# Patient Record
Sex: FEMALE | Race: WHITE | NOT HISPANIC OR LATINO | Employment: FULL TIME | URBAN - METROPOLITAN AREA
[De-identification: names, ages, dates, MRNs, and addresses within clinical notes are randomized per-mention and may not be internally consistent; named-entity substitution may affect disease eponyms.]

---

## 2017-01-05 ENCOUNTER — PATIENT MESSAGE (OUTPATIENT)
Dept: INTERNAL MEDICINE | Facility: CLINIC | Age: 57
End: 2017-01-05

## 2017-01-10 ENCOUNTER — OFFICE VISIT (OUTPATIENT)
Dept: OBSTETRICS AND GYNECOLOGY | Facility: CLINIC | Age: 57
End: 2017-01-10
Attending: OBSTETRICS & GYNECOLOGY
Payer: COMMERCIAL

## 2017-01-10 ENCOUNTER — HOSPITAL ENCOUNTER (OUTPATIENT)
Dept: RADIOLOGY | Facility: HOSPITAL | Age: 57
Discharge: HOME | End: 2017-01-10
Attending: OBSTETRICS & GYNECOLOGY
Payer: COMMERCIAL

## 2017-01-10 VITALS
SYSTOLIC BLOOD PRESSURE: 110 MMHG | WEIGHT: 199.94 LBS | BODY MASS INDEX: 29.53 KG/M2 | DIASTOLIC BLOOD PRESSURE: 80 MMHG

## 2017-01-10 DIAGNOSIS — N94.10 FEMALE DYSPAREUNIA: ICD-10-CM

## 2017-01-10 DIAGNOSIS — R10.32 LEFT LOWER QUADRANT PAIN: Primary | ICD-10-CM

## 2017-01-10 DIAGNOSIS — R10.32 LEFT LOWER QUADRANT PAIN: ICD-10-CM

## 2017-01-10 DIAGNOSIS — N95.1 VAGINAL DRYNESS, MENOPAUSAL: ICD-10-CM

## 2017-01-10 PROCEDURE — 76856 US EXAM PELVIC COMPLETE: CPT | Mod: 26,,, | Performed by: RADIOLOGY

## 2017-01-10 PROCEDURE — 99999 PR PBB SHADOW E&M-EST. PATIENT-LVL III: CPT | Mod: PBBFAC,,, | Performed by: OBSTETRICS & GYNECOLOGY

## 2017-01-10 PROCEDURE — 76830 TRANSVAGINAL US NON-OB: CPT | Mod: 26,,, | Performed by: RADIOLOGY

## 2017-01-10 PROCEDURE — 76856 US EXAM PELVIC COMPLETE: CPT | Mod: TC

## 2017-01-10 PROCEDURE — 99203 OFFICE O/P NEW LOW 30 MIN: CPT | Mod: S$GLB,,, | Performed by: OBSTETRICS & GYNECOLOGY

## 2017-01-10 RX ORDER — ESTRADIOL 0.1 MG/G
1 CREAM VAGINAL DAILY
Qty: 42.5 G | Refills: 1 | Status: SHIPPED | OUTPATIENT
Start: 2017-01-10 | End: 2017-12-05 | Stop reason: SDUPTHER

## 2017-01-10 NOTE — PROGRESS NOTES
HISTORY OF PRESENT ILLNESS:    Josefina Blue is a 56 y.o. female, , No LMP recorded. Patient has had a hysterectomy.,  presents for a problem visit, complaining of vaginal pain and dryness with intercourse.   She has also had constant left lower quadrant pain for the last few weeks.  No nausea, vomiting, fever.  She has a history of LSC cautery of endometriosis in her 30s, then RALH  for abnormal bleeding.    History of mild dysplasia;  Normal pap 1.5 years ago.  MMG is up to date.    Past Medical History   Diagnosis Date    Hyperlipidemia 2015    Hypertension     IFG (impaired fasting glucose) 2015    Neuropathic pain 2015    Pernicious anemia 2015    Squamous cell cancer of tongue     Unspecified essential hypertension 2015       Past Surgical History   Procedure Laterality Date    Hysterectomy      Tongue surgery      Knee arthroscopy Right      for torn meniscus    Cholecystectomy      Gallbladder surgery  2016       MEDICATIONS AND ALLERGIES:      Current Outpatient Prescriptions:     alprazolam (XANAX) 0.25 MG tablet, TAKE 1 TABLET BY MOUTH EVERY NIGHT AS NEEDED FOR ANXIETY, Disp: 30 tablet, Rfl: 2    aspirin (ECOTRIN) 81 MG EC tablet, Take 81 mg by mouth nightly., Disp: , Rfl:     clobetasol (TEMOVATE) 0.05 % external solution, Apply topically 2 (two) times daily. Use one - two times daily as needed for scaling or itching to scalp, Disp: 60 mL, Rfl: 3    irbesartan (AVAPRO) 75 MG tablet, Take 1 tablet (75 mg total) by mouth every evening., Disp: 90 tablet, Rfl: 3    multivitamin capsule, Take 1 capsule by mouth nightly. , Disp: , Rfl:     rosuvastatin (CRESTOR) 5 MG tablet, Take 0.5 tablets (2.5 mg total) by mouth once daily. (Patient taking differently: Take 2.5 mg by mouth every evening. Takes every other night), Disp: 45 tablet, Rfl: 3    tretinoin (RETIN-A) 0.05 % cream, Apply topically nightly. Apply pea-sized amount to face., Disp: 45 g,  Rfl: 2    triamterene-hydrochlorothiazide 37.5-25 mg (DYAZIDE) 37.5-25 mg per capsule, Take 1 capsule by mouth every morning., Disp: 90 capsule, Rfl: 3    zolpidem (AMBIEN) 5 MG Tab, TAKE 1 TABLET BY MOUTH EVERY NIGHT AS NEEDED, Disp: 30 tablet, Rfl: 1    estradiol (ESTRACE) 0.01 % (0.1 mg/gram) vaginal cream, Place 1 g vaginally once daily., Disp: 42.5 g, Rfl: 1    Review of patient's allergies indicates:   Allergen Reactions    Aspirin Nausea Only     Can take low dose of Aspirin    Codeine Nausea Only     Can take Codeine if she takes a dose of Zofran with it       Family History   Problem Relation Age of Onset    Alcohol abuse Mother     Cirrhosis Mother     Cancer Father 74     prostate, throat, lung- smoker    Hyperlipidemia Father     Diabetes Sister     Hypertension Sister     Drug abuse Brother     Heart disease Brother     Hyperlipidemia Brother     No Known Problems Maternal Aunt     No Known Problems Maternal Uncle     No Known Problems Paternal Aunt     No Known Problems Paternal Uncle     No Known Problems Maternal Grandmother     No Known Problems Maternal Grandfather     No Known Problems Paternal Grandmother     No Known Problems Paternal Grandfather     Amblyopia Neg Hx     Blindness Neg Hx     Cataracts Neg Hx     Glaucoma Neg Hx     Macular degeneration Neg Hx     Retinal detachment Neg Hx     Strabismus Neg Hx     Stroke Neg Hx     Thyroid disease Neg Hx        Social History     Social History    Marital status:      Spouse name: N/A    Number of children: N/A    Years of education: N/A     Occupational History    chief nursing officer      Social History Main Topics    Smoking status: Former Smoker     Packs/day: 0.25     Years: 1.00    Smokeless tobacco: Never Used    Alcohol use 0.0 oz/week     0 Standard drinks or equivalent per week      Comment: occ    Drug use: No    Sexual activity: Yes     Birth control/ protection: None     Other Topics  Concern    Not on file     Social History Narrative    Lives with  and 2 dogs       COMPREHENSIVE GYN HISTORY:  Infection History: Denies STDs. Denies PID.  Benign History: Denies uterine fibroids. Denies ovarian cysts. Denies endometriosis. Denies other conditions.  Cancer History: Denies cervical cancer. Denies uterine cancer or hyperplasia. Denies ovarian cancer. Denies vulvar cancer or pre-cancer. Denies vaginal cancer or pre-cancer. Denies breast cancer. Denies colon cancer.    ROS:  GENERAL: No weight changes. No swelling. No fatigue. No fever.  CARDIOVASCULAR: No chest pain. No shortness of breath. No leg cramps.   NEUROLOGICAL: No headaches. No vision changes.  BREASTS: No pain. No lumps. No discharge.  ABDOMEN:  No nausea. No vomiting. No diarrhea. No constipation.  REPRODUCTIVE: No abnormal bleeding.   VULVA: No pain. No lesions. No itching.  VAGINA: No relaxation. No itching. No odor. No discharge. No lesions.  URINARY: No incontinence. No nocturia. No frequency. No dysuria.    Visit Vitals    /80    Wt 90.7 kg (199 lb 15.3 oz)    BMI 29.53 kg/m2       PE:  APPEARANCE: Well nourished, well developed, in no acute distress.  ABDOMEN: Soft. No tenderness or masses. No hepatosplenomegaly. No hernias.  BREASTS, FUNDOSCOPIC, RECTAL DEFERRED  PELVIC: External female genitalia without lesions.  Female hair distribution. Adequate perineal body, Normal urethral meatus. Vagina atrophic without lesions or discharge.  No significant cystocele or rectocele present. Cervix and uterus absent.   Adnexa without masses;  Mild tenderness LLQ to deep palpation.  EXTREMITIES: No edema      DIAGNOSIS:  1. Left lower quadrant pain  US Pelvis Complete Non OB   2. Female dyspareunia  estradiol (ESTRACE) 0.01 % (0.1 mg/gram) vaginal cream   3. Vaginal dryness, menopausal  estradiol (ESTRACE) 0.01 % (0.1 mg/gram) vaginal cream       COUNSELING:  Discussed options for treatment of vaginal dryness.  Discussed  follow-up with GI if pelvic ultrasound is normal

## 2017-01-10 NOTE — MR AVS SNAPSHOT
[facility] [address]  [address]  [address]  [facility] [address]  Phone: [phone]  Fax: [phone]                  Josefina Blue   1/10/2017 8:15 AM   Office Visit    Description:  Female : 1960   Provider:  Shawna Myers MD   Department:  [facility] [address]           Reason for Visit     Abdominal Pain                To Do List           Goals (5 Years of Data)     None      [facility]     [facility] On Call Nurse [facility] -  Assistance  Registered nurses in the [facility] On Call Center provide clinical advisement, health education, appointment booking, and other advisory services.  Call for this free service at [phone].             Medications           Message regarding Medications     Verify the changes and/or additions to your medication regime listed below are the same as discussed with your clinician today.  If any of these changes or additions are incorrect, please notify your healthcare provider.             Verify that the below list of medications is an accurate representation of the medications you are currently taking.  If none reported, the list may be blank. If incorrect, please contact your healthcare provider. Carry this list with you in case of emergency.           Current Medications     alprazolam (XANAX) 0.25 MG tablet TAKE 1 TABLET BY MOUTH EVERY NIGHT AS NEEDED FOR ANXIETY    aspirin (ECOTRIN) 81 MG EC tablet Take 81 mg by mouth nightly.    clobetasol (TEMOVATE) 0.05 % external solution Apply topically 2 (two) times daily. Use one - two times daily as needed for scaling or itching to scalp    irbesartan (AVAPRO) 75 MG tablet Take 1 tablet (75 mg total) by mouth every evening.    multivitamin capsule Take 1 capsule by mouth nightly.     rosuvastatin (CRESTOR) 5 MG tablet Take 0.5 tablets (2.5 mg total) by mouth once daily.    tretinoin (RETIN-A) 0.05 % cream Apply topically nightly. Apply pea-sized amount to face.     triamterene-hydrochlorothiazide 37.5-25 mg (DYAZIDE) 37.5-25 mg per capsule Take 1 capsule by mouth every morning.    zolpidem (AMBIEN) 5 MG Tab TAKE 1 TABLET BY MOUTH EVERY NIGHT AS NEEDED           Clinical Reference Information           Vital Signs - Last Recorded  Most recent update: 1/10/2017  8:28 AM by Akosua Mitchell MA    BP Wt BMI          110/80 90.7 kg (199 lb 15.3 oz) 29.53 kg/m2        Blood Pressure          Most Recent Value    BP  110/80      Allergies as of 1/10/2017     Aspirin    Codeine      Immunizations Administered on Date of Encounter - 1/10/2017     None

## 2017-01-11 ENCOUNTER — PATIENT MESSAGE (OUTPATIENT)
Dept: OBSTETRICS AND GYNECOLOGY | Facility: CLINIC | Age: 57
End: 2017-01-11

## 2017-01-12 ENCOUNTER — PATIENT MESSAGE (OUTPATIENT)
Dept: OBSTETRICS AND GYNECOLOGY | Facility: CLINIC | Age: 57
End: 2017-01-12

## 2017-02-15 DIAGNOSIS — G47.00 INSOMNIA: ICD-10-CM

## 2017-02-15 DIAGNOSIS — F41.8 SITUATIONAL ANXIETY: ICD-10-CM

## 2017-02-16 RX ORDER — ALPRAZOLAM 0.25 MG/1
TABLET ORAL
Qty: 30 TABLET | Refills: 0 | Status: SHIPPED | OUTPATIENT
Start: 2017-02-16 | End: 2017-03-20 | Stop reason: SDUPTHER

## 2017-02-16 RX ORDER — ZOLPIDEM TARTRATE 5 MG/1
TABLET ORAL
Qty: 30 TABLET | Refills: 0 | Status: SHIPPED | OUTPATIENT
Start: 2017-02-16 | End: 2017-03-20 | Stop reason: SDUPTHER

## 2017-02-26 ENCOUNTER — HOSPITAL ENCOUNTER (EMERGENCY)
Facility: HOSPITAL | Age: 57
Discharge: HOME | End: 2017-02-26
Attending: EMERGENCY MEDICINE
Payer: COMMERCIAL

## 2017-02-26 VITALS
TEMPERATURE: 100 F | DIASTOLIC BLOOD PRESSURE: 75 MMHG | SYSTOLIC BLOOD PRESSURE: 132 MMHG | OXYGEN SATURATION: 98 % | WEIGHT: 196 LBS | HEIGHT: 69 IN | RESPIRATION RATE: 24 BRPM | HEART RATE: 104 BPM | BODY MASS INDEX: 29.03 KG/M2

## 2017-02-26 DIAGNOSIS — J04.0 LARYNGITIS: Primary | ICD-10-CM

## 2017-02-26 DIAGNOSIS — R00.0 TACHYCARDIA: ICD-10-CM

## 2017-02-26 DIAGNOSIS — J20.9 ACUTE BRONCHITIS, UNSPECIFIED ORGANISM: ICD-10-CM

## 2017-02-26 DIAGNOSIS — R06.00 DYSPNEA, UNSPECIFIED TYPE: ICD-10-CM

## 2017-02-26 LAB
ALBUMIN SERPL BCP-MCNC: 3.9 G/DL
ALP SERPL-CCNC: 69 U/L
ALT SERPL W/O P-5'-P-CCNC: 21 U/L
ANION GAP SERPL CALC-SCNC: 9 MMOL/L
AST SERPL-CCNC: 18 U/L
BILIRUB SERPL-MCNC: 0.4 MG/DL
BUN SERPL-MCNC: 20 MG/DL
CALCIUM SERPL-MCNC: 8.7 MG/DL
CHLORIDE SERPL-SCNC: 106 MMOL/L
CO2 SERPL-SCNC: 24 MMOL/L
CREAT SERPL-MCNC: 0.9 MG/DL
ERYTHROCYTE [DISTWIDTH] IN BLOOD BY AUTOMATED COUNT: 13.9 %
EST. GFR  (AFRICAN AMERICAN): >60 ML/MIN/1.73 M^2
EST. GFR  (NON AFRICAN AMERICAN): >60 ML/MIN/1.73 M^2
FLUAV AG SPEC QL IA: NEGATIVE
FLUBV AG SPEC QL IA: NEGATIVE
GLUCOSE SERPL-MCNC: 119 MG/DL
HCT VFR BLD AUTO: 36.6 %
HGB BLD-MCNC: 12.2 G/DL
MCH RBC QN AUTO: 28.6 PG
MCHC RBC AUTO-ENTMCNC: 33.3 %
MCV RBC AUTO: 86 FL
PLATELET # BLD AUTO: 182 K/UL
PMV BLD AUTO: 10.3 FL
POTASSIUM SERPL-SCNC: 3.6 MMOL/L
PROT SERPL-MCNC: 6.9 G/DL
RBC # BLD AUTO: 4.27 M/UL
SODIUM SERPL-SCNC: 139 MMOL/L
SPECIMEN SOURCE: NORMAL
WBC # BLD AUTO: 11.51 K/UL

## 2017-02-26 PROCEDURE — 99284 EMERGENCY DEPT VISIT MOD MDM: CPT | Mod: ,,, | Performed by: EMERGENCY MEDICINE

## 2017-02-26 PROCEDURE — 93010 ELECTROCARDIOGRAM REPORT: CPT | Mod: ,,, | Performed by: INTERNAL MEDICINE

## 2017-02-26 PROCEDURE — 94761 N-INVAS EAR/PLS OXIMETRY MLT: CPT

## 2017-02-26 PROCEDURE — 96374 THER/PROPH/DIAG INJ IV PUSH: CPT

## 2017-02-26 PROCEDURE — 87400 INFLUENZA A/B EACH AG IA: CPT

## 2017-02-26 PROCEDURE — 63600175 PHARM REV CODE 636 W HCPCS: Performed by: EMERGENCY MEDICINE

## 2017-02-26 PROCEDURE — 25000242 PHARM REV CODE 250 ALT 637 W/ HCPCS: Performed by: EMERGENCY MEDICINE

## 2017-02-26 PROCEDURE — 25000003 PHARM REV CODE 250: Performed by: EMERGENCY MEDICINE

## 2017-02-26 PROCEDURE — 94640 AIRWAY INHALATION TREATMENT: CPT

## 2017-02-26 PROCEDURE — 85027 COMPLETE CBC AUTOMATED: CPT

## 2017-02-26 PROCEDURE — 99284 EMERGENCY DEPT VISIT MOD MDM: CPT | Mod: 25

## 2017-02-26 PROCEDURE — 93005 ELECTROCARDIOGRAM TRACING: CPT

## 2017-02-26 PROCEDURE — 80053 COMPREHEN METABOLIC PANEL: CPT

## 2017-02-26 RX ORDER — ALBUTEROL SULFATE 2.5 MG/.5ML
2.5 SOLUTION RESPIRATORY (INHALATION)
Status: COMPLETED | OUTPATIENT
Start: 2017-02-26 | End: 2017-02-26

## 2017-02-26 RX ORDER — DEXAMETHASONE SODIUM PHOSPHATE 4 MG/ML
8 INJECTION, SOLUTION INTRA-ARTICULAR; INTRALESIONAL; INTRAMUSCULAR; INTRAVENOUS; SOFT TISSUE
Status: COMPLETED | OUTPATIENT
Start: 2017-02-26 | End: 2017-02-26

## 2017-02-26 RX ORDER — ALBUTEROL SULFATE 90 UG/1
1-2 AEROSOL, METERED RESPIRATORY (INHALATION) EVERY 6 HOURS PRN
Qty: 1 INHALER | Refills: 0 | Status: SHIPPED | OUTPATIENT
Start: 2017-02-26 | End: 2017-05-30

## 2017-02-26 RX ADMIN — SODIUM CHLORIDE 1000 ML: 0.9 INJECTION, SOLUTION INTRAVENOUS at 07:02

## 2017-02-26 RX ADMIN — ALBUTEROL SULFATE 2.5 MG: 2.5 SOLUTION RESPIRATORY (INHALATION) at 06:02

## 2017-02-26 RX ADMIN — DEXAMETHASONE SODIUM PHOSPHATE 8 MG: 4 INJECTION, SOLUTION INTRAMUSCULAR; INTRAVENOUS at 07:02

## 2017-02-26 NOTE — DISCHARGE INSTRUCTIONS
What Is Acute Bronchitis?  Acute or short-term bronchitis last for days or weeks. It occurs when the bronchial tubes (airways in the lungs) are irritated by a virus, bacteria, or allergen. This causes a cough that produces yellow or greenish mucus.  Inside healthy lungs    Air travels in and out of the lungs through the airways. The linings of these airways produce sticky mucus. This mucus traps particles that enter the lungs. Tiny structures called cilia then sweep the particles out of the airways.     Healthy airway: Airways are normally open. Air moves in and out easily.      Healthy cilia: Tiny, hairlike cilia sweep mucus and particles up and out of the airways.   Lungs with bronchitis  Bronchitis often occurs with a cold or the flu virus. The airways become inflamed (red and swollen). There is a deep hacking cough from the extra mucus. Other symptoms may include:  · Wheezing  · Chest discomfort  · Shortness of breath  · Mild fever  A second infection, this time due to bacteria, may then occur. And airways irritated by allergens or smoke are more likely to get infected.        Inflamed airway: Inflammation and extra mucus narrow the airway, causing shortness of breath.      Impaired cilia: Extra mucus impairs cilia, causing congestion and wheezing. Smoking makes the problem worse.   Making a diagnosis  A physical exam, health history, and certain tests help your healthcare provider make the diagnosis.  Health history  Your healthcare provider will ask you about your symptoms.  The exam  Your provider listens to your chest for signs of congestion. He or she may also check your ears, nose, and throat.  Possible tests  · A sputum test for bacteria. This requires a sample of mucus from the lungs.  · A nasal or throat swab for bacterial infection.  · A chest X-ray if your healthcare provider thinks you have pneumonia.  · Tests to check for an underlying condition, such as allergies, asthma, or COPD. You may need  to see a specialist for more lung function testing.  Treating a cough  The main treatment for bronchitis is easing symptoms. Avoiding smoke, allergens, and other things that trigger coughing can often help. If the infection is bacterial, you may be given antibiotics. During the illness, it's important to get plenty of sleep. To ease symptoms:  · Dont smoke, and avoid secondhand smoke.  · Use a humidifier, or breathe in steam from a hot shower. This may help loosen mucus.  · Drink a lot of water and juice. They can soothe the throat and may help thin mucus.  · Sit up or use extra pillows when in bed to help lessen coughing and congestion.  · Ask your provider about using cough medicine, pain and fever medicine, or a decongestant.  Antibiotics  Most cases of bronchitis are caused by cold or flu viruses. Antibiotics dont treat viral illness. Taking antibiotics when they are not needed increases your risk of getting an infection later that is antibiotic-resistant. Your provider will prescribe antibiotics if the infection is caused by bacteria. If they are prescribed:  · Take the medicine until it is used up, even if symptoms have improved. If you dont, the bronchitis may come back.  · Take them as directed. For instance, some medicines should be taken with food.  · Ask your provider or pharmacist what side effects are common, and what to do about them.  Follow-up care  You should see your provider again in 2 to 3 weeks. By this time, symptoms should have improved. An infection that lasts longer may mean you have a more serious problem.  Prevention  · Avoid tobacco smoke. If you smoke, quit. Stay away from smoky places. Ask friends and family not to smoke around you, or in your home or car.  · Get checked for allergies.  · Ask your provider about getting a yearly flu shot, and pneumococcal or pneumonia shots.  · Wash your hands often. This helps reduce the chance of picking up viruses that cause colds and flu.  Call  your healthcare provider if:  · Symptoms worsen, or new symptoms develop.  · Breathing problems worsen or  become severe.  · Symptoms dont get better within a week, or within 3 days of taking antibiotics.   Date Last Reviewed: 6/18/2014  © 5819-7791 [employer]. [address] [place] [address], [place] [address]. All rights reserved. This information is not intended as a substitute for professional medical care. Always follow your healthcare professional's instructions.

## 2017-02-26 NOTE — ED TRIAGE NOTES
Pt presents to ED c/o SOB and soreness of throat that started this morning. Pt stated that she was woken up in her sleep by SOB. Pt stated that SOB is worse when laying flat. Pt also c/o hoarseness, body aches, and a frequent nonproductive cough. Pt denies fever, chills, N/V/D.     LOC: Patient name and date of birth verified.  The patient is awake, alert and aware of environment with an appropriate affect, the patient is oriented x 3 and speaking appropriately.  Pt in NAD.    APPEARANCE: Patient resting comfortably and in no acute distress, patient is clean and well groomed, patient's clothing is properly fastened.  SKIN: The skin is warm and dry, color consistent with ethnicity, patient has normal skin turgor and moist mucus membranes, skin intact, no breakdown or brusing noted.  MUSCULOSKELETAL: Patient moving all extremities well, no obvious swelling or deformities noted.  RESPIRATORY: Airway is open and patent, respirations are spontaneous, patient has a normal effort and rate, no accessory muscle use noted.  CARDIAC: Patient has a normal rate and rhythm, no periphreal edema noted, capillary refill < 3 seconds.  ABDOMEN: Soft and non tender to palpation, no distention noted. Bowel sounds present in all four quadrants.  NEUROLOGIC: Eyes open spontaneously, behavior appropriate to situation, follows commands, facial expression symmetrical, bilateral hand grasp equal and even, purposeful motor response noted, normal sensation in all extremities when touched with a finger.

## 2017-02-26 NOTE — ED PROVIDER NOTES
Encounter Date: 2/26/2017    SCRIBE #1 NOTE: I, Edelmira Shilo, am scribing for, and in the presence of, Dr. Britt.       History     Chief Complaint   Patient presents with    Shortness of Breath     Cough for several days. Woke up at 4am with shortness of breath. Chest pain when coughing.      Review of patient's allergies indicates:   Allergen Reactions    Aspirin Nausea Only     Can take low dose of Aspirin    Codeine Nausea Only     Can take Codeine if she takes a dose of Zofran with it     HPI Comments: Time seen by provider: 5:38 AM    This is a 56 y.o. female with a PMHx of HTN and HLD who presents with complaint of shortness of breath with cough. The patient states that she has been getting a sore throat for several days but states she has been feeling fine otherwise. She describes that around 4am she woke up feeling like she couldn't breath and had diaphoresis as well as heart palpitations. She states she thinks she may have had a fever and has had associated postnasal drip, myalgias, and has lost her voice. She thinks this may be secondary to being at a parade last night and yelling a lot. She denies chest pain, nausea or vomiting, and leg swelling. 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     The history is provided by the patient.     Past Medical History:   Diagnosis Date    Hyperlipidemia 8/31/2015    Hypertension     IFG (impaired fasting glucose) 8/31/2015    Neuropathic pain 8/31/2015    Pernicious anemia 8/31/2015    Squamous cell cancer of tongue 2012    Unspecified essential hypertension 8/31/2015     Past Surgical History:   Procedure Laterality Date    CHOLECYSTECTOMY      GALLBLADDER SURGERY  06/2016    HYSTERECTOMY      KNEE ARTHROSCOPY Right     for torn meniscus    TONGUE SURGERY       Family History   Problem Relation Age of Onset    Alcohol abuse Mother     Cirrhosis Mother     Cancer Father 74     prostate,  throat, lung- smoker    Hyperlipidemia Father     Diabetes Sister     Hypertension Sister     Drug abuse Brother     Heart disease Brother     Hyperlipidemia Brother     No Known Problems Maternal Aunt     No Known Problems Maternal Uncle     No Known Problems Paternal Aunt     No Known Problems Paternal Uncle     No Known Problems Maternal Grandmother     No Known Problems Maternal Grandfather     No Known Problems Paternal Grandmother     No Known Problems Paternal Grandfather     Amblyopia Neg Hx     Blindness Neg Hx     Cataracts Neg Hx     Glaucoma Neg Hx     Macular degeneration Neg Hx     Retinal detachment Neg Hx     Strabismus Neg Hx     Stroke Neg Hx     Thyroid disease Neg Hx      Social History   Substance Use Topics    Smoking status: Former Smoker     Packs/day: 0.25     Years: 1.00    Smokeless tobacco: Never Used    Alcohol use 0.0 oz/week     0 Standard drinks or equivalent per week      Comment: occ     Review of Systems   Constitutional: Positive for chills, diaphoresis and fever.   HENT: Positive for postnasal drip, sore throat and voice change. Negative for facial swelling and nosebleeds.    Eyes: Negative for visual disturbance.   Respiratory: Positive for cough, chest tightness and shortness of breath.    Cardiovascular: Positive for palpitations. Negative for chest pain and leg swelling.   Gastrointestinal: Negative for abdominal distention, abdominal pain, diarrhea, nausea and vomiting.   Genitourinary: Negative for difficulty urinating, dysuria, frequency and hematuria.   Musculoskeletal: Negative for neck pain and neck stiffness.   Skin: Negative for rash.   Neurological: Negative for seizures, syncope and speech difficulty.       Physical Exam   Initial Vitals   BP Pulse Resp Temp SpO2   02/26/17 0527 02/26/17 0527 02/26/17 0527 02/26/17 0527 02/26/17 0527   147/84 110 24 99.8 °F (37.7 °C) 98 %     Physical Exam    Nursing note and vitals  reviewed.  Constitutional: She appears well-developed and well-nourished. She is not diaphoretic. No distress.   HENT:   Head: Normocephalic and atraumatic.   Mouth/Throat: Posterior oropharyngeal erythema (Mild) present.   Uvula midline   Eyes: EOM are normal. Pupils are equal, round, and reactive to light. Right conjunctiva is not injected. Left conjunctiva is not injected.   Neck: Normal range of motion. Neck supple. No JVD present.   Cardiovascular: Regular rhythm, normal heart sounds and intact distal pulses. Tachycardia present.    Pulmonary/Chest: Breath sounds normal. No respiratory distress. She has no wheezes. She has no rhonchi. She has no rales.   Abdominal: Soft. Bowel sounds are normal. She exhibits no distension. There is no tenderness.   Musculoskeletal: Normal range of motion. She exhibits no edema.   No calf asymmetry or tenderness, no pitting edema.   Lymphadenopathy:     She has no cervical adenopathy.   Neurological: She is alert and oriented to person, place, and time. She has normal strength. No cranial nerve deficit or sensory deficit.   Skin: Skin is warm and dry.         ED Course   Procedures  Labs Reviewed   HEMATOLOGY PROFILE - Abnormal; Notable for the following:        Result Value    Hematocrit 36.6 (*)     All other components within normal limits   COMPREHENSIVE METABOLIC PANEL - Abnormal; Notable for the following:     Glucose 119 (*)     All other components within normal limits   INFLUENZA A AND B ANTIGEN   CBC W/ AUTO DIFFERENTIAL   COMPREHENSIVE METABOLIC PANEL   INFLUENZA A AND B ANTIGEN          X-Rays:   Independently Interpreted Readings:   Other Readings:  ST at 103, nl intervals, nl axis, no ischemia    Medical Decision Making:   History:   Old Medical Records: I decided to obtain old medical records.  Initial Assessment:   Emergent evaluation of a 56 y.o. female who presents with tachycardia, shortness of breath and cough. My initial differential diagnoses include, but  are not limited to: pneumonia, bronchitis, laryngitis, influenza. Will do labs and reevaluate.   Clinical Tests:   Lab Tests: Ordered and Reviewed  Radiological Study: Ordered and Reviewed  Medical Tests: Ordered and Reviewed  ED Management:  - IVF  - albuterol nebulizer  - CXR  - labs  - EKG    Patient reports significant improvement after albuterol nebulizer.  Chest x-ray reviewed with no evidence of pneumonia.  Patient treated with IV fluids and Decadron IV.      Patient stable for discharge to follow-up with PCP for further evaluation.              Scribe Attestation:   Scribe #1: I performed the above scribed service and the documentation accurately describes the services I performed. I attest to the accuracy of the note.    Attending Attestation:           Physician Attestation for Scribe:  Physician Attestation Statement for Scribe #1: I, Dr. Britt, reviewed documentation, as scribed by Edelmira Lao in my presence, and it is both accurate and complete.                 ED Course     Clinical Impression:   The primary encounter diagnosis was Laryngitis. Diagnoses of Acute bronchitis, unspecified organism, Dyspnea, unspecified type, and Tachycardia were also pertinent to this visit.    Disposition:   Disposition: Discharged  Condition: Stable       Alexandra Britt MD  02/26/17 0767

## 2017-02-26 NOTE — ED AVS SNAPSHOT
[facility]  [address] [facility] [address]               Josefina Blue   2017  5:24 AM   ED    Description:  Female : 1960   Department:  [facility]           Your Care was Coordinated By:     Provider Role From To    Alexandra Britt MD Attending Provider 17 0532 --      Reason for Visit     Shortness of Breath           Diagnoses this Visit        Comments    Laryngitis    -  Primary     Acute bronchitis, unspecified organism         Dyspnea, unspecified type         Tachycardia           ED Disposition     ED Disposition Condition Comment    Discharge  - please discharge after IVF           To Do List           Follow-up Information     Follow up with Jo-Ann Collins MD. Schedule an appointment as soon as possible for a visit in 1 week.    Specialty:  Internal Medicine    Contact information:    [address]  [place] [address]  [phone]         These Medications        Disp Refills Start End    albuterol 90 mcg/actuation inhaler 1 Inhaler 0 2017    Inhale 1-2 puffs into the lungs every 6 (six) hours as needed for Wheezing. Rescue - Inhalation    Pharmacy: [facility] - [place], [place] - [address] Ph #: [phone]         [facility]     [facility] Care [facility] -  Assistance  Registered nurses in the [facility] provide clinical advisement, health education, appointment booking, and other advisory services.  Call for this free service at [phone].             Medications           Message regarding Medications     Verify the changes and/or additions to your medication regime listed below are the same as discussed with your clinician today.  If any of these changes or additions are incorrect, please notify your healthcare provider.        START taking these NEW medications        Refills    albuterol 90 mcg/actuation inhaler 0    Sig:  Inhale 1-2 puffs into the lungs every 6 (six) hours as needed for Wheezing. Rescue    Class: Print    Route: Inhalation      These medications were administered today        Dose Freq    albuterol sulfate nebulizer solution 2.5 mg 2.5 mg ED 1 Time    Sig: Take 2.5 mg by nebulization ED 1 Time.    Class: Normal    Route: Nebulization    dexamethasone injection 8 mg 8 mg ED 1 Time    Sig: Inject 2 mLs (8 mg total) into the vein ED 1 Time.    Class: Normal    Route: Intravenous    sodium chloride 0.9% bolus 1,000 mL 1,000 mL ED 1 Time    Sig: Inject 1,000 mLs into the vein ED 1 Time.    Class: Normal    Route: Intravenous           Verify that the below list of medications is an accurate representation of the medications you are currently taking.  If none reported, the list may be blank. If incorrect, please contact your healthcare provider. Carry this list with you in case of emergency.           Current Medications     albuterol 90 mcg/actuation inhaler Inhale 1-2 puffs into the lungs every 6 (six) hours as needed for Wheezing. Rescue    albuterol sulfate nebulizer solution 2.5 mg Take 2.5 mg by nebulization ED 1 Time.    alprazolam (XANAX) 0.25 MG tablet TAKE ONE TABLET BY MOUTH EVERY NIGHT AS NEEDED FOR ANXIETY    aspirin (ECOTRIN) 81 MG EC tablet Take 81 mg by mouth nightly.    clobetasol (TEMOVATE) 0.05 % external solution Apply topically 2 (two) times daily. Use one - two times daily as needed for scaling or itching to scalp    dexamethasone injection 8 mg Inject 2 mLs (8 mg total) into the vein ED 1 Time.    estradiol (ESTRACE) 0.01 % (0.1 mg/gram) vaginal cream Place 1 g vaginally once daily.    irbesartan (AVAPRO) 75 MG tablet Take 1 tablet (75 mg total) by mouth every evening.    multivitamin capsule Take 1 capsule by mouth nightly.     rosuvastatin (CRESTOR) 5 MG tablet Take 0.5 tablets (2.5 mg total) by mouth once daily.    sodium chloride 0.9% bolus 1,000 mL Inject 1,000 mLs into the vein ED 1 Time.     tretinoin (RETIN-A) 0.05 % cream Apply topically nightly. Apply pea-sized amount to face.    triamterene-hydrochlorothiazide 37.5-25 mg (DYAZIDE) 37.5-25 mg per capsule Take 1 capsule by mouth every morning.    zolpidem (AMBIEN) 5 MG Tab TAKE ONE TABLET BY MOUTH EVERY NIGHT AT BEDTIME AS NEEDED           Clinical Reference Information           Your Vitals Were     BP                   132/75           Allergies as of 2/26/2017        Reactions    Aspirin Nausea Only    Can take low dose of Aspirin    Codeine Nausea Only    Can take Codeine if she takes a dose of Zofran with it      Immunizations Administered on Date of Encounter - 2/26/2017     None      ED Micro, Lab, POCT     Start Ordered       Status Ordering Provider    02/26/17 0603 02/26/17 0603  CBC Without Differential  Once      Final result     02/26/17 0603 02/26/17 0603  Comprehensive metabolic panel  Once      Final result     02/26/17 0601 02/26/17 0601  Influenza antigen  Once      Final result     02/26/17 0545 02/26/17 0544  Influenza antigen Nasopharyngeal Swab  STAT      In process     02/26/17 0544 02/26/17 0544  CBC auto differential  STAT      In process     02/26/17 0544 02/26/17 0544  Comprehensive metabolic panel  STAT      In process       ED Imaging Orders     Start Ordered       Status Ordering Provider    02/26/17 0545 02/26/17 0544  X-Ray Chest PA And Lateral  1 time imaging      Final result         Discharge Instructions         What Is Acute Bronchitis?  Acute or short-term bronchitis last for days or weeks. It occurs when the bronchial tubes (airways in the lungs) are irritated by a virus, bacteria, or allergen. This causes a cough that produces yellow or greenish mucus.  Inside healthy lungs    Air travels in and out of the lungs through the airways. The linings of these airways produce sticky mucus. This mucus traps particles that enter the lungs. Tiny structures called cilia then sweep the particles out of the airways.      Healthy airway: Airways are normally open. Air moves in and out easily.      Healthy cilia: Tiny, hairlike cilia sweep mucus and particles up and out of the airways.   Lungs with bronchitis  Bronchitis often occurs with a cold or the flu virus. The airways become inflamed (red and swollen). There is a deep hacking cough from the extra mucus. Other symptoms may include:  · Wheezing  · Chest discomfort  · Shortness of breath  · Mild fever  A second infection, this time due to bacteria, may then occur. And airways irritated by allergens or smoke are more likely to get infected.        Inflamed airway: Inflammation and extra mucus narrow the airway, causing shortness of breath.      Impaired cilia: Extra mucus impairs cilia, causing congestion and wheezing. Smoking makes the problem worse.   Making a diagnosis  A physical exam, health history, and certain tests help your healthcare provider make the diagnosis.  Health history  Your healthcare provider will ask you about your symptoms.  The exam  Your provider listens to your chest for signs of congestion. He or she may also check your ears, nose, and throat.  Possible tests  · A sputum test for bacteria. This requires a sample of mucus from the lungs.  · A nasal or throat swab for bacterial infection.  · A chest X-ray if your healthcare provider thinks you have pneumonia.  · Tests to check for an underlying condition, such as allergies, asthma, or COPD. You may need to see a specialist for more lung function testing.  Treating a cough  The main treatment for bronchitis is easing symptoms. Avoiding smoke, allergens, and other things that trigger coughing can often help. If the infection is bacterial, you may be given antibiotics. During the illness, it's important to get plenty of sleep. To ease symptoms:  · Dont smoke, and avoid secondhand smoke.  · Use a humidifier, or breathe in steam from a hot shower. This may help loosen mucus.  · Drink a lot of water and  juice. They can soothe the throat and may help thin mucus.  · Sit up or use extra pillows when in bed to help lessen coughing and congestion.  · Ask your provider about using cough medicine, pain and fever medicine, or a decongestant.  Antibiotics  Most cases of bronchitis are caused by cold or flu viruses. Antibiotics dont treat viral illness. Taking antibiotics when they are not needed increases your risk of getting an infection later that is antibiotic-resistant. Your provider will prescribe antibiotics if the infection is caused by bacteria. If they are prescribed:  · Take the medicine until it is used up, even if symptoms have improved. If you dont, the bronchitis may come back.  · Take them as directed. For instance, some medicines should be taken with food.  · Ask your provider or pharmacist what side effects are common, and what to do about them.  Follow-up care  You should see your provider again in 2 to 3 weeks. By this time, symptoms should have improved. An infection that lasts longer may mean you have a more serious problem.  Prevention  · Avoid tobacco smoke. If you smoke, quit. Stay away from smoky places. Ask friends and family not to smoke around you, or in your home or car.  · Get checked for allergies.  · Ask your provider about getting a yearly flu shot, and pneumococcal or pneumonia shots.  · Wash your hands often. This helps reduce the chance of picking up viruses that cause colds and flu.  Call your healthcare provider if:  · Symptoms worsen, or new symptoms develop.  · Breathing problems worsen or  become severe.  · Symptoms dont get better within a week, or within 3 days of taking antibiotics.   Date Last Reviewed: 6/18/2014 © 2000-2016 [employer]. [address] [place] [address], [place] [address]. All rights reserved. This information is not intended as a substitute for professional medical care. Always follow your healthcare professional's instructions.          Discharge  References/Attachments     LARYNGITIS (ENGLISH)      Smoking Cessation     If you would like to quit smoking:   You may be eligible for free services if you are a [place] resident and started smoking cigarettes before September 1, 1988.  Call the Smoking Cessation [employer] (SCT) toll free at (462) 568-6606 or (637) 229-9145.   Call 1-800-QUIT-NOW if you do not meet the above criteria.             [facility] complies with applicable Federal civil rights laws and does not discriminate on the basis of race, color, national origin, age, disability, or sex.        Language Assistance Services     ATTENTION: Language assistance services are available, free of charge. Please call [phone].      ATENCIÓN: Si habla español, tiene a hill disposición servicios gratuitos de asistencia lingüística. Llame al [phone].     CHÚ Ý: N?u b?n nói Ti?ng Vi?t, có các d?ch v? h? tr? ngôn ng? mi?n phí dành cho b?n. G?i s? [phone].

## 2017-02-28 ENCOUNTER — TELEPHONE (OUTPATIENT)
Dept: INTERNAL MEDICINE | Facility: CLINIC | Age: 57
End: 2017-02-28

## 2017-02-28 NOTE — TELEPHONE ENCOUNTER
Please call pt to schedule follow up appt this week or next week from ED visit - ok to take an urgent slot.

## 2017-03-01 DIAGNOSIS — M54.2 NECK PAIN: Primary | ICD-10-CM

## 2017-03-01 RX ORDER — HYDROCODONE POLISTIREX AND CHLORPHENIRAMINE POLISTIREX 10; 8 MG/5ML; MG/5ML
5 SUSPENSION, EXTENDED RELEASE ORAL EVERY 12 HOURS PRN
Qty: 120 ML | Refills: 0 | Status: SHIPPED | OUTPATIENT
Start: 2017-03-01 | End: 2017-05-30

## 2017-03-01 RX ORDER — HYDROCODONE BITARTRATE AND ACETAMINOPHEN 10; 325 MG/1; MG/1
1 TABLET ORAL EVERY 6 HOURS PRN
Qty: 30 TABLET | Refills: 0 | OUTPATIENT
Start: 2017-03-01

## 2017-03-01 RX ORDER — METHYLPREDNISOLONE 4 MG/1
4 TABLET ORAL DAILY
Qty: 1 PACKAGE | Refills: 0 | Status: SHIPPED | OUTPATIENT
Start: 2017-03-01 | End: 2017-05-30

## 2017-03-01 RX ORDER — HYDROCODONE BITARTRATE AND ACETAMINOPHEN 10; 325 MG/1; MG/1
1 TABLET ORAL EVERY 6 HOURS PRN
Qty: 30 TABLET | Refills: 0 | Status: SHIPPED | OUTPATIENT
Start: 2017-03-01 | End: 2017-05-30

## 2017-03-01 RX ORDER — HYDROCODONE BITARTRATE AND ACETAMINOPHEN 10; 325 MG/1; MG/1
30 TABLET ORAL EVERY 6 HOURS PRN
Qty: 30 TABLET | Refills: 0 | Status: SHIPPED | OUTPATIENT
Start: 2017-03-01 | End: 2017-03-01 | Stop reason: SDUPTHER

## 2017-03-06 DIAGNOSIS — I10 ESSENTIAL HYPERTENSION: ICD-10-CM

## 2017-03-06 RX ORDER — TRIAMTERENE AND HYDROCHLOROTHIAZIDE 37.5; 25 MG/1; MG/1
CAPSULE ORAL
Qty: 90 CAPSULE | Refills: 0 | Status: SHIPPED | OUTPATIENT
Start: 2017-03-06 | End: 2017-05-30

## 2017-03-15 DIAGNOSIS — G47.00 INSOMNIA: ICD-10-CM

## 2017-03-15 DIAGNOSIS — F41.8 SITUATIONAL ANXIETY: ICD-10-CM

## 2017-03-15 DIAGNOSIS — Z00.00 ANNUAL PHYSICAL EXAM: Primary | ICD-10-CM

## 2017-03-16 ENCOUNTER — PATIENT MESSAGE (OUTPATIENT)
Dept: INTERNAL MEDICINE | Facility: CLINIC | Age: 57
End: 2017-03-16

## 2017-03-16 DIAGNOSIS — G47.00 INSOMNIA, UNSPECIFIED TYPE: ICD-10-CM

## 2017-03-16 DIAGNOSIS — F41.8 SITUATIONAL ANXIETY: ICD-10-CM

## 2017-03-16 RX ORDER — ALPRAZOLAM 0.25 MG/1
TABLET ORAL
Qty: 30 TABLET | Refills: 0 | OUTPATIENT
Start: 2017-03-16

## 2017-03-16 RX ORDER — ZOLPIDEM TARTRATE 5 MG/1
TABLET ORAL
Qty: 30 TABLET | Refills: 0 | OUTPATIENT
Start: 2017-03-16

## 2017-03-16 NOTE — TELEPHONE ENCOUNTER
Please remind pt it's almost time for annual - fasting labs 1 week prior. Last annual 4/1/16. Can you double check with her to see if she's taking ambien or xanax to help with sleep b/c it should be one or the other?

## 2017-03-20 RX ORDER — ZOLPIDEM TARTRATE 5 MG/1
5 TABLET ORAL NIGHTLY PRN
Qty: 30 TABLET | Refills: 2 | Status: SHIPPED | OUTPATIENT
Start: 2017-03-20 | End: 2017-05-30 | Stop reason: SDUPTHER

## 2017-03-20 RX ORDER — ALPRAZOLAM 0.25 MG/1
0.25 TABLET ORAL NIGHTLY PRN
Qty: 30 TABLET | Refills: 0 | Status: SHIPPED | OUTPATIENT
Start: 2017-03-20 | End: 2017-05-30 | Stop reason: SDUPTHER

## 2017-05-11 ENCOUNTER — OFFICE VISIT (OUTPATIENT)
Dept: OPTOMETRY | Facility: CLINIC | Age: 57
End: 2017-05-11
Payer: COMMERCIAL

## 2017-05-11 DIAGNOSIS — Z01.00 EXAMINATION OF EYES AND VISION: Primary | ICD-10-CM

## 2017-05-11 DIAGNOSIS — H52.4 PRESBYOPIA: ICD-10-CM

## 2017-05-11 DIAGNOSIS — H52.201 MYOPIA WITH ASTIGMATISM, RIGHT: ICD-10-CM

## 2017-05-11 DIAGNOSIS — H52.12 MYOPIA, LEFT: ICD-10-CM

## 2017-05-11 DIAGNOSIS — H52.11 MYOPIA WITH ASTIGMATISM, RIGHT: ICD-10-CM

## 2017-05-11 PROCEDURE — 92014 COMPRE OPH EXAM EST PT 1/>: CPT | Mod: S$GLB,,, | Performed by: OPTOMETRIST

## 2017-05-11 PROCEDURE — 99999 PR PBB SHADOW E&M-EST. PATIENT-LVL III: CPT | Mod: PBBFAC,,, | Performed by: OPTOMETRIST

## 2017-05-11 PROCEDURE — 92015 DETERMINE REFRACTIVE STATE: CPT | Mod: S$GLB,,, | Performed by: OPTOMETRIST

## 2017-05-11 NOTE — PATIENT INSTRUCTIONS
Myopia with slight astigmatism in the right eye, and myopia in the left eye.  Good correctable VA in each eye.  Presbyopia consistent with age.  New spectacle lens Rx issued for use in lieu of multifocal SCLs.  Has been wearing Air Optix Multifocal SCLs in both eye.  Happy with VA with the CLs and happy with lens comfort.  Came in without CLs in, but okay to duplicate last CL Rx.  New (duplicate) CL Rx issued.  Return when convenient for DFE and tonometry.  Repeat refraction in 12 - 18 months.

## 2017-05-11 NOTE — MR AVS SNAPSHOT
[facility]  [address]  [facility] [address]  Phone: [phone]  Fax: [phone]                  Josefina Blue   2017 11:00 AM   Office Visit    Description:  Female : 1960   Provider:  Sergei Coyle OD   Department:  [facility]           Reason for Visit     Concerns About Ocular Health                To Do List           Future Appointments        Provider Department Dept Phone    2017 1:20 PM MD [facility] [phone]      Goals (5 Years of Data)     None      [facility]     [facility] On Call Nurse Care Line -  Assistance  Unless otherwise directed by your provider, please contact [facility], our nurse care line that is available for  assistance.     Registered nurses in the [facility] On Call Center provide: appointment scheduling, clinical advisement, health education, and other advisory services.  Call: [phone] (toll free)               Medications           Message regarding Medications     Verify the changes and/or additions to your medication regime listed below are the same as discussed with your clinician today.  If any of these changes or additions are incorrect, please notify your healthcare provider.             Verify that the below list of medications is an accurate representation of the medications you are currently taking.  If none reported, the list may be blank. If incorrect, please contact your healthcare provider. Carry this list with you in case of emergency.           Current Medications     albuterol 90 mcg/actuation inhaler Inhale 1-2 puffs into the lungs every 6 (six) hours as needed for Wheezing. Rescue    alprazolam (XANAX) 0.25 MG tablet Take 1 tablet (0.25 mg total) by mouth nightly as needed.    aspirin (ECOTRIN) 81 MG EC tablet Take 81 mg by mouth nightly.    clobetasol (TEMOVATE) 0.05 % external solution Apply topically 2 (two) times daily. Use one - two times daily as  needed for scaling or itching to scalp    estradiol (ESTRACE) 0.01 % (0.1 mg/gram) vaginal cream Place 1 g vaginally once daily.    hydrocodone-acetaminophen 10-325mg (NORCO)  mg Tab Take 1 tablet by mouth every 6 (six) hours as needed for Pain.    hydrocodone-chlorpheniramine (TUSSIONEX) 10-8 mg/5 mL suspension Take 5 mLs by mouth every 12 (twelve) hours as needed for Cough.    irbesartan (AVAPRO) 75 MG tablet Take 1 tablet (75 mg total) by mouth every evening.    methylPREDNISolone (MEDROL DOSEPACK) 4 mg tablet Take 1 tablet (4 mg total) by mouth once daily. use as directed    multivitamin capsule Take 1 capsule by mouth nightly.     tretinoin (RETIN-A) 0.05 % cream Apply topically nightly. Apply pea-sized amount to face.    triamterene-hydrochlorothiazide 37.5-25 mg (DYAZIDE) 37.5-25 mg per capsule TAKE 1 CAPSULE BY MOUTH EVERY MORNING    zolpidem (AMBIEN) 5 MG Tab Take 1 tablet (5 mg total) by mouth nightly as needed.           Clinical Reference Information           Allergies as of 5/11/2017     Aspirin    Codeine      Immunizations Administered on Date of Encounter - 5/11/2017     None      Instructions    Myopia with slight astigmatism in the right eye, and myopia in the left eye.  Good correctable VA in each eye.  Presbyopia consistent with age.  New spectacle lens Rx issued for use in lieu of multifocal SCLs.  Has been wearing Air Optix Multifocal SCLs in both eye.  Happy with VA with the CLs and happy with lens comfort.  Came in without CLs in, but okay to duplicate last CL Rx.  New (duplicate) CL Rx issued.  Return when convenient for DFE and tonometry.  Repeat refraction in 12 - 18 months.        Language Assistance Services     ATTENTION: Language assistance services are available, free of charge. Please call [phone].      ATENCIÓN: Si yudithla basim, tiene a hill disposición servicios gratuitos de asistencia lingüística. Llame al [phone].     CHÚ Ý: N?u b?n nói Ti?ng Vi?t, có các d?ch  v? h? tr? ngôn ng? mi?n phí michih cho b?n. G?i s? [phone].         [facility] complies with applicable Federal civil rights laws and does not discriminate on the basis of race, color, national origin, age, disability, or sex.

## 2017-05-11 NOTE — PROGRESS NOTES
HPI     Concerns About Ocular Health    Additional comments: Eye exam and refraction           Comments   Patient's age: 56 y.o. WF  Occupation: [employer]  Approximate date of last eye examination:  01/12/16  Name of last eye doctor seen: Dr. Coyle   City/State: [employer] [facility]   Wears glasses? Yes     If yes, wears  Full-time or part-time?  Part-time  Present glasses are: Bifocal, SV Distance, SV Reading?  Single vision   distance, takes glasses off to read  Approximate age of present glasses:  15 months old   Got new glasses following last exam, or subsequently?:  Yes   Any problem with VA with glasses?  No  Wears CLs?:  Yes           If yes:              Type of CL worn:    Air Optix Aqua Multifocal          8.6 14.2              -2.75 / High Add  (+2.50)              -2.25 /  Med Add  (+2.00)              Wears full-time or part-time:  Full-time               Sleeps with contact lenses:  No               CL Solution used:  Opti-free Replenish               How often replace CLs:  Monthly              Any problem with VA with CLs?               Has patient read and signed the contact lens fee information   document? No  Headaches?  No  Eye pain/discomfort?  No                                                                                     Flashes?  No  Floaters?  No  Diplopia/Double vision?  No  Patient's Ocular History:         Any eye surgeries? No         Any eye injury?  2004 Shingles affecting the right eye - several   years ago         Any treatment for eye disease?  No  Family history of eye disease?  None  Significant patient medical history:         1. Diabetes?  No   2. HBP?  Yes, controlled by medication and diet                ! OTC eyedrops currently using:  None   ! Prescription eye meds currently using:  None   ! Any history of allergy/adverse reaction to any eye meds used   previously?  No    ! Any history of allergy/adverse reaction to eyedrops used during prior   eye exam(s)? No    ! Any history of  allergy/adverse reaction to Novacaine or similar meds?   No   ! Any history of allergy/adverse reaction to Epinephrine or similar meds?   No    ! Patient okay with use of anesthetic eyedrops to check eye pressure?    Yes        ! Patient okay with use of eyedrops to dilate pupils today?  NO.  Would   prefer to return at later date.    !  Allergies/Medications/Medical History/Family History reviewed today?    Yes      PD = 66/62 mm     Desired reading distance =  14 inches                                                                      Last edited by Sergei Coyle, OD on 5/11/2017 11:42 AM. (History)            Assessment /Plan     For exam results, see Encounter Report.    1. Examination of eyes and vision     2. Myopia with astigmatism, right     3. Myopia, left     4. Presbyopia                    Myopia with slight astigmatism in the right eye, and myopia in the left eye.  Good correctable VA in each eye.  Presbyopia consistent with age.  New spectacle lens Rx issued for use in lieu of multifocal SCLs.  Has been wearing Air Optix Multifocal SCLs in both eye.  Happy with VA with the CLs and happy with lens comfort.  Came in without CLs in, but okay to duplicate last CL Rx.  New (duplicate) CL Rx issued.  Return when convenient for DFE and tonometry.  Repeat refraction in 12 - 18 months.

## 2017-05-12 ENCOUNTER — TELEPHONE (OUTPATIENT)
Dept: OPTOMETRY | Facility: CLINIC | Age: 57
End: 2017-05-12

## 2017-05-19 RX ORDER — MUPIROCIN 20 MG/G
OINTMENT TOPICAL
Qty: 22 G | Refills: 1 | Status: SHIPPED | OUTPATIENT
Start: 2017-05-19 | End: 2017-05-30

## 2017-05-30 ENCOUNTER — OFFICE VISIT (OUTPATIENT)
Dept: INTERNAL MEDICINE | Facility: CLINIC | Age: 57
End: 2017-05-30
Payer: COMMERCIAL

## 2017-05-30 VITALS
RESPIRATION RATE: 17 BRPM | BODY MASS INDEX: 31.19 KG/M2 | WEIGHT: 210.56 LBS | HEART RATE: 82 BPM | TEMPERATURE: 98 F | DIASTOLIC BLOOD PRESSURE: 70 MMHG | HEIGHT: 69 IN | SYSTOLIC BLOOD PRESSURE: 112 MMHG

## 2017-05-30 DIAGNOSIS — Z00.00 ANNUAL PHYSICAL EXAM: Primary | ICD-10-CM

## 2017-05-30 DIAGNOSIS — F41.8 SITUATIONAL ANXIETY: ICD-10-CM

## 2017-05-30 DIAGNOSIS — I10 BENIGN ESSENTIAL HYPERTENSION: ICD-10-CM

## 2017-05-30 DIAGNOSIS — G47.00 INSOMNIA, UNSPECIFIED TYPE: ICD-10-CM

## 2017-05-30 DIAGNOSIS — Z12.31 ENCOUNTER FOR SCREENING MAMMOGRAM FOR BREAST CANCER: ICD-10-CM

## 2017-05-30 DIAGNOSIS — R73.01 IFG (IMPAIRED FASTING GLUCOSE): ICD-10-CM

## 2017-05-30 PROCEDURE — 99396 PREV VISIT EST AGE 40-64: CPT | Mod: S$GLB,,, | Performed by: INTERNAL MEDICINE

## 2017-05-30 PROCEDURE — 99999 PR PBB SHADOW E&M-EST. PATIENT-LVL III: CPT | Mod: PBBFAC,,, | Performed by: INTERNAL MEDICINE

## 2017-05-30 RX ORDER — TRIAMTERENE/HYDROCHLOROTHIAZID 37.5-25 MG
0.5 TABLET ORAL DAILY
Qty: 45 TABLET | Refills: 3 | Status: SHIPPED | OUTPATIENT
Start: 2017-05-30 | End: 2017-07-28 | Stop reason: SDUPTHER

## 2017-05-30 RX ORDER — ALPRAZOLAM 0.25 MG/1
0.25 TABLET ORAL NIGHTLY PRN
Qty: 30 TABLET | Refills: 2 | Status: SHIPPED | OUTPATIENT
Start: 2017-05-30 | End: 2017-10-29 | Stop reason: SDUPTHER

## 2017-05-30 RX ORDER — ZOLPIDEM TARTRATE 5 MG/1
5 TABLET ORAL NIGHTLY PRN
Qty: 30 TABLET | Refills: 5 | Status: SHIPPED | OUTPATIENT
Start: 2017-05-30 | End: 2018-01-02 | Stop reason: SDUPTHER

## 2017-05-30 NOTE — PROGRESS NOTES
INTERNAL MEDICINE ANNUAL VISIT NOTE      CHIEF COMPLAINT     ANNUAL    HPI     Josefina Blue is a 57 y.o. C female who presents for her annual.    HTN - triamterene-hydrochlorothiazide 37.5-25 milligrams daily, irbesartan 75 mg nightly. Checks her BP at home. Reports controlled. Occasionally feels orthostatic but rare and usually only if she takes in too much coffee.    Insomnia - on ambien 5mg nightly prn.  Lots of public speaking. Takes about 10-15 xanax a month - 1 prn flying or public speaking.    Pre-DM - s/p drastic dietary and exercise changes ([employer] - [employer]). Weight loss of >25lbs over the last year - did [employer] for a little and then continued on the diet on her own.    Past Medical History:  Past Medical History:   Diagnosis Date    Hyperlipidemia 8/31/2015    Hypertension     IFG (impaired fasting glucose) 8/31/2015    Neuropathic pain 8/31/2015    Squamous cell cancer of tongue 2012       Past Surgical History:  Past Surgical History:   Procedure Laterality Date    CHOLECYSTECTOMY      GALLBLADDER SURGERY  06/2016    HYSTERECTOMY      KNEE ARTHROSCOPY Right     for torn meniscus    TONGUE SURGERY         Allergies:  Review of patient's allergies indicates:   Allergen Reactions    Aspirin Nausea Only     Can take low dose of Aspirin    Codeine Nausea Only     Can take Codeine if she takes a dose of Zofran with it     meds reviewed.    Family History:  Family History   Problem Relation Age of Onset    Alcohol abuse Mother     Cirrhosis Mother     Cancer Father 74     prostate, throat, lung- smoker    Hyperlipidemia Father     Diabetes Sister     Hypertension Sister     Drug abuse Brother     Heart disease Brother     Hyperlipidemia Brother     No Known Problems Maternal Aunt     No Known Problems Maternal Uncle     No Known Problems Paternal Aunt     No Known Problems Paternal Uncle     No Known Problems Maternal Grandmother     No Known Problems Maternal  "Grandfather     No Known Problems Paternal Grandmother     No Known Problems Paternal Grandfather     Amblyopia Neg Hx     Blindness Neg Hx     Cataracts Neg Hx     Glaucoma Neg Hx     Macular degeneration Neg Hx     Retinal detachment Neg Hx     Strabismus Neg Hx     Stroke Neg Hx     Thyroid disease Neg Hx        Social History:  Social History   Substance Use Topics    Smoking status: Former Smoker     Packs/day: 0.25     Years: 1.00    Smokeless tobacco: Never Used    Alcohol use 0.0 oz/week      Comment: occ       Review of Systems:  Review of Systems   Constitutional: Negative for activity change, chills, fever and unexpected weight change.   HENT: Negative for hearing loss, rhinorrhea and trouble swallowing.    Eyes: Negative for discharge and visual disturbance.   Respiratory: Negative for chest tightness, shortness of breath and wheezing.    Cardiovascular: Negative for chest pain and palpitations.   Gastrointestinal: Negative for abdominal pain, blood in stool, constipation, diarrhea, nausea and vomiting.   Endocrine: Negative for polydipsia and polyuria.   Genitourinary: Negative for difficulty urinating, dysuria, hematuria and menstrual problem.   Musculoskeletal: Negative for arthralgias, joint swelling and neck pain.   Skin: Negative for rash.   Neurological: Negative for weakness and headaches.   Psychiatric/Behavioral: Negative for confusion and dysphoric mood.       Health Maintainence:   Immunizations:   TDap is up to date, Influenza is up to date  Cancer Screening:  Mammogram: is up to date. 10/21/15  Colonoscopy: is up to date. At 51 y/o.   Screening:  Hepatitis C is up to date in pts born between [id number].     PHYSICAL EXAM     /70   Pulse 82   Temp 98.4 °F (36.9 °C) (Oral)   Resp 17   Ht 5' 9" (1.753 m)   Wt 95.5 kg (210 lb 8.6 oz)   BMI 31.09 kg/m²     GEN - A+OX4, NAD   HEENT - PERRL, EOMI, OP clear. MMM.   Neck - No thyromegaly or cervical LAD. No thyroid masses " felt.  CV - RRR, no m/r   Chest - CTAB, no wheezing or rhonchi  Abd - S/NT/ND/+BS.   Ext - 2+BDP and radial pulses. No C/C/E.  Neuro - 5/5 BUE and BLE strength. PERRL, EOMI, no nystagmus, eyebrow raise, facial sensation, hearing, m of mastication, smile, palatal raise, shoulder shrug, tongue protrusion symmetric and intact. Sensation to light touch intact throughout. 2+ DTRs.  MSK - no spinal tenderness to palpation. Normal gait.   Skin - No rash.      LABS     Labs reviewed. Needs new labs.     ASSESSMENT/PLAN     Josefina Blue is a 57 y.o. female with  Josefina was seen today for annual exam.    Diagnoses and all orders for this visit:    Annual physical exam - scheduled labs    Benign essential hypertension - 1/2 of triamterene-hctz. Cont irbesartan.  -     triamterene-hydrochlorothiazide 37.5-25 mg (MAXZIDE-25) 37.5-25 mg per tablet; Take 0.5 tablets by mouth once daily.    IFG (impaired fasting glucose) - recheck a1c. Continue weight loss.     Encounter for screening mammogram for breast cancer  -     Mammo Digital Screening Bilateral With CAD; Future; Expected date: 05/30/2017    BMI 31 - Cont diet and exercise. Has done a remarkable job w/ weight loss over the last year. Keep it up!    Situational anxiety - Xanax prn public speaking or flying. 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 Goes through about 10-15 pills per month.    Insomnia - on ambien prn. Do not combine w/ xanax.    RTC in 6 months, sooner if needed and depending on labs.    Jo-Ann Collins MD  Department of Internal Medicine - [facility]  1:18 PM

## 2017-06-01 ENCOUNTER — LAB VISIT (OUTPATIENT)
Dept: LAB | Facility: HOSPITAL | Age: 57
End: 2017-06-01
Attending: INTERNAL MEDICINE
Payer: COMMERCIAL

## 2017-06-01 DIAGNOSIS — Z00.00 ANNUAL PHYSICAL EXAM: ICD-10-CM

## 2017-06-01 LAB
25(OH)D3+25(OH)D2 SERPL-MCNC: 32 NG/ML
ALBUMIN SERPL BCP-MCNC: 4.2 G/DL
ALP SERPL-CCNC: 68 U/L
ALT SERPL W/O P-5'-P-CCNC: 18 U/L
ANION GAP SERPL CALC-SCNC: 9 MMOL/L
AST SERPL-CCNC: 16 U/L
BASOPHILS # BLD AUTO: 0.03 K/UL
BASOPHILS NFR BLD: 0.5 %
BILIRUB SERPL-MCNC: 0.6 MG/DL
BUN SERPL-MCNC: 23 MG/DL
CALCIUM SERPL-MCNC: 9.3 MG/DL
CHLORIDE SERPL-SCNC: 103 MMOL/L
CHOLEST/HDLC SERPL: 4.6 {RATIO}
CO2 SERPL-SCNC: 30 MMOL/L
CREAT SERPL-MCNC: 0.9 MG/DL
DIFFERENTIAL METHOD: ABNORMAL
EOSINOPHIL # BLD AUTO: 0.1 K/UL
EOSINOPHIL NFR BLD: 1 %
ERYTHROCYTE [DISTWIDTH] IN BLOOD BY AUTOMATED COUNT: 13 %
EST. GFR  (AFRICAN AMERICAN): >60 ML/MIN/1.73 M^2
EST. GFR  (NON AFRICAN AMERICAN): >60 ML/MIN/1.73 M^2
ESTIMATED AVG GLUCOSE: 117 MG/DL
GLUCOSE SERPL-MCNC: 113 MG/DL
HBA1C MFR BLD HPLC: 5.7 %
HCT VFR BLD AUTO: 39.6 %
HDL/CHOLESTEROL RATIO: 21.7 %
HDLC SERPL-MCNC: 184 MG/DL
HDLC SERPL-MCNC: 40 MG/DL
HGB BLD-MCNC: 13 G/DL
LDLC SERPL CALC-MCNC: 117.4 MG/DL
LYMPHOCYTES # BLD AUTO: 2.9 K/UL
LYMPHOCYTES NFR BLD: 49.1 %
MCH RBC QN AUTO: 28.4 PG
MCHC RBC AUTO-ENTMCNC: 32.8 %
MCV RBC AUTO: 87 FL
MONOCYTES # BLD AUTO: 0.3 K/UL
MONOCYTES NFR BLD: 4.6 %
NEUTROPHILS # BLD AUTO: 2.6 K/UL
NEUTROPHILS NFR BLD: 44.6 %
NONHDLC SERPL-MCNC: 144 MG/DL
PLATELET # BLD AUTO: 201 K/UL
PMV BLD AUTO: 10.6 FL
POTASSIUM SERPL-SCNC: 3.7 MMOL/L
PROT SERPL-MCNC: 7.3 G/DL
RBC # BLD AUTO: 4.57 M/UL
SODIUM SERPL-SCNC: 142 MMOL/L
TRIGL SERPL-MCNC: 133 MG/DL
TSH SERPL DL<=0.005 MIU/L-ACNC: 1.07 UIU/ML
VIT B12 SERPL-MCNC: 753 PG/ML
WBC # BLD AUTO: 5.84 K/UL

## 2017-06-01 PROCEDURE — 36415 COLL VENOUS BLD VENIPUNCTURE: CPT

## 2017-06-01 PROCEDURE — 85025 COMPLETE CBC W/AUTO DIFF WBC: CPT

## 2017-06-01 PROCEDURE — 82306 VITAMIN D 25 HYDROXY: CPT

## 2017-06-01 PROCEDURE — 83036 HEMOGLOBIN GLYCOSYLATED A1C: CPT

## 2017-06-01 PROCEDURE — 80053 COMPREHEN METABOLIC PANEL: CPT

## 2017-06-01 PROCEDURE — 82607 VITAMIN B-12: CPT

## 2017-06-01 PROCEDURE — 80061 LIPID PANEL: CPT

## 2017-06-01 PROCEDURE — 84443 ASSAY THYROID STIM HORMONE: CPT

## 2017-06-25 DIAGNOSIS — G47.00 INSOMNIA, UNSPECIFIED TYPE: ICD-10-CM

## 2017-06-26 RX ORDER — ZOLPIDEM TARTRATE 5 MG/1
TABLET ORAL
Qty: 30 TABLET | Refills: 2 | OUTPATIENT
Start: 2017-06-26

## 2017-07-11 ENCOUNTER — HOSPITAL ENCOUNTER (OUTPATIENT)
Dept: RADIOLOGY | Facility: HOSPITAL | Age: 57
Discharge: HOME | End: 2017-07-11
Attending: ORTHOPAEDIC SURGERY
Payer: COMMERCIAL

## 2017-07-11 ENCOUNTER — HOSPITAL ENCOUNTER (OUTPATIENT)
Dept: RADIOLOGY | Facility: OTHER | Age: 57
Discharge: HOME | End: 2017-07-11
Attending: ORTHOPAEDIC SURGERY
Payer: COMMERCIAL

## 2017-07-11 ENCOUNTER — OFFICE VISIT (OUTPATIENT)
Dept: SPORTS MEDICINE | Facility: CLINIC | Age: 57
End: 2017-07-11
Payer: COMMERCIAL

## 2017-07-11 VITALS
WEIGHT: 210 LBS | HEART RATE: 85 BPM | SYSTOLIC BLOOD PRESSURE: 134 MMHG | HEIGHT: 69 IN | BODY MASS INDEX: 31.1 KG/M2 | DIASTOLIC BLOOD PRESSURE: 85 MMHG

## 2017-07-11 DIAGNOSIS — M25.561 RIGHT KNEE PAIN, UNSPECIFIED CHRONICITY: ICD-10-CM

## 2017-07-11 DIAGNOSIS — M25.561 RIGHT KNEE PAIN, UNSPECIFIED CHRONICITY: Primary | ICD-10-CM

## 2017-07-11 PROCEDURE — 73721 MRI JNT OF LWR EXTRE W/O DYE: CPT | Mod: 26,RT,, | Performed by: RADIOLOGY

## 2017-07-11 PROCEDURE — 99203 OFFICE O/P NEW LOW 30 MIN: CPT | Mod: S$GLB,,, | Performed by: ORTHOPAEDIC SURGERY

## 2017-07-11 PROCEDURE — 99999 PR PBB SHADOW E&M-EST. PATIENT-LVL III: CPT | Mod: PBBFAC,,, | Performed by: ORTHOPAEDIC SURGERY

## 2017-07-11 PROCEDURE — 73564 X-RAY EXAM KNEE 4 OR MORE: CPT | Mod: TC,50,PO

## 2017-07-11 PROCEDURE — 73564 X-RAY EXAM KNEE 4 OR MORE: CPT | Mod: 26,50,, | Performed by: RADIOLOGY

## 2017-07-11 PROCEDURE — 73721 MRI JNT OF LWR EXTRE W/O DYE: CPT | Mod: TC,RT

## 2017-07-11 RX ORDER — MELOXICAM 15 MG/1
15 TABLET ORAL DAILY
Qty: 30 TABLET | Refills: 2 | Status: SHIPPED | OUTPATIENT
Start: 2017-07-11 | End: 2019-04-08

## 2017-07-11 RX ORDER — DICLOFENAC SODIUM 10 MG/G
2 GEL TOPICAL 4 TIMES DAILY
Qty: 1 TUBE | Refills: 2 | Status: ON HOLD | OUTPATIENT
Start: 2017-07-11 | End: 2018-04-09

## 2017-07-11 NOTE — PROGRESS NOTES
CC: Right knee pain    57 y.o. 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  She states that the pain is severe and not responding to any conservative care.    She reports that the pain and weakness. It also bothers her at night. [place] and felt she tweaked the knee, began bothering her the next day while working out.    - mechanical symptoms, - instability    Is affecting ADLs.  Pain is 7/10 at it's worst.    2007-R knee arthroscopy, meniscectomy. Had a postop infection of the knee.    REVIEW OF SYSTEMS:   Constitution: Negative. Negative for chills, fever and night sweats.   HENT: Negative for congestion and headaches.    Eyes: Negative for blurred vision, left vision loss and right vision loss.   Cardiovascular: Negative for chest pain and syncope.   Respiratory: Negative for cough and shortness of breath.    Endocrine: Negative for polydipsia, polyphagia and polyuria.   Hematologic/Lymphatic: Negative for bleeding problem. Does not bruise/bleed easily.   Skin: Negative for dry skin, itching and rash.   Musculoskeletal: Negative for falls. Positive for right knee pain and  muscle weakness.   Gastrointestinal: Negative for abdominal pain and bowel incontinence.   Genitourinary: Negative for bladder incontinence and nocturia.   Neurological: Negative for disturbances in coordination, loss of balance and seizures.   Psychiatric/Behavioral: Negative for depression. The patient does not have insomnia.    Allergic/Immunologic: Negative for hives and persistent infections.     PAST MEDICAL HISTORY:   Past Medical History:   Diagnosis Date    Hyperlipidemia 8/31/2015    Hypertension     IFG (impaired fasting glucose) 8/31/2015    Neuropathic pain 8/31/2015    Squamous cell cancer of tongue 2012       PAST SURGICAL HISTORY:   Past Surgical History:   Procedure Laterality Date    CHOLECYSTECTOMY      GALLBLADDER SURGERY  06/2016     HYSTERECTOMY      KNEE ARTHROSCOPY Right     for torn meniscus    TONGUE SURGERY         FAMILY HISTORY:   Family History   Problem Relation Age of Onset    Alcohol abuse Mother     Cirrhosis Mother     Cancer Father 74     prostate, throat, lung- smoker    Hyperlipidemia Father     Diabetes Sister     Hypertension Sister     Drug abuse Brother     Heart disease Brother     Hyperlipidemia Brother     No Known Problems Maternal Aunt     No Known Problems Maternal Uncle     No Known Problems Paternal Aunt     No Known Problems Paternal Uncle     No Known Problems Maternal Grandmother     No Known Problems Maternal Grandfather     No Known Problems Paternal Grandmother     No Known Problems Paternal Grandfather     Amblyopia Neg Hx     Blindness Neg Hx     Cataracts Neg Hx     Glaucoma Neg Hx     Macular degeneration Neg Hx     Retinal detachment Neg Hx     Strabismus Neg Hx     Stroke Neg Hx     Thyroid disease Neg Hx        SOCIAL HISTORY:   Social History     Social History    Marital status:      Spouse name: N/A    Number of children: N/A    Years of education: N/A     Occupational History    chief nursing officer      Social History Main Topics    Smoking status: Former Smoker     Packs/day: 0.25     Years: 1.00    Smokeless tobacco: Never Used    Alcohol use 0.0 oz/week      Comment: occ    Drug use: No    Sexual activity: Yes     Birth control/ protection: None     Other Topics Concern    Not on file     Social History Narrative    Lives with  and 2 dogs       MEDICATIONS:     Current Outpatient Prescriptions:     alprazolam (XANAX) 0.25 MG tablet, Take 1 tablet (0.25 mg total) by mouth nightly as needed., Disp: 30 tablet, Rfl: 2    aspirin (ECOTRIN) 81 MG EC tablet, Take 81 mg by mouth nightly., Disp: , Rfl:     clobetasol (TEMOVATE) 0.05 % external solution, Apply topically 2 (two) times daily. Use one - two times daily as needed for scaling or itching to  "scalp, Disp: 60 mL, Rfl: 3    estradiol (ESTRACE) 0.01 % (0.1 mg/gram) vaginal cream, Place 1 g vaginally once daily., Disp: 42.5 g, Rfl: 1    irbesartan (AVAPRO) 75 MG tablet, Take 1 tablet (75 mg total) by mouth every evening., Disp: 90 tablet, Rfl: 3    multivitamin capsule, Take 1 capsule by mouth nightly. , Disp: , Rfl:     tretinoin (RETIN-A) 0.05 % cream, Apply topically nightly. Apply pea-sized amount to face., Disp: 45 g, Rfl: 2    triamterene-hydrochlorothiazide 37.5-25 mg (MAXZIDE-25) 37.5-25 mg per tablet, Take 0.5 tablets by mouth once daily., Disp: 45 tablet, Rfl: 3    zolpidem (AMBIEN) 5 MG Tab, Take 1 tablet (5 mg total) by mouth nightly as needed., Disp: 30 tablet, Rfl: 5    ALLERGIES:   Review of patient's allergies indicates:   Allergen Reactions    Aspirin Nausea Only     Can take low dose of Aspirin    Codeine Nausea Only     Can take Codeine if she takes a dose of Zofran with it       VITAL SIGNS:   /85   Pulse 85   Ht 5' 9" (1.753 m)   Wt 95.3 kg (210 lb)   BMI 31.01 kg/m²      PHYSICAL EXAMINATION:  General:  The patient is alert and oriented x 3.  Mood is pleasant.  Observation of ears, eyes and nose reveal no gross abnormalities.  No labored breathing observed.    RIGHT KNEE EXAMINATION     OBSERVATION / INSPECTION   Gait:   Nonantalgic   Alignment:  Neutral   Scars:   None   Muscle atrophy: Mild  Effusion:  mild   Warmth:  None   Discoloration:   none     TENDERNESS / CREPITUS (T / C):          T / C      T / C   Patella   - /+   Lateral joint line   - / -   Peripatellar medial  -  Medial joint line    - / -   Peripatellar lateral -  Medial plica   - / -   Patellar tendon -   Popliteal fossa   - / -   Quad tendon   -   Gastrocnemius   -   Prepatellar Bursa - / -   Quadricep   -   Tibial tubercle  -  Thigh/hamstring  -   Pes anserine/HS -  Fibula    -   ITB   - / -  Tibia     -   Tib/fib joint  - / -  LCL    -     MFC   - / -   MCL: Proximal  -    LFC   - / - "    Distal   -          ROM: (* = pain)  PASSIVE   ACTIVE    Left :   5 / 0 / 145   5 / 0 / 145     Right :    5 / 0 / 145   5 / 0 / 145    Patellofemoral examination:  See above noted areas of tenderness.   Patella position    Subluxation / dislocation: Centered           Sup. / Inf;   Normal   Crepitus (PF):    Absent   Patellar Mobility:       Medial-lateral:   Normal    Superior-inferior:  Normal    Inferior tilt   Normal    Patellar tendon:  Normal   Lateral tilt:    Normal   J-sign:     None   Patellofemoral grind:   ++ pain       MENISCAL SIGNS:     Pain on terminal extension:  +  Pain on terminal flexion:  +  Yosis maneuver:  + (for pain)  Squat     - (for limtied)    LIGAMENT EXAMINATION:  ACL / Lachman:  normal (-1 to 2mm)    PCL-Post.  drawer: normal 0 to 2mm  MCL- Valgus:  normal 0 to 2mm  LCL- Varus:  normal 0 to 2mm  Pivot shift: normal (Equal)   Dial Test: difference c/w other side   At 30° flexion: normal (< 5°)    At 90° flexion: normal (< 5°)   Reverse Pivot Shift:   normal (Equal)     STRENGTH: (* = with pain) PAINFUL SIDE   Quadricep   5/5   Hamstrin/5    EXTREMITY NEURO-VASCULAR EXAMINATION:   Sensation:  Grossly intact to light touch all dermatomal regions.   Motor Function:  Fully intact motor function at hip, knee, foot and ankle    DTRs;  quadriceps and  achilles 2+.  No clonus and downgoing Babinski.    Vascular status:  DP and PT pulses 2+, brisk capillary refill, symmetric.     OTHER FINDINGS:      IMAGING:    X-rays including standing, weight bearing AP and flexion bilateral knees, lateral and merchant views ordered and images reviewed by me show:    No fracture, dislocation or other pathology   Medial compartment: + degenerative changes   Lateral compartment: + degenerative changes   Patellofemoral compartment: + degenerative changes        ASSESSMENT:    Right Knee Pain, Probable DJD,  Lateral meniscal tear    PLAN:   1. MRI Right knee  2. voltaren gel  3. mobic  4.  Possible CSI R knee at follow up visit if no improvement      All questions were answered, pt will contact us for questions or concerns in the interim.

## 2017-07-14 ENCOUNTER — OFFICE VISIT (OUTPATIENT)
Dept: SPORTS MEDICINE | Facility: CLINIC | Age: 57
End: 2017-07-14
Payer: COMMERCIAL

## 2017-07-14 VITALS
HEIGHT: 69 IN | SYSTOLIC BLOOD PRESSURE: 112 MMHG | BODY MASS INDEX: 31.1 KG/M2 | WEIGHT: 210 LBS | HEART RATE: 77 BPM | DIASTOLIC BLOOD PRESSURE: 72 MMHG

## 2017-07-14 DIAGNOSIS — M17.11 PRIMARY OSTEOARTHRITIS OF RIGHT KNEE: Primary | ICD-10-CM

## 2017-07-14 PROCEDURE — 99213 OFFICE O/P EST LOW 20 MIN: CPT | Mod: 25,S$GLB,, | Performed by: ORTHOPAEDIC SURGERY

## 2017-07-14 PROCEDURE — 99999 PR PBB SHADOW E&M-EST. PATIENT-LVL III: CPT | Mod: PBBFAC,,, | Performed by: ORTHOPAEDIC SURGERY

## 2017-07-14 PROCEDURE — 20610 DRAIN/INJ JOINT/BURSA W/O US: CPT | Mod: RT,S$GLB,, | Performed by: ORTHOPAEDIC SURGERY

## 2017-07-14 RX ORDER — TRIAMCINOLONE ACETONIDE 40 MG/ML
40 INJECTION, SUSPENSION INTRA-ARTICULAR; INTRAMUSCULAR
Status: DISCONTINUED | OUTPATIENT
Start: 2017-07-14 | End: 2017-07-14 | Stop reason: HOSPADM

## 2017-07-14 RX ADMIN — TRIAMCINOLONE ACETONIDE 40 MG: 40 INJECTION, SUSPENSION INTRA-ARTICULAR; INTRAMUSCULAR at 10:07

## 2017-07-14 NOTE — PROCEDURES
Large Joint Aspiration/Injection  Date/Time: 7/14/2017 10:17 AM  Performed by: JAZMYN TELLES  Authorized by: JAZMYN TELLES     Consent Done?:  Yes (Verbal)  Indications:  Pain  Procedure site marked: Yes    Timeout: Prior to procedure the correct patient, procedure, and site was verified      Location:  Knee  Site:  R knee  Prep: Patient was prepped and draped in usual sterile fashion    Ultrasonic Guidance for needle placement: No  Needle size:  22 G  Approach:  Superior  Medications:  40 mg triamcinolone acetonide 40 mg/mL  Patient tolerance:  Patient tolerated the procedure well with no immediate complications

## 2017-07-14 NOTE — PROGRESS NOTES
CC: Right knee pain    57 y.o. Female with a history of right knee pain who is an  for [employer] at [employer].  She is here for follow up on her right knee.  She states that it feels a little better since her last visit but she still complains of some intermittent pain and soreness.     - mechanical symptoms, - instability    Is affecting ADLs.  Pain is 7/10 at it's worst.    2007-R knee arthroscopy, meniscectomy. Had a postop infection of the knee but nothing recently.    REVIEW OF SYSTEMS:   Constitution: Negative. Negative for chills, fever and night sweats.   HENT: Negative for congestion and headaches.    Eyes: Negative for blurred vision, left vision loss and right vision loss.   Cardiovascular: Negative for chest pain and syncope.   Respiratory: Negative for cough and shortness of breath.    Endocrine: Negative for polydipsia, polyphagia and polyuria.   Hematologic/Lymphatic: Negative for bleeding problem. Does not bruise/bleed easily.   Skin: Negative for dry skin, itching and rash.   Musculoskeletal: Negative for falls. Positive for right knee pain and  muscle weakness.   Gastrointestinal: Negative for abdominal pain and bowel incontinence.   Genitourinary: Negative for bladder incontinence and nocturia.   Neurological: Negative for disturbances in coordination, loss of balance and seizures.   Psychiatric/Behavioral: Negative for depression. The patient does not have insomnia.    Allergic/Immunologic: Negative for hives and persistent infections.     PAST MEDICAL HISTORY:   Past Medical History:   Diagnosis Date    Hyperlipidemia 8/31/2015    Hypertension     IFG (impaired fasting glucose) 8/31/2015    Neuropathic pain 8/31/2015    Squamous cell cancer of tongue 2012       PAST SURGICAL HISTORY:   Past Surgical History:   Procedure Laterality Date    CHOLECYSTECTOMY      GALLBLADDER SURGERY  06/2016    HYSTERECTOMY      KNEE ARTHROSCOPY Right     for torn meniscus    TONGUE SURGERY          FAMILY HISTORY:   Family History   Problem Relation Age of Onset    Alcohol abuse Mother     Cirrhosis Mother     Cancer Father 74     prostate, throat, lung- smoker    Hyperlipidemia Father     Diabetes Sister     Hypertension Sister     Drug abuse Brother     Heart disease Brother     Hyperlipidemia Brother     No Known Problems Maternal Aunt     No Known Problems Maternal Uncle     No Known Problems Paternal Aunt     No Known Problems Paternal Uncle     No Known Problems Maternal Grandmother     No Known Problems Maternal Grandfather     No Known Problems Paternal Grandmother     No Known Problems Paternal Grandfather     Amblyopia Neg Hx     Blindness Neg Hx     Cataracts Neg Hx     Glaucoma Neg Hx     Macular degeneration Neg Hx     Retinal detachment Neg Hx     Strabismus Neg Hx     Stroke Neg Hx     Thyroid disease Neg Hx        SOCIAL HISTORY:   Social History     Social History    Marital status:      Spouse name: N/A    Number of children: N/A    Years of education: N/A     Occupational History    chief nursing officer      Social History Main Topics    Smoking status: Former Smoker     Packs/day: 0.25     Years: 1.00    Smokeless tobacco: Never Used    Alcohol use 0.0 oz/week      Comment: occ    Drug use: No    Sexual activity: Yes     Birth control/ protection: None     Other Topics Concern    Not on file     Social History Narrative    Lives with  and 2 dogs       MEDICATIONS:     Current Outpatient Prescriptions:     alprazolam (XANAX) 0.25 MG tablet, Take 1 tablet (0.25 mg total) by mouth nightly as needed., Disp: 30 tablet, Rfl: 2    aspirin (ECOTRIN) 81 MG EC tablet, Take 81 mg by mouth nightly., Disp: , Rfl:     clobetasol (TEMOVATE) 0.05 % external solution, Apply topically 2 (two) times daily. Use one - two times daily as needed for scaling or itching to scalp, Disp: 60 mL, Rfl: 3    diclofenac sodium (VOLTAREN) 1 % Gel, Apply 2 g  "topically 4 (four) times daily., Disp: 1 Tube, Rfl: 2    estradiol (ESTRACE) 0.01 % (0.1 mg/gram) vaginal cream, Place 1 g vaginally once daily., Disp: 42.5 g, Rfl: 1    irbesartan (AVAPRO) 75 MG tablet, Take 1 tablet (75 mg total) by mouth every evening., Disp: 90 tablet, Rfl: 3    meloxicam (MOBIC) 15 MG tablet, Take 1 tablet (15 mg total) by mouth once daily., Disp: 30 tablet, Rfl: 2    multivitamin capsule, Take 1 capsule by mouth nightly. , Disp: , Rfl:     tretinoin (RETIN-A) 0.05 % cream, Apply topically nightly. Apply pea-sized amount to face., Disp: 45 g, Rfl: 2    triamterene-hydrochlorothiazide 37.5-25 mg (MAXZIDE-25) 37.5-25 mg per tablet, Take 0.5 tablets by mouth once daily., Disp: 45 tablet, Rfl: 3    zolpidem (AMBIEN) 5 MG Tab, Take 1 tablet (5 mg total) by mouth nightly as needed., Disp: 30 tablet, Rfl: 5    ALLERGIES:   Review of patient's allergies indicates:   Allergen Reactions    Aspirin Nausea Only     Can take low dose of Aspirin    Codeine Nausea Only     Can take Codeine if she takes a dose of Zofran with it       VITAL SIGNS:   /72   Pulse 77   Ht 5' 9" (1.753 m)   Wt 95.3 kg (210 lb)   BMI 31.01 kg/m²      PHYSICAL EXAMINATION:  General:  The patient is alert and oriented x 3.  Mood is pleasant.  Observation of ears, eyes and nose reveal no gross abnormalities.  No labored breathing observed.    RIGHT KNEE EXAMINATION     OBSERVATION / INSPECTION   Gait:   Nonantalgic   Alignment:  Neutral   Scars:   None   Muscle atrophy: Mild  Effusion:  mild   Warmth:  None   Discoloration:   none     TENDERNESS / CREPITUS (T / C):          T / C      T / C   Patella   - /+   Lateral joint line   - / -   Peripatellar medial  -  Medial joint line    - / -   Peripatellar lateral -  Medial plica   - / -   Patellar tendon -   Popliteal fossa   - / -   Quad tendon   -   Gastrocnemius   -   Prepatellar Bursa - / -   Quadricep   -   Tibial tubercle  -  Thigh/hamstring  -   Pes " anserine/HS -  Fibula    -   ITB   - / -  Tibia     -   Tib/fib joint  - / -  LCL    -     MFC   - / -   MCL: Proximal  -    LFC   - / -    Distal   -          ROM: (* = pain)  PASSIVE   ACTIVE    Left :   5 / 0 / 145   5 / 0 / 145     Right :    5 / 0 / 145   5 / 0 / 145    Patellofemoral examination:  See above noted areas of tenderness.   Patella position    Subluxation / dislocation: Centered           Sup. / Inf;   Normal   Crepitus (PF):    Absent   Patellar Mobility:       Medial-lateral:   Normal    Superior-inferior:  Normal    Inferior tilt   Normal    Patellar tendon:  Normal   Lateral tilt:    Normal   J-sign:     None   Patellofemoral grind:   + pain       MENISCAL SIGNS:     Pain on terminal extension:  +  Pain on terminal flexion:  +  Yosis maneuver:  + (for pain)  Squat     - (for limtied)    LIGAMENT EXAMINATION:  ACL / Lachman:  normal (-1 to 2mm)    PCL-Post.  drawer: normal 0 to 2mm  MCL- Valgus:  normal 0 to 2mm  LCL- Varus:  normal 0 to 2mm  Pivot shift: normal (Equal)   Dial Test: difference c/w other side   At 30° flexion: normal (< 5°)    At 90° flexion: normal (< 5°)   Reverse Pivot Shift:   normal (Equal)     STRENGTH: (* = with pain) PAINFUL SIDE   Quadricep   5/5   Hamstrin/5    EXTREMITY NEURO-VASCULAR EXAMINATION:   Sensation:  Grossly intact to light touch all dermatomal regions.   Motor Function:  Fully intact motor function at hip, knee, foot and ankle    DTRs;  quadriceps and  achilles 2+.  No clonus and downgoing Babinski.    Vascular status:  DP and PT pulses 2+, brisk capillary refill, symmetric.     OTHER FINDINGS:      IMAGING:    X-rays including standing, weight bearing AP and flexion bilateral knees, lateral and merchant views ordered and images reviewed by me show:    No fracture, dislocation or other pathology   Medial compartment: + degenerative changes   Lateral compartment: + degenerative changes   Patellofemoral compartment: + degenerative  changes        Right knee MRI:  No acute injury or loose body seen.  Degenerative changes medial and patellofemoral compartments  Degenerative medial meniscus tear    ASSESSMENT:    Right Knee Pain, DJD,      PLAN:   1. Right knee steroid injection today.    2. Continue with topical NSAID    3. Risks of PO NSAIDs discussed    4. Can schedule Euflexxa injections at her convenience.    5. RTC as needed    All questions were answered, pt will contact us for questions or concerns in the interim.

## 2017-07-26 ENCOUNTER — PATIENT MESSAGE (OUTPATIENT)
Dept: INTERNAL MEDICINE | Facility: CLINIC | Age: 57
End: 2017-07-26

## 2017-07-26 DIAGNOSIS — I10 BENIGN ESSENTIAL HYPERTENSION: ICD-10-CM

## 2017-07-26 DIAGNOSIS — I10 ESSENTIAL HYPERTENSION: ICD-10-CM

## 2017-07-28 RX ORDER — IRBESARTAN 75 MG/1
75 TABLET ORAL NIGHTLY
Qty: 90 TABLET | Refills: 3 | Status: SHIPPED | OUTPATIENT
Start: 2017-07-28 | End: 2018-01-08 | Stop reason: SDUPTHER

## 2017-07-28 RX ORDER — TRIAMTERENE/HYDROCHLOROTHIAZID 37.5-25 MG
0.5 TABLET ORAL DAILY
Qty: 45 TABLET | Refills: 3 | Status: SHIPPED | OUTPATIENT
Start: 2017-07-28 | End: 2017-12-05 | Stop reason: SDUPTHER

## 2017-10-23 ENCOUNTER — OFFICE VISIT (OUTPATIENT)
Dept: URGENT CARE | Facility: CLINIC | Age: 57
End: 2017-10-23
Payer: COMMERCIAL

## 2017-10-23 VITALS
TEMPERATURE: 98 F | DIASTOLIC BLOOD PRESSURE: 78 MMHG | RESPIRATION RATE: 18 BRPM | OXYGEN SATURATION: 94 % | SYSTOLIC BLOOD PRESSURE: 115 MMHG | HEART RATE: 84 BPM | HEIGHT: 69 IN | WEIGHT: 210 LBS | BODY MASS INDEX: 31.1 KG/M2

## 2017-10-23 DIAGNOSIS — J32.9 SINUSITIS, UNSPECIFIED CHRONICITY, UNSPECIFIED LOCATION: Primary | ICD-10-CM

## 2017-10-23 DIAGNOSIS — J02.9 SORE THROAT: ICD-10-CM

## 2017-10-23 LAB
CTP QC/QA: YES
S PYO RRNA THROAT QL PROBE: NEGATIVE

## 2017-10-23 PROCEDURE — 99203 OFFICE O/P NEW LOW 30 MIN: CPT | Mod: 25,S$GLB,, | Performed by: PHYSICIAN ASSISTANT

## 2017-10-23 PROCEDURE — 87880 STREP A ASSAY W/OPTIC: CPT | Mod: QW,S$GLB,, | Performed by: PHYSICIAN ASSISTANT

## 2017-10-23 PROCEDURE — 96372 THER/PROPH/DIAG INJ SC/IM: CPT | Mod: S$GLB,,, | Performed by: PHYSICIAN ASSISTANT

## 2017-10-23 RX ORDER — BETAMETHASONE SODIUM PHOSPHATE AND BETAMETHASONE ACETATE 3; 3 MG/ML; MG/ML
12 INJECTION, SUSPENSION INTRA-ARTICULAR; INTRALESIONAL; INTRAMUSCULAR; SOFT TISSUE ONCE
Status: COMPLETED | OUTPATIENT
Start: 2017-10-23 | End: 2017-10-23

## 2017-10-23 RX ORDER — AMOXICILLIN AND CLAVULANATE POTASSIUM 875; 125 MG/1; MG/1
1 TABLET, FILM COATED ORAL 2 TIMES DAILY
Qty: 20 TABLET | Refills: 0 | Status: SHIPPED | OUTPATIENT
Start: 2017-10-23 | End: 2017-11-02

## 2017-10-23 RX ADMIN — BETAMETHASONE SODIUM PHOSPHATE AND BETAMETHASONE ACETATE 12 MG: 3; 3 INJECTION, SUSPENSION INTRA-ARTICULAR; INTRALESIONAL; INTRAMUSCULAR; SOFT TISSUE at 09:10

## 2017-10-23 NOTE — PROGRESS NOTES
"Subjective:       Patient ID: Josefina Blue is a 57 y.o. female.    Vitals:  height is 5' 9" (1.753 m) and weight is 95.3 kg (210 lb). Her oral temperature is 98.4 °F (36.9 °C). Her blood pressure is 115/78 and her pulse is 84. Her respiration is 18 and oxygen saturation is 94% (abnormal).     Chief Complaint: Sore Throat and Facial Pain    This is a 57 y.o. 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 Took Dayquil which helped. Patient states she has been flying recently.      Sore Throat    This is a new problem. The current episode started in the past 7 days. The problem has been gradually worsening. Neither side of throat is experiencing more pain than the other. There has been no fever. The pain is at a severity of 5/10. The pain is moderate. Associated symptoms include congestion, headaches, a hoarse voice and swollen glands. Pertinent negatives include no abdominal pain, coughing, diarrhea, ear pain, neck pain, shortness of breath, trouble swallowing or vomiting. She has had no exposure to strep. She has tried NSAIDs for the symptoms. The treatment provided mild relief.   Facial Pain   This is a new problem. The current episode started in the past 7 days. The problem occurs constantly. The problem has been gradually worsening. Associated symptoms include congestion, fatigue, headaches, a sore throat and swollen glands. Pertinent negatives include no abdominal pain, chest pain, chills, coughing, fever, myalgias, nausea, neck pain, numbness, rash, vertigo, visual change, vomiting or weakness. Nothing aggravates the symptoms. She has tried acetaminophen for the symptoms. The treatment provided moderate relief.   Sinus Problem   Associated symptoms include congestion, headaches, a hoarse " voice, sinus pressure, a sore throat and swollen glands. Pertinent negatives include no chills, coughing, ear pain, neck pain or shortness of breath.     Review of Systems   Constitution: Positive for fatigue and malaise/fatigue. Negative for chills, fever and weakness.   HENT: Positive for congestion, hoarse voice, sinus pressure and sore throat. Negative for ear pain and trouble swallowing.    Eyes: Negative for blurred vision, discharge, pain, redness and visual disturbance.   Cardiovascular: Negative for chest pain, dyspnea on exertion, leg swelling, near-syncope and syncope.   Respiratory: Negative for cough, shortness of breath, sputum production and wheezing.    Hematologic/Lymphatic: Negative for adenopathy.   Skin: Negative for itching and rash.   Musculoskeletal: Negative for back pain, myalgias, neck pain and stiffness.   Gastrointestinal: Negative for abdominal pain, diarrhea, nausea and vomiting.   Neurological: Positive for headaches. Negative for dizziness, light-headedness, numbness and vertigo.   Psychiatric/Behavioral: Negative for altered mental status.   Allergic/Immunologic: Negative for hives.   All other systems reviewed and are negative.      Objective:      Physical Exam   Constitutional: She is oriented to person, place, and time. She appears well-developed and well-nourished.  Non-toxic appearance. She has a sickly appearance. She does not appear ill. No distress.   HENT:   Head: Normocephalic and atraumatic.   Right Ear: Tympanic membrane, external ear and ear canal normal.   Left Ear: Tympanic membrane, external ear and ear canal normal.   Nose: Mucosal edema present. No epistaxis. Right sinus exhibits maxillary sinus tenderness and frontal sinus tenderness. Left sinus exhibits maxillary sinus tenderness and frontal sinus tenderness.   Mouth/Throat: Uvula is midline and mucous membranes are normal. No uvula swelling. Posterior oropharyngeal erythema present.   Bilateral purulent nasal  "congestion and erythema    Eyes: Pupils are equal, round, and reactive to light.   Neck: Normal range of motion. Neck supple.   Cardiovascular: Normal rate, regular rhythm and normal heart sounds.  Exam reveals no gallop and no friction rub.    No murmur heard.  Pulmonary/Chest: Effort normal and breath sounds normal. No respiratory distress. She has no decreased breath sounds. She has no wheezes. She has no rhonchi. She has no rales.   Musculoskeletal: Normal range of motion.   Lymphadenopathy:        Head (right side): No submental, no submandibular, no tonsillar, no preauricular, no posterior auricular and no occipital adenopathy present.        Head (left side): No submental, no submandibular, no tonsillar, no preauricular, no posterior auricular and no occipital adenopathy present.     She has no cervical adenopathy.        Right cervical: No posterior cervical adenopathy present.       Left cervical: No posterior cervical adenopathy present.        Right: No supraclavicular adenopathy present.        Left: No supraclavicular adenopathy present.   Neurological: She is alert and oriented to person, place, and time. She is not disoriented. Coordination and gait normal.   Skin: No abrasion, no ecchymosis, no laceration and no rash noted. No erythema.   Psychiatric: She has a normal mood and affect. Her behavior is normal.   Nursing note and vitals reviewed.      /78   Pulse 84   Temp 98.4 °F (36.9 °C) (Oral)   Resp 18   Ht 5' 9" (1.753 m)   Wt 95.3 kg (210 lb)   SpO2 (!) 94%   BMI 31.01 kg/m²      Assessment:       1. Sinusitis, unspecified chronicity, unspecified location    2. Sore throat        Plan:         Sinusitis, unspecified chronicity, unspecified location  -     amoxicillin-clavulanate 875-125mg (AUGMENTIN) 875-125 mg per tablet; Take 1 tablet by mouth 2 (two) times daily.  Dispense: 20 tablet; Refill: 0  -     betamethasone acetate-betamethasone sodium phosphate injection 12 mg; Inject 2 " mLs (12 mg total) into the muscle once.    Sore throat  -     POCT rapid strep A    - Please return here or go to the Emergency Department for any concerns or worsening of condition.   - If you were prescribed antibiotics, please take them to completion.  - Please follow up with your primary care provider (PCP) or discussed specialist(s) as needed.

## 2017-10-23 NOTE — PATIENT INSTRUCTIONS
- Please return here or go to the Emergency Department for any concerns or worsening of condition.   - If you were prescribed antibiotics, please take them to completion.  - Please follow up with your primary care provider (PCP) or discussed specialist(s) as needed.           Sinusitis (Antibiotic Treatment)    The sinuses are air-filled spaces within the bones of the face. They connect to the inside of the nose. Sinusitis is an inflammation of the tissue lining the sinus cavity. Sinus inflammation can occur during a cold. It can also be due to allergies to pollens and other particles in the air. Sinusitis can cause symptoms of sinus congestion and fullness. A sinus infection causes fever, headache and facial pain. There is often green or yellow drainage from the nose or into the back of the throat (post-nasal drip). You have been given antibiotics to treat this condition.  Home care:  · Take the full course of antibiotics as instructed. Do not stop taking them, even if you feel better.  · Drink plenty of water, hot tea, and other liquids. This may help thin mucus. It also may promote sinus drainage.  · Heat may help soothe painful areas of the face. Use a towel soaked in hot water. Or,  the shower and direct the hot spray onto your face. Using a vaporizer along with a menthol rub at night may also help.   · An expectorant containing guaifenesin may help thin the mucus and promote drainage from the sinuses.  · Over-the-counter decongestants may be used unless a similar medicine was prescribed. Nasal sprays work the fastest. Use one that contains phenylephrine or oxymetazoline. First blow the nose gently. Then use the spray. Do not use these medicines more often than directed on the label or symptoms may get worse. You may also use tablets containing pseudoephedrine. Avoid products that combine ingredients, because side effects may be increased. Read labels. You can also ask the pharmacist for help.  (NOTE: Persons with high blood pressure should not use decongestants. They can raise blood pressure.)  · Over-the-counter antihistamines may help if allergies contributed to your sinusitis.    · Do not use nasal rinses or irrigation during an acute sinus infection, unless told to by your health care provider. Rinsing may spread the infection to other sinuses.  · Use acetaminophen or ibuprofen to control pain, unless another pain medicine was prescribed. (If you have chronic liver or kidney disease or ever had a stomach ulcer, talk with your doctor before using these medicines. Aspirin should never be used in anyone under 18 years of age who is ill with a fever. It may cause severe liver damage.)  · Don't smoke. This can worsen symptoms.  Follow-up care  Follow up with your healthcare provider or our staff if you are not improving within the next week.  When to seek medical advice  Call your healthcare provider if any of these occur:  · Facial pain or headache becoming more severe  · Stiff neck  · Unusual drowsiness or confusion  · Swelling of the forehead or eyelids  · Vision problems, including blurred or double vision  · Fever of 100.4ºF (38ºC) or higher, or as directed by your healthcare provider  · Seizure  · Breathing problems  · Symptoms not resolving within 10 days  Date Last Reviewed: 4/13/2015  © 5168-9939 [email or web address]. [address] [place] [address], [place] [address]. All rights reserved. This information is not intended as a substitute for professional medical care. Always follow your healthcare professional's instructions.

## 2017-10-29 DIAGNOSIS — G47.00 INSOMNIA, UNSPECIFIED TYPE: ICD-10-CM

## 2017-10-29 DIAGNOSIS — F41.8 SITUATIONAL ANXIETY: ICD-10-CM

## 2017-10-30 RX ORDER — ALPRAZOLAM 0.25 MG/1
TABLET ORAL
Qty: 30 TABLET | Refills: 2 | Status: SHIPPED | OUTPATIENT
Start: 2017-10-30 | End: 2018-01-08 | Stop reason: SDUPTHER

## 2017-12-05 ENCOUNTER — PATIENT MESSAGE (OUTPATIENT)
Dept: INTERNAL MEDICINE | Facility: CLINIC | Age: 57
End: 2017-12-05

## 2017-12-05 ENCOUNTER — PATIENT MESSAGE (OUTPATIENT)
Dept: OBSTETRICS AND GYNECOLOGY | Facility: CLINIC | Age: 57
End: 2017-12-05

## 2017-12-05 DIAGNOSIS — N94.10 FEMALE DYSPAREUNIA: ICD-10-CM

## 2017-12-05 DIAGNOSIS — I10 BENIGN ESSENTIAL HYPERTENSION: ICD-10-CM

## 2017-12-05 DIAGNOSIS — N95.1 VAGINAL DRYNESS, MENOPAUSAL: ICD-10-CM

## 2017-12-05 RX ORDER — ESTRADIOL 0.1 MG/G
1 CREAM VAGINAL DAILY
Qty: 42.5 G | Refills: 6 | Status: SHIPPED | OUTPATIENT
Start: 2017-12-05 | End: 2019-06-10 | Stop reason: SDUPTHER

## 2017-12-05 RX ORDER — TRIAMTERENE/HYDROCHLOROTHIAZID 37.5-25 MG
1 TABLET ORAL DAILY
Qty: 90 TABLET | Refills: 3 | Status: SHIPPED | OUTPATIENT
Start: 2017-12-05 | End: 2018-01-08 | Stop reason: SDUPTHER

## 2017-12-05 NOTE — TELEPHONE ENCOUNTER
Pt states she is taking 3/4 tablet of triamterene/ hctz, needs new script to reflect dosage change

## 2017-12-08 ENCOUNTER — PATIENT MESSAGE (OUTPATIENT)
Dept: OBSTETRICS AND GYNECOLOGY | Facility: CLINIC | Age: 57
End: 2017-12-08

## 2017-12-08 DIAGNOSIS — I10 BENIGN ESSENTIAL HYPERTENSION: ICD-10-CM

## 2017-12-08 RX ORDER — TRIAMTERENE/HYDROCHLOROTHIAZID 37.5-25 MG
1 TABLET ORAL DAILY
Qty: 90 TABLET | Refills: 3 | OUTPATIENT
Start: 2017-12-08 | End: 2018-12-08

## 2017-12-11 ENCOUNTER — DOCUMENTATION ONLY (OUTPATIENT)
Dept: INFECTIOUS DISEASES | Facility: HOSPITAL | Age: 57
End: 2017-12-11

## 2017-12-11 RX ORDER — DIAZEPAM 10 MG/1
10 TABLET ORAL 3 TIMES DAILY
Qty: 30 TABLET | Refills: 0 | OUTPATIENT
Start: 2017-12-11 | End: 2017-12-12 | Stop reason: SDUPTHER

## 2017-12-11 RX ORDER — DOXYCYCLINE HYCLATE 100 MG
100 TABLET ORAL 2 TIMES DAILY
Qty: 20 TABLET | Refills: 1 | Status: SHIPPED | OUTPATIENT
Start: 2017-12-11 | End: 2017-12-12 | Stop reason: SDUPTHER

## 2017-12-11 RX ORDER — HYDROCODONE BITARTRATE AND ACETAMINOPHEN 5; 325 MG/1; MG/1
1 TABLET ORAL EVERY 6 HOURS PRN
Qty: 50 TABLET | Refills: 0 | Status: SHIPPED | OUTPATIENT
Start: 2017-12-11 | End: 2017-12-12 | Stop reason: SDUPTHER

## 2017-12-11 RX ORDER — PREDNISONE 10 MG/1
10 TABLET ORAL DAILY
Qty: 50 TABLET | Refills: 1 | Status: SHIPPED | OUTPATIENT
Start: 2017-12-11 | End: 2017-12-12 | Stop reason: SDUPTHER

## 2017-12-11 RX ORDER — AZITHROMYCIN 250 MG/1
TABLET, FILM COATED ORAL
Qty: 6 TABLET | Refills: 1 | Status: SHIPPED | OUTPATIENT
Start: 2017-12-11 | End: 2017-12-12 | Stop reason: SDUPTHER

## 2017-12-12 RX ORDER — HYDROCODONE BITARTRATE AND ACETAMINOPHEN 5; 325 MG/1; MG/1
1 TABLET ORAL EVERY 6 HOURS PRN
Qty: 50 TABLET | Refills: 0 | Status: SHIPPED | OUTPATIENT
Start: 2017-12-12 | End: 2017-12-12 | Stop reason: SDUPTHER

## 2017-12-12 RX ORDER — AZITHROMYCIN 250 MG/1
TABLET, FILM COATED ORAL
Qty: 6 TABLET | Refills: 1 | Status: SHIPPED | OUTPATIENT
Start: 2017-12-12 | End: 2017-12-12 | Stop reason: SDUPTHER

## 2017-12-12 RX ORDER — DOXYCYCLINE HYCLATE 100 MG
100 TABLET ORAL 2 TIMES DAILY
Qty: 20 TABLET | Refills: 1 | Status: SHIPPED | OUTPATIENT
Start: 2017-12-12 | End: 2017-12-12 | Stop reason: SDUPTHER

## 2017-12-12 RX ORDER — DIAZEPAM 10 MG/1
10 TABLET ORAL 3 TIMES DAILY
Qty: 30 TABLET | Refills: 0 | Status: SHIPPED | OUTPATIENT
Start: 2017-12-12 | End: 2017-12-12 | Stop reason: SDUPTHER

## 2017-12-12 RX ORDER — PREDNISONE 10 MG/1
10 TABLET ORAL DAILY
Qty: 50 TABLET | Refills: 1 | Status: SHIPPED | OUTPATIENT
Start: 2017-12-12 | End: 2017-12-12 | Stop reason: SDUPTHER

## 2017-12-13 ENCOUNTER — TELEPHONE (OUTPATIENT)
Dept: OPTOMETRY | Facility: CLINIC | Age: 57
End: 2017-12-13

## 2017-12-13 RX ORDER — HYDROCODONE BITARTRATE AND ACETAMINOPHEN 5; 325 MG/1; MG/1
1 TABLET ORAL EVERY 6 HOURS PRN
Qty: 50 TABLET | Refills: 0 | Status: ON HOLD | OUTPATIENT
Start: 2017-12-13 | End: 2018-04-09

## 2017-12-13 RX ORDER — DIAZEPAM 10 MG/1
10 TABLET ORAL 3 TIMES DAILY
Qty: 30 TABLET | Refills: 0 | Status: ON HOLD | OUTPATIENT
Start: 2017-12-13 | End: 2018-04-09

## 2017-12-13 RX ORDER — PREDNISONE 10 MG/1
10 TABLET ORAL DAILY
Qty: 50 TABLET | Refills: 1 | Status: ON HOLD | OUTPATIENT
Start: 2017-12-13 | End: 2018-04-09

## 2017-12-13 RX ORDER — AZITHROMYCIN 250 MG/1
TABLET, FILM COATED ORAL
Qty: 6 TABLET | Refills: 1 | Status: ON HOLD | OUTPATIENT
Start: 2017-12-13 | End: 2018-04-09

## 2017-12-13 RX ORDER — DOXYCYCLINE HYCLATE 100 MG
100 TABLET ORAL 2 TIMES DAILY
Qty: 20 TABLET | Refills: 1 | Status: ON HOLD | OUTPATIENT
Start: 2017-12-13 | End: 2018-04-09

## 2018-01-02 DIAGNOSIS — G47.00 INSOMNIA, UNSPECIFIED TYPE: ICD-10-CM

## 2018-01-03 RX ORDER — ZOLPIDEM TARTRATE 5 MG/1
TABLET ORAL
Qty: 30 TABLET | Refills: 5 | Status: SHIPPED | OUTPATIENT
Start: 2018-01-03 | End: 2018-01-08 | Stop reason: SDUPTHER

## 2018-01-08 DIAGNOSIS — I10 BENIGN ESSENTIAL HYPERTENSION: ICD-10-CM

## 2018-01-08 DIAGNOSIS — F41.8 SITUATIONAL ANXIETY: ICD-10-CM

## 2018-01-08 DIAGNOSIS — I10 ESSENTIAL HYPERTENSION: ICD-10-CM

## 2018-01-08 DIAGNOSIS — G47.00 INSOMNIA, UNSPECIFIED TYPE: ICD-10-CM

## 2018-01-08 RX ORDER — ALPRAZOLAM 0.25 MG/1
0.25 TABLET ORAL NIGHTLY PRN
Qty: 30 TABLET | Refills: 2 | Status: SHIPPED | OUTPATIENT
Start: 2018-01-08 | End: 2018-07-01 | Stop reason: SDUPTHER

## 2018-01-08 RX ORDER — TRIAMTERENE/HYDROCHLOROTHIAZID 37.5-25 MG
1 TABLET ORAL DAILY
Qty: 90 TABLET | Refills: 3 | Status: SHIPPED | OUTPATIENT
Start: 2018-01-08 | End: 2019-01-04 | Stop reason: SDUPTHER

## 2018-01-08 RX ORDER — IRBESARTAN 75 MG/1
75 TABLET ORAL NIGHTLY
Qty: 90 TABLET | Refills: 3 | Status: SHIPPED | OUTPATIENT
Start: 2018-01-08 | End: 2019-02-10 | Stop reason: SDUPTHER

## 2018-01-08 RX ORDER — ZOLPIDEM TARTRATE 5 MG/1
5 TABLET ORAL NIGHTLY PRN
Qty: 30 TABLET | Refills: 5 | Status: SHIPPED | OUTPATIENT
Start: 2018-01-08 | End: 2019-08-19 | Stop reason: SDUPTHER

## 2018-01-11 ENCOUNTER — TELEPHONE (OUTPATIENT)
Dept: DERMATOLOGY | Facility: CLINIC | Age: 58
End: 2018-01-11

## 2018-01-23 ENCOUNTER — PROCEDURE VISIT (OUTPATIENT)
Dept: DERMATOLOGY | Facility: CLINIC | Age: 58
End: 2018-01-23

## 2018-01-23 DIAGNOSIS — Z41.1 ELECTIVE PROCEDURE FOR UNACCEPTABLE COSMETIC APPEARANCE: Primary | ICD-10-CM

## 2018-01-23 PROCEDURE — 11950 SUBQ NJX FILLING MATRL 1CC/<: CPT | Mod: CSM,S$GLB,, | Performed by: DERMATOLOGY

## 2018-01-23 PROCEDURE — 99499 UNLISTED E&M SERVICE: CPT | Mod: S$GLB,,, | Performed by: DERMATOLOGY

## 2018-01-23 NOTE — PROGRESS NOTES
Pt here for repeat botox treatment. Has had written consent signed in the past.    Pt denies any new medical problems or medications. Pt denies current pregnancy.   Risks reviewed including headache, pain, infection, bleeding, bruising, eyebrow droop, eyelid droop, asymmetry, inability to close eye temporarily. Pt understands and would like to have treatment.  19 units placed in glabella, 0 units placed in forehead, 0 units placed in crows feet.   No complications.    Post procedure handout provided.    Botox lot number [id number]  Expiration date 3/2020    Pt here for second  time treatment of nasolabial folds with fillers.  Discussed risks including pain, infection, bruising, bleeding, swelling, infection, lumpiness, asymmetry, hypersensitivity reaction, blue tint to skin, need for more treatments, skin necrosis.   Pt denies any known allergies to fillers or its constituents.   Pt had time to ask questions and elected to proceed.    1.0 cc juvederm ultra xc placed in upper nasolabial folds lateral oral commissures after topical anesthesia  NO complications and pt tolerated well  Post procedure handout provided to patient    Lot number [id number]  Exp Date 3/27/2018

## 2018-02-14 RX ORDER — HYDROCODONE POLISTIREX AND CHLORPHENIRAMINE POLISTIREX 10; 8 MG/5ML; MG/5ML
5 SUSPENSION, EXTENDED RELEASE ORAL EVERY 12 HOURS PRN
Qty: 120 ML | Refills: 0 | Status: ON HOLD | OUTPATIENT
Start: 2018-02-14 | End: 2018-04-09

## 2018-02-27 RX ORDER — FLUTICASONE PROPIONATE AND SALMETEROL 250; 50 UG/1; UG/1
1 POWDER RESPIRATORY (INHALATION) 2 TIMES DAILY
Qty: 1 EACH | Refills: 3 | Status: ON HOLD | OUTPATIENT
Start: 2018-02-27 | End: 2018-04-09

## 2018-04-02 ENCOUNTER — OFFICE VISIT (OUTPATIENT)
Dept: OTOLARYNGOLOGY | Facility: CLINIC | Age: 58
End: 2018-04-02
Payer: COMMERCIAL

## 2018-04-02 DIAGNOSIS — D37.02 NEOPLASM OF UNCERTAIN BEHAVIOR OF JUNCTIONAL ZONE OF TONGUE: Primary | ICD-10-CM

## 2018-04-02 DIAGNOSIS — D37.02 NEOPLASM OF UNCERTAIN BEHAVIOR OF JUNCTIONAL ZONE OF TONGUE: ICD-10-CM

## 2018-04-02 PROCEDURE — 99203 OFFICE O/P NEW LOW 30 MIN: CPT | Mod: S$GLB,,, | Performed by: OTOLARYNGOLOGY

## 2018-04-02 PROCEDURE — 99999 PR PBB SHADOW E&M-EST. PATIENT-LVL II: CPT | Mod: PBBFAC,,, | Performed by: OTOLARYNGOLOGY

## 2018-04-02 RX ORDER — AMOXICILLIN AND CLAVULANATE POTASSIUM 875; 125 MG/1; MG/1
1 TABLET, FILM COATED ORAL 2 TIMES DAILY
Qty: 28 TABLET | Refills: 0 | Status: ON HOLD | OUTPATIENT
Start: 2018-04-02 | End: 2018-04-09 | Stop reason: ALTCHOICE

## 2018-04-02 NOTE — ASSESSMENT & PLAN NOTE
Possible MSG inflammation versus infection versus second primary tumor. Will treat with abx then reassess next week. If no response then will get MRI of head and neck (has extensive dental work) then plan operative biopsy. It is too posterior for clinic biopsy.

## 2018-04-03 ENCOUNTER — PATIENT MESSAGE (OUTPATIENT)
Dept: OTOLARYNGOLOGY | Facility: CLINIC | Age: 58
End: 2018-04-03

## 2018-04-06 ENCOUNTER — HOSPITAL ENCOUNTER (OUTPATIENT)
Dept: RADIOLOGY | Facility: HOSPITAL | Age: 58
Discharge: HOME | End: 2018-04-06
Attending: OTOLARYNGOLOGY
Payer: COMMERCIAL

## 2018-04-06 ENCOUNTER — TELEPHONE (OUTPATIENT)
Dept: OTOLARYNGOLOGY | Facility: CLINIC | Age: 58
End: 2018-04-06

## 2018-04-06 DIAGNOSIS — D37.02 NEOPLASM OF UNCERTAIN BEHAVIOR OF JUNCTIONAL ZONE OF TONGUE: Primary | ICD-10-CM

## 2018-04-06 DIAGNOSIS — D37.02 NEOPLASM OF UNCERTAIN BEHAVIOR OF JUNCTIONAL ZONE OF TONGUE: ICD-10-CM

## 2018-04-06 LAB
CREAT SERPL-MCNC: 0.8 MG/DL (ref 0.5–1.4)
SAMPLE: NORMAL

## 2018-04-06 PROCEDURE — 25500020 PHARM REV CODE 255: Performed by: OTOLARYNGOLOGY

## 2018-04-06 PROCEDURE — A9585 GADOBUTROL INJECTION: HCPCS | Performed by: OTOLARYNGOLOGY

## 2018-04-06 PROCEDURE — 70543 MRI ORBT/FAC/NCK W/O &W/DYE: CPT | Mod: TC

## 2018-04-06 PROCEDURE — 70543 MRI ORBT/FAC/NCK W/O &W/DYE: CPT | Mod: 26,,, | Performed by: RADIOLOGY

## 2018-04-06 RX ORDER — LIDOCAINE HYDROCHLORIDE 10 MG/ML
1 INJECTION, SOLUTION EPIDURAL; INFILTRATION; INTRACAUDAL; PERINEURAL ONCE
Status: CANCELLED | OUTPATIENT
Start: 2018-04-06 | End: 2018-04-06

## 2018-04-06 RX ORDER — GADOBUTROL 604.72 MG/ML
10 INJECTION INTRAVENOUS
Status: COMPLETED | OUTPATIENT
Start: 2018-04-06 | End: 2018-04-06

## 2018-04-06 RX ADMIN — GADOBUTROL 10 ML: 604.72 INJECTION INTRAVENOUS at 07:04

## 2018-04-06 NOTE — ASSESSMENT & PLAN NOTE
Ms. Peguero has a painful lesion on the posterior aspect of her oral tongue near the insertion of the anterior tonsil pillar. There is some palpable depth. She lacks the traditional risk factors for oral cavity SCC, and this region is actually still in the oral cavity. She has a history of SCCIS of the lateral left tongue, but that lesion was not invasive, and this is remote geographically. Given her history, I recommended an initially conservative approach with a short leash to perform a biopsy, as this could be an inflamed minor salivary gland - so I provided a week of Augmentin, with instructions to call if she did not improve. The lesion would be difficult to access in the clinic, so I recommended an operative biopsy if that proves needed.     By the time of note completion, she has not experienced any improvement in her symptoms, and her concerns persist. I have therefore recommended an exam under anesthesia with biopsy of the suspicious region. I would like to get an MRI of her neck with contrast to determine if there is more to this submucosal process.     I have recommended that the patient undergo an examination under anesthesia with laryngoscopy and biopsy, microlaryngoscopy, possible left partial glossectomy. I do not see a reasonable alternative other than observation, given the concern for malignancy. I explained that the risks of direct laryngoscopy, microlaryngoscopy and biopsy, possible partial glossectomy include, but are not limited to infection, bleeding, scarring, pain,  worsening of the voice failure to achieve the diagnosis, problems swallowing or speaking, airway swelling requiring tracheotomy, injury to the teeth/gums/lip with possible loose teeth, perforation of the trachea or esophagus, airway fire if the laser is used, and the need for additional procedures.

## 2018-04-06 NOTE — PROGRESS NOTES
[facility]    Subjective:       Patient ID: Josefina Peguero is a 57 y.o. female.    Chief Complaint:   Chief Complaint   Patient presents with    Mass     left tongue pain     HPI   TREATMENT HISTORY:  1. Left partial glossectomy, 2013 (SCCIS, [facility])    HISTORY OF PRESENT ILLNESS: Josefina Peguero presents to the [facility] for evaluation of new onset left posterior tongue and level II neck pain. She has a history of a left partial glossectomy (describes multiple passes and margins sent for ultimate clearance) in [place] several years ago. This has been present for about 3 weeks and is not getting better. She has not been treated with antibiotics, and she cannot recall any discrete trauma. She is breathing comfortably and eating a regular diet. She denies dysphagia, odynophagia. There may be some otalgia from time to time. Her voice is normal. There is no hemoptysis or hematemesis.    Past Medical History:   Diagnosis Date    Hyperlipidemia 8/31/2015    Hypertension     IFG (impaired fasting glucose) 8/31/2015    Neuropathic pain 8/31/2015    Squamous cell cancer of tongue 2012       Past Surgical History:   Procedure Laterality Date    CHOLECYSTECTOMY      GALLBLADDER SURGERY  06/2016    HYSTERECTOMY      KNEE ARTHROSCOPY Right     for torn meniscus    TONGUE SURGERY           Current Outpatient Prescriptions:     ALPRAZolam (XANAX) 0.25 MG tablet, Take 1 tablet (0.25 mg total) by mouth nightly as needed., Disp: 30 tablet, Rfl: 2    amoxicillin-clavulanate 875-125mg (AUGMENTIN) 875-125 mg per tablet, Take 1 tablet by mouth 2 (two) times daily., Disp: 28 tablet, Rfl: 0    aspirin (ECOTRIN) 81 MG EC tablet, Take 81 mg by mouth nightly., Disp: , Rfl:     azithromycin (ZITHROMAX Z-GUILLERMINA) 250 MG tablet, Take 2 the first day , the one daily, Disp: 6 tablet, Rfl: 1    clobetasol (TEMOVATE) 0.05 % external solution, Apply topically 2 (two) times daily.  Use one - two times daily as needed for scaling or itching to scalp, Disp: 60 mL, Rfl: 3    diazePAM (VALIUM) 10 MG Tab, Take 1 tablet (10 mg total) by mouth 3 (three) times daily., Disp: 30 tablet, Rfl: 0    diclofenac sodium (VOLTAREN) 1 % Gel, Apply 2 g topically 4 (four) times daily., Disp: 1 Tube, Rfl: 2    doxycycline (VIBRA-TABS) 100 MG tablet, Take 1 tablet (100 mg total) by mouth 2 (two) times daily., Disp: 20 tablet, Rfl: 1    estradiol (ESTRACE) 0.01 % (0.1 mg/gram) vaginal cream, Place 1 g vaginally once daily., Disp: 42.5 g, Rfl: 6    fluticasone-salmeterol 250-50 mcg/dose (ADVAIR) 250-50 mcg/dose diskus inhaler, Inhale 1 puff into the lungs 2 (two) times daily. Controller, Disp: 1 each, Rfl: 3    hydrocodone-acetaminophen 5-325mg (NORCO) 5-325 mg per tablet, Take 1 tablet by mouth every 6 (six) hours as needed for Pain., Disp: 50 tablet, Rfl: 0    hydrocodone-chlorpheniramine (TUSSIONEX) 10-8 mg/5 mL suspension, Take 5 mLs by mouth every 12 (twelve) hours as needed for Cough., Disp: 120 mL, Rfl: 0    irbesartan (AVAPRO) 75 MG tablet, Take 1 tablet (75 mg total) by mouth every evening., Disp: 90 tablet, Rfl: 3    meloxicam (MOBIC) 15 MG tablet, Take 1 tablet (15 mg total) by mouth once daily., Disp: 30 tablet, Rfl: 2    multivitamin capsule, Take 1 capsule by mouth nightly. , Disp: , Rfl:     predniSONE (DELTASONE) 10 MG tablet, Take 1 tablet (10 mg total) by mouth once daily. Take as directed with taper, Disp: 50 tablet, Rfl: 1    tretinoin (RETIN-A) 0.05 % cream, Apply topically nightly. Apply pea-sized amount to face., Disp: 45 g, Rfl: 2    triamterene-hydrochlorothiazide 37.5-25 mg (MAXZIDE-25) 37.5-25 mg per tablet, Take 1 tablet by mouth once daily., Disp: 90 tablet, Rfl: 3    zolpidem (AMBIEN) 5 MG Tab, Take 1 tablet (5 mg total) by mouth nightly as needed., Disp: 30 tablet, Rfl: 5    Review of patient's allergies indicates:   Allergen Reactions    Aspirin Nausea Only     Can take  low dose of Aspirin    Codeine Nausea Only     Can take Codeine if she takes a dose of Zofran with it       Social History     Social History    Marital status:      Spouse name: N/A    Number of children: N/A    Years of education: N/A     Occupational History    chief nursing officer      Social History Main Topics    Smoking status: Never Smoker    Smokeless tobacco: Never Used    Alcohol use 0.0 oz/week      Comment: occ    Drug use: No    Sexual activity: Yes     Birth control/ protection: None     Other Topics Concern    Not on file     Social History Narrative    Lives with  and 2 dogs       Family History   Problem Relation Age of Onset    Alcohol abuse Mother     Cirrhosis Mother     Cancer Father 74     prostate, throat, lung- smoker    Hyperlipidemia Father     Diabetes Sister     Hypertension Sister     Drug abuse Brother     Heart disease Brother     Hyperlipidemia Brother     No Known Problems Maternal Aunt     No Known Problems Maternal Uncle     No Known Problems Paternal Aunt     No Known Problems Paternal Uncle     No Known Problems Maternal Grandmother     No Known Problems Maternal Grandfather     No Known Problems Paternal Grandmother     No Known Problems Paternal Grandfather     Amblyopia Neg Hx     Blindness Neg Hx     Cataracts Neg Hx     Glaucoma Neg Hx     Macular degeneration Neg Hx     Retinal detachment Neg Hx     Strabismus Neg Hx     Stroke Neg Hx     Thyroid disease Neg Hx      Review of Systems   Constitutional: Negative for activity change, appetite change, fever and unexpected weight change.   HENT: Positive for mouth sores and sore throat. Negative for dental problem, trouble swallowing and voice change.    Respiratory: Negative for cough.    Cardiovascular: Negative for chest pain and palpitations.   Neurological: Negative for speech difficulty and numbness.   Psychiatric/Behavioral: The patient is nervous/anxious.         Objective:      Physical Exam   Constitutional: She is oriented to person, place, and time. She appears well-developed and well-nourished. No distress.   HENT:   Head: Normocephalic and atraumatic.   Right Ear: Hearing, tympanic membrane, external ear and ear canal normal.   Left Ear: Hearing, tympanic membrane, external ear and ear canal normal.   Nose: No rhinorrhea, nasal deformity or septal deviation. No epistaxis. Right sinus exhibits no maxillary sinus tenderness and no frontal sinus tenderness. Left sinus exhibits no maxillary sinus tenderness and no frontal sinus tenderness.   Mouth/Throat: Uvula is midline, oropharynx is clear and moist and mucous membranes are normal. She does not have dentures. No oral lesions. No trismus in the jaw. Normal dentition. No dental caries. No oropharyngeal exudate or posterior oropharyngeal edema.       Eyes: EOM and lids are normal. Pupils are equal, round, and reactive to light. Right eye exhibits no chemosis. Left eye exhibits no chemosis. No scleral icterus.   Neck: Trachea normal, normal range of motion, full passive range of motion without pain and phonation normal. No muscular tenderness present. Carotid bruit is not present. No thyroid mass and no thyromegaly present.   Cardiovascular: Normal rate, regular rhythm and intact distal pulses.    Pulmonary/Chest: Effort normal. No accessory muscle usage or stridor. No respiratory distress.   Abdominal: Normal appearance. There is no tenderness.   Musculoskeletal:        Right shoulder: She exhibits normal range of motion and no deformity.        Left shoulder: She exhibits normal range of motion and no deformity.   Lymphadenopathy:        Head (right side): No submental and no occipital adenopathy present.        Head (left side): No submental and no occipital adenopathy present.     She has no cervical adenopathy.        Right cervical: No deep cervical and no posterior cervical adenopathy present.       Left cervical:  No deep cervical and no posterior cervical adenopathy present.   Neurological: She is alert and oriented to person, place, and time. No cranial nerve deficit or sensory deficit. Gait normal.   Skin: Skin is warm and intact. No lesion and no rash noted.   Psychiatric: She has a normal mood and affect. Her speech is normal and behavior is normal. Thought content normal.   Vitals reviewed.      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 Peguero has a painful lesion on the posterior aspect of her oral tongue near the insertion of the anterior tonsil pillar. There is some palpable depth. She lacks the traditional risk factors for oral cavity SCC, and this region is actually still in the oral cavity. She has a history of SCCIS of the lateral left tongue, but that lesion was not invasive, and this is remote geographically. Given her history, I recommended an initially conservative approach with a short leash to perform a biopsy, as this could be an inflamed minor salivary gland - so I provided a week of Augmentin, with instructions to call if she did not improve. The lesion would be difficult to access in the clinic, so I recommended an operative biopsy if that proves needed.     By the time of note completion, she has not experienced any improvement in her symptoms, and her concerns persist. I have therefore recommended an exam under anesthesia with biopsy of the suspicious region. I would like to get an MRI of her neck with contrast to determine if there is more to this submucosal process.     I have recommended that the patient undergo an examination under anesthesia with laryngoscopy and biopsy, microlaryngoscopy, possible left partial glossectomy. I do not see a reasonable alternative other than observation, given the concern for malignancy. I explained that the risks of direct laryngoscopy, microlaryngoscopy and  biopsy, possible partial glossectomy include, but are not limited to infection, bleeding, scarring, pain,  worsening of the voice failure to achieve the diagnosis, problems swallowing or speaking, airway swelling requiring tracheotomy, injury to the teeth/gums/lip with possible loose teeth, perforation of the trachea or esophagus, airway fire if the laser is used, and the need for additional procedures.

## 2018-04-07 ENCOUNTER — PATIENT MESSAGE (OUTPATIENT)
Dept: OTOLARYNGOLOGY | Facility: HOSPITAL | Age: 58
End: 2018-04-07

## 2018-04-08 ENCOUNTER — ANESTHESIA EVENT (OUTPATIENT)
Dept: SURGERY | Facility: HOSPITAL | Age: 58
End: 2018-04-08
Payer: COMMERCIAL

## 2018-04-08 NOTE — ANESTHESIA PREPROCEDURE EVALUATION
04/08/2018  Josefina Peguero is a 57 y.o., female with PMH significant for HTN, HLD, neuropathic pain and SCC of tongue who presents for:    Pre-operative evaluation for Procedure(s) (LRB):  LARYNGOSCOPY BRONCHOSCOPY-DIRECT (N/A)  GLOSSECTOMY-PARTIAL (Left)      Prev airway: Present Prior to Hospital Arrival?: No; Placement Date: 06/08/16; Placement Time: 0927; Method of Intubation: Direct laryngoscopy; Inserted by: CRNA; Airway Device: Endotracheal Tube; Mask Ventilation: Easy; Intubated: Postinduction; Blade: Delcid #2; Airway Device Size: 7.0; Style: Cuffed; Cuff Inflation: Minimal occlusive pressure; Placement Verified By: Auscultation, Capnometry; Grade: Grade II; Complicating Factors: Poor neck/head extension; Intubation Findings: Positive EtCO2, Bilateral breath sounds, Atraumatic/Condition of teeth unchanged;  Depth of Insertion: 23; Securment: Lips; Complications: None; Breath Sounds: Equal Bilateral; Insertion Attempts: 1; Removal Date: 06/08/16;  Removal Time: 1023      Patient Active Problem List   Diagnosis    Unspecified essential hypertension: since age 35    Hyperlipidemia    Squamous cell cancer of tongue    Neuropathic pain    IFG (impaired fasting glucose)    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      Current Outpatient Prescriptions on File Prior to Encounter   Medication Sig Dispense Refill    ALPRAZolam (XANAX) 0.25 MG tablet Take 1 tablet (0.25 mg total) by mouth nightly as needed. 30 tablet 2    amoxicillin-clavulanate 875-125mg (AUGMENTIN) 875-125 mg per tablet Take 1 tablet  by mouth 2 (two) times daily. 28 tablet 0    aspirin (ECOTRIN) 81 MG EC tablet Take 81 mg by mouth nightly.      azithromycin (ZITHROMAX Z-GUILLERMINA) 250 MG tablet Take 2 the first day , the one daily 6 tablet 1    clobetasol (TEMOVATE) 0.05 % external solution Apply topically 2 (two) times daily. Use one - two times daily as needed for scaling or itching to scalp 60 mL 3    diazePAM (VALIUM) 10 MG Tab Take 1 tablet (10 mg total) by mouth 3 (three) times daily. 30 tablet 0    diclofenac sodium (VOLTAREN) 1 % Gel Apply 2 g topically 4 (four) times daily. 1 Tube 2    doxycycline (VIBRA-TABS) 100 MG tablet Take 1 tablet (100 mg total) by mouth 2 (two) times daily. 20 tablet 1    estradiol (ESTRACE) 0.01 % (0.1 mg/gram) vaginal cream Place 1 g vaginally once daily. 42.5 g 6    fluticasone-salmeterol 250-50 mcg/dose (ADVAIR) 250-50 mcg/dose diskus inhaler Inhale 1 puff into the lungs 2 (two) times daily. Controller 1 each 3    hydrocodone-acetaminophen 5-325mg (NORCO) 5-325 mg per tablet Take 1 tablet by mouth every 6 (six) hours as needed for Pain. 50 tablet 0    hydrocodone-chlorpheniramine (TUSSIONEX) 10-8 mg/5 mL suspension Take 5 mLs by mouth every 12 (twelve) hours as needed for Cough. 120 mL 0    irbesartan (AVAPRO) 75 MG tablet Take 1 tablet (75 mg total) by mouth every evening. 90 tablet 3    meloxicam (MOBIC) 15 MG tablet Take 1 tablet (15 mg total) by mouth once daily. 30 tablet 2    multivitamin capsule Take 1 capsule by mouth nightly.       predniSONE (DELTASONE) 10 MG tablet Take 1 tablet (10 mg total) by mouth once daily. Take as directed with taper 50 tablet 1    tretinoin (RETIN-A) 0.05 % cream Apply topically nightly. Apply pea-sized amount to face. 45 g 2    triamterene-hydrochlorothiazide 37.5-25 mg (MAXZIDE-25) 37.5-25 mg per tablet Take 1 tablet by mouth once daily. 90 tablet 3    zolpidem (AMBIEN) 5 MG Tab Take 1 tablet (5 mg total) by mouth nightly as needed. 30 tablet 5       Past  Surgical History:   Procedure Laterality Date    CHOLECYSTECTOMY      GALLBLADDER SURGERY  06/2016    HYSTERECTOMY      KNEE ARTHROSCOPY Right     for torn meniscus    TONGUE SURGERY         Social History     Social History    Marital status:      Spouse name: N/A    Number of children: N/A    Years of education: N/A     Occupational History    chief nursing officer      Social History Main Topics    Smoking status: Never Smoker    Smokeless tobacco: Never Used    Alcohol use 0.0 oz/week      Comment: occ    Drug use: No    Sexual activity: Yes     Birth control/ protection: None     Other Topics Concern    Not on file     Social History Narrative    Lives with  and 2 dogs       Lab Results   Component Value Date    WBC 5.84 06/01/2017    HGB 13.0 06/01/2017    HCT 39.6 06/01/2017    MCV 87 06/01/2017     06/01/2017       Chemistry        Component Value Date/Time     06/01/2017 0820    K 3.7 06/01/2017 0820     06/01/2017 0820    CO2 30 (H) 06/01/2017 0820    BUN 23 (H) 06/01/2017 0820    CREATININE 0.9 06/01/2017 0820     (H) 06/01/2017 0820        Component Value Date/Time    CALCIUM 9.3 06/01/2017 0820    ALKPHOS 68 06/01/2017 0820    AST 16 06/01/2017 0820    ALT 18 06/01/2017 0820    BILITOT 0.6 06/01/2017 0820    ESTGFRAFRICA >60.0 06/01/2017 0820    EGFRNONAA >60.0 06/01/2017 0820        No results found for: INR, PROTIME    Vital Signs Range (Last 24H):         CBC: No results for input(s): WBC, RBC, HGB, HCT, PLT, MCV, MCH, MCHC in the last 72 hours.    CMP: No results for input(s): NA, K, CL, CO2, BUN, CREATININE, GLU, MG, PHOS, CALCIUM, ALBUMIN, PROT, ALKPHOS, ALT, AST, BILITOT in the last 72 hours.    INR  No results for input(s): PT, INR, PROTIME, APTT in the last 72 hours.        Diagnostic Studies:      EKG:  Sinus tachycardia  Otherwise normal ECG  When compared with ECG of 31-AUG-2015 09:17,  Previous ECG has undetermined rhythm, needs  review  Incomplete right bundle branch block is no longer Present  Nonspecific T wave abnormality no longer evident in Anterior leads  Confirmed by ANGELA COOPER MD ([id number]) on 2/27/2017 8:24:34 AM      Anesthesia Evaluation    I have reviewed the Patient Summary Reports.    I have reviewed the Nursing Notes.   I have reviewed the Medications.     Review of Systems  Anesthesia Hx:  No problems with previous Anesthesia  History of prior surgery of interest to airway management or planning: Denies Family Hx of Anesthesia complications.   Denies Personal Hx of Anesthesia complications.   Social:  No Alcohol Use, Non-Smoker    Hematology/Oncology:  Hematology Normal   Oncology Normal     Cardiovascular:   Hypertension hyperlipidemia    Renal/:  Renal/ Normal     Hepatic/GI:  Hepatic/GI Normal    Neurological:  Neurology Normal    Endocrine:  Endocrine Normal    Dermatological:  Skin Normal    Psych:  Psychiatric Normal              Anesthesia Plan  Type of Anesthesia, risks & benefits discussed:  Anesthesia Type:  general  Patient's Preference:   Intra-op Monitoring Plan:   Intra-op Monitoring Plan Comments:   Post Op Pain Control Plan: multimodal analgesia  Post Op Pain Control Plan Comments:   Induction:   IV  Beta Blocker:  Patient is not currently on a Beta-Blocker (No further documentation required).       Informed Consent: Patient understands risks and agrees with Anesthesia plan.  Questions answered. Anesthesia consent signed with patient.  ASA Score: 2     Day of Surgery Review of History & Physical:    H&P update referred to the surgeon.         Ready For Surgery From Anesthesia Perspective.

## 2018-04-09 ENCOUNTER — HOSPITAL ENCOUNTER (OUTPATIENT)
Facility: HOSPITAL | Age: 58
Discharge: HOME | End: 2018-04-10
Attending: OTOLARYNGOLOGY | Admitting: OTOLARYNGOLOGY
Payer: COMMERCIAL

## 2018-04-09 ENCOUNTER — SURGERY (OUTPATIENT)
Age: 58
End: 2018-04-09

## 2018-04-09 ENCOUNTER — ANESTHESIA (OUTPATIENT)
Dept: SURGERY | Facility: HOSPITAL | Age: 58
End: 2018-04-09
Payer: COMMERCIAL

## 2018-04-09 DIAGNOSIS — D37.02 NEOPLASM OF UNCERTAIN BEHAVIOR OF JUNCTIONAL ZONE OF TONGUE: Primary | ICD-10-CM

## 2018-04-09 PROCEDURE — D9220A PRA ANESTHESIA: Mod: CRNA,,, | Performed by: NURSE ANESTHETIST, CERTIFIED REGISTERED

## 2018-04-09 PROCEDURE — 25000003 PHARM REV CODE 250: Performed by: OTOLARYNGOLOGY

## 2018-04-09 PROCEDURE — 63600175 PHARM REV CODE 636 W HCPCS: Performed by: OTOLARYNGOLOGY

## 2018-04-09 PROCEDURE — 41112 EXCISION OF TONGUE LESION: CPT | Mod: ,,, | Performed by: OTOLARYNGOLOGY

## 2018-04-09 PROCEDURE — 71000039 HC RECOVERY, EACH ADD'L HOUR: Performed by: OTOLARYNGOLOGY

## 2018-04-09 PROCEDURE — 25000003 PHARM REV CODE 250

## 2018-04-09 PROCEDURE — 71000033 HC RECOVERY, INTIAL HOUR: Performed by: OTOLARYNGOLOGY

## 2018-04-09 PROCEDURE — 63600175 PHARM REV CODE 636 W HCPCS: Performed by: STUDENT IN AN ORGANIZED HEALTH CARE EDUCATION/TRAINING PROGRAM

## 2018-04-09 PROCEDURE — 42409 DRAINAGE OF SALIVARY CYST: CPT | Mod: 51,LT,, | Performed by: OTOLARYNGOLOGY

## 2018-04-09 PROCEDURE — 88331 PATH CONSLTJ SURG 1 BLK 1SPC: CPT | Mod: 26,,, | Performed by: PATHOLOGY

## 2018-04-09 PROCEDURE — 63600175 PHARM REV CODE 636 W HCPCS

## 2018-04-09 PROCEDURE — 94761 N-INVAS EAR/PLS OXIMETRY MLT: CPT

## 2018-04-09 PROCEDURE — 36000708 HC OR TIME LEV III 1ST 15 MIN: Performed by: OTOLARYNGOLOGY

## 2018-04-09 PROCEDURE — 88305 TISSUE EXAM BY PATHOLOGIST: CPT | Mod: 26,,, | Performed by: PATHOLOGY

## 2018-04-09 PROCEDURE — 63600175 PHARM REV CODE 636 W HCPCS: Performed by: ANESTHESIOLOGY

## 2018-04-09 PROCEDURE — 37000008 HC ANESTHESIA 1ST 15 MINUTES: Performed by: OTOLARYNGOLOGY

## 2018-04-09 PROCEDURE — 63600175 PHARM REV CODE 636 W HCPCS: Performed by: NURSE ANESTHETIST, CERTIFIED REGISTERED

## 2018-04-09 PROCEDURE — 88305 TISSUE EXAM BY PATHOLOGIST: CPT | Performed by: PATHOLOGY

## 2018-04-09 PROCEDURE — D9220A PRA ANESTHESIA: Mod: ANES,,, | Performed by: ANESTHESIOLOGY

## 2018-04-09 PROCEDURE — 31536 LARYNGOSCOPY W/BX & OP SCOPE: CPT | Mod: 51,,, | Performed by: OTOLARYNGOLOGY

## 2018-04-09 PROCEDURE — 37000009 HC ANESTHESIA EA ADD 15 MINS: Performed by: OTOLARYNGOLOGY

## 2018-04-09 PROCEDURE — 36000709 HC OR TIME LEV III EA ADD 15 MIN: Performed by: OTOLARYNGOLOGY

## 2018-04-09 PROCEDURE — 25000003 PHARM REV CODE 250: Performed by: NURSE ANESTHETIST, CERTIFIED REGISTERED

## 2018-04-09 PROCEDURE — 25000003 PHARM REV CODE 250: Performed by: ANESTHESIOLOGY

## 2018-04-09 RX ORDER — ZOLPIDEM TARTRATE 5 MG/1
5 TABLET ORAL NIGHTLY PRN
Status: DISCONTINUED | OUTPATIENT
Start: 2018-04-09 | End: 2018-04-10 | Stop reason: HOSPADM

## 2018-04-09 RX ORDER — HYDROCODONE BITARTRATE AND ACETAMINOPHEN 5; 325 MG/1; MG/1
1 TABLET ORAL EVERY 6 HOURS PRN
Qty: 30 TABLET | Refills: 0 | Status: SHIPPED | OUTPATIENT
Start: 2018-04-09 | End: 2018-04-10

## 2018-04-09 RX ORDER — ROCURONIUM BROMIDE 10 MG/ML
INJECTION, SOLUTION INTRAVENOUS
Status: DISCONTINUED | OUTPATIENT
Start: 2018-04-09 | End: 2018-04-09

## 2018-04-09 RX ORDER — FENTANYL CITRATE 50 UG/ML
25 INJECTION, SOLUTION INTRAMUSCULAR; INTRAVENOUS
Status: DISCONTINUED | OUTPATIENT
Start: 2018-04-09 | End: 2018-04-09

## 2018-04-09 RX ORDER — OXYMETAZOLINE HCL 0.05 %
SPRAY, NON-AEROSOL (ML) NASAL
Status: DISCONTINUED | OUTPATIENT
Start: 2018-04-09 | End: 2018-04-09 | Stop reason: HOSPADM

## 2018-04-09 RX ORDER — ONDANSETRON 4 MG/1
8 TABLET, ORALLY DISINTEGRATING ORAL EVERY 12 HOURS PRN
Qty: 6 TABLET | Refills: 0 | Status: SHIPPED | OUTPATIENT
Start: 2018-04-09 | End: 2018-04-10

## 2018-04-09 RX ORDER — SODIUM CHLORIDE 0.9 % (FLUSH) 0.9 %
3 SYRINGE (ML) INJECTION
Status: DISCONTINUED | OUTPATIENT
Start: 2018-04-09 | End: 2018-04-10 | Stop reason: HOSPADM

## 2018-04-09 RX ORDER — FENTANYL CITRATE 50 UG/ML
25 INJECTION, SOLUTION INTRAMUSCULAR; INTRAVENOUS
Status: DISCONTINUED | OUTPATIENT
Start: 2018-04-09 | End: 2018-04-10 | Stop reason: HOSPADM

## 2018-04-09 RX ORDER — CEFAZOLIN SODIUM 1 G/3ML
INJECTION, POWDER, FOR SOLUTION INTRAMUSCULAR; INTRAVENOUS
Status: DISCONTINUED | OUTPATIENT
Start: 2018-04-09 | End: 2018-04-09

## 2018-04-09 RX ORDER — HYDROCODONE BITARTRATE AND ACETAMINOPHEN 5; 325 MG/1; MG/1
1 TABLET ORAL ONCE
Status: COMPLETED | OUTPATIENT
Start: 2018-04-09 | End: 2018-04-09

## 2018-04-09 RX ORDER — FENTANYL CITRATE 50 UG/ML
INJECTION, SOLUTION INTRAMUSCULAR; INTRAVENOUS
Status: COMPLETED
Start: 2018-04-09 | End: 2018-04-09

## 2018-04-09 RX ORDER — LIDOCAINE HYDROCHLORIDE 10 MG/ML
1 INJECTION, SOLUTION EPIDURAL; INFILTRATION; INTRACAUDAL; PERINEURAL ONCE
Status: COMPLETED | OUTPATIENT
Start: 2018-04-09 | End: 2018-04-09

## 2018-04-09 RX ORDER — PROPOFOL 10 MG/ML
VIAL (ML) INTRAVENOUS
Status: DISCONTINUED | OUTPATIENT
Start: 2018-04-09 | End: 2018-04-09

## 2018-04-09 RX ORDER — ONDANSETRON 2 MG/ML
INJECTION INTRAMUSCULAR; INTRAVENOUS
Status: DISCONTINUED | OUTPATIENT
Start: 2018-04-09 | End: 2018-04-09

## 2018-04-09 RX ORDER — HYDROCODONE BITARTRATE AND ACETAMINOPHEN 5; 325 MG/1; MG/1
1 TABLET ORAL EVERY 4 HOURS PRN
Status: DISCONTINUED | OUTPATIENT
Start: 2018-04-09 | End: 2018-04-10 | Stop reason: HOSPADM

## 2018-04-09 RX ORDER — SODIUM CHLORIDE 9 MG/ML
INJECTION, SOLUTION INTRAVENOUS CONTINUOUS
Status: DISCONTINUED | OUTPATIENT
Start: 2018-04-09 | End: 2018-04-09

## 2018-04-09 RX ORDER — GLYCOPYRROLATE 0.2 MG/ML
INJECTION INTRAMUSCULAR; INTRAVENOUS
Status: DISCONTINUED | OUTPATIENT
Start: 2018-04-09 | End: 2018-04-09

## 2018-04-09 RX ORDER — IRBESARTAN 75 MG/1
75 TABLET ORAL NIGHTLY
Status: DISCONTINUED | OUTPATIENT
Start: 2018-04-09 | End: 2018-04-10

## 2018-04-09 RX ORDER — LIDOCAINE HCL/PF 100 MG/5ML
SYRINGE (ML) INTRAVENOUS
Status: DISCONTINUED | OUTPATIENT
Start: 2018-04-09 | End: 2018-04-09

## 2018-04-09 RX ORDER — ACETAMINOPHEN 325 MG/1
650 TABLET ORAL EVERY 4 HOURS PRN
Status: DISCONTINUED | OUTPATIENT
Start: 2018-04-09 | End: 2018-04-10 | Stop reason: HOSPADM

## 2018-04-09 RX ORDER — FENTANYL CITRATE 50 UG/ML
25 INJECTION, SOLUTION INTRAMUSCULAR; INTRAVENOUS EVERY 5 MIN PRN
Status: COMPLETED | OUTPATIENT
Start: 2018-04-09 | End: 2018-04-09

## 2018-04-09 RX ORDER — ONDANSETRON 2 MG/ML
INJECTION INTRAMUSCULAR; INTRAVENOUS
Status: COMPLETED
Start: 2018-04-09 | End: 2018-04-09

## 2018-04-09 RX ORDER — TRIAMTERENE AND HYDROCHLOROTHIAZIDE 37.5; 25 MG/1; MG/1
1 CAPSULE ORAL DAILY
Status: DISCONTINUED | OUTPATIENT
Start: 2018-04-10 | End: 2018-04-10 | Stop reason: HOSPADM

## 2018-04-09 RX ORDER — ACETAMINOPHEN 325 MG/1
650 TABLET ORAL EVERY 8 HOURS PRN
Status: DISCONTINUED | OUTPATIENT
Start: 2018-04-09 | End: 2018-04-10 | Stop reason: HOSPADM

## 2018-04-09 RX ORDER — ALPRAZOLAM 0.25 MG/1
0.25 TABLET ORAL NIGHTLY PRN
Status: DISCONTINUED | OUTPATIENT
Start: 2018-04-09 | End: 2018-04-10 | Stop reason: HOSPADM

## 2018-04-09 RX ORDER — HYDRALAZINE HYDROCHLORIDE 20 MG/ML
INJECTION INTRAMUSCULAR; INTRAVENOUS
Status: DISPENSED
Start: 2018-04-09 | End: 2018-04-10

## 2018-04-09 RX ORDER — HYDRALAZINE HYDROCHLORIDE 20 MG/ML
10 INJECTION INTRAMUSCULAR; INTRAVENOUS EVERY 8 HOURS PRN
Status: DISCONTINUED | OUTPATIENT
Start: 2018-04-09 | End: 2018-04-10 | Stop reason: HOSPADM

## 2018-04-09 RX ORDER — FENTANYL CITRATE 50 UG/ML
INJECTION, SOLUTION INTRAMUSCULAR; INTRAVENOUS
Status: DISCONTINUED | OUTPATIENT
Start: 2018-04-09 | End: 2018-04-09

## 2018-04-09 RX ORDER — DIPHENHYDRAMINE HYDROCHLORIDE 50 MG/ML
25 INJECTION INTRAMUSCULAR; INTRAVENOUS EVERY 4 HOURS PRN
Status: DISCONTINUED | OUTPATIENT
Start: 2018-04-09 | End: 2018-04-10 | Stop reason: HOSPADM

## 2018-04-09 RX ORDER — FENTANYL CITRATE 50 UG/ML
25 INJECTION, SOLUTION INTRAMUSCULAR; INTRAVENOUS EVERY 5 MIN PRN
Status: DISCONTINUED | OUTPATIENT
Start: 2018-04-09 | End: 2018-04-09 | Stop reason: HOSPADM

## 2018-04-09 RX ORDER — SODIUM CHLORIDE AND POTASSIUM CHLORIDE 150; 900 MG/100ML; MG/100ML
INJECTION, SOLUTION INTRAVENOUS CONTINUOUS
Status: DISCONTINUED | OUTPATIENT
Start: 2018-04-09 | End: 2018-04-10 | Stop reason: HOSPADM

## 2018-04-09 RX ORDER — ACETAMINOPHEN 10 MG/ML
INJECTION, SOLUTION INTRAVENOUS
Status: DISCONTINUED | OUTPATIENT
Start: 2018-04-09 | End: 2018-04-09

## 2018-04-09 RX ORDER — MIDAZOLAM HYDROCHLORIDE 1 MG/ML
INJECTION, SOLUTION INTRAMUSCULAR; INTRAVENOUS
Status: DISCONTINUED | OUTPATIENT
Start: 2018-04-09 | End: 2018-04-09

## 2018-04-09 RX ORDER — ONDANSETRON 8 MG/1
8 TABLET, ORALLY DISINTEGRATING ORAL EVERY 8 HOURS PRN
Status: DISCONTINUED | OUTPATIENT
Start: 2018-04-09 | End: 2018-04-10 | Stop reason: HOSPADM

## 2018-04-09 RX ORDER — ASPIRIN 81 MG/1
81 TABLET ORAL NIGHTLY
Refills: 0 | COMMUNITY
Start: 2018-04-16

## 2018-04-09 RX ORDER — ONDANSETRON 2 MG/ML
4 INJECTION INTRAMUSCULAR; INTRAVENOUS ONCE
Status: COMPLETED | OUTPATIENT
Start: 2018-04-09 | End: 2018-04-09

## 2018-04-09 RX ORDER — LIDOCAINE HYDROCHLORIDE AND EPINEPHRINE 10; 10 MG/ML; UG/ML
INJECTION, SOLUTION INFILTRATION; PERINEURAL
Status: DISCONTINUED | OUTPATIENT
Start: 2018-04-09 | End: 2018-04-09 | Stop reason: HOSPADM

## 2018-04-09 RX ORDER — NEOSTIGMINE METHYLSULFATE 1 MG/ML
INJECTION, SOLUTION INTRAVENOUS
Status: DISCONTINUED | OUTPATIENT
Start: 2018-04-09 | End: 2018-04-09

## 2018-04-09 RX ADMIN — SODIUM CHLORIDE: 0.9 INJECTION, SOLUTION INTRAVENOUS at 11:04

## 2018-04-09 RX ADMIN — FENTANYL CITRATE 25 MCG: 50 INJECTION, SOLUTION INTRAMUSCULAR; INTRAVENOUS at 05:04

## 2018-04-09 RX ADMIN — ONDANSETRON 4 MG: 2 INJECTION INTRAMUSCULAR; INTRAVENOUS at 04:04

## 2018-04-09 RX ADMIN — OXYMETAZOLINE HYDROCHLORIDE 15 ML: 0.05 SPRAY NASAL at 04:04

## 2018-04-09 RX ADMIN — PROPOFOL 40 MG: 10 INJECTION, EMULSION INTRAVENOUS at 03:04

## 2018-04-09 RX ADMIN — PROPOFOL 180 MG: 10 INJECTION, EMULSION INTRAVENOUS at 02:04

## 2018-04-09 RX ADMIN — POTASSIUM CHLORIDE AND SODIUM CHLORIDE: 900; 150 INJECTION, SOLUTION INTRAVENOUS at 09:04

## 2018-04-09 RX ADMIN — FENTANYL CITRATE 50 MCG: 50 INJECTION, SOLUTION INTRAMUSCULAR; INTRAVENOUS at 03:04

## 2018-04-09 RX ADMIN — ROCURONIUM BROMIDE 20 MG: 10 INJECTION, SOLUTION INTRAVENOUS at 03:04

## 2018-04-09 RX ADMIN — SODIUM CHLORIDE, SODIUM GLUCONATE, SODIUM ACETATE, POTASSIUM CHLORIDE, MAGNESIUM CHLORIDE, SODIUM PHOSPHATE, DIBASIC, AND POTASSIUM PHOSPHATE: .53; .5; .37; .037; .03; .012; .00082 INJECTION, SOLUTION INTRAVENOUS at 03:04

## 2018-04-09 RX ADMIN — HYDRALAZINE HYDROCHLORIDE 10 MG: 20 INJECTION INTRAMUSCULAR; INTRAVENOUS at 05:04

## 2018-04-09 RX ADMIN — NEOSTIGMINE METHYLSULFATE 5 MG: 1 INJECTION INTRAVENOUS at 04:04

## 2018-04-09 RX ADMIN — FENTANYL CITRATE 25 MCG: 50 INJECTION, SOLUTION INTRAMUSCULAR; INTRAVENOUS at 07:04

## 2018-04-09 RX ADMIN — ROCURONIUM BROMIDE 10 MG: 10 INJECTION, SOLUTION INTRAVENOUS at 03:04

## 2018-04-09 RX ADMIN — ONDANSETRON 8 MG: 8 TABLET, ORALLY DISINTEGRATING ORAL at 07:04

## 2018-04-09 RX ADMIN — LIDOCAINE HYDROCHLORIDE 1 MG: 10 INJECTION, SOLUTION EPIDURAL; INFILTRATION; INTRACAUDAL; PERINEURAL at 11:04

## 2018-04-09 RX ADMIN — ACETAMINOPHEN 1000 MG: 10 INJECTION, SOLUTION INTRAVENOUS at 04:04

## 2018-04-09 RX ADMIN — HYDROCODONE BITARTRATE AND ACETAMINOPHEN 1 TABLET: 5; 325 TABLET ORAL at 06:04

## 2018-04-09 RX ADMIN — DIPHENHYDRAMINE HYDROCHLORIDE 25 MG: 50 INJECTION, SOLUTION INTRAMUSCULAR; INTRAVENOUS at 07:04

## 2018-04-09 RX ADMIN — ROCURONIUM BROMIDE 50 MG: 10 INJECTION, SOLUTION INTRAVENOUS at 02:04

## 2018-04-09 RX ADMIN — FENTANYL CITRATE 25 MCG: 50 INJECTION, SOLUTION INTRAMUSCULAR; INTRAVENOUS at 06:04

## 2018-04-09 RX ADMIN — ONDANSETRON 4 MG: 2 INJECTION INTRAMUSCULAR; INTRAVENOUS at 05:04

## 2018-04-09 RX ADMIN — FENTANYL CITRATE 25 MCG: 50 INJECTION, SOLUTION INTRAMUSCULAR; INTRAVENOUS at 11:04

## 2018-04-09 RX ADMIN — FENTANYL CITRATE 25 MCG: 50 INJECTION, SOLUTION INTRAMUSCULAR; INTRAVENOUS at 04:04

## 2018-04-09 RX ADMIN — MIDAZOLAM HYDROCHLORIDE 2 MG: 1 INJECTION, SOLUTION INTRAMUSCULAR; INTRAVENOUS at 02:04

## 2018-04-09 RX ADMIN — FENTANYL CITRATE 50 MCG: 50 INJECTION, SOLUTION INTRAMUSCULAR; INTRAVENOUS at 02:04

## 2018-04-09 RX ADMIN — FENTANYL CITRATE 25 MCG: 50 INJECTION, SOLUTION INTRAMUSCULAR; INTRAVENOUS at 09:04

## 2018-04-09 RX ADMIN — CEFAZOLIN 2 G: 330 INJECTION, POWDER, FOR SOLUTION INTRAMUSCULAR; INTRAVENOUS at 03:04

## 2018-04-09 RX ADMIN — PROMETHAZINE HYDROCHLORIDE 6.25 MG: 25 INJECTION INTRAMUSCULAR; INTRAVENOUS at 06:04

## 2018-04-09 RX ADMIN — LIDOCAINE HYDROCHLORIDE 100 MG: 20 INJECTION, SOLUTION INTRAVENOUS at 02:04

## 2018-04-09 RX ADMIN — PROMETHAZINE HYDROCHLORIDE 6.25 MG: 25 INJECTION INTRAMUSCULAR; INTRAVENOUS at 09:04

## 2018-04-09 RX ADMIN — GLYCOPYRROLATE 0.4 MG: 0.2 INJECTION, SOLUTION INTRAMUSCULAR; INTRAVENOUS at 04:04

## 2018-04-09 RX ADMIN — LIDOCAINE HYDROCHLORIDE AND EPINEPHRINE 5 ML: 10; 10 INJECTION, SOLUTION INFILTRATION; PERINEURAL at 04:04

## 2018-04-09 RX ADMIN — ONDANSETRON 8 MG: 8 TABLET, ORALLY DISINTEGRATING ORAL at 11:04

## 2018-04-09 NOTE — BRIEF OP NOTE
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 Left posterior tongue - sent for frozen2. Left tongue base - sent for frozen      04/09/18 1628        As above  I was present for and participated in all aspects of this operation.    Discharge Note    SUMMARY     Admit Date: 4/9/2018    Discharge Date and Time:  04/09/2018 4:58 PM    Hospital Course: Following completion of an electively scheduled procedure, the patient was transferred to the PACU for postoperative monitoring. The post operative course was uneventful and noted for adequate pain control and PO intake following surgery. The patient is discharged home in good condition and will follow-up with Dr. 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     ondansetron 4 MG  Tbdl  Commonly known as:  ZOFRAN-ODT  Take 2 tablets (8 mg total) by mouth every 12 (twelve) hours as needed.        CONTINUE taking these medications    ALPRAZolam 0.25 MG tablet  Commonly known as:  XANAX  Take 1 tablet (0.25 mg total) by mouth nightly as needed.     aspirin 81 MG EC tablet  Commonly known as:  ECOTRIN  Take 1 tablet (81 mg total) by mouth nightly.  Start taking on:  4/16/2018     clobetasol 0.05 % external solution  Commonly known as:  TEMOVATE  Apply topically 2 (two) times daily. Use one - two times daily as needed for scaling or itching to scalp     estradiol 0.01 % (0.1 mg/gram) vaginal cream  Commonly known as:  ESTRACE  Place 1 g vaginally once daily.     irbesartan 75 MG tablet  Commonly known as:  AVAPRO  Take 1 tablet (75 mg total) by mouth every evening.     meloxicam 15 MG tablet  Commonly known as:  MOBIC  Take 1 tablet (15 mg total) by mouth once daily.     tretinoin 0.05 % cream  Commonly known as:  RETIN-A  Apply topically nightly. Apply pea-sized amount to face.     triamterene-hydrochlorothiazide 37.5-25 mg 37.5-25 mg per tablet  Commonly known as:  MAXZIDE-25  Take 1 tablet by mouth once daily.     zolpidem 5 MG Tab  Commonly known as:  AMBIEN  Take 1 tablet (5 mg total) by mouth nightly as needed.           Where to Get Your Medications      You can get these medications from any pharmacy    Bring a paper prescription for each of these medications  · hydrocodone-acetaminophen 5-325mg 5-325 mg per tablet  · ondansetron 4 MG Tbdl  You don't need a prescription for these medications  · aspirin 81 MG EC tablet         Discharge Procedure Orders  Diet Adult Regular   Scheduling Instructions: Start with liquid diet and advance as tolerated     Activity as tolerated     Lifting restrictions   Order Comments: No lifting anything heavier than a gallon of milk     Notify your health care provider if you experience any of the following:  temperature >100.4     Notify your health care  provider if you experience any of the following:  persistent nausea and vomiting or diarrhea     Notify your health care provider if you experience any of the following:  severe uncontrolled pain     Notify your health care provider if you experience any of the following:  redness, tenderness, or signs of infection (pain, swelling, redness, odor or green/yellow discharge around incision site)     Notify your health care provider if you experience any of the following:  severe persistent headache     Notify your health care provider if you experience any of the following:  difficulty breathing or increased cough     Notify your health care provider if you experience any of the following:  worsening rash     Notify your health care provider if you experience any of the following:  persistent dizziness, light-headedness, or visual disturbances     Notify your health care provider if you experience any of the following:  increased confusion or weakness     No dressing needed       Follow-up Information     Azam Marroquin MD In 1 week.    Specialty:  Otolaryngology  Why:  For wound re-check  Contact information:  [address] [facility] [address]  [phone]

## 2018-04-09 NOTE — TRANSFER OF CARE
"Anesthesia Transfer of Care Note    Patient: Josefina Blue    Procedure(s) Performed: Procedure(s) (LRB):  LARYNGOSCOPY BRONCHOSCOPY-DIRECT (N/A)  EXCISION-LESION-TONGUE    Patient location: PACU    Anesthesia Type: general    Transport from OR: Transported from OR on 6-10 L/min O2 by face mask with adequate spontaneous ventilation    Post pain: adequate analgesia    Post assessment: no apparent anesthetic complications    Post vital signs: stable    Level of consciousness: sedated    Nausea/Vomiting: no nausea/vomiting    Complications: none    Transfer of care protocol was followed      Last vitals:   Visit Vitals  /68 (BP Location: Right arm, Patient Position: Lying)   Pulse 74   Temp 36.9 °C (98.4 °F) (Oral)   Resp 16   Ht 5' 8" (1.727 m)   Wt 99.8 kg (220 lb)   SpO2 99%   Breastfeeding? No   BMI 33.45 kg/m²     "

## 2018-04-09 NOTE — INTERVAL H&P NOTE
The patient has been examined and the H&P has been reviewed:    I concur with the findings and no changes have occurred since H&P was written.    Anesthesia/Surgery risks, benefits and alternative options discussed and understood by patient/family.          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

## 2018-04-09 NOTE — PROGRESS NOTES
Dr. Marroquin at the bedside to speak to patient and . Update given. Patient c/o pain and nausea. MD stated will admit patient for pain control. Charge nurse notified.

## 2018-04-09 NOTE — H&P (VIEW-ONLY)
[facility]    Subjective:       Patient ID: Josefina Peguero is a 57 y.o. female.    Chief Complaint:   Chief Complaint   Patient presents with    Mass     left tongue pain     HPI   TREATMENT HISTORY:  1. Left partial glossectomy, 2013 (SCCIS, [facility])    HISTORY OF PRESENT ILLNESS: Josefina Peguero presents to the [facility] for evaluation of new onset left posterior tongue and level II neck pain. She has a history of a left partial glossectomy (describes multiple passes and margins sent for ultimate clearance) in [place] several years ago. This has been present for about 3 weeks and is not getting better. She has not been treated with antibiotics, and she cannot recall any discrete trauma. She is breathing comfortably and eating a regular diet. She denies dysphagia, odynophagia. There may be some otalgia from time to time. Her voice is normal. There is no hemoptysis or hematemesis.    Past Medical History:   Diagnosis Date    Hyperlipidemia 8/31/2015    Hypertension     IFG (impaired fasting glucose) 8/31/2015    Neuropathic pain 8/31/2015    Squamous cell cancer of tongue 2012       Past Surgical History:   Procedure Laterality Date    CHOLECYSTECTOMY      GALLBLADDER SURGERY  06/2016    HYSTERECTOMY      KNEE ARTHROSCOPY Right     for torn meniscus    TONGUE SURGERY           Current Outpatient Prescriptions:     ALPRAZolam (XANAX) 0.25 MG tablet, Take 1 tablet (0.25 mg total) by mouth nightly as needed., Disp: 30 tablet, Rfl: 2    amoxicillin-clavulanate 875-125mg (AUGMENTIN) 875-125 mg per tablet, Take 1 tablet by mouth 2 (two) times daily., Disp: 28 tablet, Rfl: 0    aspirin (ECOTRIN) 81 MG EC tablet, Take 81 mg by mouth nightly., Disp: , Rfl:     azithromycin (ZITHROMAX Z-GUILLERMINA) 250 MG tablet, Take 2 the first day , the one daily, Disp: 6 tablet, Rfl: 1    clobetasol (TEMOVATE) 0.05 % external solution, Apply topically 2 (two) times daily.  Use one - two times daily as needed for scaling or itching to scalp, Disp: 60 mL, Rfl: 3    diazePAM (VALIUM) 10 MG Tab, Take 1 tablet (10 mg total) by mouth 3 (three) times daily., Disp: 30 tablet, Rfl: 0    diclofenac sodium (VOLTAREN) 1 % Gel, Apply 2 g topically 4 (four) times daily., Disp: 1 Tube, Rfl: 2    doxycycline (VIBRA-TABS) 100 MG tablet, Take 1 tablet (100 mg total) by mouth 2 (two) times daily., Disp: 20 tablet, Rfl: 1    estradiol (ESTRACE) 0.01 % (0.1 mg/gram) vaginal cream, Place 1 g vaginally once daily., Disp: 42.5 g, Rfl: 6    fluticasone-salmeterol 250-50 mcg/dose (ADVAIR) 250-50 mcg/dose diskus inhaler, Inhale 1 puff into the lungs 2 (two) times daily. Controller, Disp: 1 each, Rfl: 3    hydrocodone-acetaminophen 5-325mg (NORCO) 5-325 mg per tablet, Take 1 tablet by mouth every 6 (six) hours as needed for Pain., Disp: 50 tablet, Rfl: 0    hydrocodone-chlorpheniramine (TUSSIONEX) 10-8 mg/5 mL suspension, Take 5 mLs by mouth every 12 (twelve) hours as needed for Cough., Disp: 120 mL, Rfl: 0    irbesartan (AVAPRO) 75 MG tablet, Take 1 tablet (75 mg total) by mouth every evening., Disp: 90 tablet, Rfl: 3    meloxicam (MOBIC) 15 MG tablet, Take 1 tablet (15 mg total) by mouth once daily., Disp: 30 tablet, Rfl: 2    multivitamin capsule, Take 1 capsule by mouth nightly. , Disp: , Rfl:     predniSONE (DELTASONE) 10 MG tablet, Take 1 tablet (10 mg total) by mouth once daily. Take as directed with taper, Disp: 50 tablet, Rfl: 1    tretinoin (RETIN-A) 0.05 % cream, Apply topically nightly. Apply pea-sized amount to face., Disp: 45 g, Rfl: 2    triamterene-hydrochlorothiazide 37.5-25 mg (MAXZIDE-25) 37.5-25 mg per tablet, Take 1 tablet by mouth once daily., Disp: 90 tablet, Rfl: 3    zolpidem (AMBIEN) 5 MG Tab, Take 1 tablet (5 mg total) by mouth nightly as needed., Disp: 30 tablet, Rfl: 5    Review of patient's allergies indicates:   Allergen Reactions    Aspirin Nausea Only     Can take  low dose of Aspirin    Codeine Nausea Only     Can take Codeine if she takes a dose of Zofran with it       Social History     Social History    Marital status:      Spouse name: N/A    Number of children: N/A    Years of education: N/A     Occupational History    chief nursing officer      Social History Main Topics    Smoking status: Never Smoker    Smokeless tobacco: Never Used    Alcohol use 0.0 oz/week      Comment: occ    Drug use: No    Sexual activity: Yes     Birth control/ protection: None     Other Topics Concern    Not on file     Social History Narrative    Lives with  and 2 dogs       Family History   Problem Relation Age of Onset    Alcohol abuse Mother     Cirrhosis Mother     Cancer Father 74     prostate, throat, lung- smoker    Hyperlipidemia Father     Diabetes Sister     Hypertension Sister     Drug abuse Brother     Heart disease Brother     Hyperlipidemia Brother     No Known Problems Maternal Aunt     No Known Problems Maternal Uncle     No Known Problems Paternal Aunt     No Known Problems Paternal Uncle     No Known Problems Maternal Grandmother     No Known Problems Maternal Grandfather     No Known Problems Paternal Grandmother     No Known Problems Paternal Grandfather     Amblyopia Neg Hx     Blindness Neg Hx     Cataracts Neg Hx     Glaucoma Neg Hx     Macular degeneration Neg Hx     Retinal detachment Neg Hx     Strabismus Neg Hx     Stroke Neg Hx     Thyroid disease Neg Hx      Review of Systems   Constitutional: Negative for activity change, appetite change, fever and unexpected weight change.   HENT: Positive for mouth sores and sore throat. Negative for dental problem, trouble swallowing and voice change.    Respiratory: Negative for cough.    Cardiovascular: Negative for chest pain and palpitations.   Neurological: Negative for speech difficulty and numbness.   Psychiatric/Behavioral: The patient is nervous/anxious.         Objective:      Physical Exam   Constitutional: She is oriented to person, place, and time. She appears well-developed and well-nourished. No distress.   HENT:   Head: Normocephalic and atraumatic.   Right Ear: Hearing, tympanic membrane, external ear and ear canal normal.   Left Ear: Hearing, tympanic membrane, external ear and ear canal normal.   Nose: No rhinorrhea, nasal deformity or septal deviation. No epistaxis. Right sinus exhibits no maxillary sinus tenderness and no frontal sinus tenderness. Left sinus exhibits no maxillary sinus tenderness and no frontal sinus tenderness.   Mouth/Throat: Uvula is midline, oropharynx is clear and moist and mucous membranes are normal. She does not have dentures. No oral lesions. No trismus in the jaw. Normal dentition. No dental caries. No oropharyngeal exudate or posterior oropharyngeal edema.       Eyes: EOM and lids are normal. Pupils are equal, round, and reactive to light. Right eye exhibits no chemosis. Left eye exhibits no chemosis. No scleral icterus.   Neck: Trachea normal, normal range of motion, full passive range of motion without pain and phonation normal. No muscular tenderness present. Carotid bruit is not present. No thyroid mass and no thyromegaly present.   Cardiovascular: Normal rate, regular rhythm and intact distal pulses.    Pulmonary/Chest: Effort normal. No accessory muscle usage or stridor. No respiratory distress.   Abdominal: Normal appearance. There is no tenderness.   Musculoskeletal:        Right shoulder: She exhibits normal range of motion and no deformity.        Left shoulder: She exhibits normal range of motion and no deformity.   Lymphadenopathy:        Head (right side): No submental and no occipital adenopathy present.        Head (left side): No submental and no occipital adenopathy present.     She has no cervical adenopathy.        Right cervical: No deep cervical and no posterior cervical adenopathy present.       Left cervical:  No deep cervical and no posterior cervical adenopathy present.   Neurological: She is alert and oriented to person, place, and time. No cranial nerve deficit or sensory deficit. Gait normal.   Skin: Skin is warm and intact. No lesion and no rash noted.   Psychiatric: She has a normal mood and affect. Her speech is normal and behavior is normal. Thought content normal.   Vitals reviewed.      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 Peguero has a painful lesion on the posterior aspect of her oral tongue near the insertion of the anterior tonsil pillar. There is some palpable depth. She lacks the traditional risk factors for oral cavity SCC, and this region is actually still in the oral cavity. She has a history of SCCIS of the lateral left tongue, but that lesion was not invasive, and this is remote geographically. Given her history, I recommended an initially conservative approach with a short leash to perform a biopsy, as this could be an inflamed minor salivary gland - so I provided a week of Augmentin, with instructions to call if she did not improve. The lesion would be difficult to access in the clinic, so I recommended an operative biopsy if that proves needed.     By the time of note completion, she has not experienced any improvement in her symptoms, and her concerns persist. I have therefore recommended an exam under anesthesia with biopsy of the suspicious region. I would like to get an MRI of her neck with contrast to determine if there is more to this submucosal process.     I have recommended that the patient undergo an examination under anesthesia with laryngoscopy and biopsy, microlaryngoscopy, possible left partial glossectomy. I do not see a reasonable alternative other than observation, given the concern for malignancy. I explained that the risks of direct laryngoscopy, microlaryngoscopy and  biopsy, possible partial glossectomy include, but are not limited to infection, bleeding, scarring, pain,  worsening of the voice failure to achieve the diagnosis, problems swallowing or speaking, airway swelling requiring tracheotomy, injury to the teeth/gums/lip with possible loose teeth, perforation of the trachea or esophagus, airway fire if the laser is used, and the need for additional procedures.

## 2018-04-10 VITALS
TEMPERATURE: 99 F | SYSTOLIC BLOOD PRESSURE: 134 MMHG | BODY MASS INDEX: 33.34 KG/M2 | DIASTOLIC BLOOD PRESSURE: 74 MMHG | HEIGHT: 68 IN | OXYGEN SATURATION: 94 % | HEART RATE: 94 BPM | RESPIRATION RATE: 18 BRPM | WEIGHT: 220 LBS

## 2018-04-10 PROCEDURE — 63600175 PHARM REV CODE 636 W HCPCS

## 2018-04-10 PROCEDURE — 25000003 PHARM REV CODE 250

## 2018-04-10 PROCEDURE — 25000003 PHARM REV CODE 250: Performed by: OTOLARYNGOLOGY

## 2018-04-10 PROCEDURE — 63600175 PHARM REV CODE 636 W HCPCS: Performed by: OTOLARYNGOLOGY

## 2018-04-10 RX ORDER — ONDANSETRON 4 MG/1
8 TABLET, ORALLY DISINTEGRATING ORAL EVERY 12 HOURS PRN
Qty: 6 TABLET | Refills: 0 | Status: SHIPPED | OUTPATIENT
Start: 2018-04-10 | End: 2019-04-08

## 2018-04-10 RX ORDER — HYDROCODONE BITARTRATE AND ACETAMINOPHEN 5; 325 MG/1; MG/1
1 TABLET ORAL EVERY 6 HOURS PRN
Qty: 30 TABLET | Refills: 0 | Status: SHIPPED | OUTPATIENT
Start: 2018-04-10 | End: 2018-10-01

## 2018-04-10 RX ORDER — IRBESARTAN 75 MG/1
75 TABLET ORAL DAILY
Status: DISCONTINUED | OUTPATIENT
Start: 2018-04-10 | End: 2018-04-10 | Stop reason: HOSPADM

## 2018-04-10 RX ADMIN — HYDROCODONE BITARTRATE AND ACETAMINOPHEN 1 TABLET: 5; 325 TABLET ORAL at 08:04

## 2018-04-10 RX ADMIN — DIPHENHYDRAMINE HYDROCHLORIDE 25 MG: 50 INJECTION, SOLUTION INTRAMUSCULAR; INTRAVENOUS at 04:04

## 2018-04-10 RX ADMIN — DIPHENHYDRAMINE HYDROCHLORIDE 25 MG: 50 INJECTION, SOLUTION INTRAMUSCULAR; INTRAVENOUS at 08:04

## 2018-04-10 RX ADMIN — ONDANSETRON 8 MG: 8 TABLET, ORALLY DISINTEGRATING ORAL at 08:04

## 2018-04-10 RX ADMIN — IRBESARTAN 75 MG: 75 TABLET ORAL at 08:04

## 2018-04-10 RX ADMIN — HYDROCODONE BITARTRATE AND ACETAMINOPHEN 1 TABLET: 5; 325 TABLET ORAL at 12:04

## 2018-04-10 RX ADMIN — FENTANYL CITRATE 25 MCG: 50 INJECTION, SOLUTION INTRAMUSCULAR; INTRAVENOUS at 03:04

## 2018-04-10 NOTE — PLAN OF CARE
Problem: Patient Care Overview  Goal: Plan of Care Review  Outcome: Ongoing (interventions implemented as appropriate)  Better pain control achieved since admission to floor. Complains of left tongue sharp/sore pain and swelling. No difficulty swallowing reported. PRN hydrocodone and fentanyl administered. PRN benadryl given for itching. Nausea controlled with PRN antiemetics. Tolerating clears otherwise and IVF infusing. Up with stand-by assistance with bathroom, urine output adequate overnight. Ambulates well in room. Vitals stable and within patient's baseline since admission on room air. Some hypertension noted, PRN hydralazine not required since PACU yesterday. Alert and oriented when awake, slept between care. Patient and patient's spouse understand and agree with plan of care. Instructed to call for help as needed, call light in reach. Frequent rounds made for safety. Bed in lowest position and brake set. See flowsheets for detailed assessment. White board in patients room updated accordingly, including correct available times of PRN medications.

## 2018-04-10 NOTE — ASSESSMENT & PLAN NOTE
POD 1 left tongue biopsy, marsupialization of left submandibular duct  - Advance diet as tolerated  - Cont PO pain meds  - IV/PO nausea meds PRN  - d.c fluids  - Plan to d/c home later today

## 2018-04-10 NOTE — ANESTHESIA POSTPROCEDURE EVALUATION
"Anesthesia Post Evaluation    Patient: Josefina Blue    Procedure(s) Performed: Procedure(s) (LRB):  LARYNGOSCOPY BRONCHOSCOPY-DIRECT (N/A)  EXCISION-LESION-TONGUE    Final Anesthesia Type: general  Patient location during evaluation: PACU  Patient participation: Yes- Able to Participate  Level of consciousness: awake and alert and oriented  Post-procedure vital signs: reviewed and stable  Pain management: adequate  Airway patency: patent  PONV status at discharge: No PONV  Anesthetic complications: no      Cardiovascular status: blood pressure returned to baseline and hemodynamically stable  Respiratory status: unassisted  Hydration status: euvolemic  Follow-up not needed.        Visit Vitals  /72   Pulse 67   Temp 37.1 °C (98.8 °F) (Axillary)   Resp 20   Ht 5' 8" (1.727 m)   Wt 99.8 kg (220 lb)   SpO2 95%   Breastfeeding? No   BMI 33.45 kg/m²       Pain/Cherrie Score: Pain Assessment Performed: Yes (4/9/2018  7:05 PM)  Presence of Pain: non-verbal indicators absent (4/9/2018  7:05 PM)  Pain Rating Prior to Med Admin: 6 (4/9/2018  7:00 PM)  Pain Rating Post Med Admin: 5 (patient reports pain as tolerable) (4/9/2018  7:05 PM)  Cherrie Score: 10 (4/9/2018  7:05 PM)      "

## 2018-04-10 NOTE — SUBJECTIVE & OBJECTIVE
Interval History: NAEON. Pain more controlled with PO meds. Tolerating liquid diet.     Medications:  Continuous Infusions:   0/9% NACL & POTASSIUM CHLORIDE 20 MEQ/L 50 mL/hr at 04/09/18 2119     Scheduled Meds:   irbesartan  75 mg Oral Daily    triamterene-hydrochlorothiazide 37.5-25 mg  1 capsule Oral Daily     PRN Meds:acetaminophen, acetaminophen, ALPRAZolam, diphenhydrAMINE, fentaNYL, hydrALAZINE, hydrocodone-acetaminophen 5-325mg, ondansetron, promethazine (PHENERGAN) IVPB, sodium chloride 0.9%, sodium chloride 0.9%, zolpidem     Review of patient's allergies indicates:   Allergen Reactions    Aspirin Nausea Only     Can take low dose of Aspirin    Codeine Nausea Only     Can take Codeine if she takes a dose of Zofran with it     Objective:     Vital Signs (24h Range):  Temp:  [98.1 °F (36.7 °C)-99.7 °F (37.6 °C)] 99.7 °F (37.6 °C)  Pulse:  [58-98] 98  Resp:  [12-20] 16  SpO2:  [94 %-99 %] 95 %  BP: (120-180)/(63-97) 133/97        Lines/Drains/Airways     Peripheral Intravenous Line                 Peripheral IV - Single Lumen 02/26/17 0556 Right Antecubital 407 days         Peripheral IV - Single Lumen 04/09/18 1120 Left Forearm less than 1 day                Physical Exam  AAOx3, NAD  Left side of tongue with slight swelling  FOM with marsupialized left submandibular duct  No bleeding in oral cavity    Significant Labs:  None    Significant Diagnostics:  None

## 2018-04-10 NOTE — DISCHARGE SUMMARY
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       Discharged Condition: good    Hospital Course: Patient was admitted for an outpatient procedure and tolerated the procedure well with no complications.    Final Diagnoses: Same as principal problem.    Disposition: Home or Self Care    Follow up/Patient Instructions:    Medications:  Reconciled Home Medications:      Medication List      START taking these medications    hydrocodone-acetaminophen 5-325mg 5-325 mg per tablet  Commonly known as:  NORCO  Take 1 tablet by mouth every 6 (six) hours as needed for Pain.     ondansetron 4 MG Tbdl  Commonly known as:  ZOFRAN-ODT  Take 2 tablets (8 mg total) by mouth every 12 (twelve) hours as needed.        CONTINUE taking these medications    ALPRAZolam 0.25 MG tablet  Commonly known as:  XANAX  Take 1 tablet (0.25 mg total) by mouth nightly as needed.     aspirin 81 MG EC tablet  Commonly known as:  ECOTRIN  Take 1 tablet (81 mg total) by mouth nightly.  Start taking on:  4/16/2018     clobetasol 0.05 % external solution  Commonly known as:  TEMOVATE  Apply topically 2 (two) times daily. Use one - two times daily as needed for scaling or itching to scalp     estradiol 0.01 % (0.1 mg/gram) vaginal cream  Commonly known as:  ESTRACE  Place 1 g vaginally once daily.     irbesartan 75 MG tablet  Commonly known as:  AVAPRO  Take 1 tablet (75 mg total) by mouth every evening.     meloxicam 15 MG tablet  Commonly known as:  MOBIC  Take 1 tablet (15 mg total) by mouth once daily.     tretinoin 0.05 % cream  Commonly known as:  RETIN-A  Apply  topically nightly. Apply pea-sized amount to face.     triamterene-hydrochlorothiazide 37.5-25 mg 37.5-25 mg per tablet  Commonly known as:  MAXZIDE-25  Take 1 tablet by mouth once daily.     zolpidem 5 MG Tab  Commonly known as:  AMBIEN  Take 1 tablet (5 mg total) by mouth nightly as needed.            Discharge Procedure Orders  Diet Adult Regular   Scheduling Instructions: Start with liquid diet and advance as tolerated     Activity as tolerated     Lifting restrictions   Order Comments: No lifting anything heavier than a gallon of milk     Notify your health care provider if you experience any of the following:  temperature >100.4     Notify your health care provider if you experience any of the following:  persistent nausea and vomiting or diarrhea     Notify your health care provider if you experience any of the following:  severe uncontrolled pain     Notify your health care provider if you experience any of the following:  redness, tenderness, or signs of infection (pain, swelling, redness, odor or green/yellow discharge around incision site)     Notify your health care provider if you experience any of the following:  severe persistent headache     Notify your health care provider if you experience any of the following:  difficulty breathing or increased cough     Notify your health care provider if you experience any of the following:  worsening rash     Notify your health care provider if you experience any of the following:  persistent dizziness, light-headedness, or visual disturbances     Notify your health care provider if you experience any of the following:  increased confusion or weakness     No dressing needed     Activity as tolerated       Follow-up Information     Azam Marroquin MD In 1 week.    Specialty:  Otolaryngology  Why:  For wound re-check  Contact information:  [address] [place] [id number]  [phone]                   Discharge Procedure Orders (must include Diet,  Follow-up, Activity):    Discharge Procedure Orders (must include Diet, Follow-up, Activity)  Diet Adult Regular   Scheduling Instructions: Start with liquid diet and advance as tolerated     Activity as tolerated     Lifting restrictions   Order Comments: No lifting anything heavier than a gallon of milk     Notify your health care provider if you experience any of the following:  temperature >100.4     Notify your health care provider if you experience any of the following:  persistent nausea and vomiting or diarrhea     Notify your health care provider if you experience any of the following:  severe uncontrolled pain     Notify your health care provider if you experience any of the following:  redness, tenderness, or signs of infection (pain, swelling, redness, odor or green/yellow discharge around incision site)     Notify your health care provider if you experience any of the following:  severe persistent headache     Notify your health care provider if you experience any of the following:  difficulty breathing or increased cough     Notify your health care provider if you experience any of the following:  worsening rash     Notify your health care provider if you experience any of the following:  persistent dizziness, light-headedness, or visual disturbances     Notify your health care provider if you experience any of the following:  increased confusion or weakness     No dressing needed     Activity as tolerated

## 2018-04-10 NOTE — ANESTHESIA RELEASE NOTE
"Anesthesia Release from PACU Note    Patient: Josefina Blue    Procedure(s) Performed: Procedure(s) (LRB):  LARYNGOSCOPY BRONCHOSCOPY-DIRECT (N/A)  EXCISION-LESION-TONGUE    Anesthesia type: general    Post pain: Adequate analgesia    Post assessment: no apparent anesthetic complications    Last Vitals:   Visit Vitals  /72   Pulse 67   Temp 37.1 °C (98.8 °F) (Axillary)   Resp 20   Ht 5' 8" (1.727 m)   Wt 99.8 kg (220 lb)   SpO2 95%   Breastfeeding? No   BMI 33.45 kg/m²       Post vital signs: stable    Level of consciousness: awake, alert  and oriented    Nausea/Vomiting: no nausea/no vomiting    Complications: none    Airway Patency: patent    Respiratory: unassisted    Cardiovascular: stable and blood pressure at baseline    Hydration: euvolemic  "

## 2018-04-10 NOTE — NURSING
Discharge instructions given to pt, pt verbalized understanding. Paper prescriptions given to pt. Pt does not want wheelchair transport. Pt walking out with spouse.

## 2018-04-10 NOTE — NURSING
Patient arrived to 6th floor [facility] room 610 from PACU in no acute distress. Transported via stretcher by transport personnel. PRN pain and nausea medicine administered. Oriented to room, bed in lowest position and brake set. Call light in reach and spouse at bedside. Continuing to monitor throughout the night.

## 2018-04-11 ENCOUNTER — PATIENT MESSAGE (OUTPATIENT)
Dept: OTOLARYNGOLOGY | Facility: CLINIC | Age: 58
End: 2018-04-11

## 2018-04-11 NOTE — ANESTHESIA POSTPROCEDURE EVALUATION
"Anesthesia Post Evaluation    Patient: Josefina Blue    Procedure(s) Performed: Procedure(s) (LRB):  LARYNGOSCOPY BRONCHOSCOPY-DIRECT (N/A)  EXCISION-LESION-TONGUE    Final Anesthesia Type: general  Patient location during evaluation: PACU  Patient participation: Yes- Able to Participate  Level of consciousness: awake and alert and oriented  Post-procedure vital signs: reviewed and stable  Pain management: adequate  Airway patency: patent  PONV status at discharge: No PONV  Anesthetic complications: no      Cardiovascular status: blood pressure returned to baseline and hemodynamically stable  Respiratory status: unassisted and spontaneous ventilation  Hydration status: euvolemic  Follow-up not needed.        Visit Vitals  /74   Pulse 94   Temp 37.4 °C (99.4 °F) (Oral)   Resp 18   Ht 5' 8" (1.727 m)   Wt 99.8 kg (220 lb)   SpO2 (!) 94%   Breastfeeding? No   BMI 33.45 kg/m²       Pain/Cherrie Score: Pain Assessment Performed: Yes (4/10/2018  7:15 AM)  Presence of Pain: complains of pain/discomfort (4/10/2018  7:15 AM)  Pain Rating Prior to Med Admin: 6 (4/10/2018  8:38 AM)      "

## 2018-04-13 ENCOUNTER — TELEPHONE (OUTPATIENT)
Dept: OTOLARYNGOLOGY | Facility: CLINIC | Age: 58
End: 2018-04-13

## 2018-04-13 NOTE — OP NOTE
DATE OF PROCEDURE:  04/09/2018    PREOPERATIVE DIAGNOSES:  1.  Tongue lesion of uncertain significance.  2.  Left submandibular sialolithiasis.  3.  Tongue pain.    POSTOPERATIVE DIAGNOSES:  1.  Tongue lesion of uncertain significance.  2.  Left submandibular sialolithiasis.  3.  Tongue pain.    PROCEDURE PERFORMED:  1.  Direct laryngoscopy with telescope and biopsy of left lateral pharyngeal   wall.  2.  Excision of tongue lesion with primary closure.  3.  Left submandibular sialodochoplasty with dilation and marsupialization of   left submandibular duct.    SURGEON:  Azam Marroquin M.D.    ASSISTANT:  Dr. Coley.    ANESTHESIA:  Via general endotracheal.    ESTIMATED BLOOD LOSS:  20 mL    IV FLUIDS:  May be found in the operative record.    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  Over the last 3 to 4 weeks, she has developed worsening left   tongue pain.  There was a firm nodule on the lateral border of the tongue,   which was in a region of some minor salivary glands, just anterior to the   insertion of the anterior tonsil pillar.  This area was tender to palpation.    Additionally, it was noted that there was significant scar tissue on the lateral   tongue and floor of mouth from her prior surgery.  The risks, benefits,   indications and alternatives to the above procedures were thoroughly discussed   with the patient preoperatively and informed consent was obtained.  Please see   my detailed preoperative note for a thorough account of this discussion.    FINDINGS:  The nodule on the left lateral tongue border was consistent with   inflamed minor salivary glands clinically, but it was locally excised.  There   was some asymmetry of the inferior pole of the left tonsil, which was biopsied.    Most importantly, there was significant stenosis of the left submandibular   duct.   It appeared that it had been caught or incorporated into the old suture   line and had scarred cephalad.  There were 2 punctate areas where saliva flowed   on the left side.  It appeared that perhaps there had been an earlier attempted   marsupialization of the duct, but this had stenosed over time.  This was dilated   and then further marsupialize in the left floor of mouth.    DETAILED ACCOUNT OF TECHNIQUE EMPLOYED:  The patient was identified in the   holding area per routine.  She was transferred to the Operating Room and placed   supine on the operating table.  She was intubated transorally by the   Anesthesiology Service, and an adequate level of general endotracheal anesthesia   was achieved.  The patient's face was draped in standard fashion for   laryngoscopy and oral procedures.  A timeout was then performed according to the   universal protocol.    The procedure began by examining the oral cavity and palpating the nodule on the   left lateral tongue border.  We then placed a maxillary tooth guard and   inserted the Ovidio laryngoscope.  This scope was advanced to visualize the   oral cavity, oropharynx and hypopharynx with visualization optimized with the   0-degree telescope.  The left inferior lateral pharyngeal wall tissue, which   appeared to be an extension of the tonsil, was biopsied.  We then switched   scopes to the Dedo, which was then used to visualize the endolarynx, again with   no abnormalities detected.  The scopes and tooth guard were removed and replaced   with the self-retaining cheek retractor and a bite block.  Exposure was   provided of the lateral tongue lesion, which was excised with narrow margins and   sent for frozen section assessment, returning as negative.  This area was   closed primarily with interrupted 3-0 chromic suture.  Next, we transitioned to   the anterior floor of mouth.  There was some sporadic salivary flow identified   through 2 to 3 distinct punctate areas in  the left floor of mouth.  This was   intimately involved in the scar tissue from her prior surgical procedure.  I   cannulated these with the lacrimal punch, connected them, and then continued   incising over the lacrimal punch, which was within the submandibular duct across   the posterior floor of mouth.  We then took the cut edges of the duct and   sutured them both superiorly and inferiorly to the cut edges of the floor of   mouth mucosa.  Clear salivary flow was preserved at the end of the case.  I did   not identify any discrete stones, but the duct was certainly ectatic.    Hemostasis was accomplished from the pharyngeal biopsy site with topical   application of Afrin-soaked neurosurgical cottonoids.  At this point, the   procedure was deemed complete.  There was no evidence of other neoplasm or area   of concern.  The patient was then allowed to awaken from anesthesia, extubated   and transported to the Recovery Room in stable condition.  All sponge, needle   and instrument counts were correct at the end of the case x2.  I was present for   and performed all portions of this operation.      BAM/HN  dd: 04/13/2018 08:17:46 (CDT)  td: 04/13/2018 10:07:02 (CDT)  Doc ID   #[id number]  Job ID #[id number]    CC:

## 2018-04-13 NOTE — TELEPHONE ENCOUNTER
I called to check on the patient and update her on the pathology report.    FINAL PATHOLOGIC DIAGNOSIS  BIOPSIES OF 1. LEFT POSTERIOR TONGUE AND 2. LEFT TONGUE BASE:  NO SIGNIFICANT HISTOLOGIC ALTERATION  NO NEOPLASIA IDENTIFIED

## 2018-04-24 ENCOUNTER — OFFICE VISIT (OUTPATIENT)
Dept: OTOLARYNGOLOGY | Facility: CLINIC | Age: 58
End: 2018-04-24
Payer: COMMERCIAL

## 2018-04-24 VITALS — SYSTOLIC BLOOD PRESSURE: 146 MMHG | TEMPERATURE: 99 F | HEART RATE: 88 BPM | DIASTOLIC BLOOD PRESSURE: 87 MMHG

## 2018-04-24 DIAGNOSIS — Z09 POSTOP CHECK: ICD-10-CM

## 2018-04-24 DIAGNOSIS — C02.9 SQUAMOUS CELL CANCER OF TONGUE: Primary | ICD-10-CM

## 2018-04-24 PROBLEM — D37.02: Status: RESOLVED | Noted: 2018-04-02 | Resolved: 2018-04-24

## 2018-04-24 PROCEDURE — 99999 PR PBB SHADOW E&M-EST. PATIENT-LVL III: CPT | Mod: PBBFAC,,, | Performed by: OTOLARYNGOLOGY

## 2018-04-24 PROCEDURE — 99024 POSTOP FOLLOW-UP VISIT: CPT | Mod: S$GLB,,, | Performed by: OTOLARYNGOLOGY

## 2018-04-24 NOTE — LETTER
April 24, 2018    Josefina Blue  [address] [address]             [facility]  [address]  [facility] [address]  Phone: [phone]  Fax: [phone] Dear Mrs. Blue:    You are healing nicely. You may return to work, but I would advise limiting your speaking requirements over the next 2 weeks until you are fully healed. There are no activity restrictions other than this.      If you have any questions or concerns, please don't hesitate to call. Thank you for allowing me to participate in your care.     Sincerely,        Azam Marroquin MD

## 2018-04-24 NOTE — ASSESSMENT & PLAN NOTE
She has no evidence of recurrent disease. She did have chronic scar tissue related to her prior surgery, and we released this and tried to address the ductal changes.

## 2018-04-24 NOTE — PROGRESS NOTES
[facility]    Subjective:       Patient ID: Josefina Blue is a 57 y.o. female.    Chief Complaint:   Chief Complaint   Patient presents with    Post-op Evaluation     HPI   TREATMENT HISTORY:  1. Left partial glossectomy, 2013 ([facility], [facility])  2. Excision of tongue lesion and left sialodochoplasty, 4/9/2018    HISTORY OF PRESENT ILLNESS: Josefina Blue presents to the [facility] for follow-up of new onset left posterior tongue and level II neck pain. She has some persistent left tongue pain - she has been eating ok. Her pain is controlled with advil. She wants to go back to work.     Past Medical History:   Diagnosis Date    Hyperlipidemia 8/31/2015    Hypertension     IFG (impaired fasting glucose) 8/31/2015    Neuropathic pain 8/31/2015    Squamous cell cancer of tongue 2012       Past Surgical History:   Procedure Laterality Date    CHOLECYSTECTOMY      GALLBLADDER SURGERY  06/2016    HYSTERECTOMY      KNEE ARTHROSCOPY Right     for torn meniscus    TONGUE SURGERY           Current Outpatient Prescriptions:     ALPRAZolam (XANAX) 0.25 MG tablet, Take 1 tablet (0.25 mg total) by mouth nightly as needed., Disp: 30 tablet, Rfl: 2    aspirin (ECOTRIN) 81 MG EC tablet, Take 1 tablet (81 mg total) by mouth nightly., Disp: , Rfl: 0    clobetasol (TEMOVATE) 0.05 % external solution, Apply topically 2 (two) times daily. Use one - two times daily as needed for scaling or itching to scalp, Disp: 60 mL, Rfl: 3    estradiol (ESTRACE) 0.01 % (0.1 mg/gram) vaginal cream, Place 1 g vaginally once daily., Disp: 42.5 g, Rfl: 6    hydrocodone-acetaminophen 5-325mg (NORCO) 5-325 mg per tablet, Take 1 tablet by mouth every 6 (six) hours as needed for Pain., Disp: 30 tablet, Rfl: 0    irbesartan (AVAPRO) 75 MG tablet, Take 1 tablet (75 mg total) by mouth every evening., Disp: 90 tablet, Rfl: 3    meloxicam (MOBIC) 15 MG tablet, Take 1 tablet (15 mg total)  by mouth once daily., Disp: 30 tablet, Rfl: 2    ondansetron (ZOFRAN-ODT) 4 MG TbDL, Take 2 tablets (8 mg total) by mouth every 12 (twelve) hours as needed., Disp: 6 tablet, Rfl: 0    tretinoin (RETIN-A) 0.05 % cream, Apply topically nightly. Apply pea-sized amount to face., Disp: 45 g, Rfl: 2    triamterene-hydrochlorothiazide 37.5-25 mg (MAXZIDE-25) 37.5-25 mg per tablet, Take 1 tablet by mouth once daily., Disp: 90 tablet, Rfl: 3    zolpidem (AMBIEN) 5 MG Tab, Take 1 tablet (5 mg total) by mouth nightly as needed., Disp: 30 tablet, Rfl: 5    Review of patient's allergies indicates:   Allergen Reactions    Aspirin Nausea Only     Can take low dose of Aspirin    Codeine Nausea Only     Can take Codeine if she takes a dose of Zofran with it       Social History     Social History    Marital status:      Spouse name: N/A    Number of children: N/A    Years of education: N/A     Occupational History    chief nursing officer      Social History Main Topics    Smoking status: Never Smoker    Smokeless tobacco: Never Used    Alcohol use 0.0 oz/week      Comment: occ    Drug use: No    Sexual activity: Yes     Birth control/ protection: None     Other Topics Concern    Not on file     Social History Narrative    Lives with  and 2 dogs       Family History   Problem Relation Age of Onset    Alcohol abuse Mother     Cirrhosis Mother     Cancer Father 74     prostate, throat, lung- smoker    Hyperlipidemia Father     Diabetes Sister     Hypertension Sister     Drug abuse Brother     Heart disease Brother     Hyperlipidemia Brother     No Known Problems Maternal Aunt     No Known Problems Maternal Uncle     No Known Problems Paternal Aunt     No Known Problems Paternal Uncle     No Known Problems Maternal Grandmother     No Known Problems Maternal Grandfather     No Known Problems Paternal Grandmother     No Known Problems Paternal Grandfather     Amblyopia Neg Hx     Blindness  Neg Hx     Cataracts Neg Hx     Glaucoma Neg Hx     Macular degeneration Neg Hx     Retinal detachment Neg Hx     Strabismus Neg Hx     Stroke Neg Hx     Thyroid disease Neg Hx      Review of Systems   Constitutional: Negative for activity change, appetite change, fever and unexpected weight change.   HENT: Positive for sore throat. Negative for dental problem, mouth sores, trouble swallowing and voice change.    Respiratory: Negative for cough.    Cardiovascular: Negative for chest pain and palpitations.   Neurological: Negative for speech difficulty and numbness.   Psychiatric/Behavioral: The patient is not nervous/anxious.        Objective:      Physical Exam   Constitutional: She is oriented to person, place, and time. She appears well-developed and well-nourished. No distress.   HENT:   Head: Normocephalic and atraumatic.   Right Ear: Hearing and external ear normal.   Left Ear: Hearing and external ear normal.   Nose: No rhinorrhea or nasal deformity.   Mouth/Throat: Uvula is midline, oropharynx is clear and moist and mucous membranes are normal. She does not have dentures. No oral lesions. No trismus in the jaw. Normal dentition. No dental caries. No oropharyngeal exudate or posterior oropharyngeal edema.       Eyes: EOM and lids are normal. Pupils are equal, round, and reactive to light. Right eye exhibits no chemosis. Left eye exhibits no chemosis. No scleral icterus.   Neck: Trachea normal, normal range of motion, full passive range of motion without pain and phonation normal. No muscular tenderness present. Carotid bruit is not present. No thyroid mass and no thyromegaly present.       Pulmonary/Chest: Effort normal. No accessory muscle usage or stridor. No respiratory distress.   Abdominal: Normal appearance. There is no tenderness.   Musculoskeletal:        Right shoulder: She exhibits normal range of motion and no deformity.        Left shoulder: She exhibits normal range of motion and no  deformity.   Lymphadenopathy:        Head (right side): No submental and no occipital adenopathy present.        Head (left side): No submental and no occipital adenopathy present.     She has no cervical adenopathy.        Right cervical: No deep cervical and no posterior cervical adenopathy present.       Left cervical: No deep cervical and no posterior cervical adenopathy present.   Neurological: She is alert and oriented to person, place, and time. No cranial nerve deficit or sensory deficit. Gait normal.   Skin: Skin is warm and intact. No lesion and no rash noted.   Psychiatric: She has a normal mood and affect. Her speech is normal and behavior is normal. Thought content normal.   Vitals reviewed.      Assessment & Plan:       Problem List Items Addressed This Visit     Squamous cell cancer of tongue - Primary     She has no evidence of recurrent disease. She did have chronic scar tissue related to her prior surgery, and we released this and tried to address the ductal changes.            Other Visit Diagnoses     Postop check        She is doing well and may return to work.

## 2018-06-07 ENCOUNTER — PATIENT MESSAGE (OUTPATIENT)
Dept: RESEARCH | Facility: HOSPITAL | Age: 58
End: 2018-06-07

## 2018-07-01 DIAGNOSIS — F41.8 SITUATIONAL ANXIETY: ICD-10-CM

## 2018-07-01 DIAGNOSIS — G47.00 INSOMNIA, UNSPECIFIED TYPE: ICD-10-CM

## 2018-07-02 RX ORDER — ALPRAZOLAM 0.25 MG/1
0.25 TABLET ORAL NIGHTLY PRN
Qty: 30 TABLET | Refills: 2 | Status: SHIPPED | OUTPATIENT
Start: 2018-07-02 | End: 2019-01-07 | Stop reason: SDUPTHER

## 2018-07-31 RX ORDER — ZOLPIDEM TARTRATE 5 MG/1
5 TABLET ORAL NIGHTLY
Qty: 30 TABLET | Refills: 5 | Status: SHIPPED | OUTPATIENT
Start: 2018-07-31 | End: 2019-02-10 | Stop reason: SDUPTHER

## 2018-08-01 ENCOUNTER — DOCUMENTATION ONLY (OUTPATIENT)
Dept: OPTOMETRY | Facility: CLINIC | Age: 58
End: 2018-08-01

## 2018-08-01 NOTE — PROGRESS NOTES
Assessment /Plan     For exam results, see Encounter Report.    Refractive error OU  Wears SCLs Ou            Refer to previous optometry encounter notes.  Received request from [facility] for authorization for renewal of last CL Rx, but Rx has .  Faxed form to [employer] advising them of the above.  Will have my assistant call MsMarci Blue to notify her of the above, but will dispense trial lenses for use until she can come in for exam and CL follow-up:        Air Optix Multifocal SCLs:           OD  8.6  14.2  -2.50 / High add (+2.50 add) in                             lieu of -2.75 / High add (not in trial stock)           OS  8.6   14.2  -2.25 / Med Add (+2.00 add)    Will have my assistant call Ms. Blue to schedule appointment and CL follow-up.      Darnell Coyle, OD

## 2018-08-03 DIAGNOSIS — Z12.39 BREAST CANCER SCREENING: ICD-10-CM

## 2018-08-22 ENCOUNTER — PATIENT MESSAGE (OUTPATIENT)
Dept: ADMINISTRATIVE | Facility: HOSPITAL | Age: 58
End: 2018-08-22

## 2018-08-27 ENCOUNTER — OFFICE VISIT (OUTPATIENT)
Dept: OPTOMETRY | Facility: CLINIC | Age: 58
End: 2018-08-27
Payer: COMMERCIAL

## 2018-08-27 DIAGNOSIS — H52.201 MYOPIA WITH ASTIGMATISM, RIGHT: ICD-10-CM

## 2018-08-27 DIAGNOSIS — Z01.00 EXAMINATION OF EYES AND VISION: Primary | ICD-10-CM

## 2018-08-27 DIAGNOSIS — H52.4 PRESBYOPIA: ICD-10-CM

## 2018-08-27 DIAGNOSIS — H02.9 EYELID DISORDER: Primary | ICD-10-CM

## 2018-08-27 DIAGNOSIS — H52.12 MYOPIA, LEFT: ICD-10-CM

## 2018-08-27 DIAGNOSIS — H52.11 MYOPIA WITH ASTIGMATISM, RIGHT: ICD-10-CM

## 2018-08-27 PROCEDURE — 99999 PR PBB SHADOW E&M-EST. PATIENT-LVL II: CPT | Mod: PBBFAC,,, | Performed by: OPTOMETRIST

## 2018-08-27 PROCEDURE — 92012 INTRM OPH EXAM EST PATIENT: CPT | Mod: S$GLB,,, | Performed by: OPTOMETRIST

## 2018-08-27 PROCEDURE — 92015 DETERMINE REFRACTIVE STATE: CPT | Mod: S$GLB,,, | Performed by: OPTOMETRIST

## 2018-08-27 NOTE — PATIENT INSTRUCTIONS
Myopia with slight astigmatism in the right eye, and myopia in the left eye.  Good best corrected VA in each eye.  Presbyopia consistent with age.  New spectacle lens Rx issued for use as needed in lieu of SCLs.    Cyst of Zeis temporal RUL margin.  Mrs. Blue desires consult with Dr. Zuniga for removal/excision.  Will have assistant call Mrs. Blue to schedule consult with Dr. Zuniga.    Currently wearing Air Optix Multifocal SCLs.  Mrs. Blue reports some difficulty with near VA with CLs, but no other problems.  Dispensed new pair of trial Air Optix Multifocal SCLs:     OD  8.6   14.2  -2.25 / High add     OS  8.6   14.2  -2.00 / High add  Wear new trial lenses as usual, and return for CL follow-up in several days.  Following [facility], will do tonometry and dilated fundus exam (DFE).      Repeat refraction in one year.     10/19/2018.  Addendum to notes.  Received message from Mrs. Blue stating that she is very pleased with the trial lenses dispensed on 08/27/2018.  She requests that the Rx be finalized and sent to the CL department for ordering.  I sent an email to her notifying her that that will be done today.

## 2018-08-27 NOTE — PROGRESS NOTES
HPI     eye exam      Additional comments: General eye examination and refraction.  Came in without CLs in.  Wearing Air optix multifocal lenses.  Feel need for help with near VA with CLs.  VA with glasses okay.               Comments     Patient's age: 58 y.o. Wf  Occupation: director of nursing at [employer]  Approximate date of last eye examination:  05/11/2017  Name of last eye doctor seen: Dr. Coyle    Wears glasses? yes     If yes, wears  Full-time or part-time?  Part-time when without CLs  Present glasses are: Bifocal, SV Distance, SV Reading?  Single vision   distance - removes for reading  Approximate age of present glasses:  Three years  Got new glasses following last exam, or subsequently?: no   Any problem with VA with glasses?  Distance VA okay with glasses  Wears CLs?:  yes           If yes:              Type of CL worn:  Air Optix Multifocal SCLs                        OD   8.6  14.2  -2.75 / High                        OS   8.6   14.2  -2.25 / Med              Wears full-time or part-time:  Full-time on daily wear basis               Sleeps with contact lenses:  no                            How often replace CLs:  monthly              Any problem with VA with CLs?  Difficulty with near VA with   CLs                Headaches?  no  Eye pain/discomfort?  no                                                                                     Flashes?  no  Floaters?  no  Diplopia/Double vision?  no  Patient's Ocular History:         Any eye surgeries? no         Any eye injury?  no         Any treatment for eye disease?  no  Family history of eye disease?  no  Significant patient medical history:         1. Diabetes?  no       If yes, IDDM or NIDDM? n/a   2. HBP?  Yes.   Takes meds.  States well controlled              3. Other (describe):  Squamous cell carcinoma of toung   ! OTC eyedrops currently using:  no   ! Prescription eye meds currently using:  no   ! Any history of allergy/adverse reaction to any  "eye meds used   previously?  no    ! Any history of allergy/adverse reaction to eyedrops used during prior   eye exam(s)? no      ! Any history of allergy/adverse reaction to Epinephrine or similar meds?   no    ! Patient okay with use of anesthetic eyedrops to check eye pressure?    yes        ! Patient okay with use of eyedrops to dilate pupils today?  NO -   REQUESTS DFE BE DEFERED TO A LATER DATE   !  Allergies/Medications/Medical History/Family History reviewed today?     Yes       PD =   66/62  Desired reading distance =  14"                                                                       Last edited by Sergei Coyle, OD on 8/27/2018 11:29 AM. (History)            Assessment /Plan     For exam results, see Encounter Report.    1. Examination of eyes and vision     2. Myopia with astigmatism, right     3. Myopia, left     4. Presbyopia                    Myopia with slight astigmatism in the right eye, and myopia in the left eye.  Good best corrected VA in each eye.  Presbyopia consistent with age.  New spectacle lens Rx issued for use as needed in lieu of SCLs.    Cyst of Zeis temporal RUL margin.  Mrs. Blue desires consult with Dr. Zuniga for removal/excision.  Will have assistant call Mrs. Bule to schedule consult with Dr. Zuniga.    Currently wearing Air Optix Multifocal SCLs.  Mrs. Blue reports some difficulty with near VA with CLs, but no other problems.  Dispensed new pair of trial Air Optix Multifocal SCLs:     OD  8.6   14.2  -2.25 / High add     OS  8.6   14.2  -2.00 / High add  Wear new trial lenses as usual, and return for CL follow-up in several days.  Following [facility], will do tonometry and dilated fundus exam (DFE).      Repeat refraction in one year.       "

## 2018-09-10 ENCOUNTER — TELEPHONE (OUTPATIENT)
Dept: OPHTHALMOLOGY | Facility: CLINIC | Age: 58
End: 2018-09-10

## 2018-10-01 ENCOUNTER — PROCEDURE VISIT (OUTPATIENT)
Dept: OPHTHALMOLOGY | Facility: CLINIC | Age: 58
End: 2018-10-01
Payer: COMMERCIAL

## 2018-10-01 VITALS — SYSTOLIC BLOOD PRESSURE: 119 MMHG | DIASTOLIC BLOOD PRESSURE: 85 MMHG | HEART RATE: 76 BPM

## 2018-10-01 DIAGNOSIS — H02.9 EYELID LESION: Primary | ICD-10-CM

## 2018-10-01 PROCEDURE — 92285 EXTERNAL OCULAR PHOTOGRAPHY: CPT | Mod: S$GLB,,, | Performed by: OPHTHALMOLOGY

## 2018-10-01 PROCEDURE — 92012 INTRM OPH EXAM EST PATIENT: CPT | Mod: S$GLB,,, | Performed by: OPHTHALMOLOGY

## 2018-10-01 NOTE — PROGRESS NOTES
HPI     cyst of Zeis temporal RUL margin      Additional comments: per Dr. Coyle              Comments     Pt here for possible excision of cyst of Zeis temporal RUL margin.  No   pain, discomfort, burning, bleeding.  Has had for four months, grew for a   month or two and then stopped.  No fevers, chills, sweats, or weight loss.    Often gets white cysts like this on her face in other spots.  Hx squamous   cell carcinoma of tongue s/p resection at [facility], no skin cancers  Referred by: Dr. Sergei Coyle at [facility].  -----------------------------------------------------------  SYMPTOMS:  Lesion has been present for 4 months RUL not painful.       -----------------------------------------------------------  EYE MEDS: none            Last edited by Randall Browning MD on 10/1/2018 11:02 AM. (History)        Assessment /Plan     For exam results, see Encounter Report.    Eyelid lesion  -     External/Slit Lamp Photography      # Sebaceous cyst RUL  - Informed consent obtained after extensive risks/benefits/alternatives were discussed with the patient including but not limited to pain, bleeding, infection, loss of vision, loss of the eye, asymmetry, need for revision in future, scarring, loss of eyelashes, damage to canalicular system or punctum, need for lacrimal intubation.  Alternatives such as observation discussed.  All questions were answered.    - Discussed with patient would be cosmetic removal and out of pocket expense  - Pt will defer excision at this time      I have reviewed and concur with the resident's history, physical, assessment, and plan.  I have personally interviewed and examined the patient.

## 2018-10-19 ENCOUNTER — DOCUMENTATION ONLY (OUTPATIENT)
Dept: OPTOMETRY | Facility: CLINIC | Age: 58
End: 2018-10-19

## 2019-01-04 DIAGNOSIS — I10 BENIGN ESSENTIAL HYPERTENSION: ICD-10-CM

## 2019-01-04 RX ORDER — TRIAMTERENE/HYDROCHLOROTHIAZID 37.5-25 MG
1 TABLET ORAL DAILY
Qty: 90 TABLET | Refills: 3 | Status: SHIPPED | OUTPATIENT
Start: 2019-01-04 | End: 2020-01-10 | Stop reason: SDUPTHER

## 2019-01-07 DIAGNOSIS — G47.00 INSOMNIA, UNSPECIFIED TYPE: ICD-10-CM

## 2019-01-07 DIAGNOSIS — F41.8 SITUATIONAL ANXIETY: ICD-10-CM

## 2019-01-07 RX ORDER — ALPRAZOLAM 0.25 MG/1
0.25 TABLET ORAL NIGHTLY PRN
Qty: 30 TABLET | Refills: 2 | Status: CANCELLED | OUTPATIENT
Start: 2019-01-07

## 2019-01-08 DIAGNOSIS — G47.00 INSOMNIA, UNSPECIFIED TYPE: ICD-10-CM

## 2019-01-08 DIAGNOSIS — F41.8 SITUATIONAL ANXIETY: ICD-10-CM

## 2019-01-08 RX ORDER — ALPRAZOLAM 0.25 MG/1
0.25 TABLET ORAL NIGHTLY PRN
Qty: 30 TABLET | Refills: 2 | Status: SHIPPED | OUTPATIENT
Start: 2019-01-08 | End: 2019-09-23 | Stop reason: SDUPTHER

## 2019-01-22 ENCOUNTER — OFFICE VISIT (OUTPATIENT)
Dept: URGENT CARE | Facility: CLINIC | Age: 59
End: 2019-01-22
Payer: COMMERCIAL

## 2019-01-22 VITALS
TEMPERATURE: 101 F | HEIGHT: 69 IN | DIASTOLIC BLOOD PRESSURE: 85 MMHG | SYSTOLIC BLOOD PRESSURE: 139 MMHG | HEART RATE: 109 BPM | OXYGEN SATURATION: 98 % | WEIGHT: 220 LBS | BODY MASS INDEX: 32.58 KG/M2 | RESPIRATION RATE: 18 BRPM

## 2019-01-22 DIAGNOSIS — J10.1 INFLUENZA A: Primary | ICD-10-CM

## 2019-01-22 LAB
CTP QC/QA: YES
FLUAV AG NPH QL: POSITIVE
FLUBV AG NPH QL: NEGATIVE

## 2019-01-22 PROCEDURE — 87804 INFLUENZA ASSAY W/OPTIC: CPT | Mod: QW,S$GLB,, | Performed by: PHYSICIAN ASSISTANT

## 2019-01-22 PROCEDURE — 3075F PR MOST RECENT SYSTOLIC BLOOD PRESS GE 130-139MM HG: ICD-10-PCS | Mod: CPTII,S$GLB,, | Performed by: PHYSICIAN ASSISTANT

## 2019-01-22 PROCEDURE — 3079F DIAST BP 80-89 MM HG: CPT | Mod: CPTII,S$GLB,, | Performed by: PHYSICIAN ASSISTANT

## 2019-01-22 PROCEDURE — 3075F SYST BP GE 130 - 139MM HG: CPT | Mod: CPTII,S$GLB,, | Performed by: PHYSICIAN ASSISTANT

## 2019-01-22 PROCEDURE — 3008F PR BODY MASS INDEX (BMI) DOCUMENTED: ICD-10-PCS | Mod: CPTII,S$GLB,, | Performed by: PHYSICIAN ASSISTANT

## 2019-01-22 PROCEDURE — 99214 OFFICE O/P EST MOD 30 MIN: CPT | Mod: S$GLB,,, | Performed by: PHYSICIAN ASSISTANT

## 2019-01-22 PROCEDURE — 3079F PR MOST RECENT DIASTOLIC BLOOD PRESSURE 80-89 MM HG: ICD-10-PCS | Mod: CPTII,S$GLB,, | Performed by: PHYSICIAN ASSISTANT

## 2019-01-22 PROCEDURE — 99214 PR OFFICE/OUTPT VISIT, EST, LEVL IV, 30-39 MIN: ICD-10-PCS | Mod: S$GLB,,, | Performed by: PHYSICIAN ASSISTANT

## 2019-01-22 PROCEDURE — 87804 POCT INFLUENZA A/B: ICD-10-PCS | Mod: 59,QW,S$GLB, | Performed by: PHYSICIAN ASSISTANT

## 2019-01-22 PROCEDURE — 3008F BODY MASS INDEX DOCD: CPT | Mod: CPTII,S$GLB,, | Performed by: PHYSICIAN ASSISTANT

## 2019-01-22 RX ORDER — PROMETHAZINE HYDROCHLORIDE AND DEXTROMETHORPHAN HYDROBROMIDE 6.25; 15 MG/5ML; MG/5ML
5 SYRUP ORAL EVERY 6 HOURS PRN
Qty: 120 ML | Refills: 0 | Status: SHIPPED | OUTPATIENT
Start: 2019-01-22 | End: 2019-02-01

## 2019-01-22 RX ORDER — IBUPROFEN 200 MG
400 TABLET ORAL
Status: COMPLETED | OUTPATIENT
Start: 2019-01-22 | End: 2019-01-22

## 2019-01-22 RX ORDER — OSELTAMIVIR PHOSPHATE 75 MG/1
75 CAPSULE ORAL 2 TIMES DAILY
Qty: 10 CAPSULE | Refills: 0 | Status: SHIPPED | OUTPATIENT
Start: 2019-01-22 | End: 2019-01-27

## 2019-01-22 RX ADMIN — Medication 400 MG: at 07:01

## 2019-01-22 NOTE — LETTER
January 22, 2019      [facility]  [address]  [facility] [address]  Phone: [phone]  Fax: [phone]       Patient: Josefina Blue   YOB: 1960  Date of Visit: 01/22/2019    To Whom It May Concern:    Donis Blue  was at [facility] on 01/22/2019. She may return to work/school on 01/28/2019 with no restrictions. If you have any questions or concerns, or if I can be of further assistance, please do not hesitate to contact me.    Sincerely,        Yenifer Simmons PA-C

## 2019-01-23 NOTE — PROGRESS NOTES
"Subjective:       Patient ID: Josefina Blue is a 58 y.o. female.    Vitals:  height is 5' 9" (1.753 m) and weight is 99.8 kg (220 lb). Her oral temperature is 101.1 °F (38.4 °C) (abnormal). Her blood pressure is 139/85 and her pulse is 109. Her respiration is 18 and oxygen saturation is 98%.     Chief Complaint: CLEMENTINA Marcus is a 58 y.o. female who presents today with a chief complaint of cough, fever and body aches today. She has had 2 bouts of explosive diarrhea. She took Nyquil at 6:30pm.      URI    This is a new problem. The current episode started today. The problem has been gradually worsening. The maximum temperature recorded prior to her arrival was 101 - 101.9 F. Associated symptoms include congestion, coughing and diarrhea. Pertinent negatives include no chest pain, headaches, nausea, rhinorrhea, sinus pain, sore throat, vomiting or wheezing. She has tried acetaminophen for the symptoms. The treatment provided mild relief.       Constitution: Positive for fatigue, fever and generalized weakness. Negative for chills and sweating.   HENT: Positive for congestion. Negative for sinus pain, sinus pressure and sore throat.    Cardiovascular: Negative for chest pain.   Respiratory: Positive for cough. Negative for sputum production and wheezing.    Gastrointestinal: Positive for diarrhea. Negative for nausea and vomiting.   Skin: Negative for erythema.   Allergic/Immunologic: Positive for flu shot.   Neurological: Negative for headaches and altered mental status.   Psychiatric/Behavioral: Negative for altered mental status.       Objective:      Physical Exam   Constitutional: She is oriented to person, place, and time. She appears well-developed and well-nourished.   HENT:   Head: Normocephalic and atraumatic.   Right Ear: Hearing, tympanic membrane, external ear and ear canal normal.   Left Ear: Hearing, tympanic membrane, external ear and ear canal normal.   Nose: Rhinorrhea present. Right sinus exhibits no " maxillary sinus tenderness and no frontal sinus tenderness. Left sinus exhibits no maxillary sinus tenderness and no frontal sinus tenderness.   Mouth/Throat: Uvula is midline and oropharynx is clear and moist.   Eyes: Conjunctivae are normal.   Neck: Normal range of motion. Neck supple. No thyromegaly present.   Cardiovascular: Normal rate and regular rhythm. Exam reveals no gallop and no friction rub.   No murmur heard.  Pulmonary/Chest: Effort normal and breath sounds normal. She has no wheezes. She has no rales.   Musculoskeletal: Normal range of motion.   Lymphadenopathy:     She has no cervical adenopathy.   Neurological: She is alert and oriented to person, place, and time.   Skin: Skin is warm and dry. No rash noted. No erythema.   Psychiatric: She has a normal mood and affect. Her behavior is normal. Judgment and thought content normal.   Nursing note and vitals reviewed.      Results for orders placed or performed in visit on 01/22/19   POCT Influenza A/B   Result Value Ref Range    Rapid Influenza A Ag Positive (A) Negative    Rapid Influenza B Ag Negative Negative     Acceptable Yes        Assessment:       1. Influenza A        Plan:         Influenza A  -     POCT Influenza A/B  -     ibuprofen tablet 400 mg  -     promethazine-dextromethorphan (PROMETHAZINE-DM) 6.25-15 mg/5 mL Syrp; Take 5 mLs by mouth every 6 (six) hours as needed (cough).  Dispense: 120 mL; Refill: 0  -     oseltamivir (TAMIFLU) 75 MG capsule; Take 1 capsule (75 mg total) by mouth 2 (two) times daily. for 5 days  Dispense: 10 capsule; Refill: 0        Josefina was seen today for uri.    Diagnoses and all orders for this visit:    Influenza A  -     POCT Influenza A/B  -     ibuprofen tablet 400 mg  -     promethazine-dextromethorphan (PROMETHAZINE-DM) 6.25-15 mg/5 mL Syrp; Take 5 mLs by mouth every 6 (six) hours as needed (cough).  -     oseltamivir (TAMIFLU) 75 MG capsule; Take 1 capsule (75 mg total) by mouth 2 (two)  times daily. for 5 days      Patient Instructions     - Rest.    - Drink plenty of fluids.    - Tylenol or Ibuprofen as directed as needed for fever/pain.    - Take over-the-counter claritin, zyrtec, allegra, or xyzal as directed.  You should NOT use a decongestant form (D) of this medication if you have a history of hypertension or heart disease.  - Use over the counter Flonase as directed for sinus congestion and postnasal drip.  - use nasal saline prior to Flonase.  - stop using Flonase if you developed nosebleeds.  - Use Ocean Spray Nasal Saline 1-3 puffs each nostril every 2-3 hours then blow out onto tissue. This is to irrigate the nasal passage way to clear the sinus openings. Use until sinus problem resolved.   - Follow up with your PCP or specialty clinic as directed in the next 1-2 weeks if not improved or as needed.  You can call [phone] to schedule an appointment with the appropriate provider.    - Go to the ED if your symptoms worsen.  - You must understand that you have received an Urgent Care treatment only and that you may be released before all of your medical problems are known or treated.   - You, the patient, will arrange for follow up care as instructed.   - If your condition worsens or fails to improve we recommend that you receive another evaluation at the ER immediately or contact your PCP to discuss your concerns or return here.       Influenza (Adult)    Influenza is also called the flu. It is a viral illness that affects the air passages of your lungs. It is different from the common cold. The flu can easily be passed from one to person to another. It may be spread through the air by coughing and sneezing. Or it can be spread by touching the sick person and then touching your own eyes, nose, or mouth.  The flu starts 1 to 3 days after you are exposed to the flu virus. It may last for 1 to 2 weeks but many people feel tired or fatigued for many weeks afterward. You usually dont need  to take antibiotics unless you have a complication. This might be an ear or sinus infection or pneumonia.  Symptoms of the flu may be mild or severe. They can include extreme tiredness (wanting to stay in bed all day), chills, fevers, muscle aches, soreness with eye movement, headache, and a dry, hacking cough.  Home care  Follow these guidelines when caring for yourself at home:  · Avoid being around cigarette smoke, whether yours or other peoples.  · Acetaminophen or ibuprofen will help ease your fever, muscle aches, and headache. Dont give aspirin to anyone younger than 18 who has the flu. Aspirin can harm the liver.  · Nausea and loss of appetite are common with the flu. Eat light meals. Drink 6 to 8 glasses of liquids every day. Good choices are water, sport drinks, soft drinks without caffeine, juices, tea, and soup. Extra fluids will also help loosen secretions in your nose and lungs.  · Over-the-counter cold medicines will not make the flu go away faster. But the medicines may help with coughing, sore throat, and congestion in your nose and sinuses. Dont use a decongestant if you have high blood pressure.  · Stay home until your fever has been gone for at least 24 hours without using medicine to reduce fever.  Follow-up care  Follow up with your healthcare provider, or as advised, if you are not getting better over the next week.  If you are age 65 or older, talk with your provider about getting a pneumococcal vaccine every 5 years. You should also get this vaccine if you have chronic asthma or COPD. All adults should get a flu vaccine every fall. Ask your provider about this.  When to seek medical advice  Call your healthcare provider right away if any of these occur:  · Cough with lots of colored mucus (sputum) or blood in your mucus  · Chest pain, shortness of breath, wheezing, or trouble breathing  · Severe headache, or face, neck, or ear pain  · New rash with fever  · Fever of 100.4°F (38°C) or  higher, or as directed by your healthcare provider  · Confusion, behavior change, or seizure  · Severe weakness or dizziness  · You get a new fever or cough after getting better for a few days  Date Last Reviewed: 1/1/2017 © 2000-2017 [employer]. [address] [place] [address], [place] [address]. All rights reserved. This information is not intended as a substitute for professional medical care. Always follow your healthcare professional's instructions.        The Flu (Influenza)     The virus that causes the flu spreads through the air in droplets when someone who has the flu coughs, sneezes, laughs, or talks.   The flu (influenza) is an infection that affects your respiratory tract. This tract is made up of your mouth, nose, and lungs, and the passages between them. Unlike a cold, the flu can make you very ill. And it can lead to pneumonia, a serious lung infection. The flu can have serious complications and even cause death.  Who is at risk for the flu?  Anyone can get the flu. But you are more likely to become infected if you:  · Have a weakened immune system  · Work in a healthcare setting where you may be exposed to flu germs  · Live or work with someone who has the flu  · Havent had an annual flu shot  How does the flu spread?  The flu is caused by a virus. The virus spreads through the air in droplets when someone who has the flu coughs, sneezes, laughs, or talks. You can become infected when you inhale these viruses directly. You can also become infected when you touch a surface on which the droplets have landed and then transfer the germs to your eyes, nose, or mouth. Touching used tissues, or sharing utensils, drinking glasses, or a toothbrush from an infected person can expose you to flu viruses, too.  What are the symptoms of the flu?  Flu symptoms tend to come on quickly and may last a few days to a few weeks. They include:  · Fever usually higher than 100.4°F  (38°C) and chills  · Sore throat  and headache  · Dry cough  · Runny nose  · Tiredness and weakness  · Muscle aches  Who is at risk for flu complications?  For some people, the flu can be very serious. The risk for complications is greater for:  · Children younger than age 5  · Adults ages 65 and older  · People with a chronic illness such as diabetes or heart, kidney, or lung disease  · People who live in a nursing home or long-term care facility   How is the flu treated?  The flu usually gets better after 7 days or so. In some cases, your healthcare provider may prescribe an antiviral medicine. This may help you get well a little sooner. For the medicine to help, you need to take it as soon as possible (ideally within 48 hours) after your symptoms start. If you develop pneumonia or other serious illness, you may need to stay in the hospital.  Easing flu symptoms  · Drink lots of fluids such as water, juice, and warm soup. A good rule is to drink enough so that you urinate your normal amount.  · Get plenty of rest.  · Ask your healthcare provider what to take for fever and pain.  · Call your provider if your fever is 100.4°F (38°C) or higher, or you become dizzy, lightheaded, or short of breath.  Taking steps to protect others  · Wash your hands often, especially after coughing or sneezing. Or clean your hands with an alcohol-based hand  containing at least 60% alcohol.  · Cough or sneeze into a tissue. Then throw the tissue away and wash your hands. If you dont have a tissue, cough and sneeze into your elbow.  · Stay home until at least 24 hours after you no longer have a fever or chills. Be sure the fever isnt being hidden by fever-reducing medicine.  · Dont share food, utensils, drinking glasses, or a toothbrush with others.  · Ask your healthcare provider if others in your household should get antiviral medicine to help them avoid infection.  How can the flu be prevented?  · One of the best ways to avoid the flu is to get a flu  vaccine each year. The virus that causes the flu changes from year to year. For that reason, healthcare providers recommend getting the flu vaccine each year, as soon as it's available in your area. The vaccine is given as a shot. Your healthcare provider can tell you which vaccine is right for you. A nasal spray is also available but is not recommended for the 6377-4788 flu season. The CDC says this is because the nasal spray did not seem to protect against the flu over the last several flu seasons. In the past, it was meant for people ages 2 to 49.  · Wash your hands often. Frequent handwashing is a proven way to help prevent infection.  · Carry an alcohol-based hand gel containing at least 60% alcohol. Use it when you can't use soap and water. Then wash your hands as soon as you can.  · Avoid touching your eyes, nose, and mouth.  · At home and work, clean phones, computer keyboards, and toys often with disinfectant wipes.  · If possible, avoid close contact with others who have the flu or symptoms of the flu.  Handwashing tips  Handwashing is one of the best ways to prevent many common infections. If you are caring for or visiting someone with the flu, wash your hands each time you enter and leave the room. Follow these steps:  · Use warm water and plenty of soap. Rub your hands together well.  · Clean the whole hand, including under your nails, between your fingers, and up the wrists.  · Wash for at least 15 seconds.  · Rinse, letting the water run down your fingers, not up your wrists.  · Dry your hands well. Use a paper towel to turn off the faucet and open the door.  Using alcohol-based hand   Alcohol-based hand  are also a good choice. Use them when you can't use soap and water. Follow these steps:  · Squeeze about a tablespoon of gel into the palm of one hand.  · Rub your hands together briskly, cleaning the backs of your hands, the palms, between your fingers, and up the wrists.  · Rub  until the gel is gone and your hands are completely dry.  Preventing the flu in healthcare settings  The flu is a special concern for people in hospitals and long-term care facilities. To help prevent the spread of flu, many hospitals and nursing homes take these steps:  · Healthcare providers wash their hands or use an alcohol-based hand  before and after treating each patient.  · People with the flu have private rooms and bathrooms or share a room with someone with the same infection.  · People who are at high risk for the flu but don't have it are encouraged to get the flu and pneumonia vaccines.  · All healthcare workers are encouraged or required to get flu shots.   Date Last Reviewed: 12/1/2016  © 7523-4753 [employer]. [address] [place] [address], [place] [address]. All rights reserved. This information is not intended as a substitute for professional medical care. Always follow your healthcare professional's instructions.

## 2019-01-23 NOTE — PATIENT INSTRUCTIONS
- Rest.    - Drink plenty of fluids.    - Tylenol or Ibuprofen as directed as needed for fever/pain.    - Take over-the-counter claritin, zyrtec, allegra, or xyzal as directed.  You should NOT use a decongestant form (D) of this medication if you have a history of hypertension or heart disease.  - Use over the counter Flonase as directed for sinus congestion and postnasal drip.  - use nasal saline prior to Flonase.  - stop using Flonase if you developed nosebleeds.  - Use Ocean Spray Nasal Saline 1-3 puffs each nostril every 2-3 hours then blow out onto tissue. This is to irrigate the nasal passage way to clear the sinus openings. Use until sinus problem resolved.   - Follow up with your PCP or specialty clinic as directed in the next 1-2 weeks if not improved or as needed.  You can call [phone] to schedule an appointment with the appropriate provider.    - Go to the ED if your symptoms worsen.  - You must understand that you have received an Urgent Care treatment only and that you may be released before all of your medical problems are known or treated.   - You, the patient, will arrange for follow up care as instructed.   - If your condition worsens or fails to improve we recommend that you receive another evaluation at the ER immediately or contact your PCP to discuss your concerns or return here.       Influenza (Adult)    Influenza is also called the flu. It is a viral illness that affects the air passages of your lungs. It is different from the common cold. The flu can easily be passed from one to person to another. It may be spread through the air by coughing and sneezing. Or it can be spread by touching the sick person and then touching your own eyes, nose, or mouth.  The flu starts 1 to 3 days after you are exposed to the flu virus. It may last for 1 to 2 weeks but many people feel tired or fatigued for many weeks afterward. You usually dont need to take antibiotics unless you have a complication.  This might be an ear or sinus infection or pneumonia.  Symptoms of the flu may be mild or severe. They can include extreme tiredness (wanting to stay in bed all day), chills, fevers, muscle aches, soreness with eye movement, headache, and a dry, hacking cough.  Home care  Follow these guidelines when caring for yourself at home:  · Avoid being around cigarette smoke, whether yours or other peoples.  · Acetaminophen or ibuprofen will help ease your fever, muscle aches, and headache. Dont give aspirin to anyone younger than 18 who has the flu. Aspirin can harm the liver.  · Nausea and loss of appetite are common with the flu. Eat light meals. Drink 6 to 8 glasses of liquids every day. Good choices are water, sport drinks, soft drinks without caffeine, juices, tea, and soup. Extra fluids will also help loosen secretions in your nose and lungs.  · Over-the-counter cold medicines will not make the flu go away faster. But the medicines may help with coughing, sore throat, and congestion in your nose and sinuses. Dont use a decongestant if you have high blood pressure.  · Stay home until your fever has been gone for at least 24 hours without using medicine to reduce fever.  Follow-up care  Follow up with your healthcare provider, or as advised, if you are not getting better over the next week.  If you are age 65 or older, talk with your provider about getting a pneumococcal vaccine every 5 years. You should also get this vaccine if you have chronic asthma or COPD. All adults should get a flu vaccine every fall. Ask your provider about this.  When to seek medical advice  Call your healthcare provider right away if any of these occur:  · Cough with lots of colored mucus (sputum) or blood in your mucus  · Chest pain, shortness of breath, wheezing, or trouble breathing  · Severe headache, or face, neck, or ear pain  · New rash with fever  · Fever of 100.4°F (38°C) or higher, or as directed by your healthcare  provider  · Confusion, behavior change, or seizure  · Severe weakness or dizziness  · You get a new fever or cough after getting better for a few days  Date Last Reviewed: 1/1/2017  © 5510-4887 [employer]. [address] [place] [address], [place] [address]. All rights reserved. This information is not intended as a substitute for professional medical care. Always follow your healthcare professional's instructions.        The Flu (Influenza)     The virus that causes the flu spreads through the air in droplets when someone who has the flu coughs, sneezes, laughs, or talks.   The flu (influenza) is an infection that affects your respiratory tract. This tract is made up of your mouth, nose, and lungs, and the passages between them. Unlike a cold, the flu can make you very ill. And it can lead to pneumonia, a serious lung infection. The flu can have serious complications and even cause death.  Who is at risk for the flu?  Anyone can get the flu. But you are more likely to become infected if you:  · Have a weakened immune system  · Work in a healthcare setting where you may be exposed to flu germs  · Live or work with someone who has the flu  · Havent had an annual flu shot  How does the flu spread?  The flu is caused by a virus. The virus spreads through the air in droplets when someone who has the flu coughs, sneezes, laughs, or talks. You can become infected when you inhale these viruses directly. You can also become infected when you touch a surface on which the droplets have landed and then transfer the germs to your eyes, nose, or mouth. Touching used tissues, or sharing utensils, drinking glasses, or a toothbrush from an infected person can expose you to flu viruses, too.  What are the symptoms of the flu?  Flu symptoms tend to come on quickly and may last a few days to a few weeks. They include:  · Fever usually higher than 100.4°F  (38°C) and chills  · Sore throat and headache  · Dry cough  · Runny  nose  · Tiredness and weakness  · Muscle aches  Who is at risk for flu complications?  For some people, the flu can be very serious. The risk for complications is greater for:  · Children younger than age 5  · Adults ages 65 and older  · People with a chronic illness such as diabetes or heart, kidney, or lung disease  · People who live in a nursing home or long-term care facility   How is the flu treated?  The flu usually gets better after 7 days or so. In some cases, your healthcare provider may prescribe an antiviral medicine. This may help you get well a little sooner. For the medicine to help, you need to take it as soon as possible (ideally within 48 hours) after your symptoms start. If you develop pneumonia or other serious illness, you may need to stay in the hospital.  Easing flu symptoms  · Drink lots of fluids such as water, juice, and warm soup. A good rule is to drink enough so that you urinate your normal amount.  · Get plenty of rest.  · Ask your healthcare provider what to take for fever and pain.  · Call your provider if your fever is 100.4°F (38°C) or higher, or you become dizzy, lightheaded, or short of breath.  Taking steps to protect others  · Wash your hands often, especially after coughing or sneezing. Or clean your hands with an alcohol-based hand  containing at least 60% alcohol.  · Cough or sneeze into a tissue. Then throw the tissue away and wash your hands. If you dont have a tissue, cough and sneeze into your elbow.  · Stay home until at least 24 hours after you no longer have a fever or chills. Be sure the fever isnt being hidden by fever-reducing medicine.  · Dont share food, utensils, drinking glasses, or a toothbrush with others.  · Ask your healthcare provider if others in your household should get antiviral medicine to help them avoid infection.  How can the flu be prevented?  · One of the best ways to avoid the flu is to get a flu vaccine each year. The virus that causes  the flu changes from year to year. For that reason, healthcare providers recommend getting the flu vaccine each year, as soon as it's available in your area. The vaccine is given as a shot. Your healthcare provider can tell you which vaccine is right for you. A nasal spray is also available but is not recommended for the 5444-6086 flu season. The CDC says this is because the nasal spray did not seem to protect against the flu over the last several flu seasons. In the past, it was meant for people ages 2 to 49.  · Wash your hands often. Frequent handwashing is a proven way to help prevent infection.  · Carry an alcohol-based hand gel containing at least 60% alcohol. Use it when you can't use soap and water. Then wash your hands as soon as you can.  · Avoid touching your eyes, nose, and mouth.  · At home and work, clean phones, computer keyboards, and toys often with disinfectant wipes.  · If possible, avoid close contact with others who have the flu or symptoms of the flu.  Handwashing tips  Handwashing is one of the best ways to prevent many common infections. If you are caring for or visiting someone with the flu, wash your hands each time you enter and leave the room. Follow these steps:  · Use warm water and plenty of soap. Rub your hands together well.  · Clean the whole hand, including under your nails, between your fingers, and up the wrists.  · Wash for at least 15 seconds.  · Rinse, letting the water run down your fingers, not up your wrists.  · Dry your hands well. Use a paper towel to turn off the faucet and open the door.  Using alcohol-based hand   Alcohol-based hand  are also a good choice. Use them when you can't use soap and water. Follow these steps:  · Squeeze about a tablespoon of gel into the palm of one hand.  · Rub your hands together briskly, cleaning the backs of your hands, the palms, between your fingers, and up the wrists.  · Rub until the gel is gone and your hands are  completely dry.  Preventing the flu in healthcare settings  The flu is a special concern for people in hospitals and long-term care facilities. To help prevent the spread of flu, many hospitals and nursing homes take these steps:  · Healthcare providers wash their hands or use an alcohol-based hand  before and after treating each patient.  · People with the flu have private rooms and bathrooms or share a room with someone with the same infection.  · People who are at high risk for the flu but don't have it are encouraged to get the flu and pneumonia vaccines.  · All healthcare workers are encouraged or required to get flu shots.   Date Last Reviewed: 12/1/2016  © 8134-0930 [employer]. [address] [place] [address], [place] [address]. All rights reserved. This information is not intended as a substitute for professional medical care. Always follow your healthcare professional's instructions.

## 2019-01-25 ENCOUNTER — TELEPHONE (OUTPATIENT)
Dept: URGENT CARE | Facility: CLINIC | Age: 59
End: 2019-01-25

## 2019-01-26 NOTE — TELEPHONE ENCOUNTER
I spoke with the patient and she is still running fever.  Patient states she does feel somewhat better.  She says that she will be having her company fax over some short-term disability paperwork to fill out but it may not come for a few days.  I have told her that I will be more than happy to fill this out infected back were over needs to go.  She voiced her thanks.

## 2019-02-10 DIAGNOSIS — I10 ESSENTIAL HYPERTENSION: ICD-10-CM

## 2019-02-11 DIAGNOSIS — L70.0 ACNE VULGARIS: ICD-10-CM

## 2019-02-11 RX ORDER — ZOLPIDEM TARTRATE 5 MG/1
5 TABLET ORAL NIGHTLY
Qty: 30 TABLET | Refills: 5 | Status: SHIPPED | OUTPATIENT
Start: 2019-02-11 | End: 2019-04-08

## 2019-02-11 RX ORDER — IRBESARTAN 75 MG/1
75 TABLET ORAL NIGHTLY
Qty: 90 TABLET | Refills: 3 | Status: SHIPPED | OUTPATIENT
Start: 2019-02-11 | End: 2020-02-17 | Stop reason: SDUPTHER

## 2019-02-11 NOTE — TELEPHONE ENCOUNTER
Josefina Blue would like a refill of the following medications:         tretinoin (RETIN-A) 0.05 % cream [Rissa Abarca MD]     Preferred pharmacy: [facility]   Delivery method: Pickup

## 2019-02-15 RX ORDER — TRETINOIN 0.5 MG/G
CREAM TOPICAL NIGHTLY
Qty: 45 G | Refills: 2 | OUTPATIENT
Start: 2019-02-15

## 2019-04-08 ENCOUNTER — OFFICE VISIT (OUTPATIENT)
Dept: INTERNAL MEDICINE | Facility: CLINIC | Age: 59
End: 2019-04-08
Payer: COMMERCIAL

## 2019-04-08 ENCOUNTER — LAB VISIT (OUTPATIENT)
Dept: LAB | Facility: HOSPITAL | Age: 59
End: 2019-04-08
Attending: INTERNAL MEDICINE
Payer: COMMERCIAL

## 2019-04-08 VITALS
HEIGHT: 69 IN | DIASTOLIC BLOOD PRESSURE: 64 MMHG | WEIGHT: 219.13 LBS | BODY MASS INDEX: 32.46 KG/M2 | SYSTOLIC BLOOD PRESSURE: 110 MMHG | HEART RATE: 78 BPM | TEMPERATURE: 98 F

## 2019-04-08 DIAGNOSIS — Z00.00 ANNUAL PHYSICAL EXAM: ICD-10-CM

## 2019-04-08 DIAGNOSIS — I10 BENIGN ESSENTIAL HYPERTENSION: ICD-10-CM

## 2019-04-08 DIAGNOSIS — Z12.31 ENCOUNTER FOR SCREENING MAMMOGRAM FOR BREAST CANCER: ICD-10-CM

## 2019-04-08 DIAGNOSIS — L21.9 SEBORRHEIC DERMATITIS: ICD-10-CM

## 2019-04-08 DIAGNOSIS — L70.0 ACNE VULGARIS: ICD-10-CM

## 2019-04-08 DIAGNOSIS — E78.5 HYPERLIPIDEMIA, UNSPECIFIED HYPERLIPIDEMIA TYPE: Primary | ICD-10-CM

## 2019-04-08 DIAGNOSIS — G47.00 INSOMNIA, UNSPECIFIED TYPE: ICD-10-CM

## 2019-04-08 DIAGNOSIS — F41.8 SITUATIONAL ANXIETY: ICD-10-CM

## 2019-04-08 DIAGNOSIS — Z00.00 ANNUAL PHYSICAL EXAM: Primary | ICD-10-CM

## 2019-04-08 LAB
ALBUMIN SERPL BCP-MCNC: 4.5 G/DL (ref 3.5–5.2)
ALP SERPL-CCNC: 47 U/L (ref 55–135)
ALT SERPL W/O P-5'-P-CCNC: 37 U/L (ref 10–44)
ANION GAP SERPL CALC-SCNC: 9 MMOL/L (ref 8–16)
AST SERPL-CCNC: 26 U/L (ref 10–40)
BASOPHILS # BLD AUTO: 0.03 K/UL (ref 0–0.2)
BASOPHILS NFR BLD: 0.5 % (ref 0–1.9)
BILIRUB SERPL-MCNC: 0.5 MG/DL (ref 0.1–1)
BUN SERPL-MCNC: 21 MG/DL (ref 6–20)
CALCIUM SERPL-MCNC: 9.8 MG/DL (ref 8.7–10.5)
CHLORIDE SERPL-SCNC: 101 MMOL/L (ref 95–110)
CHOLEST SERPL-MCNC: 256 MG/DL (ref 120–199)
CHOLEST/HDLC SERPL: 6.6 {RATIO} (ref 2–5)
CO2 SERPL-SCNC: 28 MMOL/L (ref 23–29)
CREAT SERPL-MCNC: 0.8 MG/DL (ref 0.5–1.4)
DIFFERENTIAL METHOD: ABNORMAL
EOSINOPHIL # BLD AUTO: 0.1 K/UL (ref 0–0.5)
EOSINOPHIL NFR BLD: 1.8 % (ref 0–8)
ERYTHROCYTE [DISTWIDTH] IN BLOOD BY AUTOMATED COUNT: 13 % (ref 11.5–14.5)
EST. GFR  (AFRICAN AMERICAN): >60 ML/MIN/1.73 M^2
EST. GFR  (NON AFRICAN AMERICAN): >60 ML/MIN/1.73 M^2
ESTIMATED AVG GLUCOSE: 123 MG/DL (ref 68–131)
GLUCOSE SERPL-MCNC: 111 MG/DL (ref 70–110)
HBA1C MFR BLD HPLC: 5.9 % (ref 4–5.6)
HCT VFR BLD AUTO: 38.7 % (ref 37–48.5)
HDLC SERPL-MCNC: 39 MG/DL (ref 40–75)
HDLC SERPL: 15.2 % (ref 20–50)
HGB BLD-MCNC: 12.7 G/DL (ref 12–16)
LDLC SERPL CALC-MCNC: 189.2 MG/DL (ref 63–159)
LYMPHOCYTES # BLD AUTO: 2.8 K/UL (ref 1–4.8)
LYMPHOCYTES NFR BLD: 49.7 % (ref 18–48)
MCH RBC QN AUTO: 28.4 PG (ref 27–31)
MCHC RBC AUTO-ENTMCNC: 32.8 G/DL (ref 32–36)
MCV RBC AUTO: 87 FL (ref 82–98)
MONOCYTES # BLD AUTO: 0.4 K/UL (ref 0.3–1)
MONOCYTES NFR BLD: 6.7 % (ref 4–15)
NEUTROPHILS # BLD AUTO: 2.3 K/UL (ref 1.8–7.7)
NEUTROPHILS NFR BLD: 41.1 % (ref 38–73)
NONHDLC SERPL-MCNC: 217 MG/DL
PLATELET # BLD AUTO: 195 K/UL (ref 150–350)
PMV BLD AUTO: 10.1 FL (ref 9.2–12.9)
POTASSIUM SERPL-SCNC: 4.3 MMOL/L (ref 3.5–5.1)
PROT SERPL-MCNC: 7.2 G/DL (ref 6–8.4)
RBC # BLD AUTO: 4.47 M/UL (ref 4–5.4)
SODIUM SERPL-SCNC: 138 MMOL/L (ref 136–145)
TRIGL SERPL-MCNC: 139 MG/DL (ref 30–150)
TSH SERPL DL<=0.005 MIU/L-ACNC: 0.92 UIU/ML (ref 0.4–4)
WBC # BLD AUTO: 5.69 K/UL (ref 3.9–12.7)

## 2019-04-08 PROCEDURE — 3078F PR MOST RECENT DIASTOLIC BLOOD PRESSURE < 80 MM HG: ICD-10-PCS | Mod: CPTII,S$GLB,, | Performed by: INTERNAL MEDICINE

## 2019-04-08 PROCEDURE — 36415 COLL VENOUS BLD VENIPUNCTURE: CPT

## 2019-04-08 PROCEDURE — 3074F SYST BP LT 130 MM HG: CPT | Mod: CPTII,S$GLB,, | Performed by: INTERNAL MEDICINE

## 2019-04-08 PROCEDURE — 3078F DIAST BP <80 MM HG: CPT | Mod: CPTII,S$GLB,, | Performed by: INTERNAL MEDICINE

## 2019-04-08 PROCEDURE — 99999 PR PBB SHADOW E&M-EST. PATIENT-LVL III: CPT | Mod: PBBFAC,,, | Performed by: INTERNAL MEDICINE

## 2019-04-08 PROCEDURE — 99396 PR PREVENTIVE VISIT,EST,40-64: ICD-10-PCS | Mod: S$GLB,,, | Performed by: INTERNAL MEDICINE

## 2019-04-08 PROCEDURE — 80053 COMPREHEN METABOLIC PANEL: CPT

## 2019-04-08 PROCEDURE — 99396 PREV VISIT EST AGE 40-64: CPT | Mod: S$GLB,,, | Performed by: INTERNAL MEDICINE

## 2019-04-08 PROCEDURE — 3074F PR MOST RECENT SYSTOLIC BLOOD PRESSURE < 130 MM HG: ICD-10-PCS | Mod: CPTII,S$GLB,, | Performed by: INTERNAL MEDICINE

## 2019-04-08 PROCEDURE — 99999 PR PBB SHADOW E&M-EST. PATIENT-LVL III: ICD-10-PCS | Mod: PBBFAC,,, | Performed by: INTERNAL MEDICINE

## 2019-04-08 PROCEDURE — 83036 HEMOGLOBIN GLYCOSYLATED A1C: CPT

## 2019-04-08 PROCEDURE — 85025 COMPLETE CBC W/AUTO DIFF WBC: CPT

## 2019-04-08 PROCEDURE — 84443 ASSAY THYROID STIM HORMONE: CPT

## 2019-04-08 PROCEDURE — 80061 LIPID PANEL: CPT

## 2019-04-08 RX ORDER — CLOBETASOL PROPIONATE 0.46 MG/ML
SOLUTION TOPICAL 2 TIMES DAILY
Qty: 60 ML | Refills: 3 | Status: SHIPPED | OUTPATIENT
Start: 2019-04-08 | End: 2023-03-12

## 2019-04-08 RX ORDER — TRETINOIN 0.5 MG/G
CREAM TOPICAL NIGHTLY
Qty: 45 G | Refills: 2 | Status: SHIPPED | OUTPATIENT
Start: 2019-04-08 | End: 2019-04-09 | Stop reason: SDUPTHER

## 2019-04-08 NOTE — PROGRESS NOTES
INTERNAL MEDICINE ANNUAL VISIT NOTE      CHIEF COMPLAINT     Chief Complaint   Patient presents with    Annual Exam     HPI     Josefina Blue is a 58 y.o. C female who presents for annual exam.    HTN - irbesartan 75mg daily and triamterene-hctz 37.5-25mg daily.   BP controlled.   Exercising a little less due to work - once a week.     Insomnia on ambien 5mg nightly prn. No issues w/ med. 10mg nightly caused sleepwalking.     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 Reports feels fine now - no pain.   Saw Dr. Marroquin last 4/2018.    Seborrheic dermatitis of scalp and behind the L ear. Uses it about once a month. Clobetasol helps. Acne. Uses retin-a.     Xanax 0.25mg daily prn flying. Works well.     Past Medical History:  Past Medical History:   Diagnosis Date    Hyperlipidemia 8/31/2015    Hypertension     IFG (impaired fasting glucose) 8/31/2015    Neuropathic pain 8/31/2015    Squamous cell cancer of tongue 2012       Past Surgical History:  Past Surgical History:   Procedure Laterality Date    CHOLECYSTECTOMY      CHOLECYSTECTOMY-LAPAROSCOPIC W CHOLANGIOGRAM N/A 6/8/2016    Performed by Dino Simmons MD at [facility] OR [address] [place] [address]    EXCISION-LESION-TONGUE  4/9/2018    Performed by Azam Marroquin MD at [facility] OR [address] [place] [address]    GALLBLADDER SURGERY  06/2016    HYSTERECTOMY      KNEE ARTHROSCOPY Right     for torn meniscus    LARYNGOSCOPY BRONCHOSCOPY-DIRECT N/A 4/9/2018    Performed by Azam Marroquin MD at [facility] OR [address] [place] [address]    TONGUE SURGERY         Allergies:  Review of patient's allergies indicates:   Allergen Reactions    Aspirin Nausea Only     Can take low dose of Aspirin    Codeine Nausea Only     Can take Codeine if she takes a dose of Zofran with it       Home Medications:  Prior to Admission medications    Medication Sig Start Date End Date Taking? Authorizing Provider   ALPRAZolam (XANAX) 0.25 MG tablet Take 1 tablet (0.25 mg total) by mouth nightly as needed. 1/8/19  Yes Jo-Ann Collins,  MD   aspirin (ECOTRIN) 81 MG EC tablet Take 1 tablet (81 mg total) by mouth nightly. 4/16/18  Yes Clayton Coley MD   clobetasol (TEMOVATE) 0.05 % external solution Apply topically 2 (two) times daily. Use one - two times daily as needed for scaling or itching to scalp 5/2/16  Yes Rissa Oakley MD   estradiol (ESTRACE) 0.01 % (0.1 mg/gram) vaginal cream Place 1 g vaginally once daily. 12/5/17 4/8/19 Yes Shawna Myers MD   irbesartan (AVAPRO) 75 MG tablet Take 1 tablet (75 mg total) by mouth every evening. 2/11/19  Yes Jo-Ann Collins MD   tretinoin (RETIN-A) 0.05 % cream Apply topically nightly. Apply pea-sized amount to face. 3/14/16  Yes Rissa Abarca MD   triamterene-hydrochlorothiazide 37.5-25 mg (MAXZIDE-25) 37.5-25 mg per tablet Take 1 tablet by mouth once daily. 1/4/19 1/4/20 Yes Jo-Ann Collins MD   zolpidem (AMBIEN) 5 MG Tab Take 1 tablet (5 mg total) by mouth nightly as needed. 1/8/18  Yes Jo-Ann Collins MD   meloxicam (MOBIC) 15 MG tablet Take 1 tablet (15 mg total) by mouth once daily. 7/11/17   Nicolas Bojorquez MD   ondansetron (ZOFRAN-ODT) 4 MG TbDL Take 2 tablets (8 mg total) by mouth every 12 (twelve) hours as needed. 4/10/18   Clayton Coley MD   zolpidem (AMBIEN) 5 MG Tab Take 1 tablet (5 mg total) by mouth every evening. 2/11/19 8/11/19  Jo-Ann Collins MD       Family History:  Family History   Problem Relation Age of Onset    Alcohol abuse Mother     Cirrhosis Mother     Cancer Father 74        prostate, throat, lung- smoker    Hyperlipidemia Father     Diabetes Sister     Hypertension Sister     Drug abuse Brother     Heart disease Brother     Hyperlipidemia Brother     No Known Problems Maternal Aunt     No Known Problems Maternal Uncle     No Known Problems Paternal Aunt     No Known Problems Paternal Uncle     No Known Problems Maternal Grandmother     No Known Problems Maternal Grandfather     No Known Problems Paternal Grandmother     No Known Problems Paternal Grandfather     Amblyopia  "Neg Hx     Blindness Neg Hx     Cataracts Neg Hx     Glaucoma Neg Hx     Macular degeneration Neg Hx     Retinal detachment Neg Hx     Strabismus Neg Hx     Stroke Neg Hx     Thyroid disease Neg Hx        Social History:  Social History     Tobacco Use    Smoking status: Never Smoker    Smokeless tobacco: Never Used   Substance Use Topics    Alcohol use: Yes     Alcohol/week: 0.0 oz     Comment: occ    Drug use: No       Review of Systems:  Review of Systems   Constitutional: Negative for chills and fever.   HENT: Negative.    Respiratory: Negative for cough, shortness of breath and wheezing.    Cardiovascular: Negative.    Gastrointestinal: Negative for abdominal pain, blood in stool, constipation, diarrhea, nausea and vomiting.   Genitourinary: Negative.    Musculoskeletal: Negative.    Skin: Negative for rash.   Neurological: Negative for dizziness, light-headedness and headaches.   Psychiatric/Behavioral: Positive for sleep disturbance. Negative for dysphoric mood. The patient is nervous/anxious (only assoc w/ flights).        Health Maintainence:    reviewed. Due MMG.     PHYSICAL EXAM     /64 (BP Location: Left arm, Patient Position: Sitting, BP Method: Large (Manual))   Pulse 78   Temp 98.4 °F (36.9 °C)   Ht 5' 9" (1.753 m)   Wt 99.4 kg (219 lb 2.2 oz)   BMI 32.36 kg/m²     GEN - A+OX4, NAD   HEENT - PERRL, EOMI, OP clear. MMM.   Neck - No thyromegaly or cervical LAD. No thyroid masses felt.  CV - RRR, no m/r   Chest - CTAB, no wheezing or rhonchi  Abd - S/NT/ND/+BS.   Ext - 2+BDP and radial pulses. No LE edema.   Neuro - PERRL, EOMI, no nystagmus, eyebrow raise, facial sensation, hearing, m of mastication, smile, palatal raise, shoulder shrug, tongue protrusion symmetric and intact. 5/5 BUE and BLE strength. Sensation to light touch intact throughout. 1+ DTRs. Normal gait.   MSK - No spinal tenderness to palpation. Normal gait.   Skin - No rash.    LABS     Previous labs " reviewed.    ASSESSMENT/PLAN     Josefina Blue is a 58 y.o. female with  Josefina was seen today for annual exam.    Diagnoses and all orders for this visit:    Annual physical exam  -     CBC auto differential; Future; Expected date: 04/08/2019  -     Comprehensive metabolic panel; Future; Expected date: 04/08/2019  -     Hemoglobin A1c; Future; Expected date: 04/08/2019  -     Lipid panel; Future; Expected date: 04/08/2019  -     TSH; Future; Expected date: 04/08/2019    Encounter for screening mammogram for breast cancer  -     Mammo Digital Screening Bilat; Future; Expected date: 04/08/2019    Benign essential hypertension - Stable and controlled. Continue current medications.  -     Comprehensive metabolic panel; Future; Expected date: 04/08/2019    Insomnia, unspecified type - doing well on ambien 5 nightly. Ok to continue.     Seborrheic dermatitis  -     clobetasol (TEMOVATE) 0.05 % external solution; Apply topically 2 (two) times daily. Use one - two times daily as needed for scaling or itching to scalp    Acne vulgaris  -     tretinoin (RETIN-A) 0.05 % cream; Apply topically nightly. Apply pea-sized amount to face.    Situational anxiety - xanax only prn flying. Does not need refill at this time.       RTC in 12 months, sooner if needed and depending on labs.    Jo-Ann Collins MD  Department of Internal Medicine - [facility]  9:14 AM

## 2019-04-09 ENCOUNTER — PATIENT MESSAGE (OUTPATIENT)
Dept: INTERNAL MEDICINE | Facility: CLINIC | Age: 59
End: 2019-04-09

## 2019-04-09 DIAGNOSIS — L70.0 ACNE VULGARIS: ICD-10-CM

## 2019-04-09 RX ORDER — TRETINOIN 0.5 MG/G
CREAM TOPICAL NIGHTLY
Qty: 45 G | Refills: 2 | Status: SHIPPED | OUTPATIENT
Start: 2019-04-09 | End: 2020-09-09

## 2019-04-09 RX ORDER — PRAVASTATIN SODIUM 20 MG/1
20 TABLET ORAL DAILY
Qty: 90 TABLET | Refills: 3 | Status: SHIPPED | OUTPATIENT
Start: 2019-04-09 | End: 2020-06-15 | Stop reason: SDUPTHER

## 2019-04-11 ENCOUNTER — TELEPHONE (OUTPATIENT)
Dept: PHARMACY | Facility: CLINIC | Age: 59
End: 2019-04-11

## 2019-04-12 ENCOUNTER — HOSPITAL ENCOUNTER (OUTPATIENT)
Dept: RADIOLOGY | Facility: HOSPITAL | Age: 59
Discharge: HOME | End: 2019-04-12
Attending: INTERNAL MEDICINE
Payer: COMMERCIAL

## 2019-04-12 DIAGNOSIS — Z12.31 ENCOUNTER FOR SCREENING MAMMOGRAM FOR BREAST CANCER: ICD-10-CM

## 2019-04-12 PROCEDURE — 77067 SCR MAMMO BI INCL CAD: CPT | Mod: 26,,, | Performed by: RADIOLOGY

## 2019-04-12 PROCEDURE — 77063 BREAST TOMOSYNTHESIS BI: CPT | Mod: 26,,, | Performed by: RADIOLOGY

## 2019-04-12 PROCEDURE — 77067 MAMMO DIGITAL SCREENING BILAT WITH TOMOSYNTHESIS_CAD: ICD-10-PCS | Mod: 26,,, | Performed by: RADIOLOGY

## 2019-04-12 PROCEDURE — 77063 MAMMO DIGITAL SCREENING BILAT WITH TOMOSYNTHESIS_CAD: ICD-10-PCS | Mod: 26,,, | Performed by: RADIOLOGY

## 2019-04-12 PROCEDURE — 77067 SCR MAMMO BI INCL CAD: CPT | Mod: TC

## 2019-05-06 ENCOUNTER — TELEPHONE (OUTPATIENT)
Dept: OPTOMETRY | Facility: CLINIC | Age: 59
End: 2019-05-06

## 2019-05-30 ENCOUNTER — OFFICE VISIT (OUTPATIENT)
Dept: SPORTS MEDICINE | Facility: CLINIC | Age: 59
End: 2019-05-30
Payer: COMMERCIAL

## 2019-05-30 ENCOUNTER — HOSPITAL ENCOUNTER (OUTPATIENT)
Dept: RADIOLOGY | Facility: HOSPITAL | Age: 59
Discharge: HOME | End: 2019-05-30
Attending: ORTHOPAEDIC SURGERY
Payer: COMMERCIAL

## 2019-05-30 VITALS
SYSTOLIC BLOOD PRESSURE: 131 MMHG | HEART RATE: 72 BPM | DIASTOLIC BLOOD PRESSURE: 83 MMHG | HEIGHT: 69 IN | BODY MASS INDEX: 32.44 KG/M2 | WEIGHT: 219 LBS

## 2019-05-30 DIAGNOSIS — M54.50 LOW BACK PAIN: ICD-10-CM

## 2019-05-30 DIAGNOSIS — M76.30 IT BAND SYNDROME: ICD-10-CM

## 2019-05-30 DIAGNOSIS — M25.562 LEFT KNEE PAIN, UNSPECIFIED CHRONICITY: Primary | ICD-10-CM

## 2019-05-30 DIAGNOSIS — M17.12 DEGENERATIVE ARTHRITIS OF LEFT KNEE: ICD-10-CM

## 2019-05-30 DIAGNOSIS — M25.562 LEFT KNEE PAIN, UNSPECIFIED CHRONICITY: ICD-10-CM

## 2019-05-30 PROCEDURE — 20610 DRAIN/INJ JOINT/BURSA W/O US: CPT | Mod: LT,S$GLB,, | Performed by: ORTHOPAEDIC SURGERY

## 2019-05-30 PROCEDURE — 3075F PR MOST RECENT SYSTOLIC BLOOD PRESS GE 130-139MM HG: ICD-10-PCS | Mod: CPTII,S$GLB,, | Performed by: ORTHOPAEDIC SURGERY

## 2019-05-30 PROCEDURE — 73564 XR KNEE ORTHO BILAT WITH FLEXION: ICD-10-PCS | Mod: 26,50,, | Performed by: RADIOLOGY

## 2019-05-30 PROCEDURE — 3008F PR BODY MASS INDEX (BMI) DOCUMENTED: ICD-10-PCS | Mod: CPTII,S$GLB,, | Performed by: ORTHOPAEDIC SURGERY

## 2019-05-30 PROCEDURE — 99999 PR PBB SHADOW E&M-EST. PATIENT-LVL III: CPT | Mod: PBBFAC,,, | Performed by: ORTHOPAEDIC SURGERY

## 2019-05-30 PROCEDURE — 99999 PR PBB SHADOW E&M-EST. PATIENT-LVL III: ICD-10-PCS | Mod: PBBFAC,,, | Performed by: ORTHOPAEDIC SURGERY

## 2019-05-30 PROCEDURE — 73564 X-RAY EXAM KNEE 4 OR MORE: CPT | Mod: 26,50,, | Performed by: RADIOLOGY

## 2019-05-30 PROCEDURE — 99214 OFFICE O/P EST MOD 30 MIN: CPT | Mod: 25,S$GLB,, | Performed by: ORTHOPAEDIC SURGERY

## 2019-05-30 PROCEDURE — 73564 X-RAY EXAM KNEE 4 OR MORE: CPT | Mod: TC,50,FY,PO

## 2019-05-30 PROCEDURE — 3075F SYST BP GE 130 - 139MM HG: CPT | Mod: CPTII,S$GLB,, | Performed by: ORTHOPAEDIC SURGERY

## 2019-05-30 PROCEDURE — 3079F DIAST BP 80-89 MM HG: CPT | Mod: CPTII,S$GLB,, | Performed by: ORTHOPAEDIC SURGERY

## 2019-05-30 PROCEDURE — 3079F PR MOST RECENT DIASTOLIC BLOOD PRESSURE 80-89 MM HG: ICD-10-PCS | Mod: CPTII,S$GLB,, | Performed by: ORTHOPAEDIC SURGERY

## 2019-05-30 PROCEDURE — 3008F BODY MASS INDEX DOCD: CPT | Mod: CPTII,S$GLB,, | Performed by: ORTHOPAEDIC SURGERY

## 2019-05-30 PROCEDURE — 99214 PR OFFICE/OUTPT VISIT, EST, LEVL IV, 30-39 MIN: ICD-10-PCS | Mod: 25,S$GLB,, | Performed by: ORTHOPAEDIC SURGERY

## 2019-05-30 PROCEDURE — 20610 LARGE JOINT ASPIRATION/INJECTION: L KNEE: ICD-10-PCS | Mod: LT,S$GLB,, | Performed by: ORTHOPAEDIC SURGERY

## 2019-05-30 RX ORDER — DICLOFENAC SODIUM 10 MG/G
2 GEL TOPICAL 4 TIMES DAILY
Qty: 1 TUBE | Refills: 2 | Status: SHIPPED | OUTPATIENT
Start: 2019-05-30 | End: 2020-05-20

## 2019-05-30 RX ORDER — CELECOXIB 200 MG/1
200 CAPSULE ORAL 2 TIMES DAILY
Qty: 60 CAPSULE | Refills: 2 | Status: SHIPPED | OUTPATIENT
Start: 2019-05-30 | End: 2019-12-11 | Stop reason: SDUPTHER

## 2019-05-30 RX ORDER — TRIAMCINOLONE ACETONIDE 40 MG/ML
40 INJECTION, SUSPENSION INTRA-ARTICULAR; INTRAMUSCULAR
Status: DISCONTINUED | OUTPATIENT
Start: 2019-05-30 | End: 2019-05-30 | Stop reason: HOSPADM

## 2019-05-30 RX ADMIN — TRIAMCINOLONE ACETONIDE 40 MG: 40 INJECTION, SUSPENSION INTRA-ARTICULAR; INTRAMUSCULAR at 01:05

## 2019-05-30 NOTE — PROCEDURES
Large Joint Aspiration/Injection: L knee  Date/Time: 5/30/2019 1:00 PM  Performed by: Zac Noriega MD  Authorized by: Zac Noriega MD     Consent Done?:  Yes (Verbal)  Indications:  Pain  Procedure site marked: Yes    Timeout: Prior to procedure the correct patient, procedure, and site was verified      Location:  Knee  Site:  L knee  Prep: Patient was prepped and draped in usual sterile fashion    Ultrasonic Guidance for needle placement: No  Needle size:  22 G  Approach:  Superior  Medications:  40 mg triamcinolone acetonide 40 mg/mL  Patient tolerance:  Patient tolerated the procedure well with no immediate complications

## 2019-05-30 NOTE — PROGRESS NOTES
CC: Left knee pain    59 y.o. Female with a history of left knee pain since 5/25/19 without [facility].  She was walking in the supermarket when she felt a blinding pain in her left knee (anteriormedial).  She recalls moving about 6 weeks ago; since then low back and left IT band has been bothering her.      She reports that the pain and weakness. It also bothers her at night.    - mechanical symptoms, - instability    Is affecting ADLs.  Pain is 7/10 at it's worst.    REVIEW OF SYSTEMS:  Constitution: Negative. Negative for chills, fever and night sweats.   HENT: Negative for congestion and headaches.    Eyes: Negative for blurred vision, left vision loss and right vision loss.   Cardiovascular: Negative for chest pain and syncope.   Respiratory: Negative for cough and shortness of breath.    Endocrine: Negative for polydipsia, polyphagia and polyuria.   Hematologic/Lymphatic: Negative for bleeding problem. Does not bruise/bleed easily.   Skin: Negative for dry skin, itching and rash.   Musculoskeletal: Negative for falls. Positive for left knee pain and  muscle weakness.   Gastrointestinal: Negative for abdominal pain and bowel incontinence.   Genitourinary: Negative for bladder incontinence and nocturia.   Neurological: Negative for disturbances in coordination, loss of balance and seizures.   Psychiatric/Behavioral: Negative for depression. The patient does not have insomnia.    Allergic/Immunologic: Negative for hives and persistent infections.     PAST MEDICAL HISTORY:    Past Medical History:   Diagnosis Date    Hyperlipidemia 8/31/2015    Hypertension     IFG (impaired fasting glucose) 8/31/2015    Neuropathic pain 8/31/2015    Squamous cell cancer of tongue 2012       PAST SURGICAL HISTORY:   Past Surgical History:   Procedure Laterality Date    CHOLECYSTECTOMY      CHOLECYSTECTOMY-LAPAROSCOPIC W CHOLANGIOGRAM N/A 6/8/2016    Performed by Dino Simmons [facility] at [facility] OR [address] [place] [address]     EXCISION-LESION-TONGUE  4/9/2018    Performed by [facility]am [facility]quin [facility] at [facility] OR 2ND FLR    GALLBLADDER SURGERY  06/2016    HYSTERECTOMY      KNEE ARTHROSCOPY Right     for torn meniscus    LARYNGOSCOPY BRONCHOSCOPY-DIRECT N/A 4/9/2018    Performed by [facility]quin [facility] at [facility] OR 2ND FLR    TONGUE SURGERY         FAMILY HISTORY:   Family History   Problem Relation Age of Onset    Alcohol abuse Mother     Cirrhosis Mother     Cancer Father 74        prostate, throat, lung- smoker    Hyperlipidemia Father     Diabetes Sister     Hypertension Sister     Drug abuse Brother     Heart disease Brother     Hyperlipidemia Brother     No Known Problems Maternal Aunt     No Known Problems Maternal Uncle     No Known Problems Paternal Aunt     No Known Problems Paternal Uncle     No Known Problems Maternal Grandmother     No Known Problems Maternal Grandfather     No Known Problems Paternal Grandmother     No Known Problems Paternal Grandfather     Amblyopia Neg Hx     Blindness Neg Hx     Cataracts Neg Hx     Glaucoma Neg Hx     Macular degeneration Neg Hx     Retinal detachment Neg Hx     Strabismus Neg Hx     Stroke Neg Hx     Thyroid disease Neg Hx        SOCIAL HISTORY:   Social History     Socioeconomic History    Marital status:      Spouse name: Not on file    Number of children: Not on file    Years of education: Not on file    Highest education level: Not on file   Occupational History    Occupation: chief nursing officer   Social Needs    Financial resource strain: Not on file    Food insecurity:     Worry: Not on file     Inability: Not on file    Transportation needs:     Medical: Not on file     Non-medical: Not on file   Tobacco Use    Smoking status: Never Smoker    Smokeless tobacco: Never Used   Substance and Sexual Activity    Alcohol use: Yes     Alcohol/week: 0.0 oz     Comment: occ    Drug use: No    Sexual activity: Yes     Birth control/protection: None    Lifestyle    Physical activity:     Days per week: Not on file     Minutes per session: Not on file    Stress: Not on file   Relationships    Social connections:     Talks on phone: Not on file     Gets together: Not on file     Attends [employer] service: Not on file     Active member of club or organization: Not on file     Attends meetings of clubs or organizations: Not on file     Relationship status: Not on file   Other Topics Concern    Are you pregnant or think you may be? Not Asked    Breast-feeding Not Asked   Social History Narrative    Lives with  and 2 dogs       MEDICATIONS:     Current Outpatient Medications:     ALPRAZolam (XANAX) 0.25 MG tablet, Take 1 tablet (0.25 mg total) by mouth nightly as needed., Disp: 30 tablet, Rfl: 2    aspirin (ECOTRIN) 81 MG EC tablet, Take 1 tablet (81 mg total) by mouth nightly., Disp: , Rfl: 0    clobetasol (TEMOVATE) 0.05 % external solution, Use one to two times daily as needed for scaling or itching to scalp, Disp: 60 mL, Rfl: 3    irbesartan (AVAPRO) 75 MG tablet, Take 1 tablet (75 mg total) by mouth every evening., Disp: 90 tablet, Rfl: 3    pravastatin (PRAVACHOL) 20 MG tablet, Take 1 tablet (20 mg total) by mouth once daily., Disp: 90 tablet, Rfl: 3    tretinoin (RETIN-A) 0.05 % cream, Apply topically nightly. Apply pea-sized amount to face., Disp: 45 g, Rfl: 2    triamterene-hydrochlorothiazide 37.5-25 mg (MAXZIDE-25) 37.5-25 mg per tablet, Take 1 tablet by mouth once daily., Disp: 90 tablet, Rfl: 3    zolpidem (AMBIEN) 5 MG Tab, Take 1 tablet (5 mg total) by mouth nightly as needed., Disp: 30 tablet, Rfl: 5    zolpidem (AMBIEN) 5 MG Tab, Take one tablet by mouth every evening, Disp: 30 tablet, Rfl: 5    estradiol (ESTRACE) 0.01 % (0.1 mg/gram) vaginal cream, Place 1 g vaginally once daily., Disp: 42.5 g, Rfl: 6    ALLERGIES:   Review of patient's allergies indicates:   Allergen Reactions    Aspirin Nausea Only     Can take low dose of  "Aspirin    Codeine Nausea Only     Can take Codeine if she takes a dose of Zofran with it       VITAL SIGNS:   /83   Pulse 72   Ht 5' 9" (1.753 m)   Wt 99.3 kg (219 lb)   BMI 32.34 kg/m²      PHYSICAL EXAMINATION  General:  The patient is alert and oriented x 3.  Mood is pleasant.  Observation of ears, eyes and nose reveal no gross abnormalities.  No labored breathing observed.    LEFT KNEE EXAMINATION     OBSERVATION / INSPECTION   Gait:   Nonantalgic   Alignment:  Neutral   Scars:   None   Muscle atrophy: Mild  Effusion:  None   Warmth:  None   Discoloration:   none     TENDERNESS / CREPITUS (T / C):          T / C      T / C   Patella   - / -   Lateral joint line   - / -   Peripatellar medial  -  Medial joint line    - / -   Peripatellar lateral -  Medial plica   - / -   Patellar tendon -   Popliteal fossa   - / -   Quad tendon   -   Gastrocnemius   -   Prepatellar Bursa + / -   Quadricep   -   Tibial tubercle  -  Thigh/hamstring  -   Pes anserine/HS -  Fibula    -   ITB   + / -  Tibia     -   Tib/fib joint  - / -  LCL    -     MFC   - / -   MCL: Proximal  -    LFC   - / -    Distal   -          ROM: (* = pain)  PASSIVE   ACTIVE    Left :   5 / 0 / 145   5 / 0 / 145     Right :    5 / 0 / 145   5 / 0 / 145    Patellofemoral examination:  See above noted areas of tenderness.   Patella position    Subluxation / dislocation: Centered           Sup. / Inf;   Normal   Crepitus (PF):    Absent   Patellar Mobility:       Medial-lateral:   Normal    Superior-inferior:  Normal    Inferior tilt   Normal    Patellar tendon:  Normal   Lateral tilt:    Normal   J-sign:     None   Patellofemoral grind:   No pain       MENISCAL SIGNS:     Pain on terminal extension:  +  Pain on terminal flexion:  -  Yosis maneuver:  -   Squat     -     LIGAMENT EXAMINATION:  ACL / Lachman:  normal (-1 to 2mm)    PCL-Post.  drawer: normal 0 to 2mm  MCL- Valgus:  normal 0 to 2mm  LCL- Varus:  normal 0 to 2mm  Pivot shift: normal " (Equal)   Dial Test: difference c/w other side   At 30° flexion: normal (< 5°)    At 90° flexion: normal (< 5°)   Reverse Pivot Shift:   normal (Equal)     STRENGTH: (* = with pain) PAINFUL SIDE   Quadricep   5/5   Hamstrin/5    EXTREMITY NEURO-VASCULAR EXAMINATION:   Sensation:  Grossly intact to light touch all dermatomal regions.   Motor Function:  Fully intact motor function at hip, knee, foot and ankle    DTRs;  quadriceps and  achilles 2+.  No clonus and downgoing Babinski.    Vascular status:  DP and PT pulses 2+, brisk capillary refill, symmetric.     XRAY (19):     Right: There is moderate DJD and a varus deformity.  No fracture dislocation bone destruction or OCD seen.    Left: There is moderate DJD and a varus deformity.  No fracture dislocation bone destruction or OCD seen.    ASSESSMENT:    Left knee pain  Low back pain  Left IT band syndrome    PLAN:   1. Cortisone injection today  2. 3D knee sleeve  3. Voltaren gel  4. Celebrex 200mg  5. Physical therapy with Marco Leal DPT    All questions were answered, pt will contact us for questions or concerns in the interim.

## 2019-06-05 ENCOUNTER — CLINICAL SUPPORT (OUTPATIENT)
Dept: REHABILITATION | Facility: HOSPITAL | Age: 59
End: 2019-06-05
Attending: ORTHOPAEDIC SURGERY
Payer: COMMERCIAL

## 2019-06-05 DIAGNOSIS — M62.81 MUSCLE WEAKNESS OF LOWER EXTREMITY: ICD-10-CM

## 2019-06-05 DIAGNOSIS — M25.552 LEFT HIP PAIN: ICD-10-CM

## 2019-06-05 DIAGNOSIS — M25.562 ACUTE PAIN OF LEFT KNEE: ICD-10-CM

## 2019-06-05 PROCEDURE — 97110 THERAPEUTIC EXERCISES: CPT | Performed by: PHYSICAL THERAPIST

## 2019-06-05 PROCEDURE — 97161 PT EVAL LOW COMPLEX 20 MIN: CPT | Performed by: PHYSICAL THERAPIST

## 2019-06-05 NOTE — PROGRESS NOTES
Please see Treatment section for Initial Evaluation and Plan of Care  Marco Leal, PT  6/5/2019

## 2019-06-06 PROBLEM — M62.81 MUSCLE WEAKNESS OF LOWER EXTREMITY: Status: ACTIVE | Noted: 2019-06-06

## 2019-06-06 PROBLEM — M25.562 LEFT KNEE PAIN: Status: ACTIVE | Noted: 2019-06-06

## 2019-06-06 PROBLEM — M25.552 LEFT HIP PAIN: Status: ACTIVE | Noted: 2019-06-06

## 2019-06-06 NOTE — PLAN OF CARE
[facility]  Physical Therapy Initial Evaluation    Name: Josefina Blue  Clinic Number: [id number]    Therapy Diagnosis:   Encounter Diagnoses   Name Primary?    Acute pain of left knee     Left hip pain     Muscle weakness of lower extremity      Physician: Zac Noriega MD    Physician Orders: PT Eval and Treat   Medical Diagnosis: M25.562 (ICD-10-CM) - Left knee pain, unspecified chronicity M17.12 (ICD-10-CM) - Degenerative arthritis of left knee M76.30 (ICD-10-CM) - IT band syndrome M54.5 (ICD-10-CM) - Low back pain     Evaluation Date: 6/5/2019  Authorization Period Expiration:  12/31/2019  Plan of Care Certification Period:   7/15/2019  Visit # / Visits authorized:  1 / 20    Time In:   17:30  Time Out: 18:30  Total Billable Time: 60 minutes    Precautions: Standard    Subjective   Date of onset:  6 week hx L hip/glut/LB and 3 week hx L knee   History of current condition - Josefina reports moving 6 weeks ago and injuring her L hip/LB with resultant gluteal / hip pain, then, 3 weeks ago was in the grocery store and had pain in L knee (ITB) leading her to MD and subsequent referral to PT     Past Medical History:   Diagnosis Date    Hyperlipidemia 8/31/2015    Hypertension     IFG (impaired fasting glucose) 8/31/2015    Neuropathic pain 8/31/2015    Squamous cell cancer of tongue 2012     Josefina Blue  has a past surgical history that includes Hysterectomy; Tongue surgery; Knee arthroscopy (Right); Cholecystectomy; and Gallbladder surgery (06/2016).    Josefina has a current medication list which includes the following prescription(s): alprazolam, aspirin, celecoxib, clobetasol, diclofenac sodium, estradiol, irbesartan, pravastatin, tretinoin, triamterene-hydrochlorothiazide 37.5-25 mg, zolpidem, and zolpidem.    Review of patient's allergies indicates:   Allergen Reactions    Aspirin Nausea Only     Can take low dose of Aspirin    Codeine Nausea Only     Can take  "Codeine if she takes a dose of Zofran with it        Pain:  Current 1/10, worst 10/10, best 0/10   Location: left  Glut, hip, ITB , knee anteromedial    Description: Aching  Aggravating Factors: Standing and Walking  Easing Factors: rest    Prior Therapy: none   Social History:  Lives with spouse  Occupation: Exec  nursing at [employer]  Prior Level of Function: I  Current Level of Function:  Limited ADLs and working out due to pain     Pts goals:  "increase my flexibility in my LB, quad and ITB"    Objective     Observation:   Enters PT without gait deviation, muscle atrophy noted L quad, however, pt has mod genu varus R knee from previous menisectomy 10 yrs ago with infection    Range of Motion (extension/neutral/flexion):    Right Left   Knee PROM: 0 / 19 / 100 prone  degrees 0 / 0 / 106 prone  degrees     Strength:    Right Left Comment   Knee Extension: 5/5 5/5    Knee Flexion: 5/5 5/5    Hip Abduction: 5/5 5/5      Special Tests:  (+) U bridge test R/L, decreased flexibility B RF and quad     Palpation:  (+) TTP medial retinaculum and in piriformis region         TREATMENT   Treatment Time In:  18:00  Treatment Time Out: 18:30  Total Treatment time separate from Evaluation time:30'    Josefina received therapeutic exercises to develop strength, endurance, flexibility and core stabilization for 30  minutes including:    Stick upper / lower leg  Supine RF stretch 5x:15 R/L  Supine HSS 3D 2x:15 R/L  Supine butterfly stretch 5x;15  Supine glut max stretch 5x;15  Supine piriformis stretch 5x;15  Butt winking 1x15:05  B bridge 2x10:10  Plank 5x:10-20    Education     Home Exercises and Patient Education Provided    Education provided re:   - progress towards goals   - role of therapy in multi - disciplinary team, goals for therapy  No spiritual or educational barriers to learning provided    Written Home Exercises Provided: yes.  Exercises were reviewed and Josefina was able to demonstrate them prior to the end of the " session.   Pt received a written copy of exercises to perform at home. Josefina demonstrated good  understanding of the education provided.     Assessment   Josefina is a 59 y.o. female referred to outpatient Physical Therapy with a medical diagnosis of L LBP/ITB syndrome/L knee pain . Pt presents with       Pain  Stiffness  Decreased ROM  Decreased flexibility   Decreased strength  Decreased functional status       Pt prognosis is Good.   Pt will benefit from skilled outpatient Physical Therapy to address the deficits stated above and in the chart below, provide pt/family education, and to maximize pt's level of independence.     Plan of care discussed with patient: Yes  Pt's spiritual, cultural and educational needs considered and pt agreeable to plan of care and goals as stated below:     Anticipated Barriers for therapy: none       Medical Necessity is demonstrated by the following  History  Co-morbidities and personal factors that may impact the plan of care Co-morbidities:   nonw    Personal Factors:   no deficits     low   Examination  Body Structures and Functions, activity limitations and participation restrictions that may impact the plan of care Body Regions:   lower extremities    Body Systems:    ROM  strength  balance  motor control    Participation Restrictions:   Flexibility     Activity limitations:   Mobility  no deficits    Self care  no deficits    Domestic Life  shopping  cooking  doing house work (cleaning house, washing dishes, laundry)    Community and Social Life  recreation and leisure         low   Clinical Presentation stable and uncomplicated low   Decision Making/ Complexity Score: low     Goals     Short-Term Goals: 6 weeks  - The patient will be independent with initial home exercise program.  - The patient will be independent amb community distances on level and unlevel surfaces with  0/10 pain .  Increase flexibility B RF to improve standing/walking tolerance with 0/10 pain .  - The  patient will increase strength by demonstrating normal plank (core) and neg U bridge tests (glut)  to perform rec/leisure activities with 0/10 pain       Plan   Certification Period: 6/5/2019 to  7/15/2019    Outpatient Physical Therapy 1 times weekly for 6 weeks to include the following interventions: patient education, Manual Therapy, Moist Heat/ Ice, Neuromuscular Re-ed, Patient Education, Therapeutic Activites and Therapeutic Exercise.     Marco Leal, PT

## 2019-06-10 DIAGNOSIS — N95.1 VAGINAL DRYNESS, MENOPAUSAL: ICD-10-CM

## 2019-06-10 DIAGNOSIS — N94.10 FEMALE DYSPAREUNIA: ICD-10-CM

## 2019-06-10 RX ORDER — ESTRADIOL 0.1 MG/G
1 CREAM VAGINAL DAILY
Qty: 42.5 G | Refills: 0 | Status: SHIPPED | OUTPATIENT
Start: 2019-06-10 | End: 2020-04-13

## 2019-07-19 ENCOUNTER — PATIENT MESSAGE (OUTPATIENT)
Dept: SPORTS MEDICINE | Facility: CLINIC | Age: 59
End: 2019-07-19

## 2019-07-19 ENCOUNTER — OFFICE VISIT (OUTPATIENT)
Dept: SPORTS MEDICINE | Facility: CLINIC | Age: 59
End: 2019-07-19
Payer: COMMERCIAL

## 2019-07-19 ENCOUNTER — CLINICAL SUPPORT (OUTPATIENT)
Dept: REHABILITATION | Facility: HOSPITAL | Age: 59
End: 2019-07-19
Payer: COMMERCIAL

## 2019-07-19 VITALS
WEIGHT: 216 LBS | DIASTOLIC BLOOD PRESSURE: 77 MMHG | HEIGHT: 69 IN | HEART RATE: 74 BPM | SYSTOLIC BLOOD PRESSURE: 124 MMHG | BODY MASS INDEX: 31.99 KG/M2

## 2019-07-19 DIAGNOSIS — M54.50 CHRONIC BILATERAL LOW BACK PAIN WITHOUT SCIATICA: ICD-10-CM

## 2019-07-19 DIAGNOSIS — M76.32 IT BAND SYNDROME, LEFT: Primary | ICD-10-CM

## 2019-07-19 DIAGNOSIS — M25.562 ACUTE PAIN OF LEFT KNEE: Primary | ICD-10-CM

## 2019-07-19 DIAGNOSIS — G89.29 CHRONIC BILATERAL LOW BACK PAIN WITHOUT SCIATICA: ICD-10-CM

## 2019-07-19 DIAGNOSIS — M62.81 MUSCLE WEAKNESS OF LOWER EXTREMITY: ICD-10-CM

## 2019-07-19 PROCEDURE — 99999 PR PBB SHADOW E&M-EST. PATIENT-LVL III: ICD-10-PCS | Mod: PBBFAC,,, | Performed by: PHYSICIAN ASSISTANT

## 2019-07-19 PROCEDURE — 97110 THERAPEUTIC EXERCISES: CPT

## 2019-07-19 PROCEDURE — 3074F PR MOST RECENT SYSTOLIC BLOOD PRESSURE < 130 MM HG: ICD-10-PCS | Mod: CPTII,S$GLB,, | Performed by: PHYSICIAN ASSISTANT

## 2019-07-19 PROCEDURE — 97164 PT RE-EVAL EST PLAN CARE: CPT

## 2019-07-19 PROCEDURE — 99213 OFFICE O/P EST LOW 20 MIN: CPT | Mod: S$GLB,,, | Performed by: PHYSICIAN ASSISTANT

## 2019-07-19 PROCEDURE — 3074F SYST BP LT 130 MM HG: CPT | Mod: CPTII,S$GLB,, | Performed by: PHYSICIAN ASSISTANT

## 2019-07-19 PROCEDURE — 3078F PR MOST RECENT DIASTOLIC BLOOD PRESSURE < 80 MM HG: ICD-10-PCS | Mod: CPTII,S$GLB,, | Performed by: PHYSICIAN ASSISTANT

## 2019-07-19 PROCEDURE — 3078F DIAST BP <80 MM HG: CPT | Mod: CPTII,S$GLB,, | Performed by: PHYSICIAN ASSISTANT

## 2019-07-19 PROCEDURE — 97032 APPL MODALITY 1+ESTIM EA 15: CPT

## 2019-07-19 PROCEDURE — 99213 PR OFFICE/OUTPT VISIT, EST, LEVL III, 20-29 MIN: ICD-10-PCS | Mod: S$GLB,,, | Performed by: PHYSICIAN ASSISTANT

## 2019-07-19 PROCEDURE — 99999 PR PBB SHADOW E&M-EST. PATIENT-LVL III: CPT | Mod: PBBFAC,,, | Performed by: PHYSICIAN ASSISTANT

## 2019-07-19 PROCEDURE — 3008F BODY MASS INDEX DOCD: CPT | Mod: CPTII,S$GLB,, | Performed by: PHYSICIAN ASSISTANT

## 2019-07-19 PROCEDURE — 3008F PR BODY MASS INDEX (BMI) DOCUMENTED: ICD-10-PCS | Mod: CPTII,S$GLB,, | Performed by: PHYSICIAN ASSISTANT

## 2019-07-19 NOTE — PROGRESS NOTES
CC: Left knee pain    59 y.o. Female presents to clinic with left knee pain x 1 day. While swimming yesterday, she felt a sharp pain along the lateral aspect of her left knee. She reports a previous 20+ year history of  IT band syndrome that comes and goes. She reports feeling a sharp, pain that lasted 20 minutes and then subsided. She felt the same pain again this morning when getting dressed. She states that she feels like something is moving along the lateral aspect of her knee. She exercises regularly ([employer] classes).She has been applying heat to her left knee with some improvement.     She reports that the pain and weakness. It also bothers her at night.    - mechanical symptoms, - instability    Is affecting ADLs.  Pain is 7/10 at it's worst.    REVIEW OF SYSTEMS:  Constitution: Negative. Negative for chills, fever and night sweats.   HENT: Negative for congestion and headaches.    Eyes: Negative for blurred vision, left vision loss and right vision loss.   Cardiovascular: Negative for chest pain and syncope.   Respiratory: Negative for cough and shortness of breath.    Endocrine: Negative for polydipsia, polyphagia and polyuria.   Hematologic/Lymphatic: Negative for bleeding problem. Does not bruise/bleed easily.   Skin: Negative for dry skin, itching and rash.   Musculoskeletal: Negative for falls. Positive for left knee pain and  muscle weakness.   Gastrointestinal: Negative for abdominal pain and bowel incontinence.   Genitourinary: Negative for bladder incontinence and nocturia.   Neurological: Negative for disturbances in coordination, loss of balance and seizures.   Psychiatric/Behavioral: Negative for depression. The patient does not have insomnia.    Allergic/Immunologic: Negative for hives and persistent infections.     PAST MEDICAL HISTORY:    Past Medical History:   Diagnosis Date    Hyperlipidemia 8/31/2015    Hypertension     IFG (impaired fasting glucose) 8/31/2015    Neuropathic pain  8/31/2015    Squamous cell cancer of tongue 2012       PAST SURGICAL HISTORY:   Past Surgical History:   Procedure Laterality Date    CHOLECYSTECTOMY      CHOLECYSTECTOMY-LAPAROSCOPIC W CHOLANGIOGRAM N/A 6/8/2016    Performed by Dino Simmons MD at [facility] OR [address] [place] [address]    EXCISION-LESION-TONGUE  4/9/2018    Per[facility]    GALLBLADDER SURGERY  06/2016    HYSTERECTOMY      KNEE ARTHROSCOPY Right     for torn meniscus    LARYNGOSCOPY BRONCHOSCOPY-DIRECT N/A 4/9/2018    Performed [facility]    TONGUE SURGERY         FAMILY HISTORY:   Family History   Problem Relation Age of Onset    Alcohol abuse Mother     Cirrhosis Mother     Cancer Father 74        prostate, throat, lung- smoker    Hyperlipidemia Father     Diabetes Sister     Hypertension Sister     Drug abuse Brother     Heart disease Brother     Hyperlipidemia Brother     No Known Problems Maternal Aunt     No Known Problems Maternal Uncle     No Known Problems Paternal Aunt     No Known Problems Paternal Uncle     No Known Problems Maternal Grandmother     No Known Problems Maternal Grandfather     No Known Problems Paternal Grandmother     No Known Problems Paternal Grandfather     Amblyopia Neg Hx     Blindness Neg Hx     Cataracts Neg Hx     Glaucoma Neg Hx     Macular degeneration Neg Hx     Retinal detachment Neg Hx     Strabismus Neg Hx     Stroke Neg Hx     Thyroid disease Neg Hx        SOCIAL HISTORY:   Social History     Socioeconomic History    Marital status:      Spouse name: Not on file    Number of children: Not on file    Years of education: Not on file    Highest education level: Not on file   Occupational History    Occupation: chief nursing officer   Social Needs    Financial resource strain: Not on file    Food insecurity:     Worry: Not on file     Inability: Not on file    Transportation needs:     Medical: Not on file      Non-medical: Not on file   Tobacco Use    Smoking status: Never Smoker    Smokeless tobacco: Never Used   Substance and Sexual Activity    Alcohol use: Yes     Alcohol/week: 0.0 oz     Comment: occ    Drug use: No    Sexual activity: Yes     Birth control/protection: None   Lifestyle    Physical activity:     Days per week: Not on file     Minutes per session: Not on file    Stress: Not on file   Relationships    Social connections:     Talks on phone: Not on file     Gets together: Not on file     Attends Sabianism service: Not on file     Active member of club or organization: Not on file     Attends meetings of clubs or organizations: Not on file     Relationship status: Not on file   Other Topics Concern    Are you pregnant or think you may be? Not Asked    Breast-feeding Not Asked   Social History Narrative    Lives with  and 2 dogs       MEDICATIONS:     Current Outpatient Medications:     ALPRAZolam (XANAX) 0.25 MG tablet, Take 1 tablet (0.25 mg total) by mouth nightly as needed., Disp: 30 tablet, Rfl: 2    aspirin (ECOTRIN) 81 MG EC tablet, Take 1 tablet (81 mg total) by mouth nightly., Disp: , Rfl: 0    celecoxib (CELEBREX) 200 MG capsule, Take 1 capsule (200 mg total) by mouth 2 (two) times daily., Disp: 60 capsule, Rfl: 2    clobetasol (TEMOVATE) 0.05 % external solution, Use one to two times daily as needed for scaling or itching to scalp, Disp: 60 mL, Rfl: 3    estradiol (ESTRACE) 0.01 % (0.1 mg/gram) vaginal cream, Place 1 g vaginally once daily., Disp: 42.5 g, Rfl: 0    irbesartan (AVAPRO) 75 MG tablet, Take 1 tablet (75 mg total) by mouth every evening., Disp: 90 tablet, Rfl: 3    pravastatin (PRAVACHOL) 20 MG tablet, Take 1 tablet (20 mg total) by mouth once daily., Disp: 90 tablet, Rfl: 3    tretinoin (RETIN-A) 0.05 % cream, Apply topically nightly. Apply pea-sized amount to face., Disp: 45 g, Rfl: 2    triamterene-hydrochlorothiazide 37.5-25 mg (MAXZIDE-25) 37.5-25 mg  "per tablet, Take 1 tablet by mouth once daily., Disp: 90 tablet, Rfl: 3    zolpidem (AMBIEN) 5 MG Tab, Take 1 tablet (5 mg total) by mouth nightly as needed., Disp: 30 tablet, Rfl: 5    zolpidem (AMBIEN) 5 MG Tab, Take one tablet by mouth every evening, Disp: 30 tablet, Rfl: 5    diclofenac sodium (VOLTAREN) 1 % Gel, Apply 2 g topically 4 (four) times daily. for 10 days, Disp: 1 Tube, Rfl: 2    ALLERGIES:   Review of patient's allergies indicates:   Allergen Reactions    Aspirin Nausea Only     Can take low dose of Aspirin    Codeine Nausea Only     Can take Codeine if she takes a dose of Zofran with it       VITAL SIGNS:   /77   Pulse 74   Ht 5' 9" (1.753 m)   Wt 98 kg (216 lb)   BMI 31.90 kg/m²      PHYSICAL EXAMINATION  General:  The patient is alert and oriented x 3.  Mood is pleasant.  Observation of ears, eyes and nose reveal no gross abnormalities.  No labored breathing observed.    LEFT KNEE EXAMINATION     OBSERVATION / INSPECTION   Gait:   Nonantalgic   Alignment:  Neutral   Scars:   None   Muscle atrophy: Mild  Effusion:  None   Warmth:  None   Discoloration:   none     TENDERNESS / CREPITUS (T / C):          T / C      T / C   Patella   - / -   Lateral joint line   - / -   Peripatellar medial  -  Medial joint line    - / -   Peripatellar lateral -  Medial plica   - / -   Patellar tendon -   Popliteal fossa   - / -   Quad tendon   -   Gastrocnemius   -   Prepatellar Bursa - / -   Quadricep   -   Tibial tubercle  -  Thigh/hamstring  -   Pes anserine/HS -  Fibula    -   Distal ITB  + / -  Tibia     -   Tib/fib joint  - / -  LCL    -     MFC   - / -   MCL: Proximal  -    LFC   - / -    Distal   -          ROM: (* = pain)  PASSIVE   ACTIVE    Left :   5 / 0 / 130   5 / 0 / 130     Right :    5 / 0 / 130   5 / 0 / 130    Patellofemoral examination:  See above noted areas of tenderness.   Patella position    Subluxation / dislocation: Centered           Sup. / Inf;   Normal   Crepitus " (PF):    Absent   Patellar Mobility:       Medial-lateral:   Normal    Superior-inferior:  Normal    Inferior tilt   Normal    Patellar tendon:  Normal   Lateral tilt:    Normal   J-sign:     None   Patellofemoral grind:   No pain       MENISCAL SIGNS:     Pain on terminal extension:  +  Pain on terminal flexion:  +  Yosis maneuver:  -   Squat     -     LIGAMENT EXAMINATION:  ACL / Lachman:  normal (-1 to 2mm)    PCL-Post.  drawer: normal 0 to 2mm  MCL- Valgus:  normal 0 to 2mm  LCL- Varus:  normal 0 to 2mm  Pivot shift: normal (Equal)   Dial Test: difference c/w other side   At 30° flexion: normal (< 5°)    At 90° flexion: normal (< 5°)   Reverse Pivot Shift:   normal (Equal)     STRENGTH: (* = with pain) PAINFUL SIDE   Quadricep   5/5   Hamstrin/5    EXTREMITY NEURO-VASCULAR EXAMINATION:   Sensation:  Grossly intact to light touch all dermatomal regions.   Motor Function:  Fully intact motor function at hip, knee, foot and ankle    DTRs;  quadriceps and  achilles 2+.  No clonus and downgoing Babinski.    Vascular status:  DP and PT pulses 2+, brisk capillary refill, symmetric.     Other findings:  UZAIR TEST + BILATERALLY    XRAY (19):   Right: There is moderate DJD and a varus deformity.  No fracture dislocation bone destruction or OCD seen.    Left: There is moderate DJD and a varus deformity.  No fracture dislocation bone destruction or OCD seen.    ASSESSMENT:    Left IT band syndrome    PLAN:   1. Referral to formal PT at [facility] for left IT band syndrome-dry needling. Scheduled an appointment for today.  2.  Voltaren gel to IT band  3. Continue Celebrex 200mg  4. Alternate heat/ice  5. Foam rolling/stretching  6. RTC prn pain    All questions were answered, pt will contact us for questions or concerns in the interim.

## 2019-07-20 ENCOUNTER — TELEPHONE (OUTPATIENT)
Dept: SPORTS MEDICINE | Facility: CLINIC | Age: 59
End: 2019-07-20

## 2019-07-20 ENCOUNTER — HOSPITAL ENCOUNTER (OUTPATIENT)
Dept: RADIOLOGY | Facility: OTHER | Age: 59
Discharge: HOME | End: 2019-07-20
Attending: ORTHOPAEDIC SURGERY
Payer: COMMERCIAL

## 2019-07-20 DIAGNOSIS — M25.562 LEFT KNEE PAIN, UNSPECIFIED CHRONICITY: Primary | ICD-10-CM

## 2019-07-20 DIAGNOSIS — M25.562 LEFT KNEE PAIN, UNSPECIFIED CHRONICITY: ICD-10-CM

## 2019-07-20 PROCEDURE — 73721 MRI JNT OF LWR EXTRE W/O DYE: CPT | Mod: TC,LT

## 2019-07-20 PROCEDURE — 73721 MRI JNT OF LWR EXTRE W/O DYE: CPT | Mod: 26,LT,, | Performed by: RADIOLOGY

## 2019-07-20 PROCEDURE — 73721 MRI KNEE WITHOUT CONTRAST LEFT: ICD-10-PCS | Mod: 26,LT,, | Performed by: RADIOLOGY

## 2019-07-20 NOTE — TELEPHONE ENCOUNTER
Josefina contacted me about her left knee.  She injured it a few days ago and was seen in clinic yesterday and therapy was set up.  She was getting up this morning and felt a sharp pain medially, along with a mechanical catching / blocking and a feeling like she is going to fall down.  She thinks she might have re-injured it again and now primarily complains of instability and locking in her knee.  She has a known history of degenerative arthritis that has been stable for some time. Her complaints now is mechanical with locking and instability as she doesn't feel she can trust putting weight on the limb with the knee giving out on her.    Because of the change in her symptoms, and that they are now mechanical locking and instability, I think an MRI is most reasonable for her knee.    Imp:  Left knee mechanical locking / instability    Plan:  MRI left knee to evaluate for meniscus tear or mechanically impinging loose body.      Zac Noriega MD

## 2019-07-22 ENCOUNTER — CLINICAL SUPPORT (OUTPATIENT)
Dept: REHABILITATION | Facility: HOSPITAL | Age: 59
End: 2019-07-22
Payer: COMMERCIAL

## 2019-07-22 DIAGNOSIS — M25.562 ACUTE PAIN OF LEFT KNEE: Primary | ICD-10-CM

## 2019-07-22 DIAGNOSIS — G89.29 CHRONIC BILATERAL LOW BACK PAIN WITHOUT SCIATICA: ICD-10-CM

## 2019-07-22 DIAGNOSIS — M62.81 MUSCLE WEAKNESS OF LOWER EXTREMITY: ICD-10-CM

## 2019-07-22 DIAGNOSIS — M54.50 CHRONIC BILATERAL LOW BACK PAIN WITHOUT SCIATICA: ICD-10-CM

## 2019-07-22 PROCEDURE — 97140 MANUAL THERAPY 1/> REGIONS: CPT

## 2019-07-22 PROCEDURE — 97110 THERAPEUTIC EXERCISES: CPT

## 2019-07-22 NOTE — PLAN OF CARE
[facility]  Physical Therapy Re- Evaluation    Name: Josefina Blue  Clinic Number: [id number]    Therapy Diagnosis:   Encounter Diagnoses   Name Primary?    Chronic bilateral low back pain without sciatica     Acute pain of left knee Yes    Muscle weakness of lower extremity      Physician: Yola Adair, BRAD    Physician Orders: PT Eval and Treat; walked over from Sports Medicine this AM for evaluation and trial dry needling.   Medical Diagnosis from Referral:   M25.562 (ICD-10-CM) - Left knee pain, unspecified chronicity   M17.12 (ICD-10-CM) - Degenerative arthritis of left knee   M76.30 (ICD-10-CM) - IT band syndrome   M54.5 (ICD-10-CM) - Low back pain       Evaluation Date: 7/19/2019  Authorization Period Expiration: 12/31/2019  Plan of Care Expiration: 11/30/2019  Visit # / Visits authorized: 1/ 30    Time In: 09:10  Time Out: 10:00  Total Billable Time: 50 minutes    Precautions: Standard    Subjective   Date of onset: 2 days ago  History of current condition - Josefina reports that she re-aggravated her lateral L knee 1-2 days ago. She recently went swimming, but did not kick her legs she states. She noted severe sharp lateral knee pain standing up from bed putting her pants on, making it difficult to walk. She has been able to bike and walk after loosening up with benefit. She was working with Marco PETERSON for hip/knee symptoms recently as well from moving 6+ wks ago. She participates in [employer] and yoga classes as well with benefit typically. She states she has had ongoing L knee pain and stiffness for years now due to OA. She also has chronic back pain as well she feels is attributed to her randall buttock pain and L knee pain. Denies any N/T. Crepitus and giving way noted.      Medical History:   Past Medical History:   Diagnosis Date    Hyperlipidemia 8/31/2015    Hypertension     IFG (impaired fasting glucose) 8/31/2015    Neuropathic pain 8/31/2015    Squamous cell cancer of  tongue 2012       Surgical History:   Josefina Blue  has a past surgical history that includes Hysterectomy; Tongue surgery; Knee arthroscopy (Right); Cholecystectomy; and Gallbladder surgery (06/2016).    Medications:   Josefina has a current medication list which includes the following prescription(s): alprazolam, aspirin, celecoxib, clobetasol, diclofenac sodium, estradiol, irbesartan, pravastatin, tretinoin, triamterene-hydrochlorothiazide 37.5-25 mg, zolpidem, and zolpidem.    Allergies:   Review of patient's allergies indicates:   Allergen Reactions    Aspirin Nausea Only     Can take low dose of Aspirin    Codeine Nausea Only     Can take Codeine if she takes a dose of Zofran with it        Imaging, MRI studies:   1. Advanced tricompartmental osteoarthritis with severe chondromalacia within the medial tibiofemoral compartment.  2. Complex tear body segment/posterior horn medial meniscus.  3. Degenerative fraying lateral meniscus.  4. Small joint effusion with associated synovitis.  5. Complex popliteal cyst    Prior Therapy: yes  Social History:  lives with their spouse  Occupation: nursing executive with [employer]  Prior Level of Function: indep with occasional LBP, hip pain, and L knee pain  Current Level of Function: indep with worsening L lateral knee pain    Pain:  Current 3/10, worst 9/10, best 2/10   Location: left knee   Description: Aching, Dull, Tight and Sharp  Aggravating Factors: Morning and putting on pants, squatting  Easing Factors: ice, heating pad and rest    Pts goals: reduce pain, improve ITB symptoms    Objective     Observation: lacking terminal knee extension on L; atrophy L quad    Posture: no significant varum/valgum to note2    Functional tests(*=pain):   DL squat: 50 deg with R weight shift- painful  SL squat: 15 deg with significant pain  SLS EO: > 30 sec mild pain increase  Gait: indep and reciprocal; trendelenburg noted with no terminal knee ext on L  Transfers: indep    Range of  Motion(*=pain):   Knee Right active Left Active   Flexion Lacking 2 deg Lacking 15 deg*   Extension 110 108     Lower Extremity Strength  Right LE  Left LE    Quadriceps: 4/5 Quadriceps: 4/5   Hamstrings: 4/5 Hamstrings: 4/5   Hip flexion (supine): 4/5 Hip flexion (supine): 4/5   Hip extension:  4/5 Hip extension: 4/5   Hip abduction: 3+/5 Hip abduction:  3+/5   Hip ER:  4-/5 Hip ER:  4-/5   Hip IR: 4+/5 Hip IR: 4+/5     Special Tests:   Right Left   Valgus Stress Test - -   Varus Stress test - -   Lachman's test - -   Posterior Lachman - -   Yessica's Test - +   Thessaly's Test - +   Patellar Grind Test - -     Joint Mobility: hypomobile L knee PF and tibiofemoral joint especially into extension     Palpation: TTP L lateral distal ITB    Proximal/distal screen: LBP- no pain AROM     (+) SLR with adduction bias on L compared to R at 50 deg     L4 central PA familiar lateral L knee symptoms         Sensation: intact grossly light touch  Reflexes: 2+ and = randall    Flexibility:    Hamstrings: R = -10 ; L = -18    Meg's test: R = + ; L = ++   Jose test: R = + ; L = ++        TREATMENT   Treatment Time In: 9:30  Treatment Time Out: 10:00  Total Treatment time separate from Evaluation: 30 minutes    Josefina received therapeutic exercises to develop ROM and flexibility for 10 minutes including:  ITB stretch with strap supine 3 x 30 sec; for neural tension as well  Clamshells x30    Josefina received the following manual therapy techniques: Joint mobilizations and Soft tissue Mobilization were applied for 20 minutes, including:  Dry needling to L distal ITB with attended e-stim NMES x 8 min; dry needling not billed- set up and assessment billed  STM lateral thigh roller TFL, glute med/max/ITB/lateral rectus    *Pt able to SL squat to 30 deg post tx no pain; felt good at exit walking.     Home Exercises and Patient Education Provided    Education provided:   - lumbar neural tension, ITB symptoms, anatomy/pathology,  benefit/risk dry needling, expectations rehab, expected soreness, benefits of exercise, activity modifications    Written Home Exercises Provided: Patient instructed to cont prior HEP.  Exercises were reviewed and Josefina was able to demonstrate them prior to the end of the session.  Josefina demonstrated good  understanding of the education provided.     See EMR under Media for exercises provided prior visit.    Assessment   Josefina is a 59 y.o. female referred to outpatient Physical Therapy with a medical diagnosis of acute flare up in L distal ITB pain. Pt presents with L knee pain, stiffness, decreased ROM, weakness, neural tension/LBP sensitization, and reduced functional mobility.     Pt prognosis is Good.   Pt will benefit from skilled outpatient Physical Therapy to address the deficits stated above and in the chart below, provide pt/family education, and to maximize pt's level of independence.     Plan of care discussed with patient: Yes  Pt's spiritual, cultural and educational needs considered and patient is agreeable to the plan of care and goals as stated below:     Anticipated Barriers for therapy: busy work schedule    Medical Necessity is demonstrated by the following  History  Co-morbidities and personal factors that may impact the plan of care Co-morbidities:   high BMI and acute flare chronic symptoms, LBP    Personal Factors:   age     low   Examination  Body Structures and Functions, activity limitations and participation restrictions that may impact the plan of care Body Regions:   back  lower extremities    Body Systems:    gross symmetry  ROM  strength  gross coordinated movement  balance  gait  transfers  transitions  motor control  motor learning    Participation Restrictions:   Work schedule    Activity limitations:   Learning and applying knowledge  no deficits    General Tasks and Commands  no deficits    Communication  no deficits    Mobility  walking  squatting    Self care  no  deficits    Domestic Life  doing house work (cleaning house, washing dishes, laundry)  assisting others    Interactions/Relationships  no deficits    Life Areas  no deficits    Community and Social Life  no deficits         low   Clinical Presentation evolving clinical presentation with changing clinical characteristics Mod   Decision Making/ Complexity Score: low     GOALS: Short Term Goals:  4 weeks  1.Report decreased L knee pain  < / =  2/10  to increase tolerance for putting on pants in AM.   2. Increase knee ROM to 0-8-110 deg in order to be able to perform ADLs without difficulty.  3. Increase strength by 1/3 MMT grade in L LE  to increase tolerance for ADL and work activities.  4. Pt to tolerate HEP to improve ROM and independence with ADL's    Long Term Goals: 8 weeks  1.Report decreased L knee pain < / = 2/10  to increase tolerance for SL squat to 30 deg.   2.Pt to have (-) SLR with adduction bias on L to show improved neural mobility.   3.Increase strength to >/= 4+/5 in L LE  to increase tolerance for ADL and work activities.    Plan   Plan of care Certification: 7/19/2019 to 11/30/2019.    Outpatient Physical Therapy 2 times weekly for 8 weeks to include the following interventions: Aquatic Therapy, Electrical Stimulation TENs/IFC/NMES, Iontophoresis (with dexamethasone), Manual Therapy, Moist Heat/ Ice, Neuromuscular Re-ed, Patient Education, Self Care, Therapeutic Activites, Therapeutic Exercise, Ultrasound and IASTM, taping, dry needling, and gr 5 mobilization as needed/indicated. .     Joseph River, PT

## 2019-07-22 NOTE — PROGRESS NOTES
Physical Therapy Daily Treatment Note     Name: Josefina Blue  Clinic Number: [id number]    Therapy Diagnosis:   Encounter Diagnoses   Name Primary?    Chronic bilateral low back pain without sciatica     Acute pain of left knee Yes    Muscle weakness of lower extremity      Physician: Zac Noriega MD    Visit Date: 7/22/2019    Physician Orders: PT Eval and Treat; walked over from Sports Medicine this AM for evaluation and trial dry needling.   Medical Diagnosis from Referral:   M25.562 (ICD-10-CM) - Left knee pain, unspecified chronicity   M17.12 (ICD-10-CM) - Degenerative arthritis of left knee   M76.30 (ICD-10-CM) - IT band syndrome   M54.5 (ICD-10-CM) - Low back pain         Evaluation Date: 7/19/2019  Authorization Period Expiration: 12/31/2019  Plan of Care Expiration: 11/30/2019  Visit # / Visits authorized: 2/ 30      Time In: 3:00  Time Out: 3:50  Total Billable Time: 45 minutes    Precautions: Standard    Subjective     Pt reports: Her knee continued to hurt badly through the weekend, so she had MRI revealing knee pathology. She plans to put off surgery- scope until after Vacation 8/2 and have steroid/SYNVISC injections. She wishes to have tx to both knee, hips, and low back, but hold dry needling as she is unsure it helped symptoms. She is taking off work next few days at [employer] advice. She wishes she was more consistent with prior PT exercises/visits.     She was compliant with home exercise program.  Response to previous treatment: felt better a few hrs then return symptoms  Functional change: walking better    Pain: 3/10  Location: left knee      Objective     MRI 7/20/2019:   1. Advanced tricompartmental osteoarthritis with severe chondromalacia within the medial tibiofemoral compartment.  2. Complex tear body segment/posterior horn medial meniscus.  3. Degenerative fraying lateral meniscus.  4. Small joint effusion with associated synovitis.  5. Complex popliteal cyst    ROM R knee: 0-8-100  deg firm end feel into flexion  ROM L knee: 0- deg stiff end range    Josefina received therapeutic exercises to develop ROM, strength, and flexibility for 35 minutes including:  Bike upright x 8 min lv 3-4  TB stretch with strap supine 3 x 30 sec; for neural tension as well  Clamshells x20 x 5 sec BTB randall  SL bridge 2 x 10 x 5 sec randall; arms stabilized by side  Modified pigeon stretch on table 3 x 30 sec randall  Manual stretching randall hips/knees all planes; manual nerve glides randall  Sit to stands- NP  Leg press- NP  Heel taps- NP     Josefina received the following manual therapy techniques: Joint mobilizations and Soft tissue Mobilization were applied for 20 minutes, including:  STM lateral thigh roller TFL, glute med/max/ITB/lateral rectus  Tibial and femoral AP glide for extension gr 3 randall  PF gr 3 all planes randall  Long axis distraction randall hips gr 3         Home Exercises Provided and Patient Education Provided     Education provided:   - lumbar neural tension, anatomy/pathology, benefit/risk dry needling, expectations rehab, expected soreness, benefits of exercise, activity modifications    Written Home Exercises Provided: yes.  Exercises were reviewed and Josefina was able to demonstrate them prior to the end of the session.  Josefina demonstrated good  understanding of the education provided.     See EMR under Media for exercises provided prior visit.    Assessment     Pt reported feeling great at exit, enjoyed exercises. Given blue band for HEP clamshells. Required cuing for correct exercise performance. Continues to have weakness, ROM deficits, flexibility deficits in both LE. MRI revealed lateral knee joint intraarticular pathology. Kept exercises non-weight bearing for comfort and pt request at MD directed rest. Pt reported/demonstrated no adverse response or exacerbation of symptoms during today's session.     Josefina is progressing well towards her goals.   Pt prognosis is Good.     Pt will continue to benefit  from skilled outpatient physical therapy to address the deficits listed in the problem list box on initial evaluation, provide pt/family education and to maximize pt's level of independence in the home and community environment.     Pt's spiritual, cultural and educational needs considered and pt agreeable to plan of care and goals.     Anticipated barriers to physical therapy: work schedule    GOALS: Short Term Goals:  4 weeks  (progressing, not met)  1.Report decreased L knee pain  < / =  2/10  to increase tolerance for putting on pants in AM.   2. Increase knee ROM to 0-8-110 deg in order to be able to perform ADLs without difficulty.  3. Increase strength by 1/3 MMT grade in L LE  to increase tolerance for ADL and work activities.  4. Pt to tolerate HEP to improve ROM and independence with ADL's     Long Term Goals: 8 weeks  (progressing, not met)  1.Report decreased L knee pain < / = 2/10  to increase tolerance for SL squat to 30 deg.   2.Pt to have (-) SLR with adduction bias on L to show improved neural mobility.   3.Increase strength to >/= 4+/5 in L LE  to increase tolerance for ADL and work activities.    Plan     Continue LE ROM, flexibility, and strength as able to improve comfort functional mobility. Work into closed chain as tolerated.     Joseph River, PT

## 2019-07-23 ENCOUNTER — OFFICE VISIT (OUTPATIENT)
Dept: SPORTS MEDICINE | Facility: CLINIC | Age: 59
End: 2019-07-23
Payer: COMMERCIAL

## 2019-07-23 VITALS
HEIGHT: 69 IN | WEIGHT: 216 LBS | BODY MASS INDEX: 31.99 KG/M2 | DIASTOLIC BLOOD PRESSURE: 74 MMHG | SYSTOLIC BLOOD PRESSURE: 118 MMHG | HEART RATE: 74 BPM

## 2019-07-23 DIAGNOSIS — M25.562 LEFT KNEE PAIN, UNSPECIFIED CHRONICITY: ICD-10-CM

## 2019-07-23 DIAGNOSIS — S83.249A MEDIAL MENISCUS TEAR: ICD-10-CM

## 2019-07-23 DIAGNOSIS — M76.32 IT BAND SYNDROME, LEFT: Primary | ICD-10-CM

## 2019-07-23 PROCEDURE — 20610 DRAIN/INJ JOINT/BURSA W/O US: CPT | Mod: LT,S$GLB,, | Performed by: ORTHOPAEDIC SURGERY

## 2019-07-23 PROCEDURE — 99214 PR OFFICE/OUTPT VISIT, EST, LEVL IV, 30-39 MIN: ICD-10-PCS | Mod: 25,S$GLB,, | Performed by: ORTHOPAEDIC SURGERY

## 2019-07-23 PROCEDURE — 99999 PR PBB SHADOW E&M-EST. PATIENT-LVL III: CPT | Mod: PBBFAC,,, | Performed by: ORTHOPAEDIC SURGERY

## 2019-07-23 PROCEDURE — 99999 PR PBB SHADOW E&M-EST. PATIENT-LVL III: ICD-10-PCS | Mod: PBBFAC,,, | Performed by: ORTHOPAEDIC SURGERY

## 2019-07-23 PROCEDURE — 3078F PR MOST RECENT DIASTOLIC BLOOD PRESSURE < 80 MM HG: ICD-10-PCS | Mod: CPTII,S$GLB,, | Performed by: ORTHOPAEDIC SURGERY

## 2019-07-23 PROCEDURE — 3008F BODY MASS INDEX DOCD: CPT | Mod: CPTII,S$GLB,, | Performed by: ORTHOPAEDIC SURGERY

## 2019-07-23 PROCEDURE — 99214 OFFICE O/P EST MOD 30 MIN: CPT | Mod: 25,S$GLB,, | Performed by: ORTHOPAEDIC SURGERY

## 2019-07-23 PROCEDURE — 20610 LARGE JOINT ASPIRATION/INJECTION: L KNEE: ICD-10-PCS | Mod: LT,S$GLB,, | Performed by: ORTHOPAEDIC SURGERY

## 2019-07-23 PROCEDURE — 3074F SYST BP LT 130 MM HG: CPT | Mod: CPTII,S$GLB,, | Performed by: ORTHOPAEDIC SURGERY

## 2019-07-23 PROCEDURE — 3074F PR MOST RECENT SYSTOLIC BLOOD PRESSURE < 130 MM HG: ICD-10-PCS | Mod: CPTII,S$GLB,, | Performed by: ORTHOPAEDIC SURGERY

## 2019-07-23 PROCEDURE — 3008F PR BODY MASS INDEX (BMI) DOCUMENTED: ICD-10-PCS | Mod: CPTII,S$GLB,, | Performed by: ORTHOPAEDIC SURGERY

## 2019-07-23 PROCEDURE — 3078F DIAST BP <80 MM HG: CPT | Mod: CPTII,S$GLB,, | Performed by: ORTHOPAEDIC SURGERY

## 2019-07-23 RX ORDER — TRIAMCINOLONE ACETONIDE 40 MG/ML
40 INJECTION, SUSPENSION INTRA-ARTICULAR; INTRAMUSCULAR
Status: DISCONTINUED | OUTPATIENT
Start: 2019-07-23 | End: 2019-07-23 | Stop reason: HOSPADM

## 2019-07-23 RX ADMIN — TRIAMCINOLONE ACETONIDE 40 MG: 40 INJECTION, SUSPENSION INTRA-ARTICULAR; INTRAMUSCULAR at 04:07

## 2019-07-23 NOTE — PROCEDURES
Large Joint Aspiration/Injection: L knee  Date/Time: 7/23/2019 4:48 PM  Performed by: Zca Noriega MD  Authorized by: Zac Noriega MD     Consent Done?:  Yes (Verbal)  Indications:  Pain  Procedure site marked: Yes    Timeout: Prior to procedure the correct patient, procedure, and site was verified      Location:  Knee  Site:  L knee  Prep: Patient was prepped and draped in usual sterile fashion    Ultrasonic Guidance for needle placement: No  Needle size:  22 G  Approach:  Superior  Medications:  40 mg triamcinolone acetonide 40 mg/mL; 40 mg triamcinolone acetonide 40 mg/mL  Patient tolerance:  Patient tolerated the procedure well with no immediate complications

## 2019-07-23 NOTE — PROGRESS NOTES
CC: Left knee pain    Josefina Blue returns to clinic for follow up evaluation of left knee.    59 y.o. Female presents to clinic with left knee pain x 1 day. While swimming yesterday, she felt a sharp pain along the lateral aspect of her left knee. She reports a previous 20+ year history of  IT band syndrome that comes and goes. She reports feeling a sharp, pain that lasted 20 minutes and then subsided. She felt the same pain again this morning when getting dressed. She states that she feels like something is moving along the lateral aspect of her knee. She exercises regularly ([employer] classes).She has been applying heat to her left knee with some improvement.     She reports that the pain and weakness. It also bothers her at night.    - mechanical symptoms, - instability    Is affecting ADLs.  Pain is 4/10 at it's worst.    REVIEW OF SYSTEMS:  Constitution: Negative. Negative for chills, fever and night sweats.   HENT: Negative for congestion and headaches.    Eyes: Negative for blurred vision, left vision loss and right vision loss.   Cardiovascular: Negative for chest pain and syncope.   Respiratory: Negative for cough and shortness of breath.    Endocrine: Negative for polydipsia, polyphagia and polyuria.   Hematologic/Lymphatic: Negative for bleeding problem. Does not bruise/bleed easily.   Skin: Negative for dry skin, itching and rash.   Musculoskeletal: Negative for falls. Positive for left knee pain and  muscle weakness.   Gastrointestinal: Negative for abdominal pain and bowel incontinence.   Genitourinary: Negative for bladder incontinence and nocturia.   Neurological: Negative for disturbances in coordination, loss of balance and seizures.   Psychiatric/Behavioral: Negative for depression. The patient does not have insomnia.    Allergic/Immunologic: Negative for hives and persistent infections.     PAST MEDICAL HISTORY:    Past Medical History:   Diagnosis Date    Hyperlipidemia 8/31/2015     Hypertension     IFG (impaired fasting glucose) 8/31/2015    Neuropathic pain 8/31/2015    Squamous cell cancer of tongue 2012       PAST SURGICAL HISTORY:   Past Surgical History:   Procedure Laterality Date    CHOLECYSTECTOMY      CHOLECYSTECTOMY-LAPAROSCOPIC W CHOLANGIOGRAM N/A 6/8/2016    Performed by Dino Simmons [facility] at [facility] 2ND [facility]    EXCISION-LESION-TONGUE  4/9/2018    Performed by [facility]roquin [facility] at [facility]    GALLBLADDER SURGERY  06/2016    HYSTERECTOMY      KNEE ARTHROSCOPY Right     for torn meniscus    LARYNGOSCOPY BRONCHOSCOPY-DIRECT N/A 4/9/2018    Performed by [facility]quin [facility] at [facility]    TONGUE SURGERY         FAMILY HISTORY:   Family History   Problem Relation Age of Onset    Alcohol abuse Mother     Cirrhosis Mother     Cancer Father 74        prostate, throat, lung- smoker    Hyperlipidemia Father     Diabetes Sister     Hypertension Sister     Drug abuse Brother     Heart disease Brother     Hyperlipidemia Brother     No Known Problems Maternal Aunt     No Known Problems Maternal Uncle     No Known Problems Paternal Aunt     No Known Problems Paternal Uncle     No Known Problems Maternal Grandmother     No Known Problems Maternal Grandfather     No Known Problems Paternal Grandmother     No Known Problems Paternal Grandfather     Amblyopia Neg Hx     Blindness Neg Hx     Cataracts Neg Hx     Glaucoma Neg Hx     Macular degeneration Neg Hx     Retinal detachment Neg Hx     Strabismus Neg Hx     Stroke Neg Hx     Thyroid disease Neg Hx        SOCIAL HISTORY:   Social History     Socioeconomic History    Marital status:      Spouse name: Not on file    Number of children: Not on file    Years of education: Not on file    Highest education level: Not on file   Occupational History    Occupation: chief nursing officer   Social Needs    Financial resource strain: Not on file    Food insecurity:     Worry: Not on file      Inability: Not on file    Transportation needs:     Medical: Not on file     Non-medical: Not on file   Tobacco Use    Smoking status: Never Smoker    Smokeless tobacco: Never Used   Substance and Sexual Activity    Alcohol use: Yes     Alcohol/week: 0.0 oz     Comment: occ    Drug use: No    Sexual activity: Yes     Birth control/protection: None   Lifestyle    Physical activity:     Days per week: Not on file     Minutes per session: Not on file    Stress: Not on file   Relationships    Social connections:     Talks on phone: Not on file     Gets together: Not on file     Attends Latter-day service: Not on file     Active member of club or organization: Not on file     Attends meetings of clubs or organizations: Not on file     Relationship status: Not on file   Other Topics Concern    Are you pregnant or think you may be? Not Asked    Breast-feeding Not Asked   Social History Narrative    Lives with  and 2 dogs       MEDICATIONS:     Current Outpatient Medications:     ALPRAZolam (XANAX) 0.25 MG tablet, Take 1 tablet (0.25 mg total) by mouth nightly as needed., Disp: 30 tablet, Rfl: 2    aspirin (ECOTRIN) 81 MG EC tablet, Take 1 tablet (81 mg total) by mouth nightly., Disp: , Rfl: 0    celecoxib (CELEBREX) 200 MG capsule, Take 1 capsule (200 mg total) by mouth 2 (two) times daily., Disp: 60 capsule, Rfl: 2    clobetasol (TEMOVATE) 0.05 % external solution, Use one to two times daily as needed for scaling or itching to scalp, Disp: 60 mL, Rfl: 3    diclofenac sodium (VOLTAREN) 1 % Gel, Apply 2 g topically 4 (four) times daily. for 10 days, Disp: 1 Tube, Rfl: 2    estradiol (ESTRACE) 0.01 % (0.1 mg/gram) vaginal cream, Place 1 g vaginally once daily., Disp: 42.5 g, Rfl: 0    irbesartan (AVAPRO) 75 MG tablet, Take 1 tablet (75 mg total) by mouth every evening., Disp: 90 tablet, Rfl: 3    pravastatin (PRAVACHOL) 20 MG tablet, Take 1 tablet (20 mg total) by mouth once daily., Disp: 90  "tablet, Rfl: 3    tretinoin (RETIN-A) 0.05 % cream, Apply topically nightly. Apply pea-sized amount to face., Disp: 45 g, Rfl: 2    triamterene-hydrochlorothiazide 37.5-25 mg (MAXZIDE-25) 37.5-25 mg per tablet, Take 1 tablet by mouth once daily., Disp: 90 tablet, Rfl: 3    zolpidem (AMBIEN) 5 MG Tab, Take 1 tablet (5 mg total) by mouth nightly as needed., Disp: 30 tablet, Rfl: 5    zolpidem (AMBIEN) 5 MG Tab, Take one tablet by mouth every evening, Disp: 30 tablet, Rfl: 5    ALLERGIES:   Review of patient's allergies indicates:   Allergen Reactions    Aspirin Nausea Only     Can take low dose of Aspirin    Codeine Nausea Only     Can take Codeine if she takes a dose of Zofran with it       VITAL SIGNS:   /74   Pulse 74   Ht 5' 9" (1.753 m)   Wt 98 kg (216 lb)   BMI 31.90 kg/m²      PHYSICAL EXAMINATION  General:  The patient is alert and oriented x 3.  Mood is pleasant.  Observation of ears, eyes and nose reveal no gross abnormalities.  No labored breathing observed.    LEFT KNEE EXAMINATION     OBSERVATION / INSPECTION   Gait:   Nonantalgic   Alignment:  Neutral   Scars:   None   Muscle atrophy: Mild  Effusion:  None   Warmth:  None   Discoloration:   none     TENDERNESS / CREPITUS (T / C):          T / C      T / C   Patella   - / -   Lateral joint line   - / -   Peripatellar medial  -  Medial joint line    - / -   Peripatellar lateral -  Medial plica   - / -   Patellar tendon -   Popliteal fossa   - / -   Quad tendon   -   Gastrocnemius   -   Prepatellar Bursa - / -   Quadricep   -   Tibial tubercle  -  Thigh/hamstring  -   Pes anserine/HS -  Fibula    -   Distal ITB  + / -  Tibia     -   Tib/fib joint  - / -  LCL    -     MFC   - / -   MCL: Proximal  -    LFC   - / -    Distal   -          ROM: (* = pain)  PASSIVE   ACTIVE    Left :   5 / 0 / 130   5 / 0 / 130     Right :    5 / 0 / 130   5 / 0 / 130    Patellofemoral examination:  See above noted areas of tenderness.   Patella position "    Subluxation / dislocation: Centered           Sup. / Inf;   Normal   Crepitus (PF):    Absent   Patellar Mobility:       Medial-lateral:   Normal    Superior-inferior:  Normal    Inferior tilt   Normal    Patellar tendon:  Normal   Lateral tilt:    Normal   J-sign:     None   Patellofemoral grind:   No pain       MENISCAL SIGNS:     Pain on terminal extension:  +  Pain on terminal flexion:  +  Yosis maneuver:  -   Squat     -     LIGAMENT EXAMINATION:  ACL / Lachman:  normal (-1 to 2mm)    PCL-Post.  drawer: normal 0 to 2mm  MCL- Valgus:  normal 0 to 2mm  LCL- Varus:  normal 0 to 2mm  Pivot shift: normal (Equal)   Dial Test: difference c/w other side   At 30° flexion: normal (< 5°)    At 90° flexion: normal (< 5°)   Reverse Pivot Shift:   normal (Equal)     STRENGTH: (* = with pain) PAINFUL SIDE   Quadricep   5/5   Hamstrin/5    EXTREMITY NEURO-VASCULAR EXAMINATION:   Sensation:  Grossly intact to light touch all dermatomal regions.   Motor Function:  Fully intact motor function at hip, knee, foot and ankle    DTRs;  quadriceps and  achilles 2+.  No clonus and downgoing Babinski.    Vascular status:  DP and PT pulses 2+, brisk capillary refill, symmetric.     Other findings:  UZAIR TEST + BILATERALLY    XRAY (19):   Right: There is moderate DJD and a varus deformity.  No fracture dislocation bone destruction or OCD seen.    Left: There is moderate DJD and a varus deformity.  No fracture dislocation bone destruction or OCD seen.    MRI (19):   1. Advanced tricompartmental osteoarthritis with severe chondromalacia within the medial tibiofemoral compartment.  2. Complex tear body segment/posterior horn medial meniscus.  3. Degenerative fraying lateral meniscus.  4. Small joint effusion with associated synovitis.  5. Complex popliteal cyst.    ASSESSMENT:    Left knee pain  Left IT band syndrome    PLAN:   1. Cortisone injection today - 3marcaine: 2kenalog  2.  Voltaren gel to IT band  3.  Continue Celebrex 200mg  4. Continue physical therapy  5. Follow up as needed    All questions were answered, pt will contact us for questions or concerns in the interim.

## 2019-07-26 ENCOUNTER — CLINICAL SUPPORT (OUTPATIENT)
Dept: REHABILITATION | Facility: HOSPITAL | Age: 59
End: 2019-07-26
Payer: COMMERCIAL

## 2019-07-26 DIAGNOSIS — M54.50 CHRONIC BILATERAL LOW BACK PAIN WITHOUT SCIATICA: ICD-10-CM

## 2019-07-26 DIAGNOSIS — M62.81 MUSCLE WEAKNESS OF LOWER EXTREMITY: ICD-10-CM

## 2019-07-26 DIAGNOSIS — M25.562 LEFT KNEE PAIN, UNSPECIFIED CHRONICITY: Primary | ICD-10-CM

## 2019-07-26 DIAGNOSIS — G89.29 CHRONIC BILATERAL LOW BACK PAIN WITHOUT SCIATICA: ICD-10-CM

## 2019-07-26 PROCEDURE — 97110 THERAPEUTIC EXERCISES: CPT

## 2019-07-26 PROCEDURE — 97140 MANUAL THERAPY 1/> REGIONS: CPT

## 2019-07-26 NOTE — PROGRESS NOTES
Physical Therapy Daily Treatment Note     Name: Josefina Blue  Clinic Number: [id number]    Therapy Diagnosis:   Encounter Diagnoses   Name Primary?    Chronic bilateral low back pain without sciatica     Left knee pain, unspecified chronicity Yes    Muscle weakness of lower extremity      Physician: Zac Noriega MD    Visit Date: 7/26/2019    Physician Orders: PT Eval and Treat; walked over from Sports Medicine this AM for evaluation and trial dry needling.   Medical Diagnosis from Referral:   M25.562 (ICD-10-CM) - Left knee pain, unspecified chronicity   M17.12 (ICD-10-CM) - Degenerative arthritis of left knee   M76.30 (ICD-10-CM) - IT band syndrome   M54.5 (ICD-10-CM) - Low back pain         Evaluation Date: 7/19/2019  Authorization Period Expiration: 12/31/2019  Plan of Care Expiration: 11/30/2019  Visit # / Visits authorized: 3/ 30      Time In: 8:10  Time Out: 9:00  Total Billable Time: 45 minutes    Precautions: Standard    Subjective     Pt reports: loves exercises helping a lot. States she is thinking she will try and avoid surgery for now and focus on strength and mobility. Will space out visits and she will do exercises diligently due to work. Wants quad stretch and more NWB strength exercises for LE today.  Loved the workout today at exit, felt great.     She was compliant with home exercise program.  Response to previous treatment: feeling much better overall  Functional change: walking better    Pain: 1/10; no increase in session; felt almost no pain at exit  Location: left knee      Objective     MRI 7/20/2019:   1. Advanced tricompartmental osteoarthritis with severe chondromalacia within the medial tibiofemoral compartment.  2. Complex tear body segment/posterior horn medial meniscus.  3. Degenerative fraying lateral meniscus.  4. Small joint effusion with associated synovitis.  5. Complex popliteal cyst    ROM R knee: 0-8-100 deg firm end feel into flexion  ROM L knee: 0- deg  stiff end range    Josefina received therapeutic exercises to develop ROM, strength, and flexibility for 35 minutes including:  Bike upright x 8 min lv 3-4  ITB stretch with strap supine 3 x 30 sec; for neural tension as well  Clamshells x30 x 5 sec BTB randall  SL bridge 3 x 10 x 5 sec randall; arms stabilized by side  DL bridge BTB at knees x 30 x 5 sec  Modified pigeon stretch on table 3 x 30 sec randall  Manual stretching randall hips/knees all planes; manual nerve glides randall  Prone quad/hip flexor stretch in hip ext 3 x 30 sec randall with strap  SLR with quad set 3 x 10 randall from heel prop  Reverse clamshells BTB at ankles in hip abd 2 x 8 randall  Sit to stands- NP  Leg press- NP  Heel taps- NP     Josefina received the following manual therapy techniques: Joint mobilizations and Soft tissue Mobilization were applied for 10 minutes, including:  STM lateral thigh roller TFL, glute med/max/ITB/lateral rectus  Tibial and femoral AP glide for extension gr 3 randall  PF gr 3 all planes randall  Long axis distraction randall hips gr 3         Home Exercises Provided and Patient Education Provided     Education provided:   - lumbar neural tension, anatomy/pathology, benefit/risk dry needling, expectations rehab, expected soreness, benefits of exercise, activity modifications    Written Home Exercises Provided: yes. Handout given today.   Exercises were reviewed and Josefina was able to demonstrate them prior to the end of the session.  Josefina demonstrated good  understanding of the education provided.     See EMR under Media for exercises provided prior visit.    Assessment     Pt reported feeling great at exit, verbalized she really enjoyed exercises and can tell benefit. Added prone quad stretch and progressing hip and knee strengthening exercises. Required cuing for correct exercise performance/movement sequencing. Continues to have weakness, ROM deficits, flexibility deficits in both LE. She demonstrates improved gait and mobility in clinic, reporting  much less pain/discomfort. Pt reported/demonstrated no adverse response or exacerbation of symptoms during today's session.     Josefina is progressing well towards her goals.   Pt prognosis is Good.     Pt will continue to benefit from skilled outpatient physical therapy to address the deficits listed in the problem list box on initial evaluation, provide pt/family education and to maximize pt's level of independence in the home and community environment.     Pt's spiritual, cultural and educational needs considered and pt agreeable to plan of care and goals.     Anticipated barriers to physical therapy: work schedule    GOALS: Short Term Goals:  4 weeks  (progressing, not met)  1.Report decreased L knee pain  < / =  2/10  to increase tolerance for putting on pants in AM.   2. Increase knee ROM to 0-8-110 deg in order to be able to perform ADLs without difficulty.  3. Increase strength by 1/3 MMT grade in L LE  to increase tolerance for ADL and work activities.  4. Pt to tolerate HEP to improve ROM and independence with ADL's     Long Term Goals: 8 weeks  (progressing, not met)  1.Report decreased L knee pain < / = 2/10  to increase tolerance for SL squat to 30 deg.   2.Pt to have (-) SLR with adduction bias on L to show improved neural mobility.   3.Increase strength to >/= 4+/5 in L LE  to increase tolerance for ADL and work activities.    Plan     Continue LE ROM, flexibility, and strength as able to improve comfort functional mobility. Work into closed chain as tolerated.     Joseph River, PT

## 2019-08-09 ENCOUNTER — PATIENT MESSAGE (OUTPATIENT)
Dept: INTERNAL MEDICINE | Facility: CLINIC | Age: 59
End: 2019-08-09

## 2019-08-13 ENCOUNTER — PATIENT MESSAGE (OUTPATIENT)
Dept: INTERNAL MEDICINE | Facility: CLINIC | Age: 59
End: 2019-08-13

## 2019-08-16 ENCOUNTER — OFFICE VISIT (OUTPATIENT)
Dept: INTERNAL MEDICINE | Facility: CLINIC | Age: 59
End: 2019-08-16
Payer: COMMERCIAL

## 2019-08-16 ENCOUNTER — LAB VISIT (OUTPATIENT)
Dept: LAB | Facility: HOSPITAL | Age: 59
End: 2019-08-16
Attending: PHYSICIAN ASSISTANT
Payer: COMMERCIAL

## 2019-08-16 VITALS
HEART RATE: 77 BPM | BODY MASS INDEX: 33.05 KG/M2 | OXYGEN SATURATION: 97 % | HEIGHT: 69 IN | TEMPERATURE: 98 F | DIASTOLIC BLOOD PRESSURE: 80 MMHG | WEIGHT: 223.13 LBS | SYSTOLIC BLOOD PRESSURE: 115 MMHG

## 2019-08-16 DIAGNOSIS — R53.1 WEAKNESS: ICD-10-CM

## 2019-08-16 DIAGNOSIS — M62.838 MUSCLE SPASM: ICD-10-CM

## 2019-08-16 DIAGNOSIS — R73.01 IFG (IMPAIRED FASTING GLUCOSE): ICD-10-CM

## 2019-08-16 DIAGNOSIS — M79.2 NEUROPATHIC PAIN: ICD-10-CM

## 2019-08-16 DIAGNOSIS — M62.838 MUSCLE SPASM: Primary | ICD-10-CM

## 2019-08-16 LAB
25(OH)D3+25(OH)D2 SERPL-MCNC: 34 NG/ML (ref 30–96)
ALBUMIN SERPL BCP-MCNC: 4.6 G/DL (ref 3.5–5.2)
ALP SERPL-CCNC: 50 U/L (ref 55–135)
ALT SERPL W/O P-5'-P-CCNC: 30 U/L (ref 10–44)
ANION GAP SERPL CALC-SCNC: 11 MMOL/L (ref 8–16)
AST SERPL-CCNC: 21 U/L (ref 10–40)
BASOPHILS # BLD AUTO: 0.03 K/UL (ref 0–0.2)
BASOPHILS NFR BLD: 0.3 % (ref 0–1.9)
BILIRUB SERPL-MCNC: 0.3 MG/DL (ref 0.1–1)
BUN SERPL-MCNC: 22 MG/DL (ref 6–20)
CALCIUM SERPL-MCNC: 9.2 MG/DL (ref 8.7–10.5)
CHLORIDE SERPL-SCNC: 101 MMOL/L (ref 95–110)
CO2 SERPL-SCNC: 26 MMOL/L (ref 23–29)
CREAT SERPL-MCNC: 0.9 MG/DL (ref 0.5–1.4)
CRP SERPL-MCNC: 0.7 MG/L (ref 0–8.2)
DIFFERENTIAL METHOD: NORMAL
EOSINOPHIL # BLD AUTO: 0.1 K/UL (ref 0–0.5)
EOSINOPHIL NFR BLD: 0.5 % (ref 0–8)
ERYTHROCYTE [DISTWIDTH] IN BLOOD BY AUTOMATED COUNT: 14.1 % (ref 11.5–14.5)
ERYTHROCYTE [SEDIMENTATION RATE] IN BLOOD BY WESTERGREN METHOD: 5 MM/HR (ref 0–36)
EST. GFR  (AFRICAN AMERICAN): >60 ML/MIN/1.73 M^2
EST. GFR  (NON AFRICAN AMERICAN): >60 ML/MIN/1.73 M^2
ESTIMATED AVG GLUCOSE: 123 MG/DL (ref 68–131)
GLUCOSE SERPL-MCNC: 101 MG/DL (ref 70–110)
HBA1C MFR BLD HPLC: 5.9 % (ref 4–5.6)
HCT VFR BLD AUTO: 40.2 % (ref 37–48.5)
HGB BLD-MCNC: 13.1 G/DL (ref 12–16)
IRON SERPL-MCNC: 42 UG/DL (ref 30–160)
LYMPHOCYTES # BLD AUTO: 3.8 K/UL (ref 1–4.8)
LYMPHOCYTES NFR BLD: 39.4 % (ref 18–48)
MAGNESIUM SERPL-MCNC: 2.3 MG/DL (ref 1.6–2.6)
MCH RBC QN AUTO: 29 PG (ref 27–31)
MCHC RBC AUTO-ENTMCNC: 32.6 G/DL (ref 32–36)
MCV RBC AUTO: 89 FL (ref 82–98)
MONOCYTES # BLD AUTO: 0.6 K/UL (ref 0.3–1)
MONOCYTES NFR BLD: 6 % (ref 4–15)
NEUTROPHILS # BLD AUTO: 5.1 K/UL (ref 1.8–7.7)
NEUTROPHILS NFR BLD: 53.8 % (ref 38–73)
PLATELET # BLD AUTO: 213 K/UL (ref 150–350)
PMV BLD AUTO: 10.6 FL (ref 9.2–12.9)
POTASSIUM SERPL-SCNC: 3.8 MMOL/L (ref 3.5–5.1)
PROT SERPL-MCNC: 7.3 G/DL (ref 6–8.4)
RBC # BLD AUTO: 4.51 M/UL (ref 4–5.4)
SATURATED IRON: 11 % (ref 20–50)
SODIUM SERPL-SCNC: 138 MMOL/L (ref 136–145)
TOTAL IRON BINDING CAPACITY: 392 UG/DL (ref 250–450)
TRANSFERRIN SERPL-MCNC: 265 MG/DL (ref 200–375)
TSH SERPL DL<=0.005 MIU/L-ACNC: 1.13 UIU/ML (ref 0.4–4)
VIT B12 SERPL-MCNC: 373 PG/ML (ref 210–950)
WBC # BLD AUTO: 9.59 K/UL (ref 3.9–12.7)

## 2019-08-16 PROCEDURE — 83540 ASSAY OF IRON: CPT

## 2019-08-16 PROCEDURE — 84443 ASSAY THYROID STIM HORMONE: CPT

## 2019-08-16 PROCEDURE — 80053 COMPREHEN METABOLIC PANEL: CPT

## 2019-08-16 PROCEDURE — 3074F PR MOST RECENT SYSTOLIC BLOOD PRESSURE < 130 MM HG: ICD-10-PCS | Mod: CPTII,S$GLB,, | Performed by: PHYSICIAN ASSISTANT

## 2019-08-16 PROCEDURE — 99213 PR OFFICE/OUTPT VISIT, EST, LEVL III, 20-29 MIN: ICD-10-PCS | Mod: S$GLB,,, | Performed by: PHYSICIAN ASSISTANT

## 2019-08-16 PROCEDURE — 99999 PR PBB SHADOW E&M-EST. PATIENT-LVL IV: CPT | Mod: PBBFAC,,, | Performed by: PHYSICIAN ASSISTANT

## 2019-08-16 PROCEDURE — 83036 HEMOGLOBIN GLYCOSYLATED A1C: CPT

## 2019-08-16 PROCEDURE — 83735 ASSAY OF MAGNESIUM: CPT

## 2019-08-16 PROCEDURE — 3008F BODY MASS INDEX DOCD: CPT | Mod: CPTII,S$GLB,, | Performed by: PHYSICIAN ASSISTANT

## 2019-08-16 PROCEDURE — 3074F SYST BP LT 130 MM HG: CPT | Mod: CPTII,S$GLB,, | Performed by: PHYSICIAN ASSISTANT

## 2019-08-16 PROCEDURE — 85652 RBC SED RATE AUTOMATED: CPT

## 2019-08-16 PROCEDURE — 3008F PR BODY MASS INDEX (BMI) DOCUMENTED: ICD-10-PCS | Mod: CPTII,S$GLB,, | Performed by: PHYSICIAN ASSISTANT

## 2019-08-16 PROCEDURE — 36415 COLL VENOUS BLD VENIPUNCTURE: CPT

## 2019-08-16 PROCEDURE — 99999 PR PBB SHADOW E&M-EST. PATIENT-LVL IV: ICD-10-PCS | Mod: PBBFAC,,, | Performed by: PHYSICIAN ASSISTANT

## 2019-08-16 PROCEDURE — 99213 OFFICE O/P EST LOW 20 MIN: CPT | Mod: S$GLB,,, | Performed by: PHYSICIAN ASSISTANT

## 2019-08-16 PROCEDURE — 85025 COMPLETE CBC W/AUTO DIFF WBC: CPT

## 2019-08-16 PROCEDURE — 86140 C-REACTIVE PROTEIN: CPT

## 2019-08-16 PROCEDURE — 3079F PR MOST RECENT DIASTOLIC BLOOD PRESSURE 80-89 MM HG: ICD-10-PCS | Mod: CPTII,S$GLB,, | Performed by: PHYSICIAN ASSISTANT

## 2019-08-16 PROCEDURE — 3079F DIAST BP 80-89 MM HG: CPT | Mod: CPTII,S$GLB,, | Performed by: PHYSICIAN ASSISTANT

## 2019-08-16 PROCEDURE — 82306 VITAMIN D 25 HYDROXY: CPT

## 2019-08-16 PROCEDURE — 82607 VITAMIN B-12: CPT

## 2019-08-16 RX ORDER — CYCLOBENZAPRINE HCL 5 MG
5 TABLET ORAL 3 TIMES DAILY PRN
Qty: 30 TABLET | Refills: 3 | Status: SHIPPED | OUTPATIENT
Start: 2019-08-16 | End: 2019-08-16

## 2019-08-16 RX ORDER — CYCLOBENZAPRINE HCL 5 MG
5 TABLET ORAL 3 TIMES DAILY PRN
Qty: 30 TABLET | Refills: 3 | Status: SHIPPED | OUTPATIENT
Start: 2019-08-16 | End: 2019-10-06 | Stop reason: SDUPTHER

## 2019-08-16 NOTE — PROGRESS NOTES
Subjective:       Patient ID: Josefina Blue is a 59 y.o. female.    Chief Complaint: Spasms (full body cramping, all day, worst at night, constant throughout day and night ) and Follow-up (labs, previous abnormal labs)    Patient presents with complaints of muscle spasms that occur over all areas of her body at random times for the last 2 months..  She states it happens frequently during the day as well as at night.  She was for example schooling through her cell phone and had a muscle spasm in the base of her thumb.  She also has frequent muscle spasms in the front of her shins even while at rest.  She thought this may be due to her cholesterol medication and stop the medication 10 days ago but actually notes worsening of the symptoms.  She does take vitamin D with calcium and magnesium every day.  She was given some Celebrex thinking it may be due to her back but this has not helped the symptoms at all.  She would like some blood work checked and rule out any organic reason for the muscle spasms.  She denies any recent travel, new over-the-counter medications, changes in any of her current medications, or other symptoms.  She denies any unexplained weight loss or any exposure to heavy metals.    Review of Systems   Constitutional: Positive for activity change and fatigue. Negative for appetite change, fever and unexpected weight change.   HENT: Negative for congestion, dental problem, hearing loss, postnasal drip, rhinorrhea, sinus pressure and trouble swallowing.    Eyes: Negative for photophobia, discharge and visual disturbance.   Respiratory: Negative for cough, chest tightness, shortness of breath and wheezing.    Cardiovascular: Negative for chest pain, palpitations and leg swelling.   Gastrointestinal: Negative for abdominal pain, blood in stool, constipation, diarrhea, nausea and vomiting.   Genitourinary: Negative for difficulty urinating, dyspareunia, dysuria, hematuria, menstrual problem, pelvic pain,  vaginal bleeding, vaginal discharge and vaginal pain.   Musculoskeletal: Positive for myalgias. Negative for arthralgias, back pain, gait problem, joint swelling, neck pain and neck stiffness.   Skin: Negative for color change and rash.   Neurological: Negative for dizziness, tremors, seizures, syncope, facial asymmetry, speech difficulty, weakness, light-headedness, numbness and headaches.   Hematological: Does not bruise/bleed easily.   Psychiatric/Behavioral: Negative.  Negative for sleep disturbance and suicidal ideas. The patient is not nervous/anxious.        Objective:      Physical Exam   Constitutional: She is oriented to person, place, and time. She appears well-developed and well-nourished.   HENT:   Head: Normocephalic and atraumatic.   Eyes: Pupils are equal, round, and reactive to light. Conjunctivae are normal.   Neck: Normal range of motion. Neck supple. No JVD present.   Cardiovascular: Normal rate and regular rhythm. Exam reveals no gallop and no friction rub.   No murmur heard.  Pulmonary/Chest: Effort normal and breath sounds normal. No respiratory distress. She has no wheezes. She has no rales.   Abdominal: Soft.   Neurological: She is alert and oriented to person, place, and time.   Skin: Skin is warm and dry.   Psychiatric: She has a normal mood and affect. Her behavior is normal. Judgment and thought content normal.       Assessment:       1. Muscle spasm    2. Weakness    3. IFG (impaired fasting glucose)    4. Neuropathic pain        Plan:     Josefina was seen today for spasms and follow-up.    Diagnoses and all orders for this visit:    Muscle spasm  -     CBC auto differential; Future  -     Comprehensive metabolic panel; Future  -     TSH; Future  -     Magnesium; Future  -     Vitamin D; Future  -     Vitamin B12; Future  -     C-reactive protein; Future  -     Sedimentation rate; Future  -     Iron and TIBC; Future  -     Discontinue: cyclobenzaprine (FLEXERIL) 5 MG tablet; Take 1  tablet (5 mg total) by mouth 3 (three) times daily as needed for Muscle spasms.  -     cyclobenzaprine (FLEXERIL) 5 MG tablet; Take 1 tablet (5 mg total) by mouth 3 (three) times daily as needed for Muscle spasms.    Weakness  -     TSH; Future  -     Magnesium; Future  -     Iron and TIBC; Future    IFG (impaired fasting glucose)  -     Hemoglobin A1c; Future    Neuropathic pain  -     Magnesium; Future  -     C-reactive protein; Future  -     Sedimentation rate; Future  -     Iron and TIBC; Future     We will start with blood work and a trial of Flexeril.  Will call patient once results are known.  If all blood work is negative may refer to Physical Medicine or Neurology for further evaluation

## 2019-08-19 ENCOUNTER — PATIENT MESSAGE (OUTPATIENT)
Dept: INTERNAL MEDICINE | Facility: CLINIC | Age: 59
End: 2019-08-19

## 2019-08-19 RX ORDER — ZOLPIDEM TARTRATE 5 MG/1
TABLET ORAL
Qty: 30 TABLET | Refills: 5 | Status: SHIPPED | OUTPATIENT
Start: 2019-08-19 | End: 2020-05-20

## 2019-08-23 ENCOUNTER — HOSPITAL ENCOUNTER (OUTPATIENT)
Dept: RADIOLOGY | Facility: HOSPITAL | Age: 59
Discharge: HOME | End: 2019-08-23
Attending: STUDENT IN AN ORGANIZED HEALTH CARE EDUCATION/TRAINING PROGRAM
Payer: COMMERCIAL

## 2019-08-23 ENCOUNTER — PATIENT MESSAGE (OUTPATIENT)
Dept: INTERNAL MEDICINE | Facility: CLINIC | Age: 59
End: 2019-08-23

## 2019-08-23 ENCOUNTER — OFFICE VISIT (OUTPATIENT)
Dept: SPORTS MEDICINE | Facility: CLINIC | Age: 59
End: 2019-08-23
Payer: COMMERCIAL

## 2019-08-23 VITALS — DIASTOLIC BLOOD PRESSURE: 87 MMHG | TEMPERATURE: 98 F | SYSTOLIC BLOOD PRESSURE: 133 MMHG | HEART RATE: 72 BPM

## 2019-08-23 DIAGNOSIS — G89.29 CHRONIC PAIN OF LEFT KNEE: Primary | ICD-10-CM

## 2019-08-23 DIAGNOSIS — G89.29 CHRONIC BILATERAL LOW BACK PAIN WITHOUT SCIATICA: ICD-10-CM

## 2019-08-23 DIAGNOSIS — M54.50 CHRONIC BILATERAL LOW BACK PAIN WITHOUT SCIATICA: ICD-10-CM

## 2019-08-23 DIAGNOSIS — M25.562 CHRONIC PAIN OF LEFT KNEE: Primary | ICD-10-CM

## 2019-08-23 DIAGNOSIS — M79.2 NEUROPATHIC PAIN: ICD-10-CM

## 2019-08-23 PROCEDURE — 3079F PR MOST RECENT DIASTOLIC BLOOD PRESSURE 80-89 MM HG: ICD-10-PCS | Mod: CPTII,S$GLB,, | Performed by: ORTHOPAEDIC SURGERY

## 2019-08-23 PROCEDURE — 99214 PR OFFICE/OUTPT VISIT, EST, LEVL IV, 30-39 MIN: ICD-10-PCS | Mod: S$GLB,,, | Performed by: ORTHOPAEDIC SURGERY

## 2019-08-23 PROCEDURE — 99214 OFFICE O/P EST MOD 30 MIN: CPT | Mod: S$GLB,,, | Performed by: ORTHOPAEDIC SURGERY

## 2019-08-23 PROCEDURE — 99999 PR PBB SHADOW E&M-EST. PATIENT-LVL III: CPT | Mod: PBBFAC,,, | Performed by: ORTHOPAEDIC SURGERY

## 2019-08-23 PROCEDURE — 72100 X-RAY EXAM L-S SPINE 2/3 VWS: CPT | Mod: 26,,, | Performed by: RADIOLOGY

## 2019-08-23 PROCEDURE — 3079F DIAST BP 80-89 MM HG: CPT | Mod: CPTII,S$GLB,, | Performed by: ORTHOPAEDIC SURGERY

## 2019-08-23 PROCEDURE — 72100 XR LUMBAR SPINE AP AND LATERAL: ICD-10-PCS | Mod: 26,,, | Performed by: RADIOLOGY

## 2019-08-23 PROCEDURE — 3075F SYST BP GE 130 - 139MM HG: CPT | Mod: CPTII,S$GLB,, | Performed by: ORTHOPAEDIC SURGERY

## 2019-08-23 PROCEDURE — 99999 PR PBB SHADOW E&M-EST. PATIENT-LVL III: ICD-10-PCS | Mod: PBBFAC,,, | Performed by: ORTHOPAEDIC SURGERY

## 2019-08-23 PROCEDURE — 72100 X-RAY EXAM L-S SPINE 2/3 VWS: CPT | Mod: TC,FY,PO

## 2019-08-23 PROCEDURE — 3075F PR MOST RECENT SYSTOLIC BLOOD PRESS GE 130-139MM HG: ICD-10-PCS | Mod: CPTII,S$GLB,, | Performed by: ORTHOPAEDIC SURGERY

## 2019-08-23 RX ORDER — ZOLPIDEM TARTRATE 5 MG/1
TABLET ORAL
Qty: 30 TABLET | Refills: 5 | OUTPATIENT
Start: 2019-08-23

## 2019-08-23 NOTE — PROGRESS NOTES
CC: Left lower extremity pain / weakness    Josefina Blue returns to clinic for evaluation of her left lower extremity.    59 y.o. Female presents to clinic with some persistent and progressively worsening left > right lower extremity pain in her low back and upper thigh, medial thigh and adductors.  She has been trying to rehab her quads and hips for her degenerative knees and since then has had this progressive pain and weakness of her leg.    Is affecting ADLs.  Pain is 4/10 at it's worst.    REVIEW OF SYSTEMS:  Constitution: Negative. Negative for chills, fever and night sweats.   HENT: Negative for congestion and headaches.    Eyes: Negative for blurred vision, left vision loss and right vision loss.   Cardiovascular: Negative for chest pain and syncope.   Respiratory: Negative for cough and shortness of breath.    Endocrine: Negative for polydipsia, polyphagia and polyuria.   Hematologic/Lymphatic: Negative for bleeding problem. Does not bruise/bleed easily.   Skin: Negative for dry skin, itching and rash.   Musculoskeletal: Negative for falls. Positive for left > right lower extremity pain and muscle weakness.   Gastrointestinal: Negative for abdominal pain and bowel incontinence.   Genitourinary: Negative for bladder incontinence and nocturia.   Neurological: Negative for disturbances in coordination, loss of balance and seizures.   Psychiatric/Behavioral: Negative for depression. The patient does not have insomnia.    Allergic/Immunologic: Negative for hives and persistent infections.     PAST MEDICAL HISTORY:    Past Medical History:   Diagnosis Date    Hyperlipidemia 8/31/2015    Hypertension     IFG (impaired fasting glucose) 8/31/2015    Neuropathic pain 8/31/2015    Squamous cell cancer of tongue 2012       PAST SURGICAL HISTORY:   Past Surgical History:   Procedure Laterality Date    CHOLECYSTECTOMY      CHOLECYSTECTOMY-LAPAROSCOPIC W CHOLANGIOGRAM N/A 6/8/2016    Performed by Dino GROSSMAN  [facility] [place] [facility] OR 2ND [facility]    EXCISION-LESION-TONGUE  4/9/2018    [facility] OR 2ND [facility]    GALLBLADDER SURGERY  06/2016    HYSTERECTOMY      KNEE ARTHROSCOPY Right     for torn meniscus    LARYNGOSCOPY BRONCHOSCOPY-DIRECT N/A 4/9/2018    [facility] OR 2ND [facility]    TONGUE SURGERY         FAMILY HISTORY:   Family History   Problem Relation Age of Onset    Alcohol abuse Mother     Cirrhosis Mother     Cancer Father 74        prostate, throat, lung- smoker    Hyperlipidemia Father     Diabetes Sister     Hypertension Sister     Drug abuse Brother     Heart disease Brother     Hyperlipidemia Brother     No Known Problems Maternal Aunt     No Known Problems Maternal Uncle     No Known Problems Paternal Aunt     No Known Problems Paternal Uncle     No Known Problems Maternal Grandmother     No Known Problems Maternal Grandfather     No Known Problems Paternal Grandmother     No Known Problems Paternal Grandfather     Amblyopia Neg Hx     Blindness Neg Hx     Cataracts Neg Hx     Glaucoma Neg Hx     Macular degeneration Neg Hx     Retinal detachment Neg Hx     Strabismus Neg Hx     Stroke Neg Hx     Thyroid disease Neg Hx        SOCIAL HISTORY:   Social History     Socioeconomic History    Marital status:      Spouse name: Not on file    Number of children: Not on file    Years of education: Not on file    Highest education level: Not on file   Occupational History    Occupation: chief nursing officer   Social Needs    Financial resource strain: Not on file    Food insecurity:     Worry: Not on file     Inability: Not on file    Transportation needs:     Medical: Not on file     Non-medical: Not on file   Tobacco Use    Smoking status: Never Smoker    Smokeless tobacco: Never Used   Substance and Sexual Activity    Alcohol use: Yes     Alcohol/week: 0.0 oz     Comment: occ    Drug use: No    Sexual activity:  Yes     Birth control/protection: None   Lifestyle    Physical activity:     Days per week: Not on file     Minutes per session: Not on file    Stress: Not on file   Relationships    Social connections:     Talks on phone: Not on file     Gets together: Not on file     Attends Caodaism service: Not on file     Active member of club or organization: Not on file     Attends meetings of clubs or organizations: Not on file     Relationship status: Not on file   Other Topics Concern    Are you pregnant or think you may be? Not Asked    Breast-feeding Not Asked   Social History Narrative    Lives with  and 2 dogs       MEDICATIONS:     Current Outpatient Medications:     ALPRAZolam (XANAX) 0.25 MG tablet, Take 1 tablet (0.25 mg total) by mouth nightly as needed., Disp: 30 tablet, Rfl: 2    aspirin (ECOTRIN) 81 MG EC tablet, Take 1 tablet (81 mg total) by mouth nightly., Disp: , Rfl: 0    celecoxib (CELEBREX) 200 MG capsule, Take 1 capsule (200 mg total) by mouth 2 (two) times daily., Disp: 60 capsule, Rfl: 2    clobetasol (TEMOVATE) 0.05 % external solution, Use one to two times daily as needed for scaling or itching to scalp, Disp: 60 mL, Rfl: 3    cyclobenzaprine (FLEXERIL) 5 MG tablet, Take 1 tablet (5 mg total) by mouth 3 (three) times daily as needed for Muscle spasms., Disp: 30 tablet, Rfl: 3    diclofenac sodium (VOLTAREN) 1 % Gel, Apply 2 g topically 4 (four) times daily. for 10 days, Disp: 1 Tube, Rfl: 2    estradiol (ESTRACE) 0.01 % (0.1 mg/gram) vaginal cream, Place 1 g vaginally once daily., Disp: 42.5 g, Rfl: 0    irbesartan (AVAPRO) 75 MG tablet, Take 1 tablet (75 mg total) by mouth every evening., Disp: 90 tablet, Rfl: 3    pravastatin (PRAVACHOL) 20 MG tablet, Take 1 tablet (20 mg total) by mouth once daily., Disp: 90 tablet, Rfl: 3    tretinoin (RETIN-A) 0.05 % cream, Apply topically nightly. Apply pea-sized amount to face., Disp: 45 g, Rfl: 2    triamterene-hydrochlorothiazide  37.5-25 mg (MAXZIDE-25) 37.5-25 mg per tablet, Take 1 tablet by mouth once daily., Disp: 90 tablet, Rfl: 3    zolpidem (AMBIEN) 5 MG Tab, Take one tablet by mouth every evening, Disp: 30 tablet, Rfl: 5    ALLERGIES:   Review of patient's allergies indicates:   Allergen Reactions    Aspirin Nausea Only     Can take low dose of Aspirin    Codeine Nausea Only     Can take Codeine if she takes a dose of Zofran with it       VITAL SIGNS:   /87   Pulse 72   Temp 97.6 °F (36.4 °C)      PHYSICAL EXAMINATION  General:  The patient is alert and oriented x 3.  Mood is pleasant.  Observation of ears, eyes and nose reveal no gross abnormalities.  No labored breathing observed.    LEFT KNEE EXAMINATION     OBSERVATION / INSPECTION   Gait:   Nonantalgic   Alignment:  Neutral   Scars:   None   Muscle atrophy: Mild  Effusion:  None   Warmth:  None   Discoloration:   none     TENDERNESS / CREPITUS (T / C):          T / C      T / C   Patella   - / -   Lateral joint line   - / -   Peripatellar medial  -  Medial joint line    - / -   Peripatellar lateral -  Medial plica   - / -   Patellar tendon -   Popliteal fossa   - / -   Quad tendon   -   Gastrocnemius   -   Prepatellar Bursa - / -   Quadricep   -   Tibial tubercle  -  Thigh/hamstring  -   Pes anserine/HS -  Fibula    -   Distal ITB  + / -  Tibia     -   Tib/fib joint  - / -  LCL    -     MFC   - / -   MCL: Proximal  -    LFC   - / -    Distal   -          ROM: (* = pain)  PASSIVE   ACTIVE    Left :   5 / 0 / 130   5 / 0 / 130     Right :    5 / 0 / 130   5 / 0 / 130    Patellofemoral examination:  See above noted areas of tenderness.   Patella position    Subluxation / dislocation: Centered           Sup. / Inf;   Normal   Crepitus (PF):    Absent   Patellar Mobility:       Medial-lateral:   Normal    Superior-inferior:  Normal    Inferior tilt   Normal    Patellar tendon:  Normal   Lateral tilt:    Normal   J-sign:     None   Patellofemoral grind:   No pain        MENISCAL SIGNS:     Pain on terminal extension:  +  Pain on terminal flexion:  +  Yosis maneuver:  -   Squat     -     LIGAMENT EXAMINATION:  ACL / Lachman:  normal (-1 to 2mm)    PCL-Post.  drawer: normal 0 to 2mm  MCL- Valgus:  normal 0 to 2mm  LCL- Varus:  normal 0 to 2mm  Pivot shift: normal (Equal)   Dial Test: difference c/w other side   At 30° flexion: normal (< 5°)    At 90° flexion: normal (< 5°)   Reverse Pivot Shift:   normal (Equal)     STRENGTH: (* = with pain) PAINFUL SIDE   Quadricep   5/5   Hamstrin/5    EXTREMITY NEURO-VASCULAR EXAMINATION:   Sensation:  Grossly intact to light touch all dermatomal regions.   Motor Function: 4/5 quad and adductor strength left leg.    DTRs;  quadriceps and  achilles 2+.  No clonus and downgoing Babinski.    Vascular status:  DP and PT pulses 2+, brisk capillary refill, symmetric.   Tender   Other findings:    XRAY (L spine) today:   Multi level degenerative changes in lumbar spine with osteophyte formation, disc space narrowing and neuro foraminal narrowing.      ASSESSMENT:    Left > right lower extremity pain and weakness  Radiculopathy  Multi-level lumbar degenerative spondylosis    PLAN:   1. MRI L-spine to evaluate for HNP and/or degenerative nerve root impingement causing radiculopathy    All questions were answered, pt will contact us for questions or concerns in the interim.

## 2019-08-29 ENCOUNTER — HOSPITAL ENCOUNTER (OUTPATIENT)
Dept: RADIOLOGY | Facility: OTHER | Age: 59
Discharge: HOME | End: 2019-08-29
Attending: STUDENT IN AN ORGANIZED HEALTH CARE EDUCATION/TRAINING PROGRAM
Payer: COMMERCIAL

## 2019-08-29 DIAGNOSIS — G89.29 CHRONIC BILATERAL LOW BACK PAIN WITHOUT SCIATICA: ICD-10-CM

## 2019-08-29 DIAGNOSIS — M54.50 CHRONIC BILATERAL LOW BACK PAIN WITHOUT SCIATICA: ICD-10-CM

## 2019-08-31 ENCOUNTER — HOSPITAL ENCOUNTER (OUTPATIENT)
Dept: RADIOLOGY | Facility: OTHER | Age: 59
Discharge: HOME | End: 2019-08-31
Attending: STUDENT IN AN ORGANIZED HEALTH CARE EDUCATION/TRAINING PROGRAM
Payer: COMMERCIAL

## 2019-08-31 PROCEDURE — 72148 MRI LUMBAR SPINE WITHOUT CONTRAST: ICD-10-PCS | Mod: 26,,, | Performed by: RADIOLOGY

## 2019-08-31 PROCEDURE — 72148 MRI LUMBAR SPINE W/O DYE: CPT | Mod: 26,,, | Performed by: RADIOLOGY

## 2019-08-31 PROCEDURE — 72148 MRI LUMBAR SPINE W/O DYE: CPT | Mod: TC

## 2019-09-16 ENCOUNTER — TELEPHONE (OUTPATIENT)
Dept: ORTHOPEDICS | Facility: CLINIC | Age: 59
End: 2019-09-16

## 2019-09-16 DIAGNOSIS — M51.36 DDD (DEGENERATIVE DISC DISEASE), LUMBAR: Primary | ICD-10-CM

## 2019-09-17 ENCOUNTER — HOSPITAL ENCOUNTER (OUTPATIENT)
Dept: RADIOLOGY | Facility: HOSPITAL | Age: 59
Discharge: HOME | End: 2019-09-17
Attending: PHYSICIAN ASSISTANT
Payer: COMMERCIAL

## 2019-09-17 ENCOUNTER — INITIAL CONSULT (OUTPATIENT)
Dept: ORTHOPEDICS | Facility: CLINIC | Age: 59
End: 2019-09-17
Payer: COMMERCIAL

## 2019-09-17 VITALS — BODY MASS INDEX: 33.03 KG/M2 | HEIGHT: 69 IN | WEIGHT: 223 LBS

## 2019-09-17 DIAGNOSIS — M51.36 DDD (DEGENERATIVE DISC DISEASE), LUMBAR: ICD-10-CM

## 2019-09-17 DIAGNOSIS — M43.16 SPONDYLOLISTHESIS OF LUMBAR REGION: ICD-10-CM

## 2019-09-17 DIAGNOSIS — M48.062 SPINAL STENOSIS OF LUMBAR REGION WITH NEUROGENIC CLAUDICATION: Primary | ICD-10-CM

## 2019-09-17 PROCEDURE — 72120 X-RAY BEND ONLY L-S SPINE: CPT | Mod: 26,,, | Performed by: RADIOLOGY

## 2019-09-17 PROCEDURE — 3008F BODY MASS INDEX DOCD: CPT | Mod: CPTII,S$GLB,, | Performed by: PHYSICIAN ASSISTANT

## 2019-09-17 PROCEDURE — 99999 PR PBB SHADOW E&M-EST. PATIENT-LVL III: CPT | Mod: PBBFAC,,, | Performed by: PHYSICIAN ASSISTANT

## 2019-09-17 PROCEDURE — 99204 OFFICE O/P NEW MOD 45 MIN: CPT | Mod: S$GLB,,, | Performed by: PHYSICIAN ASSISTANT

## 2019-09-17 PROCEDURE — 72120 XR LUMBAR SPINE FLEXION AND EXTENSION ONLY: ICD-10-PCS | Mod: 26,,, | Performed by: RADIOLOGY

## 2019-09-17 PROCEDURE — 99204 PR OFFICE/OUTPT VISIT, NEW, LEVL IV, 45-59 MIN: ICD-10-PCS | Mod: S$GLB,,, | Performed by: PHYSICIAN ASSISTANT

## 2019-09-17 PROCEDURE — 99999 PR PBB SHADOW E&M-EST. PATIENT-LVL III: ICD-10-PCS | Mod: PBBFAC,,, | Performed by: PHYSICIAN ASSISTANT

## 2019-09-17 PROCEDURE — 72120 X-RAY BEND ONLY L-S SPINE: CPT | Mod: TC

## 2019-09-17 PROCEDURE — 3008F PR BODY MASS INDEX (BMI) DOCUMENTED: ICD-10-PCS | Mod: CPTII,S$GLB,, | Performed by: PHYSICIAN ASSISTANT

## 2019-09-17 NOTE — H&P (VIEW-ONLY)
"DATE: 9/17/2019  PATIENT: Josefina Blue    Supervising Physician: Jonathan Saul M.D.    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  The pain has been present since march.  She was seeing Dr. Noriega for her knee but then her back started hurting more so he referred her here.  The patient describes the pain as "reverberating."  The pain is worse with climbing stairs and lying flat and improved by sitting and at night.  She reports cramping in her legs at night.  The cramping is in the medial and lateral leg and only occurs at night.  There is no associated numbness and tingling. There is no subjective weakness. Prior treatments have included celebrex, flexeril, voltaren gel, heat, and physical therapy for her knee but no ESIs or surgery.    The patient denies myelopathic symptoms such as handwriting changes or difficulty with buttons/coins/keys. Denies perineal paresthesias, bowel/bladder dysfunction.    PAST MEDICAL/SURGICAL HISTORY:  Past Medical History:   Diagnosis Date    Hyperlipidemia 8/31/2015    Hypertension     IFG (impaired fasting glucose) 8/31/2015    Neuropathic pain 8/31/2015    Squamous cell cancer of tongue 2012     Past Surgical History:   Procedure Laterality Date    CHOLECYSTECTOMY      CHOLECYSTECTOMY-LAPAROSCOPIC W CHOLANGIOGRAM N/A 6/8/2016    Performed [facility] OR 2ND FLR    EXCISION-LESION-TONGUE  4/9/2018    Per[facility] OR 2ND FLR    GALLBLADDER SURGERY  06/2016    HYSTERECTOMY      KNEE ARTHROSCOPY Right     for torn meniscus    LARYNGOSCOPY BRONCHOSCOPY-DIRECT N/A 4/9/2018    Performed [facility] OR 2ND [facility]    TONGUE SURGERY         Medications:   Current Outpatient Medications on File Prior to Visit "   Medication Sig Dispense Refill    ALPRAZolam (XANAX) 0.25 MG tablet Take 1 tablet (0.25 mg total) by mouth nightly as needed. 30 tablet 2    aspirin (ECOTRIN) 81 MG EC tablet Take 1 tablet (81 mg total) by mouth nightly.  0    celecoxib (CELEBREX) 200 MG capsule Take 1 capsule (200 mg total) by mouth 2 (two) times daily. 60 capsule 2    clobetasol (TEMOVATE) 0.05 % external solution Use one to two times daily as needed for scaling or itching to scalp 60 mL 3    cyclobenzaprine (FLEXERIL) 5 MG tablet Take 1 tablet (5 mg total) by mouth 3 (three) times daily as needed for Muscle spasms. 30 tablet 3    estradiol (ESTRACE) 0.01 % (0.1 mg/gram) vaginal cream Place 1 g vaginally once daily. 42.5 g 0    irbesartan (AVAPRO) 75 MG tablet Take 1 tablet (75 mg total) by mouth every evening. 90 tablet 3    pravastatin (PRAVACHOL) 20 MG tablet Take 1 tablet (20 mg total) by mouth once daily. 90 tablet 3    tretinoin (RETIN-A) 0.05 % cream Apply topically nightly. Apply pea-sized amount to face. 45 g 2    triamterene-hydrochlorothiazide 37.5-25 mg (MAXZIDE-25) 37.5-25 mg per tablet Take 1 tablet by mouth once daily. 90 tablet 3    zolpidem (AMBIEN) 5 MG Tab Take one tablet by mouth every evening 30 tablet 5    diclofenac sodium (VOLTAREN) 1 % Gel Apply 2 g topically 4 (four) times daily. for 10 days 1 Tube 2     No current facility-administered medications on file prior to visit.        Social History:   Social History     Socioeconomic History    Marital status:      Spouse name: Not on file    Number of children: Not on file    Years of education: Not on file    Highest education level: Not on file   Occupational History    Occupation: chief nursing officer   Social Needs    Financial resource strain: Not on file    Food insecurity:     Worry: Not on file     Inability: Not on file    Transportation needs:     Medical: Not on file     Non-medical: Not on file   Tobacco Use    Smoking status: Never  "Smoker    Smokeless tobacco: Never Used   Substance and Sexual Activity    Alcohol use: Yes     Alcohol/week: 0.0 oz     Comment: occ    Drug use: No    Sexual activity: Yes     Birth control/protection: None   Lifestyle    Physical activity:     Days per week: Not on file     Minutes per session: Not on file    Stress: Not on file   Relationships    Social connections:     Talks on phone: Not on file     Gets together: Not on file     Attends Rastafari service: Not on file     Active member of club or organization: Not on file     Attends meetings of clubs or organizations: Not on file     Relationship status: Not on file   Other Topics Concern    Are you pregnant or think you may be? Not Asked    Breast-feeding Not Asked   Social History Narrative    Lives with  and 2 dogs       REVIEW OF SYSTEMS:  Constitution: Negative. Negative for chills, fever and night sweats.   Cardiovascular: Negative for chest pain and syncope.   Respiratory: Negative for cough and shortness of breath.   Gastrointestinal: See HPI. Negative for nausea/vomiting. Negative for abdominal pain.  Genitourinary: See HPI. Negative for discoloration or dysuria.  Skin: Negative for dry skin, itching and rash.   Hematologic/Lymphatic: Negative for bleeding problem. Does not bruise/bleed easily.   Musculoskeletal: Negative for falls and muscle weakness.   Neurological: See HPI. No seizures.   Endocrine: Negative for polydipsia, polyphagia and polyuria.   Allergic/Immunologic: Negative for hives and persistent infections.     EXAM:  Ht 5' 9" (1.753 m)   Wt 101.2 kg (223 lb)   BMI 32.93 kg/m²     General: The patient is a very pleasant 59 y.o. female in no apparent distress, the patient is oriented to person, place and time.  Psych: Normal mood and affect  HEENT: Vision grossly intact, hearing intact to the spoken word.  Lungs: Respirations unlabored.  Gait: Normal station and gait, no difficulty with toe or heel walk.   Skin: Dorsal " lumbar skin negative for rashes, lesions, hairy patches and surgical scars. There is mild lumbar tenderness to palpation.  Range of motion: Lumbar range of motion is acceptable.  Spinal Balance: Global saggital and coronal spinal balance acceptable, not significant for scoliosis and kyphosis.  Musculoskeletal: No pain with the range of motion of the bilateral hips. No trochanteric tenderness to palpation.  Vascular: Bilateral lower extremities warm and well perfused, dorsalis pedis pulses 2+ bilaterally.  Neurological: Normal strength and tone in all major motor groups in the bilateral lower extremities. Normal sensation to light touch in the L2-S1 dermatomes bilaterally.  Deep tendon reflexes symmetric 2+ in the bilateral lower extremities.  Negative Babinski bilaterally. Straight leg raise negative bilaterally.    IMAGING:      Today I personally reviewed AP, Lat and Flex/Ex  upright L-spine films that demonstrate anterolisthesis of L4/5.  There is moderate to severe degenerative change throughout the lumbar spine.      MRI lumbar spine demonstrates a right/central disc protrusion at L1/2 resulting in moderate to severe stenosis.  There is a central disc protrusion at L4/5 resulting in severe stenosis.        Body mass index is 32.93 kg/m².    Hemoglobin A1C   Date Value Ref Range Status   08/16/2019 5.9 (H) 4.0 - 5.6 % Final     Comment:     ADA Screening Guidelines:  5.7-6.4%  Consistent with prediabetes  >or=6.5%  Consistent with diabetes  High levels of fetal hemoglobin interfere with the HbA1C  assay. Heterozygous hemoglobin variants (HbS, HgC, etc)do  not significantly interfere with this assay.   However, presence of multiple variants may affect accuracy.     04/08/2019 5.9 (H) 4.0 - 5.6 % Final     Comment:     ADA Screening Guidelines:  5.7-6.4%  Consistent with prediabetes  >or=6.5%  Consistent with diabetes  High levels of fetal hemoglobin interfere with the HbA1C  assay. Heterozygous hemoglobin  variants (HbS, HgC, etc)do  not significantly interfere with this assay.   However, presence of multiple variants may affect accuracy.     06/01/2017 5.7 4.5 - 6.2 % Final     Comment:     According to ADA guidelines, hemoglobin A1C <7.0% represents  optimal control in non-pregnant diabetic patients.  Different  metrics may apply to specific populations.   Standards of Medical Care in Diabetes - 2016.  For the purpose of screening for the presence of diabetes:  <5.7%     Consistent with the absence of diabetes  5.7-6.4%  Consistent with increasing risk for diabetes   (prediabetes)  >or=6.5%  Consistent with diabetes  Currently no consensus exists for use of hemoglobin A1C  for diagnosis of diabetes for children.         ASSESSMENT/PLAN:    Josefina was seen today for low-back pain and leg pain.    Diagnoses and all orders for this visit:    Spinal stenosis of lumbar region with neurogenic claudication  -     Procedure Order to [facility]; Future    Spondylolisthesis of lumbar region        The patient is having low back pain, buttock pain and leg cramping at night.     Today we discussed at length all of the different treatment options including anti-inflammatories, acetaminophen, rest, ice, heat, physical therapy including strengthening and stretching exercises, home exercises, ROM, aerobic conditioning, aqua therapy, other modalities including ultrasound, massage, and dry needling, epidural steroid injections and finally surgical intervention.      The patient has stenosis at L2/3 as well as L4/5.  I think likely L4/5 is her symptomatic level.  We will try an NOE at L4/5.  She will follow up with me 2 weeks after injection.      Follow up if symptoms worsen or fail to improve.

## 2019-09-17 NOTE — PROGRESS NOTES
"DATE: 9/17/2019  PATIENT: Josefina Blue    Supervising Physician: Jonathan Saul M.D.    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  The pain has been present since march.  She was seeing Dr. Noriega for her knee but then her back started hurting more so he referred her here.  The patient describes the pain as "reverberating."  The pain is worse with climbing stairs and lying flat and improved by sitting and at night.  She reports cramping in her legs at night.  The cramping is in the medial and lateral leg and only occurs at night.  There is no associated numbness and tingling. There is no subjective weakness. Prior treatments have included celebrex, flexeril, voltaren gel, heat, and physical therapy for her knee but no ESIs or surgery.    The patient denies myelopathic symptoms such as handwriting changes or difficulty with buttons/coins/keys. Denies perineal paresthesias, bowel/bladder dysfunction.    PAST MEDICAL/SURGICAL HISTORY:  Past Medical History:   Diagnosis Date    Hyperlipidemia 8/31/2015    Hypertension     IFG (impaired fasting glucose) 8/31/2015    Neuropathic pain 8/31/2015    Squamous cell cancer of tongue 2012     Past Surgical History:   Procedure Laterality Date    CHOLECYSTECTOMY      CHOLECYSTECTOMY-LAPAROSCOPIC W CHOLANGIOGRAM N/A 6/8/2016    Performed by [facility]ino [facility] at [facility] OR 2ND [facility]    EXCISION-LESION-TONGUE  4/9/2018    Performed by Azam Marroquin MD at [facility] OR 2ND [facility]    GALLBLADDER SURGERY  06/2016    HYSTERECTOMY      KNEE ARTHROSCOPY Right     for torn meniscus    LARYNGOSCOPY BRONCHOSCOPY-DIRECT N/A 4/9/2018    Performed by [facility]am [facility]quin [facility] at [facility] OR 2ND [facility]    TONGUE SURGERY         Medications:   Current Outpatient Medications on File Prior to Visit "   Medication Sig Dispense Refill    ALPRAZolam (XANAX) 0.25 MG tablet Take 1 tablet (0.25 mg total) by mouth nightly as needed. 30 tablet 2    aspirin (ECOTRIN) 81 MG EC tablet Take 1 tablet (81 mg total) by mouth nightly.  0    celecoxib (CELEBREX) 200 MG capsule Take 1 capsule (200 mg total) by mouth 2 (two) times daily. 60 capsule 2    clobetasol (TEMOVATE) 0.05 % external solution Use one to two times daily as needed for scaling or itching to scalp 60 mL 3    cyclobenzaprine (FLEXERIL) 5 MG tablet Take 1 tablet (5 mg total) by mouth 3 (three) times daily as needed for Muscle spasms. 30 tablet 3    estradiol (ESTRACE) 0.01 % (0.1 mg/gram) vaginal cream Place 1 g vaginally once daily. 42.5 g 0    irbesartan (AVAPRO) 75 MG tablet Take 1 tablet (75 mg total) by mouth every evening. 90 tablet 3    pravastatin (PRAVACHOL) 20 MG tablet Take 1 tablet (20 mg total) by mouth once daily. 90 tablet 3    tretinoin (RETIN-A) 0.05 % cream Apply topically nightly. Apply pea-sized amount to face. 45 g 2    triamterene-hydrochlorothiazide 37.5-25 mg (MAXZIDE-25) 37.5-25 mg per tablet Take 1 tablet by mouth once daily. 90 tablet 3    zolpidem (AMBIEN) 5 MG Tab Take one tablet by mouth every evening 30 tablet 5    diclofenac sodium (VOLTAREN) 1 % Gel Apply 2 g topically 4 (four) times daily. for 10 days 1 Tube 2     No current facility-administered medications on file prior to visit.        Social History:   Social History     Socioeconomic History    Marital status:      Spouse name: Not on file    Number of children: Not on file    Years of education: Not on file    Highest education level: Not on file   Occupational History    Occupation: chief nursing officer   Social Needs    Financial resource strain: Not on file    Food insecurity:     Worry: Not on file     Inability: Not on file    Transportation needs:     Medical: Not on file     Non-medical: Not on file   Tobacco Use    Smoking status: Never  "Smoker    Smokeless tobacco: Never Used   Substance and Sexual Activity    Alcohol use: Yes     Alcohol/week: 0.0 oz     Comment: occ    Drug use: No    Sexual activity: Yes     Birth control/protection: None   Lifestyle    Physical activity:     Days per week: Not on file     Minutes per session: Not on file    Stress: Not on file   Relationships    Social connections:     Talks on phone: Not on file     Gets together: Not on file     Attends Judaism service: Not on file     Active member of club or organization: Not on file     Attends meetings of clubs or organizations: Not on file     Relationship status: Not on file   Other Topics Concern    Are you pregnant or think you may be? Not Asked    Breast-feeding Not Asked   Social History Narrative    Lives with  and 2 dogs       REVIEW OF SYSTEMS:  Constitution: Negative. Negative for chills, fever and night sweats.   Cardiovascular: Negative for chest pain and syncope.   Respiratory: Negative for cough and shortness of breath.   Gastrointestinal: See HPI. Negative for nausea/vomiting. Negative for abdominal pain.  Genitourinary: See HPI. Negative for discoloration or dysuria.  Skin: Negative for dry skin, itching and rash.   Hematologic/Lymphatic: Negative for bleeding problem. Does not bruise/bleed easily.   Musculoskeletal: Negative for falls and muscle weakness.   Neurological: See HPI. No seizures.   Endocrine: Negative for polydipsia, polyphagia and polyuria.   Allergic/Immunologic: Negative for hives and persistent infections.     EXAM:  Ht 5' 9" (1.753 m)   Wt 101.2 kg (223 lb)   BMI 32.93 kg/m²     General: The patient is a very pleasant 59 y.o. female in no apparent distress, the patient is oriented to person, place and time.  Psych: Normal mood and affect  HEENT: Vision grossly intact, hearing intact to the spoken word.  Lungs: Respirations unlabored.  Gait: Normal station and gait, no difficulty with toe or heel walk.   Skin: Dorsal " lumbar skin negative for rashes, lesions, hairy patches and surgical scars. There is mild lumbar tenderness to palpation.  Range of motion: Lumbar range of motion is acceptable.  Spinal Balance: Global saggital and coronal spinal balance acceptable, not significant for scoliosis and kyphosis.  Musculoskeletal: No pain with the range of motion of the bilateral hips. No trochanteric tenderness to palpation.  Vascular: Bilateral lower extremities warm and well perfused, dorsalis pedis pulses 2+ bilaterally.  Neurological: Normal strength and tone in all major motor groups in the bilateral lower extremities. Normal sensation to light touch in the L2-S1 dermatomes bilaterally.  Deep tendon reflexes symmetric 2+ in the bilateral lower extremities.  Negative Babinski bilaterally. Straight leg raise negative bilaterally.    IMAGING:      Today I personally reviewed AP, Lat and Flex/Ex  upright L-spine films that demonstrate anterolisthesis of L4/5.  There is moderate to severe degenerative change throughout the lumbar spine.      MRI lumbar spine demonstrates a right/central disc protrusion at L1/2 resulting in moderate to severe stenosis.  There is a central disc protrusion at L4/5 resulting in severe stenosis.        Body mass index is 32.93 kg/m².    Hemoglobin A1C   Date Value Ref Range Status   08/16/2019 5.9 (H) 4.0 - 5.6 % Final     Comment:     ADA Screening Guidelines:  5.7-6.4%  Consistent with prediabetes  >or=6.5%  Consistent with diabetes  High levels of fetal hemoglobin interfere with the HbA1C  assay. Heterozygous hemoglobin variants (HbS, HgC, etc)do  not significantly interfere with this assay.   However, presence of multiple variants may affect accuracy.     04/08/2019 5.9 (H) 4.0 - 5.6 % Final     Comment:     ADA Screening Guidelines:  5.7-6.4%  Consistent with prediabetes  >or=6.5%  Consistent with diabetes  High levels of fetal hemoglobin interfere with the HbA1C  assay. Heterozygous hemoglobin  variants (HbS, HgC, etc)do  not significantly interfere with this assay.   However, presence of multiple variants may affect accuracy.     06/01/2017 5.7 4.5 - 6.2 % Final     Comment:     According to ADA guidelines, hemoglobin A1C <7.0% represents  optimal control in non-pregnant diabetic patients.  Different  metrics may apply to specific populations.   Standards of Medical Care in Diabetes - 2016.  For the purpose of screening for the presence of diabetes:  <5.7%     Consistent with the absence of diabetes  5.7-6.4%  Consistent with increasing risk for diabetes   (prediabetes)  >or=6.5%  Consistent with diabetes  Currently no consensus exists for use of hemoglobin A1C  for diagnosis of diabetes for children.         ASSESSMENT/PLAN:    Josefina was seen today for low-back pain and leg pain.    Diagnoses and all orders for this visit:    Spinal stenosis of lumbar region with neurogenic claudication  -     Procedure Order to [facility]; Future    Spondylolisthesis of lumbar region        The patient is having low back pain, buttock pain and leg cramping at night.     Today we discussed at length all of the different treatment options including anti-inflammatories, acetaminophen, rest, ice, heat, physical therapy including strengthening and stretching exercises, home exercises, ROM, aerobic conditioning, aqua therapy, other modalities including ultrasound, massage, and dry needling, epidural steroid injections and finally surgical intervention.      The patient has stenosis at L2/3 as well as L4/5.  I think likely L4/5 is her symptomatic level.  We will try an NOE at L4/5.  She will follow up with me 2 weeks after injection.      Follow up if symptoms worsen or fail to improve.

## 2019-09-18 ENCOUNTER — TELEPHONE (OUTPATIENT)
Dept: PAIN MEDICINE | Facility: CLINIC | Age: 59
End: 2019-09-18

## 2019-09-18 DIAGNOSIS — M48.062 SPINAL STENOSIS, LUMBAR REGION WITH NEUROGENIC CLAUDICATION: Primary | ICD-10-CM

## 2019-09-19 ENCOUNTER — HOSPITAL ENCOUNTER (OUTPATIENT)
Facility: OTHER | Age: 59
Discharge: HOME | End: 2019-09-19
Attending: ANESTHESIOLOGY | Admitting: ANESTHESIOLOGY
Payer: COMMERCIAL

## 2019-09-19 VITALS
TEMPERATURE: 97 F | HEART RATE: 75 BPM | WEIGHT: 215 LBS | RESPIRATION RATE: 18 BRPM | OXYGEN SATURATION: 95 % | DIASTOLIC BLOOD PRESSURE: 82 MMHG | BODY MASS INDEX: 31.84 KG/M2 | SYSTOLIC BLOOD PRESSURE: 124 MMHG | HEIGHT: 69 IN

## 2019-09-19 DIAGNOSIS — M54.16 LUMBAR RADICULOPATHY: Primary | ICD-10-CM

## 2019-09-19 DIAGNOSIS — M51.36 DDD (DEGENERATIVE DISC DISEASE), LUMBAR: ICD-10-CM

## 2019-09-19 DIAGNOSIS — G89.29 CHRONIC PAIN: ICD-10-CM

## 2019-09-19 PROCEDURE — 25500020 PHARM REV CODE 255: Performed by: ANESTHESIOLOGY

## 2019-09-19 PROCEDURE — 99152 PR MOD CONSCIOUS SEDATION, SAME PHYS, 5+ YRS, FIRST 15 MIN: ICD-10-PCS | Mod: ,,, | Performed by: ANESTHESIOLOGY

## 2019-09-19 PROCEDURE — 64483 NJX AA&/STRD TFRM EPI L/S 1: CPT | Mod: 50 | Performed by: ANESTHESIOLOGY

## 2019-09-19 PROCEDURE — 64483 PR EPIDURAL INJ, ANES/STEROID, TRANSFORAMINAL, LUMB/SACR, SNGL LEVL: ICD-10-PCS | Mod: 50,,, | Performed by: ANESTHESIOLOGY

## 2019-09-19 PROCEDURE — 64483 NJX AA&/STRD TFRM EPI L/S 1: CPT | Mod: 50,,, | Performed by: ANESTHESIOLOGY

## 2019-09-19 PROCEDURE — 63600175 PHARM REV CODE 636 W HCPCS: Performed by: STUDENT IN AN ORGANIZED HEALTH CARE EDUCATION/TRAINING PROGRAM

## 2019-09-19 PROCEDURE — 63600175 PHARM REV CODE 636 W HCPCS: Performed by: ANESTHESIOLOGY

## 2019-09-19 PROCEDURE — 99152 MOD SED SAME PHYS/QHP 5/>YRS: CPT | Mod: ,,, | Performed by: ANESTHESIOLOGY

## 2019-09-19 PROCEDURE — 25000003 PHARM REV CODE 250: Performed by: ANESTHESIOLOGY

## 2019-09-19 RX ORDER — FENTANYL CITRATE 50 UG/ML
INJECTION, SOLUTION INTRAMUSCULAR; INTRAVENOUS
Status: DISCONTINUED | OUTPATIENT
Start: 2019-09-19 | End: 2019-09-19 | Stop reason: HOSPADM

## 2019-09-19 RX ORDER — LIDOCAINE HYDROCHLORIDE 10 MG/ML
INJECTION INFILTRATION; PERINEURAL
Status: DISCONTINUED | OUTPATIENT
Start: 2019-09-19 | End: 2019-09-19 | Stop reason: HOSPADM

## 2019-09-19 RX ORDER — SODIUM CHLORIDE 9 MG/ML
500 INJECTION, SOLUTION INTRAVENOUS CONTINUOUS
Status: DISCONTINUED | OUTPATIENT
Start: 2019-09-19 | End: 2019-09-19 | Stop reason: HOSPADM

## 2019-09-19 RX ORDER — LIDOCAINE HYDROCHLORIDE 10 MG/ML
INJECTION, SOLUTION EPIDURAL; INFILTRATION; INTRACAUDAL; PERINEURAL
Status: DISCONTINUED | OUTPATIENT
Start: 2019-09-19 | End: 2019-09-19 | Stop reason: HOSPADM

## 2019-09-19 RX ORDER — DEXAMETHASONE SODIUM PHOSPHATE 100 MG/10ML
INJECTION INTRAMUSCULAR; INTRAVENOUS
Status: DISCONTINUED | OUTPATIENT
Start: 2019-09-19 | End: 2019-09-19 | Stop reason: HOSPADM

## 2019-09-19 RX ORDER — MIDAZOLAM HYDROCHLORIDE 1 MG/ML
INJECTION INTRAMUSCULAR; INTRAVENOUS
Status: DISCONTINUED | OUTPATIENT
Start: 2019-09-19 | End: 2019-09-19 | Stop reason: HOSPADM

## 2019-09-19 RX ADMIN — SODIUM CHLORIDE 500 ML: 0.9 INJECTION, SOLUTION INTRAVENOUS at 03:09

## 2019-09-19 NOTE — DISCHARGE INSTRUCTIONS
Thank you for allowing us to care for you today. You may receive a survey about the care we provided. Your feedback is valuable and helps us provide excellent care throughout the community.     Home Care Instructions for Pain Management:    1. DIET:   You may resume your normal diet today.   2. BATHING:   You may shower with luke warm water. No tub baths or anything that will soak injection sites under water for the next 24 hours.  3. DRESSING:   You may remove your bandage today.   4. ACTIVITY LEVEL:   You may resume your normal activities 24 hrs after your procedure. Nothing strenuous today.  5. MEDICATIONS:   You may resume your normal medications today. To restart blood thinners, ask your doctor.  6. DRIVING    If you have received any sedatives by mouth today, you may not drive for 12 hours.    If you have received any sedation through your IV, you may not drive for 24 hrs.   7. SPECIAL INSTRUCTIONS:   No heat to the injection site for 24 hrs including, hot bath or shower, heating pad, moist heat, or hot tubs.    Use ice pack to injection site for any pain or discomfort.  Apply ice packs for 20 minute intervals as needed.    IF you have diabetes, be sure to monitor your blood sugar more closely. IF your injection contained steroids your blood sugar levels may become higher than normal.    If you are still having pain upon discharge:  Your pain may improve over the next 48 hours. The anesthetic (numbing medication) works immediately to 48 hours. IF your injection contained a steroid (anti-inflammatory medication), it takes approximately 3 days to start feeling relief and 7-10 days to see your greatest results from the medication. It is possible you may need subsequent injections. This would be discussed at your follow up appointment with pain management or your referring doctor.    Please call the PAIN MANAGEMENT office at [phone] or ON CALL pager at [phone] if you experienced any:   -Weakness or  loss of sensation  -Fever > 101.5  -Pain uncontrolled with oral medications   -Persistent nausea, vomiting, or diarrhea  -Redness or drainage from the injection sites, or any other worrisome concerns.   If physician on call was not reached or could not communicate with our office for any reason please go to the nearest emergency department.  Adult Procedural Sedation Instructions    Recovery After Procedural Sedation (Adult)  You have been given medicine by vein to make you sleep during your surgery. This may have included both a pain medicine and sleeping medicine. Most of the effects have worn off. But you may still have some drowsiness for the next 6 to 8 hours.  Home care  Follow these guidelines when you get home:  · For the next 8 hours, you should be watched by a responsible adult. This person should make sure your condition is not getting worse.  · Don't drink any alcohol for the next 24 hours.  · Don't drive, operate dangerous machinery, or make important business or personal decisions during the next 24 hours.  Note: Your healthcare provider may tell you not to take any medicine by mouth for pain or sleep in the next 4 hours. These medicines may react with the medicines you were given in the hospital. This could cause a much stronger response than usual.  Follow-up care  Follow up with your healthcare provider if you are not alert and back to your usual level of activity within 12 hours.  When to seek medical advice  Call your healthcare provider right away if any of these occur:  · Drowsiness gets worse  · Weakness or dizziness gets worse  · Repeated vomiting  · You can't be awakened   Date Last Reviewed: 10/18/2016  © 9673-3788 [email or web address]. [address] [place] [address], [place] [address]. All rights reserved. This information is not intended as a substitute for professional medical care. Always follow your healthcare professional's instructions.

## 2019-09-19 NOTE — INTERVAL H&P NOTE
The patient has been examined and the H&P has been reviewed:    I concur with the findings and no changes have occurred since H&P was written. We will do L5 level since her symptoms are dorsal    Anesthesia/Surgery risks, benefits and alternative options discussed and understood by patient/family.          Active Hospital Problems    Diagnosis  POA    Chronic pain [G89.29]  Yes      Resolved Hospital Problems   No resolved problems to display.

## 2019-09-19 NOTE — DISCHARGE SUMMARY
Discharge Note  Short Stay      SUMMARY     Admit Date: 9/19/2019    Attending Physician: Tim Kerns      Discharge Physician: Tmi Kerns      Discharge Date: 9/19/2019 4:22 PM    Procedure(s) (LRB):  Injection,steroid,epidural,transforaminal approach LUMBAR TRANSFORAMINAL BILATERAL L4/5 TF NOE (Bilateral)    Final Diagnosis: Spinal stenosis, lumbar region with neurogenic claudication [M48.062]    Disposition: Home or self care    Patient Instructions:   Current Discharge Medication List      CONTINUE these medications which have NOT CHANGED    Details   ALPRAZolam (XANAX) 0.25 MG tablet Take 1 tablet (0.25 mg total) by mouth nightly as needed.  Qty: 30 tablet, Refills: 2    Associated Diagnoses: Insomnia, unspecified type; Situational anxiety      aspirin (ECOTRIN) 81 MG EC tablet Take 1 tablet (81 mg total) by mouth nightly.  Refills: 0      celecoxib (CELEBREX) 200 MG capsule Take 1 capsule (200 mg total) by mouth 2 (two) times daily.  Qty: 60 capsule, Refills: 2    Associated Diagnoses: Left knee pain, unspecified chronicity; Degenerative arthritis of left knee; IT band syndrome; Low back pain      clobetasol (TEMOVATE) 0.05 % external solution Use one to two times daily as needed for scaling or itching to scalp  Qty: 60 mL, Refills: 3    Associated Diagnoses: Seborrheic dermatitis      cyclobenzaprine (FLEXERIL) 5 MG tablet Take 1 tablet (5 mg total) by mouth 3 (three) times daily as needed for Muscle spasms.  Qty: 30 tablet, Refills: 3    Associated Diagnoses: Muscle spasm      diclofenac sodium (VOLTAREN) 1 % Gel Apply 2 g topically 4 (four) times daily. for 10 days  Qty: 1 Tube, Refills: 2    Associated Diagnoses: Left knee pain, unspecified chronicity; Degenerative arthritis of left knee; IT band syndrome; Low back pain      estradiol (ESTRACE) 0.01 % (0.1 mg/gram) vaginal cream Place 1 g vaginally once daily.  Qty: 42.5 g, Refills: 0    Comments: BIN#: [id number] PCN# [id number] GRP# [id number] ID#  [id number]  Associated Diagnoses: Female dyspareunia; Vaginal dryness, menopausal      irbesartan (AVAPRO) 75 MG tablet Take 1 tablet (75 mg total) by mouth every evening.  Qty: 90 tablet, Refills: 3    Associated Diagnoses: Essential hypertension      pravastatin (PRAVACHOL) 20 MG tablet Take 1 tablet (20 mg total) by mouth once daily.  Qty: 90 tablet, Refills: 3    Associated Diagnoses: Hyperlipidemia, unspecified hyperlipidemia type      tretinoin (RETIN-A) 0.05 % cream Apply topically nightly. Apply pea-sized amount to face.  Qty: 45 g, Refills: 2    Associated Diagnoses: Acne vulgaris      triamterene-hydrochlorothiazide 37.5-25 mg (MAXZIDE-25) 37.5-25 mg per tablet Take 1 tablet by mouth once daily.  Qty: 90 tablet, Refills: 3    Associated Diagnoses: Benign essential hypertension      zolpidem (AMBIEN) 5 MG Tab Take one tablet by mouth every evening  Qty: 30 tablet, Refills: 5                 Discharge Diagnosis: Spinal stenosis, lumbar region with neurogenic claudication [M48.062]  Condition on Discharge: Stable with no complications to procedure   Diet on Discharge: Same as before.  Activity: as per instruction sheet.  Discharge to: Home with a responsible adult.  Follow up: 2-4 weeks       Please call my office or pager at [phone] if experienced any weakness or loss of sensation, fever > 101.5, pain uncontrolled with oral medications, persistent nausea/vomiting/or diarrhea, redness or drainage from the incisions, or any other worrisome concerns. If physician on call was not reached or could not communicate with our office for any reason please go to the nearest emergency department

## 2019-09-19 NOTE — OP NOTE
Date of Service: 09/19/2019    PCP: Jo-Ann Collins MD    Referring Physician:    Time-out taken to identify patient and procedure side prior to starting the procedure.   I attest that I have reviewed the patient's home medications prior to the procedure and no contraindication have been identified. I  re-evaluated the patient after the patient was positioned for the procedure in the procedure room immediately before the procedural time-out. The vital signs are current and represent the current state of the patient which has not significantly changed since the preprocedure assessment.                                                           PROCEDURE: Bilateral L5 transforaminal epidural steroid injection under fluoroscopy    REASON FOR PROCEDURE: Bilateral Spinal stenosis, lumbar region with neurogenic claudication [M48.062]  1. Lumbar radiculopathy    2. DDD (degenerative disc disease), lumbar    3. Chronic pain      POSTPROCEDURE DIAGNOSIS:   Spinal stenosis, lumbar region with neurogenic claudication [M48.062]    1. Lumbar radiculopathy    2. DDD (degenerative disc disease), lumbar    3. Chronic pain           PHYSICIAN: Tim Kerns MD  ASSISTANTS: Quentin Barrientos MD fellow       MEDICATIONS INJECTED:  Preservative-free dexamethasone 10mg, Xylocaine 1% MPF 3-5ml. 3ml per level. Preservative free, sterile normal saline is used to get larger volume as needed.  LOCAL ANESTHETIC INJECTED:  Xylocaine 1% 9ml with Sodium Bicarbonate 1ml. 3ml per site.    SEDATION MEDICATIONS: Versed 2mg, Fentanyl 50mcg    ESTIMATED BLOOD LOSS:  None.    COMPLICATIONS:  None.    TECHNIQUE:   Laying in a prone position, the patient was prepped and draped in the usual sterile fashion using ChloraPrep and fenestrated drape.  The area to be injected was determined under fluoroscopic guidance.  Local anesthetic was given by raising a wheel and going down to the hub of a 27-gauge 1.25 inch needle.  The 3.5inch 22-gauge spinal needle was  introduced towards the transverse process of all levels as stated above.   The needle was walked medially then hinged into the neural foramen.  Omnipaque was injected to confirm appropriate placement and that there was no vascular runoff.  The medication was then injected after applying negative pressure. The patient tolerated the procedure well.    PAIN BEFORE THE PROCEDURE: 4-5/10.    PAIN AFTER THE PROCEDURE: 0/10.    The patient was monitored after the procedure.  Patient was given post procedure and discharge instructions to follow at home.  We will see the patient back in two weeks or the patient may call to inform of status. The patient was discharged in a stable condition.

## 2019-09-23 DIAGNOSIS — G47.00 INSOMNIA, UNSPECIFIED TYPE: ICD-10-CM

## 2019-09-23 DIAGNOSIS — F41.8 SITUATIONAL ANXIETY: ICD-10-CM

## 2019-09-24 RX ORDER — ALPRAZOLAM 0.25 MG/1
0.25 TABLET ORAL NIGHTLY PRN
Qty: 30 TABLET | Refills: 2 | Status: SHIPPED | OUTPATIENT
Start: 2019-09-24 | End: 2020-05-20

## 2019-10-06 DIAGNOSIS — M62.838 MUSCLE SPASM: ICD-10-CM

## 2019-10-07 ENCOUNTER — TELEPHONE (OUTPATIENT)
Dept: PAIN MEDICINE | Facility: CLINIC | Age: 59
End: 2019-10-07

## 2019-10-07 ENCOUNTER — HOSPITAL ENCOUNTER (OUTPATIENT)
Facility: OTHER | Age: 59
Discharge: HOME | End: 2019-10-07
Attending: ANESTHESIOLOGY | Admitting: ANESTHESIOLOGY
Payer: COMMERCIAL

## 2019-10-07 VITALS
HEART RATE: 82 BPM | HEIGHT: 69 IN | RESPIRATION RATE: 16 BRPM | DIASTOLIC BLOOD PRESSURE: 81 MMHG | BODY MASS INDEX: 31.4 KG/M2 | SYSTOLIC BLOOD PRESSURE: 129 MMHG | TEMPERATURE: 99 F | WEIGHT: 212 LBS | OXYGEN SATURATION: 98 %

## 2019-10-07 DIAGNOSIS — M48.062 SPINAL STENOSIS OF LUMBAR REGION WITH NEUROGENIC CLAUDICATION: Primary | ICD-10-CM

## 2019-10-07 DIAGNOSIS — M51.36 DDD (DEGENERATIVE DISC DISEASE), LUMBAR: Primary | ICD-10-CM

## 2019-10-07 DIAGNOSIS — M43.16 SPONDYLOLISTHESIS, LUMBAR REGION: Primary | ICD-10-CM

## 2019-10-07 DIAGNOSIS — M43.16 SPONDYLOLISTHESIS OF LUMBAR REGION: ICD-10-CM

## 2019-10-07 PROBLEM — M51.369 DDD (DEGENERATIVE DISC DISEASE), LUMBAR: Status: ACTIVE | Noted: 2019-10-07

## 2019-10-07 PROCEDURE — 63600175 PHARM REV CODE 636 W HCPCS: Performed by: STUDENT IN AN ORGANIZED HEALTH CARE EDUCATION/TRAINING PROGRAM

## 2019-10-07 PROCEDURE — 64483 NJX AA&/STRD TFRM EPI L/S 1: CPT | Mod: 50,,, | Performed by: ANESTHESIOLOGY

## 2019-10-07 PROCEDURE — 25500020 PHARM REV CODE 255: Performed by: ANESTHESIOLOGY

## 2019-10-07 PROCEDURE — 99152 MOD SED SAME PHYS/QHP 5/>YRS: CPT | Mod: ,,, | Performed by: ANESTHESIOLOGY

## 2019-10-07 PROCEDURE — 64483 NJX AA&/STRD TFRM EPI L/S 1: CPT | Mod: 50 | Performed by: ANESTHESIOLOGY

## 2019-10-07 PROCEDURE — 99152 PR MOD CONSCIOUS SEDATION, SAME PHYS, 5+ YRS, FIRST 15 MIN: ICD-10-PCS | Mod: ,,, | Performed by: ANESTHESIOLOGY

## 2019-10-07 PROCEDURE — 25000003 PHARM REV CODE 250: Performed by: ANESTHESIOLOGY

## 2019-10-07 PROCEDURE — 64483 PR EPIDURAL INJ, ANES/STEROID, TRANSFORAMINAL, LUMB/SACR, SNGL LEVL: ICD-10-PCS | Mod: 50,,, | Performed by: ANESTHESIOLOGY

## 2019-10-07 PROCEDURE — 63600175 PHARM REV CODE 636 W HCPCS: Performed by: ANESTHESIOLOGY

## 2019-10-07 RX ORDER — MIDAZOLAM HYDROCHLORIDE 1 MG/ML
INJECTION INTRAMUSCULAR; INTRAVENOUS
Status: DISCONTINUED | OUTPATIENT
Start: 2019-10-07 | End: 2019-10-07 | Stop reason: HOSPADM

## 2019-10-07 RX ORDER — AZITHROMYCIN 500 MG/1
500 TABLET, FILM COATED ORAL DAILY
Qty: 3 TABLET | Refills: 0 | Status: SHIPPED | OUTPATIENT
Start: 2019-10-07 | End: 2021-09-14 | Stop reason: SDUPTHER

## 2019-10-07 RX ORDER — LIDOCAINE HYDROCHLORIDE 10 MG/ML
INJECTION, SOLUTION EPIDURAL; INFILTRATION; INTRACAUDAL; PERINEURAL
Status: DISCONTINUED | OUTPATIENT
Start: 2019-10-07 | End: 2019-10-07 | Stop reason: HOSPADM

## 2019-10-07 RX ORDER — FENTANYL CITRATE 50 UG/ML
INJECTION, SOLUTION INTRAMUSCULAR; INTRAVENOUS
Status: DISCONTINUED | OUTPATIENT
Start: 2019-10-07 | End: 2019-10-07 | Stop reason: HOSPADM

## 2019-10-07 RX ORDER — CYCLOBENZAPRINE HCL 5 MG
5 TABLET ORAL 3 TIMES DAILY PRN
Qty: 30 TABLET | Refills: 3 | Status: SHIPPED | OUTPATIENT
Start: 2019-10-07 | End: 2019-12-18

## 2019-10-07 RX ORDER — DEXAMETHASONE SODIUM PHOSPHATE 100 MG/10ML
INJECTION INTRAMUSCULAR; INTRAVENOUS
Status: DISCONTINUED | OUTPATIENT
Start: 2019-10-07 | End: 2019-10-07 | Stop reason: HOSPADM

## 2019-10-07 RX ORDER — DOXYCYCLINE HYCLATE 100 MG
100 TABLET ORAL 2 TIMES DAILY
Qty: 20 TABLET | Refills: 0 | Status: SHIPPED | OUTPATIENT
Start: 2019-10-07 | End: 2019-10-18

## 2019-10-07 RX ORDER — SODIUM CHLORIDE 9 MG/ML
500 INJECTION, SOLUTION INTRAVENOUS CONTINUOUS
Status: DISCONTINUED | OUTPATIENT
Start: 2019-10-07 | End: 2019-10-07 | Stop reason: HOSPADM

## 2019-10-07 RX ORDER — METHYLPREDNISOLONE 4 MG/1
4 TABLET ORAL DAILY
Qty: 1 PACKAGE | Refills: 0 | Status: SHIPPED | OUTPATIENT
Start: 2019-10-07 | End: 2020-05-20

## 2019-10-07 RX ORDER — ZALEPLON 10 MG/1
10 CAPSULE ORAL NIGHTLY
Qty: 30 CAPSULE | Refills: 0 | Status: SHIPPED | OUTPATIENT
Start: 2019-10-07 | End: 2019-11-07

## 2019-10-07 RX ORDER — ONDANSETRON 8 MG/1
8 TABLET, ORALLY DISINTEGRATING ORAL EVERY 6 HOURS PRN
Qty: 12 TABLET | Refills: 1 | Status: SHIPPED | OUTPATIENT
Start: 2019-10-07 | End: 2020-05-20

## 2019-10-07 RX ADMIN — SODIUM CHLORIDE 500 ML: 0.9 INJECTION, SOLUTION INTRAVENOUS at 03:10

## 2019-10-07 NOTE — H&P
HPI  Patient presenting for Procedure(s) (LRB):  LUMBAR TRANSFORAMINAL BILATERAL L4/5 TF NOE (Bilateral)     Patient on Anti-coagulation Aspirin    No health changes since previous encounter    Past Medical History:   Diagnosis Date    Hyperlipidemia 8/31/2015    Hypertension     IFG (impaired fasting glucose) 8/31/2015    Neuropathic pain 8/31/2015    Squamous cell cancer of tongue 2012     Past Surgical History:   Procedure Laterality Date    CHOLECYSTECTOMY      GALLBLADDER SURGERY  06/2016    HYSTERECTOMY      KNEE ARTHROSCOPY Right     for torn meniscus    TONGUE SURGERY      TRANSFORAMINAL EPIDURAL INJECTION OF STEROID Bilateral 9/19/2019    Procedure: Injection,steroid,epidural,transforaminal approach LUMBAR TRANSFORAMINAL BILATERAL L4/5 TF NOE;  Surgeon: Tim Kerns MD;  [facility];  Service: Pain Management;  Laterality: Bilateral;  NEEDS CONSENT, VIP     Review of patient's allergies indicates:   Allergen Reactions    Aspirin Nausea Only     Can take low dose of Aspirin    Codeine Nausea Only     Can take Codeine if she takes a dose of Zofran with it      No current facility-administered medications for this encounter.      Current Outpatient Medications   Medication Sig    ALPRAZolam (XANAX) 0.25 MG tablet Take 1 tablet (0.25 mg total) by mouth nightly as needed.    aspirin (ECOTRIN) 81 MG EC tablet Take 1 tablet (81 mg total) by mouth nightly.    celecoxib (CELEBREX) 200 MG capsule Take 1 capsule (200 mg total) by mouth 2 (two) times daily.    clobetasol (TEMOVATE) 0.05 % external solution Use one to two times daily as needed for scaling or itching to scalp    cyclobenzaprine (FLEXERIL) 5 MG tablet Take 1 tablet (5 mg total) by mouth 3 (three) times daily as needed for Muscle spasms.    diclofenac sodium (VOLTAREN) 1 % Gel Apply 2 g topically 4 (four) times daily. for 10 days    estradiol (ESTRACE) 0.01 % (0.1 mg/gram) vaginal cream Place 1 g vaginally once daily.     irbesartan (AVAPRO) 75 MG tablet Take 1 tablet (75 mg total) by mouth every evening.    pravastatin (PRAVACHOL) 20 MG tablet Take 1 tablet (20 mg total) by mouth once daily.    tretinoin (RETIN-A) 0.05 % cream Apply topically nightly. Apply pea-sized amount to face.    triamterene-hydrochlorothiazide 37.5-25 mg (MAXZIDE-25) 37.5-25 mg per tablet Take 1 tablet by mouth once daily.    zolpidem (AMBIEN) 5 MG Tab Take one tablet by mouth every evening       PMHx, PSHx, Allergies, Medications reviewed in epic    ROS negative except pain complaints in HPI    OBJECTIVE:    There were no vitals taken for this visit.    PHYSICAL EXAMINATION:    GENERAL: Well appearing, in no acute distress, alert and oriented x3.  PSYCH:  Mood and affect appropriate.  SKIN: Skin color, texture, turgor normal, no rashes or lesions which will impact the procedure.  CV: RRR with palpation of the radial artery.  PULM: No evidence of respiratory difficulty, symmetric chest rise. Clear to auscultation.  NEURO: Cranial nerves grossly intact.    Plan:    Proceed with procedure as planned Procedure(s) (LRB):  LUMBAR TRANSFORAMINAL BILATERAL L4/5 TF NOE (Bilateral)    Tejas Lorenzo  10/07/2019

## 2019-10-07 NOTE — OP NOTE
Date of Service: 10/07/2019    PCP: Jo-Ann Collins MD    Referring Physician:    Time-out taken to identify patient and procedure side prior to starting the procedure.   I attest that I have reviewed the patient's home medications prior to the procedure and no contraindication have been identified. I  re-evaluated the patient after the patient was positioned for the procedure in the procedure room immediately before the procedural time-out. The vital signs are current and represent the current state of the patient which has not significantly changed since the preprocedure assessment.                                                           PROCEDURE: Bilateral L5 transforaminal epidural steroid injection under fluoroscopy    REASON FOR PROCEDURE: Bilateral Spondylolisthesis, lumbar region [M43.16]  1. DDD (degenerative disc disease), lumbar      POSTPROCEDURE DIAGNOSIS:   Spondylolisthesis, lumbar region [M43.16]    1. DDD (degenerative disc disease), lumbar           PHYSICIAN: Tim Kerns MD  ASSISTANTS:Tejas Lorenzo MD resident     MEDICATIONS INJECTED:  Preservative-free dexamethasone 10mg, Xylocaine 1% MPF 3-5ml. 3ml per level. Preservative free, sterile normal saline is used to get larger volume as needed.  LOCAL ANESTHETIC INJECTED:  Xylocaine 1% 9ml with Sodium Bicarbonate 1ml. 3ml per site.    SEDATION MEDICATIONS: Versed 2mg, Fentanyl 25mcg    ESTIMATED BLOOD LOSS:  None.    COMPLICATIONS:  None.    TECHNIQUE:   Laying in a prone position, the patient was prepped and draped in the usual sterile fashion using ChloraPrep and fenestrated drape.  The area to be injected was determined under fluoroscopic guidance.  Local anesthetic was given by raising a wheel and going down to the hub of a 27-gauge 1.25 inch needle.  The 3.5inch 22-gauge spinal needle was introduced towards the transverse process of all levels as stated above.   The needle was walked medially then hinged into the neural foramen.  Omnipaque was  injected to confirm appropriate placement and that there was no vascular runoff.  The medication was then injected after applying negative pressure. The patient tolerated the procedure well.    PAIN BEFORE THE PROCEDURE: 5/10.    PAIN AFTER THE PROCEDURE: 0/10.    The patient was monitored after the procedure.  Patient was given post procedure and discharge instructions to follow at home.  We will see the patient back in two weeks or the patient may call to inform of status. The patient was discharged in a stable condition.

## 2019-10-07 NOTE — DISCHARGE SUMMARY
Discharge Note  Short Stay      SUMMARY     Admit Date: 10/7/2019    Attending Physician: Tim Kerns      Discharge Physician: Tim Kerns      Discharge Date: 10/7/2019 3:43 PM    Procedure(s) (LRB):  LUMBAR TRANSFORAMINAL BILATERAL L4/5 TF NOE (Bilateral)    Final Diagnosis: Spondylolisthesis, lumbar region [M43.16]    Disposition: Home or self care    Patient Instructions:   Current Discharge Medication List      CONTINUE these medications which have NOT CHANGED    Details   ALPRAZolam (XANAX) 0.25 MG tablet Take 1 tablet (0.25 mg total) by mouth nightly as needed.  Qty: 30 tablet, Refills: 2    Associated Diagnoses: Insomnia, unspecified type; Situational anxiety      aspirin (ECOTRIN) 81 MG EC tablet Take 1 tablet (81 mg total) by mouth nightly.  Refills: 0      azithromycin (ZITHROMAX) 500 MG tablet Take 1 tablet (500 mg total) by mouth once daily. Take one tablet daily for 3 days as needed for traveler's diarrhea for 3 days  Qty: 3 tablet, Refills: 0      celecoxib (CELEBREX) 200 MG capsule Take 1 capsule (200 mg total) by mouth 2 (two) times daily.  Qty: 60 capsule, Refills: 2    Associated Diagnoses: Left knee pain, unspecified chronicity; Degenerative arthritis of left knee; IT band syndrome; Low back pain      clobetasol (TEMOVATE) 0.05 % external solution Use one to two times daily as needed for scaling or itching to scalp  Qty: 60 mL, Refills: 3    Associated Diagnoses: Seborrheic dermatitis      cyclobenzaprine (FLEXERIL) 5 MG tablet Take 1 tablet (5 mg total) by mouth 3 (three) times daily as needed for Muscle spasms.  Qty: 30 tablet, Refills: 3    Associated Diagnoses: Muscle spasm      diclofenac sodium (VOLTAREN) 1 % Gel Apply 2 g topically 4 (four) times daily. for 10 days  Qty: 1 Tube, Refills: 2    Associated Diagnoses: Left knee pain, unspecified chronicity; Degenerative arthritis of left knee; IT band syndrome; Low back pain      doxycycline (VIBRA-TABS) 100 MG tablet Take 1 tablet (100 mg  total) by mouth 2 (two) times daily. for 10 days  Qty: 20 tablet, Refills: 0      estradiol (ESTRACE) 0.01 % (0.1 mg/gram) vaginal cream Place 1 g vaginally once daily.  Qty: 42.5 g, Refills: 0    Comments: BIN#: [id number] PCN# [id number] GRP# [id number] ID# [id number]  Associated Diagnoses: Female dyspareunia; Vaginal dryness, menopausal      irbesartan (AVAPRO) 75 MG tablet Take 1 tablet (75 mg total) by mouth every evening.  Qty: 90 tablet, Refills: 3    Associated Diagnoses: Essential hypertension      methylPREDNISolone (MEDROL DOSEPACK) 4 mg tablet Take 1 tablet (4 mg total) by mouth once daily. use as directed  Qty: 1 Package, Refills: 0      ondansetron (ZOFRAN-ODT) 8 MG TbDL Take 1 tablet (8 mg total) by mouth every 6 (six) hours as needed.  Qty: 12 tablet, Refills: 1      pravastatin (PRAVACHOL) 20 MG tablet Take 1 tablet (20 mg total) by mouth once daily.  Qty: 90 tablet, Refills: 3    Associated Diagnoses: Hyperlipidemia, unspecified hyperlipidemia type      tretinoin (RETIN-A) 0.05 % cream Apply topically nightly. Apply pea-sized amount to face.  Qty: 45 g, Refills: 2    Associated Diagnoses: Acne vulgaris      triamterene-hydrochlorothiazide 37.5-25 mg (MAXZIDE-25) 37.5-25 mg per tablet Take 1 tablet by mouth once daily.  Qty: 90 tablet, Refills: 3    Associated Diagnoses: Benign essential hypertension      zaleplon (SONATA) 10 MG capsule Take 1 capsule (10 mg total) by mouth every evening.  Qty: 30 capsule, Refills: 0      zolpidem (AMBIEN) 5 MG Tab Take one tablet by mouth every evening  Qty: 30 tablet, Refills: 5                 Discharge Diagnosis: Spondylolisthesis, lumbar region [M43.16]  Condition on Discharge: Stable with no complications to procedure   Diet on Discharge: Same as before.  Activity: as per instruction sheet.  Discharge to: Home with a responsible adult.  Follow up: 2-4 weeks       Please call my office or pager at [phone] if experienced any weakness or loss of sensation, fever >  101.5, pain uncontrolled with oral medications, persistent nausea/vomiting/or diarrhea, redness or drainage from the incisions, or any other worrisome concerns. If physician on call was not reached or could not communicate with our office for any reason please go to the nearest emergency department

## 2019-10-07 NOTE — DISCHARGE INSTRUCTIONS
Thank you for allowing us to care for you today. You may receive a survey about the care we provided. Your feedback is valuable and helps us provide excellent care throughout the community.     Home Care Instructions for Pain Management:    1. DIET:   You may resume your normal diet today.   2. BATHING:   You may shower with luke warm water. No tub baths or anything that will soak injection sites under water for the next 24 hours.  3. DRESSING:   You may remove your bandage today.   4. ACTIVITY LEVEL:   You may resume your normal activities 24 hrs after your procedure. Nothing strenuous today.  5. MEDICATIONS:   You may resume your normal medications today. To restart blood thinners, ask your doctor.  6. DRIVING    If you have received any sedatives by mouth today, you may not drive for 12 hours.    If you have received any sedation through your IV, you may not drive for 24 hrs.   7. SPECIAL INSTRUCTIONS:   No heat to the injection site for 24 hrs including, hot bath or shower, heating pad, moist heat, or hot tubs.    Use ice pack to injection site for any pain or discomfort.  Apply ice packs for 20 minute intervals as needed.    IF you have diabetes, be sure to monitor your blood sugar more closely. IF your injection contained steroids your blood sugar levels may become higher than normal.    If you are still having pain upon discharge:  Your pain may improve over the next 48 hours. The anesthetic (numbing medication) works immediately to 48 hours. IF your injection contained a steroid (anti-inflammatory medication), it takes approximately 3 days to start feeling relief and 7-10 days to see your greatest results from the medication. It is possible you may need subsequent injections. This would be discussed at your follow up appointment with pain management or your referring doctor.    Please call the PAIN MANAGEMENT office at [phone] or ON CALL pager at [phone] if you experienced any:   -Weakness or  loss of sensation  -Fever > 101.5  -Pain uncontrolled with oral medications   -Persistent nausea, vomiting, or diarrhea  -Redness or drainage from the injection sites, or any other worrisome concerns.   If physician on call was not reached or could not communicate with our office for any reason please go to the nearest emergency department.    Recovery After Procedural Sedation (Adult)  You have been given medicine by vein to make you sleep during your surgery. This may have included both a pain medicine and sleeping medicine. Most of the effects have worn off. But you may still have some drowsiness for the next 6 to 8 hours.  Home care  Follow these guidelines when you get home:  · For the next 8 hours, you should be watched by a responsible adult. This person should make sure your condition is not getting worse.  · Don't drink any alcohol for the next 24 hours.  · Don't drive, operate dangerous machinery, or make important business or personal decisions during the next 24 hours.  Note: Your healthcare provider may tell you not to take any medicine by mouth for pain or sleep in the next 4 hours. These medicines may react with the medicines you were given in the hospital. This could cause a much stronger response than usual.  Follow-up care  Follow up with your healthcare provider if you are not alert and back to your usual level of activity within 12 hours.  When to seek medical advice  Call your healthcare provider right away if any of these occur:  · Drowsiness gets worse  · Weakness or dizziness gets worse  · Repeated vomiting  · You can't be awakened   Date Last Reviewed: 10/18/2016  © 3772-0090 [employer]. [address] [place] [address], [place] [address]. All rights reserved. This information is not intended as a substitute for professional medical care. Always follow your healthcare professional's instructions.

## 2019-10-28 NOTE — PROGRESS NOTES
Neutrogena foaming gentle cleanser or Cetaphil cleanser as a wash.    [facility]  Otorhinolaryngology-Head & Neck Surgery  Progress Note    Subjective:     Post-Op Info:  Procedure(s) (LRB):  LARYNGOSCOPY BRONCHOSCOPY-DIRECT (N/A)  EXCISION-LESION-TONGUE   1 Day Post-Op  Hospital Day: 2     Interval History: NAEON. Pain more controlled with PO meds. Tolerating liquid diet.     Medications:  Continuous Infusions:   0/9% NACL & POTASSIUM CHLORIDE 20 MEQ/L 50 mL/hr at 04/09/18 2119     Scheduled Meds:   irbesartan  75 mg Oral Daily    triamterene-hydrochlorothiazide 37.5-25 mg  1 capsule Oral Daily     PRN Meds:acetaminophen, acetaminophen, ALPRAZolam, diphenhydrAMINE, fentaNYL, hydrALAZINE, hydrocodone-acetaminophen 5-325mg, ondansetron, promethazine (PHENERGAN) IVPB, sodium chloride 0.9%, sodium chloride 0.9%, zolpidem     Review of patient's allergies indicates:   Allergen Reactions    Aspirin Nausea Only     Can take low dose of Aspirin    Codeine Nausea Only     Can take Codeine if she takes a dose of Zofran with it     Objective:     Vital Signs (24h Range):  Temp:  [98.1 °F (36.7 °C)-99.7 °F (37.6 °C)] 99.7 °F (37.6 °C)  Pulse:  [58-98] 98  Resp:  [12-20] 16  SpO2:  [94 %-99 %] 95 %  BP: (120-180)/(63-97) 133/97        Lines/Drains/Airways     Peripheral Intravenous Line                 Peripheral IV - Single Lumen 02/26/17 0556 Right Antecubital 407 days         Peripheral IV - Single Lumen 04/09/18 1120 Left Forearm less than 1 day                Physical Exam  AAOx3, NAD  Left side of tongue with slight swelling  FOM with marsupialized left submandibular duct  No bleeding in oral cavity    Significant Labs:  None    Significant Diagnostics:  None    Assessment/Plan:     * 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

## 2019-11-01 DIAGNOSIS — M43.16 SPONDYLOLISTHESIS OF LUMBAR REGION: Primary | ICD-10-CM

## 2019-11-01 DIAGNOSIS — M48.062 SPINAL STENOSIS OF LUMBAR REGION WITH NEUROGENIC CLAUDICATION: ICD-10-CM

## 2019-11-18 ENCOUNTER — CLINICAL SUPPORT (OUTPATIENT)
Dept: REHABILITATION | Facility: OTHER | Age: 59
End: 2019-11-18
Attending: INTERNAL MEDICINE
Payer: COMMERCIAL

## 2019-11-18 ENCOUNTER — TELEPHONE (OUTPATIENT)
Dept: SPORTS MEDICINE | Facility: CLINIC | Age: 59
End: 2019-11-18

## 2019-11-18 ENCOUNTER — CLINICAL SUPPORT (OUTPATIENT)
Dept: REHABILITATION | Facility: OTHER | Age: 59
End: 2019-11-18
Attending: PHYSICAL MEDICINE & REHABILITATION
Payer: COMMERCIAL

## 2019-11-18 VITALS
DIASTOLIC BLOOD PRESSURE: 83 MMHG | HEIGHT: 69 IN | HEART RATE: 81 BPM | SYSTOLIC BLOOD PRESSURE: 124 MMHG | BODY MASS INDEX: 33.14 KG/M2 | WEIGHT: 223.75 LBS

## 2019-11-18 DIAGNOSIS — M54.50 CHRONIC BILATERAL LOW BACK PAIN WITHOUT SCIATICA: Primary | ICD-10-CM

## 2019-11-18 DIAGNOSIS — G89.29 CHRONIC BILATERAL LOW BACK PAIN WITHOUT SCIATICA: Primary | ICD-10-CM

## 2019-11-18 DIAGNOSIS — M47.816 SPONDYLOSIS OF LUMBAR REGION WITHOUT MYELOPATHY OR RADICULOPATHY: ICD-10-CM

## 2019-11-18 DIAGNOSIS — M48.061 SPINAL STENOSIS OF LUMBAR REGION WITHOUT NEUROGENIC CLAUDICATION: ICD-10-CM

## 2019-11-18 PROCEDURE — 97750 PHYSICAL PERFORMANCE TEST: CPT | Mod: 32 | Performed by: PHYSICAL MEDICINE & REHABILITATION

## 2019-11-18 NOTE — TELEPHONE ENCOUNTER
Returning call to patient.  Previously message unclear for what patient is looking for - medication refill versus repeat steroid injection.  Left message for patient to call back to discuss.  Scheduled her with Dr. Noriega for tomorrow, 11/19/19 at 11:30am if she is interested in repeat left knee steroid injection.

## 2019-11-18 NOTE — PROGRESS NOTES
Subjective:      Patient ID: Josefina Blue is a 59 y.o. female.    Chief Complaint: No chief complaint on file.    Ms Blue is a 58 yo female for evaluation for the healthy back lumbar program.  she has had low back pain for 8 months and worsening until injection in October.  There was no event that started the pain.  The pain is low back dominant, and goes to bilateral glutes.  The pain is dull and achy pain.  She has a sharp zinging pain when going from sit to stand.  She has tingling on the left buttock.  Sharp nerve pain on the outside of left knee.  There is no burning, numbness or weakness.  The pain is intermittent ranging from 0-3/10.  It is worse with lying on back and stomach, prolonged standing, climbing stairs, sit to stand, leaning back, changing positions in bed, morning, and bedtime.  It is better lying on sides, sitting, walking, muscle relaxers, afternoon and evening. She had 2 bilateral L4-5 TF NOE 9/2019 with no relief and 10/2019 with decreased severity and relief of leg cramping at night.  She has not had PT, surgery or been to chiropractor.  Her goals are climbing stairs, changing positions in bed, carrying and lifting. Pattern 2    MRI lumbar 8/2019  The incidentally visualized soft tissue structures of the abdomen appear normal.  There is very minimal retrolisthesis of L1 in respect to L2 by approximately 2 mm.  Similarly, mild retrolisthesis of L2 respect L3 by approximately 4 mm.  Mild anterolisthesis of L4 respect to L5 by approximately 6 3 mm.  Vertebral body heights are maintained.  There is disc desiccation throughout the lumbar spine with disc space narrowing at the L1-2, L2-3 and L5-S1 levels.  Endplate degenerative changes are noted at L5-S1.  The marrow signal is otherwise normal without evidence for a marrow replacement process, infection or tumor.  The conus terminates at T12-L1.    At L1-2, there is a circumferential disc bulge with a superimposed central/right paracentral  disc protrusion, ligamentum flavum hypertrophy and facet arthropathy contributing to moderate severe central canal stenosis with mild right-sided neural foraminal narrowing.    At L2-3, there is a circumferential disc bulge with ligamentum flavum hypertrophy and facet arthropathy resulting in mild central canal stenosis with mild bilateral neural foraminal narrowing.    At L3-4, there is a circumferential disc bulge with ligamentum flavum hypertrophy and facet arthropathy resulting in moderate central canal stenosis without neural foraminal narrowing.    At L4-5, there is a circumferential disc bulge with a superimposed central disc extrusion, ligamentum flavum hypertrophy and facet arthropathy resulting in severe central canal stenosis without neural foraminal narrowing.    At L5-S1, there is a circumferential disc bulge with facet arthropathy contributing to mild bilateral neural foraminal narrowing.    Impression      Multilevel degenerative changes of the lumbar spine contributing to central canal stenosis and neural foraminal narrowing as detailed in the above level by level description.  Please note that the central canal stenosis is severe at the L4-5 level.    9/2019  Mild DJD.  There is a grade 1 L4/L5 anterolisthesis identified slightly more pronounced on the flexion view.  Extension view demonstrating a few mm L1/L2 and L2/L3 retrolisthesis.  The intervertebral disc spaces are narrowed.  No acute fracture or dislocation.  No bone destruction identified    Past Medical History:  10/7/2019: DDD (degenerative disc disease), lumbar  8/31/2015: Hyperlipidemia  No date: Hypertension  8/31/2015: IFG (impaired fasting glucose)  8/31/2015: Neuropathic pain  2012: Squamous cell cancer of tongue    Past Surgical History:  No date: CHOLECYSTECTOMY  06/2016: GALLBLADDER SURGERY  No date: HYSTERECTOMY  No date: KNEE ARTHROSCOPY; Right      Comment:  for torn meniscus  No date: TONGUE SURGERY  9/19/2019: TRANSFORAMINAL  EPIDURAL INJECTION OF STEROID; Bilateral      Comment:  Procedure: Injection,steroid,epidural,transforaminal                approach LUMBAR TRANSFORAMINAL BILATERAL L4/5 TF NOE;                 Surgeon: Tim Kerns MD;  Location: [facility], [place], operated by [facility];                 Service: Pain Management;  Laterality: Bilateral;  NEEDS                CONSENT, VIP  10/7/2019: TRANSFORAMINAL EPIDURAL INJECTION OF STEROID; Bilateral      Comment:  Procedure: LUMBAR TRANSFORAMINAL BILATERAL L4/5 TF NOE;                Surgeon: Tim Kerns MD;  Location: [facility], [place], operated by [facility];                 Service: Pain Management;  Laterality: Bilateral;  NEEDS                CONSENT, **VIP**    Review of patient's family history indicates:  Problem: Alcohol abuse      Relation: Mother          Age of Onset: (Not Specified)  Problem: Cirrhosis      Relation: Mother          Age of Onset: (Not Specified)  Problem: Cancer      Relation: Father          Age of Onset: 74          Comment: prostate, throat, lung- smoker  Problem: Hyperlipidemia      Relation: Father          Age of Onset: (Not Specified)  Problem: Diabetes      Relation: Sister          Age of Onset: (Not Specified)  Problem: Hypertension      Relation: Sister          Age of Onset: (Not Specified)  Problem: Drug abuse      Relation: Brother          Age of Onset: (Not Specified)  Problem: Heart disease      Relation: Brother          Age of Onset: (Not Specified)  Problem: Hyperlipidemia      Relation: Brother          Age of Onset: (Not Specified)  Problem: No Known Problems      Relation: Maternal Aunt          Age of Onset: (Not Specified)  Problem: No Known Problems      Relation: Maternal Uncle          Age of Onset: (Not Specified)  Problem: No Known Problems      Relation: Paternal Aunt          Age of Onset: (Not Specified)  Problem: No Known Problems      Relation: Paternal Uncle          Age of Onset: (Not Specified)  Problem: No Known Problems      Relation: Maternal Grandmother           Age of Onset: (Not Specified)  Problem: No Known Problems      Relation: Maternal Grandfather          Age of Onset: (Not Specified)  Problem: No Known Problems      Relation: Paternal Grandmother          Age of Onset: (Not Specified)  Problem: No Known Problems      Relation: Paternal Grandfather          Age of Onset: (Not Specified)  Problem: Amblyopia      Relation: Neg Hx          Age of Onset: (Not Specified)  Problem: Blindness      Relation: Neg Hx          Age of Onset: (Not Specified)  Problem: Cataracts      Relation: Neg Hx          Age of Onset: (Not Specified)  Problem: Glaucoma      Relation: Neg Hx          Age of Onset: (Not Specified)  Problem: Macular degeneration      Relation: Neg Hx          Age of Onset: (Not Specified)  Problem: Retinal detachment      Relation: Neg Hx          Age of Onset: (Not Specified)  Problem: Strabismus      Relation: Neg Hx          Age of Onset: (Not Specified)  Problem: Stroke      Relation: Neg Hx          Age of Onset: (Not Specified)  Problem: Thyroid disease      Relation: Neg Hx          Age of Onset: (Not Specified)      Social History    Socioeconomic History      Marital status:       Spouse name: Not on file      Number of children: Not on file      Years of education: Not on file      Highest education level: Not on file    Occupational History      Occupation: chief nursing officer    Social Needs      Financial resource strain: Not on file      Food insecurity:        Worry: Not on file        Inability: Not on file      Transportation needs:        Medical: Not on file        Non-medical: Not on file    Tobacco Use      Smoking status: Never Smoker      Smokeless tobacco: Never Used    Substance and Sexual Activity      Alcohol use: Yes        Alcohol/week: 0.0 standard drinks        Comment: occ      Drug use: No      Sexual activity: Yes        Birth control/protection: None    Lifestyle      Physical activity:        Days per week: Not on  file        Minutes per session: Not on file      Stress: Not on file    Relationships      Social connections:        Talks on phone: Not on file        Gets together: Not on file        Attends Scientologist service: Not on file        Active member of club or organization: Not on file        Attends meetings of clubs or organizations: Not on file        Relationship status: Not on file    Other Topics      Concerns:        Are you pregnant or think you may be?: Not Asked        Breast-feeding: Not Asked    Social History Narrative      Lives with  and 2 dogs      Current Outpatient Medications:  ALPRAZolam (XANAX) 0.25 MG tablet, Take 1 tablet (0.25 mg total) by mouth nightly as needed., Disp: 30 tablet, Rfl: 2  ALPRAZolam (XANAX) 0.25 MG tablet, TAKE ONE TABLET BY MOUTH AT BEDTIME AS NEEDED., Disp: 30 tablet, Rfl: 2  aspirin (ECOTRIN) 81 MG EC tablet, Take 1 tablet (81 mg total) by mouth nightly., Disp: , Rfl: 0  celecoxib (CELEBREX) 200 MG capsule, Take 1 capsule (200 mg total) by mouth 2 (two) times daily., Disp: 60 capsule, Rfl: 2  clobetasol (TEMOVATE) 0.05 % external solution, Use one to two times daily as needed for scaling or itching to scalp, Disp: 60 mL, Rfl: 3  cyclobenzaprine (FLEXERIL) 5 MG tablet, Take 1 tablet (5 mg total) by mouth 3 (three) times daily as needed for Muscle spasms., Disp: 30 tablet, Rfl: 3  diclofenac sodium (VOLTAREN) 1 % Gel, Apply 2 g topically 4 (four) times daily. for 10 days, Disp: 1 Tube, Rfl: 2  estradiol (ESTRACE) 0.01 % (0.1 mg/gram) vaginal cream, Place 1 g vaginally once daily., Disp: 42.5 g, Rfl: 0  irbesartan (AVAPRO) 75 MG tablet, Take 1 tablet (75 mg total) by mouth every evening., Disp: 90 tablet, Rfl: 3  methylPREDNISolone (MEDROL DOSEPACK) 4 mg tablet, Take 1 tablet (4 mg total) by mouth once daily. use as directed, Disp: 1 Package, Rfl: 0  ondansetron (ZOFRAN-ODT) 8 MG TbDL, Dissolve 1 tablet (8 mg total) by mouth every 6 (six) hours as needed., Disp: 12  tablet, Rfl: 1  pravastatin (PRAVACHOL) 20 MG tablet, Take 1 tablet (20 mg total) by mouth once daily., Disp: 90 tablet, Rfl: 3  tretinoin (RETIN-A) 0.05 % cream, Apply topically nightly. Apply pea-sized amount to face., Disp: 45 g, Rfl: 2  triamterene-hydrochlorothiazide 37.5-25 mg (MAXZIDE-25) 37.5-25 mg per tablet, Take 1 tablet by mouth once daily., Disp: 90 tablet, Rfl: 3  zolpidem (AMBIEN) 5 MG Tab, Take one tablet by mouth every evening, Disp: 30 tablet, Rfl: 5    No current facility-administered medications for this visit.       Review of patient's allergies indicates:   -- Aspirin -- Nausea Only    --  Can take low dose of Aspirin   -- Codeine -- Nausea Only    --  Can take Codeine if she takes a dose of Zofran             with it            Review of Systems   Constitution: Negative for weight gain and weight loss.   Cardiovascular: Negative for chest pain.   Respiratory: Negative for shortness of breath.    Musculoskeletal: Positive for back pain. Negative for joint pain and joint swelling.   Gastrointestinal: Negative for abdominal pain, bowel incontinence, nausea and vomiting.   Genitourinary: Negative for bladder incontinence.   Neurological: Positive for paresthesias (left glute). Negative for numbness.         Objective:        General: Josefina is well-developed, well-nourished, appears stated age, in no acute distress, alert and oriented to time, place and person.     General    Vitals reviewed.  Constitutional: She is oriented to person, place, and time. She appears well-developed and well-nourished.   HENT:   Head: Normocephalic and atraumatic.   Pulmonary/Chest: Effort normal.   Neurological: She is alert and oriented to person, place, and time.   Psychiatric: She has a normal mood and affect. Her behavior is normal. Judgment and thought content normal.     General Musculoskeletal Exam   Gait: normal     Right Ankle/Foot Exam     Tests   Heel Walk: able to perform  Tiptoe Walk: able to  perform    Left Ankle/Foot Exam     Tests   Heel Walk: able to perform  Tiptoe Walk: able to perform  Back (L-Spine & T-Spine) / Neck (C-Spine) Exam     Back (L-Spine & T-Spine) Range of Motion   Extension: 20 (with pain)   Flexion: 90   Lateral bend right: 20   Lateral bend left: 20   Rotation right: 40   Rotation left: 40     Spinal Sensation   Right Side Sensation  C-Spine Level: normal   L-Spine Level: normal  S-Spine Level: normal  Left Side Sensation  C-Spine Level: normal  L-Spine Level: normal  S-Spine Level: normal    Back (L-Spine & T-Spine) Tests   Right Side Tests  Straight leg raise:      Sitting SLR: > 70 degrees      Left Side Tests  Straight leg raise:     Sitting SLR: > 70 degrees          Other She has no scoliosis .  Spinal Kyphosis:  Absent    Comments:  Neg CARLOS bilaterally      Muscle Strength   Right Upper Extremity   Biceps: 5/5/5   Deltoid:  5/5  Triceps:  5/5  Wrist extension: 5/5/5   Finger Flexors:  5/5  Left Upper Extremity  Biceps: 5/5/5   Deltoid:  5/5  Triceps:  5/5  Wrist extension: 5/5/5   Finger Flexors:  5/5  Right Lower Extremity   Hip Flexion: 5/5   Quadriceps:  5/5   Anterior tibial:  5/5/5  EHL:  5/5  Left Lower Extremity   Hip Flexion: 5/5   Quadriceps:  5/5   Anterior tibial:  5/5/5   EHL:  5/5    Reflexes     Left Side  Biceps:  2+  Triceps:  2+  Brachioradialis:  2+  Quadriceps:  2+  Achilles:  2+  Left Cunningham's Sign:  Absent  Babinski Sign:  absent    Right Side   Biceps:  2+  Triceps:  2+  Brachioradialis:  2+  Quadriceps:  2+  Achilles:  2+  Right Cunningham's Sign:  absent  Babinski Sign:  absent    Vascular Exam     Right Pulses        Carotid:                  2+    Left Pulses        Carotid:                  2+              Assessment:       1. Chronic bilateral low back pain without sciatica    2. Spondylosis of lumbar region without myelopathy or radiculopathy    3. Spinal stenosis of lumbar region without neurogenic claudication           Plan:       Orders  Placed This Encounter    Ambulatory consult to [facility]      The patient has had a history of low back pain with limitations in there activities of Daily living    Previous treatment has not provided relief.    The situation was discussed at length with the patient.  We discussed different causes of back pain and different treatment options.  We discussed the importance of stretching and strengthening.  We discussed posture.  We discussed the pros and cons of further diagnostic testing, alternative treatment and Medications    Based on the history, physical exam, and functional index, an active physical therapy program is recommended.  The goal is to restore the patients function and reduce pain.  A program of progressive resistance exercises, biomechanical, and mobility maneuvers, instructions in proper body mechanics, aerobic conditioning and HEP will be utilized. The program will continue as long as making improvements.    An assessment of patients progress will be made at each visit to document change in status.    The patient will be actively involved in there own treatment, and responsible for appointments and home program    The patient's lumbar isometric strength will be tested and they will be placed in a program of isolated strength training based on 50% of their total functional torque and advanced as clinically appropriate.      Directional preference of pain will further influence the patients active rehabilitation program    The patient was instructed there might be an initial increase in discomfort    They are enrolled with good prognosis  Pattern 2      Follow-up: No follow-ups on file. If there are any questions prior to this, the patient was instructed to contact the office.

## 2019-11-18 NOTE — TELEPHONE ENCOUNTER
----- Message from Camryn Lane sent at 11/18/2019  1:41 PM CST -----  Contact: Patient  Patient is requesting a refill for the triamcinolone acetonide injection 40 mg.

## 2019-11-18 NOTE — PROGRESS NOTES
Health  met with patient to complete initial outcomes for the Healthy Back lumbar program.  Questions were reviewed with patient and discussed with Dr. Turcios. The patient will meet with Dr. Turcios to determine program enrollment.       Any changes to patient responses are below in red font.    HealthyBack Questionnaire  11/18/2019   Please select the location of your worst pain:  Low back   Please select the location of any additional pain: (hold down the control key, and click to select multiple responses) Seat- Left, Seat- Right   Did any event trigger your pain?  No   How long has this pain been going on?  8 months and progressively worsening over time   Please indicate how the pain is changing.  Improving   What is the WORST level of pain that you have experienced in the last week?  3   What is the LEAST level of pain that you have experienced in the past week?  0   What can you NOT do not that you used to be able to do?  N/A   Are your limitations mainly due to your pain?  No   What are your additional complaints, if any? Tingling   Is there ever a time during the day when your pain stops, even for a brief moment, before returning? Yes   If yes, when your pain stops, does it disappear completely? Is it totally gone? Yes   Does bending forward make your typical pain worse? No   Morning: Worse during   Afternoon: Better during   Evening:  Better during   Nighttime: Worse during   Standing:  Worse   Lying on stomach: Worse   Lying on back: Worse   Sitting:  Better   Walking: Better   Climbing stairs: Worse   In the last seven days, have you had any changes in your bowel and/or bladder habits? No   Have all of your attempts to treat your back/leg pain resulted in failure?  No   Do you believe your doctor(s) do NOT understand how much pain you have?  No   Do you believe that you have one or more conditions that have not been diagnosed by your doctor(s)?  No   Do you believe that you deserve more  understanding from others including your family than you are getting?  No   Do you feel relatively calm about how your back/leg pain has impacted your life?  Yes   Are you OK with treatment taking a very long time (even years) before you feel relief from your back/leg pain?  Yes   Do you believe that your pain has caused you to be more forgetful?  No   Do you feel that you have not received enough treatment for your pain?  No   Do you believe that your doctor(s) do not have the right training to treat your back/leg pain effectively?  No   Do you believe you should not be allowed to work or attend school because of your back/leg pain?  No   When did this pain begin?  8 months progressively worsening over time but improved since injection in October   Did the pain begin after an injury or an accident? No   Is the pain work related?  No   Please gabriel which of the following over-the-counter medicines you take. (hold down the control key, and click to select multiple responses) None   Please gabriel which of the following prescription medicines you take. (hold down the control key, and click to select multiple responses) Muscle relaxer, SCHROEDER II Inhibitor (Celebrex)   Emergency department  No   Health care providers (hold down the control key, and click to select multiple responses) Family doctor   Investigations done (hold down the control key, and click to select multiple responses) X-ray, MRI   Procedures (hold down the control key, and click to select multiple responses) Epidural steroid injections (NOE)   Emergency department  No   Health care providers (hold down the control key, and click to select multiple responses) Family doctor   Investigations done (hold down the control key, and click to select multiple responses) None   Procedures (hold down the control key, and click to select multiple responses) None   Have you had any surgery on your back?  No   Please gabriel what you are experiencing. (hold down the control  key, and click to select multiple responses) Arthritic joint pain, Muscle pain in arms or legs   First activity you would like to perform better:  Climbing stairs   Current ability score to perform first activity: 5   Second activity you would like to perform better: Carrying and lifting   Current ability score to perform second activity: 3   Third activity you would like to perform better: Changing positions in bed   Current ability score to perform third activity: 2   Pain intensity:  The pain comes and goes and is moderate.   Personal care (washing, dressing, etc.):  I would not have to change my way of washing or dressing in order to avoid pain.   Lifting: Pain prevents me from lifting heavy weights, but I can manage light to medium weights if conveniently positioned.   Walking: I have no pain walking.   Sitting: Pain prevents me from sitting more than one hour.   Standing: Pain prevents me from standing more than one hour.   Sleeping: I get pain in bed, but it does not prevent me from sleeping well.   Social life: My social life is normal and gives me no pain.   Traveling: I get no pain while traveling.   Changing degree of pain: My pain seems to be getting better but improvement is slow at present.   Do you need any help looking after yourself? I need no help at all.   When doing household tasks, e.g., preparing food, gardening, using the video recorder, radio, telephone, or washing the car: I need no help at all.   Thinking about how easily you can get around your home and community: I get around my home and community by myself without any difficulty.   Because of your health, your relationships, e.g., your friends, partner or parents, generally: Are very close and warm   Thinking about your relationships with other people: I have plenty of friends and am never lonely.   Thinking about your health and your relationship with your  family:  My role in the family is unaffected by my health.   Thinking about your  vision, including when using your glasses or contact lenses if needed: I see normally.   Thinking about your hearing, including using your hearing aid if needed: I hear normally.   When you communicate with others, e.g., talking, listening, writing, or signing: I have no trouble speaking to them or understanding what they are saying.   Thinking about how you sleep: My sleep is interrupted some of the time, but I am usually able to go back to sleep without difficulty.   Thinking about how you generally feel: I do not feel anxious, worried or depressed.   How much pain or discomfort do you experience? I have moderate pain.   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 What are your hobbies? Cook and read   How do you spend your leisure time? What are your hobbies? Cook and read   What is your highest level of education? Advanced/graduate   What is your work status? Employed   How did you hear about the Healthyback program?  Physician   When did this pain begin?  8 months progressively worsening over time but improved since injection in October   Do you need any help looking after yourself? I need no help at all.   When doing household tasks, e.g., preparing food, gardening, using the video recorder, radio, telephone, or washing the car: I need no help at all.   Thinking about how easily you can get around your home and community: I get around my home and community by myself without any difficulty.   Because of your health, your relationships, e.g., your friends, partner or parents, generally: Are very close and warm   Thinking about your relationships with other people: I have plenty of friends and am never lonely.   Thinking about your health and your relationship with your  family:  My role in the family is unaffected by my health.   Thinking about your vision,  including when using your glasses or contact lenses if needed: I see normally.   Thinking about your hearing, including using your hearing aid if needed: I hear normally.   When you communicate with others, e.g., talking, listening, writing, or signing: I have no trouble speaking to them or understanding what they are saying.   Thinking about how you sleep: My sleep is interrupted some of the time, but I am usually able to go back to sleep without difficulty.   Thinking about how you generally feel: I do not feel anxious, worried or depressed.   How much pain or discomfort do you experience? I have moderate pain.   Little interest or pleasure in doing things Not at all   Feeling down, depressed or hopeless Not at all

## 2019-12-03 ENCOUNTER — CLINICAL SUPPORT (OUTPATIENT)
Dept: REHABILITATION | Facility: OTHER | Age: 59
End: 2019-12-03
Attending: PHYSICAL MEDICINE & REHABILITATION
Payer: COMMERCIAL

## 2019-12-03 DIAGNOSIS — Z74.09 POOR MOBILITY: ICD-10-CM

## 2019-12-03 DIAGNOSIS — M48.061 SPINAL STENOSIS OF LUMBAR REGION WITHOUT NEUROGENIC CLAUDICATION: ICD-10-CM

## 2019-12-03 DIAGNOSIS — G89.29 CHRONIC BILATERAL LOW BACK PAIN WITHOUT SCIATICA: ICD-10-CM

## 2019-12-03 DIAGNOSIS — M47.816 SPONDYLOSIS OF LUMBAR REGION WITHOUT MYELOPATHY OR RADICULOPATHY: ICD-10-CM

## 2019-12-03 DIAGNOSIS — M54.50 CHRONIC BILATERAL LOW BACK PAIN WITHOUT SCIATICA: ICD-10-CM

## 2019-12-03 PROCEDURE — 97750 PHYSICAL PERFORMANCE TEST: CPT | Mod: 32

## 2019-12-03 NOTE — PLAN OF CARE
[facility] - PHYSICAL THERAPY EVALUATION     Name: Josefina Blue  Clinic Number: [id number]    Therapy Diagnosis:   Encounter Diagnoses   Name Primary?    Chronic bilateral low back pain without sciatica     Spondylosis of lumbar region without myelopathy or radiculopathy     Spinal stenosis of lumbar region without neurogenic claudication     Poor mobility      Physician: Kathie Turcios, *    Physician Orders: PT Eval and Treat   Medical Diagnosis from Referral:Chronic bilateral low back pain without sciatica [M54.5, G89.29]  Spondylosis of lumbar region without myelopathy or radiculopathy [M47.816]  Spinal stenosis of lumbar region without neurogenic claudication [M48.061]  Evaluation Date: 12/3/2019  Authorization Period Expiration: 12/31/2019  Plan of Care Expiration: 12/31/2019  Reassessment Due: 12/31/2019  Visit # / Visits authorized: 1/ 20    Time In: 7:10  Time Out: 8:25  Total Billable Time: 75 minutes    Precautions: R knee scope, L meniscus tears, DDD, HTN    Pattern of pain determined: 1 PEN      Subjective   Date of onset: about 9 months ago  History of current condition - Josefina reports: The second injection got rid of a lot of the pain about 95%. She can bend over well but coming up from bending she gets a zing of pain. Difficulty with lifting heavy items (15# or more). At night she had terrible leg cramps in the quads, now it happeds aobut once or twice a week. 2 torn meniscus on the L knee does not bother her much. Was in PT in the summer because of the shooting pain was on the lateral L side. Mild intermittant discomfort in the back. A half sidelying sleeper. Can't turn over while on her stomach without pain. Walking for more than an hour will feel tension in the back, stretching helps    Per MD: Ms Blue is a 58 yo female for evaluation for the healthy back lumbar program.  she has had low back pain for 8 months and worsening until injection in October.  There was no event  that started the pain.  The pain is low back dominant, and goes to bilateral glutes.  The pain is dull and achy pain.  She has a sharp zinging pain when going from sit to stand.  She has tingling on the left buttock.  Sharp nerve pain on the outside of left knee.  There is no burning, numbness or weakness.  The pain is intermittent ranging from 0-3/10.  It is worse with lying on back and stomach, prolonged standing, climbing stairs, sit to stand, leaning back, changing positions in bed, morning, and bedtime.  It is better lying on sides, sitting, walking, muscle relaxers, afternoon and evening. She had 2 bilateral L4-5 TF NOE 9/2019 with no relief and 10/2019 with decreased severity and relief of leg cramping at night.  She has not had PT, surgery or been to chiropractor.  Her goals are climbing stairs, changing positions in bed, carrying and lifting. Pattern 2     Imaging,  MRI lumbar 8/2019  The incidentally visualized soft tissue structures of the abdomen appear normal.  There is very minimal retrolisthesis of L1 in respect to L2 by approximately 2 mm.  Similarly, mild retrolisthesis of L2 respect L3 by approximately 4 mm.  Mild anterolisthesis of L4 respect to L5 by approximately 6 3 mm.  Vertebral body heights are maintained.  There is disc desiccation throughout the lumbar spine with disc space narrowing at the L1-2, L2-3 and L5-S1 levels.  Endplate degenerative changes are noted at L5-S1.  The marrow signal is otherwise normal without evidence for a marrow replacement process, infection or tumor.  The conus terminates at T12-L1.     At L1-2, there is a circumferential disc bulge with a superimposed central/right paracentral disc protrusion, ligamentum flavum hypertrophy and facet arthropathy contributing to moderate severe central canal stenosis with mild right-sided neural foraminal narrowing.     At L2-3, there is a circumferential disc bulge with ligamentum flavum hypertrophy and facet arthropathy  resulting in mild central canal stenosis with mild bilateral neural foraminal narrowing.     At L3-4, there is a circumferential disc bulge with ligamentum flavum hypertrophy and facet arthropathy resulting in moderate central canal stenosis without neural foraminal narrowing.     At L4-5, there is a circumferential disc bulge with a superimposed central disc extrusion, ligamentum flavum hypertrophy and facet arthropathy resulting in severe central canal stenosis without neural foraminal narrowing.     At L5-S1, there is a circumferential disc bulge with facet arthropathy contributing to mild bilateral neural foraminal narrowing.     Impression                       Multilevel degenerative changes of the lumbar spine contributing to central canal stenosis and neural foraminal narrowing as detailed in the above level by level description.  Please note that the central canal stenosis is severe at the L4-5 level.     9/2019  Mild DJD.  There is a grade 1 L4/L5 anterolisthesis identified slightly more pronounced on the flexion view.  Extension view demonstrating a few mm L1/L2 and L2/L3 retrolisthesis.  The intervertebral disc spaces are narrowed.  No acute fracture or dislocation.  No bone destruction identified     Medical History:   Past Medical History:   Diagnosis Date    DDD (degenerative disc disease), lumbar 10/7/2019    Hyperlipidemia 8/31/2015    Hypertension     IFG (impaired fasting glucose) 8/31/2015    Neuropathic pain 8/31/2015    Squamous cell cancer of tongue 2012       Surgical History:   Josefina Blue  has a past surgical history that includes Hysterectomy; Tongue surgery; Knee arthroscopy (Right); Cholecystectomy; Gallbladder surgery (06/2016); Transforaminal epidural injection of steroid (Bilateral, 9/19/2019); and Transforaminal epidural injection of steroid (Bilateral, 10/7/2019).    Medications:   Josefina has a current medication list which includes the following prescription(s):  alprazolam, alprazolam, aspirin, celecoxib, clobetasol, cyclobenzaprine, diclofenac sodium, estradiol, irbesartan, methylprednisolone, ondansetron, pravastatin, tretinoin, triamterene-hydrochlorothiazide 37.5-25 mg, and zolpidem.    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  Leisure: Walking around downtown, going to football games, cooking   Prior Level of Function: I with ADLS  Current Level of Function: I with ADL's difficulty lifting heavy items, gets tightness in the back with walking  DME owned/used: Gym in the building        Pain:  Current 0/10, worst 4/10, best 0/10   Location: bilateral low back and glutes, previously had pain going down the L leg  Description: Aching, neuropathic  Sometimes it feels like butterflies, turning over is more of a sharp pain  Aggravating Factors: Lifting something heavy from the ground, turning over from stomach  Easing Factors: Medication, stretching, hot bath/shower  Disturbed Sleep: No        Pattern of pain questions:  1.  Where is your pain the worst? Back  2.  Is your pain constant or intermittent? intermittent  3.  Does bending forward make your typical pain worse? no  4.  Since the start of your back pain, has there been a change in your bowel or bladder? no  5.  What can't you do now that you use to be able to do? No      Pts goals: Be able to bend down and  a box, be able to turn over in bed.      Red Flag Screening:   Cough  Sneeze  Strain: (--)  Bladder/ bowel: (--)  Falls: (--)  Night pain: (--)  Unexplained weight loss: (--)  General health: Good    OBJECTIVE     Postural examination/scapula  alignment: Rounded shoulder and Head forward  Joint integrity: Hard end feel, tenderness to palpation at lumbo sacral region, increased discomfort with sacral thrust  Skin integrity: Intact  Edema: None noted  Sitting: Fair  Standing: Fair  Correction of posture: better with lumbar roll    MOVEMENT LOSS    ROM Loss   Flexion minimal loss   Extension moderate loss   Side bending Right moderate loss   Side bending Left moderate loss   Rotation Right minimal loss   Rotation Left minimal loss     Extension = R glute ache  Bending to the L feel disocmfort on the R    Lower Extremity Strength  Right LE  Left LE    Hip flexion: 5/5 Hip flexion: 5/5   Hip extension:  5/5 Hip extension: 5/5   Hip abduction: 5/5 Hip abduction: 5/5   Hip adduction:  5/5 Hip adduction:     Hip Internal rotation   5 Hip Internal rotation 5   Knee Flexion 5/5 Knee Flexion 5/5   Knee Extension 55 Knee Extension    Ankle dorsiflexion:  Ankle dorsiflexion:    Ankle plantarflexion:  Ankle plantarflexion:        GAIT:  Assistive Device used: none  Level of Assistance: independent  Patient displays the following gait deviations:  no gait deviations observed.     Special Tests:   Test Name  Test Result   Prone Instability Test (--)    SI Joint Provocation Test (--) + sacral thurst   Straight Leg Raise (--)   Neural Tension Test (--)   Crossed Straight Leg Raise (--)   Walking on toes (--)   Walking on heels  (--)       NEUROLOGICAL SCREENING     Sensory deficit: Intact to light touch    Reflexes:    Left Right   Patella Tendon 1+ 1+   Achilles Tendon 1+ 1+   Clonus (--) (--)     REPEATED TEST MOVEMENTS:  Repeated Flexion in Standing no effect   Repeated Extension in Standing worse   Repeated Flexion in lying no effect   Repeated Extension in lying  worse   Prone on elbows OK, Full extension not okay      Baseline Isometric Testing on Med X equipment: Testing administered by PT  Date of testin2019    ROM 0-48 deg   Max  Peak Torque 120    Min Peak Torque 27    Flex/Ext Ratio 4.44:1   % below normative data 33     Start at 50 ft lbs    Oswestry Questionnaire Review 12/3/2019 11/18/2019   Pain Intensity 1 2   Personal Care (Washing, Dressing) 1 0   Lifting 2 4   Walking 1 0   Sitting 0 2   Standing 2 2   Sleeping 2 1   Social Life 2 0   Traveling 1 0   Changing Degree of Pain 1 2   Score 13 13         Treatment   Treatment Time In: 7:55  Treatment Time Out: 08:25  Total Treatment time separate from Evaluation: 30 minutes      Josefina received therapeutic exercises to develop/improve posture, lumbar/cervical ROM, strength and muscular endurance for 30 minutes including the following exercises:     Med x dynamic exercise and baseline IM test    [facility] 12/3/2019   Visit Number 1   VAS Pain Rating 1   Flexion in Lying 10   Lumbar Extension Seat Pad 0   Femur Restraint 7   Top Dead Center 24   Counterweight 210   Lumbar Flexion 48   Lumbar Extension 0   Lumbar Peak Torque 120   Min Torque 27   Test Percent Below Normative Data 33   Ice - Z Lie (in min.) 10         Double knee to chest 10x  Posterior pelvic tilt 10x  Piriformis stretch 3x 20 sec    Plan to add:  Prone on elbows  Glute set  SIJ METs      Written Home Exercises Provided: yes.  Exercises were reviewed and Josefina was able to demonstrate them prior to the end of the session.  Josefina demonstrated good  understanding of the education provided.     See EMR under Patient Instructions for exercises provided 12/3/2019.      Education provided:   - Patient received education regarding proper posture and body mechanics.  Patient was given top 10 tips handout which discusses posture seated, standing, lifting correctly, components of exercise, importance of nutrition and hydration, and importance of sleep.  - Fadi roll tried, recommended, and purchase information was provided.    - Patient received a handout regarding anticipated muscular soreness following the isometric  test and strategies for management were reviewed with patient including stretching, using ice and scheduled rest.   - Patient received education on the Healthy Back program, purpose of the isometric test, progression of back strengthening as well as wellness approach and systemic strengthening.  Details of the program were discussed.  Reviewed that patient should feel support/pressure from med ex restraints but no pain or discomfort and patient expressed understanding.    Josefina received cold pack for 10 minutes to low back.    Assessment   Josefina is a 59 y.o. female referred to [facility] with a medical diagnosis of Chronic bilateral low back pain without sciatica, Spondylosis of lumbar region without myelopathy or radiculopathy, Spinal stenosis of lumbar region without neurogenic claudication. Pt presents with increased pain and discomfort with extension positions. She is able to complete pelvic tilt without increased discomfort but not able to do a bridge. She is able to tolerate prone lying and prone on elbows but press ups cause glute discomfort and prone leg lifts increase low back discomfort. Pt demonstrates poor motor control  of the lumbar extensor muscles. She was able to test on the lumbar medx and is 33% below age related values and shows a significant difference in flexion extension ratio.    Pain Pattern: 1 PEN       Pt prognosis is Excellent.   Pt will benefit from skilled outpatient Physical Therapy to address the deficits stated above and in the chart below, provide pt/family education, and to maximize pt's level of independence. Based on the above history and physical examination an active physical therapy program is recommended.  Pt will continue to benefit from skilled outpatient physical therapy to address the deficits listed below in the chart, provide pt/family education and to maximize pt's level of independence in the home and community environment. .       Plan of care discussed  with patient: Yes  Pt's spiritual, cultural and educational needs considered and patient is agreeable to the plan of care and goals as stated below:     Anticipated Barriers for therapy: Schedule    PT Evaluation Completed? Yes    Medical necessity is demonstrated by the following problem list.    Pt presents with the following impairments:     History  Co-morbidities and personal factors that may impact the plan of care Co-morbidities:   HTN and DDD, previous knee surgery    Personal Factors:   lifestyle     low   Examination  Body Structures and Functions, activity limitations and participation restrictions that may impact the plan of care Body Regions:   back    Body Systems:    gross symmetry  ROM  strength  gross coordinated movement  transfers  transitions  motor control  motor learning    Participation Restrictions:   Work schedule    Activity limitations:   Learning and applying knowledge  no deficits    General Tasks and Commands  no deficits    Communication  no deficits    Mobility  lifting and carrying objects    Self care  no deficits    Domestic Life  no deficits    Interactions/Relationships  no deficits    Life Areas  no deficits    Community and Social Life  no deficits         moderate   Clinical Presentation stable and uncomplicated low   Decision Making/ Complexity Score: low       GOALS: Pt is in agreement with the following goals.    Short term goals:  6 weeks or 10 visits   1.  Pt will demonstrate increased lumbar ROM by at least 3 degrees from the initial ROM value with improvements noted in functional ROM and ability to perform ADLs.  2.  Pt will demonstrate increased maximum isometric torque value by 15% when compared to the initial value resulting in improved ability to perform bending, lifting, and carrying activities safely, confidently.    3.  Patient report a reduction in worst pain score by 1-2 points for improved tolerance for lifting items off the ground.  4.  Pt able to perform HEP  "correctly with minimal cueing or supervision from therapist to encourage independent management of symptoms.       Long term goals: 10 weeks or 20 visits   1. Pt will demonstrate increased lumbar ROM by at least 6 degrees from initial ROM value, resulting in improved ability to perform functional fwd bending while standing and sitting.   2. Pt will demonstrate increased maximum isometric torque value by 30% when compared to the initial value resulting in improved ability to perform bending, lifting, and carrying activities safely, confidently.  3. Pt to demonstrate ability to independently control and reduce their pain through posture positioning and mechanical movements throughout a typical day.  4.  Pt will demonstrate reduced pain and improved functional outcomes as reported on the Oswestry Disability Index by reaching a score of 8 or less in order to demonstrate subjective improvement in pt's condition.    5. Pt will demonstrate independence with the HEP at discharge  6.  Pt will be able to turn over from lying on her stomach without increased pain      Plan   Outpatient physical therapy 2x week for 10 weeks or 20 visits to include the following:   - Patient education  - Therapeutic exercise  - Manual therapy  - Performance testing   - Neuromuscular Re-education  - Therapeutic activity   - Modalities    Pt may be seen by PTA as part of the rehabilitation team.     Therapist: Holli Lynch, PT  12/3/2019    "I certify the need for these services furnished under this plan of treatment and while under my care."    ____________________________________  Physician/Referring Practitioner    _______________  Date of Signature          "

## 2019-12-03 NOTE — PATIENT INSTRUCTIONS
Top 10 tips for back and neck pain    The spine is the pillar of the body, providing the foundation for the upper and lower extremities to attach.  Our spines withstand significant forces all day long.  There are many ways in which we can take care of our backs.  Here are a couple tips to help you keep your back in action.    1. Watch your posture in sitting.     Sit in chairs with back supports, and use a lumbar roll to maintain the normal curve of your low back. Ensure the height of your chair is such that your feet rest flat on the floor with your knees and hips level.  The average American sits 9 hours a day.  Do not sit longer than 1 hour without getting up to stretch or move.     2. Watch your posture in standing.   Maintain the normal curves of your spine in standing.   When standing tall, you should be able to draw a line down through your ear, shoulder, hip, and ankle.  Wear good shoes and consider using a standing desk mat if you stand a lot during work.  Take breaks from standing.    3. Lift correctly   Lift objects by using the strong muscles in your legs.  Get close to the object, bend your knees and your hips, and maintain the normal curve of your low back. Do not twist when lifting or carrying items. Think about the tasks you perform daily at work or home, and minimize lifting and carrying objects.  Use rolling carts or other strategies to reduce back strain.    4. Exercise regularly  Individuals who exercise regularly generally experience better health, reduced back pain, and less stress.  A good exercise program has a stretching component, a strengthening component, and an aerobic component.   Maintain the mobility of your spine by stretching daily. Strengthen your core and extremities several times a week.   Get regular cardiovascular exercise, 3-5/week.  Choose activities you like such as walking, swimming, dancing, or riding a bike.     5. Quit Smoking  Smoking increases the likelihood of  back pain.  It is thought that smoking reduces the blood supply to the discs between the vertebrae and this may lead to degeneration of these discs.  Talk to your Physician about quitting.  There are many smoking cessation options that may work for you.    6. Keep moving even when you have pain  Motion is lotion.   The majority of back pain is mechanical in nature, and will likely reduce with gentle movements, stretching, and walking.  As tempting as it may be to stay in bed when you are hurting, remember that you will likely feel better by getting up and gently moving and walking.  Limit bed rest.      7. Maintain a healthy diet  Try to maintain a healthy diet and weight.        8. Stay hydrated  The average adult is approximately 60 % water.  Staying hydrated is beneficial for all aspects of health.  In general, an adult should drink half of their body weight in ounces.  For example, if you weight 180 lbs, you should drink 90 ounces of water daily.     9. Get regular sleep   Ensure that you get a good nights rest on a regular basis. The discs in your spine hydrate when you lie down to sleep. Your spine needs the rest too.     10. See Your Physician    Make an appointment to see your Physician for back pain that is progressively worsening, and for back pain that is no better or worse with changing positions and activities.        Z LIE POSITION  Z Lie is a position that you can use to unload your back and assist with pain reduction.  Lie on your back and rest your calves on the seat on a chair or a bench.  Viewed from the side, you should resemble a Z.  Your therapist may suggest sliding the chair closer to you, so your knees are over your stomach.   Your therapist may also suggest a pillow under your buttock if needed.  Follow the directions from your therapist.  The goal of this position is to reduce your symptoms.    Z lie can be done in a variety of ways.  It can be done on a bed resting your legs on a  light and easy to lift chair

## 2019-12-06 ENCOUNTER — CLINICAL SUPPORT (OUTPATIENT)
Dept: REHABILITATION | Facility: OTHER | Age: 59
End: 2019-12-06
Attending: PHYSICAL MEDICINE & REHABILITATION
Payer: COMMERCIAL

## 2019-12-06 DIAGNOSIS — Z74.09 POOR MOBILITY: ICD-10-CM

## 2019-12-06 DIAGNOSIS — G89.29 CHRONIC BILATERAL LOW BACK PAIN WITHOUT SCIATICA: Primary | ICD-10-CM

## 2019-12-06 DIAGNOSIS — M54.50 CHRONIC BILATERAL LOW BACK PAIN WITHOUT SCIATICA: Primary | ICD-10-CM

## 2019-12-06 PROCEDURE — 97750 PHYSICAL PERFORMANCE TEST: CPT | Mod: 32

## 2019-12-06 NOTE — PROGRESS NOTES
[facility]      Name: Josefina Blue  Clinic Number: [id number]    Therapy Diagnosis:   Encounter Diagnoses   Name Primary?    Poor mobility     Chronic bilateral low back pain without sciatica Yes     Physician: Kathie Turcios, *    Visit Date: 2019    Physician Orders: PT Eval and Treat   Medical Diagnosis from Referral:Chronic bilateral low back pain without sciatica [M54.5, G89.29]  Spondylosis of lumbar region without myelopathy or radiculopathy [M47.816]  Spinal stenosis of lumbar region without neurogenic claudication [M48.061]  Evaluation Date: 12/3/2019  Authorization Period Expiration: 2019  Plan of Care Expiration: 2019  Reassessment Due: 2019  Visit # / Visits authorized:      Time In: 7:10  Time Out: 8:25  Total Billable Time: 75 minutes     Precautions: R knee scope, L meniscus tears, DDD, HTN     Pattern of pain determined: 1 PEN         Subjective   Josefina reports some soreness today in the low back, she has had some pain but at the worst it was about 3/10, not debilitating, she stretched in the shower this morning, letting that water run over her back..      Patient reports tolerating previous visit fair, with some soreness  Patient reports their pain to be 3/10 on a 0-10 scale with 0 being no pain and 10 being the worst pain imaginable.  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       Objective     Baseline Isometric Testing on Med X equipment: Testing administered by PT  Date of testin2019     ROM 0-48 deg   Max Peak Torque 120    Min Peak Torque 27    Flex/Ext Ratio 4.44:1   % below normative data 33      Start at 50 ft lbs      Outcomes:  Initial score:  Visit 5 score:  Goal:      Treatment    Pt was  instructed in and performed the following:     Josefina received therapeutic exercises to develop/improved posture, cardiovascular endurance, muscular endurance, lumbar/cervical ROM, strength and muscular endurance for 30 minutes including the following exercises:     [facility] 12/6/2019   Visit Number 2   VAS Pain Rating 3   Recumbent Bike Seat Pos. 14   Time 10   Flexion in Lying 10   Lumbar Extension Seat Pad -   Femur Restraint -   Top Dead Center -   Counterweight -   Lumbar Flexion -   Lumbar Extension -   Lumbar Peak Torque -   Min Torque -   Test Percent Below Normative Data -   Lumbar Weight 50   Repetitions 15   Rating of Perceived Exertion 4   Ice - Z Lie (in min.) 10         Double knee to chest 10x  Posterior pelvic tilt 10x  Piriformis stretch 3x 20 sec  Prone Glute set 10x 5 sec    Plan to add:  Prone on elbows  SIJ METs        Peripheral muscle strengthening which included 1 set of 15-20 repetitions at a slow, controlled 10-13 second per rep pace focused on strengthening supporting musculature for improved body mechanics and functional mobility.  Pt and therapist focused on proper form during treatment to ensure optimal strengthening of each targeted muscle group.  Machines were utilized including torso rotation, leg extension, leg curl, chest press, upright row. Tricep extension, bicep curl, leg press, and hip abduction added visit 3    Josefina received the following manual therapy techniques: none were applied.         Home Exercises Provided and Patient Education Provided     Education provided:   - Patient received education in benefits of progressing mobility and strengthening gradually  - Discussed exertion scale, slow progressive resistance protocol to promote safe and healthy strengthening of supportive musculature  by performing 15-20 reps, 7 sec per rep, and increasing weight by 5 % at 20 reps only if there ex done safely, slowly, using correct musculature, exertion and no pain.   "Patient expressed understanding.  -Pt educated on strategies to safely perform examination and exercise using "Stop Light" analogy to describe how to avoid or stop irritating motions, proceed with caution with some movements, and progress positive exercises.     Written Home Exercises Provided: Patient instructed to cont prior HEP.  Exercises were reviewed and Josefina was able to demonstrate them prior to the end of the session.  Josefina demonstrated good  understanding of the education provided.     See EMR under Patient Instructions for exercises provided prior visit.          Assessment     Pt presents to second healthy back visit reporting minimal low back and and felt better post therapy. Pt was able to start strengthening and endurance training on the lumbar MedX at 50% of max peak torque according to the initial visit isometric test. Pt was able to complete 15 reps, with 4/10 RPE. Pt was also able to complete half of the peripheral strengthening exercises without increased discomfort and will complete the complete circuit next visit as tolerated.  Patient is making good progress towards established goals.  Pt will continue to benefit from skilled outpatient physical therapy to address the deficits stated in the impairment chart, provide pt/family education and to maximize pt's level of independence in the home and community environment.     Anticipated Barriers for therapy: Schedule  Pt's spiritual, cultural and educational needs considered and pt agreeable to plan of care and goals as stated below:       GOALS: Pt is in agreement with the following goals.     Short term goals:  6 weeks or 10 visits   1.  Pt will demonstrate increased lumbar ROM by at least 3 degrees from the initial ROM value with improvements noted in functional ROM and ability to perform ADLs.  (PROGRESSING, NOT MET)  2.  Pt will demonstrate increased maximum isometric torque value by 15% when compared to the initial value resulting in " improved ability to perform bending, lifting, and carrying activities safely, confidently.  (PROGRESSING, NOT MET)  3.  Patient report a reduction in worst pain score by 1-2 points for improved tolerance for lifting items off the ground.  (PROGRESSING, NOT MET)  4.  Pt able to perform HEP correctly with minimal cueing or supervision from therapist to encourage independent management of symptoms.   (PROGRESSING, NOT MET)        Long term goals: 10 weeks or 20 visits   1. Pt will demonstrate increased lumbar ROM by at least 6 degrees from initial ROM value, resulting in improved ability to perform functional fwd bending while standing and sitting.   (PROGRESSING, NOT MET)  2. Pt will demonstrate increased maximum isometric torque value by 30% when compared to the initial value resulting in improved ability to perform bending, lifting, and carrying activities safely, confidently.  (PROGRESSING, NOT MET)  3. Pt to demonstrate ability to independently control and reduce their pain through posture positioning and mechanical movements throughout a typical day.  (PROGRESSING, NOT MET)  4.  Pt will demonstrate reduced pain and improved functional outcomes as reported on the Oswestry Disability Index by reaching a score of 8 or less in order to demonstrate subjective improvement in pt's condition.    (PROGRESSING, NOT MET)  5. Pt will demonstrate independence with the HEP at discharge  (PROGRESSING, NOT MET)  6.  Pt will be able to turn over from lying on her stomach without increased pain  (PROGRESSING, NOT MET)        Plan   Continue with established Plan of Care towards established PT goals.

## 2019-12-10 ENCOUNTER — CLINICAL SUPPORT (OUTPATIENT)
Dept: REHABILITATION | Facility: OTHER | Age: 59
End: 2019-12-10
Attending: PHYSICAL MEDICINE & REHABILITATION
Payer: COMMERCIAL

## 2019-12-10 DIAGNOSIS — Z74.09 POOR MOBILITY: ICD-10-CM

## 2019-12-10 DIAGNOSIS — G89.29 CHRONIC BILATERAL LOW BACK PAIN WITHOUT SCIATICA: Primary | ICD-10-CM

## 2019-12-10 DIAGNOSIS — M54.50 CHRONIC BILATERAL LOW BACK PAIN WITHOUT SCIATICA: Primary | ICD-10-CM

## 2019-12-10 PROCEDURE — 97750 PHYSICAL PERFORMANCE TEST: CPT | Mod: 32

## 2019-12-10 NOTE — PROGRESS NOTES
[facility]      Name: Josefina Blue  Clinic Number: [id number]    Therapy Diagnosis:   Encounter Diagnoses   Name Primary?    Poor mobility     Chronic bilateral low back pain without sciatica Yes     Physician: Kathie Turcios, *    Visit Date: 12/10/2019    Physician Orders: PT Eval and Treat   Medical Diagnosis from Referral:Chronic bilateral low back pain without sciatica [M54.5, G89.29]  Spondylosis of lumbar region without myelopathy or radiculopathy [M47.816]  Spinal stenosis of lumbar region without neurogenic claudication [M48.061]  Evaluation Date: 12/3/2019  Authorization Period Expiration: 2019  Plan of Care Expiration: 2019  Reassessment Due: 2019  Visit # / Visits authorized: 3/ 20     Time In: 7:00  Time Out: 8:00  Total Billable Time: 45 minutes     Precautions: R knee scope, L meniscus tears, DDD, HTN     Pattern of pain determined: 1 PEN         Subjective   Josefina reports her back is feeling great but she has been having some discomfort in the neck on the R side and it is difficult to turn her head to the R    Patient reports tolerating previous visit fair, with some soreness  Patient reports their pain to be 0/10 on a 0-10 scale with 0 being no pain and 10 being the worst pain imaginable.  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       Objective     Baseline Isometric Testing on Med X equipment: Testing administered by PT  Date of testin2019     ROM 0-48 deg   Max Peak Torque 120    Min Peak Torque 27    Flex/Ext Ratio 4.44:1   % below normative data 33      Start at 50 ft lbs      Outcomes:  Oswestry Questionnaire Review 12/3/2019 2019   Pain Intensity 1 2   Personal Care (Washing, Dressing) 1 0   Lifting 2 4    Walking 1 0   Sitting 0 2   Standing 2 2   Sleeping 2 1   Social Life 2 0   Traveling 1 0   Changing Degree of Pain 1 2   Score 13 13         Treatment    Pt was instructed in and performed the following:     Josefina received therapeutic exercises to develop/improved posture, cardiovascular endurance, muscular endurance, lumbar/cervical ROM, strength and muscular endurance for 30 minutes including the following exercises:     [facility] 12/10/2019   Visit Number 3   VAS Pain Rating 0   Recumbent Bike Seat Pos. -   Time 10   Flexion in Lying 10   Manual Therapy 10   Lumbar Weight 50   Repetitions 20   Rating of Perceived Exertion 3   Ice - Z Lie (in min.) 10         Double knee to chest 10x  Posterior pelvic tilt 10x  Piriformis stretch 3x 20 sec  Prone Glute set 10x 5 sec  Cervical Right Rotation isometric 5 sec x 5 x 2  R levator scapula stretch 10 sec x 5    Plan to add:  Prone on elbows  SIJ METs        Peripheral muscle strengthening which included 1 set of 15-20 repetitions at a slow, controlled 10-13 second per rep pace focused on strengthening supporting musculature for improved body mechanics and functional mobility.  Pt and therapist focused on proper form during treatment to ensure optimal strengthening of each targeted muscle group.  Machines were utilized including torso rotation, leg extension, leg curl, chest press, upright row. Tricep extension, bicep curl, leg press, and hip abduction added visit 3    Josefina received the following manual therapy techniques: Cervical manual therapy, occipital release, Upper trap and levator scapula STM. Closing mobs on the L Grade 3-4        Home Exercises Provided and Patient Education Provided     Education provided:   - Patient received education in benefits of progressing mobility and strengthening gradually  - Discussed exertion scale, slow progressive resistance protocol to promote safe and healthy strengthening of supportive musculature  by performing  "15-20 reps, 7 sec per rep, and increasing weight by 5 % at 20 reps only if there ex done safely, slowly, using correct musculature, exertion and no pain.  Patient expressed understanding.  -Pt educated on strategies to safely perform examination and exercise using "Stop Light" analogy to describe how to avoid or stop irritating motions, proceed with caution with some movements, and progress positive exercises.     Written Home Exercises Provided: Patient instructed to cont prior HEP.  Exercises were reviewed and Josefina was able to demonstrate them prior to the end of the session.  Josefina demonstrated good  understanding of the education provided.     See EMR under Patient Instructions for exercises provided prior visit.          Assessment     Pt presents to visit with increased neck pain which she attributes to arthritis. Decreased pain with levator scapula stretch and rotation isometrics. Pt was able to complete back stretches and the lumbar medx. Will increase 5-10% next visit as tolerated.     Patient is making good progress towards established goals.  Pt will continue to benefit from skilled outpatient physical therapy to address the deficits stated in the impairment chart, provide pt/family education and to maximize pt's level of independence in the home and community environment.     Anticipated Barriers for therapy: Schedule  Pt's spiritual, cultural and educational needs considered and pt agreeable to plan of care and goals as stated below:       GOALS: Pt is in agreement with the following goals.     Short term goals:  6 weeks or 10 visits   1.  Pt will demonstrate increased lumbar ROM by at least 3 degrees from the initial ROM value with improvements noted in functional ROM and ability to perform ADLs.  (PROGRESSING, NOT MET)  2.  Pt will demonstrate increased maximum isometric torque value by 15% when compared to the initial value resulting in improved ability to perform bending, lifting, and carrying " activities safely, confidently.  (PROGRESSING, NOT MET)  3.  Patient report a reduction in worst pain score by 1-2 points for improved tolerance for lifting items off the ground.  (PROGRESSING, NOT MET)  4.  Pt able to perform HEP correctly with minimal cueing or supervision from therapist to encourage independent management of symptoms.   (PROGRESSING, NOT MET)        Long term goals: 10 weeks or 20 visits   1. Pt will demonstrate increased lumbar ROM by at least 6 degrees from initial ROM value, resulting in improved ability to perform functional fwd bending while standing and sitting.   (PROGRESSING, NOT MET)  2. Pt will demonstrate increased maximum isometric torque value by 30% when compared to the initial value resulting in improved ability to perform bending, lifting, and carrying activities safely, confidently.  (PROGRESSING, NOT MET)  3. Pt to demonstrate ability to independently control and reduce their pain through posture positioning and mechanical movements throughout a typical day.  (PROGRESSING, NOT MET)  4.  Pt will demonstrate reduced pain and improved functional outcomes as reported on the Oswestry Disability Index by reaching a score of 8 or less in order to demonstrate subjective improvement in pt's condition.    (PROGRESSING, NOT MET)  5. Pt will demonstrate independence with the HEP at discharge  (PROGRESSING, NOT MET)  6.  Pt will be able to turn over from lying on her stomach without increased pain  (PROGRESSING, NOT MET)        Plan   Continue with established Plan of Care towards established PT goals.

## 2019-12-11 DIAGNOSIS — M54.50 LOW BACK PAIN: ICD-10-CM

## 2019-12-11 DIAGNOSIS — M76.30 IT BAND SYNDROME: ICD-10-CM

## 2019-12-11 DIAGNOSIS — M17.12 DEGENERATIVE ARTHRITIS OF LEFT KNEE: ICD-10-CM

## 2019-12-11 DIAGNOSIS — M25.562 LEFT KNEE PAIN, UNSPECIFIED CHRONICITY: ICD-10-CM

## 2019-12-11 NOTE — TELEPHONE ENCOUNTER
----- Message from Srinivasan More sent at 12/11/2019  3:17 PM CST -----  Contact: Velia ([facility]) [phone]  Pharmacy Calling    Reason for call: refill request     Pharmacy Name:     [facility][id number] - [place], [place] - [address] [place] [id number]  [facility] [address]  Phone: [phone] Fax: [phone]      Prescription Name:celecoxib (CELEBREX) 200 MG       Additional Information:

## 2019-12-12 RX ORDER — CELECOXIB 200 MG/1
200 CAPSULE ORAL 2 TIMES DAILY
Qty: 60 CAPSULE | Refills: 2 | Status: SHIPPED | OUTPATIENT
Start: 2019-12-12 | End: 2020-06-15 | Stop reason: SDUPTHER

## 2019-12-13 ENCOUNTER — CLINICAL SUPPORT (OUTPATIENT)
Dept: REHABILITATION | Facility: OTHER | Age: 59
End: 2019-12-13
Attending: PHYSICAL MEDICINE & REHABILITATION
Payer: COMMERCIAL

## 2019-12-13 DIAGNOSIS — G89.29 CHRONIC BILATERAL LOW BACK PAIN WITHOUT SCIATICA: Primary | ICD-10-CM

## 2019-12-13 DIAGNOSIS — M54.50 CHRONIC BILATERAL LOW BACK PAIN WITHOUT SCIATICA: Primary | ICD-10-CM

## 2019-12-13 DIAGNOSIS — Z74.09 POOR MOBILITY: ICD-10-CM

## 2019-12-13 PROCEDURE — 97750 PHYSICAL PERFORMANCE TEST: CPT | Mod: 32

## 2019-12-13 NOTE — PROGRESS NOTES
[facility]      Name: Josefina Blue  Clinic Number: [id number]    Therapy Diagnosis:   Encounter Diagnoses   Name Primary?    Poor mobility     Chronic bilateral low back pain without sciatica Yes     Physician: Kathie Turcios, *    Visit Date: 2019    Physician Orders: PT Eval and Treat   Medical Diagnosis from Referral:Chronic bilateral low back pain without sciatica [M54.5, G89.29]  Spondylosis of lumbar region without myelopathy or radiculopathy [M47.816]  Spinal stenosis of lumbar region without neurogenic claudication [M48.061]  Evaluation Date: 12/3/2019  Authorization Period Expiration: 2019  Plan of Care Expiration: 2019  Reassessment Due: 2019  Visit # / Visits authorized:      Time In: 7:00  Time Out: 8:00  Total Billable Time: 45 minutes     Precautions: R knee scope, L meniscus tears, DDD, HTN     Pattern of pain determined: 1 PEN         Subjective   Josefina reports her neck and back are feeling good, she got the treat your neck book and there was a stretch she did that released the tension in the neck    Patient reports tolerating previous visit fair, with some soreness  Patient reports their pain to be 0/10 on a 0-10 scale with 0 being no pain and 10 being the worst pain imaginable.  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       Objective     Baseline Isometric Testing on Med X equipment: Testing administered by PT  Date of testin2019     ROM 0-48 deg   Max Peak Torque 120    Min Peak Torque 27    Flex/Ext Ratio 4.44:1   % below normative data 33      Start at 50 ft lbs      Outcomes:  Oswestry Questionnaire Review 12/3/2019 2019   Pain Intensity 1 2   Personal Care (Washing, Dressing) 1 0   Lifting 2  4   Walking 1 0   Sitting 0 2   Standing 2 2   Sleeping 2 1   Social Life 2 0   Traveling 1 0   Changing Degree of Pain 1 2   Score 13 13         Treatment    Pt was instructed in and performed the following:     Josefina received therapeutic exercises to develop/improved posture, cardiovascular endurance, muscular endurance, lumbar/cervical ROM, strength and muscular endurance for 30 minutes including the following exercises:       [facility] 12/13/2019   Visit Number 4   VAS Pain Rating 0   Recumbent Bike Seat Pos. -   Time 10   Flexion in Lying 10   Manual Therapy -   Lumbar Extension Seat Pad -   Femur Restraint 6   Top Dead Center -   Counterweight -   Lumbar Flexion -   Lumbar Extension -   Lumbar Peak Torque -   Min Torque -   Test Percent Below Normative Data -   Lumbar Weight 53   Repetitions 15   Rating of Perceived Exertion 4   Ice - Z Lie (in min.) 10           Double knee to chest 10x  Posterior pelvic tilt 10x  Piriformis stretch 3x 20 sec  Prone Glute set 10x 5 sec  Prone on elbows 30 sec x 3        Peripheral muscle strengthening which included 1 set of 15-20 repetitions at a slow, controlled 10-13 second per rep pace focused on strengthening supporting musculature for improved body mechanics and functional mobility.  Pt and therapist focused on proper form during treatment to ensure optimal strengthening of each targeted muscle group.  Machines were utilized including torso rotation, leg extension, leg curl, chest press, upright row. Tricep extension, bicep curl, leg press, and hip abduction added visit 3    Josefina received the following manual therapy techniques: Cervical manual therapy, occipital release, Upper trap and levator scapula STM. Closing mobs on the L Grade 3-4        Home Exercises Provided and Patient Education Provided     Education provided:   - Patient received education in benefits of progressing mobility and strengthening gradually  - Discussed exertion scale, slow  "progressive resistance protocol to promote safe and healthy strengthening of supportive musculature  by performing 15-20 reps, 7 sec per rep, and increasing weight by 5 % at 20 reps only if there ex done safely, slowly, using correct musculature, exertion and no pain.  Patient expressed understanding.  -Pt educated on strategies to safely perform examination and exercise using "Stop Light" analogy to describe how to avoid or stop irritating motions, proceed with caution with some movements, and progress positive exercises.     Written Home Exercises Provided: Patient instructed to cont prior HEP.  Exercises were reviewed and Josefina was able to demonstrate them prior to the end of the session.  Josefina demonstrated good  understanding of the education provided.     See EMR under Patient Instructions for exercises provided prior visit.          Assessment     Pt presents to visit without low back or neck pain, she was able to complete therapy without increase in pain. Added prone on elbow to increase lumbar extension without c/o discomfort int he low back. Increased resistance on the lumbar medx by 5% and she was able to complete 15 reps with 4/10 RPE, will increase repetitions as tolerated next visit.    Patient is making good progress towards established goals.  Pt will continue to benefit from skilled outpatient physical therapy to address the deficits stated in the impairment chart, provide pt/family education and to maximize pt's level of independence in the home and community environment.     Anticipated Barriers for therapy: Schedule  Pt's spiritual, cultural and educational needs considered and pt agreeable to plan of care and goals as stated below:       GOALS: Pt is in agreement with the following goals.     Short term goals:  6 weeks or 10 visits   1.  Pt will demonstrate increased lumbar ROM by at least 3 degrees from the initial ROM value with improvements noted in functional ROM and ability to perform " ADLs.  (PROGRESSING, NOT MET)  2.  Pt will demonstrate increased maximum isometric torque value by 15% when compared to the initial value resulting in improved ability to perform bending, lifting, and carrying activities safely, confidently.  (PROGRESSING, NOT MET)  3.  Patient report a reduction in worst pain score by 1-2 points for improved tolerance for lifting items off the ground.  (PROGRESSING, NOT MET)  4.  Pt able to perform HEP correctly with minimal cueing or supervision from therapist to encourage independent management of symptoms.   (PROGRESSING, NOT MET)        Long term goals: 10 weeks or 20 visits   1. Pt will demonstrate increased lumbar ROM by at least 6 degrees from initial ROM value, resulting in improved ability to perform functional fwd bending while standing and sitting.   (PROGRESSING, NOT MET)  2. Pt will demonstrate increased maximum isometric torque value by 30% when compared to the initial value resulting in improved ability to perform bending, lifting, and carrying activities safely, confidently.  (PROGRESSING, NOT MET)  3. Pt to demonstrate ability to independently control and reduce their pain through posture positioning and mechanical movements throughout a typical day.  (PROGRESSING, NOT MET)  4.  Pt will demonstrate reduced pain and improved functional outcomes as reported on the Oswestry Disability Index by reaching a score of 8 or less in order to demonstrate subjective improvement in pt's condition.    (PROGRESSING, NOT MET)  5. Pt will demonstrate independence with the HEP at discharge  (PROGRESSING, NOT MET)  6.  Pt will be able to turn over from lying on her stomach without increased pain  (PROGRESSING, NOT MET)        Plan   Continue with established Plan of Care towards established PT goals.

## 2019-12-17 ENCOUNTER — CLINICAL SUPPORT (OUTPATIENT)
Dept: REHABILITATION | Facility: OTHER | Age: 59
End: 2019-12-17
Attending: PHYSICAL MEDICINE & REHABILITATION
Payer: COMMERCIAL

## 2019-12-17 DIAGNOSIS — M54.50 CHRONIC BILATERAL LOW BACK PAIN WITHOUT SCIATICA: Primary | ICD-10-CM

## 2019-12-17 DIAGNOSIS — G89.29 CHRONIC BILATERAL LOW BACK PAIN WITHOUT SCIATICA: Primary | ICD-10-CM

## 2019-12-17 DIAGNOSIS — Z74.09 POOR MOBILITY: ICD-10-CM

## 2019-12-17 PROCEDURE — 97750 PHYSICAL PERFORMANCE TEST: CPT | Mod: 32

## 2019-12-17 NOTE — PROGRESS NOTES
[facility]      Name: Josefina Blue  Clinic Number: [id number]    Therapy Diagnosis:   Encounter Diagnoses   Name Primary?    Poor mobility     Chronic bilateral low back pain without sciatica Yes     Physician: Kathie Turcios, *    Visit Date: 2019    Physician Orders: PT Eval and Treat   Medical Diagnosis from Referral:Chronic bilateral low back pain without sciatica [M54.5, G89.29]  Spondylosis of lumbar region without myelopathy or radiculopathy [M47.816]  Spinal stenosis of lumbar region without neurogenic claudication [M48.061]  Evaluation Date: 12/3/2019  Authorization Period Expiration: 2019  Plan of Care Expiration: 2019  Reassessment Due: 2019  Visit # / Visits authorized:      Time In: 7:00  Time Out: 8:00  Total Billable Time: 45 minutes     Precautions: R knee scope, L meniscus tears, DDD, HTN     Pattern of pain determined: 1 PEN         Subjective   Josefina reports her neck and back are feeling good. No new complaints at this time    Patient reports tolerating previous visit fair, with some soreness  Patient reports their pain to be 0/10 on a 0-10 scale with 0 being no pain and 10 being the worst pain imaginable.  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       Objective     Baseline Isometric Testing on Med X equipment: Testing administered by PT  Date of testin2019     ROM 0-48 deg   Max Peak Torque 120    Min Peak Torque 27    Flex/Ext Ratio 4.44:1   % below normative data 33      Start at 50 ft lbs      Outcomes:  Oswestry Questionnaire Review 12/3/2019 2019   Pain Intensity 1 2   Personal Care (Washing, Dressing) 1 0   Lifting 2 4   Walking 1 0   Sitting 0 2   Standing 2 2   Sleeping 2 1   Social  "Life 2 0   Traveling 1 0   Changing Degree of Pain 1 2   Score 13 13         Treatment    Pt was instructed in and performed the following:     Josefina received therapeutic exercises to develop/improved posture, cardiovascular endurance, muscular endurance, lumbar/cervical ROM, strength and muscular endurance for 30 minutes including the following exercises:       [facility] 12/17/2019   Visit Number 5   VAS Pain Rating 0   Recumbent Bike Seat Pos. -   Time 10   Flexion in Lying 10   Lumbar Weight 53   Repetitions 18   Rating of Perceived Exertion 3   Ice - Z Lie (in min.) 10       Double knee to chest 10x  Posterior pelvic tilt 10x  Bridges 10x  Piriformis stretch 3x 20 sec  Prone Glute set c/ prone leg lift 10x  Prone on elbows 30 sec x 3        Peripheral muscle strengthening which included 1 set of 15-20 repetitions at a slow, controlled 10-13 second per rep pace focused on strengthening supporting musculature for improved body mechanics and functional mobility.  Pt and therapist focused on proper form during treatment to ensure optimal strengthening of each targeted muscle group.  Machines were utilized including torso rotation, leg extension, leg curl, chest press, upright row. Tricep extension, bicep curl, leg press, and hip abduction added visit 3    Josefina received the following manual therapy techniques: none        Home Exercises Provided and Patient Education Provided     Education provided:   - Patient received education in benefits of progressing mobility and strengthening gradually  - Discussed exertion scale, slow progressive resistance protocol to promote safe and healthy strengthening of supportive musculature  by performing 15-20 reps, 7 sec per rep, and increasing weight by 5 % at 20 reps only if there ex done safely, slowly, using correct musculature, exertion and no pain.  Patient expressed understanding.  -Pt educated on strategies to safely perform examination and exercise using "Stop " "Light" analogy to describe how to avoid or stop irritating motions, proceed with caution with some movements, and progress positive exercises.     Written Home Exercises Provided: Patient instructed to cont prior HEP.  Exercises were reviewed and Josefina was able to demonstrate them prior to the end of the session.  Josefina demonstrated good  understanding of the education provided.     See EMR under Patient Instructions for exercises provided prior visit.          Assessment     Pt presents to visit without low back or neck pain, she was able to complete therapy without increase in pain. Added bridges and glute set this visit without c/o discomfort in the low back. Some discomfort int he low back with prone on elbows when standing up, she feels like she needs to go into flexion. Increased repetitions on the lumbar medx  as tolerated next visit.    Patient is making good progress towards established goals.  Pt will continue to benefit from skilled outpatient physical therapy to address the deficits stated in the impairment chart, provide pt/family education and to maximize pt's level of independence in the home and community environment.     Anticipated Barriers for therapy: Schedule  Pt's spiritual, cultural and educational needs considered and pt agreeable to plan of care and goals as stated below:       GOALS: Pt is in agreement with the following goals.     Short term goals:  6 weeks or 10 visits   1.  Pt will demonstrate increased lumbar ROM by at least 3 degrees from the initial ROM value with improvements noted in functional ROM and ability to perform ADLs.  (PROGRESSING, NOT MET)  2.  Pt will demonstrate increased maximum isometric torque value by 15% when compared to the initial value resulting in improved ability to perform bending, lifting, and carrying activities safely, confidently.  (PROGRESSING, NOT MET)  3.  Patient report a reduction in worst pain score by 1-2 points for improved tolerance for lifting " items off the ground.  (PROGRESSING, NOT MET)  4.  Pt able to perform HEP correctly with minimal cueing or supervision from therapist to encourage independent management of symptoms.   (PROGRESSING, NOT MET)        Long term goals: 10 weeks or 20 visits   1. Pt will demonstrate increased lumbar ROM by at least 6 degrees from initial ROM value, resulting in improved ability to perform functional fwd bending while standing and sitting.   (PROGRESSING, NOT MET)  2. Pt will demonstrate increased maximum isometric torque value by 30% when compared to the initial value resulting in improved ability to perform bending, lifting, and carrying activities safely, confidently.  (PROGRESSING, NOT MET)  3. Pt to demonstrate ability to independently control and reduce their pain through posture positioning and mechanical movements throughout a typical day.  (PROGRESSING, NOT MET)  4.  Pt will demonstrate reduced pain and improved functional outcomes as reported on the Oswestry Disability Index by reaching a score of 8 or less in order to demonstrate subjective improvement in pt's condition.    (PROGRESSING, NOT MET)  5. Pt will demonstrate independence with the HEP at discharge  (PROGRESSING, NOT MET)  6.  Pt will be able to turn over from lying on her stomach without increased pain  (PROGRESSING, NOT MET)        Plan   Continue with established Plan of Care towards established PT goals.

## 2019-12-18 DIAGNOSIS — M62.838 MUSCLE SPASM: ICD-10-CM

## 2019-12-18 RX ORDER — CYCLOBENZAPRINE HCL 5 MG
5 TABLET ORAL 3 TIMES DAILY PRN
Qty: 30 TABLET | Refills: 3 | Status: SHIPPED | OUTPATIENT
Start: 2019-12-18 | End: 2020-03-07 | Stop reason: SDUPTHER

## 2019-12-20 ENCOUNTER — CLINICAL SUPPORT (OUTPATIENT)
Dept: REHABILITATION | Facility: OTHER | Age: 59
End: 2019-12-20
Attending: PHYSICAL MEDICINE & REHABILITATION
Payer: COMMERCIAL

## 2019-12-20 DIAGNOSIS — M54.50 CHRONIC BILATERAL LOW BACK PAIN WITHOUT SCIATICA: Primary | ICD-10-CM

## 2019-12-20 DIAGNOSIS — G89.29 CHRONIC BILATERAL LOW BACK PAIN WITHOUT SCIATICA: Primary | ICD-10-CM

## 2019-12-20 DIAGNOSIS — Z74.09 POOR MOBILITY: ICD-10-CM

## 2019-12-20 PROCEDURE — 97750 PHYSICAL PERFORMANCE TEST: CPT | Mod: 32

## 2019-12-20 NOTE — PROGRESS NOTES
[facility]      Name: Josefina Blue  Clinic Number: [id number]    Therapy Diagnosis:   Encounter Diagnoses   Name Primary?    Poor mobility     Chronic bilateral low back pain without sciatica Yes     Physician: Kathie Turcios, *    Visit Date: 2019    Physician Orders: PT Eval and Treat   Medical Diagnosis from Referral:Chronic bilateral low back pain without sciatica [M54.5, G89.29]  Spondylosis of lumbar region without myelopathy or radiculopathy [M47.816]  Spinal stenosis of lumbar region without neurogenic claudication [M48.061]  Evaluation Date: 12/3/2019  Authorization Period Expiration: 2019  Plan of Care Expiration: 2019  Reassessment Due: 2019  Visit # / Visits authorized:      Time In: 7:00  Time Out: 8:00  Total Billable Time: 45 minutes     Precautions: R knee scope, L meniscus tears, DDD, HTN     Pattern of pain determined: 1 PEN         Subjective   Josefina reports her neck and back are feeling good. No new complaints at this time    Patient reports tolerating previous visit fair, with moderate soreness that last approximately one day.   Patient reports their pain to be 0/10 on a 0-10 scale with 0 being no pain and 10 being the worst pain imaginable.  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       Objective     Baseline Isometric Testing on Med X equipment: Testing administered by PT  Date of testin2019     ROM 0-48 deg   Max Peak Torque 120    Min Peak Torque 27    Flex/Ext Ratio 4.44:1   % below normative data 33      Start at 50 ft lbs      Outcomes:  Oswestry Questionnaire Review 12/3/2019 2019   Pain Intensity 1 2   Personal Care (Washing, Dressing) 1 0   Lifting 2 4   Walking 1 0   Sitting 0 2    Standing 2 2   Sleeping 2 1   Social Life 2 0   Traveling 1 0   Changing Degree of Pain 1 2   Score 13 13         Treatment    Pt was instructed in and performed the following:     Josefina received therapeutic exercises to develop/improved posture, cardiovascular endurance, muscular endurance, lumbar/cervical ROM, strength and muscular endurance for 30 minutes including the following exercises:   [facility] 12/20/2019   Visit Number 6   VAS Pain Rating 0   Recumbent Bike Seat Pos. -   Time 10   Flexion in Lying 10   Manual Therapy -   Lumbar Extension Seat Pad -   Femur Restraint -   Top Dead Center -   Counterweight -   Lumbar Flexion -   Lumbar Extension -   Lumbar Peak Torque -   Min Torque -   Test Percent Below Normative Data -   Lumbar Weight 53   Repetitions 20   Rating of Perceived Exertion 3   Ice - Z Lie (in min.) 10     Exercises completed today:  Double knee to chest 10x  Posterior pelvic tilt 10x  Bridges 10x  Piriformis stretch 3x 20 sec   Prone Glute set c/ prone leg lift 10x  Prone on elbows 30 sec x 3 (Held for now)         Peripheral muscle strengthening which included 1 set of 15-20 repetitions at a slow, controlled 10-13 second per rep pace focused on strengthening supporting musculature for improved body mechanics and functional mobility.  Pt and therapist focused on proper form during treatment to ensure optimal strengthening of each targeted muscle group.  Machines were utilized including torso rotation, leg extension, leg curl, chest press, upright row. Tricep extension, bicep curl, leg press, and hip abduction added visit 3    Josefina received the following manual therapy techniques: none        Home Exercises Provided and Patient Education Provided     Education provided:   - Patient received education in benefits of progressing mobility and strengthening gradually  - Discussed exertion scale, slow progressive resistance protocol to promote safe and healthy strengthening of  "supportive musculature  by performing 15-20 reps, 7 sec per rep, and increasing weight by 5 % at 20 reps only if there ex done safely, slowly, using correct musculature, exertion and no pain.  Patient expressed understanding.  -Pt educated on strategies to safely perform examination and exercise using "Stop Light" analogy to describe how to avoid or stop irritating motions, proceed with caution with some movements, and progress positive exercises.     Written Home Exercises Provided: Patient instructed to cont prior HEP.  Exercises were reviewed and Josefina was able to demonstrate them prior to the end of the session.  Josefina demonstrated good  understanding of the education provided.     See EMR under Patient Instructions for exercises provided prior visit.          Assessment     Pt able to complete all components of session with no adverse effects. Increase resistance next session secondary to performance on medx machine this session. Extension in lying held today due to increased pinching sensation reported by pt. Will reassess and attempt to re implement at future session.   Patient is making good progress towards established goals.  Pt will continue to benefit from skilled outpatient physical therapy to address the deficits stated in the impairment chart, provide pt/family education and to maximize pt's level of independence in the home and community environment.     Anticipated Barriers for therapy: Schedule  Pt's spiritual, cultural and educational needs considered and pt agreeable to plan of care and goals as stated below:       GOALS: Pt is in agreement with the following goals.     Short term goals:  6 weeks or 10 visits   1.  Pt will demonstrate increased lumbar ROM by at least 3 degrees from the initial ROM value with improvements noted in functional ROM and ability to perform ADLs.  (PROGRESSING, NOT MET)  2.  Pt will demonstrate increased maximum isometric torque value by 15% when compared to the " initial value resulting in improved ability to perform bending, lifting, and carrying activities safely, confidently.  (PROGRESSING, NOT MET)  3.  Patient report a reduction in worst pain score by 1-2 points for improved tolerance for lifting items off the ground.  (PROGRESSING, NOT MET)  4.  Pt able to perform HEP correctly with minimal cueing or supervision from therapist to encourage independent management of symptoms.   (PROGRESSING, NOT MET)        Long term goals: 10 weeks or 20 visits   1. Pt will demonstrate increased lumbar ROM by at least 6 degrees from initial ROM value, resulting in improved ability to perform functional fwd bending while standing and sitting.   (PROGRESSING, NOT MET)  2. Pt will demonstrate increased maximum isometric torque value by 30% when compared to the initial value resulting in improved ability to perform bending, lifting, and carrying activities safely, confidently.  (PROGRESSING, NOT MET)  3. Pt to demonstrate ability to independently control and reduce their pain through posture positioning and mechanical movements throughout a typical day.  (PROGRESSING, NOT MET)  4.  Pt will demonstrate reduced pain and improved functional outcomes as reported on the Oswestry Disability Index by reaching a score of 8 or less in order to demonstrate subjective improvement in pt's condition.    (PROGRESSING, NOT MET)  5. Pt will demonstrate independence with the HEP at discharge  (PROGRESSING, NOT MET)  6.  Pt will be able to turn over from lying on her stomach without increased pain  (PROGRESSING, NOT MET)        Plan   Continue with established Plan of Care towards established PT goals.

## 2020-01-07 ENCOUNTER — CLINICAL SUPPORT (OUTPATIENT)
Dept: REHABILITATION | Facility: OTHER | Age: 60
End: 2020-01-07
Attending: PHYSICAL MEDICINE & REHABILITATION
Payer: COMMERCIAL

## 2020-01-07 DIAGNOSIS — Z74.09 POOR MOBILITY: ICD-10-CM

## 2020-01-07 DIAGNOSIS — G89.29 CHRONIC BILATERAL LOW BACK PAIN WITHOUT SCIATICA: Primary | ICD-10-CM

## 2020-01-07 DIAGNOSIS — M54.50 CHRONIC BILATERAL LOW BACK PAIN WITHOUT SCIATICA: Primary | ICD-10-CM

## 2020-01-07 PROCEDURE — 97750 PHYSICAL PERFORMANCE TEST: CPT | Mod: 32

## 2020-01-07 NOTE — PROGRESS NOTES
[facility]      Name: Josefina Hannafatt  Clinic Number: [id number]    Therapy Diagnosis:   Encounter Diagnoses   Name Primary?    Poor mobility     Chronic bilateral low back pain without sciatica Yes     Physician: Kathie Turcios, *    Visit Date: 2020    Physician Orders: PT Eval and Treat   Medical Diagnosis from Referral:Chronic bilateral low back pain without sciatica [M54.5, G89.29]  Spondylosis of lumbar region without myelopathy or radiculopathy [M47.816]  Spinal stenosis of lumbar region without neurogenic claudication [M48.061]  Evaluation Date: 12/3/2019  Authorization Period Expiration: 2020  Plan of Care Expiration: 3/7/20  Reassessment Due: 2020  Visit # / Visits authorized:      Time In: 7:00  Time Out: 8:00  Total Billable Time: 45 minutes     Precautions: R knee scope, L meniscus tears, DDD, HTN     Pattern of pain determined: 1 PEN         Subjective   Josefina reports she has no pain today.  Pt reports she has been able to work out over her vacation    Patient reports tolerating previous visit fair, with moderate soreness that last approximately one day.   Patient reports their pain to be 0/10 on a 0-10 scale with 0 being no pain and 10 being the worst pain imaginable.  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       Objective     Baseline Isometric Testing on Med X equipment: Testing administered by PT  Date of testin2019     ROM 0-48 deg   Max Peak Torque 120    Min Peak Torque 27    Flex/Ext Ratio 4.44:1   % below normative data 33      Start at 50 ft lbs      Outcomes:  Oswestry Questionnaire Review 12/3/2019 2019   Pain Intensity 1 2   Personal Care (Washing, Dressing) 1 0   Lifting 2 4   Walking 1 0   Sitting  0 2   Standing 2 2   Sleeping 2 1   Social Life 2 0   Traveling 1 0   Changing Degree of Pain 1 2   Score 13 13         Treatment    Pt was instructed in and performed the following:     Josefina received therapeutic exercises to develop/improved posture, cardiovascular endurance, muscular endurance, lumbar/cervical ROM, strength and muscular endurance for 60 minutes including the following exercises:   [facility] 1/7/2020   Visit Number 7   VAS Pain Rating 0   Recumbent Bike Seat Pos. -   Time -   Flexion in Lying 10   Manual Therapy -   Lumbar Extension Seat Pad -   Femur Restraint -   Top Dead Center -   Counterweight -   Lumbar Flexion -   Lumbar Extension -   Lumbar Peak Torque -   Min Torque -   Test Percent Below Normative Data -   Lumbar Weight 55   Repetitions 18   Rating of Perceived Exertion 4   Ice - Z Lie (in min.) 10       Exercises completed today:  Double knee to chest 10x  Posterior pelvic tilt with LE extension 10x  Bridges 10x  Piriformis stretch 3x 20 sec   Prone Glute set c/ prone leg lift 10x  Prone on elbows 30 sec x 3 (Held for now)         Peripheral muscle strengthening which included 1 set of 15-20 repetitions at a slow, controlled 10-13 second per rep pace focused on strengthening supporting musculature for improved body mechanics and functional mobility.  Pt and therapist focused on proper form during treatment to ensure optimal strengthening of each targeted muscle group.  Machines were utilized including torso rotation, leg extension, leg curl, chest press, upright row. Tricep extension, bicep curl, leg press, and hip abduction added visit 3    Josefina received the following manual therapy techniques: none        Home Exercises Provided and Patient Education Provided     Education provided:   - Patient received education in benefits of progressing mobility and strengthening gradually  - Discussed exertion scale, slow progressive resistance protocol to promote safe and healthy  "strengthening of supportive musculature  by performing 15-20 reps, 7 sec per rep, and increasing weight by 5 % at 20 reps only if there ex done safely, slowly, using correct musculature, exertion and no pain.  Patient expressed understanding.  -Pt educated on strategies to safely perform examination and exercise using "Stop Light" analogy to describe how to avoid or stop irritating motions, proceed with caution with some movements, and progress positive exercises.     Written Home Exercises Provided: Patient instructed to cont prior HEP.  Exercises were reviewed and Josefina was able to demonstrate them prior to the end of the session.  Josefina demonstrated good  understanding of the education provided.   Seated Posterior pelvic tilt   Added Non weight bearing LE strengthening exercises  See EMR under Patient Instructions for exercises provided prior visit.          Assessment     Pt arrives today without c/o pain in LB today.  Added LE extension with pelvic tilts and seated pelvic tilts to work on stability.  Also issued HEP for LE strengthening in non weight bearing positions.  Increased Med X weights to 55ft/lbs with completion of 18 reps and 4/10 exertion level.  Patient is making good progress towards established goals.  Pt will continue to benefit from skilled outpatient physical therapy to address the deficits stated in the impairment chart, provide pt/family education and to maximize pt's level of independence in the home and community environment.     Anticipated Barriers for therapy: Schedule  Pt's spiritual, cultural and educational needs considered and pt agreeable to plan of care and goals as stated below:       GOALS: Pt is in agreement with the following goals.     Short term goals:  6 weeks or 10 visits   1.  Pt will demonstrate increased lumbar ROM by at least 3 degrees from the initial ROM value with improvements noted in functional ROM and ability to perform ADLs.  (PROGRESSING, NOT MET)  2.  Pt will " demonstrate increased maximum isometric torque value by 15% when compared to the initial value resulting in improved ability to perform bending, lifting, and carrying activities safely, confidently.  (PROGRESSING, NOT MET)  3.  Patient report a reduction in worst pain score by 1-2 points for improved tolerance for lifting items off the ground.  (PROGRESSING, NOT MET)  4.  Pt able to perform HEP correctly with minimal cueing or supervision from therapist to encourage independent management of symptoms.   (PROGRESSING, NOT MET)        Long term goals: 10 weeks or 20 visits   1. Pt will demonstrate increased lumbar ROM by at least 6 degrees from initial ROM value, resulting in improved ability to perform functional fwd bending while standing and sitting.   (PROGRESSING, NOT MET)  2. Pt will demonstrate increased maximum isometric torque value by 30% when compared to the initial value resulting in improved ability to perform bending, lifting, and carrying activities safely, confidently.  (PROGRESSING, NOT MET)  3. Pt to demonstrate ability to independently control and reduce their pain through posture positioning and mechanical movements throughout a typical day.  (PROGRESSING, NOT MET)  4.  Pt will demonstrate reduced pain and improved functional outcomes as reported on the Oswestry Disability Index by reaching a score of 8 or less in order to demonstrate subjective improvement in pt's condition.    (PROGRESSING, NOT MET)  5. Pt will demonstrate independence with the HEP at discharge  (PROGRESSING, NOT MET)  6.  Pt will be able to turn over from lying on her stomach without increased pain  (PROGRESSING, NOT MET)        Plan   Continue with established Plan of Care towards established PT goals.

## 2020-01-07 NOTE — PATIENT INSTRUCTIONS
Strengthening: Straight Leg Raise (Phase 1)        Tighten muscles on front of right thigh, then lift leg ____ inches from surface, keeping knee locked.   Repeat ____ times per set. Do ____ sets per session. Do ____ sessions per day.     [email or web address]     Copyright © [employer]. All rights reserved.   Quad Set: Slight Flexion        Tense muscles on top of left thigh. Hold ____ seconds.  Repeat ____ times per set. Do ____ sets per session. Do ____ sessions per day.     [email or web address]     Copyright © [employer]. All rights reserved.   Strengthening: Hip Abduction (Side-Lying)        Tighten muscles on front of left thigh, then lift leg ____ inches from surface, keeping knee locked.   Repeat ____ times per set. Do ____ sets per session. Do ____ sessions per day.     [email or web address]     Copyright © [employer]. All rights reserved.

## 2020-01-09 ENCOUNTER — CLINICAL SUPPORT (OUTPATIENT)
Dept: REHABILITATION | Facility: OTHER | Age: 60
End: 2020-01-09
Attending: PHYSICAL MEDICINE & REHABILITATION
Payer: COMMERCIAL

## 2020-01-09 DIAGNOSIS — Z74.09 POOR MOBILITY: ICD-10-CM

## 2020-01-09 DIAGNOSIS — M54.50 CHRONIC BILATERAL LOW BACK PAIN WITHOUT SCIATICA: Primary | ICD-10-CM

## 2020-01-09 DIAGNOSIS — G89.29 CHRONIC BILATERAL LOW BACK PAIN WITHOUT SCIATICA: Primary | ICD-10-CM

## 2020-01-09 PROCEDURE — 97750 PHYSICAL PERFORMANCE TEST: CPT | Mod: 32

## 2020-01-09 NOTE — PROGRESS NOTES
[facility]      Name: Josefina Blue  Clinic Number: [id number]    Therapy Diagnosis:   Encounter Diagnoses   Name Primary?    Poor mobility     Chronic bilateral low back pain without sciatica Yes     Physician: Kathie Turcios, *    Visit Date: 2020    Physician Orders: PT Eval and Treat   Medical Diagnosis from Referral:Chronic bilateral low back pain without sciatica [M54.5, G89.29]  Spondylosis of lumbar region without myelopathy or radiculopathy [M47.816]  Spinal stenosis of lumbar region without neurogenic claudication [M48.061]  Evaluation Date: 12/3/2019  Authorization Period Expiration: 2020  Plan of Care Expiration: 3/7/20  Reassessment Due: 2020  Visit # / Visits authorized:      Time In: 7:00  Time Out: 8:00  Total Billable Time: 45 minutes     Precautions: R knee scope, L meniscus tears, DDD, HTN     Pattern of pain determined: 1 PEN         Subjective   Josefina reports she has no pain today. She was given some knee exercises    Patient reports tolerating previous visit fair, with moderate soreness that last approximately one day.   Patient reports their pain to be 0/10 on a 0-10 scale with 0 being no pain and 10 being the worst pain imaginable.  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       Objective     Baseline Isometric Testing on Med X equipment: Testing administered by PT  Date of testin2019     ROM 0-48 deg   Max Peak Torque 120    Min Peak Torque 27    Flex/Ext Ratio 4.44:1   % below normative data 33      Start at 50 ft lbs      Outcomes:  Oswestry Questionnaire Review 12/3/2019 2019   Pain Intensity 1 2   Personal Care (Washing, Dressing) 1 0   Lifting 2 4   Walking 1 0   Sitting 0 2   Standing 2 2    Sleeping 2 1   Social Life 2 0   Traveling 1 0   Changing Degree of Pain 1 2   Score 13 13         Treatment    Pt was instructed in and performed the following:     Josefina received therapeutic exercises to develop/improved posture, cardiovascular endurance, muscular endurance, lumbar/cervical ROM, strength and muscular endurance for 60 minutes including the following exercises:     [facility] 1/9/2020   Visit Number 8   VAS Pain Rating 0   Recumbent Bike Seat Pos. -   Time 10   Flexion in Lying 10   Lumbar Weight 55   Repetitions 20   Rating of Perceived Exertion 3.5   Ice - Z Lie (in min.) 10         Exercises completed today:  Double knee to chest 10x  Posterior pelvic tilt with LE extension 10x  Bridges 10x  Piriformis stretch 3x 20 sec   Prone Glute set c/ prone leg lift 10x  Side lying hip abd 10x          Peripheral muscle strengthening which included 1 set of 15-20 repetitions at a slow, controlled 10-13 second per rep pace focused on strengthening supporting musculature for improved body mechanics and functional mobility.  Pt and therapist focused on proper form during treatment to ensure optimal strengthening of each targeted muscle group.  Machines were utilized including torso rotation, leg extension, leg curl, chest press, upright row. Tricep extension, bicep curl, leg press, and hip abduction added visit 3    Josefina received the following manual therapy techniques: none        Home Exercises Provided and Patient Education Provided     Education provided:   - Patient received education in benefits of progressing mobility and strengthening gradually  - Discussed exertion scale, slow progressive resistance protocol to promote safe and healthy strengthening of supportive musculature  by performing 15-20 reps, 7 sec per rep, and increasing weight by 5 % at 20 reps only if there ex done safely, slowly, using correct musculature, exertion and no pain.  Patient expressed understanding.  -Pt educated  "on strategies to safely perform examination and exercise using "Stop Light" analogy to describe how to avoid or stop irritating motions, proceed with caution with some movements, and progress positive exercises.     Written Home Exercises Provided: Patient instructed to cont prior HEP.  Exercises were reviewed and Josefina was able to demonstrate them prior to the end of the session.  Josefina demonstrated good  understanding of the education provided.   Seated Posterior pelvic tilt   Added Non weight bearing LE strengthening exercises  See EMR under Patient Instructions for exercises provided prior visit.          Assessment     Pt arrives today without c/o pain in LB today.  Pt reports that she has been doing her exercises for knee strengthening. Maintained Med X weights to 55ft/lbs with completion of 20 reps and 3.5/10 exertion level.  Patient is making good progress towards established goals.  Pt will continue to benefit from skilled outpatient physical therapy to address the deficits stated in the impairment chart, provide pt/family education and to maximize pt's level of independence in the home and community environment.     Anticipated Barriers for therapy: Schedule  Pt's spiritual, cultural and educational needs considered and pt agreeable to plan of care and goals as stated below:       GOALS: Pt is in agreement with the following goals.     Short term goals:  6 weeks or 10 visits   1.  Pt will demonstrate increased lumbar ROM by at least 3 degrees from the initial ROM value with improvements noted in functional ROM and ability to perform ADLs.  (PROGRESSING, NOT MET)  2.  Pt will demonstrate increased maximum isometric torque value by 15% when compared to the initial value resulting in improved ability to perform bending, lifting, and carrying activities safely, confidently.  (PROGRESSING, NOT MET)  3.  Patient report a reduction in worst pain score by 1-2 points for improved tolerance for lifting items off " the ground.  (PROGRESSING, NOT MET)  4.  Pt able to perform HEP correctly with minimal cueing or supervision from therapist to encourage independent management of symptoms.   (PROGRESSING, NOT MET)        Long term goals: 10 weeks or 20 visits   1. Pt will demonstrate increased lumbar ROM by at least 6 degrees from initial ROM value, resulting in improved ability to perform functional fwd bending while standing and sitting.   (PROGRESSING, NOT MET)  2. Pt will demonstrate increased maximum isometric torque value by 30% when compared to the initial value resulting in improved ability to perform bending, lifting, and carrying activities safely, confidently.  (PROGRESSING, NOT MET)  3. Pt to demonstrate ability to independently control and reduce their pain through posture positioning and mechanical movements throughout a typical day.  (PROGRESSING, NOT MET)  4.  Pt will demonstrate reduced pain and improved functional outcomes as reported on the Oswestry Disability Index by reaching a score of 8 or less in order to demonstrate subjective improvement in pt's condition.    (PROGRESSING, NOT MET)  5. Pt will demonstrate independence with the HEP at discharge  (PROGRESSING, NOT MET)  6.  Pt will be able to turn over from lying on her stomach without increased pain  (PROGRESSING, NOT MET)        Plan   Continue with established Plan of Care towards established PT goals.

## 2020-01-10 DIAGNOSIS — I10 BENIGN ESSENTIAL HYPERTENSION: ICD-10-CM

## 2020-01-10 RX ORDER — TRIAMTERENE/HYDROCHLOROTHIAZID 37.5-25 MG
1 TABLET ORAL DAILY
Qty: 90 TABLET | Refills: 3 | Status: SHIPPED | OUTPATIENT
Start: 2020-01-10 | End: 2021-01-04 | Stop reason: SDUPTHER

## 2020-01-13 ENCOUNTER — CLINICAL SUPPORT (OUTPATIENT)
Dept: REHABILITATION | Facility: OTHER | Age: 60
End: 2020-01-13
Attending: PHYSICAL MEDICINE & REHABILITATION
Payer: COMMERCIAL

## 2020-01-13 DIAGNOSIS — G89.29 CHRONIC BILATERAL LOW BACK PAIN WITHOUT SCIATICA: Primary | ICD-10-CM

## 2020-01-13 DIAGNOSIS — M54.50 CHRONIC BILATERAL LOW BACK PAIN WITHOUT SCIATICA: Primary | ICD-10-CM

## 2020-01-13 DIAGNOSIS — Z74.09 POOR MOBILITY: ICD-10-CM

## 2020-01-13 PROCEDURE — 97750 PHYSICAL PERFORMANCE TEST: CPT | Mod: 32

## 2020-01-13 NOTE — PROGRESS NOTES
[facility]      Name: Josefina Blue  Clinic Number: [id number]    Therapy Diagnosis:   Encounter Diagnoses   Name Primary?    Poor mobility     Chronic bilateral low back pain without sciatica Yes     Physician: Kathie Turcios, *    Visit Date: 2020    Physician Orders: PT Eval and Treat   Medical Diagnosis from Referral:Chronic bilateral low back pain without sciatica [M54.5, G89.29]  Spondylosis of lumbar region without myelopathy or radiculopathy [M47.816]  Spinal stenosis of lumbar region without neurogenic claudication [M48.061]  Evaluation Date: 12/3/2019  Authorization Period Expiration: 2020  Plan of Care Expiration: 3/7/20  Reassessment Due: 2020  Visit # / Visits authorized:      Time In: 7:00  Time Out: 8:00  Total Billable Time: 45 minutes     Precautions: R knee scope, L meniscus tears, DDD, HTN     Pattern of pain determined: 1 PEN         Subjective   Josefina she is not experiencing any back pain today, but does have some L knee and shoulder pain at this time     Patient reports tolerating previous visit fair, with moderate soreness that last approximately one day.   Patient reports their pain to be 0/10 on a 0-10 scale with 0 being no pain and 10 being the worst pain imaginable.  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       Objective     Baseline Isometric Testing on Med X equipment: Testing administered by PT  Date of testin2019  ROM 0-48 deg   Max Peak Torque 120    Min Peak Torque 27    Flex/Ext Ratio 4.44:1   % below normative data 33       Outcomes:  Oswestry Questionnaire Review 12/3/2019 2019   Pain Intensity 1 2   Personal Care (Washing, Dressing) 1 0   Lifting 2 4   Walking 1 0   Sitting 0 2    Standing 2 2   Sleeping 2 1   Social Life 2 0   Traveling 1 0   Changing Degree of Pain 1 2   Score 13 13         Treatment    Pt was instructed in and performed the following:     Josefina received therapeutic exercises to develop/improved posture, cardiovascular endurance, muscular endurance, lumbar/cervical ROM, strength and muscular endurance for 60 minutes including the following exercises:   [facility] 1/13/2020   Visit Number 9   VAS Pain Rating 0   Recumbent Bike Seat Pos. -   Time 10   Flexion in Lying 10   Manual Therapy -   Lumbar Extension Seat Pad -   Femur Restraint -   Top Dead Center -   Counterweight -   Lumbar Flexion -   Lumbar Extension -   Lumbar Peak Torque -   Min Torque -   Test Percent Below Normative Data -   Lumbar Weight 58   Repetitions 18   Rating of Perceived Exertion 5   Ice - Z Lie (in min.) 10     Exercises completed today:  Double knee to chest 10x  Posterior pelvic tilt with LE extension 10x  Bridges 10x  Piriformis stretch 3x 20 sec   Prone Glute set c/ prone leg lift 10x  Side lying hip abd 10x    Peripheral muscle strengthening which included 1 set of 15-20 repetitions at a slow, controlled 10-13 second per rep pace focused on strengthening supporting musculature for improved body mechanics and functional mobility.  Pt and therapist focused on proper form during treatment to ensure optimal strengthening of each targeted muscle group.  Machines were utilized including torso rotation, leg extension, leg curl, chest press, upright row. Tricep extension, bicep curl, leg press, and hip abduction added visit 3    Josefina received the following manual therapy techniques: none        Home Exercises Provided and Patient Education Provided     Education provided:   - Patient received education in benefits of progressing mobility and strengthening gradually  - Discussed exertion scale, slow progressive resistance protocol to promote safe and healthy strengthening of supportive  "musculature  by performing 15-20 reps, 7 sec per rep, and increasing weight by 5 % at 20 reps only if there ex done safely, slowly, using correct musculature, exertion and no pain.  Patient expressed understanding.  -Pt educated on strategies to safely perform examination and exercise using "Stop Light" analogy to describe how to avoid or stop irritating motions, proceed with caution with some movements, and progress positive exercises.     Written Home Exercises Provided: Patient instructed to cont prior HEP.  Exercises were reviewed and Josefina was able to demonstrate them prior to the end of the session.  Josefina demonstrated good  understanding of the education provided.   Seated Posterior pelvic tilt   Added Non weight bearing LE strengthening exercises  See EMR under Patient Instructions for exercises provided prior visit.    Assessment     Pt able to complete all components of session with no adverse effects. Increased resistance on medx machine secondary to performance at previous session. Will reassess pt strength on medx next session. Patient is making good progress towards established goals.  Pt will continue to benefit from skilled outpatient physical therapy to address the deficits stated in the impairment chart, provide pt/family education and to maximize pt's level of independence in the home and community environment.     Anticipated Barriers for therapy: Schedule  Pt's spiritual, cultural and educational needs considered and pt agreeable to plan of care and goals as stated below:       GOALS: Pt is in agreement with the following goals.     Short term goals:  6 weeks or 10 visits   1.  Pt will demonstrate increased lumbar ROM by at least 3 degrees from the initial ROM value with improvements noted in functional ROM and ability to perform ADLs.  (PROGRESSING, NOT MET)  2.  Pt will demonstrate increased maximum isometric torque value by 15% when compared to the initial value resulting in improved " ability to perform bending, lifting, and carrying activities safely, confidently.  (PROGRESSING, NOT MET)  3.  Patient report a reduction in worst pain score by 1-2 points for improved tolerance for lifting items off the ground.  (PROGRESSING, NOT MET)  4.  Pt able to perform HEP correctly with minimal cueing or supervision from therapist to encourage independent management of symptoms.   (PROGRESSING, NOT MET)        Long term goals: 10 weeks or 20 visits   1. Pt will demonstrate increased lumbar ROM by at least 6 degrees from initial ROM value, resulting in improved ability to perform functional fwd bending while standing and sitting.   (PROGRESSING, NOT MET)  2. Pt will demonstrate increased maximum isometric torque value by 30% when compared to the initial value resulting in improved ability to perform bending, lifting, and carrying activities safely, confidently.  (PROGRESSING, NOT MET)  3. Pt to demonstrate ability to independently control and reduce their pain through posture positioning and mechanical movements throughout a typical day.  (PROGRESSING, NOT MET)  4.  Pt will demonstrate reduced pain and improved functional outcomes as reported on the Oswestry Disability Index by reaching a score of 8 or less in order to demonstrate subjective improvement in pt's condition.    (PROGRESSING, NOT MET)  5. Pt will demonstrate independence with the HEP at discharge  (PROGRESSING, NOT MET)  6.  Pt will be able to turn over from lying on her stomach without increased pain  (PROGRESSING, NOT MET)        Plan   Continue with established Plan of Care towards established PT goals.

## 2020-01-15 ENCOUNTER — CLINICAL SUPPORT (OUTPATIENT)
Dept: REHABILITATION | Facility: OTHER | Age: 60
End: 2020-01-15
Attending: PHYSICAL MEDICINE & REHABILITATION
Payer: COMMERCIAL

## 2020-01-15 DIAGNOSIS — G89.29 CHRONIC BILATERAL LOW BACK PAIN WITHOUT SCIATICA: Primary | ICD-10-CM

## 2020-01-15 DIAGNOSIS — M25.562 LEFT KNEE PAIN, UNSPECIFIED CHRONICITY: ICD-10-CM

## 2020-01-15 DIAGNOSIS — M62.81 MUSCLE WEAKNESS OF LOWER EXTREMITY: ICD-10-CM

## 2020-01-15 DIAGNOSIS — Z74.09 POOR MOBILITY: ICD-10-CM

## 2020-01-15 DIAGNOSIS — M54.50 CHRONIC BILATERAL LOW BACK PAIN WITHOUT SCIATICA: Primary | ICD-10-CM

## 2020-01-15 DIAGNOSIS — M25.552 LEFT HIP PAIN: ICD-10-CM

## 2020-01-15 PROCEDURE — 97750 PHYSICAL PERFORMANCE TEST: CPT | Mod: 32

## 2020-01-15 NOTE — PROGRESS NOTES
[facility]      Name: Josefina Blue  Clinic Number: [id number]    Therapy Diagnosis:   Encounter Diagnoses   Name Primary?    Left knee pain, unspecified chronicity     Left hip pain     Muscle weakness of lower extremity      Physician: Kathie Turcios, *    Visit Date: 1/15/2020    Physician Orders: PT Eval and Treat   Medical Diagnosis from Referral:Chronic bilateral low back pain without sciatica [M54.5, G89.29]  Spondylosis of lumbar region without myelopathy or radiculopathy [M47.816]  Spinal stenosis of lumbar region without neurogenic claudication [M48.061]  Evaluation Date: 12/3/2019  Authorization Period Expiration: 2020  Plan of Care Expiration: 3/7/20  Reassessment Due: 2020  Visit # / Visits authorized:      Time In: 7:00  Time Out: 8:00  Total Billable Time: 45 minutes     Precautions: R knee scope, L meniscus tears, DDD, HTN     Pattern of pain determined: 1 PEN         Subjective   Josefina she has not experienced any back pain in a couple of weeks. Reports her back pain has been a 3/10 at its worst in the past week. Pt reports she is able to roll onto stomach easier at this time.     Patient reports tolerating previous visit fair, with moderate soreness that last approximately one day.   Patient reports their pain to be 0/10 on a 0-10 scale with 0 being no pain and 10 being the worst pain imaginable.  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       Objective     Baseline Isometric Testing on Med X equipment: Testing administered by PT  Date of testin2019  ROM 0-48 deg   Max Peak Torque 120    Min Peak Torque 27    Flex/Ext Ratio 4.44:1   % below normative data 33       Outcomes:  Oswestry Questionnaire Review 1/15/2020  12/3/2019 11/18/2019   Pain Intensity 0 1 2   Personal Care (Washing, Dressing) 0 1 0   Lifting 2 2 4   Walking 0 1 0   Sitting 0 0 2   Standing 2 2 2   Sleeping 0 2 1   Social Life 2 2 0   Traveling 0 1 0   Changing Degree of Pain 0 1 2   Score 6 13 13       Treatment    Pt was instructed in and performed the following:     Josefina received therapeutic exercises to develop/improved posture, cardiovascular endurance, muscular endurance, lumbar/cervical ROM, strength and muscular endurance for 60 minutes including the following exercises:   [facility] 1/15/2020   Visit Number 10   VAS Pain Rating 0   Recumbent Bike Seat Pos. -   Time 10   Flexion in Lying 10   Manual Therapy -   Lumbar Extension Seat Pad -   Femur Restraint -   Top Dead Center -   Counterweight -   Lumbar Flexion 51   Lumbar Extension 0   Lumbar Peak Torque 197   Min Torque 59   Test Percent Below Normative Data -   Lumbar Weight -   Repetitions -   Rating of Perceived Exertion -   Ice - Z Lie (in min.) 10       Exercises completed today:  Double knee to chest 10x  Posterior pelvic tilt with LE extension 10x  Bridges 10x  Piriformis stretch 3x 20 sec   Prone Glute set c/ prone leg lift 10x  Side lying hip abd 10x    Peripheral muscle strengthening which included 1 set of 15-20 repetitions at a slow, controlled 10-13 second per rep pace focused on strengthening supporting musculature for improved body mechanics and functional mobility.  Pt and therapist focused on proper form during treatment to ensure optimal strengthening of each targeted muscle group.  Machines were utilized including torso rotation, leg extension, leg curl, chest press, upright row. Tricep extension, bicep curl, leg press, and hip abduction added visit 3    Josefina received the following manual therapy techniques: none        Home Exercises Provided and Patient Education Provided     Education provided:   - Patient received education in benefits of progressing mobility and  "strengthening gradually  - Discussed exertion scale, slow progressive resistance protocol to promote safe and healthy strengthening of supportive musculature  by performing 15-20 reps, 7 sec per rep, and increasing weight by 5 % at 20 reps only if there ex done safely, slowly, using correct musculature, exertion and no pain.  Patient expressed understanding.  -Pt educated on strategies to safely perform examination and exercise using "Stop Light" analogy to describe how to avoid or stop irritating motions, proceed with caution with some movements, and progress positive exercises.     Written Home Exercises Provided: Patient instructed to cont prior HEP.  Exercises were reviewed and Josefina was able to demonstrate them prior to the end of the session.  Josefina demonstrated good  understanding of the education provided.   Seated Posterior pelvic tilt   Added Non weight bearing LE strengthening exercises  See EMR under Patient Instructions for exercises provided prior visit.    Assessment     Patient has attended 10 visits at [facility] which included MD evaluation, PT evaluation with isometric testing, and physical therapy treatment including HEP instruction, education, aerobic work, dynamic strengthening on med ex equipment for the spine, and whole body strengthening on med ex equipment with increasing weight loads.  Patient  is demonstrating increased ability to reduce symptoms, improved posture, improved   ROM, and improved   strength on med ex test by  61% average.  Patient is making good progress towards established goals.  Pt will continue to benefit from skilled outpatient physical therapy to address the deficits stated in the impairment chart, provide pt/family education and to maximize pt's level of independence in the home and community environment.     Anticipated Barriers for therapy: Schedule  Pt's spiritual, cultural and educational needs considered and pt agreeable to plan of care and goals as " stated below:       GOALS: Pt is in agreement with the following goals.     Short term goals:  6 weeks or 10 visits   1.  Pt will demonstrate increased lumbar ROM by at least 3 degrees from the initial ROM value with improvements noted in functional ROM and ability to perform ADLs.  (PROGRESSING, NOT MET)  2.  Pt will demonstrate increased maximum isometric torque value by 15% when compared to the initial value resulting in improved ability to perform bending, lifting, and carrying activities safely, confidently.  (PROGRESSING, NOT MET)  3.  Patient report a reduction in worst pain score by 1-2 points for improved tolerance for lifting items off the ground.  (PROGRESSING, NOT MET)  4.  Pt able to perform HEP correctly with minimal cueing or supervision from therapist to encourage independent management of symptoms.   (PROGRESSING, NOT MET)        Long term goals: 10 weeks or 20 visits   1. Pt will demonstrate increased lumbar ROM by at least 6 degrees from initial ROM value, resulting in improved ability to perform functional fwd bending while standing and sitting.   (PROGRESSING, NOT MET)  2. Pt will demonstrate increased maximum isometric torque value by 30% when compared to the initial value resulting in improved ability to perform bending, lifting, and carrying activities safely, confidently.  (PROGRESSING, NOT MET)  3. Pt to demonstrate ability to independently control and reduce their pain through posture positioning and mechanical movements throughout a typical day.  (PROGRESSING, NOT MET)  4.  Pt will demonstrate reduced pain and improved functional outcomes as reported on the Oswestry Disability Index by reaching a score of 8 or less in order to demonstrate subjective improvement in pt's condition.    (PROGRESSING, NOT MET)  5. Pt will demonstrate independence with the HEP at discharge  (PROGRESSING, NOT MET)  6.  Pt will be able to turn over from lying on her stomach without increased pain  (PROGRESSING,  NOT MET)        Plan   Continue with established Plan of Care towards established PT goals.

## 2020-01-20 ENCOUNTER — CLINICAL SUPPORT (OUTPATIENT)
Dept: REHABILITATION | Facility: OTHER | Age: 60
End: 2020-01-20
Attending: PHYSICAL MEDICINE & REHABILITATION
Payer: COMMERCIAL

## 2020-01-20 DIAGNOSIS — M54.50 CHRONIC BILATERAL LOW BACK PAIN WITHOUT SCIATICA: Primary | ICD-10-CM

## 2020-01-20 DIAGNOSIS — Z74.09 POOR MOBILITY: ICD-10-CM

## 2020-01-20 DIAGNOSIS — G89.29 CHRONIC BILATERAL LOW BACK PAIN WITHOUT SCIATICA: Primary | ICD-10-CM

## 2020-01-20 PROCEDURE — 97750 PHYSICAL PERFORMANCE TEST: CPT | Mod: 32

## 2020-01-20 NOTE — PROGRESS NOTES
[facility]      Name: Josefina Blue  Clinic Number: [id number]    Therapy Diagnosis:   Encounter Diagnoses   Name Primary?    Poor mobility     Chronic bilateral low back pain without sciatica Yes     Physician: Kathie Turcios, *    Visit Date: 2020    Physician Orders: PT Eval and Treat   Medical Diagnosis from Referral:Chronic bilateral low back pain without sciatica [M54.5, G89.29]  Spondylosis of lumbar region without myelopathy or radiculopathy [M47.816]  Spinal stenosis of lumbar region without neurogenic claudication [M48.061]  Evaluation Date: 12/3/2019  Authorization Period Expiration: 2020  Plan of Care Expiration: 3/7/20  Reassessment Due: 2020  Visit # / Visits authorized:      Time In: 7:00  Time Out: 8:00  Total Billable Time: 45 minutes     Precautions: R knee scope, L meniscus tears, DDD, HTN     Pattern of pain determined: 1 PEN      Subjective   Josefina reports she is having some increased intermittent knee pain recently, but the back pain is minimal. No known cause of the knee pain- maybe partially increased LE exercises and new shoes. Pt reports she will continue to monitor.     Patient reports tolerating previous visit fair, with moderate soreness that last approximately one day.   Patient reports their pain to be 0/10 on a 0-10 scale with 0 being no pain and 10 being the worst pain imaginable.  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       Objective     Baseline Isometric Testing on Med X equipment: Testing administered by PT  Date of testin2019  ROM 0-48 deg   Max Peak Torque 120    Min Peak Torque 27    Flex/Ext Ratio 4.44:1   % below normative data 33       Outcomes:  Oswestry Questionnaire Review 1/15/2020  12/3/2019 11/18/2019   Pain Intensity 0 1 2   Personal Care (Washing, Dressing) 0 1 0   Lifting 2 2 4   Walking 0 1 0   Sitting 0 0 2   Standing 2 2 2   Sleeping 0 2 1   Social Life 2 2 0   Traveling 0 1 0   Changing Degree of Pain 0 1 2   Score 6 13 13       Treatment    Pt was instructed in and performed the following:     Josefina received therapeutic exercises to develop/improved posture, cardiovascular endurance, muscular endurance, lumbar/cervical ROM, strength and muscular endurance for 60 minutes including the following exercises:   [facility] 1/20/2020   Visit Number 11   VAS Pain Rating 0   Recumbent Bike Seat Pos. -   Time 10   Flexion in Lying 10   Manual Therapy -   Lumbar Extension Seat Pad -   Femur Restraint -   Top Dead Center -   Counterweight -   Lumbar Flexion -   Lumbar Extension -   Lumbar Peak Torque -   Min Torque -   Test Percent Below Normative Data -   Lumbar Weight 58   Repetitions 20   Rating of Perceived Exertion 3   Ice - Z Lie (in min.) 10     Exercises completed today:  Double knee to chest 10x  Posterior pelvic tilt with LE extension 10x  Bridges 10x  Piriformis stretch 3x 20 sec   Prone Glute set c/ prone leg lift 10x  Side lying hip abd 10x  Prone on Elbows 2x30 sec     Peripheral muscle strengthening which included 1 set of 15-20 repetitions at a slow, controlled 10-13 second per rep pace focused on strengthening supporting musculature for improved body mechanics and functional mobility.  Pt and therapist focused on proper form during treatment to ensure optimal strengthening of each targeted muscle group.  Machines were utilized including torso rotation, leg extension, leg curl, chest press, upright row. Tricep extension, bicep curl, leg press, and hip abduction added visit 3    Josefina received the following manual therapy techniques: none        Home Exercises Provided and Patient Education Provided     Education provided:   - Patient received education in benefits of  "progressing mobility and strengthening gradually  - Discussed exertion scale, slow progressive resistance protocol to promote safe and healthy strengthening of supportive musculature  by performing 15-20 reps, 7 sec per rep, and increasing weight by 5 % at 20 reps only if there ex done safely, slowly, using correct musculature, exertion and no pain.  Patient expressed understanding.  -Pt educated on strategies to safely perform examination and exercise using "Stop Light" analogy to describe how to avoid or stop irritating motions, proceed with caution with some movements, and progress positive exercises.     Written Home Exercises Provided: Patient instructed to cont prior HEP.  Exercises were reviewed and Josefina was able to demonstrate them prior to the end of the session.  Josefina demonstrated good  understanding of the education provided.   Seated Posterior pelvic tilt   Added Non weight bearing LE strengthening exercises  See EMR under Patient Instructions for exercises provided prior visit.    Assessment     Pt able to complete all components of session with no adverse effects. Increased repetitions on medx today with appropriate RPE. Will increase resistance next session per performance. Prone on elbows implemented today to provide static extension stretch and reassess directional preference- pt reports stretch feels good.   Patient is making good progress towards established goals.  Pt will continue to benefit from skilled outpatient physical therapy to address the deficits stated in the impairment chart, provide pt/family education and to maximize pt's level of independence in the home and community environment.     Anticipated Barriers for therapy: Schedule  Pt's spiritual, cultural and educational needs considered and pt agreeable to plan of care and goals as stated below:       GOALS: Pt is in agreement with the following goals.     Short term goals:  6 weeks or 10 visits   1.  Pt will demonstrate " increased lumbar ROM by at least 3 degrees from the initial ROM value with improvements noted in functional ROM and ability to perform ADLs.  (PROGRESSING, NOT MET)  2.  Pt will demonstrate increased maximum isometric torque value by 15% when compared to the initial value resulting in improved ability to perform bending, lifting, and carrying activities safely, confidently.  (PROGRESSING, NOT MET)  3.  Patient report a reduction in worst pain score by 1-2 points for improved tolerance for lifting items off the ground.  (PROGRESSING, NOT MET)  4.  Pt able to perform HEP correctly with minimal cueing or supervision from therapist to encourage independent management of symptoms.   (PROGRESSING, NOT MET)        Long term goals: 10 weeks or 20 visits   1. Pt will demonstrate increased lumbar ROM by at least 6 degrees from initial ROM value, resulting in improved ability to perform functional fwd bending while standing and sitting.   (PROGRESSING, NOT MET)  2. Pt will demonstrate increased maximum isometric torque value by 30% when compared to the initial value resulting in improved ability to perform bending, lifting, and carrying activities safely, confidently.  (PROGRESSING, NOT MET)  3. Pt to demonstrate ability to independently control and reduce their pain through posture positioning and mechanical movements throughout a typical day.  (PROGRESSING, NOT MET)  4.  Pt will demonstrate reduced pain and improved functional outcomes as reported on the Oswestry Disability Index by reaching a score of 8 or less in order to demonstrate subjective improvement in pt's condition.    (PROGRESSING, NOT MET)  5. Pt will demonstrate independence with the HEP at discharge  (PROGRESSING, NOT MET)  6.  Pt will be able to turn over from lying on her stomach without increased pain  (PROGRESSING, NOT MET)        Plan   Continue with established Plan of Care towards established PT goals.

## 2020-01-21 ENCOUNTER — PATIENT MESSAGE (OUTPATIENT)
Dept: SPORTS MEDICINE | Facility: CLINIC | Age: 60
End: 2020-01-21

## 2020-01-21 ENCOUNTER — OFFICE VISIT (OUTPATIENT)
Dept: SPORTS MEDICINE | Facility: CLINIC | Age: 60
End: 2020-01-21
Payer: COMMERCIAL

## 2020-01-21 ENCOUNTER — HOSPITAL ENCOUNTER (OUTPATIENT)
Dept: RADIOLOGY | Facility: HOSPITAL | Age: 60
Discharge: HOME | End: 2020-01-21
Attending: ORTHOPAEDIC SURGERY
Payer: COMMERCIAL

## 2020-01-21 VITALS
DIASTOLIC BLOOD PRESSURE: 89 MMHG | SYSTOLIC BLOOD PRESSURE: 132 MMHG | HEART RATE: 82 BPM | BODY MASS INDEX: 33.03 KG/M2 | WEIGHT: 223 LBS | HEIGHT: 69 IN

## 2020-01-21 DIAGNOSIS — M25.562 LEFT KNEE PAIN, UNSPECIFIED CHRONICITY: ICD-10-CM

## 2020-01-21 DIAGNOSIS — M25.562 LEFT KNEE PAIN, UNSPECIFIED CHRONICITY: Primary | ICD-10-CM

## 2020-01-21 DIAGNOSIS — M17.0 BILATERAL PRIMARY OSTEOARTHRITIS OF KNEE: ICD-10-CM

## 2020-01-21 PROCEDURE — 3008F BODY MASS INDEX DOCD: CPT | Mod: CPTII,S$GLB,, | Performed by: ORTHOPAEDIC SURGERY

## 2020-01-21 PROCEDURE — 3075F SYST BP GE 130 - 139MM HG: CPT | Mod: CPTII,S$GLB,, | Performed by: ORTHOPAEDIC SURGERY

## 2020-01-21 PROCEDURE — 3079F DIAST BP 80-89 MM HG: CPT | Mod: CPTII,S$GLB,, | Performed by: ORTHOPAEDIC SURGERY

## 2020-01-21 PROCEDURE — 3008F PR BODY MASS INDEX (BMI) DOCUMENTED: ICD-10-PCS | Mod: CPTII,S$GLB,, | Performed by: ORTHOPAEDIC SURGERY

## 2020-01-21 PROCEDURE — 20610 DRAIN/INJ JOINT/BURSA W/O US: CPT | Mod: LT,S$GLB,, | Performed by: ORTHOPAEDIC SURGERY

## 2020-01-21 PROCEDURE — 73564 X-RAY EXAM KNEE 4 OR MORE: CPT | Mod: TC,50

## 2020-01-21 PROCEDURE — 3075F PR MOST RECENT SYSTOLIC BLOOD PRESS GE 130-139MM HG: ICD-10-PCS | Mod: CPTII,S$GLB,, | Performed by: ORTHOPAEDIC SURGERY

## 2020-01-21 PROCEDURE — 99214 PR OFFICE/OUTPT VISIT, EST, LEVL IV, 30-39 MIN: ICD-10-PCS | Mod: 25,S$GLB,, | Performed by: ORTHOPAEDIC SURGERY

## 2020-01-21 PROCEDURE — 73564 X-RAY EXAM KNEE 4 OR MORE: CPT | Mod: 26,50,, | Performed by: RADIOLOGY

## 2020-01-21 PROCEDURE — 20610 LARGE JOINT ASPIRATION/INJECTION: L KNEE: ICD-10-PCS | Mod: LT,S$GLB,, | Performed by: ORTHOPAEDIC SURGERY

## 2020-01-21 PROCEDURE — 3079F PR MOST RECENT DIASTOLIC BLOOD PRESSURE 80-89 MM HG: ICD-10-PCS | Mod: CPTII,S$GLB,, | Performed by: ORTHOPAEDIC SURGERY

## 2020-01-21 PROCEDURE — 99214 OFFICE O/P EST MOD 30 MIN: CPT | Mod: 25,S$GLB,, | Performed by: ORTHOPAEDIC SURGERY

## 2020-01-21 PROCEDURE — 99999 PR PBB SHADOW E&M-EST. PATIENT-LVL III: ICD-10-PCS | Mod: PBBFAC,,, | Performed by: ORTHOPAEDIC SURGERY

## 2020-01-21 PROCEDURE — 99999 PR PBB SHADOW E&M-EST. PATIENT-LVL III: CPT | Mod: PBBFAC,,, | Performed by: ORTHOPAEDIC SURGERY

## 2020-01-21 PROCEDURE — 73564 XR KNEE ORTHO BILAT WITH FLEXION: ICD-10-PCS | Mod: 26,50,, | Performed by: RADIOLOGY

## 2020-01-21 RX ORDER — TRIAMCINOLONE ACETONIDE 40 MG/ML
40 INJECTION, SUSPENSION INTRA-ARTICULAR; INTRAMUSCULAR
Status: DISCONTINUED | OUTPATIENT
Start: 2020-01-21 | End: 2020-01-21 | Stop reason: HOSPADM

## 2020-01-21 RX ADMIN — TRIAMCINOLONE ACETONIDE 40 MG: 40 INJECTION, SUSPENSION INTRA-ARTICULAR; INTRAMUSCULAR at 09:01

## 2020-01-21 NOTE — PROCEDURES
Large Joint Aspiration/Injection: L knee  Date/Time: 1/21/2020 9:00 AM  Performed by: Zac Noriega MD  Authorized by: Zac Noriega MD     Consent Done?:  Yes (Verbal)  Indications:  Pain  Procedure site marked: Yes    Timeout: Prior to procedure the correct patient, procedure, and site was verified    Anesthesia  Local anesthesia not used      Location:  Knee  Site:  L knee  Prep: Patient was prepped and draped in usual sterile fashion    Needle size:  22 G  Ultrasonic Guidance for needle placement: No  Approach:  Lateral  Medications:  40 mg triamcinolone acetonide 40 mg/mL  Patient tolerance:  Patient tolerated the procedure well with no immediate complications

## 2020-01-21 NOTE — PROGRESS NOTES
CC: Left lower extremity pain / weakness    Josefina Blue returns to clinic for evaluation of her left lower extremity.    59 y.o. Female presents to clinic with some persistent and progressively worsening left > right lower extremity pain in her low back and upper thigh, medial thigh and adductors.  She has been trying to rehab her quads and hips for her degenerative knees and since then has had this progressive pain and weakness of her leg.    Is affecting ADLs.  Pain is 4/10 at it's worst.    INTERVAL HISTORY (01/21/20):  Patient presents for evaluation of acute on chronic left knee pain. Last time she was here in July, she was diagnosed with bilateral knee osteoarthritis.  She was given a steroid injection at that point time, which has helped significantly with the pain in the knee. She reports that she has been doing exercises for core hip any strengthening and a regular basis and she has lost 17 lb since her last visit.  She reports that she has been doing pretty well with knee pain until last night, when she turned in her bed, that she heard a loud pop and had immediate pain over the lateral distal femur. She has been having difficulty weight-bearing on that side due to the pain. She has tried Celebrex, ice, and a compression wrap. This has provided minimal relief.  In August she had a epidural steroid injection, which has helped significantly with the radiculopathy from her low back.  She currently has no radiculopathy.  Denies any groin pain. No other concerns or complaints.    REVIEW OF SYSTEMS:  Constitution: Negative. Negative for chills, fever and night sweats.   HENT: Negative for congestion and headaches.    Eyes: Negative for blurred vision, left vision loss and right vision loss.   Cardiovascular: Negative for chest pain and syncope.   Respiratory: Negative for cough and shortness of breath.    Endocrine: Negative for polydipsia, polyphagia and polyuria.   Hematologic/Lymphatic: Negative for  bleeding problem. Does not bruise/bleed easily.   Skin: Negative for dry skin, itching and rash.   Musculoskeletal: Negative for falls. Positive for left > right lower extremity pain and muscle weakness.   Gastrointestinal: Negative for abdominal pain and bowel incontinence.   Genitourinary: Negative for bladder incontinence and nocturia.   Neurological: Negative for disturbances in coordination, loss of balance and seizures.   Psychiatric/Behavioral: Negative for depression. The patient does not have insomnia.    Allergic/Immunologic: Negative for hives and persistent infections.     PAST MEDICAL HISTORY:    Past Medical History:   Diagnosis Date    DDD (degenerative disc disease), lumbar 10/7/2019    Hyperlipidemia 8/31/2015    Hypertension     IFG (impaired fasting glucose) 8/31/2015    Neuropathic pain 8/31/2015    Squamous cell cancer of tongue 2012       PAST SURGICAL HISTORY:   Past Surgical History:   Procedure Laterality Date    CHOLECYSTECTOMY      GALLBLADDER SURGERY  06/2016    HYSTERECTOMY      KNEE ARTHROSCOPY Right     for torn meniscus    TONGUE SURGERY      TRANSFORAMINAL EPIDURAL INJECTION OF STEROID Bilateral 9/19/2019    Procedure: Injection,steroid,epidural,transforaminal approach LUMBAR TRANSFORAMINAL BILATERAL L4/5 TF NOE;  Surgeon: Tim Kerns MD;  Location: [facility];  Service: Pain Management;  Laterality: Bilateral;  NEEDS CONSENT, VIP    TRANSFORAMINAL EPIDURAL INJECTION OF STEROID Bilateral 10/7/2019    Procedure: LUMBAR TRANSFORAMINAL BILATERAL L4/5 TF NOE;  Surgeon: Tim Kerns MD;  Location: [facility];  Service: Pain Management;  Laterality: Bilateral;  NEEDS CONSENT, **VIP**       FAMILY HISTORY:   Family History   Problem Relation Age of Onset    Alcohol abuse Mother     Cirrhosis Mother     Cancer Father 74        prostate, throat, lung- smoker    Hyperlipidemia Father     Diabetes Sister     Hypertension Sister     Drug abuse Brother     Heart  disease Brother     Hyperlipidemia Brother     No Known Problems Maternal Aunt     No Known Problems Maternal Uncle     No Known Problems Paternal Aunt     No Known Problems Paternal Uncle     No Known Problems Maternal Grandmother     No Known Problems Maternal Grandfather     No Known Problems Paternal Grandmother     No Known Problems Paternal Grandfather     Amblyopia Neg Hx     Blindness Neg Hx     Cataracts Neg Hx     Glaucoma Neg Hx     Macular degeneration Neg Hx     Retinal detachment Neg Hx     Strabismus Neg Hx     Stroke Neg Hx     Thyroid disease Neg Hx        SOCIAL HISTORY:   Social History     Socioeconomic History    Marital status:      Spouse name: Not on file    Number of children: Not on file    Years of education: Not on file    Highest education level: Not on file   Occupational History    Occupation: chief nursing officer   Social Needs    Financial resource strain: Not on file    Food insecurity:     Worry: Not on file     Inability: Not on file    Transportation needs:     Medical: Not on file     Non-medical: Not on file   Tobacco Use    Smoking status: Never Smoker    Smokeless tobacco: Never Used   Substance and Sexual Activity    Alcohol use: Yes     Alcohol/week: 0.0 standard drinks     Comment: occ    Drug use: No    Sexual activity: Yes     Birth control/protection: None   Lifestyle    Physical activity:     Days per week: Not on file     Minutes per session: Not on file    Stress: Not on file   Relationships    Social connections:     Talks on phone: Not on file     Gets together: Not on file     Attends Druze service: Not on file     Active member of club or organization: Not on file     Attends meetings of clubs or organizations: Not on file     Relationship status: Not on file   Other Topics Concern    Are you pregnant or think you may be? Not Asked    Breast-feeding Not Asked   Social History Narrative    Lives with  and 2  dogs       MEDICATIONS:     Current Outpatient Medications:     ALPRAZolam (XANAX) 0.25 MG tablet, Take 1 tablet (0.25 mg total) by mouth nightly as needed., Disp: 30 tablet, Rfl: 2    ALPRAZolam (XANAX) 0.25 MG tablet, TAKE ONE TABLET BY MOUTH AT BEDTIME AS NEEDED., Disp: 30 tablet, Rfl: 2    aspirin (ECOTRIN) 81 MG EC tablet, Take 1 tablet (81 mg total) by mouth nightly., Disp: , Rfl: 0    celecoxib (CELEBREX) 200 MG capsule, Take 1 capsule (200 mg total) by mouth 2 (two) times daily., Disp: 60 capsule, Rfl: 2    clobetasol (TEMOVATE) 0.05 % external solution, Use one to two times daily as needed for scaling or itching to scalp, Disp: 60 mL, Rfl: 3    cyclobenzaprine (FLEXERIL) 5 MG tablet, Take 1 tablet (5 mg total) by mouth 3 (three) times daily as needed for Muscle spasms., Disp: 30 tablet, Rfl: 3    estradiol (ESTRACE) 0.01 % (0.1 mg/gram) vaginal cream, Place 1 g vaginally once daily., Disp: 42.5 g, Rfl: 0    irbesartan (AVAPRO) 75 MG tablet, Take 1 tablet (75 mg total) by mouth every evening., Disp: 90 tablet, Rfl: 3    methylPREDNISolone (MEDROL DOSEPACK) 4 mg tablet, Take 1 tablet (4 mg total) by mouth once daily. use as directed, Disp: 1 Package, Rfl: 0    ondansetron (ZOFRAN-ODT) 8 MG TbDL, Dissolve 1 tablet (8 mg total) by mouth every 6 (six) hours as needed., Disp: 12 tablet, Rfl: 1    pravastatin (PRAVACHOL) 20 MG tablet, Take 1 tablet (20 mg total) by mouth once daily., Disp: 90 tablet, Rfl: 3    tretinoin (RETIN-A) 0.05 % cream, Apply topically nightly. Apply pea-sized amount to face., Disp: 45 g, Rfl: 2    triamterene-hydrochlorothiazide 37.5-25 mg (MAXZIDE-25) 37.5-25 mg per tablet, Take 1 tablet by mouth once daily., Disp: 90 tablet, Rfl: 3    zolpidem (AMBIEN) 5 MG Tab, Take one tablet by mouth every evening, Disp: 30 tablet, Rfl: 5    diclofenac sodium (VOLTAREN) 1 % Gel, Apply 2 g topically 4 (four) times daily. for 10 days, Disp: 1 Tube, Rfl: 2    ALLERGIES:   Review of  "patient's allergies indicates:   Allergen Reactions    Aspirin Nausea Only     Can take low dose of Aspirin    Codeine Nausea Only     Can take Codeine if she takes a dose of Zofran with it       VITAL SIGNS:   /89   Pulse 82   Ht 5' 9" (1.753 m)   Wt 101.2 kg (223 lb)   BMI 32.93 kg/m²      PHYSICAL EXAMINATION  General:  The patient is alert and oriented x 3.  Mood is pleasant.  Observation of ears, eyes and nose reveal no gross abnormalities.  No labored breathing observed.    LEFT KNEE EXAMINATION     OBSERVATION / INSPECTION   Gait:   Nonantalgic   Alignment:  Mild varus bilateral  Scars:   None   Muscle atrophy: Mild  Effusion:  None   Warmth:  None   Discoloration:   none     TENDERNESS / CREPITUS (T / C):          T / C      T / C   Patella   - / -   Lateral joint line   - / -   Peripatellar medial  -  Medial joint line    + / -   Peripatellar lateral -  Medial plica   - / -   Patellar tendon -   Popliteal fossa   - / -   Quad tendon   -   Gastrocnemius   -   Prepatellar Bursa - / -   Quadricep   -   Tibial tubercle  -  Thigh/hamstring  -   Pes anserine/HS -  Fibula    -   Distal ITB  + / -  Tibia     -   Tib/fib joint  - / -  LCL    -     MFC   - / -   MCL: Proximal  -    LFC   - / -    Distal   -          ROM: (* = pain)  PASSIVE   ACTIVE    Left :   5 / 0 / 130   5 / 5 / 130     Right :    5 / 0 / 130   5 / 0 / 130    Patellofemoral examination:  See above noted areas of tenderness.   Patella position    Subluxation / dislocation: Centered           Sup. / Inf;   Normal   Crepitus (PF):    Absent   Patellar Mobility:       Medial-lateral:   Normal    Superior-inferior:  Normal    Inferior tilt   Normal    Patellar tendon:  Normal   Lateral tilt:    Normal   J-sign:     None   Patellofemoral grind:   No pain       MENISCAL SIGNS:     Pain on terminal extension:  +  Pain on terminal flexion:  +  Yosis maneuver:  -   Squat     -     LIGAMENT EXAMINATION:  ACL / Lachman:  normal (-1 to 2mm) "    PCL-Post.  drawer: normal 0 to 2mm  MCL- Valgus:  normal 0 to 2mm  LCL- Varus:  normal 0 to 2mm  Pivot shift: normal (Equal)   Dial Test: difference c/w other side   At 30° flexion: normal (< 5°)    At 90° flexion: normal (< 5°)   Reverse Pivot Shift:   normal (Equal)     STRENGTH: (* = with pain) PAINFUL SIDE   Quadricep   5/5   Hamstrin/5    EXTREMITY NEURO-VASCULAR EXAMINATION:   Sensation:  Grossly intact to light touch all dermatomal regions.   Motor Function: 4/5 quad and adductor strength left leg.    DTRs;  quadriceps and  achilles 2+.  No clonus and downgoing Babinski.    Vascular status:  DP and PT pulses 2+, brisk capillary refill, symmetric.   Tender   Other findings:    XRAY (L spine) today:   Multi level degenerative changes in lumbar spine with osteophyte formation, disc space narrowing and neuro foraminal narrowing.      ASSESSMENT:    Bilateral knee osteoarthritis, acute on chronic left knee pain.    PLAN:   1. CSI provided into the Left knee  2. Discussed viscosupplimentation - will apply for insurance auth for Eufflexa   3. Continue Celebrex and ice  4. F/U after Euflexxa approved for injection    All questions were answered, pt will contact us for questions or concerns in the interim.

## 2020-01-22 ENCOUNTER — DOCUMENTATION ONLY (OUTPATIENT)
Dept: REHABILITATION | Facility: OTHER | Age: 60
End: 2020-01-22

## 2020-01-22 NOTE — PROGRESS NOTES
Spoke with patient about on going knee pain. She states that she has torn medial and lateral meniscus on the L knee as well as popliteal cysts, she is not worried about that in particular because it has been going on for a while. What does concern her is the arthritis and bone spurs that get loose and cause excruciating pain. This type of pain happens every 6 months or so, she had an injection from Dr. Noriega and has been alternating heat and ice. She plans on getting synovial injections but in the mean time the doctor suggested that she hold off on body weight or weight bearing knee exercises and complete more therapeutic strengthening exercises for the knee. Plan to modify exercises in upcoming visits.

## 2020-01-26 ENCOUNTER — HOSPITAL ENCOUNTER (EMERGENCY)
Facility: HOSPITAL | Age: 60
Discharge: HOME | End: 2020-01-26
Attending: EMERGENCY MEDICINE
Payer: COMMERCIAL

## 2020-01-26 VITALS
WEIGHT: 212 LBS | HEIGHT: 68 IN | DIASTOLIC BLOOD PRESSURE: 97 MMHG | BODY MASS INDEX: 32.13 KG/M2 | HEART RATE: 93 BPM | RESPIRATION RATE: 16 BRPM | SYSTOLIC BLOOD PRESSURE: 132 MMHG | TEMPERATURE: 98 F | OXYGEN SATURATION: 98 %

## 2020-01-26 DIAGNOSIS — H01.004 BLEPHARITIS OF LEFT UPPER EYELID, UNSPECIFIED TYPE: Primary | ICD-10-CM

## 2020-01-26 DIAGNOSIS — H00.14 CHALAZION LEFT UPPER EYELID: ICD-10-CM

## 2020-01-26 PROCEDURE — 99284 PR EMERGENCY DEPT VISIT,LEVEL IV: ICD-10-PCS | Mod: ,,, | Performed by: EMERGENCY MEDICINE

## 2020-01-26 PROCEDURE — 25000003 PHARM REV CODE 250: Performed by: EMERGENCY MEDICINE

## 2020-01-26 PROCEDURE — 99284 EMERGENCY DEPT VISIT MOD MDM: CPT | Mod: ,,, | Performed by: EMERGENCY MEDICINE

## 2020-01-26 PROCEDURE — 99283 EMERGENCY DEPT VISIT LOW MDM: CPT

## 2020-01-26 RX ORDER — TETRACAINE HYDROCHLORIDE 5 MG/ML
2 SOLUTION OPHTHALMIC
Status: DISCONTINUED | OUTPATIENT
Start: 2020-01-26 | End: 2020-01-26

## 2020-01-26 RX ORDER — IBUPROFEN 400 MG/1
400 TABLET ORAL
Status: COMPLETED | OUTPATIENT
Start: 2020-01-26 | End: 2020-01-26

## 2020-01-26 RX ORDER — BACITRACIN ZINC AND POLYMYXIN B SULFATE 500; 10000 [USP'U]/G; [USP'U]/G
OINTMENT OPHTHALMIC 2 TIMES DAILY
Qty: 15 G | Refills: 0 | Status: SHIPPED | OUTPATIENT
Start: 2020-01-26 | End: 2020-05-20

## 2020-01-26 RX ORDER — ACETAMINOPHEN 500 MG
1000 TABLET ORAL
Status: COMPLETED | OUTPATIENT
Start: 2020-01-26 | End: 2020-01-26

## 2020-01-26 RX ADMIN — FLUORESCEIN SODIUM 1 EACH: 1 STRIP OPHTHALMIC at 09:01

## 2020-01-26 RX ADMIN — IBUPROFEN 400 MG: 400 TABLET, FILM COATED ORAL at 09:01

## 2020-01-26 RX ADMIN — ACETAMINOPHEN 1000 MG: 500 TABLET ORAL at 09:01

## 2020-01-26 NOTE — ED TRIAGE NOTES
Pt presents with left eyelid swelling that first began on Friday. Pt states the pain worsened this morning. Pt denies blurred vision or discharge from eye.

## 2020-01-26 NOTE — ED PROVIDER NOTES
Encounter Date: 1/26/2020       History     Chief Complaint   Patient presents with    Eye Pain     L eyelid - started Friday, original thought it was a sty - swollen and red - no problems with her actual eye or vision      HPI   60 Y/O F 2 days of gradual worsening of atraumatic, nonradiating pain to left upper eyelid and swelling with no associated visual acuity changes, ocular pain with extraocular movement, or ocular pain with changes in light/dark environment.  She reports utilizing some some makeup and eyeliner for a recent [place] parade in the last 70-96 hours.  She denies any facial pain or headache. No fever or chills reported.  No extension of the swelling be on the upper eyelid region.  No associated nasal congestion, runny nose, sore throat or any other upper lower respiratory symptoms. She denies similar episodes in the past and assures no history of hordeolum or chalazion.  Otherwise no other complaints.     Review of patient's allergies indicates:   Allergen Reactions    Aspirin Nausea Only     Can take low dose of Aspirin    Codeine Nausea Only     Can take Codeine if she takes a dose of Zofran with it     Past Medical History:   Diagnosis Date    DDD (degenerative disc disease), lumbar 10/7/2019    Hyperlipidemia 8/31/2015    Hypertension     IFG (impaired fasting glucose) 8/31/2015    Neuropathic pain 8/31/2015    Squamous cell cancer of tongue 2012     Past Surgical History:   Procedure Laterality Date    CHOLECYSTECTOMY      GALLBLADDER SURGERY  06/2016    HYSTERECTOMY      KNEE ARTHROSCOPY Right     for torn meniscus    TONGUE SURGERY      TRANSFORAMINAL EPIDURAL INJECTION OF STEROID Bilateral 9/19/2019    Procedure: Injection,steroid,epidural,transforaminal approach LUMBAR TRANSFORAMINAL BILATERAL L4/5 TF NOE;  Surgeon: Tim Kerns MD;  [facility];  Service: Pain Management;  Laterality: Bilateral;  NEEDS CONSENT, VIP    TRANSFORAMINAL EPIDURAL INJECTION OF  STEROID Bilateral 10/7/2019    Procedure: LUMBAR TRANSFORAMINAL BILATERAL L4/5 TF NOE;  Surgeon: Tim Kerns MD;  Location: [facility];  Service: Pain Management;  Laterality: Bilateral;  NEEDS CONSENT, **VIP**     Family History   Problem Relation Age of Onset    Alcohol abuse Mother     Cirrhosis Mother     Cancer Father 74        prostate, throat, lung- smoker    Hyperlipidemia Father     Diabetes Sister     Hypertension Sister     Drug abuse Brother     Heart disease Brother     Hyperlipidemia Brother     No Known Problems Maternal Aunt     No Known Problems Maternal Uncle     No Known Problems Paternal Aunt     No Known Problems Paternal Uncle     No Known Problems Maternal Grandmother     No Known Problems Maternal Grandfather     No Known Problems Paternal Grandmother     No Known Problems Paternal Grandfather     Amblyopia Neg Hx     Blindness Neg Hx     Cataracts Neg Hx     Glaucoma Neg Hx     Macular degeneration Neg Hx     Retinal detachment Neg Hx     Strabismus Neg Hx     Stroke Neg Hx     Thyroid disease Neg Hx      Social History     Tobacco Use    Smoking status: Never Smoker    Smokeless tobacco: Never Used   Substance Use Topics    Alcohol use: Yes     Alcohol/week: 0.0 standard drinks     Comment: occ    Drug use: No     Review of Systems  CONST: No fever, chills, weight change, or fatigue.  HEENT: +LEFT eyelid swelling and pain; No headache, blurry vision/change in vision, sore throat, ear pain, eye pain, otorrhea, rhinorrhea, tooth pain, swelling, or voice changes.  NECK: No pain, masses, trauma, or redness.  HEART: No pain, palpitations, diaphoresis, nausea, or vomiting  LUNG: No SOB, cough, orthopnea, CORONEL or other complaints.  ABDOMEN: No pain, nausea, vomiting, diarrhea, constipation, or flank pain  : No discharge, dysuria, lesions, rashes, masses, sores  EXTREMITIES: FROM with No swelling, redness, injuries/trauma, lesions, sores, weakness, numbness, or  tingling  NEURO: No dizziness, weakness, fatigue, tremors, headache, change in vision or disturbances of balance or coordination  SKIN: No lesions, rashes, trauma or other complaints    Physical Exam     Initial Vitals [01/26/20 0906]   BP Pulse Resp Temp SpO2   (!) 132/97 93 16 98.4 °F (36.9 °C) 98 %      MAP       --         Physical Exam  PHYSICAL EXAM:  GENERAL: Calm; Cooperative; Well-appearing and Non-Toxic; Well-Nourished; NAD.  HENT: AT/NC; speaking full sentences with no slurring of speech or drooling/inability to tolerate oral secretions.  EYE: + edema in slight nasal erythema to left superior palpebral; PERRL with no direct or consensual photophobia; Acuity & Fields Grossly Intact with no pain on extraocular movements and all extraocular movements intact. No periocular circumferential erythema or edema; sclera with no hyperemia or subconjunctival hemorrhage; no appreciated Peds pterygium or pinguecula; NO uptake to cornea or sclera on fluorescein staining.  + 1 mm in circumference ulcer versus lesion to the inner and nasal upper portion of left superior palpebral with surrounding erythema.   NECK: FROM with no meningismus, no accessory muscle use.  LUNGS: No Tachypnea, No Retractions, no respiratory instability.  EXTREMITIES: FROM.  SKIN: Warm, Dry, No Skin Tears or Rashes.  NEUROLOGIC: AAOx3; answering questions appropriately with no cranial nerve deficits or focal neurological deficits.    ED Course   Procedures  Labs Reviewed - No data to display       Imaging Results    None          Medical Decision Making:   History:   Old Medical Records: I decided to obtain old medical records.  Initial Assessment:    Afebrile, atraumatic, hemodynamically stable  Female presents with signs and symptoms of left superior palpebral edema and erythema warranting evaluation   For chalazion versus blepharitis versus both, as chalazion is typically not painful.  Acuity, fields grossly intact with pupils equally round  reactive to light and no direct or consensual photophobia; no pain extraocular movements; and no appreciated hyperemia, subconjunctival hemorrhage to sclera or appreciated trauma to cornea.  Will fluorescein and tetracaine eye to confirm lack of suspicion of any corneal abrasion.  I anticipate outpatient antibiotic treatment given likely blepharitis, warm compresses, and follow-up with Ophthalmology as an outpatient given the atypical chalazia on presentation.  No evidence of preseptal or orbital cellulitis warranting antibiotic treatment at this time.  We discussed symptoms warranting ED return, which both  and patient acknowledged.  ____________________  Carlos Black MD, [facility]  Emergency Medicine Staff  9:31 AM 1/26/2020                                   Clinical Impression:       ICD-10-CM ICD-9-CM   1. Blepharitis of left upper eyelid, unspecified type H01.004 373.00   2. Chalazion left upper eyelid H00.14 373.2                             Mynor Black MD  01/27/20 1242

## 2020-01-26 NOTE — ED NOTES
LOC: The patient is awake, alert, and aware of environment. The patient is oriented x 3 and speaking appropriately.   APPEARANCE: No acute distress noted. Swelling noted to left eyelid since Friday. PT reports pain to eyelid.   PSYCHOSOCIAL: Patient is calm and cooperative.   SKIN: The skin is warm, dry.   RESPIRATORY: Airway is open and patent. Bilateral chest rise and fall. Respirations are spontaneous, even and unlabored. Normal effort and rate noted. No accessory muscle use noted.   CARDIAC: Patient has a normal rate and rhythm. Denies chest pain or SOB.   ABDOMEN: Soft and non tender to palpation. No distention noted.   URINARY:  Voids independently.   NEUROLOGIC: Eyes open spontaneously. Speech clear. Tolerating saliva secretions well. Able to follow commands, demonstrating ability to actively and appropriately communicate within context of current conversation. Symmetrical facial muscles. Moving all extremities well. Movement is purposeful.   MUSCULOSKELETAL: No obvious deformities noted.

## 2020-01-27 ENCOUNTER — OFFICE VISIT (OUTPATIENT)
Dept: OPHTHALMOLOGY | Facility: CLINIC | Age: 60
End: 2020-01-27
Payer: COMMERCIAL

## 2020-01-27 DIAGNOSIS — H00.024 HORDEOLUM INTERNUM OF LEFT UPPER EYELID: ICD-10-CM

## 2020-01-27 DIAGNOSIS — H00.14 CHALAZION OF LEFT UPPER EYELID: Primary | ICD-10-CM

## 2020-01-27 PROCEDURE — 92012 INTRM OPH EXAM EST PATIENT: CPT | Mod: S$GLB,,, | Performed by: OPHTHALMOLOGY

## 2020-01-27 PROCEDURE — 99999 PR PBB SHADOW E&M-EST. PATIENT-LVL II: CPT | Mod: PBBFAC,,, | Performed by: OPHTHALMOLOGY

## 2020-01-27 PROCEDURE — 99999 PR PBB SHADOW E&M-EST. PATIENT-LVL II: ICD-10-PCS | Mod: PBBFAC,,, | Performed by: OPHTHALMOLOGY

## 2020-01-27 PROCEDURE — 92012 PR EYE EXAM, EST PATIENT,INTERMED: ICD-10-PCS | Mod: S$GLB,,, | Performed by: OPHTHALMOLOGY

## 2020-01-27 NOTE — PROGRESS NOTES
HPI     Pt states that she had a small irritation on Friday along her left upper   eyelid.   Saturday, pt states the inner corner of her left eyelid was swollen and   painful to touch.  As the weekend progressed it got worse and the eyelid became more swollen  Pt states using bacitracin JESSICA and warm compress       Last edited by Lisha Murray on 1/27/2020  9:01 AM. (History)            Assessment /Plan     For exam results, see Encounter Report.    Chalazion of left upper eyelid    Hordeolum internum of left upper eyelid        Chalazion / Hordeolum DIYA   Started 2-3 days ago    No Fever    No Preseptal cellulitis    WCs 4-6x/day  Bacitracin jessica 2-3x/day    Discussed care / expectations --> Steroids //  I & C --> hold      Plan  RTC 1-2 weeks --> Dr Zuniga  RTC sooner prn with good understanding

## 2020-01-29 ENCOUNTER — PATIENT MESSAGE (OUTPATIENT)
Dept: INTERNAL MEDICINE | Facility: CLINIC | Age: 60
End: 2020-01-29

## 2020-02-04 ENCOUNTER — OFFICE VISIT (OUTPATIENT)
Dept: SPORTS MEDICINE | Facility: CLINIC | Age: 60
End: 2020-02-04
Payer: COMMERCIAL

## 2020-02-04 ENCOUNTER — PATIENT MESSAGE (OUTPATIENT)
Dept: SPORTS MEDICINE | Facility: CLINIC | Age: 60
End: 2020-02-04

## 2020-02-04 VITALS
DIASTOLIC BLOOD PRESSURE: 88 MMHG | HEIGHT: 68 IN | WEIGHT: 212 LBS | BODY MASS INDEX: 32.13 KG/M2 | HEART RATE: 84 BPM | SYSTOLIC BLOOD PRESSURE: 125 MMHG

## 2020-02-04 DIAGNOSIS — M17.11 OSTEOARTHRITIS OF RIGHT KNEE: ICD-10-CM

## 2020-02-04 DIAGNOSIS — M17.12 DEGENERATIVE ARTHRITIS OF LEFT KNEE: Primary | ICD-10-CM

## 2020-02-04 PROCEDURE — 99499 UNLISTED E&M SERVICE: CPT | Mod: S$GLB,,, | Performed by: ORTHOPAEDIC SURGERY

## 2020-02-04 PROCEDURE — 20610 LARGE JOINT ASPIRATION/INJECTION: L KNEE, R KNEE: ICD-10-PCS | Mod: 50,S$GLB,, | Performed by: ORTHOPAEDIC SURGERY

## 2020-02-04 PROCEDURE — 99499 NO LOS: ICD-10-PCS | Mod: S$GLB,,, | Performed by: ORTHOPAEDIC SURGERY

## 2020-02-04 PROCEDURE — 99999 PR PBB SHADOW E&M-EST. PATIENT-LVL III: ICD-10-PCS | Mod: PBBFAC,,, | Performed by: ORTHOPAEDIC SURGERY

## 2020-02-04 PROCEDURE — 99999 PR PBB SHADOW E&M-EST. PATIENT-LVL III: CPT | Mod: PBBFAC,,, | Performed by: ORTHOPAEDIC SURGERY

## 2020-02-04 PROCEDURE — 20610 DRAIN/INJ JOINT/BURSA W/O US: CPT | Mod: 50,S$GLB,, | Performed by: ORTHOPAEDIC SURGERY

## 2020-02-04 NOTE — PROCEDURES
Large Joint Aspiration/Injection: L knee, R knee  Date/Time: 2/4/2020 8:45 AM  Performed by: Zac Noriega MD  Authorized by: Zac Noriega MD     Consent Done?:  Yes (Verbal)  Indications:  Pain  Procedure site marked: Yes    Timeout: Prior to procedure the correct patient, procedure, and site was verified      Location:  Knee  Site:  L knee and R knee  Prep: Patient was prepped and draped in usual sterile fashion    Needle size:  22 G  Ultrasonic Guidance for needle placement: No  Approach:  Superior  Medications:  20 mg sodium hyaluronate (EUFLEXXA) 10 mg/mL(mw 2.4 -3.6 million); 20 mg sodium hyaluronate (EUFLEXXA) 10 mg/mL(mw 2.4 -3.6 million)  Patient tolerance:  Patient tolerated the procedure well with no immediate complications

## 2020-02-04 NOTE — PROGRESS NOTES
Euflexxa Injection Procedure #1     A time out was performed, including verification of patient ID, procedure, site and side, availability of information and equipment, review of safety issues, and agreement with consent, the procedure site was marked.     The patient was prepped aseptically with povidone-iodine swabsticks. A diagnostic and therapeutic injection of 2cc Euflexxa was given under sterile technique using a 21.5g x 1.5 needle from the superolateral aspect of the bilateral knee joints in the supine position.       Josefina Blue had no adverse reactions to the medication. Pain decreased. She was instructed to apply ice to the joint for 20 minutes and avoid strenuous activities for 24-36 hours following the injection. She was warned of possible blood pressure changes during that time. Following that time, she can resume activities as prior to the injection.     She was reminded to call the clinic immediately for any adverse side effects as explained in clinic today.     Both knees:  Lot: [id number]  Exp: 2020-12-13

## 2020-02-04 NOTE — LETTER
February 4, 2020      Nam Humphreys MD  [address]  [address]  [facility] [address]           [facility]  [address]  [facility] [address]  Phone: [phone]          Patient: Josefina Blue   MR Number: [id number]   YOB: 1960   Date of Visit: 2/4/2020       Dear Dr. Nam Humphreys:    Thank you for referring Josefina Blue to me for evaluation. Attached you will find relevant portions of my assessment and plan of care.    If you have questions, please do not hesitate to call me. I look forward to following Josefina Blue along with you.    Sincerely,    Zac Noriega MD    Enclosure  CC:  No Recipients    If you would like to receive this communication electronically, please contact [email or web address] or [phone] to request more information on [facility] access.    For providers and/or their staff who would like to refer a patient to [facility], please contact us through our one-stop-shop provider referral line, [facility], at [phone].    If you feel you have received this communication in error or would no longer like to receive these types of communications, please e-mail [email or web address]

## 2020-02-05 ENCOUNTER — CLINICAL SUPPORT (OUTPATIENT)
Dept: REHABILITATION | Facility: OTHER | Age: 60
End: 2020-02-05
Attending: PHYSICAL MEDICINE & REHABILITATION
Payer: COMMERCIAL

## 2020-02-05 DIAGNOSIS — M54.50 CHRONIC BILATERAL LOW BACK PAIN WITHOUT SCIATICA: Primary | ICD-10-CM

## 2020-02-05 DIAGNOSIS — G89.29 CHRONIC BILATERAL LOW BACK PAIN WITHOUT SCIATICA: Primary | ICD-10-CM

## 2020-02-05 DIAGNOSIS — Z74.09 POOR MOBILITY: ICD-10-CM

## 2020-02-05 PROCEDURE — 97750 PHYSICAL PERFORMANCE TEST: CPT | Mod: 32

## 2020-02-05 NOTE — PROGRESS NOTES
[facility]      Name: Josefina Blue  Clinic Number: [id number]    Therapy Diagnosis:   Encounter Diagnoses   Name Primary?    Poor mobility     Chronic bilateral low back pain without sciatica Yes     Physician: Violeta Jiménez PA-C    Visit Date: 2020    Physician Orders: PT Eval and Treat   Medical Diagnosis from Referral:Chronic bilateral low back pain without sciatica [M54.5, G89.29]  Spondylosis of lumbar region without myelopathy or radiculopathy [M47.816]  Spinal stenosis of lumbar region without neurogenic claudication [M48.061]  Evaluation Date: 12/3/2019  Authorization Period Expiration: 2020  Plan of Care Expiration: 3/7/20  Reassessment Due: 3/5/2020  Visit # / Visits authorized:      Time In: 7:00  Time Out: 8:00  Total Billable Time: 45 minutes     Precautions: R knee scope, L meniscus tears, DDD, HTN     Pattern of pain determined: 1 PEN      Subjective   Josefina states her knee pain has improved following injections including first of series of three synvisc. Pt states she has attempted to complete HEP on fairly regular basis.     Patient reports tolerating previous visit fair, with moderate soreness that last approximately one day.   Patient reports their pain to be 0/10 on a 0-10 scale with 0 being no pain and 10 being the worst pain imaginable.  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       Objective     Baseline Isometric Testing on Med X equipment: Testing administered by PT  Date of testin2019  ROM 0-48 deg   Max Peak Torque 120    Min Peak Torque 27    Flex/Ext Ratio 4.44:1   % below normative data 33       Outcomes:  Oswestry Questionnaire Review 1/15/2020 12/3/2019 2019   Pain Intensity 0 1 2   Personal Care  (Washing, Dressing) 0 1 0   Lifting 2 2 4   Walking 0 1 0   Sitting 0 0 2   Standing 2 2 2   Sleeping 0 2 1   Social Life 2 2 0   Traveling 0 1 0   Changing Degree of Pain 0 1 2   Score 6 13 13       Treatment    Pt was instructed in and performed the following:     Josefina received therapeutic exercises to develop/improved posture, cardiovascular endurance, muscular endurance, lumbar/cervical ROM, strength and muscular endurance for 60 minutes including the following exercises:   [facility] 2/5/2020   Visit Number 12   VAS Pain Rating 0   Recumbent Bike Seat Pos. -   Time 10   Flexion in Lying 10   Manual Therapy -   Lumbar Extension Seat Pad -   Femur Restraint -   Top Dead Center -   Counterweight -   Lumbar Flexion -   Lumbar Extension -   Lumbar Peak Torque -   Min Torque -   Test Percent Below Normative Data -   Lumbar Weight 58   Repetitions 20   Rating of Perceived Exertion 3   Ice - Z Lie (in min.) 10     Exercises completed today:  Double knee to chest 10x  Posterior pelvic tilt with LE extension 10x  Piriformis stretch 3x 20 sec   Prone Glute set c/ prone leg lift 10x  Side lying hip abd 10x  Prone on Elbows 2x30 sec      Bridges 10x (Hold due to knee pain)    Peripheral muscle strengthening which included 1 set of 15-20 repetitions at a slow, controlled 10-13 second per rep pace focused on strengthening supporting musculature for improved body mechanics and functional mobility.  Pt and therapist focused on proper form during treatment to ensure optimal strengthening of each targeted muscle group.  Machines were utilized including torso rotation, leg extension, leg curl, chest press, upright row. Tricep extension, bicep curl, leg press, and hip abduction added visit 3    Josefina received the following manual therapy techniques: none        Home Exercises Provided and Patient Education Provided     Education provided:   - Patient received education in benefits of progressing mobility and  "strengthening gradually  - Discussed exertion scale, slow progressive resistance protocol to promote safe and healthy strengthening of supportive musculature  by performing 15-20 reps, 7 sec per rep, and increasing weight by 5 % at 20 reps only if there ex done safely, slowly, using correct musculature, exertion and no pain.  Patient expressed understanding.  -Pt educated on strategies to safely perform examination and exercise using "Stop Light" analogy to describe how to avoid or stop irritating motions, proceed with caution with some movements, and progress positive exercises.     Written Home Exercises Provided: Patient instructed to cont prior HEP.  Exercises were reviewed and Josefina was able to demonstrate them prior to the end of the session.  Josefina demonstrated good  understanding of the education provided.   Seated Posterior pelvic tilt   Added Non weight bearing LE strengthening exercises  See EMR under Patient Instructions for exercises provided prior visit.    Assessment     Pt able to complete all components of session with no adverse effects. Continued at previous established resistance despite previous performance due to prolonged absence from therapy. Pt able to complete 20 repetitions with appropriate RPE. Will increase 5-10% on medx next session pending pt report of DOMS   Patient is making good progress towards established goals.  Pt will continue to benefit from skilled outpatient physical therapy to address the deficits stated in the impairment chart, provide pt/family education and to maximize pt's level of independence in the home and community environment.     Anticipated Barriers for therapy: Schedule  Pt's spiritual, cultural and educational needs considered and pt agreeable to plan of care and goals as stated below:       GOALS: Pt is in agreement with the following goals.     Short term goals:  6 weeks or 10 visits   1.  Pt will demonstrate increased lumbar ROM by at least 3 degrees from " the initial ROM value with improvements noted in functional ROM and ability to perform ADLs.  (PROGRESSING, NOT MET)  2.  Pt will demonstrate increased maximum isometric torque value by 15% when compared to the initial value resulting in improved ability to perform bending, lifting, and carrying activities safely, confidently.  (PROGRESSING, NOT MET)  3.  Patient report a reduction in worst pain score by 1-2 points for improved tolerance for lifting items off the ground.  (PROGRESSING, NOT MET)  4.  Pt able to perform HEP correctly with minimal cueing or supervision from therapist to encourage independent management of symptoms.   (PROGRESSING, NOT MET)        Long term goals: 10 weeks or 20 visits   1. Pt will demonstrate increased lumbar ROM by at least 6 degrees from initial ROM value, resulting in improved ability to perform functional fwd bending while standing and sitting.   (PROGRESSING, NOT MET)  2. Pt will demonstrate increased maximum isometric torque value by 30% when compared to the initial value resulting in improved ability to perform bending, lifting, and carrying activities safely, confidently.  (PROGRESSING, NOT MET)  3. Pt to demonstrate ability to independently control and reduce their pain through posture positioning and mechanical movements throughout a typical day.  (PROGRESSING, NOT MET)  4.  Pt will demonstrate reduced pain and improved functional outcomes as reported on the Oswestry Disability Index by reaching a score of 8 or less in order to demonstrate subjective improvement in pt's condition.    (PROGRESSING, NOT MET)  5. Pt will demonstrate independence with the HEP at discharge  (PROGRESSING, NOT MET)  6.  Pt will be able to turn over from lying on her stomach without increased pain  (PROGRESSING, NOT MET)        Plan   Continue with established Plan of Care towards established PT goals.

## 2020-02-07 ENCOUNTER — CLINICAL SUPPORT (OUTPATIENT)
Dept: REHABILITATION | Facility: OTHER | Age: 60
End: 2020-02-07
Attending: PHYSICAL MEDICINE & REHABILITATION
Payer: COMMERCIAL

## 2020-02-07 DIAGNOSIS — M54.50 CHRONIC BILATERAL LOW BACK PAIN WITHOUT SCIATICA: Primary | ICD-10-CM

## 2020-02-07 DIAGNOSIS — G89.29 CHRONIC BILATERAL LOW BACK PAIN WITHOUT SCIATICA: Primary | ICD-10-CM

## 2020-02-07 DIAGNOSIS — Z74.09 POOR MOBILITY: ICD-10-CM

## 2020-02-07 PROCEDURE — 97750 PHYSICAL PERFORMANCE TEST: CPT | Mod: 32

## 2020-02-07 NOTE — PROGRESS NOTES
[facility]      Name: Josefina Blue  Clinic Number: [id number]    Therapy Diagnosis:   Encounter Diagnoses   Name Primary?    Poor mobility     Chronic bilateral low back pain without sciatica Yes     Physician: Violeta Jiménez PA-C    Visit Date: 2020    Physician Orders: PT Eval and Treat   Medical Diagnosis from Referral:Chronic bilateral low back pain without sciatica [M54.5, G89.29]  Spondylosis of lumbar region without myelopathy or radiculopathy [M47.816]  Spinal stenosis of lumbar region without neurogenic claudication [M48.061]  Evaluation Date: 12/3/2019  Authorization Period Expiration: 2020  Plan of Care Expiration: 3/7/20  Reassessment Due: 3/5/2020  Visit # / Visits authorized:      Time In: 7:00  Time Out: 8:00  Total Billable Time: 45 minutes     Precautions: R knee scope, L meniscus tears, DDD, HTN     Pattern of pain determined: 1 PEN      Subjective   Josefina states she is not having any back, neck or knee pain at this time. She is skipping some of the leg exercises on the weight machines. She woke up today and was laying on her stomach and that was one of her goals.    Patient reports tolerating previous visit fair, with moderate soreness that last approximately one day.   Patient reports their pain to be 0/10 on a 0-10 scale with 0 being no pain and 10 being the worst pain imaginable.  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       Objective     Baseline Isometric Testing on Med X equipment: Testing administered by PT  Date of testin2019  ROM 0-48 deg   Max Peak Torque 120    Min Peak Torque 27    Flex/Ext Ratio 4.44:1   % below normative data 33     Baseline Isometric Testing on Med X equipment: Testing administered by  PT  Date of testin/15/2020  ROM 0-51 deg   Max Peak Torque 197   Min Peak Torque 59   Flex/Ext Ratio 3.33:1   % above normative data 3     Outcomes:  Oswestry Questionnaire Review 1/15/2020 12/3/2019 2019   Pain Intensity 0 1 2   Personal Care (Washing, Dressing) 0 1 0   Lifting 2 2 4   Walking 0 1 0   Sitting 0 0 2   Standing 2 2 2   Sleeping 0 2 1   Social Life 2 2 0   Traveling 0 1 0   Changing Degree of Pain 0 1 2   Score 6 13 13       Treatment    Pt was instructed in and performed the following:     Josefina received therapeutic exercises to develop/improved posture, cardiovascular endurance, muscular endurance, lumbar/cervical ROM, strength and muscular endurance for 60 minutes including the following exercises:     [facility] 2020   Visit Number 13   VAS Pain Rating 0   Recumbent Bike Seat Pos. -   Time 10   Flexion in Lying 10   Manual Therapy -   Lumbar Extension Seat Pad -   Top Dead Center -   Counterweight -   Lumbar Flexion -   Lumbar Extension -   Lumbar Peak Torque -   Min Torque -   Test Percent Below Normative Data -   Lumbar Weight 61   Repetitions 18   Rating of Perceived Exertion 4   Ice - Z Lie (in min.) 10       Exercises completed today:  Double knee to chest 10x  Posterior pelvic tilt with LE extension 10x  Piriformis stretch 3x 20 sec   Prone Glute set c/ prone leg lift 10x  Side lying hip abd 15x  Prone on Elbows 2x30 sec    Supine straight leg raise 15x    Bridges 10x (Hold due to knee pain)    Peripheral muscle strengthening which included 1 set of 15-20 repetitions at a slow, controlled 10-13 second per rep pace focused on strengthening supporting musculature for improved body mechanics and functional mobility.  Pt and therapist focused on proper form during treatment to ensure optimal strengthening of each targeted muscle group.  Machines were utilized including torso rotation, leg extension, leg curl, chest press, upright row. Tricep extension, bicep curl, leg  "press, and hip abduction added visit 3    Josefina received the following manual therapy techniques: none        Home Exercises Provided and Patient Education Provided     Education provided:   - Patient received education in benefits of progressing mobility and strengthening gradually  - Discussed exertion scale, slow progressive resistance protocol to promote safe and healthy strengthening of supportive musculature  by performing 15-20 reps, 7 sec per rep, and increasing weight by 5 % at 20 reps only if there ex done safely, slowly, using correct musculature, exertion and no pain.  Patient expressed understanding.  -Pt educated on strategies to safely perform examination and exercise using "Stop Light" analogy to describe how to avoid or stop irritating motions, proceed with caution with some movements, and progress positive exercises.     Written Home Exercises Provided: Patient instructed to cont prior HEP.  Exercises were reviewed and Josefina was able to demonstrate them prior to the end of the session.  Josefina demonstrated good  understanding of the education provided.   Seated Posterior pelvic tilt   Added Non weight bearing LE strengthening exercises  See EMR under Patient Instructions for exercises provided prior visit.    Assessment     Pt able to complete all components of session with no adverse effects. Added straight leg raise at this time to improve core stability and hip flexor strength. Pt was able to increase resistance on the lumbar medx by ~5% and complete 18 repetitions, will increase repetitions next visit as tolerated.    Patient is making good progress towards established goals.  Pt will continue to benefit from skilled outpatient physical therapy to address the deficits stated in the impairment chart, provide pt/family education and to maximize pt's level of independence in the home and community environment.     Anticipated Barriers for therapy: Schedule  Pt's spiritual, cultural and " educational needs considered and pt agreeable to plan of care and goals as stated below:       GOALS: Pt is in agreement with the following goals.     Short term goals:  6 weeks or 10 visits   1.  Pt will demonstrate increased lumbar ROM by at least 3 degrees from the initial ROM value with improvements noted in functional ROM and ability to perform ADLs.  (MET 1/15/2020)  2.  Pt will demonstrate increased maximum isometric torque value by 15% when compared to the initial value resulting in improved ability to perform bending, lifting, and carrying activities safely, confidently.  (MET 1/15/2020)  3.  Patient report a reduction in worst pain score by 1-2 points for improved tolerance for lifting items off the ground.  (MET 2/7/2020)  4.  Pt able to perform HEP correctly with minimal cueing or supervision from therapist to encourage independent management of symptoms.   (MET 2/7/2020)        Long term goals: 10 weeks or 20 visits   1. Pt will demonstrate increased lumbar ROM by at least 6 degrees from initial ROM value, resulting in improved ability to perform functional fwd bending while standing and sitting.   (PROGRESSING, NOT MET)  2. Pt will demonstrate increased maximum isometric torque value by 30% when compared to the initial value resulting in improved ability to perform bending, lifting, and carrying activities safely, confidently.  (PROGRESSING, NOT MET)  3. Pt to demonstrate ability to independently control and reduce their pain through posture positioning and mechanical movements throughout a typical day.  (PROGRESSING, NOT MET)  4.  Pt will demonstrate reduced pain and improved functional outcomes as reported on the Oswestry Disability Index by reaching a score of 8 or less in order to demonstrate subjective improvement in pt's condition.    (PROGRESSING, NOT MET)  5. Pt will demonstrate independence with the HEP at discharge  (PROGRESSING, NOT MET)  6.  Pt will be able to turn over from lying on her  stomach without increased pain  (PROGRESSING, NOT MET)        Plan   Continue with established Plan of Care towards established PT goals.

## 2020-02-10 ENCOUNTER — CLINICAL SUPPORT (OUTPATIENT)
Dept: REHABILITATION | Facility: OTHER | Age: 60
End: 2020-02-10
Attending: PHYSICAL MEDICINE & REHABILITATION
Payer: COMMERCIAL

## 2020-02-10 DIAGNOSIS — G89.29 CHRONIC BILATERAL LOW BACK PAIN WITHOUT SCIATICA: Primary | ICD-10-CM

## 2020-02-10 DIAGNOSIS — M54.50 CHRONIC BILATERAL LOW BACK PAIN WITHOUT SCIATICA: Primary | ICD-10-CM

## 2020-02-10 DIAGNOSIS — Z74.09 POOR MOBILITY: ICD-10-CM

## 2020-02-10 PROCEDURE — 97750 PHYSICAL PERFORMANCE TEST: CPT | Mod: 32

## 2020-02-11 ENCOUNTER — OFFICE VISIT (OUTPATIENT)
Dept: SPORTS MEDICINE | Facility: CLINIC | Age: 60
End: 2020-02-11
Payer: COMMERCIAL

## 2020-02-11 VITALS
BODY MASS INDEX: 32.13 KG/M2 | WEIGHT: 212 LBS | HEART RATE: 100 BPM | HEIGHT: 68 IN | SYSTOLIC BLOOD PRESSURE: 112 MMHG | DIASTOLIC BLOOD PRESSURE: 76 MMHG

## 2020-02-11 DIAGNOSIS — M17.12 DEGENERATIVE ARTHRITIS OF LEFT KNEE: ICD-10-CM

## 2020-02-11 DIAGNOSIS — M17.11 OSTEOARTHRITIS OF RIGHT KNEE: Primary | ICD-10-CM

## 2020-02-11 PROCEDURE — 99999 PR PBB SHADOW E&M-EST. PATIENT-LVL III: ICD-10-PCS | Mod: PBBFAC,,, | Performed by: ORTHOPAEDIC SURGERY

## 2020-02-11 PROCEDURE — 99999 PR PBB SHADOW E&M-EST. PATIENT-LVL III: CPT | Mod: PBBFAC,,, | Performed by: ORTHOPAEDIC SURGERY

## 2020-02-11 PROCEDURE — 20610 LARGE JOINT ASPIRATION/INJECTION: L KNEE: ICD-10-PCS | Mod: 50,S$GLB,, | Performed by: ORTHOPAEDIC SURGERY

## 2020-02-11 PROCEDURE — 99499 UNLISTED E&M SERVICE: CPT | Mod: S$GLB,,, | Performed by: ORTHOPAEDIC SURGERY

## 2020-02-11 PROCEDURE — 20610 DRAIN/INJ JOINT/BURSA W/O US: CPT | Mod: 50,S$GLB,, | Performed by: ORTHOPAEDIC SURGERY

## 2020-02-11 PROCEDURE — 99499 NO LOS: ICD-10-PCS | Mod: S$GLB,,, | Performed by: ORTHOPAEDIC SURGERY

## 2020-02-11 NOTE — PROCEDURES
Large Joint Aspiration/Injection: L knee  Performed by: Zac Noriega MD  Authorized by: Zac Noriega MD  Date/Time: 2/11/2020 8:45 AM    Consent Done?:  Yes (Verbal)  Indications:  pain  Timeout: Immediately prior to procedure a time out was called to verify the correct patient, procedure, equipment, support staff and site/side marked as required.  Prep:Patient was prepped and draped in the usual sterile fashion.  Procedure site marked: Yes     Details:   Needle size: 22 G    Ultrasonic Guidance for needle placement: No   Approach: superior  Location:  Knee  Site:  L knee    Medications: 20 mg sodium hyaluronate (EUFLEXXA) 10 mg/mL(mw 2.4 -3.6 million); 20 mg sodium hyaluronate (EUFLEXXA) 10 mg/mL(mw 2.4 -3.6 million)  Patient tolerance:  patient tolerated the procedure well with no immediate complications

## 2020-02-11 NOTE — PROGRESS NOTES
Euflexxa Injection Procedure #2     A time out was performed, including verification of patient ID, procedure, site and side, availability of information and equipment, review of safety issues, and agreement with consent, the procedure site was marked.     The patient was prepped aseptically with povidone-iodine swabsticks. A diagnostic and therapeutic injection of 2cc Euflexxa was given under sterile technique using a 21.5g x 1.5 needle from the superolateral aspect of the bilateral knee joints in the supine position.       Josefina Blue had no adverse reactions to the medication. Pain decreased. She was instructed to apply ice to the joint for 20 minutes and avoid strenuous activities for 24-36 hours following the injection. She was warned of possible blood pressure changes during that time. Following that time, she can resume activities as prior to the injection.     She was reminded to call the clinic immediately for any adverse side effects as explained in clinic today.     Both knees:  Lot: [id number]  Exp: 2020-12-13

## 2020-02-11 NOTE — LETTER
February 11, 2020      Nam Humphreys MD  [address]  [address]  [facility] [address]           [facility]  [address]  [facility] [address]  Phone: [phone]          Patient: Josefina Blue   MR Number: [id number]   YOB: 1960   Date of Visit: 2/11/2020       Dear Dr. Nam Humphreys:    Thank you for referring Josefina Blue to me for evaluation. Attached you will find relevant portions of my assessment and plan of care.    If you have questions, please do not hesitate to call me. I look forward to following Josefina Blue along with you.    Sincerely,    Zac Noriega MD    Enclosure  CC:  No Recipients    If you would like to receive this communication electronically, please contact [email or web address] or [phone] to request more information on [employer] Link access.    For providers and/or their staff who would like to refer a patient to [facility], please contact us through our one-stop-shop provider referral line, [facility], at [phone].    If you feel you have received this communication in error or would no longer like to receive these types of communications, please e-mail [email or web address]

## 2020-02-14 ENCOUNTER — CLINICAL SUPPORT (OUTPATIENT)
Dept: REHABILITATION | Facility: OTHER | Age: 60
End: 2020-02-14
Attending: PHYSICAL MEDICINE & REHABILITATION
Payer: COMMERCIAL

## 2020-02-14 DIAGNOSIS — Z74.09 POOR MOBILITY: ICD-10-CM

## 2020-02-14 DIAGNOSIS — M54.50 CHRONIC BILATERAL LOW BACK PAIN WITHOUT SCIATICA: Primary | ICD-10-CM

## 2020-02-14 DIAGNOSIS — G89.29 CHRONIC BILATERAL LOW BACK PAIN WITHOUT SCIATICA: Primary | ICD-10-CM

## 2020-02-14 PROCEDURE — 97750 PHYSICAL PERFORMANCE TEST: CPT | Mod: 32

## 2020-02-14 NOTE — PROGRESS NOTES
[facility]      Name: Josefina Blue  Clinic Number: [id number]    Therapy Diagnosis:   Encounter Diagnoses   Name Primary?    Poor mobility     Chronic bilateral low back pain without sciatica Yes     Physician: Violeta Jiménez PA-C    Visit Date: 2020    Physician Orders: PT Eval and Treat   Medical Diagnosis from Referral:Chronic bilateral low back pain without sciatica [M54.5, G89.29]  Spondylosis of lumbar region without myelopathy or radiculopathy [M47.816]  Spinal stenosis of lumbar region without neurogenic claudication [M48.061]  Evaluation Date: 12/3/2019  Authorization Period Expiration: 2020  Plan of Care Expiration: 3/7/20  Reassessment Due: 3/5/2020  Visit # / Visits authorized: 15/ 20     Time In: 7:00  Time Out: 8:00  Total Billable Time: 45 minutes     Precautions: R knee scope, L meniscus tears, DDD, HTN     Pattern of pain determined: 1 PEN      Subjective   Josefina states she is not having any back pain today.      Patient reports tolerating previous visit fair, with moderate soreness that last approximately one day.   Patient reports their pain to be 0/10 on a 0-10 scale with 0 being no pain and 10 being the worst pain imaginable.  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       Objective     Baseline Isometric Testing on Med X equipment: Testing administered by PT  Date of testin2019  ROM 0-48 deg   Max Peak Torque 120    Min Peak Torque 27    Flex/Ext Ratio 4.44:1   % below normative data 33     Baseline Isometric Testing on Med X equipment: Testing administered by PT  Date of testin/15/2020  ROM 0-51 deg   Max Peak Torque 197   Min Peak Torque 59   Flex/Ext Ratio 3.33:1   % above normative data 3     Outcomes:  Oswestry  Questionnaire Review 1/15/2020 12/3/2019 11/18/2019   Pain Intensity 0 1 2   Personal Care (Washing, Dressing) 0 1 0   Lifting 2 2 4   Walking 0 1 0   Sitting 0 0 2   Standing 2 2 2   Sleeping 0 2 1   Social Life 2 2 0   Traveling 0 1 0   Changing Degree of Pain 0 1 2   Score 6 13 13       Treatment    Pt was instructed in and performed the following:     Josefina received therapeutic exercises to develop/improved posture, cardiovascular endurance, muscular endurance, lumbar/cervical ROM, strength and muscular endurance for 60 minutes including the following exercises:     [facility] 2/14/2020   Visit Number 15   VAS Pain Rating 0   Recumbent Bike Seat Pos. -   Time 10   Flexion in Lying 10   Lumbar Weight 64   Repetitions 18   Rating of Perceived Exertion 4   Ice - Z Lie (in min.) 10       Exercises completed today:  Double knee to chest 10x  Posterior pelvic tilt with LE extension 10x  Piriformis stretch 3x 20 sec   Prone Glute set c/ prone leg lift 10x  Side lying hip abd 15x  Prone on Elbows 3x30 sec    Supine straight leg raise 15x    Bridges 10x (Hold due to knee pain)      Peripheral muscle strengthening which included 1 set of 15-20 repetitions at a slow, controlled 10-13 second per rep pace focused on strengthening supporting musculature for improved body mechanics and functional mobility.  Pt and therapist focused on proper form during treatment to ensure optimal strengthening of each targeted muscle group.  Machines were utilized including torso rotation, leg extension, leg curl, chest press, upright row. Tricep extension, bicep curl, leg press, and hip abduction added visit 3    Josefina received the following manual therapy techniques: none        Home Exercises Provided and Patient Education Provided     Education provided:   - Patient received education in benefits of progressing mobility and strengthening gradually  - Discussed exertion scale, slow progressive resistance protocol to promote safe  "and healthy strengthening of supportive musculature  by performing 15-20 reps, 7 sec per rep, and increasing weight by 5 % at 20 reps only if there ex done safely, slowly, using correct musculature, exertion and no pain.  Patient expressed understanding.  -Pt educated on strategies to safely perform examination and exercise using "Stop Light" analogy to describe how to avoid or stop irritating motions, proceed with caution with some movements, and progress positive exercises.     Written Home Exercises Provided: Patient instructed to cont prior HEP.  Exercises were reviewed and Josefina was able to demonstrate them prior to the end of the session.  Josefina demonstrated good  understanding of the education provided.   Seated Posterior pelvic tilt   Added Non weight bearing LE strengthening exercises  See EMR under Patient Instructions for exercises provided prior visit.    Assessment     Pt able to complete all components of session with no adverse effects. Increased resistance on the lumbar medx by 5% with appropriate RPE. Increased repetitions on prone on elbows without increased low back pain.   Patient is making good progress towards established goals.  Pt will continue to benefit from skilled outpatient physical therapy to address the deficits stated in the impairment chart, provide pt/family education and to maximize pt's level of independence in the home and community environment.     Anticipated Barriers for therapy: Schedule  Pt's spiritual, cultural and educational needs considered and pt agreeable to plan of care and goals as stated below:       GOALS: Pt is in agreement with the following goals.     Short term goals:  6 weeks or 10 visits   1.  Pt will demonstrate increased lumbar ROM by at least 3 degrees from the initial ROM value with improvements noted in functional ROM and ability to perform ADLs.  (MET 1/15/2020)  2.  Pt will demonstrate increased maximum isometric torque value by 15% when compared to " the initial value resulting in improved ability to perform bending, lifting, and carrying activities safely, confidently.  (MET 1/15/2020)  3.  Patient report a reduction in worst pain score by 1-2 points for improved tolerance for lifting items off the ground.  (MET 2/7/2020)  4.  Pt able to perform HEP correctly with minimal cueing or supervision from therapist to encourage independent management of symptoms.   (MET 2/7/2020)        Long term goals: 10 weeks or 20 visits   1. Pt will demonstrate increased lumbar ROM by at least 6 degrees from initial ROM value, resulting in improved ability to perform functional fwd bending while standing and sitting.   (PROGRESSING, NOT MET)  2. Pt will demonstrate increased maximum isometric torque value by 30% when compared to the initial value resulting in improved ability to perform bending, lifting, and carrying activities safely, confidently.  (PROGRESSING, NOT MET)  3. Pt to demonstrate ability to independently control and reduce their pain through posture positioning and mechanical movements throughout a typical day.  (PROGRESSING, NOT MET)  4.  Pt will demonstrate reduced pain and improved functional outcomes as reported on the Oswestry Disability Index by reaching a score of 8 or less in order to demonstrate subjective improvement in pt's condition.    (PROGRESSING, NOT MET)  5. Pt will demonstrate independence with the HEP at discharge  (PROGRESSING, NOT MET)  6.  Pt will be able to turn over from lying on her stomach without increased pain  (PROGRESSING, NOT MET)        Plan   Continue with established Plan of Care towards established PT goals.

## 2020-02-15 DIAGNOSIS — I10 ESSENTIAL HYPERTENSION: ICD-10-CM

## 2020-02-17 ENCOUNTER — CLINICAL SUPPORT (OUTPATIENT)
Dept: REHABILITATION | Facility: OTHER | Age: 60
End: 2020-02-17
Attending: PHYSICAL MEDICINE & REHABILITATION
Payer: COMMERCIAL

## 2020-02-17 DIAGNOSIS — Z74.09 POOR MOBILITY: ICD-10-CM

## 2020-02-17 DIAGNOSIS — G89.29 CHRONIC BILATERAL LOW BACK PAIN WITHOUT SCIATICA: Primary | ICD-10-CM

## 2020-02-17 DIAGNOSIS — M54.50 CHRONIC BILATERAL LOW BACK PAIN WITHOUT SCIATICA: Primary | ICD-10-CM

## 2020-02-17 PROCEDURE — 97750 PHYSICAL PERFORMANCE TEST: CPT | Mod: 32

## 2020-02-17 RX ORDER — IRBESARTAN 75 MG/1
75 TABLET ORAL NIGHTLY
Qty: 90 TABLET | Refills: 3 | Status: SHIPPED | OUTPATIENT
Start: 2020-02-17 | End: 2021-02-21 | Stop reason: SDUPTHER

## 2020-02-17 NOTE — PROGRESS NOTES
[facility]      Name: Josefina Blue  Clinic Number: [id number]    Therapy Diagnosis:   Encounter Diagnoses   Name Primary?    Poor mobility     Chronic bilateral low back pain without sciatica Yes     Physician: Violeta Jiménez PA-C    Visit Date: 2020    Physician Orders: PT Eval and Treat   Medical Diagnosis from Referral:Chronic bilateral low back pain without sciatica [M54.5, G89.29]  Spondylosis of lumbar region without myelopathy or radiculopathy [M47.816]  Spinal stenosis of lumbar region without neurogenic claudication [M48.061]  Evaluation Date: 12/3/2019  Authorization Period Expiration: 2020  Plan of Care Expiration: 3/7/20  Reassessment Due: 3/5/2020  Visit # / Visits authorized:      Time In: 7:00  Time Out: 8:00  Total Billable Time: 45 minutes     Precautions: R knee scope, L meniscus tears, DDD, HTN     Pattern of pain determined: 1 PEN      Subjective   Josefina states she is experiencing some increased low back tightness and soreness this am perhaps from walking and standing a lot this weekend   Patient reports tolerating previous visit fair, with more soreness following previous session.   Patient reports their pain to be 2/10 on a 0-10 scale with 0 being no pain and 10 being the worst pain imaginable.  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       Objective     Baseline Isometric Testing on Med X equipment: Testing administered by PT  Date of testin2019  ROM 0-48 deg   Max Peak Torque 120    Min Peak Torque 27    Flex/Ext Ratio 4.44:1   % below normative data 33     Baseline Isometric Testing on Med X equipment: Testing administered by PT  Date of testin/15/2020  ROM 0-51 deg   Max Peak Torque 197   Min Peak Torque  59   Flex/Ext Ratio 3.33:1   % above normative data 3     Outcomes:  Oswestry Questionnaire Review 1/15/2020 12/3/2019 11/18/2019   Pain Intensity 0 1 2   Personal Care (Washing, Dressing) 0 1 0   Lifting 2 2 4   Walking 0 1 0   Sitting 0 0 2   Standing 2 2 2   Sleeping 0 2 1   Social Life 2 2 0   Traveling 0 1 0   Changing Degree of Pain 0 1 2   Score 6 13 13       Treatment    Pt was instructed in and performed the following:     Josefina received therapeutic exercises to develop/improved posture, cardiovascular endurance, muscular endurance, lumbar/cervical ROM, strength and muscular endurance for 60 minutes including the following exercises:   [facility] 2/17/2020   Visit Number 16   VAS Pain Rating 0   Recumbent Bike Seat Pos. -   Time 10   Flexion in Lying 10   Manual Therapy -   Lumbar Extension Seat Pad -   Femur Restraint -   Top Dead Center -   Counterweight -   Lumbar Flexion -   Lumbar Extension -   Lumbar Peak Torque -   Min Torque -   Test Percent Below Normative Data -   Lumbar Weight 64   Repetitions 20   Rating of Perceived Exertion 3   Ice - Z Lie (in min.) 10       Exercises completed today:  Double knee to chest 10x  Posterior pelvic tilt with LE extension 10x  Piriformis stretch 3x 20 sec   Prone Glute set c/ prone leg lift 10x  Side lying hip abd 15x  Prone on Elbows 3x30 sec    Supine straight leg raise 15x    Bridges 10x (Hold due to knee pain)      Peripheral muscle strengthening which included 1 set of 15-20 repetitions at a slow, controlled 10-13 second per rep pace focused on strengthening supporting musculature for improved body mechanics and functional mobility.  Pt and therapist focused on proper form during treatment to ensure optimal strengthening of each targeted muscle group.  Machines were utilized including torso rotation, leg extension, leg curl, chest press, upright row. Tricep extension, bicep curl, leg press, and hip abduction added visit 3    Josefina received the  "following manual therapy techniques: none        Home Exercises Provided and Patient Education Provided     Education provided:   - Patient received education in benefits of progressing mobility and strengthening gradually  - Discussed exertion scale, slow progressive resistance protocol to promote safe and healthy strengthening of supportive musculature  by performing 15-20 reps, 7 sec per rep, and increasing weight by 5 % at 20 reps only if there ex done safely, slowly, using correct musculature, exertion and no pain.  Patient expressed understanding.  -Pt educated on strategies to safely perform examination and exercise using "Stop Light" analogy to describe how to avoid or stop irritating motions, proceed with caution with some movements, and progress positive exercises.     Written Home Exercises Provided: Patient instructed to cont prior HEP.  Exercises were reviewed and Josefina was able to demonstrate them prior to the end of the session.  Josefina demonstrated good  understanding of the education provided.   Seated Posterior pelvic tilt   Added Non weight bearing LE strengthening exercises  See EMR under Patient Instructions for exercises provided prior visit.    Assessment     Pt able to complete all components of session with no adverse effects. Pt is able to complete 20 repetitions on medx with appropriate RPE. Will increased resistance next session.   Patient is making good progress towards established goals.  Pt will continue to benefit from skilled outpatient physical therapy to address the deficits stated in the impairment chart, provide pt/family education and to maximize pt's level of independence in the home and community environment.     Anticipated Barriers for therapy: Schedule  Pt's spiritual, cultural and educational needs considered and pt agreeable to plan of care and goals as stated below:       GOALS: Pt is in agreement with the following goals.     Short term goals:  6 weeks or 10 visits "   1.  Pt will demonstrate increased lumbar ROM by at least 3 degrees from the initial ROM value with improvements noted in functional ROM and ability to perform ADLs.  (MET 1/15/2020)  2.  Pt will demonstrate increased maximum isometric torque value by 15% when compared to the initial value resulting in improved ability to perform bending, lifting, and carrying activities safely, confidently.  (MET 1/15/2020)  3.  Patient report a reduction in worst pain score by 1-2 points for improved tolerance for lifting items off the ground.  (MET 2/7/2020)  4.  Pt able to perform HEP correctly with minimal cueing or supervision from therapist to encourage independent management of symptoms.   (MET 2/7/2020)        Long term goals: 10 weeks or 20 visits   1. Pt will demonstrate increased lumbar ROM by at least 6 degrees from initial ROM value, resulting in improved ability to perform functional fwd bending while standing and sitting.   (PROGRESSING, NOT MET)  2. Pt will demonstrate increased maximum isometric torque value by 30% when compared to the initial value resulting in improved ability to perform bending, lifting, and carrying activities safely, confidently.  (PROGRESSING, NOT MET)  3. Pt to demonstrate ability to independently control and reduce their pain through posture positioning and mechanical movements throughout a typical day.  (PROGRESSING, NOT MET)  4.  Pt will demonstrate reduced pain and improved functional outcomes as reported on the Oswestry Disability Index by reaching a score of 8 or less in order to demonstrate subjective improvement in pt's condition.    (PROGRESSING, NOT MET)  5. Pt will demonstrate independence with the HEP at discharge  (PROGRESSING, NOT MET)  6.  Pt will be able to turn over from lying on her stomach without increased pain  (PROGRESSING, NOT MET)        Plan   Continue with established Plan of Care towards established PT goals.

## 2020-02-20 ENCOUNTER — TELEPHONE (OUTPATIENT)
Dept: SPORTS MEDICINE | Facility: CLINIC | Age: 60
End: 2020-02-20

## 2020-02-20 ENCOUNTER — PATIENT MESSAGE (OUTPATIENT)
Dept: SPORTS MEDICINE | Facility: CLINIC | Age: 60
End: 2020-02-20

## 2020-02-20 NOTE — TELEPHONE ENCOUNTER
Spoke to patient and she stated that she would like to see Dr. Noriega for her last injection. I explained to patient that he will be out until 03/05, she stated that was fine and agreed to appointment that was scheduled.

## 2020-02-20 NOTE — TELEPHONE ENCOUNTER
----- Message from Helder Melton sent at 2/20/2020 10:35 AM CST -----  Contact: Patient   Patient called in to reschedule her appointment and would like a call back from the office. She can be reached at    [phone]

## 2020-03-03 ENCOUNTER — OFFICE VISIT (OUTPATIENT)
Dept: SPORTS MEDICINE | Facility: CLINIC | Age: 60
End: 2020-03-03
Payer: COMMERCIAL

## 2020-03-03 ENCOUNTER — CLINICAL SUPPORT (OUTPATIENT)
Dept: REHABILITATION | Facility: OTHER | Age: 60
End: 2020-03-03
Attending: PHYSICAL MEDICINE & REHABILITATION
Payer: COMMERCIAL

## 2020-03-03 VITALS
HEIGHT: 68 IN | BODY MASS INDEX: 32.13 KG/M2 | SYSTOLIC BLOOD PRESSURE: 136 MMHG | DIASTOLIC BLOOD PRESSURE: 86 MMHG | WEIGHT: 212 LBS | HEART RATE: 94 BPM

## 2020-03-03 DIAGNOSIS — M17.11 OSTEOARTHRITIS OF RIGHT KNEE: Primary | ICD-10-CM

## 2020-03-03 DIAGNOSIS — Z74.09 POOR MOBILITY: ICD-10-CM

## 2020-03-03 DIAGNOSIS — G89.29 CHRONIC BILATERAL LOW BACK PAIN WITHOUT SCIATICA: Primary | ICD-10-CM

## 2020-03-03 DIAGNOSIS — M54.50 CHRONIC BILATERAL LOW BACK PAIN WITHOUT SCIATICA: Primary | ICD-10-CM

## 2020-03-03 DIAGNOSIS — M17.12 DEGENERATIVE ARTHRITIS OF LEFT KNEE: ICD-10-CM

## 2020-03-03 PROCEDURE — 99499 NO LOS: ICD-10-PCS | Mod: S$GLB,,, | Performed by: ORTHOPAEDIC SURGERY

## 2020-03-03 PROCEDURE — 97750 PHYSICAL PERFORMANCE TEST: CPT | Mod: 32

## 2020-03-03 PROCEDURE — 20610 LARGE JOINT ASPIRATION/INJECTION: R KNEE: ICD-10-PCS | Mod: 50,S$GLB,, | Performed by: ORTHOPAEDIC SURGERY

## 2020-03-03 PROCEDURE — 20610 DRAIN/INJ JOINT/BURSA W/O US: CPT | Mod: 50,S$GLB,, | Performed by: ORTHOPAEDIC SURGERY

## 2020-03-03 PROCEDURE — 99999 PR PBB SHADOW E&M-EST. PATIENT-LVL III: CPT | Mod: PBBFAC,,, | Performed by: ORTHOPAEDIC SURGERY

## 2020-03-03 PROCEDURE — 99499 UNLISTED E&M SERVICE: CPT | Mod: S$GLB,,, | Performed by: ORTHOPAEDIC SURGERY

## 2020-03-03 PROCEDURE — 99999 PR PBB SHADOW E&M-EST. PATIENT-LVL III: ICD-10-PCS | Mod: PBBFAC,,, | Performed by: ORTHOPAEDIC SURGERY

## 2020-03-03 NOTE — LETTER
March 3, 2020      Nam Humphreys MD  [address]  [address]  [facility] [address]           [facility]  [address]  [facility] [address]  Phone: [phone]          Patient: Josefina Blue   MR Number: [id number]   YOB: 1960   Date of Visit: 3/3/2020       Dear Dr. Nam Humphreys:    Thank you for referring Josefina Blue to me for evaluation. Attached you will find relevant portions of my assessment and plan of care.    If you have questions, please do not hesitate to call me. I look forward to following Josefina Blue along with you.    Sincerely,    Zac Noriega MD    Enclosure  CC:  No Recipients    If you would like to receive this communication electronically, please contact [email or web address] or [phone] to request more information on [employer] Link access.    For providers and/or their staff who would like to refer a patient to [facility], please contact us through our one-stop-shop provider referral line, [facility], at [phone].    If you feel you have received this communication in error or would no longer like to receive these types of communications, please e-mail [email or web address]

## 2020-03-03 NOTE — PROGRESS NOTES
[facility]      Name: Josefina Blue  Clinic Number: [id number]    Therapy Diagnosis:   Encounter Diagnoses   Name Primary?    Poor mobility     Chronic bilateral low back pain without sciatica Yes     Physician: Violeta Jiménez PA-C    Visit Date: 3/3/2020    Physician Orders: PT Eval and Treat   Medical Diagnosis from Referral:Chronic bilateral low back pain without sciatica [M54.5, G89.29]  Spondylosis of lumbar region without myelopathy or radiculopathy [M47.816]  Spinal stenosis of lumbar region without neurogenic claudication [M48.061]  Evaluation Date: 12/3/2019  Authorization Period Expiration: 2020  Plan of Care Expiration: 4/3/2020  Reassessment Due: 4/3/2020  Visit # / Visits authorized:      Time In: 7:35  Time Out: 8:35  Total Billable Time: 45 minutes     Precautions: R knee scope, L meniscus tears, DDD, HTN     Pattern of pain determined: 1 PEN      Subjective   Josefina states everything is feeling awesome.     Patient reports tolerating previous visit fair, with more soreness following previous session.   Patient reports their pain to be 0/10 on a 0-10 scale with 0 being no pain and 10 being the worst pain imaginable.  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       Objective     Baseline Isometric Testing on Med X equipment: Testing administered by PT  Date of testin2019  ROM 0-48 deg   Max Peak Torque 120    Min Peak Torque 27    Flex/Ext Ratio 4.44:1   % below normative data 33     Baseline Isometric Testing on Med X equipment: Testing administered by PT  Date of testin/15/2020  ROM 0-51 deg   Max Peak Torque 197   Min Peak Torque 59   Flex/Ext Ratio 3.33:1   % above normative data 3     Outcomes:  Oswestry Questionnaire  Review 1/15/2020 12/3/2019 11/18/2019   Pain Intensity 0 1 2   Personal Care (Washing, Dressing) 0 1 0   Lifting 2 2 4   Walking 0 1 0   Sitting 0 0 2   Standing 2 2 2   Sleeping 0 2 1   Social Life 2 2 0   Traveling 0 1 0   Changing Degree of Pain 0 1 2   Score 6 13 13       Treatment    Pt was instructed in and performed the following:     Josefina received therapeutic exercises to develop/improved posture, cardiovascular endurance, muscular endurance, lumbar/cervical ROM, strength and muscular endurance for 60 minutes including the following exercises:     [facility] 3/3/2020   Visit Number 17   VAS Pain Rating 0   Recumbent Bike Seat Pos. -   Time 10   Flexion in Lying 10   Manual Therapy -   Lumbar Weight 67   Repetitions 15   Rating of Perceived Exertion 5   Ice - Z Lie (in min.) 10         Exercises completed today:  Double knee to chest 10x  Posterior pelvic tilt with LE extension 10x  Piriformis stretch 3x 20 sec   Prone Glute set c/ prone leg lift 10x  Side lying hip abd 15x  Prone on Elbows 3x30 sec    Supine straight leg raise 15x    Bridges 10x (Hold due to knee pain)      Peripheral muscle strengthening which included 1 set of 15-20 repetitions at a slow, controlled 10-13 second per rep pace focused on strengthening supporting musculature for improved body mechanics and functional mobility.  Pt and therapist focused on proper form during treatment to ensure optimal strengthening of each targeted muscle group.  Machines were utilized including torso rotation, leg extension, leg curl, chest press, upright row. Tricep extension, bicep curl, leg press, and hip abduction added visit 3    Josefina received the following manual therapy techniques: none        Home Exercises Provided and Patient Education Provided     Education provided:   - Patient received education in benefits of progressing mobility and strengthening gradually  - Discussed exertion scale, slow progressive resistance protocol to  "promote safe and healthy strengthening of supportive musculature  by performing 15-20 reps, 7 sec per rep, and increasing weight by 5 % at 20 reps only if there ex done safely, slowly, using correct musculature, exertion and no pain.  Patient expressed understanding.  -Pt educated on strategies to safely perform examination and exercise using "Stop Light" analogy to describe how to avoid or stop irritating motions, proceed with caution with some movements, and progress positive exercises.     Written Home Exercises Provided: Patient instructed to cont prior HEP.  Exercises were reviewed and Josefina was able to demonstrate them prior to the end of the session.  Josefina demonstrated good  understanding of the education provided.   Seated Posterior pelvic tilt   Added Non weight bearing LE strengthening exercises  See EMR under Patient Instructions for exercises provided prior visit.    Assessment     Pt able to complete all components of session with no adverse effects or complaints of increased pain. Increased resistance on the lumbar medx to 67 ft lbs and she was able to complete 18 repetitions with appropriate RPE. Will increased repetitions next session.   Patient is making good progress towards established goals.  Pt will continue to benefit from skilled outpatient physical therapy to address the deficits stated in the impairment chart, provide pt/family education and to maximize pt's level of independence in the home and community environment.     Anticipated Barriers for therapy: Schedule  Pt's spiritual, cultural and educational needs considered and pt agreeable to plan of care and goals as stated below:       GOALS: Pt is in agreement with the following goals.     Short term goals:  6 weeks or 10 visits   1.  Pt will demonstrate increased lumbar ROM by at least 3 degrees from the initial ROM value with improvements noted in functional ROM and ability to perform ADLs.  (MET 1/15/2020)  2.  Pt will demonstrate " increased maximum isometric torque value by 15% when compared to the initial value resulting in improved ability to perform bending, lifting, and carrying activities safely, confidently.  (MET 1/15/2020)  3.  Patient report a reduction in worst pain score by 1-2 points for improved tolerance for lifting items off the ground.  (MET 2/7/2020)  4.  Pt able to perform HEP correctly with minimal cueing or supervision from therapist to encourage independent management of symptoms.   (MET 2/7/2020)        Long term goals: 10 weeks or 20 visits   1. Pt will demonstrate increased lumbar ROM by at least 6 degrees from initial ROM value, resulting in improved ability to perform functional fwd bending while standing and sitting.   (PROGRESSING, NOT MET)  2. Pt will demonstrate increased maximum isometric torque value by 30% when compared to the initial value resulting in improved ability to perform bending, lifting, and carrying activities safely, confidently.  (PROGRESSING, NOT MET)  3. Pt to demonstrate ability to independently control and reduce their pain through posture positioning and mechanical movements throughout a typical day.  (PROGRESSING, NOT MET)  4.  Pt will demonstrate reduced pain and improved functional outcomes as reported on the Oswestry Disability Index by reaching a score of 8 or less in order to demonstrate subjective improvement in pt's condition.    (PROGRESSING, NOT MET)  5. Pt will demonstrate independence with the HEP at discharge  (PROGRESSING, NOT MET)  6.  Pt will be able to turn over from lying on her stomach without increased pain  (PROGRESSING, NOT MET)        Plan   Continue with established Plan of Care towards established PT goals.

## 2020-03-03 NOTE — PROCEDURES
Large Joint Aspiration/Injection: R knee  Performed by: Zac Noriega MD  Authorized by: Zac Noriega MD  Date/Time: 3/3/2020 9:30 AM    Consent Done?:  Yes (Verbal)  Indications:  pain  Timeout: Immediately prior to procedure a time out was called to verify the correct patient, procedure, equipment, support staff and site/side marked as required.  Prep:Patient was prepped and draped in the usual sterile fashion.  Procedure site marked: Yes     Details:   Needle size: 22 G    Ultrasonic Guidance for needle placement: No   Approach: superior  Location:  Knee  Site:  R knee    Medications: 20 mg sodium hyaluronate (EUFLEXXA) 10 mg/mL(mw 2.4 -3.6 million); 20 mg sodium hyaluronate (EUFLEXXA) 10 mg/mL(mw 2.4 -3.6 million)  Patient tolerance:  patient tolerated the procedure well with no immediate complications

## 2020-03-03 NOTE — PLAN OF CARE
[facility]      Name: Josefina Blue  Clinic Number: [id number]    Therapy Diagnosis:   Encounter Diagnoses   Name Primary?    Poor mobility     Chronic bilateral low back pain without sciatica Yes     Physician: Violeta Jiménez PA-C    Visit Date: 3/3/2020    Physician Orders: PT Eval and Treat   Medical Diagnosis from Referral:Chronic bilateral low back pain without sciatica [M54.5, G89.29]  Spondylosis of lumbar region without myelopathy or radiculopathy [M47.816]  Spinal stenosis of lumbar region without neurogenic claudication [M48.061]  Evaluation Date: 12/3/2019  Authorization Period Expiration: 2020  Plan of Care Expiration: 4/3/2020  Reassessment Due: 4/3/2020  Visit # / Visits authorized:      Time In: 7:35  Time Out: 8:35  Total Billable Time: 45 minutes     Precautions: R knee scope, L meniscus tears, DDD, HTN     Pattern of pain determined: 1 PEN      Subjective   Josefina states everything is feeling awesome.     Patient reports tolerating previous visit fair, with more soreness following previous session.   Patient reports their pain to be 0/10 on a 0-10 scale with 0 being no pain and 10 being the worst pain imaginable.  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       Objective     Baseline Isometric Testing on Med X equipment: Testing administered by PT  Date of testin2019  ROM 0-48 deg   Max Peak Torque 120    Min Peak Torque 27    Flex/Ext Ratio 4.44:1   % below normative data 33     Baseline Isometric Testing on Med X equipment: Testing administered by PT  Date of testin/15/2020  ROM 0-51 deg   Max Peak Torque 197   Min Peak Torque 59   Flex/Ext Ratio 3.33:1   % above normative data 3     Outcomes:  Oswestry Questionnaire  Review 1/15/2020 12/3/2019 11/18/2019   Pain Intensity 0 1 2   Personal Care (Washing, Dressing) 0 1 0   Lifting 2 2 4   Walking 0 1 0   Sitting 0 0 2   Standing 2 2 2   Sleeping 0 2 1   Social Life 2 2 0   Traveling 0 1 0   Changing Degree of Pain 0 1 2   Score 6 13 13       Treatment    Pt was instructed in and performed the following:     Josefina received therapeutic exercises to develop/improved posture, cardiovascular endurance, muscular endurance, lumbar/cervical ROM, strength and muscular endurance for 60 minutes including the following exercises:     [facility] 3/3/2020   Visit Number 17   VAS Pain Rating 0   Recumbent Bike Seat Pos. -   Time 10   Flexion in Lying 10   Manual Therapy -   Lumbar Weight 67   Repetitions 15   Rating of Perceived Exertion 5   Ice - Z Lie (in min.) 10         Exercises completed today:  Double knee to chest 10x  Posterior pelvic tilt with LE extension 10x  Piriformis stretch 3x 20 sec   Prone Glute set c/ prone leg lift 10x  Side lying hip abd 15x  Prone on Elbows 3x30 sec    Supine straight leg raise 15x    Bridges 10x (Hold due to knee pain)      Peripheral muscle strengthening which included 1 set of 15-20 repetitions at a slow, controlled 10-13 second per rep pace focused on strengthening supporting musculature for improved body mechanics and functional mobility.  Pt and therapist focused on proper form during treatment to ensure optimal strengthening of each targeted muscle group.  Machines were utilized including torso rotation, leg extension, leg curl, chest press, upright row. Tricep extension, bicep curl, leg press, and hip abduction added visit 3    Josefina received the following manual therapy techniques: none        Home Exercises Provided and Patient Education Provided     Education provided:   - Patient received education in benefits of progressing mobility and strengthening gradually  - Discussed exertion scale, slow progressive resistance protocol to  "promote safe and healthy strengthening of supportive musculature  by performing 15-20 reps, 7 sec per rep, and increasing weight by 5 % at 20 reps only if there ex done safely, slowly, using correct musculature, exertion and no pain.  Patient expressed understanding.  -Pt educated on strategies to safely perform examination and exercise using "Stop Light" analogy to describe how to avoid or stop irritating motions, proceed with caution with some movements, and progress positive exercises.     Written Home Exercises Provided: Patient instructed to cont prior HEP.  Exercises were reviewed and Josefina was able to demonstrate them prior to the end of the session.  Josefina demonstrated good  understanding of the education provided.   Seated Posterior pelvic tilt   Added Non weight bearing LE strengthening exercises  See EMR under Patient Instructions for exercises provided prior visit.    Assessment     Pt able to complete all components of session with no adverse effects or complaints of increased pain. Increased resistance on the lumbar medx to 67 ft lbs and she was able to complete 18 repetitions with appropriate RPE. Will increased repetitions next session.   Patient is making good progress towards established goals.  Pt will continue to benefit from skilled outpatient physical therapy to address the deficits stated in the impairment chart, provide pt/family education and to maximize pt's level of independence in the home and community environment.     Anticipated Barriers for therapy: Schedule  Pt's spiritual, cultural and educational needs considered and pt agreeable to plan of care and goals as stated below:       GOALS: Pt is in agreement with the following goals.     Short term goals:  6 weeks or 10 visits   1.  Pt will demonstrate increased lumbar ROM by at least 3 degrees from the initial ROM value with improvements noted in functional ROM and ability to perform ADLs.  (MET 1/15/2020)  2.  Pt will demonstrate " increased maximum isometric torque value by 15% when compared to the initial value resulting in improved ability to perform bending, lifting, and carrying activities safely, confidently.  (MET 1/15/2020)  3.  Patient report a reduction in worst pain score by 1-2 points for improved tolerance for lifting items off the ground.  (MET 2/7/2020)  4.  Pt able to perform HEP correctly with minimal cueing or supervision from therapist to encourage independent management of symptoms.   (MET 2/7/2020)        Long term goals: 10 weeks or 20 visits   1. Pt will demonstrate increased lumbar ROM by at least 6 degrees from initial ROM value, resulting in improved ability to perform functional fwd bending while standing and sitting.   (PROGRESSING, NOT MET)  2. Pt will demonstrate increased maximum isometric torque value by 30% when compared to the initial value resulting in improved ability to perform bending, lifting, and carrying activities safely, confidently.  (PROGRESSING, NOT MET)  3. Pt to demonstrate ability to independently control and reduce their pain through posture positioning and mechanical movements throughout a typical day.  (PROGRESSING, NOT MET)  4.  Pt will demonstrate reduced pain and improved functional outcomes as reported on the Oswestry Disability Index by reaching a score of 8 or less in order to demonstrate subjective improvement in pt's condition.    (PROGRESSING, NOT MET)  5. Pt will demonstrate independence with the HEP at discharge  (PROGRESSING, NOT MET)  6.  Pt will be able to turn over from lying on her stomach without increased pain  (PROGRESSING, NOT MET)        Plan   Continue with established Plan of Care towards established PT goals.

## 2020-03-03 NOTE — PROGRESS NOTES
Euflexxa Injection Procedure #3     A time out was performed, including verification of patient ID, procedure, site and side, availability of information and equipment, review of safety issues, and agreement with consent, the procedure site was marked.     The patient was prepped aseptically with povidone-iodine swabsticks. A diagnostic and therapeutic injection of 2cc Euflexxa was given under sterile technique using a 21.5g x 1.5 needle from the superolateral aspect of the bilateral knee joints in the supine position.       Josefina Blue had no adverse reactions to the medication. Pain decreased. She was instructed to apply ice to the joint for 20 minutes and avoid strenuous activities for 24-36 hours following the injection. She was warned of possible blood pressure changes during that time. Following that time, she can resume activities as prior to the injection.     She was reminded to call the clinic immediately for any adverse side effects as explained in clinic today.     Both knees:  Lot: [id number]  Exp: 2020-12-16

## 2020-03-05 ENCOUNTER — OFFICE VISIT (OUTPATIENT)
Dept: OPTOMETRY | Facility: CLINIC | Age: 60
End: 2020-03-05
Payer: COMMERCIAL

## 2020-03-05 ENCOUNTER — OFFICE VISIT (OUTPATIENT)
Dept: OPTOMETRY | Facility: CLINIC | Age: 60
End: 2020-03-05

## 2020-03-05 DIAGNOSIS — H52.13 MYOPIA OF BOTH EYES WITH ASTIGMATISM AND PRESBYOPIA: Primary | ICD-10-CM

## 2020-03-05 DIAGNOSIS — H02.883 MEIBOMIAN GLAND DYSFUNCTION (MGD) OF BOTH EYES: ICD-10-CM

## 2020-03-05 DIAGNOSIS — Z46.0 FITTING AND ADJUSTMENT OF SPECTACLES AND CONTACT LENSES: Primary | ICD-10-CM

## 2020-03-05 DIAGNOSIS — H52.4 MYOPIA OF BOTH EYES WITH ASTIGMATISM AND PRESBYOPIA: Primary | ICD-10-CM

## 2020-03-05 DIAGNOSIS — H25.13 NUCLEAR SCLEROSIS OF BOTH EYES: ICD-10-CM

## 2020-03-05 DIAGNOSIS — H52.203 MYOPIA OF BOTH EYES WITH ASTIGMATISM AND PRESBYOPIA: Primary | ICD-10-CM

## 2020-03-05 DIAGNOSIS — H02.886 MEIBOMIAN GLAND DYSFUNCTION (MGD) OF BOTH EYES: ICD-10-CM

## 2020-03-05 PROCEDURE — 92015 DETERMINE REFRACTIVE STATE: CPT | Mod: S$GLB,,, | Performed by: OPTOMETRIST

## 2020-03-05 PROCEDURE — 92310 PR CONTACT LENS FITTING (NO CHANGE): ICD-10-PCS | Mod: CSM,S$GLB,, | Performed by: OPTOMETRIST

## 2020-03-05 PROCEDURE — 92014 COMPRE OPH EXAM EST PT 1/>: CPT | Mod: S$GLB,,, | Performed by: OPTOMETRIST

## 2020-03-05 PROCEDURE — 99999 PR PBB SHADOW E&M-EST. PATIENT-LVL II: CPT | Mod: PBBFAC,,, | Performed by: OPTOMETRIST

## 2020-03-05 PROCEDURE — 99999 PR PBB SHADOW E&M-EST. PATIENT-LVL II: ICD-10-PCS | Mod: PBBFAC,,, | Performed by: OPTOMETRIST

## 2020-03-05 PROCEDURE — 92014 PR EYE EXAM, EST PATIENT,COMPREHESV: ICD-10-PCS | Mod: S$GLB,,, | Performed by: OPTOMETRIST

## 2020-03-05 PROCEDURE — 92015 PR REFRACTION: ICD-10-PCS | Mod: S$GLB,,, | Performed by: OPTOMETRIST

## 2020-03-05 PROCEDURE — 92310 CONTACT LENS FITTING OU: CPT | Mod: CSM,S$GLB,, | Performed by: OPTOMETRIST

## 2020-03-05 NOTE — PROGRESS NOTES
SHIKHA CROSS 08/2018 with Dr. Coyle. Patient reports good vision with current   spec Rx. Glasses are distance only and patient takes off to read. Patient   wears MF CL mostly everyday. Denies sleeping in CL. Wears for about 10-12   hours per day. Reports OS is still uncomfortable (slight irritation in   nasal corner) since having a chalazion in January and patient would like   to try daily wear multifocal CL. Reports using lid scrubs and AT's twice   weekly. Denies flashes or floaters. Patient would like to defer dilation   today.    Last edited by Ivette Smalls, OD on 3/5/2020  2:47 PM. (History)            Assessment /Plan     For exam results, see Encounter Report.    Myopia of both eyes with astigmatism and presbyopia    Meibomian gland dysfunction (MGD) of both eyes      1. Prescribe Spec and CL Rx. Educated patient on changes. .Contact lens trials dispensed to pt. Switched from monthly to daily MF CL for better comfort. Daily wear only advised, with education to risks of extended wear.  Discussed lens care, compliance and solutions.  RTC in 1 yr for annual eye exam or prn  2. Educated patient on findings and options of treatment including artificial tears, lid scrubs and warm compresses. RTC if symptoms persist.

## 2020-03-05 NOTE — PROGRESS NOTES
SHIKHA CROSS 08/2018 with Dr. Coyle. Patient reports good vision with current   spec Rx. Glasses are distance only and patient takes off to read. Patient   wears MF CL mostly everyday. Denies sleeping in CL. Wears for about 10-12   hours per day. Reports OS is still uncomfortable (slight irritation in   nasal corner) since having a chalazion in January and patient would like   to try daily wear multifocal CL. Reports using lid scrubs and AT's twice   weekly. Denies flashes or floaters. Patient would like to defer dilation   today.    Last edited by Ivette Smalls, OD on 3/5/2020  2:47 PM. (History)            Assessment /Plan     For exam results, see Encounter Report.    Myopia of both eyes with astigmatism and presbyopia    Meibomian gland dysfunction (MGD) of both eyes    Nuclear sclerosis of both eyes      1. Prescribe Spec and CL Rx. Educated patient on changes. .Contact lens trials dispensed to pt. Switched from monthly to daily MF CL for better comfort. Daily wear only advised, with education to risks of extended wear.  Discussed lens care, compliance and solutions.  RTC in 1 yr for annual eye exam or prn  2. Educated patient on findings and options of treatment including artificial tears, lid scrubs and warm compresses. RTC if symptoms persist.  3. Educated pt of today's findings. No sx needed at this time. Continue to monitor in 1 yr.    Patient deferred dilation today. RTC for DFE.

## 2020-03-07 DIAGNOSIS — M62.838 MUSCLE SPASM: ICD-10-CM

## 2020-03-09 RX ORDER — CYCLOBENZAPRINE HCL 5 MG
5 TABLET ORAL 3 TIMES DAILY PRN
Qty: 30 TABLET | Refills: 3 | Status: SHIPPED | OUTPATIENT
Start: 2020-03-09 | End: 2020-07-01 | Stop reason: CLARIF

## 2020-03-19 ENCOUNTER — TELEPHONE (OUTPATIENT)
Dept: REHABILITATION | Facility: OTHER | Age: 60
End: 2020-03-19

## 2020-03-19 NOTE — TELEPHONE ENCOUNTER
Patient: Josefina Blue  Date: 3/19/2020  MRN: [id number]  Spoke with patient due to therapy following updates regarding COVID-19 closely and taking every precaution to ensure the safety of our patients, staff and community. In an abundance of caution and in an effort to help reduce risk and limit community spread, we have decided to temporarily postpone appointments for patients who may be at increased risk to attend in-person therapy or where therapy is not critically needed at this time.Patient chose to be put on hold at this time.   3/19/2020

## 2020-03-26 ENCOUNTER — DOCUMENTATION ONLY (OUTPATIENT)
Dept: REHABILITATION | Facility: OTHER | Age: 60
End: 2020-03-26

## 2020-03-26 DIAGNOSIS — G89.29 CHRONIC BILATERAL LOW BACK PAIN WITHOUT SCIATICA: Primary | ICD-10-CM

## 2020-03-26 DIAGNOSIS — Z74.09 POOR MOBILITY: ICD-10-CM

## 2020-03-26 DIAGNOSIS — M54.50 CHRONIC BILATERAL LOW BACK PAIN WITHOUT SCIATICA: Primary | ICD-10-CM

## 2020-03-26 NOTE — PROGRESS NOTES
Patient: Josefina Blue  Date: 3/26/2020  Diagnosis:   1. Chronic bilateral low back pain without sciatica     2. Poor mobility       MRN: [id number]    Spoke with patient, she has been sitting more than usual and having some twingles in the back so she typically gets to her stretches at the end of the day. Spoke with her about her plan of care, home exercise program and answer any questions they might have. Informed them that we are exploring virtual methods of providing care, but in the meantime we will be in touch with them weekly. Pt was made aware of appropriate contact information and to call back with any questions.      3/26/2020

## 2020-03-27 ENCOUNTER — DOCUMENTATION ONLY (OUTPATIENT)
Dept: REHABILITATION | Facility: HOSPITAL | Age: 60
End: 2020-03-27

## 2020-03-27 NOTE — PROGRESS NOTES
Physical Therapy Discharge Summary    Name: Josefina Blue  [facility] Number: [id number]  Diagnosis:   M25.562 (ICD-10-CM) - Left knee pain, unspecified chronicity   M17.12 (ICD-10-CM) - Degenerative arthritis of left knee   M76.30 (ICD-10-CM) - IT band syndrome   M54.5 (ICD-10-CM) - Low back pain       Physician: Zac Noriega MD  Treatment Orders: PT Eval and Treat  Past Medical History:   Diagnosis Date    DDD (degenerative disc disease), lumbar 10/7/2019    Hyperlipidemia 2015    Hypertension     IFG (impaired fasting glucose) 2015    Neuropathic pain 2015    Squamous cell cancer of tongue      Initial visit: 2019  Date of Last visit: 2019  Date of Discharge Note: 3/27/2020  Total Visits Received: 3  Missed Visits: See EPIC  ASSESSMENT   GOALS: Short Term Goals:  4 weeks  (progressing, not met)  1.Report decreased L knee pain  < / =  2/10  to increase tolerance for putting on pants in AM.   2. Increase knee ROM to 0-8-110 deg in order to be able to perform ADLs without difficulty.  3. Increase strength by 1/3 MMT grade in L LE  to increase tolerance for ADL and work activities.  4. Pt to tolerate HEP to improve ROM and independence with ADL's     Long Term Goals: 8 weeks  (progressing, not met)  1.Report decreased L knee pain < / = 2/10  to increase tolerance for SL squat to 30 deg.   2.Pt to have (-) SLR with adduction bias on L to show improved neural mobility.   3.Increase strength to >/= 4+/5 in L LE  to increase tolerance for ADL and work activities.  ?  ?    Discharge reason : Physician order  and Pt has not re-scheduled further follow-up sessions  PLAN   This patient is discharged from Physical Therapy Services.   ?  Therapist: Joseph River, PT

## 2020-04-08 ENCOUNTER — DOCUMENTATION ONLY (OUTPATIENT)
Dept: REHABILITATION | Facility: OTHER | Age: 60
End: 2020-04-08

## 2020-04-08 DIAGNOSIS — Z74.09 POOR MOBILITY: ICD-10-CM

## 2020-04-08 DIAGNOSIS — M54.50 CHRONIC BILATERAL LOW BACK PAIN WITHOUT SCIATICA: Primary | ICD-10-CM

## 2020-04-08 DIAGNOSIS — G89.29 CHRONIC BILATERAL LOW BACK PAIN WITHOUT SCIATICA: Primary | ICD-10-CM

## 2020-04-08 NOTE — PROGRESS NOTES
Patient: Josefina Blue  Date: 4/8/2020  Diagnosis:   1. Chronic bilateral low back pain without sciatica     2. Poor mobility       MRN: [id number]    Contacted patient about virtual methods of providing care, voicemail box is full. Will contact at a later date.    4/8/2020

## 2020-04-12 DIAGNOSIS — N95.1 VAGINAL DRYNESS, MENOPAUSAL: ICD-10-CM

## 2020-04-12 DIAGNOSIS — N94.10 FEMALE DYSPAREUNIA: ICD-10-CM

## 2020-04-12 RX ORDER — ESTRADIOL 0.1 MG/G
1 CREAM VAGINAL DAILY
Qty: 42.5 G | Refills: 0 | Status: CANCELLED | OUTPATIENT
Start: 2020-04-12 | End: 2021-04-12

## 2020-04-13 ENCOUNTER — PATIENT MESSAGE (OUTPATIENT)
Dept: OBSTETRICS AND GYNECOLOGY | Facility: CLINIC | Age: 60
End: 2020-04-13

## 2020-04-13 DIAGNOSIS — N94.10 FEMALE DYSPAREUNIA: ICD-10-CM

## 2020-04-13 DIAGNOSIS — N95.1 VAGINAL DRYNESS, MENOPAUSAL: ICD-10-CM

## 2020-04-13 RX ORDER — ESTRADIOL 0.1 MG/G
1 CREAM VAGINAL DAILY
Qty: 42.5 G | Refills: 0 | Status: SHIPPED | OUTPATIENT
Start: 2020-04-13 | End: 2020-04-15

## 2020-04-15 DIAGNOSIS — N94.10 FEMALE DYSPAREUNIA: ICD-10-CM

## 2020-04-15 DIAGNOSIS — N95.1 VAGINAL DRYNESS, MENOPAUSAL: ICD-10-CM

## 2020-04-15 RX ORDER — ESTRADIOL 0.1 MG/G
1 CREAM VAGINAL DAILY
Qty: 42.5 G | Refills: 0 | Status: SHIPPED | OUTPATIENT
Start: 2020-04-15 | End: 2020-04-17

## 2020-04-17 DIAGNOSIS — N95.1 VAGINAL DRYNESS, MENOPAUSAL: ICD-10-CM

## 2020-04-17 DIAGNOSIS — N94.10 FEMALE DYSPAREUNIA: ICD-10-CM

## 2020-04-17 RX ORDER — ESTRADIOL 0.1 MG/G
1 CREAM VAGINAL DAILY
Qty: 42.5 G | Refills: 0 | Status: SHIPPED | OUTPATIENT
Start: 2020-04-17 | End: 2020-05-20

## 2020-04-18 DIAGNOSIS — N94.10 FEMALE DYSPAREUNIA: ICD-10-CM

## 2020-04-18 DIAGNOSIS — N95.1 VAGINAL DRYNESS, MENOPAUSAL: ICD-10-CM

## 2020-04-20 DIAGNOSIS — Z00.00 PREVENTATIVE HEALTH CARE: Primary | ICD-10-CM

## 2020-04-20 RX ORDER — MUPIROCIN 20 MG/G
OINTMENT TOPICAL
Qty: 22 G | Refills: 1 | Status: SHIPPED | OUTPATIENT
Start: 2020-04-20 | End: 2020-05-20

## 2020-04-20 RX ORDER — ESTRADIOL 0.1 MG/G
1 CREAM VAGINAL DAILY
Qty: 42.5 G | Refills: 0 | Status: SHIPPED | OUTPATIENT
Start: 2020-04-20 | End: 2021-04-20

## 2020-04-21 ENCOUNTER — LAB VISIT (OUTPATIENT)
Dept: LAB | Facility: HOSPITAL | Age: 60
End: 2020-04-21
Attending: INTERNAL MEDICINE
Payer: COMMERCIAL

## 2020-04-21 DIAGNOSIS — Z00.00 PREVENTATIVE HEALTH CARE: ICD-10-CM

## 2020-04-21 LAB — SARS-COV-2 IGG SERPL QL IA: NEGATIVE

## 2020-04-21 PROCEDURE — 36415 COLL VENOUS BLD VENIPUNCTURE: CPT

## 2020-04-21 PROCEDURE — 86769 SARS-COV-2 COVID-19 ANTIBODY: CPT

## 2020-05-19 ENCOUNTER — OFFICE VISIT (OUTPATIENT)
Dept: SPORTS MEDICINE | Facility: CLINIC | Age: 60
End: 2020-05-19
Payer: COMMERCIAL

## 2020-05-19 VITALS — BODY MASS INDEX: 32.13 KG/M2 | TEMPERATURE: 98 F | HEIGHT: 68 IN | WEIGHT: 212 LBS

## 2020-05-19 DIAGNOSIS — R26.9 GAIT ABNORMALITY: ICD-10-CM

## 2020-05-19 DIAGNOSIS — M25.562 BILATERAL CHRONIC KNEE PAIN: ICD-10-CM

## 2020-05-19 DIAGNOSIS — M25.561 BILATERAL CHRONIC KNEE PAIN: ICD-10-CM

## 2020-05-19 DIAGNOSIS — M17.0 BILATERAL PRIMARY OSTEOARTHRITIS OF KNEE: Primary | ICD-10-CM

## 2020-05-19 DIAGNOSIS — G89.29 BILATERAL CHRONIC KNEE PAIN: ICD-10-CM

## 2020-05-19 PROCEDURE — 3008F PR BODY MASS INDEX (BMI) DOCUMENTED: ICD-10-PCS | Mod: CPTII,S$GLB,, | Performed by: FAMILY MEDICINE

## 2020-05-19 PROCEDURE — 99999 PR PBB SHADOW E&M-EST. PATIENT-LVL III: CPT | Mod: PBBFAC,,, | Performed by: FAMILY MEDICINE

## 2020-05-19 PROCEDURE — 99999 PR PBB SHADOW E&M-EST. PATIENT-LVL III: ICD-10-PCS | Mod: PBBFAC,,, | Performed by: FAMILY MEDICINE

## 2020-05-19 PROCEDURE — 3008F BODY MASS INDEX DOCD: CPT | Mod: CPTII,S$GLB,, | Performed by: FAMILY MEDICINE

## 2020-05-19 PROCEDURE — 99214 PR OFFICE/OUTPT VISIT, EST, LEVL IV, 30-39 MIN: ICD-10-PCS | Mod: S$GLB,,, | Performed by: FAMILY MEDICINE

## 2020-05-19 PROCEDURE — 99214 OFFICE O/P EST MOD 30 MIN: CPT | Mod: S$GLB,,, | Performed by: FAMILY MEDICINE

## 2020-05-19 NOTE — PROGRESS NOTES
Josefina Blue, a 59 y.o. female, presents today for evaluation of her Left knee. Pt. is a referral from Dr. Noriega. She reports that she just finished up with the VSI series with Dr. Noriega and has had Left > Right knee pain since 2017 and has been treating conservatively. She reports that she recently went out on her boat and was playing water games in shallow water which exacerbated her left knee pain for about 2 weeks. She is interested in other conservative tx options.      History of Present Illness (HPI)  Location: anterior knee, Left  Onset: Chronic since 2017  Palliative:    Relative rest   Oral analgesics   CSI, L/R iaKnee, 01.21.2020 per Dr. Noriega   VSI, L/R iaKnjosh, 03.03.2020 per Dr. Noriega   Provocative:    ADLS   Prolonged ambulation   Exercise  Prior:  has a past surgical history that includes Knee arthroscopy, Right (2008, OSH in [facility]); Transforaminal epidural injection of steroid (Bilateral, 9/19/2019); and Transforaminal epidural injection of steroid (Bilateral, 10/7/2019).  Progression: worsening discomfort  Quality:    sharp pain  Radiation: none  Severity: per nursing documentation  Timing: intermittent w/ use  Trauma: none specific     Review of Systems (ROS)  A 10+ review of systems was performed with pertinent positives and negatives noted above in the history of present illness. Other systems were negative unless otherwise specified.    Physical Examination (PE)  General:  The patient is alert and oriented x 3. Mood is pleasant. Observation of ears, eyes and nose reveal no gross abnormalities. HEENT: NCAT, sclera anicteric.   Lungs: Respirations are equal and unlabored.  Gait is coordinated. Patient can toe walk and heel walk without difficulty.    [place]ston KNEE EXAMINATION    Observation/Inspection  Gait:   Nonantalgic   Alignment:  Neutral   Scars:   None   Muscle atrophy: Mild  Effusion:  None   Warmth:  None   Discoloration:   none     Tenderness / Crepitus (T / C):         T  / C      T / C  Patella   - / -   Lateral joint line   - / -     Peripatellar medial  -  Medial joint line    + / -  Peripatellar lateral -  Medial plica   - / -  Patellar tendon -   Popliteal fossa   - / -  Quad tendon   -   Gastrocnemius   -  Prepatellar Bursa - / -   Quadricep   -  Tibial tubercle  -  Thigh/hamstring  -  Pes anserine/HS -  Fibula    -  ITB   - / -  Tibia     -  Tib/fib joint  - / -  LCL    -    MFC   - / -   MCL: Proximal  -    LFC   - / -   Distal    -          ROM: (* = pain)  PASSIVE   ACTIVE    Left :   5  0 / 145   5 / 0 / 145     Right :     0  145   5 / 0 / 145    Patellofemoral examination:  See above noted areas of tenderness.   Patella position    Subluxation / dislocation: Centered        Sup. / Inf;   Normal   Crepitus (PF):    Absent   Patellar Mobility:       Medial-lateral:   Normal    Superior-inferior:  Normal    Inferior tilt   Normal    Patellar tendon:  Normal   Lateral tilt:    Normal   J-sign:     None   Patellofemoral grind:   No pain     Meniscal Signs:     Pain on terminal extension:  +  Pain on terminal flexion:  +  Yosis maneuver:  +*  Squat     NT  Thessaly    NT    Ligament Examination:  ACL / Lachman:  WNL  PCL-Post.  drawer: normal 0 to 2mm  MCL- Valgus:  normal 0 to 2mm  LCL- Varus:    normal 0 to 2mm  Pivot shift:  guarding   Dial Test:   difference c/w other side   At 30° flexion: normal (< 5°)    At 90° flexion: normal (< 5°)   Reverse Pivot Shift:   normal (Equal)     Strength: (* = with pain) Painful Side  Quadriceps   5/5  Hamstrin/5    Extremity Neuro-vascular Examination:   Sensation:  Grossly intact to light touch all dermatomal regions.   Motor Function:  Fully intact motor function at hip, knee, foot and ankle    DTRs;  quadriceps and  achilles 2+.  No clonus and downgoing Babinski.    Vascular status:  DP and PT pulses 2+, brisk capillary refill, symmetric.     Other Findings:    ASSESSMENT & PLAN  Assessment  #1 Kellgren-Rudy  Grade III osteoarthritis of knee, eliane med compartments, bilat    No evidence of neurologic pathology  No evidence of vascular pathology    Imaging studies reviewed:   X-ray knee, bilateral 20.01    Plan  We discussed the importance of appropriate diet, weight, and regular exercise    We discussed options including    Watchful waiting / relative rest    Physical therapy x   Injection therapy    Consultation    The patient chooses As above   x = prescribed  CSI = corticosteroid injection  VSI = viscosupplement injection  PRPI = platelet rich plasma injection  ia = intra articular  R = right  L = left  B = bilateral     Physical Therapy        Formal (fPT), @ [facility] facility b   Formal (fPT), @ [facility] facility        Homegoing (hgPT), per concurrent fPT recommendations    Homegoing (hgPT), per prior fPT recommendations    Homegoing (hgPT), handout provided        w/  (atPT)    [blank] = not prescribed  x = prescribed  b = prescribed, and begin as indicated  t = continue as indicated  r = prescribed, and restart as indicated  p = completed prior as indicated  hs = prescribed, and with high school   col = prescribed, and with college or university   nf = physical therapy was recommended, but patient is not interested in PT at this time    Activity (e.g. sports, work) restrictions    [blank] = as tolerated  pt = per physical therapist  at = per     Bracing    [blank] = not prescribed  r = recommended, but not fit with at todays visit  f = prescribed and fit with at todays visit  t = continue as indicated    Pain management    [blank] = No prescription necessary. A handout detailing dosing of appropriate   over-the-counter musculoskeletal analgesics was made available to the patient.   m = meloxicam x 14 days  mp = 14 day course of meloxicam prescribed prior    Follow up    [blank] = as needed  [number] = in [number] weeks  CSI = for corticosteroid  injection  VSI = for viscosupplement injection or injection series  PRP = for platelet rich plasma injection or injection series  MRI = after MRI imaging  ns = should surgical options be deferred (no surgery)  o = appointment offered, deferred by patient    Should symptoms worsen or fail to resolve, consider    Revisiting the above options and / or        Vocation:   used to play competitive tennis  [facility] [employer]  we see her  for elbow

## 2020-05-20 ENCOUNTER — OFFICE VISIT (OUTPATIENT)
Dept: INTERNAL MEDICINE | Facility: CLINIC | Age: 60
End: 2020-05-20
Payer: COMMERCIAL

## 2020-05-20 VITALS
SYSTOLIC BLOOD PRESSURE: 114 MMHG | OXYGEN SATURATION: 97 % | HEIGHT: 68 IN | WEIGHT: 222 LBS | BODY MASS INDEX: 33.65 KG/M2 | HEART RATE: 96 BPM | DIASTOLIC BLOOD PRESSURE: 72 MMHG

## 2020-05-20 DIAGNOSIS — Z00.00 ANNUAL PHYSICAL EXAM: Primary | ICD-10-CM

## 2020-05-20 DIAGNOSIS — Z12.31 ENCOUNTER FOR SCREENING MAMMOGRAM FOR BREAST CANCER: ICD-10-CM

## 2020-05-20 DIAGNOSIS — G47.00 INSOMNIA, UNSPECIFIED TYPE: ICD-10-CM

## 2020-05-20 DIAGNOSIS — Z12.11 COLON CANCER SCREENING: ICD-10-CM

## 2020-05-20 DIAGNOSIS — R73.03 PREDIABETES: ICD-10-CM

## 2020-05-20 DIAGNOSIS — E78.5 HYPERLIPIDEMIA, UNSPECIFIED HYPERLIPIDEMIA TYPE: ICD-10-CM

## 2020-05-20 DIAGNOSIS — M17.0 PRIMARY OSTEOARTHRITIS OF BOTH KNEES: ICD-10-CM

## 2020-05-20 PROCEDURE — 99999 PR PBB SHADOW E&M-EST. PATIENT-LVL III: CPT | Mod: PBBFAC,,, | Performed by: INTERNAL MEDICINE

## 2020-05-20 PROCEDURE — 99396 PR PREVENTIVE VISIT,EST,40-64: ICD-10-PCS | Mod: S$GLB,,, | Performed by: INTERNAL MEDICINE

## 2020-05-20 PROCEDURE — 3074F SYST BP LT 130 MM HG: CPT | Mod: CPTII,S$GLB,, | Performed by: INTERNAL MEDICINE

## 2020-05-20 PROCEDURE — 3078F PR MOST RECENT DIASTOLIC BLOOD PRESSURE < 80 MM HG: ICD-10-PCS | Mod: CPTII,S$GLB,, | Performed by: INTERNAL MEDICINE

## 2020-05-20 PROCEDURE — 3078F DIAST BP <80 MM HG: CPT | Mod: CPTII,S$GLB,, | Performed by: INTERNAL MEDICINE

## 2020-05-20 PROCEDURE — 3074F PR MOST RECENT SYSTOLIC BLOOD PRESSURE < 130 MM HG: ICD-10-PCS | Mod: CPTII,S$GLB,, | Performed by: INTERNAL MEDICINE

## 2020-05-20 PROCEDURE — 99396 PREV VISIT EST AGE 40-64: CPT | Mod: S$GLB,,, | Performed by: INTERNAL MEDICINE

## 2020-05-20 PROCEDURE — 99999 PR PBB SHADOW E&M-EST. PATIENT-LVL III: ICD-10-PCS | Mod: PBBFAC,,, | Performed by: INTERNAL MEDICINE

## 2020-05-20 NOTE — PROGRESS NOTES
INTERNAL MEDICINE ANNUAL VISIT NOTE      CHIEF COMPLAINT     ANNUAL    HPI     Josefina Blue is a 59 y.o. C female who presents for annual.    HTN - irbesartan 75mg daily and triamterene-hctz 37.5-25mg daily.     Insomnia on ambien 5mg nightly prn. No issues w/ med. 10mg nightly caused sleepwalking. Last #30 10/7/19. Hasn't had to take ambien. Taking melatonin gummies. This works well.   Xanax 0.25mg daily prn flying.  appropriate. Last #30 from 1/20/20.    SCC of the L tongue s/p resection. Reports feels fine now - no pain.   Saw Dr. Marroquin last 4/2018.    PDM - last a1c 8/16/19 5.9    OA of knees.   Was seeing Dr. Noriega for Euflexxa injections.  Recently saw Dr. Cbarera 5/19/20    LBP - s/p B L5 NOE 10/7/19 w/ Dr. Kerns.  1st injection didn't work. 2nd injection helped.   Resolved w/ [employer] program but put on hold due to COVID.     Back on pravastatin x 3 week. Was quarantine eating. Getting back on track in the last 2 weeks.     Past Medical History:  Past Medical History:   Diagnosis Date    DDD (degenerative disc disease), lumbar 10/7/2019    Hyperlipidemia 8/31/2015    Hypertension     IFG (impaired fasting glucose) 8/31/2015    Neuropathic pain 8/31/2015    Squamous cell cancer of tongue 2012       Past Surgical History:  Past Surgical History:   Procedure Laterality Date    CHOLECYSTECTOMY      GALLBLADDER SURGERY  06/2016    HYSTERECTOMY      KNEE ARTHROSCOPY Right     for torn meniscus    TONGUE SURGERY      TRANSFORAMINAL EPIDURAL INJECTION OF STEROID Bilateral 9/19/2019    Procedure: Injection,steroid,epidural,transforaminal approach LUMBAR TRANSFORAMINAL BILATERAL L4/5 TF NOE;  Surgeon: Tim Kerns MD;  Location: [facility] PAIN MGT;  Service: Pain Management;  Laterality: Bilateral;  NEEDS CONSENT, VIP    TRANSFORAMINAL EPIDURAL INJECTION OF STEROID Bilateral 10/7/2019    Procedure: LUMBAR TRANSFORAMINAL BILATERAL L4/5 TF NOE;  Surgeon: Tim Kerns MD;  Location: [facility] PAIN  MGT;  Service: Pain Management;  Laterality: Bilateral;  NEEDS CONSENT, **VIP**       Allergies:  Review of patient's allergies indicates:   Allergen Reactions    Aspirin Nausea Only     Can take low dose of Aspirin    Codeine Nausea Only     Can take Codeine if she takes a dose of Zofran with it       Home Medications:  Prior to Admission medications    Medication Sig Start Date End Date Taking? Authorizing Provider   ALPRAZolam (XANAX) 0.25 MG tablet Take 1 tablet (0.25 mg total) by mouth nightly as needed. 9/24/19   BRYN Mar   ALPRAZolam (XANAX) 0.25 MG tablet TAKE ONE TABLET BY MOUTH AT BEDTIME AS NEEDED. 10/15/19   BRYN Mar   aspirin (ECOTRIN) 81 MG EC tablet Take 1 tablet (81 mg total) by mouth nightly. 4/16/18   Clayton Coley MD   bacitracin-polymyxin b (POLYSPORIN) ophthalmic ointment Place into the left eye 2 (two) times daily. 1/26/20   Mynor Black MD   celecoxib (CELEBREX) 200 MG capsule Take 1 capsule (200 mg total) by mouth 2 (two) times daily. 12/12/19   Zac Noriega MD   clobetasol (TEMOVATE) 0.05 % external solution Use one to two times daily as needed for scaling or itching to scalp 4/8/19   Jo-Ann Collins MD   cyclobenzaprine (FLEXERIL) 5 MG tablet Take 1 tablet (5 mg total) by mouth 3 (three) times daily as needed for Muscle spasms. 3/9/20   Jo-Ann Collins MD   diclofenac sodium (VOLTAREN) 1 % Gel Apply 2 g topically 4 (four) times daily. for 10 days 5/30/19 6/9/19  Zac Noriega MD   estradioL (ESTRACE) 0.01 % (0.1 mg/gram) vaginal cream Place 1 g vaginally once daily. 4/17/20 4/17/21  Shawna Myers MD   estradioL (ESTRACE) 0.01 % (0.1 mg/gram) vaginal cream Place 1 g vaginally once daily. 4/20/20 4/20/21  Shawna Myers MD   irbesartan (AVAPRO) 75 MG tablet Take 1 tablet (75 mg total) by mouth every evening. 2/17/20   Jo-Ann Collins MD   methylPREDNISolone (MEDROL DOSEPACK) 4 mg tablet Take 1 tablet (4 mg total) by mouth once daily. use as directed 10/7/19  10/6/20  Delma Reese MD   mupirocin (BACTROBAN) 2 % ointment Apply to nostril nares 2 times daily 4/20/20   Delma Reese MD   ondansetron (ZOFRAN-ODT) 8 MG TbDL Dissolve 1 tablet (8 mg total) by mouth every 6 (six) hours as needed. 10/7/19   Delma Reese MD   pravastatin (PRAVACHOL) 20 MG tablet Take 1 tablet (20 mg total) by mouth once daily. 4/9/19 4/8/20  Jo-Ann Collins MD   tretinoin (RETIN-A) 0.05 % cream Apply topically nightly. Apply pea-sized amount to face. 4/9/19   Jo-Ann Collins MD   triamterene-hydrochlorothiazide 37.5-25 mg (MAXZIDE-25) 37.5-25 mg per tablet Take 1 tablet by mouth once daily. 1/10/20 1/9/21  Jo-Ann Collins MD   zolpidem (AMBIEN) 5 MG Tab Take one tablet by mouth every evening 8/19/19   BRYN Mar       Family History:  Family History   Problem Relation Age of Onset    Alcohol abuse Mother     Cirrhosis Mother     Cancer Father 74        prostate, throat, lung- smoker    Hyperlipidemia Father     Diabetes Sister     Hypertension Sister     Drug abuse Brother     Heart disease Brother     Hyperlipidemia Brother     No Known Problems Maternal Aunt     No Known Problems Maternal Uncle     No Known Problems Paternal Aunt     No Known Problems Paternal Uncle     No Known Problems Maternal Grandmother     No Known Problems Maternal Grandfather     No Known Problems Paternal Grandmother     No Known Problems Paternal Grandfather     Amblyopia Neg Hx     Blindness Neg Hx     Cataracts Neg Hx     Glaucoma Neg Hx     Macular degeneration Neg Hx     Retinal detachment Neg Hx     Strabismus Neg Hx     Stroke Neg Hx     Thyroid disease Neg Hx        Social History:  Social History     Tobacco Use    Smoking status: Never Smoker    Smokeless tobacco: Never Used   Substance Use Topics    Alcohol use: Yes     Alcohol/week: 0.0 standard drinks     Frequency: 2-4 times a month     Drinks per session: 1 or 2     Binge frequency: Never     Comment: occ    Drug  "use: No       Review of Systems:  Review of Systems   Constitutional: Negative for activity change and unexpected weight change.   HENT: Negative for hearing loss, rhinorrhea and trouble swallowing.    Eyes: Negative for discharge and visual disturbance.   Respiratory: Negative for chest tightness and wheezing.    Cardiovascular: Negative for chest pain and palpitations.   Gastrointestinal: Negative for blood in stool, constipation, diarrhea and vomiting.   Endocrine: Negative for polydipsia and polyuria.   Genitourinary: Negative for difficulty urinating, dysuria, hematuria and menstrual problem.   Musculoskeletal: Positive for arthralgias. Negative for joint swelling and neck pain.   Neurological: Negative for weakness and headaches.   Psychiatric/Behavioral: Negative for confusion and dysphoric mood.       Health Maintainence:   HM reviewed.    PHYSICAL EXAM     /72 (BP Location: Right arm, Patient Position: Sitting, BP Method: Large (Manual))   Pulse 96   Ht 5' 8" (1.727 m)   Wt 100.7 kg (222 lb 0.1 oz)   SpO2 97%   BMI 33.76 kg/m²     GEN - A+OX4, NAD   HEENT - PERRL, EOMI, OP clear. MMM. TM normal.   Neck - No thyromegaly or cervical LAD. No thyroid masses felt.  CV - RRR, no m/r   Chest - CTAB, no wheezing or rhonchi  Abd - S/NT/ND/+BS.   Ext - 2+BDP and radial pulses. No LE edema.   Neuro - PERRL, EOMI, no nystagmus, eyebrow raise, facial sensation, hearing, m of mastication, smile, palatal raise, shoulder shrug, tongue protrusion symmetric and intact. 5/5 BUE and BLE strength. Sensation to light touch intact throughout. Normal gait.   MSK - No spinal tenderness to palpation. Normal gait. L medial knee tenderness to palpation.  Skin - No rash.    LABS     Previous labs reviewed.    ASSESSMENT/PLAN     Josefina Blue is a 59 y.o. female with  Josefina was seen today for annual exam.    Diagnoses and all orders for this visit:    Annual physical exam  -     CBC auto differential; Future; Expected " date: 05/20/2020  -     Comprehensive metabolic panel; Future; Expected date: 05/20/2020  -     Hemoglobin A1C; Future; Expected date: 05/20/2020  -     Lipid Panel; Future; Expected date: 05/20/2020  -     TSH; Future; Expected date: 05/20/2020  -     Vitamin D; Future; Expected date: 05/20/2020  -     Mammo Digital Screening Bilat w/ [facility]; Future; Expected date: 05/20/2020  -     Case request GI: COLONOSCOPY    Hyperlipidemia, unspecified hyperlipidemia type - recently restarted pravastatin. Stopped previously as thought may be assoc w/ myalgia but the myalgia didn't go away till after her back pain improved.   -     Comprehensive metabolic panel; Future; Expected date: 05/20/2020  -     Lipid Panel; Future; Expected date: 05/20/2020    Prediabetes - has been eating poorly due to quarantine. Will delay labs for a mo.   -     Hemoglobin A1C; Future; Expected date: 05/20/2020    Colon cancer screening  -     Case request GI: COLONOSCOPY    Encounter for screening mammogram for breast cancer  -     Mammo Digital Screening Bilat w/ [facility]; Future; Expected date: 05/20/2020    Insomnia, unspecified type - doing well on melatonin.     Primary osteoarthritis of both knees - working on weight loss. Will f/u w/ ortho.    Shingrix through pharmacy  RTC in 6-12 months, sooner if needed and depending on labs.    Jo-Ann Collins MD  Department of Internal Medicine - [facility]  7:08 AM

## 2020-06-10 ENCOUNTER — PATIENT OUTREACH (OUTPATIENT)
Dept: ADMINISTRATIVE | Facility: OTHER | Age: 60
End: 2020-06-10

## 2020-06-11 ENCOUNTER — OFFICE VISIT (OUTPATIENT)
Dept: SPORTS MEDICINE | Facility: CLINIC | Age: 60
End: 2020-06-11
Payer: COMMERCIAL

## 2020-06-11 VITALS
WEIGHT: 222 LBS | HEART RATE: 83 BPM | SYSTOLIC BLOOD PRESSURE: 103 MMHG | BODY MASS INDEX: 33.65 KG/M2 | HEIGHT: 68 IN | DIASTOLIC BLOOD PRESSURE: 75 MMHG

## 2020-06-11 DIAGNOSIS — G89.29 CHRONIC PAIN OF LEFT KNEE: Primary | ICD-10-CM

## 2020-06-11 DIAGNOSIS — M25.562 CHRONIC PAIN OF LEFT KNEE: Primary | ICD-10-CM

## 2020-06-11 DIAGNOSIS — M17.12 DEGENERATIVE ARTHRITIS OF LEFT KNEE: ICD-10-CM

## 2020-06-11 PROCEDURE — 3078F DIAST BP <80 MM HG: CPT | Mod: CPTII,S$GLB,, | Performed by: ORTHOPAEDIC SURGERY

## 2020-06-11 PROCEDURE — 20610 LARGE JOINT ASPIRATION/INJECTION: L KNEE: ICD-10-PCS | Mod: LT,S$GLB,, | Performed by: ORTHOPAEDIC SURGERY

## 2020-06-11 PROCEDURE — 3074F PR MOST RECENT SYSTOLIC BLOOD PRESSURE < 130 MM HG: ICD-10-PCS | Mod: CPTII,S$GLB,, | Performed by: ORTHOPAEDIC SURGERY

## 2020-06-11 PROCEDURE — 99999 PR PBB SHADOW E&M-EST. PATIENT-LVL III: CPT | Mod: PBBFAC,,, | Performed by: ORTHOPAEDIC SURGERY

## 2020-06-11 PROCEDURE — 99999 PR PBB SHADOW E&M-EST. PATIENT-LVL III: ICD-10-PCS | Mod: PBBFAC,,, | Performed by: ORTHOPAEDIC SURGERY

## 2020-06-11 PROCEDURE — 3008F PR BODY MASS INDEX (BMI) DOCUMENTED: ICD-10-PCS | Mod: CPTII,S$GLB,, | Performed by: ORTHOPAEDIC SURGERY

## 2020-06-11 PROCEDURE — 3074F SYST BP LT 130 MM HG: CPT | Mod: CPTII,S$GLB,, | Performed by: ORTHOPAEDIC SURGERY

## 2020-06-11 PROCEDURE — 99214 OFFICE O/P EST MOD 30 MIN: CPT | Mod: 25,S$GLB,, | Performed by: ORTHOPAEDIC SURGERY

## 2020-06-11 PROCEDURE — 99214 PR OFFICE/OUTPT VISIT, EST, LEVL IV, 30-39 MIN: ICD-10-PCS | Mod: 25,S$GLB,, | Performed by: ORTHOPAEDIC SURGERY

## 2020-06-11 PROCEDURE — 20610 DRAIN/INJ JOINT/BURSA W/O US: CPT | Mod: LT,S$GLB,, | Performed by: ORTHOPAEDIC SURGERY

## 2020-06-11 PROCEDURE — 3078F PR MOST RECENT DIASTOLIC BLOOD PRESSURE < 80 MM HG: ICD-10-PCS | Mod: CPTII,S$GLB,, | Performed by: ORTHOPAEDIC SURGERY

## 2020-06-11 PROCEDURE — 3008F BODY MASS INDEX DOCD: CPT | Mod: CPTII,S$GLB,, | Performed by: ORTHOPAEDIC SURGERY

## 2020-06-11 RX ORDER — TRIAMCINOLONE ACETONIDE 40 MG/ML
40 INJECTION, SUSPENSION INTRA-ARTICULAR; INTRAMUSCULAR
Status: DISCONTINUED | OUTPATIENT
Start: 2020-06-11 | End: 2020-06-11 | Stop reason: HOSPADM

## 2020-06-11 RX ADMIN — TRIAMCINOLONE ACETONIDE 40 MG: 40 INJECTION, SUSPENSION INTRA-ARTICULAR; INTRAMUSCULAR at 11:06

## 2020-06-11 NOTE — PROCEDURES
Large Joint Aspiration/Injection: L knee  Date/Time: 6/11/2020 11:00 AM  Performed by: Zac Noriega MD  Authorized by: Zac Noriega MD     Consent Done?:  Yes (Verbal)  Indications:  Pain  Site marked: the procedure site was marked    Timeout: prior to procedure the correct patient, procedure, and site was verified    Prep: patient was prepped and draped in usual sterile fashion      Details:  Needle Size:  22 G  Ultrasonic Guidance for needle placement?: No    Approach:  Superior  Location:  Knee  Site:  L knee  Medications:  40 mg triamcinolone acetonide 40 mg/mL  Patient tolerance:  Patient tolerated the procedure well with no immediate complications

## 2020-06-11 NOTE — PROGRESS NOTES
CC: Left knee pain    Josefina Blue returns to clinic for follow up evaluation of left knee.  She request steroid injection today.    60 y.o. Female presents to clinic with some persistent and progressively worsening left > right lower extremity pain in her low back and upper thigh, medial thigh and adductors.  She has been trying to rehab her quads and hips for her degenerative knees and since then has had this progressive pain and weakness of her leg.    Is affecting ADLs.  Pain is 3/10 at it's worst.    INTERVAL HISTORY (01/21/20):  Patient presents for evaluation of acute on chronic left knee pain. Last time she was here in July, she was diagnosed with bilateral knee osteoarthritis.  She was given a steroid injection at that point time, which has helped significantly with the pain in the knee. She reports that she has been doing exercises for core hip any strengthening and a regular basis and she has lost 17 lb since her last visit.  She reports that she has been doing pretty well with knee pain until last night, when she turned in her bed, that she heard a loud pop and had immediate pain over the lateral distal femur. She has been having difficulty weight-bearing on that side due to the pain. She has tried Celebrex, ice, and a compression wrap. This has provided minimal relief.  In August she had a epidural steroid injection, which has helped significantly with the radiculopathy from her low back.  She currently has no radiculopathy.  Denies any groin pain. No other concerns or complaints.    REVIEW OF SYSTEMS:  Constitution: Negative. Negative for chills, fever and night sweats.   HENT: Negative for congestion and headaches.    Eyes: Negative for blurred vision, left vision loss and right vision loss.   Cardiovascular: Negative for chest pain and syncope.   Respiratory: Negative for cough and shortness of breath.    Endocrine: Negative for polydipsia, polyphagia and polyuria.   Hematologic/Lymphatic: Negative  for bleeding problem. Does not bruise/bleed easily.   Skin: Negative for dry skin, itching and rash.   Musculoskeletal: Negative for falls. Positive for left > right lower extremity pain and muscle weakness.   Gastrointestinal: Negative for abdominal pain and bowel incontinence.   Genitourinary: Negative for bladder incontinence and nocturia.   Neurological: Negative for disturbances in coordination, loss of balance and seizures.   Psychiatric/Behavioral: Negative for depression. The patient does not have insomnia.    Allergic/Immunologic: Negative for hives and persistent infections.     PAST MEDICAL HISTORY:    Past Medical History:   Diagnosis Date    DDD (degenerative disc disease), lumbar 10/7/2019    Hyperlipidemia 8/31/2015    Hypertension     IFG (impaired fasting glucose) 8/31/2015    Neuropathic pain 8/31/2015    Squamous cell cancer of tongue 2012       PAST SURGICAL HISTORY:   Past Surgical History:   Procedure Laterality Date    CHOLECYSTECTOMY      GALLBLADDER SURGERY  06/2016    HYSTERECTOMY      KNEE ARTHROSCOPY Right     for torn meniscus    TONGUE SURGERY      TRANSFORAMINAL EPIDURAL INJECTION OF STEROID Bilateral 9/19/2019    Procedure: Injection,steroid,epidural,transforaminal approach LUMBAR TRANSFORAMINAL BILATERAL L4/5 TF NOE;  Surgeon: Tim Kerns MD;  Location: [facility] PAIN MGT;  Service: Pain Management;  Laterality: Bilateral;  NEEDS CONSENT, VIP    TRANSFORAMINAL EPIDURAL INJECTION OF STEROID Bilateral 10/7/2019    Procedure: LUMBAR TRANSFORAMINAL BILATERAL L4/5 TF NOE;  Surgeon: Tim Kerns MD;  Location: [facility] PAIN MGT;  Service: Pain Management;  Laterality: Bilateral;  NEEDS CONSENT, **VIP**       FAMILY HISTORY:   Family History   Problem Relation Age of Onset    Alcohol abuse Mother     Cirrhosis Mother     Cancer Father 74        prostate, throat, lung- smoker    Hyperlipidemia Father     Diabetes Sister     Hypertension Sister     Drug abuse Brother     Heart  disease Brother     Hyperlipidemia Brother     No Known Problems Maternal Aunt     No Known Problems Maternal Uncle     No Known Problems Paternal Aunt     No Known Problems Paternal Uncle     No Known Problems Maternal Grandmother     No Known Problems Maternal Grandfather     No Known Problems Paternal Grandmother     No Known Problems Paternal Grandfather     Amblyopia Neg Hx     Blindness Neg Hx     Cataracts Neg Hx     Glaucoma Neg Hx     Macular degeneration Neg Hx     Retinal detachment Neg Hx     Strabismus Neg Hx     Stroke Neg Hx     Thyroid disease Neg Hx        SOCIAL HISTORY:   Social History     Socioeconomic History    Marital status:      Spouse name: Not on file    Number of children: Not on file    Years of education: Not on file    Highest education level: Not on file   Occupational History    Occupation: chief nursing officer   Social Needs    Financial resource strain: Not hard at all    Food insecurity:     Worry: Never true     Inability: Never true    Transportation needs:     Medical: No     Non-medical: No   Tobacco Use    Smoking status: Never Smoker    Smokeless tobacco: Never Used   Substance and Sexual Activity    Alcohol use: Yes     Alcohol/week: 0.0 standard drinks     Frequency: 2-4 times a month     Drinks per session: 1 or 2     Binge frequency: Never     Comment: occ    Drug use: No    Sexual activity: Yes     Birth control/protection: None   Lifestyle    Physical activity:     Days per week: 2 days     Minutes per session: 30 min    Stress: Only a little   Relationships    Social connections:     Talks on phone: More than three times a week     Gets together: Three times a week     Attends Jain service: Not on file     Active member of club or organization: No     Attends meetings of clubs or organizations: Never     Relationship status:    Other Topics Concern    Are you pregnant or think you may be? Not Asked     "Breast-feeding Not Asked   Social History Narrative    Lives with  and 2 dogs       MEDICATIONS:     Current Outpatient Medications:     ALPRAZolam (XANAX) 0.25 MG tablet, TAKE ONE TABLET BY MOUTH AT BEDTIME AS NEEDED., Disp: 30 tablet, Rfl: 2    aspirin (ECOTRIN) 81 MG EC tablet, Take 1 tablet (81 mg total) by mouth nightly., Disp: , Rfl: 0    celecoxib (CELEBREX) 200 MG capsule, Take 1 capsule (200 mg total) by mouth 2 (two) times daily., Disp: 60 capsule, Rfl: 2    clobetasol (TEMOVATE) 0.05 % external solution, Use one to two times daily as needed for scaling or itching to scalp, Disp: 60 mL, Rfl: 3    cyclobenzaprine (FLEXERIL) 5 MG tablet, Take 1 tablet (5 mg total) by mouth 3 (three) times daily as needed for Muscle spasms., Disp: 30 tablet, Rfl: 3    estradioL (ESTRACE) 0.01 % (0.1 mg/gram) vaginal cream, Place 1 g vaginally once daily., Disp: 42.5 g, Rfl: 0    irbesartan (AVAPRO) 75 MG tablet, Take 1 tablet (75 mg total) by mouth every evening., Disp: 90 tablet, Rfl: 3    tretinoin (RETIN-A) 0.05 % cream, Apply topically nightly. Apply pea-sized amount to face., Disp: 45 g, Rfl: 2    triamterene-hydrochlorothiazide 37.5-25 mg (MAXZIDE-25) 37.5-25 mg per tablet, Take 1 tablet by mouth once daily., Disp: 90 tablet, Rfl: 3    pravastatin (PRAVACHOL) 20 MG tablet, Take 1 tablet (20 mg total) by mouth once daily., Disp: 90 tablet, Rfl: 3    ALLERGIES:   Review of patient's allergies indicates:   Allergen Reactions    Aspirin Nausea Only     Can take low dose of Aspirin    Codeine Nausea Only     Can take Codeine if she takes a dose of Zofran with it       VITAL SIGNS:   /75   Pulse 83   Ht 5' 8" (1.727 m)   Wt 100.7 kg (222 lb)   BMI 33.75 kg/m²      PHYSICAL EXAMINATION  General:  The patient is alert and oriented x 3.  Mood is pleasant.  Observation of ears, eyes and nose reveal no gross abnormalities.  No labored breathing observed.    LEFT KNEE EXAMINATION     OBSERVATION / " INSPECTION   Gait:   Nonantalgic   Alignment:  Mild varus bilateral  Scars:   None   Muscle atrophy: Mild  Effusion:  None   Warmth:  None   Discoloration:   none     TENDERNESS / CREPITUS (T / C):          T / C      T / C   Patella   - / -   Lateral joint line   - / -   Peripatellar medial  -  Medial joint line    + / -   Peripatellar lateral -  Medial plica   - / -   Patellar tendon -   Popliteal fossa   - / -   Quad tendon   -   Gastrocnemius   -   Prepatellar Bursa - / -   Quadricep   -   Tibial tubercle  -  Thigh/hamstring  -   Pes anserine/HS -  Fibula    -   Distal ITB  + / -  Tibia     -   Tib/fib joint  - / -  LCL    -     MFC   - / -   MCL: Proximal  -    LFC   - / -    Distal   -          ROM: (* = pain)  PASSIVE   ACTIVE    Left :   5 / 0 / 130   5 / 5 / 130     Right :    5 / 0 / 130   5 / 0 / 130    Patellofemoral examination:  See above noted areas of tenderness.   Patella position    Subluxation / dislocation: Centered           Sup. / Inf;   Normal   Crepitus (PF):    Absent   Patellar Mobility:       Medial-lateral:   Normal    Superior-inferior:  Normal    Inferior tilt   Normal    Patellar tendon:  Normal   Lateral tilt:    Normal   J-sign:     None   Patellofemoral grind:   No pain       MENISCAL SIGNS:     Pain on terminal extension:  +  Pain on terminal flexion:  +  Yosis maneuver:  -   Squat     -     LIGAMENT EXAMINATION:  ACL / Lachman:  normal (-1 to 2mm)    PCL-Post.  drawer: normal 0 to 2mm  MCL- Valgus:  normal 0 to 2mm  LCL- Varus:  normal 0 to 2mm  Pivot shift: normal (Equal)   Dial Test: difference c/w other side   At 30° flexion: normal (< 5°)    At 90° flexion: normal (< 5°)   Reverse Pivot Shift:   normal (Equal)     STRENGTH: (* = with pain) PAINFUL SIDE   Quadricep   5/5   Hamstrin/5    EXTREMITY NEURO-VASCULAR EXAMINATION:   Sensation:  Grossly intact to light touch all dermatomal regions.   Motor Function: 4/5 quad and adductor strength left leg.    DTRs;   quadriceps and  achilles 2+.  No clonus and downgoing Babinski.    Vascular status:  DP and PT pulses 2+, brisk capillary refill, symmetric.   Tender     ASSESSMENT:    Bilateral knee osteoarthritis, acute on chronic left knee pain    PLAN:   1. CSI provided into the Left knee  2. Continue Celebrex and ice    All questions were answered, pt will contact us for questions or concerns in the interim.

## 2020-06-12 ENCOUNTER — HOSPITAL ENCOUNTER (OUTPATIENT)
Dept: RADIOLOGY | Facility: HOSPITAL | Age: 60
Discharge: HOME | End: 2020-06-12
Attending: INTERNAL MEDICINE
Payer: COMMERCIAL

## 2020-06-12 DIAGNOSIS — Z12.31 ENCOUNTER FOR SCREENING MAMMOGRAM FOR BREAST CANCER: ICD-10-CM

## 2020-06-12 DIAGNOSIS — Z00.00 ANNUAL PHYSICAL EXAM: ICD-10-CM

## 2020-06-12 PROCEDURE — 77063 MAMMO DIGITAL SCREENING BILAT WITH TOMOSYNTHESIS_CAD: ICD-10-PCS | Mod: 26,,, | Performed by: RADIOLOGY

## 2020-06-12 PROCEDURE — 77063 BREAST TOMOSYNTHESIS BI: CPT | Mod: 26,,, | Performed by: RADIOLOGY

## 2020-06-12 PROCEDURE — 77067 SCR MAMMO BI INCL CAD: CPT | Mod: 26,,, | Performed by: RADIOLOGY

## 2020-06-12 PROCEDURE — 77067 SCR MAMMO BI INCL CAD: CPT | Mod: TC

## 2020-06-12 PROCEDURE — 77067 MAMMO DIGITAL SCREENING BILAT WITH TOMOSYNTHESIS_CAD: ICD-10-PCS | Mod: 26,,, | Performed by: RADIOLOGY

## 2020-06-15 DIAGNOSIS — M17.12 DEGENERATIVE ARTHRITIS OF LEFT KNEE: ICD-10-CM

## 2020-06-15 DIAGNOSIS — M54.50 LOW BACK PAIN: ICD-10-CM

## 2020-06-15 DIAGNOSIS — M25.562 LEFT KNEE PAIN, UNSPECIFIED CHRONICITY: ICD-10-CM

## 2020-06-15 DIAGNOSIS — E78.5 HYPERLIPIDEMIA, UNSPECIFIED HYPERLIPIDEMIA TYPE: ICD-10-CM

## 2020-06-15 DIAGNOSIS — M76.30 IT BAND SYNDROME: ICD-10-CM

## 2020-06-16 RX ORDER — PRAVASTATIN SODIUM 20 MG/1
20 TABLET ORAL DAILY
Qty: 90 TABLET | Refills: 0 | Status: SHIPPED | OUTPATIENT
Start: 2020-06-16 | End: 2021-11-09

## 2020-06-16 RX ORDER — CELECOXIB 200 MG/1
200 CAPSULE ORAL 2 TIMES DAILY
Qty: 60 CAPSULE | Refills: 2 | Status: SHIPPED | OUTPATIENT
Start: 2020-06-16 | End: 2020-11-04 | Stop reason: SDUPTHER

## 2020-06-23 DIAGNOSIS — M62.838 MUSCLE SPASM: ICD-10-CM

## 2020-06-23 RX ORDER — CYCLOBENZAPRINE HCL 5 MG
5 TABLET ORAL 3 TIMES DAILY PRN
Qty: 30 TABLET | Refills: 3 | Status: CANCELLED | OUTPATIENT
Start: 2020-06-23

## 2020-06-24 DIAGNOSIS — M62.838 MUSCLE SPASM: ICD-10-CM

## 2020-06-24 RX ORDER — CYCLOBENZAPRINE HCL 5 MG
5 TABLET ORAL 3 TIMES DAILY PRN
Qty: 30 TABLET | Refills: 3 | OUTPATIENT
Start: 2020-06-24

## 2020-06-25 ENCOUNTER — PATIENT OUTREACH (OUTPATIENT)
Dept: ADMINISTRATIVE | Facility: OTHER | Age: 60
End: 2020-06-25

## 2020-06-26 ENCOUNTER — OFFICE VISIT (OUTPATIENT)
Dept: SPORTS MEDICINE | Facility: CLINIC | Age: 60
End: 2020-06-26
Payer: COMMERCIAL

## 2020-06-26 ENCOUNTER — HOSPITAL ENCOUNTER (OUTPATIENT)
Dept: RADIOLOGY | Facility: HOSPITAL | Age: 60
Discharge: HOME | End: 2020-06-26
Attending: FAMILY MEDICINE
Payer: COMMERCIAL

## 2020-06-26 VITALS — WEIGHT: 222 LBS | HEIGHT: 68 IN | BODY MASS INDEX: 33.65 KG/M2

## 2020-06-26 DIAGNOSIS — M17.0 BILATERAL PRIMARY OSTEOARTHRITIS OF KNEE: ICD-10-CM

## 2020-06-26 DIAGNOSIS — M25.562 MECHANICAL KNEE PAIN, LEFT: ICD-10-CM

## 2020-06-26 DIAGNOSIS — M25.462 EFFUSION OF LEFT KNEE: ICD-10-CM

## 2020-06-26 DIAGNOSIS — M79.662 PAIN IN LEFT LOWER LEG: ICD-10-CM

## 2020-06-26 DIAGNOSIS — G89.29 CHRONIC PAIN OF LEFT KNEE: Primary | ICD-10-CM

## 2020-06-26 DIAGNOSIS — M25.562 CHRONIC PAIN OF LEFT KNEE: Primary | ICD-10-CM

## 2020-06-26 PROCEDURE — 20611 DRAIN/INJ JOINT/BURSA W/US: CPT | Mod: LT,S$GLB,, | Performed by: FAMILY MEDICINE

## 2020-06-26 PROCEDURE — 73590 X-RAY EXAM OF LOWER LEG: CPT | Mod: TC,LT

## 2020-06-26 PROCEDURE — 99999 PR PBB SHADOW E&M-EST. PATIENT-LVL III: CPT | Mod: PBBFAC,,, | Performed by: FAMILY MEDICINE

## 2020-06-26 PROCEDURE — 3008F PR BODY MASS INDEX (BMI) DOCUMENTED: ICD-10-PCS | Mod: CPTII,S$GLB,, | Performed by: FAMILY MEDICINE

## 2020-06-26 PROCEDURE — 20611 LARGE JOINT ASPIRATION/INJECTION: L KNEE: ICD-10-PCS | Mod: LT,S$GLB,, | Performed by: FAMILY MEDICINE

## 2020-06-26 PROCEDURE — 73564 X-RAY EXAM KNEE 4 OR MORE: CPT | Mod: TC,50

## 2020-06-26 PROCEDURE — 99214 OFFICE O/P EST MOD 30 MIN: CPT | Mod: 25,S$GLB,, | Performed by: FAMILY MEDICINE

## 2020-06-26 PROCEDURE — 73564 X-RAY EXAM KNEE 4 OR MORE: CPT | Mod: 26,50,, | Performed by: RADIOLOGY

## 2020-06-26 PROCEDURE — 73564 XR KNEE ORTHO BILAT WITH FLEXION: ICD-10-PCS | Mod: 26,50,, | Performed by: RADIOLOGY

## 2020-06-26 PROCEDURE — 3008F BODY MASS INDEX DOCD: CPT | Mod: CPTII,S$GLB,, | Performed by: FAMILY MEDICINE

## 2020-06-26 PROCEDURE — 99999 PR PBB SHADOW E&M-EST. PATIENT-LVL III: ICD-10-PCS | Mod: PBBFAC,,, | Performed by: FAMILY MEDICINE

## 2020-06-26 PROCEDURE — 99214 PR OFFICE/OUTPT VISIT, EST, LEVL IV, 30-39 MIN: ICD-10-PCS | Mod: 25,S$GLB,, | Performed by: FAMILY MEDICINE

## 2020-06-26 PROCEDURE — 73590 X-RAY EXAM OF LOWER LEG: CPT | Mod: 26,LT,, | Performed by: RADIOLOGY

## 2020-06-26 PROCEDURE — 73590 XR TIBIA FIBULA 2 VIEW LEFT: ICD-10-PCS | Mod: 26,LT,, | Performed by: RADIOLOGY

## 2020-06-26 NOTE — PROGRESS NOTES
Patient indications  Josefina Blue, a 60 y.o. female, presents today with:  #1 Kellgren-Rudy Grade III osteoarthritis of knee, eliane med compartments, bilat       Procedure performed  Platelet rich plasma (PRP) injection performed using ultrasound guidance for exact placement of orthobiologic at pathologic site     Patient consent to perform procedure  See the electronic medical record for full written and signed patient consent to proceed with this procedure.    Description of procedure    A) Surgical time out  A surgical time out was performed, including verification of patient ID, procedure to be performed, site and side, availability of information and equipment, review of safety issues, and the patients agreement and consent.     B) Phlebotomy, platelet harvest, and PRP preparation  Sterile technique was used to phlebotomize 120mL of the patients blood from a peripheral vein. This blood was  into density-divided layers using an [employer] centrifuge. The layer of ***leukocyte poor (hematocrit 2%) PRP, a volume of ***mL, was placed into a sterile syringe in preparation for injection. ***, was drawn up to 4.0mL with platelet poor plasma, and divided into two sterile syringes in preparation for injection.    C) Platelet rich plasma delivery to pathologic site  Injection site: ***RIGHT KNEE  Using 1) physical examination and 2) musculoskeletal ultrasound ***and 3) recent MRI, the anatomy was visualized and the diseased tissue was identified and confirmed. The procedure site was marked with a skin marker. The patient was placed in position maximizing 1) physician access to pathologic tissue and 2) patient comfort. The skin about the area was sterilized with ChloraPrep (2%w/v CHG, 70% IPA). The site of needle insertion was anesthetized using vapocoolant. Under ultrasound guidance, the needle was advanced to the site of tissue pathology, and the injectate was deposited under direct  visualization.    D) Post-procedure care  Following the procedure, a sterile bandage was placed over the injection site.  A procedure after care instructions (ACI) handout was provided to the patient.    Complications: none     Estimated blood loss from injection procedure: none     Joint aspirate volume, if required: ***0 mL     Disposition  The patient tolerated the procedure well and there were no immediate complications. The patient was instructed to call the clinic immediately for any mild to moderate adverse side effects, or to call 911 in the event of an emergency.        Description of ultrasound utilization for needle / probe guidance  Ultrasound guidance was used for needle / probe localization. Images (and videos, if appropriate) were saved and stored for documentation. Dynamic visualization of the needle / probe was continuous throughout the procedure.    0232T

## 2020-06-26 NOTE — PROGRESS NOTES
Josefina Blue, a 60 y.o. female, presents today for evaluation of her Left and Right Knee.      History of Present Illness (HPI)  Location: anterior knee, Left > Right  Onset: Chronic since 2017  Palliative:    Relative rest   Oral analgesics   Ambulation Assistance: nwb, wheelchair assistance needed as of 06.26.2020   CSI, L/R iaKnee, 01.21.2020 per Dr. Noriega   VSI, #1 L/R iaKnee, 02.04.2020; #2 L. iaKnee 02.11.2020; R iaKnee, 03.03.2020 per Dr. Noriega    CSI, L iaKnee, 06.11.2020, per Dr. Noriega  Provocative:    ADLS   Prolonged ambulation   Exercise  Prior:  has a past surgical history that includes Knee arthroscopy, Right (2008, OSH in [facility]); Transforaminal epidural injection of steroid (Bilateral, 9/19/2019); and Transforaminal epidural injection of steroid (Bilateral, 10/7/2019).  Progression: worsening discomfort  Quality:    sharp pain  Radiation: none  Severity: per nursing documentation  Timing: intermittent w/ use  Trauma:    06.21.2020: pt. reports that she tripped and fell while getting out of the bathtub. She reports tripping on the edge of the tub and landing with a twisting mechanism on her LLE. Pt. is reporting mechanical pain and is NWB at this time.     Review of Systems (ROS)  A 10+ review of systems was performed with pertinent positives and negatives noted above in the history of present illness. Other systems were negative unless otherwise specified.    Physical Examination (PE)  General:  The patient is alert and oriented x 3. Mood is pleasant. Observation of ears, eyes and nose reveal no gross abnormalities. HEENT: NCAT, sclera anicteric.   Lungs: Respirations are equal and unlabored.  Gait is coordinated. Patient can toe walk and heel walk without difficulty.    [place] KNEE EXAMINATION    Observation/Inspection  Gait:   NWB  Alignment:  Neutral   Scars:   None   Muscle atrophy: Mild  Effusion:  None   Warmth:  None   Discoloration:   none     Tenderness / Crepitus (T / C):          T / C      T / C  Patella   - / -   Lateral joint line   + / -     Peripatellar medial  -  Medial joint line    - / -  Peripatellar lateral -  Medial plica   - / -  Patellar tendon -   Popliteal fossa   - / -  Quad tendon   -   Gastrocnemius   -  Prepatellar Bursa - / -   Quadricep   -  Tibial tubercle  -  Thigh/hamstring  -  Pes anserine/HS -  Fibula    -  ITB   - / -  Tibia     -  Tib/fib joint  + / -  LCL    -    MFC   - / -   MCL: Proximal  -    LFC   - / -   Distal    -          ROM: (* = pain)  PASSIVE   ACTIVE    Left :    0 / 100 *   5  0  75 *    Right :     0  145    145    Patellofemoral examination:  See above noted areas of tenderness.   Patella position    Subluxation / dislocation: Centered        Sup. / Inf;   Normal   Crepitus (PF):    Absent   Patellar Mobility:       Medial-lateral:   Normal    Superior-inferior:  Normal    Inferior tilt   Normal    Patellar tendon:  Normal   Lateral tilt:    Normal   J-sign:     None   Patellofemoral grind:   No pain     Meniscal Signs:     Pain on terminal extension:  +  Pain on terminal flexion:  +  Yosis maneuver:  +*  Squat     NT  Thessaly    NT    Ligament Examination:  ACL / Lachman:  WNL  PCL-Post.  drawer: normal 0 to 2mm  MCL- Valgus:  normal 0 to 2mm  LCL- Varus:    normal 0 to 2mm  Pivot shift:  guarding   Dial Test:   difference c/w other side   At 30° flexion: normal (< 5°)    At 90° flexion: normal (< 5°)   Reverse Pivot Shift:   normal (Equal)     Strength: (* = with pain) Painful Side  Quadriceps   5/5  Hamstrin/5    Extremity Neuro-vascular Examination:   Sensation:  Grossly intact to light touch all dermatomal regions.   Motor Function:  Fully intact motor function at hip, knee, foot and ankle    DTRs;  quadriceps and  achilles 2+.  No clonus and downgoing Babinski.    Vascular status:  DP and PT pulses 2+, brisk capillary refill, symmetric.     Other Findings:    ASSESSMENT & PLAN  Assessment  #1  Kellgren-Rudy Grade III osteoarthritis of knee, eliane med compartment, right  #2 Kellgren-Rudy Grade III osteoarthritis of knee, eliane med compartment, left  Knee pain, left >>> right    No evidence of neurologic pathology  No evidence of vascular pathology    Imaging studies reviewed:   X-ray knee, bilateral 20.06    Plan  We discussed the importance of appropriate diet, weight, and regular exercise    We discussed options including    Watchful waiting / relative rest x   Physical therapy x   Injection therapy LIDOCAINE ONLY iaknee l   Consultation W/ Team Isabell to discuss TKA LEFT   The patient chooses As above   x = prescribed  CSI = corticosteroid injection  VSI = viscosupplement injection  PRPI = platelet rich plasma injection  ia = intra articular  R = right  L = left  B = bilateral   nfSx = surgical consultation was recommended, but patient is not interested in consultation at this time    Physical Therapy        Formal (fPT), @ [facility] facility p   Formal (fPT), @ [facility] facility        Homegoing (hgPT), per concurrent fPT recommendations    Homegoing (hgPT), per prior fPT recommendations t   Homegoing (hgPT), handout provided        w/  (atPT)    [blank] = not prescribed  x = prescribed  b = prescribed, and begin as indicated  t = continue as indicated  r = prescribed, and restart as indicated  p = completed prior as indicated  hs = prescribed, and with high school   col = prescribed, and with college or university   nfPT = physical therapy was recommended, but patient is not interested in PT at this time    Activity (e.g. sports, work) restrictions    [blank] = as tolerated  pt = per physical therapist  at = per     Bracing Crutches, f   [blank] = not prescribed  r = recommended, but not fit with at todays visit  f = prescribed and fit with at todays visit  t = continue as indicated  p = prn use on rare, as-needed basis; advised against  chronic use    Pain management Celebrex per another provider  flexeril 5mg per another provider   [blank] = No prescription necessary. A handout detailing dosing of appropriate   over-the-counter musculoskeletal analgesics was made available to the patient.   m = meloxicam x 14 days  mp = 14 day course of meloxicam prescribed prior    Follow up ns   [blank] = as needed  [number] = in [number] weeks  CSI = for corticosteroid injection  VSI = for viscosupplement injection or injection series  PRP = for platelet rich plasma injection or injection series  MRI = after MRI imaging  ns = should surgical options be deferred (no surgery)  o = appointment offered, deferred by patient    Should symptoms worsen or fail to resolve, consider    Revisiting the above options and / or        Vocation:   used to play competitive tennis  [facility] [employer]

## 2020-06-26 NOTE — PROCEDURES
"Large Joint Aspiration/Injection: L knee    Date/Time: 6/26/2020 8:00 AM  Performed by: Elkin Cabrera MD  Authorized by: Elkin Cabrera MD     Consent Done?:  Yes (Verbal)  Indications:  Pain  Site marked: the procedure site was marked    Timeout: prior to procedure the correct patient, procedure, and site was verified    Prep: patient was prepped and draped in usual sterile fashion    Local anesthesia used?: No      Details:  Needle Size:  20 G  Ultrasonic Guidance for needle placement?: Yes    Images are saved and documented.  Approach:  Lateral  Location:  Knee  Site:  L knee  Aspirate amount (mL):  16  Aspirate:  Yellow  Patient tolerance:  Patient tolerated the procedure well with no immediate complications     LIDOCAINE ONLY    Description of ultrasound utilization for needle guidance:   Ultrasound guidance used for needle localization. Images saved and stored for documentation. The knee joint was visualized. Dynamic visualization of the 20g x 3.5" needle was continuous throughout the procedure.       "

## 2020-06-30 ENCOUNTER — OFFICE VISIT (OUTPATIENT)
Dept: SPORTS MEDICINE | Facility: CLINIC | Age: 60
End: 2020-06-30
Payer: COMMERCIAL

## 2020-06-30 VITALS
HEIGHT: 68 IN | BODY MASS INDEX: 33.65 KG/M2 | WEIGHT: 222 LBS | DIASTOLIC BLOOD PRESSURE: 87 MMHG | HEART RATE: 76 BPM | SYSTOLIC BLOOD PRESSURE: 121 MMHG

## 2020-06-30 DIAGNOSIS — M17.0 BILATERAL PRIMARY OSTEOARTHRITIS OF KNEE: Primary | ICD-10-CM

## 2020-06-30 DIAGNOSIS — Z01.818 PRE-OP TESTING: ICD-10-CM

## 2020-06-30 PROCEDURE — 3079F DIAST BP 80-89 MM HG: CPT | Mod: CPTII,S$GLB,, | Performed by: ORTHOPAEDIC SURGERY

## 2020-06-30 PROCEDURE — 99999 PR PBB SHADOW E&M-EST. PATIENT-LVL III: ICD-10-PCS | Mod: PBBFAC,,, | Performed by: ORTHOPAEDIC SURGERY

## 2020-06-30 PROCEDURE — 99214 PR OFFICE/OUTPT VISIT, EST, LEVL IV, 30-39 MIN: ICD-10-PCS | Mod: S$GLB,,, | Performed by: ORTHOPAEDIC SURGERY

## 2020-06-30 PROCEDURE — 3008F PR BODY MASS INDEX (BMI) DOCUMENTED: ICD-10-PCS | Mod: CPTII,S$GLB,, | Performed by: ORTHOPAEDIC SURGERY

## 2020-06-30 PROCEDURE — 99999 PR PBB SHADOW E&M-EST. PATIENT-LVL III: CPT | Mod: PBBFAC,,, | Performed by: ORTHOPAEDIC SURGERY

## 2020-06-30 PROCEDURE — 3074F SYST BP LT 130 MM HG: CPT | Mod: CPTII,S$GLB,, | Performed by: ORTHOPAEDIC SURGERY

## 2020-06-30 PROCEDURE — 3074F PR MOST RECENT SYSTOLIC BLOOD PRESSURE < 130 MM HG: ICD-10-PCS | Mod: CPTII,S$GLB,, | Performed by: ORTHOPAEDIC SURGERY

## 2020-06-30 PROCEDURE — 99214 OFFICE O/P EST MOD 30 MIN: CPT | Mod: S$GLB,,, | Performed by: ORTHOPAEDIC SURGERY

## 2020-06-30 PROCEDURE — 3008F BODY MASS INDEX DOCD: CPT | Mod: CPTII,S$GLB,, | Performed by: ORTHOPAEDIC SURGERY

## 2020-06-30 PROCEDURE — 3079F PR MOST RECENT DIASTOLIC BLOOD PRESSURE 80-89 MM HG: ICD-10-PCS | Mod: CPTII,S$GLB,, | Performed by: ORTHOPAEDIC SURGERY

## 2020-06-30 NOTE — PROGRESS NOTES
CC: LEFT knee pain    60 y.o. Female who presents as a new patient to me. 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 Complaint is left knee pain that occurred when she was stepping out of the bath tub and slipped hurting her knee 4 weeks ago but describes her pain getting worse over the last 3 years. Pain localizes over the lateral joint line and anterior aspect of the knee.  Denies swelling or effusions. No prominent mechanical symptoms. Denies instability.  Worse with prolonged activity on feet, getting up and down stairs, Patney exercises.. Better with rest.Treatment thus far has included rest, activity modifications, oral medications. Patient also had previous LEFT knee aspiration and lidocaine injection from Dr. Elkin Cabrera on 6/26/2020. LEFT knee cortisone injection on 6/11/2020 from Dr. Zac Noriega.  Current pain is daily and activity limiting.  Bothers her at night.  Pain is quite severe upwards of 8 to 9/10.  This is a quality of life issue for her.  She has been referred to me to discuss knee replacement surgery.    PMHx notable for BILATERAL knee moderate osteoarthritis with varus deformity.  She does have history of right knee pain as well.   Negative for tobacco.   Negative for diabetes. Last A1C: 5.9    BMI 32    No hip regarding pain.  No back or radicular symptoms.    Accompanied by her  today who is a nurse.    REVIEW OF SYSTEMS:   Constitution: Negative. Negative for chills, fever and night sweats.    Hematologic/Lymphatic: Negative for bleeding problem. Does not bruise/bleed easily.   Skin: Negative for dry skin, itching and rash.   Musculoskeletal: Negative for falls. Positive for left knee pain and muscle weakness.     All other review of symptoms were reviewed and found to be noncontributory.     PAST MEDICAL HISTORY:   Past Medical History:   Diagnosis Date    DDD (degenerative disc disease), lumbar 10/7/2019    Hyperlipidemia 8/31/2015    Hypertension      IFG (impaired fasting glucose) 8/31/2015    Neuropathic pain 8/31/2015    Squamous cell cancer of tongue 2012     PAST SURGICAL HISTORY:   Past Surgical History:   Procedure Laterality Date    CHOLECYSTECTOMY      GALLBLADDER SURGERY  06/2016    HYSTERECTOMY      KNEE ARTHROSCOPY Right     for torn meniscus    TONGUE SURGERY      TRANSFORAMINAL EPIDURAL INJECTION OF STEROID Bilateral 9/19/2019    Procedure: Injection,steroid,epidural,transforaminal approach LUMBAR TRANSFORAMINAL BILATERAL L4/5 TF NOE;  Surgeon: Tim Kerns MD;  Location: [facility];  Service: Pain Management;  Laterality: Bilateral;  NEEDS CONSENT, VIP    TRANSFORAMINAL EPIDURAL INJECTION OF STEROID Bilateral 10/7/2019    Procedure: LUMBAR TRANSFORAMINAL BILATERAL L4/5 TF NOE;  Surgeon: Tim Kerns MD;  Location: [facility];  Service: Pain Management;  Laterality: Bilateral;  NEEDS CONSENT, **VIP**     FAMILY HISTORY:   Family History   Problem Relation Age of Onset    Alcohol abuse Mother     Cirrhosis Mother     Cancer Father 74        prostate, throat, lung- smoker    Hyperlipidemia Father     Diabetes Sister     Hypertension Sister     Drug abuse Brother     Heart disease Brother     Hyperlipidemia Brother     No Known Problems Maternal Aunt     No Known Problems Maternal Uncle     No Known Problems Paternal Aunt     No Known Problems Paternal Uncle     No Known Problems Maternal Grandmother     No Known Problems Maternal Grandfather     No Known Problems Paternal Grandmother     No Known Problems Paternal Grandfather     Amblyopia Neg Hx     Blindness Neg Hx     Cataracts Neg Hx     Glaucoma Neg Hx     Macular degeneration Neg Hx     Retinal detachment Neg Hx     Strabismus Neg Hx     Stroke Neg Hx     Thyroid disease Neg Hx      SOCIAL HISTORY:   Social History     Socioeconomic History    Marital status:      Spouse name: Not on file    Number of children: Not on file    Years of education:  Not on file    Highest education level: Not on file   Occupational History    Occupation: chief nursing officer   Social Needs    Financial resource strain: Not hard at all    Food insecurity     Worry: Never true     Inability: Never true    Transportation needs     Medical: No     Non-medical: No   Tobacco Use    Smoking status: Former Smoker     Packs/day: 0.25     Years: 1.00     Pack years: 0.25    Smokeless tobacco: Never Used   Substance and Sexual Activity    Alcohol use: Yes     Alcohol/week: 0.0 standard drinks     Frequency: 2-4 times a month     Drinks per session: 1 or 2     Binge frequency: Never     Comment: once a week    Drug use: No    Sexual activity: Yes     Birth control/protection: None   Lifestyle    Physical activity     Days per week: 2 days     Minutes per session: 30 min    Stress: Only a little   Relationships    Social connections     Talks on phone: More than three times a week     Gets together: Three times a week     Attends [employer] service: Not on file     Active member of club or organization: No     Attends meetings of clubs or organizations: Never     Relationship status:    Other Topics Concern    Are you pregnant or think you may be? Not Asked    Breast-feeding Not Asked   Social History Narrative    Lives with  and 1 dog     MEDICATIONS:     Current Outpatient Medications:     ALPRAZolam (XANAX) 0.25 MG tablet, TAKE ONE TABLET BY MOUTH AT BEDTIME AS NEEDED. (Patient taking differently: Take if needed), Disp: 30 tablet, Rfl: 2    aspirin (ECOTRIN) 81 MG EC tablet, Take 1 tablet (81 mg total) by mouth nightly. (Patient taking differently: Take 81 mg by mouth nightly. Takes at night), Disp: , Rfl: 0    celecoxib (CELEBREX) 200 MG capsule, Take 1 capsule (200 mg total) by mouth 2 (two) times daily. (Patient taking differently: Take 200 mg by mouth 2 (two) times daily. Hold medication one week prior to surgery), Disp: 60 capsule, Rfl: 2     "clobetasol (TEMOVATE) 0.05 % external solution, Use one to two times daily as needed for scaling or itching to scalp, Disp: 60 mL, Rfl: 3    estradioL (ESTRACE) 0.01 % (0.1 mg/gram) vaginal cream, Place 1 g vaginally once daily., Disp: 42.5 g, Rfl: 0    irbesartan (AVAPRO) 75 MG tablet, Take 1 tablet (75 mg total) by mouth every evening. (Patient taking differently: Take 75 mg by mouth every evening. TAKES IN EVENING), Disp: 90 tablet, Rfl: 3    pravastatin (PRAVACHOL) 20 MG tablet, Take 1 tablet (20 mg total) by mouth once daily. (Patient taking differently: Take 20 mg by mouth once daily. Take am of surgery), Disp: 90 tablet, Rfl: 0    tretinoin (RETIN-A) 0.05 % cream, Apply topically nightly. Apply pea-sized amount to face., Disp: 45 g, Rfl: 2    triamterene-hydrochlorothiazide 37.5-25 mg (MAXZIDE-25) 37.5-25 mg per tablet, Take 1 tablet by mouth once daily. (Patient taking differently: Take 1 tablet by mouth once daily. Hold am of surgery), Disp: 90 tablet, Rfl: 3    melatonin 3 mg TbDL, Take by mouth., Disp: , Rfl:     ALLERGIES:   Review of patient's allergies indicates:   Allergen Reactions    Aspirin Nausea Only     Can take low dose of Aspirin    Codeine Nausea Only     Can take Codeine if she takes a dose of Zofran with it      PHYSICAL EXAMINATION:  /87   Pulse 76   Ht 5' 8" (1.727 m)   Wt 100.7 kg (222 lb)   BMI 33.75 kg/m²   General: Well-developed well-nourished 60 y.o. femalein no acute distress   Cardiovascular: Regular rhythm by palpation of distal pulse, normal color and temperature, no concerning varicosities on symptomatic side   Lungs: No labored breathing or wheezing appreciated   Neuro: Alert and oriented ×3   Psychiatric: well oriented to person, place and time, demonstrates normal mood and affect   Skin: No rashes, lesions or ulcers, normal temperature, turgor, and texture on involved extremity    Ortho/SPM Exam  Examination of the left knee demonstrates intact extensor " mechanism.  Mild soft tissue swelling.  No effusion. Central patellar tracking with positive grind test. No patellar apprehension.  Mildly decreased patellar mobility. -5 degrees short of full extension. Flexion to 100 with significant pain. Pain with forced flexion and extension. Prominent tenderness along the medial>lateral joint line.  Pain medially with varus load. Ligamentously stable.  Painless passive internal and external rotation of the hip.  No significant PVD.  2+ dorsalis pedis pulse.  No midline back tenderness.  Quad activates well.  She does have some increased adipose tissue around the knee.  IMAGING:  X-rays including standing, weight bearing AP and flexion bilateral knees, LEFT knee lateral and sunrise views ordered and images reviewed by me show:   Advanced bilateral knee osteoarthritis, tricompartmental with bone-on-bone involvement medially in both knees.  Intra-articular varus deformity    ASSESSMENT:      ICD-10-CM ICD-9-CM   1. Bilateral primary osteoarthritis of knee  M17.0 715.16   2. Pre-op testing  Z01.818 V72.84     PLAN:     I had a long discussion with the patient and her  today.  She has advanced arthritis in both knees.  The left knee is her most symptomatic side at this time.  Of note she has had 2 recent injection/aspiration procedures within the last month.  Prior conservative treatment has been extensive.  This is a quality of life issue for her.  I do think she is a good candidate for knee replacement surgery.  The details of such were discussed to include the expected postop rehab and recovery course.  Timing of surgery was reviewed.  I do have concerns with proceeding with surgery at this time given the timing in relation to her recent injection procedures.  I have explained the significance of this as relates to her increased risk for infection in particular.  The goal would be to wait 3-4 months after injection to minimize this risk.  The patient is quite miserable  with her knee and is at a point where she has difficulty walking.  This also is a duty limiting issue for her in the hospital.  I certainly understand that.  She would like to proceed with surgery as soon as possible given the known increased risk as discussed.  Otherwise she is fairly healthy.  Plan to proceed next week.  She will need preoperative medical clearance.  She recently saw her primary care physician and also saw her dentist.  No issues from that standpoint.    We discussed the upwards of 30% risk of continued or recurrent pain postop.  Other risks of surgery include stiffness, decreased functional ability, and difficulty with some activities such as kneeling.  She verbalized understanding.    -Plan for a LEFT total knee arthroplasty on 7/8/2020    -[facility] for preoperative appointment.  She will need a full-length standing x-ray of both lower extremities at her preoperative appointment.    -All questions answered    Informed Consent:    The details of the surgical procedure were explained, including the location of probable incisions and a description of possible hardware and/or grafts to be used. Alternatives to both operative and non-operative options with associated risks and benefits were discussed. The patient understands the likely convalescence after surgery and, in particular, the expected postop rehab and recovery course. The outlined risks and potential complications of the proposed procedure include but are not limited to: infection, poor wound healing, scarring, deformity, stiffness, swelling, continued or recurrent pain, instability, hardware or prosthetic failure if implanted, symptomatic hardware requiring removal, weakness, neurovascular injury, numbness, chronic regional pain disorder, tissue nonhealing/irreparability/retear, subsequent contralateral limb injury or pathology, chondral injury, arthritis, fracture, blood clot formation, inability to return to previous level of activity,  anesthetic or regional block complication up to death, need for additional procedure as indicated intraoperatively, and potential need for further surgery.    The patient was also informed and understands that the risks of surgery are greater for patients with a current condition or history of heart disease, obesity, clotting disorders, recurrent infections, steroid use, current or past smoking, and factors such as sedentary lifestyle and noncompliance with medications, therapy or follow-up. The degree of the increased risk is hard to estimate with any degree of precision. If applicable, smoking cessation was discussed.     All questions were answered. The patient has verbalized understanding of these issues and wishes to proceed with the surgery as discussed.      Procedures

## 2020-06-30 NOTE — LETTER
July 1, 2020      Elkin Cabrera MD  [address]  [facility] [address]  [facility] [address]           [facility]  [address]  [facility] [address]  Phone: [phone]          Patient: Josefina Blue   MR Number: [id number]   YOB: 1960   Date of Visit: 6/30/2020       Dear Dr. Elkin Cabrera:    Thank you for referring Josefina Blue to me for evaluation. Attached you will find relevant portions of my assessment and plan of care.    If you have questions, please do not hesitate to call me. I look forward to following Josefina Blue along with you.    Sincerely,    GLENN Whyte MD    Enclosure  CC:  No Recipients    If you would like to receive this communication electronically, please contact [email or web address] or [phone] to request more information on [facility] access.    For providers and/or their staff who would like to refer a patient to [facility], please contact us through our one-stop-shop provider referral line, [facility], at [phone].    If you feel you have received this communication in error or would no longer like to receive these types of communications, please e-mail [email or web address]

## 2020-07-01 ENCOUNTER — HOSPITAL ENCOUNTER (OUTPATIENT)
Dept: CARDIOLOGY | Facility: CLINIC | Age: 60
Discharge: HOME | End: 2020-07-01
Payer: COMMERCIAL

## 2020-07-01 ENCOUNTER — HOSPITAL ENCOUNTER (OUTPATIENT)
Dept: PREADMISSION TESTING | Facility: HOSPITAL | Age: 60
Discharge: HOME | End: 2020-07-01
Attending: ANESTHESIOLOGY
Payer: COMMERCIAL

## 2020-07-01 ENCOUNTER — INITIAL CONSULT (OUTPATIENT)
Dept: INTERNAL MEDICINE | Facility: CLINIC | Age: 60
End: 2020-07-01
Payer: COMMERCIAL

## 2020-07-01 ENCOUNTER — TELEPHONE (OUTPATIENT)
Dept: ORTHOPEDICS | Facility: CLINIC | Age: 60
End: 2020-07-01

## 2020-07-01 VITALS
SYSTOLIC BLOOD PRESSURE: 121 MMHG | DIASTOLIC BLOOD PRESSURE: 74 MMHG | TEMPERATURE: 98 F | BODY MASS INDEX: 32.88 KG/M2 | OXYGEN SATURATION: 96 % | HEIGHT: 69 IN | WEIGHT: 222 LBS | HEART RATE: 74 BPM

## 2020-07-01 DIAGNOSIS — H00.14 CHALAZION OF LEFT UPPER EYELID: ICD-10-CM

## 2020-07-01 DIAGNOSIS — B95.8 STAPH INFECTION: ICD-10-CM

## 2020-07-01 DIAGNOSIS — R73.01 IFG (IMPAIRED FASTING GLUCOSE): ICD-10-CM

## 2020-07-01 DIAGNOSIS — Z79.02 LONG TERM (CURRENT) USE OF ANTITHROMBOTICS/ANTIPLATELETS: ICD-10-CM

## 2020-07-01 DIAGNOSIS — C02.9 SQUAMOUS CELL CANCER OF TONGUE: ICD-10-CM

## 2020-07-01 DIAGNOSIS — M79.605 PAIN OF LEFT LOWER EXTREMITY: ICD-10-CM

## 2020-07-01 DIAGNOSIS — I10 ESSENTIAL HYPERTENSION: ICD-10-CM

## 2020-07-01 DIAGNOSIS — R79.89 ELEVATED LFTS: ICD-10-CM

## 2020-07-01 DIAGNOSIS — G89.29 OTHER CHRONIC PAIN: ICD-10-CM

## 2020-07-01 DIAGNOSIS — M25.562 CHRONIC PAIN OF LEFT KNEE: ICD-10-CM

## 2020-07-01 DIAGNOSIS — Z90.710 H/O: HYSTERECTOMY: ICD-10-CM

## 2020-07-01 DIAGNOSIS — K76.0 FATTY LIVER: ICD-10-CM

## 2020-07-01 DIAGNOSIS — R60.9 EDEMA, UNSPECIFIED TYPE: ICD-10-CM

## 2020-07-01 DIAGNOSIS — Z01.818 PREOP EXAMINATION: Primary | ICD-10-CM

## 2020-07-01 DIAGNOSIS — M17.9 OSTEOARTHRITIS OF KNEE, UNSPECIFIED: Primary | ICD-10-CM

## 2020-07-01 DIAGNOSIS — R11.0 NAUSEA: ICD-10-CM

## 2020-07-01 DIAGNOSIS — M17.12 PRIMARY OSTEOARTHRITIS OF LEFT KNEE: ICD-10-CM

## 2020-07-01 DIAGNOSIS — M51.36 DDD (DEGENERATIVE DISC DISEASE), LUMBAR: ICD-10-CM

## 2020-07-01 DIAGNOSIS — M79.2 NEUROPATHIC PAIN: ICD-10-CM

## 2020-07-01 DIAGNOSIS — Z01.818 PRE-OP TESTING: ICD-10-CM

## 2020-07-01 DIAGNOSIS — Z01.818 PRE-OP TESTING: Primary | ICD-10-CM

## 2020-07-01 DIAGNOSIS — G89.29 CHRONIC PAIN OF LEFT KNEE: ICD-10-CM

## 2020-07-01 DIAGNOSIS — E78.5 HYPERLIPIDEMIA, UNSPECIFIED HYPERLIPIDEMIA TYPE: ICD-10-CM

## 2020-07-01 PROBLEM — M25.552 LEFT HIP PAIN: Status: RESOLVED | Noted: 2019-06-06 | Resolved: 2020-07-01

## 2020-07-01 PROBLEM — M62.81 MUSCLE WEAKNESS OF LOWER EXTREMITY: Status: RESOLVED | Noted: 2019-06-06 | Resolved: 2020-07-01

## 2020-07-01 PROCEDURE — 99999 PR PBB SHADOW E&M-EST. PATIENT-LVL III: ICD-10-PCS | Mod: PBBFAC,,, | Performed by: HOSPITALIST

## 2020-07-01 PROCEDURE — 99244 PR OFFICE CONSULTATION,LEVEL IV: ICD-10-PCS | Mod: S$GLB,,, | Performed by: HOSPITALIST

## 2020-07-01 PROCEDURE — 93010 ELECTROCARDIOGRAM REPORT: CPT | Mod: S$GLB,,, | Performed by: INTERNAL MEDICINE

## 2020-07-01 PROCEDURE — 99999 PR PBB SHADOW E&M-EST. PATIENT-LVL III: CPT | Mod: PBBFAC,,, | Performed by: HOSPITALIST

## 2020-07-01 PROCEDURE — 93005 EKG 12-LEAD: ICD-10-PCS | Mod: S$GLB,,, | Performed by: ANESTHESIOLOGY

## 2020-07-01 PROCEDURE — 99244 OFF/OP CNSLTJ NEW/EST MOD 40: CPT | Mod: S$GLB,,, | Performed by: HOSPITALIST

## 2020-07-01 PROCEDURE — 93010 EKG 12-LEAD: ICD-10-PCS | Mod: S$GLB,,, | Performed by: INTERNAL MEDICINE

## 2020-07-01 PROCEDURE — 93005 ELECTROCARDIOGRAM TRACING: CPT | Mod: S$GLB,,, | Performed by: ANESTHESIOLOGY

## 2020-07-01 RX ORDER — ACETAMINOPHEN 500 MG
500 TABLET ORAL 2 TIMES DAILY PRN
COMMUNITY
End: 2020-09-09

## 2020-07-01 RX ORDER — IBUPROFEN/PSEUDOEPHEDRINE HCL 200MG-30MG
TABLET ORAL
COMMUNITY
End: 2022-11-30

## 2020-07-01 NOTE — PRE-PROCEDURE INSTRUCTIONS
Chart review; triage plan initiated.  Phone contact: left message regarding appointments needed for POC (following the 1 pm appt with Dr Koch), lab and EKG. Provided my contact information.

## 2020-07-01 NOTE — ASSESSMENT & PLAN NOTE
Alcohol use once a week- 1-2 glasses of wine   Likely Weight related   No suggestion of  hepatic decompensation   PLT - N

## 2020-07-01 NOTE — ANESTHESIA PAT ROS NOTE
07/01/2020  Josefina Blue is a 60 y.o., female.      Pre-op Assessment          Review of Systems  Anesthesia Hx:  No problems with previous Anesthesia  Denies Family Hx of Anesthesia complications.   Denies Personal Hx of Anesthesia complications.   Social:  Non-Smoker, Social Alcohol Use    Hematology/Oncology:  Hematology Normal       -- Cancer in past history:  surgery  Oncology Comments: SCC LEFT TONGUE S/P PARTIAL GLOSSECTOMY     EENT/Dental:EENT/Dental Normal   Cardiovascular:   Hypertension  Denies Angina. hyperlipidemia  Functional Capacity good / => 4 METS    Pulmonary:  Pulmonary Normal  Denies Shortness of breath.  Denies Recent URI.    Renal/:  Renal/ Normal     Hepatic/GI:  Liver Disease, Fatty Liver    Musculoskeletal:  Joint Disease:  Arthritis, Osteoarthritis O.A. BILATERAL KNEES Lumbar Spine Disorders, Lumbar Disc Disease L3-4  Neurological:  Neurology Normal  Pain , onset is chronic , location of LEFT KNEE  Osteoarthritis    Endocrine:  Diabetes , most recent HgA1c value was 5.9 on 8/2019.  PRE-DM.  Metabolic Disorders, Obesity / BMI > 30  Psych:   Sleep Disorder and Insomnia. Sleep Disorder and Insomnia.        Physical Exam  General:  Well nourished, Obesity    Airway/Jaw/Neck:  Airway Findings: Mouth Opening: Normal Jaw/Neck Findings:  Neck ROM: Normal ROM  Neck Findings: Normal     Dental:  Dental Findings: In tact   Chest/Lungs:  Chest/Lungs Findings: Clear to auscultation     Heart/Vascular:  Heart Findings: Rate: Normal  Rhythm: Regular Rhythm  Sounds: Normal        Mental Status:  Mental Status Findings:  Cooperative, Alert and Oriented         Anesthesia Assessment: Preoperative EQUATION    Planned Procedure: Procedure(s) (LRB):  ARTHROPLASTY, KNEE (Left)  Requested Anesthesia Type:General  Surgeon: GLENN Whyte MD  Service: Orthopedics  Known or anticipated Date of  Surgery:7/8/2020    Surgeon notes: reviewed    Previous anesthesia records:LARYNGOSCOPY BRONCHOSCOPY-DIRECT (N/A Throat) EXCISION-LESION-TONGUE (Face) 4/9/18 DR KAREN DHALIWAL; DL ANTERIOR LARYNX    Last PCP note: within [facility] , Dr Jo-Ann Collins  5/20/20    Other important co-morbidities: HLD, HTN and lumbar DDD (NOE 10/2019), SCC tongue s/p L partial glossectomy 2013 and exc lesion 2018      Tests already available:  No recent tests.               Plan:    Testing:  BMP, EKG, Hematology Profile and PT/INR   Pre-anesthesia  visit       Visit focus: possible regional anesthesia and/or nerve block      Consultation:IM Perioperative Hospitalist      Navigation: Tests to be scheduled.              Consults scheduled.             Results will be tracked by Preop Clinic.

## 2020-07-01 NOTE — DISCHARGE INSTRUCTIONS
Anesthesia: Regional Anesthesia    Youre scheduled for surgery. During surgery, youll receive medicine called anesthesia to keep you comfortable and pain-free. Your surgeon has decided that youll receive regional anesthesia. This sheet tells you what to expect with this type of anesthesia.  What is regional anesthesia?  Regional anesthesia numbs one region of your body. The anesthesia may be given around nerves or into veins in your arms, neck, or legs (nerve block or [place] block). Or it may be sent into the spinal fluid (spinal anesthesia) or into the space just outside the spinal fluid (epidural anesthesia). You may also be given sedatives to help you relax.  Nerve block or [place] block  A small area of the body, such as an arm or leg, can be numbed using a nerve block or Daysi block.  · Nerve block. During a nerve block, your skin is numbed. A needle is then inserted near nerves that serve the area to be numbed. Anesthetic is sent through the needle.  · IV regional or Daysi block. For this type of block, an IV line is put into a vein. The blood flow to the area to be numbed is blocked for a short time. Anesthetic is sent through the IV.  Spinal anesthesia  Spinal anesthesia numbs your body from about the waist down.  · Anesthetic is injected into the spinal fluid. This is a substance that surrounds the spinal cord in your spinal column. The anesthetic blocks pain traveling from the body to the brain.  · To receive the anesthetic, your skin is numbed at the injection site on your back.  · A needle is then inserted into the spinal space. Anesthetic is sent into the spinal fluid through the needle.  Epidural anesthesia  Epidural anesthesia is most commonly used during childbirth and may also be used after surgical procedures of the chest, belly, and legs.  · Anesthetic is injected into the epidural space. This is just outside the dural sac which contains the spinal fluid.  · To receive the anesthetic, your skin is  numbed at the injection site on your back.  · A needle is then inserted into the epidural space. Anesthetic is sent into the epidural space through the needle.  · A small flexible catheter may be attached to the needle and left in place. This allows for continuous injections or infusions of anesthetic.  Anesthesia tools and medicines that might be near you during your procedure  · Local anesthetic. This medicine is given through a needle numbs one region of your body.  · Electrocardiography leads (electrodes). These are used to record your heart rate and rhythm.  · Blood pressure cuff. A cuff is placed on your arm to keep track of your blood pressure.  · Pulse oximeter. This small clip is placed on the end of the finger. It measures your blood oxygen level.  · Sedatives. These medicines may be given through an IV. They help to relax you and keep you comfortable. You may stay awake or sleep lightly.  · Oxygen. You may be given oxygen through a facemask.  Risks and possible complications  Regional anesthesia carries some risks. These include:  · Nausea and vomiting  · Headache  · Backache  · Decreased blood pressure  · Allergic reaction to the anesthetic  · Ongoing numbness (rare)  · Irregular heartbeat (rare)  · Cardiac arrest (rare)   Date Last Reviewed: 12/1/2016  © 2677-1893 [employer]. [address] [place] [address], [place] [address]. All rights reserved. This information is not intended as a substitute for professional medical care. Always follow your healthcare professional's instructions.

## 2020-07-01 NOTE — TELEPHONE ENCOUNTER
Spoke to pt, provided joint education via e-mail. Pt will review and call back with questions/concerns. Understanding verbalized.

## 2020-07-01 NOTE — PROGRESS NOTES
[facility] - Pre Op Consult  Progress Note    Patient Name: Josefina Blue  MRN: [id number]  Date of Evaluation- 07/01/2020  PCP- Jo-Ann Collins MD    Future cases for Josefina Blue [[id number]]     Case ID Status Date Time Juancarlos Procedure Provider Location    [id number] [facility] 7/8/2020  2:20  ARTHROPLASTY, KNEE W John Whyte MD [[id number]] [facility] OR      Left     HPI:  History of present illness- I had the pleasure of meeting this pleasant 60 y.o. lady in the pre op clinic prior to her elective Orthopedic surgery. The patient is new to me .    I have obtained the history by speaking to the patient and by reviewing the electronic health records.    Events leading up to surgery / History of presenting illness -    She has been troubled with occasional  moderate Left knee  pain for 1 year .   Pain increases with some  activity ( walking, stairs ) and decreases with resting.    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[employer] ,  for [employer]      Relevant health conditions of significance for the perioperative period/ History of presenting illness -    Subjectively describes health as' very good    Health conditions of significance for the perioperative period     HTN    Prediabetes     Lives with  who is a nurse   Single level house     Not known to have heart disease ,  Lung disease         Subjective/ Objective:       Chief complaint-Preoperative evaluation, Perioperative Medical management, complication reduction plan     Active cardiac conditions- none    Revised cardiac risk index predictors- none    Functional capacity -Examples of physical activity, does stationary bicycle 3-4 times a week, lifts weights ,  house work and can take 1 flight of stairs----- She can undertake all the above activities without  chest pain,chest tightness, Shortness of breath  ,dizziness,lightheadedness making her exercise tolerance more,   than 4 Mets.       Review of Systems   Constitutional: Negative for chills and fever.        No unusual weight changes     HENT:        STOPBANG score 2 / 8      HTN   Age over 50        Eyes:        No new visual changes   Respiratory:        No cough , phlegm    No Hemoptysis   Cardiovascular:        As noted   Gastrointestinal:        No overt GI/ blood losses  Hysterectomy- at age 48 Y- for heavy cycles - ovaries retained   Bowel movements- Regular -    Endocrine:        Prednisone use > 20 mg daily for 3 weeks- none   Genitourinary: Negative for dysuria.        No urinary hesitancy    Musculoskeletal:        As above      Skin: Negative for rash.   Neurological: Negative for syncope.        No unilateral weakness   Hematological:        Current use of Anticoagulants  None   Psychiatric/Behavioral:        No Depression,Anxiety     No vascular stenting             No anesthesia, bleeding, cardiac problems , PONVwith previous surgeries/procedures.  Medications and Allergies reviewed in epic.   \No anesthesia,bleeding / venous thrombosis , problems in family     Physical Exam      Physical Exam  Constitutional-  General appearance-Conscious,Coherent  Eyes- No conjunctival icterus,pupils  round  and reactive to light   ENT-Oral cavity- moist  , Hearing grossly normal   Neck- No thyromegaly ,Trachea -central, No jugular venous distension,   No Carotid Bruit   Cardiovascular -Heart Sounds- Normal  and  no murmur   , No gallop rhythm   Respiratory - Normal Respiratory Effort, Normal breath sounds,  no wheeze  and  no forced expiratory wheeze    Peripheral pitting pedal edema-- mild, no calf pain   Gastrointestinal -Soft abdomen, No palpable masses, Non Tender,Liver,Spleen not palpable. No-- free fluid and shifting dullness  Musculoskeletal- No finger Clubbing. Strength grossly normal   Lymphatic-No Palpable cervical, axillary,Inguinal lymphadenopathy    Psychiatric - normal effect,Orientation  Rt Dorsalis pedis pulses-palpable    Lt Dorsalis pedis pulses- palpable   Rt Posterior tibial pulses -palpable   Left posterior tibial pulses -palpable   Miscellaneous -  Surgical scarRUQ  and  no renal bruit  Investigations  Lab and Imaging have been reviewed in epic.    Review of Medicine tests    EKG- I had independently reviewed the EKG from--2/26/2017   It was reported to be showing     Sinus tachycardia   Otherwise normal ECG   When compared with ECG of 31-AUG-2015 09:17,   Previous ECG has undetermined rhythm, needs review   Incomplete right bundle branch block is no longer Present   Nonspecific T wave abnormality no longer evident in Anterior leads     2015     1. The EKG portion of this study is negative for ischemia at a moderate workload, and peak heart rate of 155 bpm (98% of predicted).   2. Exercise capacity is average.   3. Blood pressure response to exercise was normal (Presenting BP: 115/76 Peak BP: 173/92).   4. No significant arrhythmias were present.   5. There were no symptoms of chest discomfort or significant dyspnea throughout the protocol.   6. The Duke treadmill score was 7 suggesting a low probability for future cardiovascular events.     Review of clinical lab tests:  Lab Results   Component Value Date    CREATININE 0.9 08/16/2019    HGB 13.1 08/16/2019     08/16/2019         Review of old records- Was done and information gathered regards to events leading to surgery and health conditions of significance in the perioperative period.        Preoperative cardiac risk assessment-  The patient does not have any active cardiac conditions . Revised cardiac risk index predictors-0 ---.Functional capacity is more than 4 Mets. She will be undergoing a Orthopedic procedure that carries a Moderate Risk risk     Risk of a major Cardiac event ( Defined as death, myocardial infarction, or cardiac arrest at 30 days after noncardiac surgery), based on  RCRI score     -3.9%         No further cardiac work up is indicated prior to proceeding with the surgery     Orders Placed This Encounter    Hepatic function panel    Hemoglobin A1C       American Society of Anesthesiologists Physical status classification ( ASA ) class: 2     Postoperative pulmonary complication risk assessment:      DUNGSCAT ( Canet) risk index- risk class -  Low, if duration of surgery is under 3 hours, intermediate, if duration of surgery is over 3 hours      Julita Respiratory failure index- percentage risk of respiratory failure: 0.5 %     Assessment/Plan:     Long term (current) use of antithrombotics/antiplatelets  ASA use for preventive reasons   No known circulation problem     Has problems with nausea,vomitikng with higher dose of ASA  She feels that she can  Handle ASA 81 mg coated twice a day    Nausea- with Codeine  Has nausea, vomiting with opioids  Can take opioid with Zofran        Chronic pain  Left knee pain   For surgery     On Celebrex on and off for 6 months- hold 1 week pre op - can use Tylenol  No chronic opioid use     DDD (degenerative disc disease), lumbar  Not troubled with it   MRI- Aug 2019   At L3-4, there is a circumferential disc bulge with ligamentum flavum hypertrophy and facet arthropathy resulting in moderate central canal stenosis without neural foraminal narrowing.    Neuropathic pain  No longer troubled with Tongue Neuropathic pain     Chalazion of left upper eyelid  2019   No longer troubled with it     Hyperlipidemia  HLD-I  suggest continuation of statin during the entire perioperative period.    Unspecified essential hypertension: since age 35  On Irbesartan, Maxzide     Home BP readings -None  Recent BP readings in the record-120/70's  Hypertension-  Blood pressure is acceptable . I suggest continuation of  Irbesartan  ( Taking at night )  - during the entire perioperative period. I suggested holding Maxzide   - on the morning of the surgery and can  continue that  post operatively under blood pressure, electrolyte and renal function monitoring as long as they are acceptable.I suggest addressing pain control as uncontrolled pain can increased blood pressure     Squamous cell cancer of tongue  Was operated in 2013 - [facility]   Doing good on this  Never smoked tobacco   Parents smoked   Doing good   No dysphagia  No dry mouth    IFG (impaired fasting glucose)  A1c 5.9   Prediabetes- I suggest monitoring the glucose in the perioperative period as  glucose may be high from stress hyperglycemia in which case Insulin may be required    Hemoglobin A1c in the pre diabetic range   Weight loss with diet and exercise , if able, helps lower A1c  Increased risk of becoming a diabetic   Suggested  follow up     BMI 33.75     Weight related conditions    - Osteo arthritis  - Prediabetes   - HTN  - HLD  - Fatty liver     Not known to have sleep apnea  No acid reflux    Weight loss suggested       Fatty liver  Alcohol use once a week- 1-2 glasses of wine   Likely Weight related   No suggestion of  hepatic decompensation   PLT - N    Chronic pain of left knee  For surgery     H/O: hysterectomy  Hysterectomy- at age 48 Y- for heavy cycles - ovaries retained     Staph infection  2008 after Rt knee scope surgery    Patient has a reported history of Staph infection   In an attempt to prevent Surgical site infection , with the up coming surgery , suggest the following      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         Instructions for use of  Hibiclens discused    To use the night before in the shower. Apply, let sit 2 minutes then rinse.   The morning of the surgery, to shower, after  shower , apply hibiclens with clean cloth to area of anticipated surgery and let dry        Edema  Edema- I suggested avoidance of added salt,avoidance of NSAID's, unless advised or ordered  and suggested Limb elevation and jeremiah hose use    Elevated LFTs  ALT mildly elevated   Could possibly be related to Celebrex use or weight related Hepatitis   Normal INR, PLT count is reassuring     Suggest care with usage of Medication that are hepatotoxic or  Metabolized in the liver             Preventive perioperative care    Thromboembolic prophylaxis:  Her risk factors for thrombosis include surgical procedure and age.I suggest  thromboembolic prophylaxis ( mechanical/pharmacological, weighing the risk benefits of pharmacological agent use considering aldo procedural bleeding )  during the perioperative period.I suggested being active in the post operative period.   The patient is a candidate for extended DVT prophylaxis     Postoperative pulmonary complication prophylaxis-- I suggest incentive spirometry use, early ambulation and end tidal carbon dioxide monitoring  , oral care , head end of bed elevation      Renal complication prophylaxis-Risk factors for renal complications include hypertension . I suggest keeping her well hydrated  in the perioperative period.     Surgical site Infection Prophylaxis-I  suggest appropriate antibiotic for Prophylaxis against Surgical site infections        In view of Regional anesthesia  the patient  is at risk of postoperative urinary retention.  I suggest avoidance / minimizing the of  Benzodiazepines,Anticholinergic medication,antihistamines ( Benadryl) , if possible in the perioperative period. I suggest using the minimum possible use of opioids for the minimum period of time in the perioperative period. Benadryl avoidance  "suggested      This visit was focused on Preoperative evaluation, Perioperative Medical management, complication reduction plans. I suggest that the patient follows up with primary care or relevant sub specialists for ongoing health care.    I appreciate the opportunity to be involved in this patients care. Please feel free to contact me if there were any questions about this consultation.    Patient is optimized     Patient  was instructed to call and update me about any changes to health,  medication, office visits ,testing out side of the [facility] operative care center , hospitalizations between now and surgery     Chula Koch MD  Perioperative Medicine  [facility]   Pager [phone]    COVID screening     No fever -  No cough -  No SOB-  No sore throat -  No loss of taste or smell -  No muscle aches -  No nausea, vomiting , diarrhea-  -  7/1- 18 15     Had a bath tub related injury Left knee about 1 week ago- tripped on it   No other injuries     Labs    Hb, HCT,PLT, INR- N  A1c 6.1    EKG from July 2020 personally reviewed , report pending   No acute changes     /74 Comment: right  Pulse 74   Temp 98 °F (36.7 °C)   Ht 5' 9" (1.753 m)   Wt 100.7 kg (222 lb)   SpO2 96%   BMI 32.78 kg/m²      Called and spoke to her about labs   Reports using Tylenol 1000 mg a day   LFT discussed  Discussed Ultra sound -Wants to follow with PCP   No Hepatitis C-No needle sticks, no IVDU, Snorting cocaine    Call/ correspond if needed  --    7/2/ 2020 - 8 20     Corresponded with surgeon   With reference to venous thromboembolism prophylaxis -Plan is 81 mg ASA BID x 4 weeks.    EKG report   Normal sinus rhythm   Incomplete right bundle branch block   Borderline Abnormal ECG   When compared with ECG of 26-FEB-2017 05:43,   Vent. rate has decreased BY  40 BPM   Incomplete right bundle branch block is now Present   T wave inversion now evident in Inferior leads    She does not have suggestions of " symptomatic coronary artery disease with good functional capacity   --  7/7- 19 20     Called to follow up , spoke to her  to address any concerns with the up coming surgery or any questions on Medication instructions -  Doing well ,No changes to Medication, Health -    Rides  10 miles on a stationary bike- 4 nights a week- over 45 minutes  No chest pain, chest tightness or unusual SOB  Follow up suggested

## 2020-07-01 NOTE — HPI
History of present illness- I had the pleasure of meeting this pleasant 60 y.o. lady in the pre op clinic prior to her elective Orthopedic surgery. The patient is new to me .    I have obtained the history by speaking to the patient and by reviewing the electronic health records.    Events leading up to surgery / History of presenting illness -    She has been troubled with occasional  moderate Left knee  pain for 1 year .   Pain increases with some  activity ( walking, stairs ) and decreases with resting.    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

## 2020-07-01 NOTE — ASSESSMENT & PLAN NOTE
ALT mildly elevated   Could possibly be related to Celebrex use or weight related Hepatitis   Normal INR, PLT count is reassuring     Suggest care with usage of Medication that are hepatotoxic or  Metabolized in the liver

## 2020-07-01 NOTE — TELEPHONE ENCOUNTER
Left message for pt to return call regarding joint education, provided name and direct  number, awaiting call back.

## 2020-07-01 NOTE — ASSESSMENT & PLAN NOTE
Edema- I suggested avoidance of added salt,avoidance of NSAID's, unless advised or ordered  and suggested Limb elevation and jeremiah hose use

## 2020-07-01 NOTE — ASSESSMENT & PLAN NOTE
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         Instructions for use of Hibiclens discused    To use the night before in the shower. Apply, let sit 2 minutes then rinse.   The morning of the surgery, to shower, after  shower , apply hibiclens with clean cloth to area of anticipated surgery and let dry

## 2020-07-01 NOTE — OUTPATIENT SUBJECTIVE & OBJECTIVE
Outpatient Subjective & Objective     Chief complaint-Preoperative evaluation, Perioperative Medical management, complication reduction plan     Active cardiac conditions- none    Revised cardiac risk index predictors- none    Functional capacity -Examples of physical activity, does stationary bicycle 3-4 times a week, lifts weights ,  house work and can take 1 flight of stairs----- She can undertake all the above activities without  chest pain,chest tightness, Shortness of breath ,dizziness,lightheadedness making her exercise tolerance more,   than 4 Mets.       Review of Systems   Constitutional: Negative for chills and fever.        No unusual weight changes     HENT:        STOPBANG score 2 / 8      HTN   Age over 50        Eyes:        No new visual changes   Respiratory:        No cough , phlegm    No Hemoptysis   Cardiovascular:        As noted   Gastrointestinal:        No overt GI/ blood losses  Hysterectomy- at age 48 Y- for heavy cycles - ovaries retained   Bowel movements- Regular -    Endocrine:        Prednisone use > 20 mg daily for 3 weeks- none   Genitourinary: Negative for dysuria.        No urinary hesitancy    Musculoskeletal:        As above      Skin: Negative for rash.   Neurological: Negative for syncope.        No unilateral weakness   Hematological:        Current use of Anticoagulants  None   Psychiatric/Behavioral:        No Depression,Anxiety     No vascular stenting             No anesthesia, bleeding, cardiac problems , PONVwith previous surgeries/procedures.  Medications and Allergies reviewed in epic.   \  FH- No anesthesia,bleeding / venous thrombosis , problems in family     Physical Exam      Physical Exam  Constitutional-  General appearance-Conscious,Coherent  Eyes- No conjunctival icterus,pupils  round  and reactive to light   ENT-Oral cavity- moist  , Hearing grossly normal   Neck- No thyromegaly ,Trachea -central, No jugular venous distension,   No Carotid Bruit    Cardiovascular -Heart Sounds- Normal  and  no murmur   , No gallop rhythm   Respiratory - Normal Respiratory Effort, Normal breath sounds,  no wheeze  and  no forced expiratory wheeze    Peripheral pitting pedal edema-- mild, no calf pain   Gastrointestinal -Soft abdomen, No palpable masses, Non Tender,Liver,Spleen not palpable. No-- free fluid and shifting dullness  Musculoskeletal- No finger Clubbing. Strength grossly normal   Lymphatic-No Palpable cervical, axillary,Inguinal lymphadenopathy   Psychiatric - normal effect,Orientation  Rt Dorsalis pedis pulses-palpable    Lt Dorsalis pedis pulses- palpable   Rt Posterior tibial pulses -palpable   Left posterior tibial pulses -palpable   Miscellaneous -  Surgical scarRUQ  and  no renal bruit  Investigations  Lab and Imaging have been reviewed in epic.    Review of Medicine tests    EKG- I had independently reviewed the EKG from--2/26/2017   It was reported to be showing     Sinus tachycardia   Otherwise normal ECG   When compared with ECG of 31-AUG-2015 09:17,   Previous ECG has undetermined rhythm, needs review   Incomplete right bundle branch block is no longer Present   Nonspecific T wave abnormality no longer evident in Anterior leads     2015     1. The EKG portion of this study is negative for ischemia at a moderate workload, and peak heart rate of 155 bpm (98% of predicted).   2. Exercise capacity is average.   3. Blood pressure response to exercise was normal (Presenting BP: 115/76 Peak BP: 173/92).   4. No significant arrhythmias were present.   5. There were no symptoms of chest discomfort or significant dyspnea throughout the protocol.   6. The Duke treadmill score was 7 suggesting a low probability for future cardiovascular events.     Review of clinical lab tests:  Lab Results   Component Value Date    CREATININE 0.9 08/16/2019    HGB 13.1 08/16/2019     08/16/2019         Review of old records- Was done and information gathered regards to  events leading to surgery and health conditions of significance in the perioperative period.    Outpatient Subjective & Objective

## 2020-07-01 NOTE — ASSESSMENT & PLAN NOTE
Not troubled with it   MRI- Aug 2019   At L3-4, there is a circumferential disc bulge with ligamentum flavum hypertrophy and facet arthropathy resulting in moderate central canal stenosis without neural foraminal narrowing.

## 2020-07-01 NOTE — ASSESSMENT & PLAN NOTE
Left knee pain   For surgery     On Celebrex on and off for 6 months- hold 1 week pre op - can use Tylenol  No chronic opioid use

## 2020-07-01 NOTE — ASSESSMENT & PLAN NOTE
Was operated in 2013 - At [facility]   Doing good on this  Never smoked tobacco   Parents smoked   Doing good   No dysphagia  No dry mouth

## 2020-07-01 NOTE — ASSESSMENT & PLAN NOTE
On Irbesartan, Maxzide     Home BP readings -None  Recent BP readings in the record-120/70's  Hypertension-  Blood pressure is acceptable . I suggest continuation of  Irbesartan  ( Taking at night )  - during the entire perioperative period. I suggested holding Maxzide   - on the morning of the surgery and can continue that  post operatively under blood pressure, electrolyte and renal function monitoring as long as they are acceptable.I suggest addressing pain control as uncontrolled pain can increased blood pressure

## 2020-07-01 NOTE — ASSESSMENT & PLAN NOTE
ASA use for preventive reasons   No known circulation problem     Has problems with nausea,vomitikng with higher dose of ASA  She feels that she can  Handle ASA 81 mg coated twice a day  --  7/2/ 2020 - 8 20     Corresponded with surgeon   With reference to venous thromboembolism prophylaxis -Plan is 81 mg ASA BID x 4 weeks.

## 2020-07-01 NOTE — DISCHARGE INSTRUCTIONS
Your surgery has been scheduled for:__________________________________________    You should report to:  ____[facility], located on the [place] side of the first floor of the           [facility] ([phone])  ____[facility], located on the [facility] side of the            Second floor of the [facility] ([phone])  ____3rd Floor [facility] located on the [facility] side of the [facility] [phone]  Please Note   - Tell your doctor if you take Aspirin, products containing Aspirin, herbal medications  or blood thinners, such as Coumadin, Ticlid, or Plavix.  (Consult your provider regarding holding or stopping before surgery).  - Arrange for someone to drive you home following surgery.  You will not be allowed to leave the surgical facility alone or drive yourself home following sedation and anesthesia.  Before Surgery  - Stop taking all vitamins/ herbal medications 14days prior to surgery  - No Motrin/Advil (Ibuprofen) 1 day before surgery  - No Aleve (Naproxen) 7 days before surgery  - Stop Taking Asprin, products containing Asprin _____days before surgery  - Stop taking blood thinners_______days before surgery  - No Goody's/BC  Powder 7 days before surgery  - Refrain from drinking alcoholic beverages for 24hours before and after surgery  - Stop or limit smoking _________days before surgery  - You may take Tylenol for pain  Night before Surgery   Stop ALL solid food, gum, candy (including vitamins) 8 hours before arrival time.  (Please note: If your surgeon gives you different eating and drinking instructions, please follow surgeon's directions.)   Stop all CLOUDY liquids: coffee with creamer, formula, tube feeds, cloudy juices, non-human milk and breast milk with additives, 6 hours prior to arrival time.   Stop plain breast milk 4 hours prior to arrival time.   The patient should be ENCOURAGED to drink  carbohydrate-rich clear liquids (sports drinks, clear juices) until 2 hours prior to arrival time.   CLEAR liquids include only water, black coffee NO creamer, clear oral rehydration drinks, clear sports drinks or clear fruit juices (no orange juice, no pulpy juices, no apple cider). Advise patients if they can read newsprint through the liquid, it qualifies as clear liquid.    IF IN DOUBT, drink water instead.   - Take a shower or bath (shower is recommended).  Bathe with Hibiclens soap or an antibacterial soap from the neck down.  If not supplied by your surgeon, hibiclens soap will need to be purchased over the counter in pharmacy.  Rinse soap off thoroughly.  - Shampoo your hair with your regular shampoo  The Day of Surgery  · NOTHING TO  DRINK 2 hours before arrival time. If you are told to take medication on the morning of surgery, it may be taken with a sip of water.   - Take another bath or shower with hibiclens or any antibacterial soap, to reduce the chance of infection.  - Take heart and blood pressure medications with a small sip of water, as advised by the perioperative team.  - Do not take fluid pills  - You may brush your teeth and rinse your mouth, but do not swall any additional water.   - Do not apply perfumes, powder, body lotions or deodorant on the day of surgery.  - Nail polish should be removed.  - Do not wear makeup or moisturizer  - Wear comfortable clothes, such as a button front shirt and loose fitting pants.  - Leave all jewelry, including body piercings, and valuables at home.    - Bring any devices you will neeed after surgery such as crutches or canes.  - If you have sleep apnea, please bring your CPAP machine  In the event that your physical condition changes including the onset of a cold or respiratory illness, or if you have to delay or cancel your surgery, please notify your surgeon.    Anesthesia: Regional Anesthesia    Youre scheduled for surgery. During surgery, youll  receive medicine called anesthesia to keep you comfortable and pain-free. Your surgeon has decided that youll receive regional anesthesia. This sheet tells you what to expect with this type of anesthesia.  What is regional anesthesia?  Regional anesthesia numbs one region of your body. The anesthesia may be given around nerves or into veins in your arms, neck, or legs (nerve block or Daysi block). Or it may be sent into the spinal fluid (spinal anesthesia) or into the space just outside the spinal fluid (epidural anesthesia). You may also be given sedatives to help you relax.  Nerve block or Daysi block  A small area of the body, such as an arm or leg, can be numbed using a nerve block or Marueno block.  · Nerve block. During a nerve block, your skin is numbed. A needle is then inserted near nerves that serve the area to be numbed. Anesthetic is sent through the needle.  · IV regional or Daysi block. For this type of block, an IV line is put into a vein. The blood flow to the area to be numbed is blocked for a short time. Anesthetic is sent through the IV.  Spinal anesthesia  Spinal anesthesia numbs your body from about the waist down.  · Anesthetic is injected into the spinal fluid. This is a substance that surrounds the spinal cord in your spinal column. The anesthetic blocks pain traveling from the body to the brain.  · To receive the anesthetic, your skin is numbed at the injection site on your back.  · A needle is then inserted into the spinal space. Anesthetic is sent into the spinal fluid through the needle.  Epidural anesthesia  Epidural anesthesia is most commonly used during childbirth and may also be used after surgical procedures of the chest, belly, and legs.  · Anesthetic is injected into the epidural space. This is just outside the dural sac which contains the spinal fluid.  · To receive the anesthetic, your skin is numbed at the injection site on your back.  · A needle is then inserted into the epidural  space. Anesthetic is sent into the epidural space through the needle.  · A small flexible catheter may be attached to the needle and left in place. This allows for continuous injections or infusions of anesthetic.  Anesthesia tools and medicines that might be near you during your procedure  · Local anesthetic. This medicine is given through a needle numbs one region of your body.  · Electrocardiography leads (electrodes). These are used to record your heart rate and rhythm.  · Blood pressure cuff. A cuff is placed on your arm to keep track of your blood pressure.  · Pulse oximeter. This small clip is placed on the end of the finger. It measures your blood oxygen level.  · Sedatives. These medicines may be given through an IV. They help to relax you and keep you comfortable. You may stay awake or sleep lightly.  · Oxygen. You may be given oxygen through a facemask.  Risks and possible complications  Regional anesthesia carries some risks. These include:  · Nausea and vomiting  · Headache  · Backache  · Decreased blood pressure  · Allergic reaction to the anesthetic  · Ongoing numbness (rare)  · Irregular heartbeat (rare)  · Cardiac arrest (rare)   Date Last Reviewed: 12/1/2016  © 4938-6391 [employer]. [address] [place] [address], [place] [address]. All rights reserved. This information is not intended as a substitute for professional medical care. Always follow your healthcare professional's instructions.

## 2020-07-01 NOTE — ASSESSMENT & PLAN NOTE
A1c 5.9   Prediabetes- I suggest monitoring the glucose in the perioperative period as  glucose may be high from stress hyperglycemia in which case Insulin may be required    Hemoglobin A1c in the pre diabetic range   Weight loss with diet and exercise , if able, helps lower A1c  Increased risk of becoming a diabetic   Suggested  follow up     BMI 33.75     Weight related conditions    - Osteo arthritis  - Prediabetes   - HTN  - HLD  - Fatty liver     Not known to have sleep apnea  No acid reflux    Weight loss suggested

## 2020-07-01 NOTE — LETTER
July 1, 2020      GLENN Whyte MD  [address] [facility]  1st Floor [address] [address]           [facility]  [address] [facility] [address]  Phone: [phone]          Patient: Josefina Blue   MR Number: [id number]   YOB: 1960   Date of Visit: 7/1/2020       Dear Dr. GLENN Whyte:    Thank you for referring Josefina Blue to me for evaluation. Attached you will find relevant portions of my assessment and plan of care.    If you have questions, please do not hesitate to call me. I look forward to following Josefina Blue along with you.    Sincerely,    Chula Koch MD    Enclosure  CC:  No Recipients    If you would like to receive this communication electronically, please contact [email or web address] or [phone] to request more information on [employer] [facility] access.    For providers and/or their staff who would like to refer a patient to [facility], please contact us through our one-stop-shop provider referral line, [facility], at [phone].    If you feel you have received this communication in error or would no longer like to receive these types of communications, please e-mail [email or web address]

## 2020-07-02 ENCOUNTER — TELEPHONE (OUTPATIENT)
Dept: INTERNAL MEDICINE | Facility: CLINIC | Age: 60
End: 2020-07-02

## 2020-07-02 ENCOUNTER — HOSPITAL ENCOUNTER (OUTPATIENT)
Dept: RADIOLOGY | Facility: HOSPITAL | Age: 60
Discharge: HOME | End: 2020-07-02
Attending: PHYSICIAN ASSISTANT
Payer: COMMERCIAL

## 2020-07-02 ENCOUNTER — OFFICE VISIT (OUTPATIENT)
Dept: SPORTS MEDICINE | Facility: CLINIC | Age: 60
End: 2020-07-02
Payer: COMMERCIAL

## 2020-07-02 ENCOUNTER — PATIENT MESSAGE (OUTPATIENT)
Dept: INTERNAL MEDICINE | Facility: CLINIC | Age: 60
End: 2020-07-02

## 2020-07-02 VITALS
HEIGHT: 69 IN | DIASTOLIC BLOOD PRESSURE: 79 MMHG | HEART RATE: 83 BPM | SYSTOLIC BLOOD PRESSURE: 119 MMHG | BODY MASS INDEX: 32.88 KG/M2 | WEIGHT: 222 LBS

## 2020-07-02 DIAGNOSIS — M25.562 PAIN IN LATERAL PORTION OF LEFT KNEE: ICD-10-CM

## 2020-07-02 DIAGNOSIS — M25.562 LEFT KNEE PAIN, UNSPECIFIED CHRONICITY: Primary | ICD-10-CM

## 2020-07-02 DIAGNOSIS — M25.562 LEFT KNEE PAIN, UNSPECIFIED CHRONICITY: ICD-10-CM

## 2020-07-02 DIAGNOSIS — R94.31 ABNORMAL EKG: Primary | ICD-10-CM

## 2020-07-02 DIAGNOSIS — M17.12 PRIMARY OSTEOARTHRITIS OF LEFT KNEE: ICD-10-CM

## 2020-07-02 PROCEDURE — 99499 UNLISTED E&M SERVICE: CPT | Mod: S$GLB,,, | Performed by: PHYSICIAN ASSISTANT

## 2020-07-02 PROCEDURE — 77073 XR HIP TO ANKLE: ICD-10-PCS | Mod: 26,,, | Performed by: RADIOLOGY

## 2020-07-02 PROCEDURE — 99499 NO LOS: ICD-10-PCS | Mod: S$GLB,,, | Performed by: PHYSICIAN ASSISTANT

## 2020-07-02 PROCEDURE — 77073 BONE LENGTH STUDIES: CPT | Mod: TC

## 2020-07-02 PROCEDURE — 99999 PR PBB SHADOW E&M-EST. PATIENT-LVL IV: ICD-10-PCS | Mod: PBBFAC,,, | Performed by: PHYSICIAN ASSISTANT

## 2020-07-02 PROCEDURE — 77073 BONE LENGTH STUDIES: CPT | Mod: 26,,, | Performed by: RADIOLOGY

## 2020-07-02 PROCEDURE — 99999 PR PBB SHADOW E&M-EST. PATIENT-LVL IV: CPT | Mod: PBBFAC,,, | Performed by: PHYSICIAN ASSISTANT

## 2020-07-02 RX ORDER — PREGABALIN 25 MG/1
75 CAPSULE ORAL NIGHTLY
Status: CANCELLED | OUTPATIENT
Start: 2020-07-02

## 2020-07-02 RX ORDER — POLYETHYLENE GLYCOL 3350 17 G/17G
17 POWDER, FOR SOLUTION ORAL DAILY
Status: CANCELLED | OUTPATIENT
Start: 2020-07-02

## 2020-07-02 RX ORDER — FAMOTIDINE 20 MG/1
20 TABLET, FILM COATED ORAL 2 TIMES DAILY
Status: CANCELLED | OUTPATIENT
Start: 2020-07-02

## 2020-07-02 RX ORDER — NALOXONE HCL 0.4 MG/ML
0.02 VIAL (ML) INJECTION
Status: CANCELLED | OUTPATIENT
Start: 2020-07-02

## 2020-07-02 RX ORDER — ACETAMINOPHEN 500 MG
1000 TABLET ORAL EVERY 6 HOURS
Status: CANCELLED | OUTPATIENT
Start: 2020-07-02 | End: 2020-07-04

## 2020-07-02 RX ORDER — AMOXICILLIN 250 MG
1 CAPSULE ORAL 2 TIMES DAILY
Status: CANCELLED | OUTPATIENT
Start: 2020-07-02

## 2020-07-02 RX ORDER — PREGABALIN 25 MG/1
75 CAPSULE ORAL
Status: CANCELLED | OUTPATIENT
Start: 2020-07-02

## 2020-07-02 RX ORDER — BISACODYL 10 MG
10 SUPPOSITORY, RECTAL RECTAL EVERY 12 HOURS PRN
Status: CANCELLED | OUTPATIENT
Start: 2020-07-02

## 2020-07-02 RX ORDER — CELECOXIB 100 MG/1
400 CAPSULE ORAL
Status: CANCELLED | OUTPATIENT
Start: 2020-07-02

## 2020-07-02 RX ORDER — ASPIRIN 81 MG/1
81 TABLET ORAL 2 TIMES DAILY
Qty: 56 TABLET | Refills: 0 | Status: SHIPPED | OUTPATIENT
Start: 2020-07-02 | End: 2020-08-05

## 2020-07-02 RX ORDER — FENTANYL CITRATE 50 UG/ML
25 INJECTION, SOLUTION INTRAMUSCULAR; INTRAVENOUS EVERY 5 MIN PRN
Status: CANCELLED | OUTPATIENT
Start: 2020-07-02

## 2020-07-02 RX ORDER — ONDANSETRON 2 MG/ML
4 INJECTION INTRAMUSCULAR; INTRAVENOUS EVERY 8 HOURS PRN
Status: CANCELLED | OUTPATIENT
Start: 2020-07-02

## 2020-07-02 RX ORDER — LIDOCAINE HYDROCHLORIDE 10 MG/ML
1 INJECTION, SOLUTION EPIDURAL; INFILTRATION; INTRACAUDAL; PERINEURAL
Status: CANCELLED | OUTPATIENT
Start: 2020-07-02

## 2020-07-02 RX ORDER — ASPIRIN 81 MG/1
81 TABLET ORAL 2 TIMES DAILY
Status: CANCELLED | OUTPATIENT
Start: 2020-07-02

## 2020-07-02 RX ORDER — MIDAZOLAM HYDROCHLORIDE 1 MG/ML
1 INJECTION INTRAMUSCULAR; INTRAVENOUS EVERY 5 MIN PRN
Status: CANCELLED | OUTPATIENT
Start: 2020-07-02

## 2020-07-02 RX ORDER — MUPIROCIN 20 MG/G
1 OINTMENT TOPICAL
Status: CANCELLED | OUTPATIENT
Start: 2020-07-02

## 2020-07-02 RX ORDER — OXYCODONE HYDROCHLORIDE 5 MG/1
10 TABLET ORAL
Status: CANCELLED | OUTPATIENT
Start: 2020-07-02

## 2020-07-02 RX ORDER — SODIUM CHLORIDE 9 MG/ML
INJECTION, SOLUTION INTRAVENOUS CONTINUOUS
Status: CANCELLED | OUTPATIENT
Start: 2020-07-02 | End: 2020-07-03

## 2020-07-02 RX ORDER — ACETAMINOPHEN 500 MG
1000 TABLET ORAL
Status: CANCELLED | OUTPATIENT
Start: 2020-07-02

## 2020-07-02 RX ORDER — SODIUM CHLORIDE 9 MG/ML
INJECTION, SOLUTION INTRAVENOUS
Status: CANCELLED | OUTPATIENT
Start: 2020-07-02

## 2020-07-02 RX ORDER — OXYCODONE HYDROCHLORIDE 5 MG/1
5-10 TABLET ORAL EVERY 6 HOURS PRN
Qty: 28 TABLET | Refills: 0 | Status: SHIPPED | OUTPATIENT
Start: 2020-07-02 | End: 2020-09-09

## 2020-07-02 RX ORDER — ONDANSETRON 4 MG/1
4 TABLET, FILM COATED ORAL EVERY 8 HOURS PRN
Qty: 30 TABLET | Refills: 0 | Status: SHIPPED | OUTPATIENT
Start: 2020-07-02 | End: 2020-07-18

## 2020-07-02 RX ORDER — OXYCODONE HYDROCHLORIDE 5 MG/1
5 TABLET ORAL
Status: CANCELLED | OUTPATIENT
Start: 2020-07-02

## 2020-07-02 RX ORDER — SODIUM CHLORIDE 0.9 % (FLUSH) 0.9 %
10 SYRINGE (ML) INJECTION
Status: CANCELLED | OUTPATIENT
Start: 2020-07-02

## 2020-07-02 NOTE — H&P
"Josefina Blue  is here for a completion of her perioperative paperwork. she  Is scheduled to undergo LEFT total knee arthroplasty  on 7/8/20.  She is a healthy individual and does need clearance for this procedure.     Dr. Koch stated that, "Patient is optimized."    Pending dental clearance.    Risks, indications and benefits of the surgical procedure were discussed with the patient. All questions with regard to surgery, rehab, expected return to functional activities, activities of daily living and recreational endeavors were answered to her satisfaction.    Discussed COVID-19 with the patient, they are aware of our current policies and procedures, were given the option of delaying surgery, and they elect to proceed.    Patient was informed and understands the risks of surgery are greater for patients with a current condition or hx of heart disease, obesity, clotting disorders, recurrent infections, steroid use, current or past smoking, and factors such as sedentary lifestyle and noncompliance with medications, therapy or f/u. The degree of the increased risk is hard to estimate w/ any degree of precision.    Once no other questions were asked, a brief history and physical exam was then performed.    PAST MEDICAL HISTORY:   Past Medical History:   Diagnosis Date    DDD (degenerative disc disease), lumbar 10/7/2019    Hyperlipidemia 8/31/2015    Hypertension     IFG (impaired fasting glucose) 8/31/2015    Neuropathic pain 8/31/2015    Squamous cell cancer of tongue 2012     PAST SURGICAL HISTORY:   Past Surgical History:   Procedure Laterality Date    CHOLECYSTECTOMY      GALLBLADDER SURGERY  06/2016    HYSTERECTOMY      KNEE ARTHROSCOPY Right     for torn meniscus    TONGUE SURGERY      TRANSFORAMINAL EPIDURAL INJECTION OF STEROID Bilateral 9/19/2019    Procedure: Injection,steroid,epidural,transforaminal approach LUMBAR TRANSFORAMINAL BILATERAL L4/5 TF NOE;  Surgeon: Tim Kerns MD;  Location: " [facility];  Service: Pain Management;  Laterality: Bilateral;  NEEDS CONSENT, VIP    TRANSFORAMINAL EPIDURAL INJECTION OF STEROID Bilateral 10/7/2019    Procedure: LUMBAR TRANSFORAMINAL BILATERAL L4/5 TF NOE;  Surgeon: Tim Kerns MD;  Location: [facility];  Service: Pain Management;  Laterality: Bilateral;  NEEDS CONSENT, **VIP**     FAMILY HISTORY:   Family History   Problem Relation Age of Onset    Alcohol abuse Mother     Cirrhosis Mother     Cancer Father 74        prostate, throat, lung- smoker    Hyperlipidemia Father     Diabetes Sister     Hypertension Sister     Drug abuse Brother     Heart disease Brother     Hyperlipidemia Brother     No Known Problems Maternal Aunt     No Known Problems Maternal Uncle     No Known Problems Paternal Aunt     No Known Problems Paternal Uncle     No Known Problems Maternal Grandmother     No Known Problems Maternal Grandfather     No Known Problems Paternal Grandmother     No Known Problems Paternal Grandfather     Amblyopia Neg Hx     Blindness Neg Hx     Cataracts Neg Hx     Glaucoma Neg Hx     Macular degeneration Neg Hx     Retinal detachment Neg Hx     Strabismus Neg Hx     Stroke Neg Hx     Thyroid disease Neg Hx      SOCIAL HISTORY:   Social History     Socioeconomic History    Marital status:      Spouse name: Not on file    Number of children: Not on file    Years of education: Not on file    Highest education level: Not on file   Occupational History    Occupation: chief nursing officer   Social Needs    Financial resource strain: Not hard at all    Food insecurity     Worry: Never true     Inability: Never true    Transportation needs     Medical: No     Non-medical: No   Tobacco Use    Smoking status: Former Smoker     Packs/day: 0.25     Years: 1.00     Pack years: 0.25    Smokeless tobacco: Never Used   Substance and Sexual Activity    Alcohol use: Yes     Alcohol/week: 0.0 standard drinks     Frequency:  2-4 times a month     Drinks per session: 1 or 2     Binge frequency: Never     Comment: once a week    Drug use: No    Sexual activity: Yes     Birth control/protection: None   Lifestyle    Physical activity     Days per week: 2 days     Minutes per session: 30 min    Stress: Only a little   Relationships    Social connections     Talks on phone: More than three times a week     Gets together: Three times a week     Attends Synagogue service: Not on file     Active member of club or organization: No     Attends meetings of clubs or organizations: Never     Relationship status:    Other Topics Concern    Are you pregnant or think you may be? Not Asked    Breast-feeding Not Asked   Social History Narrative    Lives with  and 1 dog       MEDICATIONS:   Current Outpatient Medications:     acetaminophen (TYLENOL) 500 MG tablet, Take 500 mg by mouth 2 (two) times daily as needed for Pain., Disp: , Rfl:     ALPRAZolam (XANAX) 0.25 MG tablet, TAKE ONE TABLET BY MOUTH AT BEDTIME AS NEEDED. (Patient taking differently: Take if needed), Disp: 30 tablet, Rfl: 2    aspirin (ECOTRIN) 81 MG EC tablet, Take 1 tablet (81 mg total) by mouth nightly. (Patient taking differently: Take 81 mg by mouth nightly. Takes at night), Disp: , Rfl: 0    celecoxib (CELEBREX) 200 MG capsule, Take 1 capsule (200 mg total) by mouth 2 (two) times daily. (Patient not taking: Reported on 7/1/2020), Disp: 60 capsule, Rfl: 2    clobetasol (TEMOVATE) 0.05 % external solution, Use one to two times daily as needed for scaling or itching to scalp, Disp: 60 mL, Rfl: 3    estradioL (ESTRACE) 0.01 % (0.1 mg/gram) vaginal cream, Place 1 g vaginally once daily., Disp: 42.5 g, Rfl: 0    irbesartan (AVAPRO) 75 MG tablet, Take 1 tablet (75 mg total) by mouth every evening. (Patient taking differently: Take 75 mg by mouth every evening. TAKES IN EVENING), Disp: 90 tablet, Rfl: 3    melatonin 3 mg TbDL, Take by mouth., Disp: , Rfl:      pravastatin (PRAVACHOL) 20 MG tablet, Take 1 tablet (20 mg total) by mouth once daily. (Patient taking differently: Take 20 mg by mouth once daily. Take am of surgery), Disp: 90 tablet, Rfl: 0    tretinoin (RETIN-A) 0.05 % cream, Apply topically nightly. Apply pea-sized amount to face., Disp: 45 g, Rfl: 2    triamterene-hydrochlorothiazide 37.5-25 mg (MAXZIDE-25) 37.5-25 mg per tablet, Take 1 tablet by mouth once daily. (Patient taking differently: Take 1 tablet by mouth once daily. Hold am of surgery), Disp: 90 tablet, Rfl: 3  ALLERGIES:   Review of patient's allergies indicates:   Allergen Reactions    Aspirin Nausea Only     Can take low dose of Aspirin    Codeine Nausea Only     Can take Codeine if she takes a dose of Zofran with it       Review of Systems   Constitution: Negative. Negative for chills, fever and night sweats.   HENT: Negative for congestion and headaches.    Eyes: Negative for blurred vision, left vision loss and right vision loss.   Cardiovascular: Negative for chest pain and syncope.   Respiratory: Negative for cough and shortness of breath.    Endocrine: Negative for polydipsia, polyphagia and polyuria.   Hematologic/Lymphatic: Negative for bleeding problem. Does not bruise/bleed easily.   Skin: Negative for dry skin, itching and rash.   Musculoskeletal: Negative for falls and muscle weakness.   Gastrointestinal: Negative for abdominal pain and bowel incontinence.   Genitourinary: Negative for bladder incontinence and nocturia.   Neurological: Negative for disturbances in coordination, loss of balance and seizures.   Psychiatric/Behavioral: Negative for depression. The patient does not have insomnia.    Allergic/Immunologic: Negative for hives and persistent infections.     PHYSICAL EXAM:  GEN: A&Ox3, WD WN NAD  HEENT: WNL  CHEST: CTAB, no W/R/R  HEART: RRR, no M/R/G   ABD: Soft, NT ND, BS x4 QUADS  MS: Refer to previous note for detailed MS exam  NEURO: CN II-XII intact       The  surgical consent was then reviewed with the patient, who agreed with all the contents of the consent form and it was signed.     PHYSICAL THERAPY:  She was also instructed regarding physical therapy and will begin on POD#1-3. She is doing physical therapy at [facility].    POST OP CARE: Instructions were reviewed including care of the wound and dressing after surgery and when she can shower.     PAIN MANAGEMENT: Josefina Blue was instructed regarding the Polar ice unit that will be in place after surgery and her postoperative pain medications.     MEDICATION:  Roxicodone 5 mg 1-2 q 4 hours PRN for pain  Zofran 4 mg q 8 hours PRN for nausea and vomiting.  Aspirin 81mg BID x 4 weeks for DVT prophylaxis starting on the evening after surgery.    Post op meds to be delivered bedside prior to discharge. Deliver to family if patient is in surgery at 5pm.     Patient was instructed to purchase and take Colace to counter possible GI side effects of taking opiates.     DVT prophylaxis was discussed with the patient today including risk factors for developing DVTs and history of DVTs. The patient was asked if any specific recommendations were given from the doctor/s that did pre-operative surgical clearance.      If the patient was previously taking 81mg baby aspirin, they were told to not take additional baby aspirin, using the above stated aspirin and to restart the 81mg aspirin daily after completion of the aspirin dose.      Patient was also told to buy over the counter Prilosec medication and take it once daily for GI protection as long as they are taking NSAIDs or Aspirin.     The patient was told that narcotic pain medications may make them drowsy and instructions were given to not sign legal documents, drive or operate heavy machinery, cars, or equipment while under the influence of narcotic medications.     Dr. Whyte was present in clinic during this pre-op evaluation. The patient  was offered the opportunity to ask Dr. Whyte any further questions regarding the procedure which may not have been addressed during their previous informed consent discussion. The patient has declined to see Dr. Whyte today.    As there were no other questions to be asked, she was given my business card along with Dr. Whyte's business card if she has any questions or concerns prior to surgery or in the postop period.

## 2020-07-02 NOTE — H&P (VIEW-ONLY)
"Josefina Blue  is here for a completion of her perioperative paperwork. she  Is scheduled to undergo LEFT total knee arthroplasty  on 7/8/20.  She is a healthy individual and does need clearance for this procedure.     Dr. Koch stated that, "Patient is optimized."    Pending dental clearance.    Risks, indications and benefits of the surgical procedure were discussed with the patient. All questions with regard to surgery, rehab, expected return to functional activities, activities of daily living and recreational endeavors were answered to her satisfaction.    Discussed COVID-19 with the patient, they are aware of our current policies and procedures, were given the option of delaying surgery, and they elect to proceed.    Patient was informed and understands the risks of surgery are greater for patients with a current condition or hx of heart disease, obesity, clotting disorders, recurrent infections, steroid use, current or past smoking, and factors such as sedentary lifestyle and noncompliance with medications, therapy or f/u. The degree of the increased risk is hard to estimate w/ any degree of precision.    Once no other questions were asked, a brief history and physical exam was then performed.    PAST MEDICAL HISTORY:   Past Medical History:   Diagnosis Date    DDD (degenerative disc disease), lumbar 10/7/2019    Hyperlipidemia 8/31/2015    Hypertension     IFG (impaired fasting glucose) 8/31/2015    Neuropathic pain 8/31/2015    Squamous cell cancer of tongue 2012     PAST SURGICAL HISTORY:   Past Surgical History:   Procedure Laterality Date    CHOLECYSTECTOMY      GALLBLADDER SURGERY  06/2016    HYSTERECTOMY      KNEE ARTHROSCOPY Right     for torn meniscus    TONGUE SURGERY      TRANSFORAMINAL EPIDURAL INJECTION OF STEROID Bilateral 9/19/2019    Procedure: Injection,steroid,epidural,transforaminal approach LUMBAR TRANSFORAMINAL BILATERAL L4/5 TF NOE;  Surgeon: Tim Kerns MD;  Location: " [facility];  Service: Pain Management;  Laterality: Bilateral;  NEEDS CONSENT, VIP    TRANSFORAMINAL EPIDURAL INJECTION OF STEROID Bilateral 10/7/2019    Procedure: LUMBAR TRANSFORAMINAL BILATERAL L4/5 TF NOE;  Surgeon: Tim Kerns MD;  Location: [facility];  Service: Pain Management;  Laterality: Bilateral;  NEEDS CONSENT, **VIP**     FAMILY HISTORY:   Family History   Problem Relation Age of Onset    Alcohol abuse Mother     Cirrhosis Mother     Cancer Father 74        prostate, throat, lung- smoker    Hyperlipidemia Father     Diabetes Sister     Hypertension Sister     Drug abuse Brother     Heart disease Brother     Hyperlipidemia Brother     No Known Problems Maternal Aunt     No Known Problems Maternal Uncle     No Known Problems Paternal Aunt     No Known Problems Paternal Uncle     No Known Problems Maternal Grandmother     No Known Problems Maternal Grandfather     No Known Problems Paternal Grandmother     No Known Problems Paternal Grandfather     Amblyopia Neg Hx     Blindness Neg Hx     Cataracts Neg Hx     Glaucoma Neg Hx     Macular degeneration Neg Hx     Retinal detachment Neg Hx     Strabismus Neg Hx     Stroke Neg Hx     Thyroid disease Neg Hx      SOCIAL HISTORY:   Social History     Socioeconomic History    Marital status:      Spouse name: Not on file    Number of children: Not on file    Years of education: Not on file    Highest education level: Not on file   Occupational History    Occupation: chief nursing officer   Social Needs    Financial resource strain: Not hard at all    Food insecurity     Worry: Never true     Inability: Never true    Transportation needs     Medical: No     Non-medical: No   Tobacco Use    Smoking status: Former Smoker     Packs/day: 0.25     Years: 1.00     Pack years: 0.25    Smokeless tobacco: Never Used   Substance and Sexual Activity    Alcohol use: Yes     Alcohol/week: 0.0 standard drinks     Frequency:  2-4 times a month     Drinks per session: 1 or 2     Binge frequency: Never     Comment: once a week    Drug use: No    Sexual activity: Yes     Birth control/protection: None   Lifestyle    Physical activity     Days per week: 2 days     Minutes per session: 30 min    Stress: Only a little   Relationships    Social connections     Talks on phone: More than three times a week     Gets together: Three times a week     Attends Gnosticism service: Not on file     Active member of club or organization: No     Attends meetings of clubs or organizations: Never     Relationship status:    Other Topics Concern    Are you pregnant or think you may be? Not Asked    Breast-feeding Not Asked   Social History Narrative    Lives with  and 1 dog       MEDICATIONS:   Current Outpatient Medications:     acetaminophen (TYLENOL) 500 MG tablet, Take 500 mg by mouth 2 (two) times daily as needed for Pain., Disp: , Rfl:     ALPRAZolam (XANAX) 0.25 MG tablet, TAKE ONE TABLET BY MOUTH AT BEDTIME AS NEEDED. (Patient taking differently: Take if needed), Disp: 30 tablet, Rfl: 2    aspirin (ECOTRIN) 81 MG EC tablet, Take 1 tablet (81 mg total) by mouth nightly. (Patient taking differently: Take 81 mg by mouth nightly. Takes at night), Disp: , Rfl: 0    celecoxib (CELEBREX) 200 MG capsule, Take 1 capsule (200 mg total) by mouth 2 (two) times daily. (Patient not taking: Reported on 7/1/2020), Disp: 60 capsule, Rfl: 2    clobetasol (TEMOVATE) 0.05 % external solution, Use one to two times daily as needed for scaling or itching to scalp, Disp: 60 mL, Rfl: 3    estradioL (ESTRACE) 0.01 % (0.1 mg/gram) vaginal cream, Place 1 g vaginally once daily., Disp: 42.5 g, Rfl: 0    irbesartan (AVAPRO) 75 MG tablet, Take 1 tablet (75 mg total) by mouth every evening. (Patient taking differently: Take 75 mg by mouth every evening. TAKES IN EVENING), Disp: 90 tablet, Rfl: 3    melatonin 3 mg TbDL, Take by mouth., Disp: , Rfl:      pravastatin (PRAVACHOL) 20 MG tablet, Take 1 tablet (20 mg total) by mouth once daily. (Patient taking differently: Take 20 mg by mouth once daily. Take am of surgery), Disp: 90 tablet, Rfl: 0    tretinoin (RETIN-A) 0.05 % cream, Apply topically nightly. Apply pea-sized amount to face., Disp: 45 g, Rfl: 2    triamterene-hydrochlorothiazide 37.5-25 mg (MAXZIDE-25) 37.5-25 mg per tablet, Take 1 tablet by mouth once daily. (Patient taking differently: Take 1 tablet by mouth once daily. Hold am of surgery), Disp: 90 tablet, Rfl: 3  ALLERGIES:   Review of patient's allergies indicates:   Allergen Reactions    Aspirin Nausea Only     Can take low dose of Aspirin    Codeine Nausea Only     Can take Codeine if she takes a dose of Zofran with it       Review of Systems   Constitution: Negative. Negative for chills, fever and night sweats.   HENT: Negative for congestion and headaches.    Eyes: Negative for blurred vision, left vision loss and right vision loss.   Cardiovascular: Negative for chest pain and syncope.   Respiratory: Negative for cough and shortness of breath.    Endocrine: Negative for polydipsia, polyphagia and polyuria.   Hematologic/Lymphatic: Negative for bleeding problem. Does not bruise/bleed easily.   Skin: Negative for dry skin, itching and rash.   Musculoskeletal: Negative for falls and muscle weakness.   Gastrointestinal: Negative for abdominal pain and bowel incontinence.   Genitourinary: Negative for bladder incontinence and nocturia.   Neurological: Negative for disturbances in coordination, loss of balance and seizures.   Psychiatric/Behavioral: Negative for depression. The patient does not have insomnia.    Allergic/Immunologic: Negative for hives and persistent infections.     PHYSICAL EXAM:  GEN: A&Ox3, WD WN NAD  HEENT: WNL  CHEST: CTAB, no W/R/R  HEART: RRR, no M/R/G   ABD: Soft, NT ND, BS x4 QUADS  MS: Refer to previous note for detailed MS exam  NEURO: CN II-XII intact       The  surgical consent was then reviewed with the patient, who agreed with all the contents of the consent form and it was signed.     PHYSICAL THERAPY:  She was also instructed regarding physical therapy and will begin on POD#1-3. She is doing physical therapy at [facility].    POST OP CARE: Instructions were reviewed including care of the wound and dressing after surgery and when she can shower.     PAIN MANAGEMENT: Josefina Blue was instructed regarding the Polar ice unit that will be in place after surgery and her postoperative pain medications.     MEDICATION:  Roxicodone 5 mg 1-2 q 4 hours PRN for pain  Zofran 4 mg q 8 hours PRN for nausea and vomiting.  Aspirin 81mg BID x 4 weeks for DVT prophylaxis starting on the evening after surgery.    Post op meds to be delivered bedside prior to discharge. Deliver to family if patient is in surgery at 5pm.     Patient was instructed to purchase and take Colace to counter possible GI side effects of taking opiates.     DVT prophylaxis was discussed with the patient today including risk factors for developing DVTs and history of DVTs. The patient was asked if any specific recommendations were given from the doctor/s that did pre-operative surgical clearance.      If the patient was previously taking 81mg baby aspirin, they were told to not take additional baby aspirin, using the above stated aspirin and to restart the 81mg aspirin daily after completion of the aspirin dose.      Patient was also told to buy over the counter Prilosec medication and take it once daily for GI protection as long as they are taking NSAIDs or Aspirin.     The patient was told that narcotic pain medications may make them drowsy and instructions were given to not sign legal documents, drive or operate heavy machinery, cars, or equipment while under the influence of narcotic medications.     Dr. Whyte was present in clinic during this pre-op evaluation. The patient  was offered the opportunity to ask Dr. Whyte any further questions regarding the procedure which may not have been addressed during their previous informed consent discussion. The patient has declined to see Dr. Whyte today.    As there were no other questions to be asked, she was given my business card along with Dr. Whyte's business card if she has any questions or concerns prior to surgery or in the postop period.

## 2020-07-02 NOTE — TELEPHONE ENCOUNTER
Called and spoke w/ pt. Reviewed EKG. Some changes. No symptoms of CAD. Prior to knee, very active and played tennis regularly. Pt reports no changes from exam w/ me from may. From my standpoint, medically optimized. Did order stress echo that she can have done further down the road. Will cc Dr. Whyte and Dr. Koch.

## 2020-07-05 ENCOUNTER — LAB VISIT (OUTPATIENT)
Dept: SPORTS MEDICINE | Facility: CLINIC | Age: 60
End: 2020-07-05
Payer: COMMERCIAL

## 2020-07-05 DIAGNOSIS — Z01.818 PRE-OP TESTING: ICD-10-CM

## 2020-07-05 PROCEDURE — U0003 INFECTIOUS AGENT DETECTION BY NUCLEIC ACID (DNA OR RNA); SEVERE ACUTE RESPIRATORY SYNDROME CORONAVIRUS 2 (SARS-COV-2) (CORONAVIRUS DISEASE [COVID-19]), AMPLIFIED PROBE TECHNIQUE, MAKING USE OF HIGH THROUGHPUT TECHNOLOGIES AS DESCRIBED BY CMS-2020-01-R: HCPCS

## 2020-07-06 ENCOUNTER — ANESTHESIA EVENT (OUTPATIENT)
Dept: SURGERY | Facility: HOSPITAL | Age: 60
End: 2020-07-06
Payer: COMMERCIAL

## 2020-07-06 ENCOUNTER — TELEPHONE (OUTPATIENT)
Dept: SPORTS MEDICINE | Facility: CLINIC | Age: 60
End: 2020-07-06

## 2020-07-06 LAB — SARS-COV-2 RNA RESP QL NAA+PROBE: NEGATIVE

## 2020-07-06 NOTE — TELEPHONE ENCOUNTER
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

## 2020-07-07 ENCOUNTER — TELEPHONE (OUTPATIENT)
Dept: SPORTS MEDICINE | Facility: CLINIC | Age: 60
End: 2020-07-07

## 2020-07-07 NOTE — ANESTHESIA PAT ROS NOTE
07/06/2020  Josefina Blue is a 60 y.o., female.      Pre-op Assessment    I have reviewed the Patient Summary Reports.     I have reviewed the Nursing Notes. I have reviewed the NPO Status.   I have reviewed the Medications.     Review of Systems  Anesthesia Hx:  No problems with previous Anesthesia  History of prior surgery of interest to airway management or planning: Denies Family Hx of Anesthesia complications.   Denies Personal Hx of Anesthesia complications.   Social:  Non-Smoker, No Alcohol Use  Denies Tobacco Use.   Hematology/Oncology:  Hematology Normal   Oncology Normal     EENT/Dental:  EENT/Dental Normal Denies Eye Symptoms   Denies ear symptoms  Denies Active Dental Problems    Cardiovascular:   Hypertension  Hypertension    Pulmonary:  Pulmonary Normal  Denies Pulmonary Symptoms.  Denies Obstructive Sleep Apnea (KOKO)   Renal/:  Renal/ Normal     Hepatic/GI:   Liver Disease,  Denies Esophageal / Stomach Disorders  Liver Disease    Musculoskeletal:   Arthritis   Denies Musculoskeletal General/Symptoms  Denies Muscle Disorders  Denies Joint Disease   Denies Bone Disorder    Neurological:  Denies Seizure Disorder and Denies Chronic Epilepsy  Denies Head Injury    Endocrine:  Endocrine Normal  Denies Diabetes  Denies Thyroid Disease    Dermatological:  Skin Normal  Denies Skin Symptoms/Problems   Psych:  Psychiatric Normal    Denies Anxiety Disorder.  Denies Obsessive Compulsive Disorder.            Anesthesia Assessment: Preoperative EQUATION    Planned Procedure: Procedure(s) (LRB):  ARTHROPLASTY, KNEE (Left)  Requested Anesthesia Type:General  Surgeon: GLENN Whyte MD  Service: Orthopedics  Known or anticipated Date of Surgery:7/8/2020    Surgeon notes: reviewed     Past Surgical History:   Procedure Laterality Date    CHOLECYSTECTOMY      GALLBLADDER SURGERY  06/2016    HYSTERECTOMY       KNEE ARTHROSCOPY Right     for torn meniscus    TONGUE SURGERY      TRANSFORAMINAL EPIDURAL INJECTION OF STEROID Bilateral 9/19/2019    Procedure: Injection,steroid,epidural,transforaminal approach LUMBAR TRANSFORAMINAL BILATERAL L4/5 TF NOE;  Surgeon: Tim Kerns MD;  Location: [facility] PAIN [facility]GT;  Service: Pain Management;  Laterality: Bilateral;  NEEDS CONSENT, VIP    TRANSFORAMINAL EPIDURAL INJECTION OF STEROID Bilateral 10/7/2019    Procedure: LUMBAR TRANSFORAMINAL BILATERAL L4/5 TF NOE;  Surgeon: Tim Kerns MD;  Location: [facility] PAIN [facility]GT;  Service: Pain Management;  Laterality: Bilateral;  NEEDS CONSENT, **VIP**       Electronic QUestionnaire Assessment completed via nurse interview with patient.        Triage considerations:     The patient has no apparent active cardiac condition (No unstable coronary Syndrome such as severe unstable angina or recent [<1 month] myocardial infarction, decompensated CHF, severe valvular   disease or significant arrhythmia)    Previous anesthesia records:[facility]    Last PCP note: within [facility]   Subspecialty notes: Ortho    Other important co-morbidities: HTN      Tests already available:  Results have been reviewed.             Instructions given.per clinic and reviewed with pat triage    Optimization:  Anesthesia Preop Clinic Assessment  Indicated    Medical Opinion Indicated       Sub-specialist consult indicated:   TBD       Plan:    Testing:  See Orders.      Patient  has previously scheduled Medical Appointment:    Navigation: Tests Scheduled.              Consults scheduled.             Results will be tracked by Preop Clinic.

## 2020-07-07 NOTE — TELEPHONE ENCOUNTER
----- Message from Sailaja Ring sent at 7/7/2020 10:38 AM CDT -----  Pt missed her call with the arrival time for surgery please call back.    Contact [facility] [phone] (home) [phone] (work)

## 2020-07-08 ENCOUNTER — ANESTHESIA (OUTPATIENT)
Dept: SURGERY | Facility: HOSPITAL | Age: 60
End: 2020-07-08
Payer: COMMERCIAL

## 2020-07-08 ENCOUNTER — HOSPITAL ENCOUNTER (OUTPATIENT)
Facility: HOSPITAL | Age: 60
Discharge: HOME | End: 2020-07-09
Attending: ORTHOPAEDIC SURGERY | Admitting: ORTHOPAEDIC SURGERY
Payer: COMMERCIAL

## 2020-07-08 DIAGNOSIS — M17.12 PRIMARY OSTEOARTHRITIS OF LEFT KNEE: ICD-10-CM

## 2020-07-08 PROCEDURE — 97116 GAIT TRAINING THERAPY: CPT

## 2020-07-08 PROCEDURE — S0028 INJECTION, FAMOTIDINE, 20 MG: HCPCS | Performed by: NURSE ANESTHETIST, CERTIFIED REGISTERED

## 2020-07-08 PROCEDURE — 76942 ECHO GUIDE FOR BIOPSY: CPT | Mod: 26,,, | Performed by: ANESTHESIOLOGY

## 2020-07-08 PROCEDURE — 37000009 HC ANESTHESIA EA ADD 15 MINS: Performed by: ORTHOPAEDIC SURGERY

## 2020-07-08 PROCEDURE — 25000003 PHARM REV CODE 250: Performed by: PHYSICIAN ASSISTANT

## 2020-07-08 PROCEDURE — 36000711: Performed by: ORTHOPAEDIC SURGERY

## 2020-07-08 PROCEDURE — 25000003 PHARM REV CODE 250: Performed by: STUDENT IN AN ORGANIZED HEALTH CARE EDUCATION/TRAINING PROGRAM

## 2020-07-08 PROCEDURE — 25000003 PHARM REV CODE 250: Performed by: ORTHOPAEDIC SURGERY

## 2020-07-08 PROCEDURE — 27447 TOTAL KNEE ARTHROPLASTY: CPT | Mod: LT,,, | Performed by: ORTHOPAEDIC SURGERY

## 2020-07-08 PROCEDURE — 63600175 PHARM REV CODE 636 W HCPCS: Performed by: ORTHOPAEDIC SURGERY

## 2020-07-08 PROCEDURE — 27447 PR TOTAL KNEE ARTHROPLASTY: ICD-10-PCS | Mod: LT,,, | Performed by: ORTHOPAEDIC SURGERY

## 2020-07-08 PROCEDURE — 99900035 HC TECH TIME PER 15 MIN (STAT)

## 2020-07-08 PROCEDURE — 64448 NJX AA&/STRD FEM NRV NFS IMG: CPT | Mod: 59,LT,, | Performed by: ANESTHESIOLOGY

## 2020-07-08 PROCEDURE — C1713 ANCHOR/SCREW BN/BN,TIS/BN: HCPCS | Performed by: ORTHOPAEDIC SURGERY

## 2020-07-08 PROCEDURE — 97165 OT EVAL LOW COMPLEX 30 MIN: CPT

## 2020-07-08 PROCEDURE — 71000033 HC RECOVERY, INTIAL HOUR: Performed by: ORTHOPAEDIC SURGERY

## 2020-07-08 PROCEDURE — 64450 NJX AA&/STRD OTHER PN/BRANCH: CPT | Mod: 59,LT,, | Performed by: ANESTHESIOLOGY

## 2020-07-08 PROCEDURE — 27201423 OPTIME MED/SURG SUP & DEVICES STERILE SUPPLY: Performed by: ORTHOPAEDIC SURGERY

## 2020-07-08 PROCEDURE — 71000039 HC RECOVERY, EACH ADD'L HOUR: Performed by: ORTHOPAEDIC SURGERY

## 2020-07-08 PROCEDURE — 37000008 HC ANESTHESIA 1ST 15 MINUTES: Performed by: ORTHOPAEDIC SURGERY

## 2020-07-08 PROCEDURE — 76942 ECHO GUIDE FOR BIOPSY: CPT | Performed by: ANESTHESIOLOGY

## 2020-07-08 PROCEDURE — 76942 PR U/S GUIDANCE FOR NEEDLE GUIDANCE: ICD-10-PCS | Mod: 26,,, | Performed by: ANESTHESIOLOGY

## 2020-07-08 PROCEDURE — 63600175 PHARM REV CODE 636 W HCPCS

## 2020-07-08 PROCEDURE — 25000003 PHARM REV CODE 250: Performed by: ANESTHESIOLOGY

## 2020-07-08 PROCEDURE — 63600175 PHARM REV CODE 636 W HCPCS: Performed by: PHYSICIAN ASSISTANT

## 2020-07-08 PROCEDURE — C1776 JOINT DEVICE (IMPLANTABLE): HCPCS | Performed by: ORTHOPAEDIC SURGERY

## 2020-07-08 PROCEDURE — 64450 PR NERVE BLOCK INJ, ANES/STEROID, OTHER PERIPHERAL: ICD-10-PCS | Mod: 59,LT,, | Performed by: ANESTHESIOLOGY

## 2020-07-08 PROCEDURE — 64448 PR NERVE BLOCK INJ, ANES/STEROID, FEMORAL, CONT INFUSION, INCL IMAG GUIDANCE: ICD-10-PCS | Mod: 59,LT,, | Performed by: ANESTHESIOLOGY

## 2020-07-08 PROCEDURE — 97161 PT EVAL LOW COMPLEX 20 MIN: CPT

## 2020-07-08 PROCEDURE — 94770 HC EXHALED C02 TEST: CPT

## 2020-07-08 PROCEDURE — D9220A PRA ANESTHESIA: Mod: ANES,,, | Performed by: ANESTHESIOLOGY

## 2020-07-08 PROCEDURE — 64448 NJX AA&/STRD FEM NRV NFS IMG: CPT | Performed by: ANESTHESIOLOGY

## 2020-07-08 PROCEDURE — 63600175 PHARM REV CODE 636 W HCPCS: Performed by: ANESTHESIOLOGY

## 2020-07-08 PROCEDURE — D9220A PRA ANESTHESIA: Mod: CRNA,,, | Performed by: NURSE ANESTHETIST, CERTIFIED REGISTERED

## 2020-07-08 PROCEDURE — D9220A PRA ANESTHESIA: ICD-10-PCS | Mod: CRNA,,, | Performed by: NURSE ANESTHETIST, CERTIFIED REGISTERED

## 2020-07-08 PROCEDURE — 25000003 PHARM REV CODE 250: Performed by: NURSE ANESTHETIST, CERTIFIED REGISTERED

## 2020-07-08 PROCEDURE — 36000710: Performed by: ORTHOPAEDIC SURGERY

## 2020-07-08 PROCEDURE — 63600175 PHARM REV CODE 636 W HCPCS: Performed by: NURSE ANESTHETIST, CERTIFIED REGISTERED

## 2020-07-08 PROCEDURE — 97535 SELF CARE MNGMENT TRAINING: CPT

## 2020-07-08 PROCEDURE — 94761 N-INVAS EAR/PLS OXIMETRY MLT: CPT

## 2020-07-08 PROCEDURE — D9220A PRA ANESTHESIA: ICD-10-PCS | Mod: ANES,,, | Performed by: ANESTHESIOLOGY

## 2020-07-08 PROCEDURE — 94799 UNLISTED PULMONARY SVC/PX: CPT

## 2020-07-08 DEVICE — TIBIAL POST STAB SZ 5 11X3 POL: Type: IMPLANTABLE DEVICE | Site: KNEE | Status: FUNCTIONAL

## 2020-07-08 DEVICE — COMP FEM POST STAB CEM SZ 5 LT: Type: IMPLANTABLE DEVICE | Site: KNEE | Status: FUNCTIONAL

## 2020-07-08 DEVICE — CEMENT BONE SMPLX HV GENTMYCN: Type: IMPLANTABLE DEVICE | Site: KNEE | Status: FUNCTIONAL

## 2020-07-08 DEVICE — PATELLA TRI 36X10 X3 POLYETHYL: Type: IMPLANTABLE DEVICE | Site: KNEE | Status: FUNCTIONAL

## 2020-07-08 DEVICE — PRIMARY TIBIAL BASE 5.: Type: IMPLANTABLE DEVICE | Site: KNEE | Status: FUNCTIONAL

## 2020-07-08 RX ORDER — ONDANSETRON 2 MG/ML
INJECTION INTRAMUSCULAR; INTRAVENOUS
Status: DISCONTINUED | OUTPATIENT
Start: 2020-07-08 | End: 2020-07-08

## 2020-07-08 RX ORDER — POLYETHYLENE GLYCOL 3350 17 G/17G
17 POWDER, FOR SOLUTION ORAL DAILY
Status: DISCONTINUED | OUTPATIENT
Start: 2020-07-08 | End: 2020-07-09 | Stop reason: HOSPADM

## 2020-07-08 RX ORDER — DEXAMETHASONE SODIUM PHOSPHATE 4 MG/ML
INJECTION, SOLUTION INTRA-ARTICULAR; INTRALESIONAL; INTRAMUSCULAR; INTRAVENOUS; SOFT TISSUE
Status: DISCONTINUED | OUTPATIENT
Start: 2020-07-08 | End: 2020-07-08

## 2020-07-08 RX ORDER — VANCOMYCIN HYDROCHLORIDE 1 G/20ML
INJECTION, POWDER, LYOPHILIZED, FOR SOLUTION INTRAVENOUS
Status: DISCONTINUED | OUTPATIENT
Start: 2020-07-08 | End: 2020-07-08 | Stop reason: HOSPADM

## 2020-07-08 RX ORDER — SODIUM CHLORIDE 9 MG/ML
INJECTION, SOLUTION INTRAVENOUS CONTINUOUS
Status: ACTIVE | OUTPATIENT
Start: 2020-07-08 | End: 2020-07-09

## 2020-07-08 RX ORDER — SODIUM CHLORIDE 9 MG/ML
INJECTION, SOLUTION INTRAVENOUS
Status: COMPLETED | OUTPATIENT
Start: 2020-07-08 | End: 2020-07-08

## 2020-07-08 RX ORDER — ROPIVACAINE/EPI/CLONIDINE/KET 2.46-0.005
SYRINGE (ML) INJECTION
Status: DISCONTINUED | OUTPATIENT
Start: 2020-07-08 | End: 2020-07-08 | Stop reason: HOSPADM

## 2020-07-08 RX ORDER — FENTANYL CITRATE 50 UG/ML
INJECTION, SOLUTION INTRAMUSCULAR; INTRAVENOUS
Status: DISCONTINUED | OUTPATIENT
Start: 2020-07-08 | End: 2020-07-08

## 2020-07-08 RX ORDER — CEFAZOLIN SODIUM 1 G/3ML
INJECTION, POWDER, FOR SOLUTION INTRAMUSCULAR; INTRAVENOUS
Status: DISCONTINUED | OUTPATIENT
Start: 2020-07-08 | End: 2020-07-08

## 2020-07-08 RX ORDER — SODIUM CHLORIDE 0.9 % (FLUSH) 0.9 %
10 SYRINGE (ML) INJECTION
Status: DISCONTINUED | OUTPATIENT
Start: 2020-07-08 | End: 2020-07-08 | Stop reason: HOSPADM

## 2020-07-08 RX ORDER — TRANEXAMIC ACID 100 MG/ML
INJECTION, SOLUTION INTRAVENOUS
Status: DISCONTINUED | OUTPATIENT
Start: 2020-07-08 | End: 2020-07-08

## 2020-07-08 RX ORDER — SODIUM CHLORIDE 9 MG/ML
INJECTION, SOLUTION INTRAVENOUS CONTINUOUS PRN
Status: DISCONTINUED | OUTPATIENT
Start: 2020-07-08 | End: 2020-07-08

## 2020-07-08 RX ORDER — MUPIROCIN 20 MG/G
1 OINTMENT TOPICAL
Status: COMPLETED | OUTPATIENT
Start: 2020-07-08 | End: 2020-07-08

## 2020-07-08 RX ORDER — ROPIVACAINE HYDROCHLORIDE 5 MG/ML
INJECTION, SOLUTION EPIDURAL; INFILTRATION; PERINEURAL
Status: DISCONTINUED | OUTPATIENT
Start: 2020-07-08 | End: 2020-07-08

## 2020-07-08 RX ORDER — AMOXICILLIN 250 MG
1 CAPSULE ORAL 2 TIMES DAILY
Status: DISCONTINUED | OUTPATIENT
Start: 2020-07-08 | End: 2020-07-09 | Stop reason: HOSPADM

## 2020-07-08 RX ORDER — TALC
3 POWDER (GRAM) TOPICAL NIGHTLY
Status: DISCONTINUED | OUTPATIENT
Start: 2020-07-08 | End: 2020-07-09 | Stop reason: HOSPADM

## 2020-07-08 RX ORDER — LIDOCAINE HYDROCHLORIDE 10 MG/ML
1 INJECTION, SOLUTION EPIDURAL; INFILTRATION; INTRACAUDAL; PERINEURAL
Status: DISCONTINUED | OUTPATIENT
Start: 2020-07-08 | End: 2020-07-08 | Stop reason: HOSPADM

## 2020-07-08 RX ORDER — SCOLOPAMINE TRANSDERMAL SYSTEM 1 MG/1
1 PATCH, EXTENDED RELEASE TRANSDERMAL
Status: DISCONTINUED | OUTPATIENT
Start: 2020-07-08 | End: 2020-07-08 | Stop reason: HOSPADM

## 2020-07-08 RX ORDER — ASPIRIN 81 MG/1
81 TABLET ORAL 2 TIMES DAILY
Status: DISCONTINUED | OUTPATIENT
Start: 2020-07-08 | End: 2020-07-09 | Stop reason: HOSPADM

## 2020-07-08 RX ORDER — ROPIVACAINE HYDROCHLORIDE 2 MG/ML
6 INJECTION, SOLUTION EPIDURAL; INFILTRATION; PERINEURAL CONTINUOUS
Status: DISCONTINUED | OUTPATIENT
Start: 2020-07-08 | End: 2020-07-09 | Stop reason: HOSPADM

## 2020-07-08 RX ORDER — BISACODYL 10 MG
10 SUPPOSITORY, RECTAL RECTAL EVERY 12 HOURS PRN
Status: DISCONTINUED | OUTPATIENT
Start: 2020-07-08 | End: 2020-07-09 | Stop reason: HOSPADM

## 2020-07-08 RX ORDER — PREGABALIN 75 MG/1
75 CAPSULE ORAL NIGHTLY
Status: DISCONTINUED | OUTPATIENT
Start: 2020-07-08 | End: 2020-07-09 | Stop reason: HOSPADM

## 2020-07-08 RX ORDER — IRBESARTAN 75 MG/1
75 TABLET ORAL NIGHTLY
Status: DISCONTINUED | OUTPATIENT
Start: 2020-07-08 | End: 2020-07-09 | Stop reason: HOSPADM

## 2020-07-08 RX ORDER — OXYCODONE HYDROCHLORIDE 10 MG/1
10 TABLET ORAL
Status: DISCONTINUED | OUTPATIENT
Start: 2020-07-08 | End: 2020-07-09 | Stop reason: HOSPADM

## 2020-07-08 RX ORDER — TRIAMTERENE AND HYDROCHLOROTHIAZIDE 37.5; 25 MG/1; MG/1
1 CAPSULE ORAL DAILY
Status: DISCONTINUED | OUTPATIENT
Start: 2020-07-09 | End: 2020-07-09 | Stop reason: HOSPADM

## 2020-07-08 RX ORDER — ACETAMINOPHEN 500 MG
1000 TABLET ORAL
Status: COMPLETED | OUTPATIENT
Start: 2020-07-08 | End: 2020-07-08

## 2020-07-08 RX ORDER — DEXMEDETOMIDINE HYDROCHLORIDE 100 UG/ML
INJECTION, SOLUTION INTRAVENOUS
Status: DISCONTINUED | OUTPATIENT
Start: 2020-07-08 | End: 2020-07-08

## 2020-07-08 RX ORDER — FENTANYL CITRATE 50 UG/ML
25 INJECTION, SOLUTION INTRAMUSCULAR; INTRAVENOUS EVERY 5 MIN PRN
Status: DISCONTINUED | OUTPATIENT
Start: 2020-07-08 | End: 2020-07-08 | Stop reason: HOSPADM

## 2020-07-08 RX ORDER — MIDAZOLAM HYDROCHLORIDE 1 MG/ML
INJECTION INTRAMUSCULAR; INTRAVENOUS
Status: COMPLETED
Start: 2020-07-08 | End: 2020-07-08

## 2020-07-08 RX ORDER — PROPOFOL 10 MG/ML
VIAL (ML) INTRAVENOUS CONTINUOUS PRN
Status: DISCONTINUED | OUTPATIENT
Start: 2020-07-08 | End: 2020-07-08

## 2020-07-08 RX ORDER — OXYCODONE HYDROCHLORIDE 5 MG/1
5 TABLET ORAL
Status: DISCONTINUED | OUTPATIENT
Start: 2020-07-08 | End: 2020-07-09 | Stop reason: HOSPADM

## 2020-07-08 RX ORDER — MIDAZOLAM HYDROCHLORIDE 1 MG/ML
1 INJECTION INTRAMUSCULAR; INTRAVENOUS EVERY 5 MIN PRN
Status: DISCONTINUED | OUTPATIENT
Start: 2020-07-08 | End: 2020-07-08 | Stop reason: HOSPADM

## 2020-07-08 RX ORDER — KETAMINE HCL IN 0.9 % NACL 50 MG/5 ML
SYRINGE (ML) INTRAVENOUS
Status: DISCONTINUED | OUTPATIENT
Start: 2020-07-08 | End: 2020-07-08

## 2020-07-08 RX ORDER — ALPRAZOLAM 0.25 MG/1
0.25 TABLET ORAL 2 TIMES DAILY PRN
Status: DISCONTINUED | OUTPATIENT
Start: 2020-07-08 | End: 2020-07-09 | Stop reason: HOSPADM

## 2020-07-08 RX ORDER — MIDAZOLAM HYDROCHLORIDE 1 MG/ML
INJECTION, SOLUTION INTRAMUSCULAR; INTRAVENOUS
Status: DISCONTINUED | OUTPATIENT
Start: 2020-07-08 | End: 2020-07-08

## 2020-07-08 RX ORDER — NALOXONE HCL 0.4 MG/ML
0.02 VIAL (ML) INJECTION
Status: DISCONTINUED | OUTPATIENT
Start: 2020-07-08 | End: 2020-07-09 | Stop reason: HOSPADM

## 2020-07-08 RX ORDER — PHENYLEPHRINE HYDROCHLORIDE 10 MG/ML
INJECTION INTRAVENOUS
Status: DISCONTINUED | OUTPATIENT
Start: 2020-07-08 | End: 2020-07-08

## 2020-07-08 RX ORDER — ACETAMINOPHEN 500 MG
1000 TABLET ORAL EVERY 6 HOURS
Status: DISCONTINUED | OUTPATIENT
Start: 2020-07-08 | End: 2020-07-09 | Stop reason: HOSPADM

## 2020-07-08 RX ORDER — CELECOXIB 200 MG/1
400 CAPSULE ORAL
Status: COMPLETED | OUTPATIENT
Start: 2020-07-08 | End: 2020-07-08

## 2020-07-08 RX ORDER — ONDANSETRON 4 MG/1
4 TABLET, FILM COATED ORAL ONCE
Status: COMPLETED | OUTPATIENT
Start: 2020-07-08 | End: 2020-07-08

## 2020-07-08 RX ORDER — FAMOTIDINE 10 MG/ML
INJECTION INTRAVENOUS
Status: DISCONTINUED | OUTPATIENT
Start: 2020-07-08 | End: 2020-07-08

## 2020-07-08 RX ORDER — PRAVASTATIN SODIUM 20 MG/1
20 TABLET ORAL DAILY
Status: DISCONTINUED | OUTPATIENT
Start: 2020-07-08 | End: 2020-07-09 | Stop reason: HOSPADM

## 2020-07-08 RX ORDER — FAMOTIDINE 20 MG/1
20 TABLET, FILM COATED ORAL 2 TIMES DAILY
Status: DISCONTINUED | OUTPATIENT
Start: 2020-07-08 | End: 2020-07-09 | Stop reason: HOSPADM

## 2020-07-08 RX ORDER — CEFAZOLIN SODIUM 1 G/3ML
2 INJECTION, POWDER, FOR SOLUTION INTRAMUSCULAR; INTRAVENOUS
Status: COMPLETED | OUTPATIENT
Start: 2020-07-08 | End: 2020-07-08

## 2020-07-08 RX ORDER — ASPIRIN 81 MG/1
81 TABLET ORAL 2 TIMES DAILY
Status: DISCONTINUED | OUTPATIENT
Start: 2020-07-08 | End: 2020-07-08

## 2020-07-08 RX ORDER — CEFAZOLIN SODIUM 1 G/3ML
2 INJECTION, POWDER, FOR SOLUTION INTRAMUSCULAR; INTRAVENOUS
Status: DISCONTINUED | OUTPATIENT
Start: 2020-07-08 | End: 2020-07-08 | Stop reason: HOSPADM

## 2020-07-08 RX ORDER — ONDANSETRON 2 MG/ML
4 INJECTION INTRAMUSCULAR; INTRAVENOUS EVERY 8 HOURS PRN
Status: DISCONTINUED | OUTPATIENT
Start: 2020-07-08 | End: 2020-07-09 | Stop reason: HOSPADM

## 2020-07-08 RX ORDER — PREGABALIN 75 MG/1
75 CAPSULE ORAL
Status: COMPLETED | OUTPATIENT
Start: 2020-07-08 | End: 2020-07-08

## 2020-07-08 RX ADMIN — ONDANSETRON 4 MG: 2 INJECTION INTRAMUSCULAR; INTRAVENOUS at 07:07

## 2020-07-08 RX ADMIN — Medication 30 MG: at 07:07

## 2020-07-08 RX ADMIN — MEPIVACAINE HYDROCHLORIDE 6 ML/HR: 15 INJECTION, SOLUTION EPIDURAL; INFILTRATION at 08:07

## 2020-07-08 RX ADMIN — SODIUM CHLORIDE: 0.9 INJECTION, SOLUTION INTRAVENOUS at 06:07

## 2020-07-08 RX ADMIN — PHENYLEPHRINE HYDROCHLORIDE 200 MCG: 10 INJECTION INTRAVENOUS at 09:07

## 2020-07-08 RX ADMIN — DOCUSATE SODIUM 50MG AND SENNOSIDES 8.6MG 1 TABLET: 8.6; 5 TABLET, FILM COATED ORAL at 08:07

## 2020-07-08 RX ADMIN — CEFAZOLIN 2 G: 330 INJECTION, POWDER, FOR SOLUTION INTRAMUSCULAR; INTRAVENOUS at 05:07

## 2020-07-08 RX ADMIN — ONDANSETRON HYDROCHLORIDE 4 MG: 4 TABLET, FILM COATED ORAL at 10:07

## 2020-07-08 RX ADMIN — OXYCODONE HYDROCHLORIDE 5 MG: 5 TABLET ORAL at 04:07

## 2020-07-08 RX ADMIN — SCOPALAMINE 1 PATCH: 1 PATCH, EXTENDED RELEASE TRANSDERMAL at 10:07

## 2020-07-08 RX ADMIN — OXYCODONE HYDROCHLORIDE 5 MG: 5 TABLET ORAL at 12:07

## 2020-07-08 RX ADMIN — PREGABALIN 75 MG: 75 CAPSULE ORAL at 08:07

## 2020-07-08 RX ADMIN — ACETAMINOPHEN 1000 MG: 500 TABLET ORAL at 12:07

## 2020-07-08 RX ADMIN — ROPIVACAINE HYDROCHLORIDE 6 ML/HR: 2 INJECTION, SOLUTION EPIDURAL; INFILTRATION at 10:07

## 2020-07-08 RX ADMIN — ONDANSETRON HYDROCHLORIDE 4 MG: 2 SOLUTION INTRAMUSCULAR; INTRAVENOUS at 10:07

## 2020-07-08 RX ADMIN — PHENYLEPHRINE HYDROCHLORIDE 100 MCG: 10 INJECTION INTRAVENOUS at 08:07

## 2020-07-08 RX ADMIN — MEPIVACAINE HYDROCHLORIDE 3 ML: 15 INJECTION, SOLUTION EPIDURAL; INFILTRATION at 07:07

## 2020-07-08 RX ADMIN — MUPIROCIN 1 G: 20 OINTMENT TOPICAL at 06:07

## 2020-07-08 RX ADMIN — PHENYLEPHRINE HYDROCHLORIDE 200 MCG: 10 INJECTION INTRAVENOUS at 07:07

## 2020-07-08 RX ADMIN — CEFAZOLIN 2 G: 330 INJECTION, POWDER, FOR SOLUTION INTRAMUSCULAR; INTRAVENOUS at 10:07

## 2020-07-08 RX ADMIN — TRANEXAMIC ACID 1000 MG: 100 INJECTION, SOLUTION INTRAVENOUS at 07:07

## 2020-07-08 RX ADMIN — CELECOXIB 400 MG: 200 CAPSULE ORAL at 06:07

## 2020-07-08 RX ADMIN — SODIUM CHLORIDE, SODIUM GLUCONATE, SODIUM ACETATE, POTASSIUM CHLORIDE, MAGNESIUM CHLORIDE, SODIUM PHOSPHATE, DIBASIC, AND POTASSIUM PHOSPHATE: .53; .5; .37; .037; .03; .012; .00082 INJECTION, SOLUTION INTRAVENOUS at 07:07

## 2020-07-08 RX ADMIN — PHENYLEPHRINE HYDROCHLORIDE 100 MCG: 10 INJECTION INTRAVENOUS at 07:07

## 2020-07-08 RX ADMIN — FENTANYL CITRATE 50 MCG: 50 INJECTION, SOLUTION INTRAMUSCULAR; INTRAVENOUS at 07:07

## 2020-07-08 RX ADMIN — ACETAMINOPHEN 1000 MG: 500 TABLET ORAL at 05:07

## 2020-07-08 RX ADMIN — VANCOMYCIN HYDROCHLORIDE 1500 MG: 1.5 INJECTION, POWDER, LYOPHILIZED, FOR SOLUTION INTRAVENOUS at 06:07

## 2020-07-08 RX ADMIN — OXYCODONE HYDROCHLORIDE 5 MG: 5 TABLET ORAL at 10:07

## 2020-07-08 RX ADMIN — MIDAZOLAM HYDROCHLORIDE 2 MG: 1 INJECTION, SOLUTION INTRAMUSCULAR; INTRAVENOUS at 07:07

## 2020-07-08 RX ADMIN — MIDAZOLAM HYDROCHLORIDE 1 MG: 1 INJECTION, SOLUTION INTRAMUSCULAR; INTRAVENOUS at 06:07

## 2020-07-08 RX ADMIN — ACETAMINOPHEN 1000 MG: 500 TABLET ORAL at 06:07

## 2020-07-08 RX ADMIN — DEXAMETHASONE SODIUM PHOSPHATE 8 MG: 4 INJECTION, SOLUTION INTRAMUSCULAR; INTRAVENOUS at 07:07

## 2020-07-08 RX ADMIN — PROPOFOL 75 MCG/KG/MIN: 10 INJECTION, EMULSION INTRAVENOUS at 07:07

## 2020-07-08 RX ADMIN — ASPIRIN 81 MG: 81 TABLET, COATED ORAL at 08:07

## 2020-07-08 RX ADMIN — DEXMEDETOMIDINE HYDROCHLORIDE 20 MCG: 100 INJECTION, SOLUTION, CONCENTRATE INTRAVENOUS at 07:07

## 2020-07-08 RX ADMIN — SODIUM CHLORIDE: 0.9 INJECTION, SOLUTION INTRAVENOUS at 10:07

## 2020-07-08 RX ADMIN — FAMOTIDINE 20 MG: 10 INJECTION, SOLUTION INTRAVENOUS at 07:07

## 2020-07-08 RX ADMIN — MIDAZOLAM HYDROCHLORIDE 2 MG: 1 INJECTION, SOLUTION INTRAMUSCULAR; INTRAVENOUS at 06:07

## 2020-07-08 RX ADMIN — ROPIVACAINE HYDROCHLORIDE 17.5 ML: 5 INJECTION, SOLUTION EPIDURAL; INFILTRATION; PERINEURAL at 06:07

## 2020-07-08 RX ADMIN — ONDANSETRON HYDROCHLORIDE 4 MG: 2 SOLUTION INTRAMUSCULAR; INTRAVENOUS at 04:07

## 2020-07-08 RX ADMIN — PREGABALIN 75 MG: 75 CAPSULE ORAL at 06:07

## 2020-07-08 RX ADMIN — Medication 3 MG: at 10:07

## 2020-07-08 RX ADMIN — FAMOTIDINE 20 MG: 20 TABLET, FILM COATED ORAL at 08:07

## 2020-07-08 RX ADMIN — CEFAZOLIN 2 G: 330 INJECTION, POWDER, FOR SOLUTION INTRAMUSCULAR; INTRAVENOUS at 07:07

## 2020-07-08 RX ADMIN — OXYCODONE HYDROCHLORIDE 10 MG: 5 TABLET ORAL at 10:07

## 2020-07-08 RX ADMIN — TRANEXAMIC ACID 1000 MG: 100 INJECTION, SOLUTION INTRAVENOUS at 08:07

## 2020-07-08 NOTE — PLAN OF CARE
Patient tolerated PT session well. She ambulated 80ft with RW and CGA. No LOB or SOB noted. She is okay to ambulate with nursing staff assistance and RW.     Problem: Physical Therapy Goal  Goal: Physical Therapy Goal  Description: Goals to be met by: 7/10/2020    Patient will increase functional independence with mobility by performin. Supine to sit with supervision   2. Sit to stand transfer with Supervision  3. Gait x250 feet with Supervision using Rolling Walker  4. Ascend/Descend 6 inch curb step with Contact Guard Assistance using Rolling Walker  5. Lower extremity exercise program x30 reps per handout, with supervision    Outcome: Ongoing, Progressing

## 2020-07-08 NOTE — ANESTHESIA PROCEDURE NOTES
IPACK Single Injection Block    Patient location during procedure: pre-op   Block not for primary anesthetic.  Reason for block: at surgeon's request and post-op pain management   Post-op Pain Location: left knee pain  Start time: 7/8/2020 6:50 AM  Timeout: 7/8/2020 6:41 AM   End time: 7/8/2020 6:52 AM    Staffing  Authorizing Provider: Rhonda Bettencourt MD  Performing Provider: Rhonda Bettencourt MD    Preanesthetic Checklist  Completed: patient identified, site marked, surgical consent, pre-op evaluation, timeout performed, IV checked, risks and benefits discussed and monitors and equipment checked  Peripheral Block  Patient position: supine  Prep: ChloraPrep  Patient monitoring: heart rate, cardiac monitor, continuous pulse ox, continuous capnometry and frequent blood pressure checks  Block type: I PACK  Laterality: left  Injection technique: single shot  Needle  Needle type: Stimuplex   Needle gauge: 21 G  Needle length: 4 in  Needle localization: anatomical landmarks and ultrasound guidance   -ultrasound image captured on disc.  Assessment  Injection assessment: negative aspiration, negative parasthesia and local visualized surrounding nerve  Paresthesia pain: none  Heart rate change: no  Slow fractionated injection: yes  Additional Notes  VSS.  [facility] RN monitoring vitals throughout procedure.  Patient tolerated procedure well.    20 cc of 0.25% ropiv with 1/300k epi used as local

## 2020-07-08 NOTE — PLAN OF CARE
Contacted patient to review the Covid-19 Procedure/Surgery Confirmation questionnaire.  Patient receptive to questions and all information provided on instructions concerning our temporary temperature, hand washing/sanitizing and visitor policy as per CDC recommendations. One visitor will be allowed into the hospital at this time.  Patient will be dropped off and picked up at the same location.  We will receive permission to text that person with updates,(if patient wishes to do so) as well as when the patient is ready for discharge.  Phone number [phone] provided for patients to call if they were to develop fever, cough or SOB prior to surgery. Instructed to take temperature the night before and the morning of surgery. Travel questions as per travel questionnaire reviewed. Our cleaning protocols for every surgery and every procedure were reviewed, and reassurance provided that safety, as well as following CDC guidelines, are our top priority. Voiced understanding.    Patient states she is an employee of [employer] and Is familiar with policies and facility.      Patient is a non smoker.    Patient aware of medications to hold, states was called per Dr. Whyte office and per Dr. Whyte.

## 2020-07-08 NOTE — PLAN OF CARE
Problem: Occupational Therapy Goal  Goal: Occupational Therapy Goal  Description: Goals to be met by: 7-15-20    Patient will increase functional independence with ADLs by performing:    UE Dressing with [place].  LE Dressing with Modified [place].  Grooming while standing at sink with Modified [place].  Toileting from toilet with Modified [place] for hygiene and clothing management.   Toilet transfer to toilet with Modified [place].    Outcome: Ongoing, Progressing    OT goals established. Patient contact guard assistance for toilet transfer and toilet task.     Shari Dubose, OT  7/8/2020

## 2020-07-08 NOTE — PT/OT/SLP EVAL
"Occupational Therapy   Evaluation    Name: Josefina Blue  MRN: [id number]  Admitting Diagnosis:  Primary osteoarthritis of left knee Day of Surgery    Recommendations:     Discharge Recommendations: home  Discharge Equipment Recommendations:  bedside commode, walker, rolling  Barriers to discharge:       Assessment:     Josefina Blue is a 60 y.o. female with a medical diagnosis of Primary osteoarthritis of left knee.  Patient is s/p left TKA. She completed bed mobility at stand by assistance. She completed toilet transfer and task at contact guard assistance. She would benefit from skilled OT needs s/p left TKA to increase independence with ADLs and ADL transfers.  Performance deficits affecting function: weakness, impaired sensation, impaired self care skills, impaired functional mobilty, gait instability, impaired balance, decreased lower extremity function.      Rehab Prognosis: Good; patient would benefit from acute skilled OT services to address these deficits and reach maximum level of function.       Plan:     Patient to be seen daily to address the above listed problems via self-care/home management, therapeutic activities, therapeutic exercises  · Plan of Care Expires: 07/15/20  · Plan of Care Reviewed with: patient, spouse    Subjective     Chief Complaint: "pain in back of knee"  Patient/Family Comments/goals: "get back to gym, at least get on bike"    Occupational Profile:  Living Environment: lives with  on 9th floor with elevator, walk in shower with stool   Previous level of function: independent with ADLs, reporting using single crutch last 10 days due to injury to left knee   Roles and Routines: Works for [employer]   Equipment Used at Home:  shower chair, crutches, axillary  Assistance upon Discharge:      Pain/Comfort:  · Pain Rating 1: 3/10  · Location - Side 1: Left  · Location - Orientation 1: posterior  · Location 1: knee  · Pain Addressed 1: Reposition, Distraction    Patients " cultural, spiritual, Cheondoism conflicts given the current situation: no    Objective:     Communicated with: RN prior to session.  Patient found supine with cryotherapy, perineural catheter, peripheral IV, SCD, FCD upon OT entry to room.    General Precautions: Standard, fall   Orthopedic Precautions:LLE weight bearing as tolerated   Braces: N/A     Occupational Performance:    Bed Mobility:    · Patient completed Scooting/Bridging with stand by assistance  · Patient completed Supine to Sit with stand by assistance    Functional Mobility/Transfers:  · Patient completed Sit <> Stand Transfer with contact guard assistance  with  rolling walker   · Patient completed Toilet Transfer Step Transfer technique with contact guard assistance with  rolling walker   · Patient completed chair transfer with step transfer technique with contact guard assistance with rolling walker   · Functional Mobility: patient ambulated to/from bathroom with use of rolling walker at contact guard assistance     Activities of Daily Living:  · Grooming: contact guard assistance standing at sink to wash hands  · Upper Body Dressing: minimum assistance to don gown for back, IV in hand  · Toileting: contact guard assistance completed in bathroom with bedside commode placed over toilet    Cognitive/Visual Perceptual:  Cognitive/Psychosocial Skills:     -       Oriented to: Person, Place and Time   -       Follows Commands/attention:Follows multistep  commands    Physical Exam:  Upper Extremity Range of Motion:     -       Right Upper Extremity: WNL  -       Left Upper Extremity: WNL  Upper Extremity Strength:    -       Right Upper Extremity: WNL  -       Left Upper Extremity: WNL    AMPAC 6 Click ADL:  AMPAC Total Score: 19    Treatment & Education:  -Patient educated on hand placement for transfers   -Patient educated on rolling walker placement for ADLs  -Patient educated on [place] use  -Patient educated on role of OT and plan of care    Education:    Patient left up in chair with all lines intact, call button in reach, RN notified and  present    GOALS:   Multidisciplinary Problems     Occupational Therapy Goals        Problem: Occupational Therapy Goal    Goal Priority Disciplines Outcome Interventions   Occupational Therapy Goal     OT, PT/OT Ongoing, Progressing    Description: Goals to be met by: 7-15-20    Patient will increase functional independence with ADLs by performing:    UE Dressing with [place].  LE Dressing with Modified [place].  Grooming while standing at sink with Modified [place].  Toileting from toilet with Modified [place] for hygiene and clothing management.   Toilet transfer to toilet with Modified [place].                     History:     Past Medical History:   Diagnosis Date    DDD (degenerative disc disease), lumbar 10/7/2019    Hyperlipidemia 8/31/2015    Hypertension     IFG (impaired fasting glucose) 8/31/2015    Neuropathic pain 8/31/2015    Squamous cell cancer of tongue 2012       Past Surgical History:   Procedure Laterality Date    CHOLECYSTECTOMY      GALLBLADDER SURGERY  06/2016    HYSTERECTOMY      KNEE ARTHROSCOPY Right     for torn meniscus    TONGUE SURGERY      TRANSFORAMINAL EPIDURAL INJECTION OF STEROID Bilateral 9/19/2019    Procedure: Injection,steroid,epidural,transforaminal approach LUMBAR TRANSFORAMINAL BILATERAL L4/5 TF NOE;  Surgeon: Tim Kerns MD;  Location: [facility] PAIN MGT;  Service: Pain Management;  Laterality: Bilateral;  NEEDS CONSENT, VIP    TRANSFORAMINAL EPIDURAL INJECTION OF STEROID Bilateral 10/7/2019    Procedure: LUMBAR TRANSFORAMINAL BILATERAL L4/5 TF NOE;  Surgeon: Tim Kerns MD;  Location: [facility] PAIN MGT;  Service: Pain Management;  Laterality: Bilateral;  NEEDS CONSENT, **VIP**       Time Tracking:     OT Date of Treatment: 07/08/20  OT Start Time: 1309  OT Stop Time: 1331  OT Total Time (min): 22 min    Billable Minutes:Evaluation  10  Self Care/Home Management 12    Shari Dubose, OT  7/8/2020

## 2020-07-08 NOTE — ANESTHESIA PREPROCEDURE EVALUATION
07/08/2020  Josefina Blue is a 60 y.o., female.    Patient Active Problem List   Diagnosis    Unspecified essential hypertension: since age 35    Hyperlipidemia    Squamous cell cancer of tongue    Neuropathic pain    IFG (impaired fasting glucose)    Chronic pain of left knee    Chronic bilateral low back pain without sciatica    Chronic pain    DDD (degenerative disc disease), lumbar    Poor mobility    Bilateral primary osteoarthritis of knee    Hordeolum internum of left upper eyelid    Chalazion of left upper eyelid    Long term (current) use of antithrombotics/antiplatelets    Nausea- with Codeine    Fatty liver    H/O: hysterectomy    Staph infection    Edema    Elevated LFTs    Primary osteoarthritis of left knee         Anesthesia Evaluation    I have reviewed the Patient Summary Reports.    I have reviewed the Nursing Notes. I have reviewed the NPO Status.   I have reviewed the Medications.     Review of Systems  Anesthesia Hx:  No problems with previous Anesthesia  History of prior surgery of interest to airway management or planning: Denies Family Hx of Anesthesia complications.   Denies Personal Hx of Anesthesia complications.   Social:  Non-Smoker, No Alcohol Use  Denies Tobacco Use.   Hematology/Oncology:  Hematology Normal   Oncology Normal     EENT/Dental:  EENT/Dental Normal Denies Eye Symptoms   Denies ear symptoms  Denies Active Dental Problems    Cardiovascular:   Hypertension  Hypertension    Pulmonary:  Pulmonary Normal  Denies Pulmonary Symptoms.  Denies Obstructive Sleep Apnea (KOKO)   Renal/:  Renal/ Normal     Hepatic/GI:   Liver Disease,  Denies Esophageal / Stomach Disorders  Liver Disease    Musculoskeletal:   Arthritis   Denies Musculoskeletal General/Symptoms  Denies Muscle Disorders  Denies Joint Disease   Denies Bone Disorder    Neurological:  Denies  Seizure Disorder and Denies Chronic Epilepsy  Denies Head Injury    Endocrine:  Endocrine Normal  Denies Diabetes  Denies Thyroid Disease    Dermatological:  Skin Normal  Denies Skin Symptoms/Problems   Psych:  Psychiatric Normal    Denies Anxiety Disorder.  Denies Obsessive Compulsive Disorder.         Physical Exam  General:  Well nourished    Airway/Jaw/Neck:  Airway Findings: Mouth Opening: Normal Tongue: Normal  Mallampati: II  TM Distance: Normal, at least 6 cm      Dental:  Dental Findings: In tact   Chest/Lungs:  Chest/Lungs Findings: Clear to auscultation, Normal Respiratory Rate     Heart/Vascular:  Heart Findings: Rate: Normal  Rhythm: Regular Rhythm        Mental Status:  Mental Status Findings:  Cooperative, Alert and Oriented         Anesthesia Plan  Type of Anesthesia, risks & benefits discussed:  Anesthesia Type:  general, CSE, regional  Patient's Preference:   Intra-op Monitoring Plan: standard ASA monitors  Intra-op Monitoring Plan Comments:   Post Op Pain Control Plan: multimodal analgesia, peripheral nerve block and IV/PO Opioids PRN  Post Op Pain Control Plan Comments:   Induction:   IV  Beta Blocker:  Patient is not currently on a Beta-Blocker (No further documentation required).       Informed Consent: Patient understands risks and agrees with Anesthesia plan.  Questions answered. Anesthesia consent signed with patient.  ASA Score: 2     Day of Surgery Review of History & Physical:    H&P update referred to the surgeon.         Ready For Surgery From Anesthesia Perspective.

## 2020-07-08 NOTE — PROGRESS NOTES
"Per Dr. Bettencourt per ANIBAL, change rate of ropivacaine 0.2% from 8 ml/hr to 6 ml/hr per pt hgt of 5'8".  Also, ok to place on bag infusion via sapphire pump.  "

## 2020-07-08 NOTE — PLAN OF CARE
Pre-op complete, all questions answered, pt is stable and ready for next phase of care. Pt's belongings are in locker # 20 .

## 2020-07-08 NOTE — BRIEF OP NOTE
[facility]  Brief Operative Note    SUMMARY     Surgery Date: 7/8/2020     Surgeon(s) and Role:     * GLENN Whyte MD - Primary    Assisting Surgeon: None    Pre-op Diagnosis:  Primary osteoarthritis of left knee [M17.12]    Post-op Diagnosis:  Post-Op Diagnosis Codes:     * Primary osteoarthritis of left knee [M17.12]    Procedure(s) (LRB):  ARTHROPLASTY, KNEE (Left)    Anesthesia: General    Description of Procedure: see op note  Description of the findings of the procedure: see op note    Estimated Blood Loss: minimal         Specimens:   Specimen (12h ago, onward)    None

## 2020-07-08 NOTE — ANESTHESIA PROCEDURE NOTES
CSE    Patient location during procedure: OR  Start time: 7/8/2020 7:08 AM  Timeout: 7/8/2020 7:06 AM  End time: 7/8/2020 7:12 AM    Staffing  Authorizing Provider: Rhonda Bettencourt MD  Performing Provider: Rhonda Bettencourt MD    Preanesthetic Checklist  Completed: patient identified, surgical consent, pre-op evaluation, timeout performed, IV checked, risks and benefits discussed and monitors and equipment checked  CSE  Patient position: sitting  Prep: ChloraPrep  Patient monitoring: frequent blood pressure checks and heart rate  Approach: midline  Spinal Needle  Needle type: Maximino   Needle gauge: 25 G  Needle length: 5 in  Epidural Needle  Injection technique: MERLE air  Needle type: Tuohy   Needle gauge: 17 G  Needle length: 3.5 in  Needle insertion depth: 6 cm  Location: L3-4  Needle localization: anatomical landmarks  Catheter  Catheter type: springwound  Catheter size: 19 G  Catheter at skin depth: 11 cm  Assessment  Sensory level: T8   Dermatomal levels determined by pinch or prick  Intrathecal Medications:  administered: primary anesthetic mcg of

## 2020-07-08 NOTE — TRANSFER OF CARE
"Anesthesia Transfer of Care Note    Patient: Josefina Blue    Procedure(s) Performed: Procedure(s) (LRB):  ARTHROPLASTY, KNEE (Left)    Patient location: PACU    Anesthesia Type: regional and general    Transport from OR: Transported from OR on room air with adequate spontaneous ventilation. Transported from OR on 6-10 L/min O2 by face mask with adequate spontaneous ventilation    Post pain: adequate analgesia    Post assessment: no apparent anesthetic complications and tolerated procedure well    Post vital signs: stable    Level of consciousness: awake and alert    Nausea/Vomiting: no nausea/vomiting    Complications: none    Transfer of care protocol was followed      Last vitals:   Visit Vitals  /63 (BP Location: Right arm, Patient Position: Lying)   Pulse 83   Temp 37.1 °C (98.7 °F) (Oral)   Resp 20   Ht 5' 8" (1.727 m)   Wt 99.3 kg (219 lb)   SpO2 100%   Breastfeeding No   BMI 33.30 kg/m²     "

## 2020-07-08 NOTE — PROGRESS NOTES
Dr. Whyte at bedside.   INR is 1.5. Take extra 2.5 mg of Coumadin today and then resume usual regimen.   Recheck pro time 1 month

## 2020-07-08 NOTE — ANESTHESIA PROCEDURE NOTES
Adductor Canal Catheter    Patient location during procedure: pre-op   Block not for primary anesthetic.  Reason for block: at surgeon's request and post-op pain management   Post-op Pain Location: left knee pain  Start time: 7/8/2020 6:43 AM  Timeout: 7/8/2020 6:41 AM   End time: 7/8/2020 6:50 AM    Staffing  Authorizing Provider: Rhonda Bettencourt MD  Performing Provider: Rhonda Bettencourt MD    Preanesthetic Checklist  Completed: patient identified, site marked, surgical consent, pre-op evaluation, timeout performed, IV checked, risks and benefits discussed and monitors and equipment checked  Peripheral Block  Patient position: supine  Prep: ChloraPrep and site prepped and draped  Patient monitoring: heart rate, cardiac monitor, continuous pulse ox, continuous capnometry and frequent blood pressure checks  Block type: adductor canal  Laterality: left  Injection technique: continuous  Needle  Needle type: Tuohy   Needle gauge: 17 G  Needle length: 3.5 in  Needle localization: anatomical landmarks and ultrasound guidance  Catheter type: spring wound  Catheter size: 19 G  Test dose: lidocaine 1.5% with Epi 1-to-200,000 and negative   -ultrasound image captured on disc.  Assessment  Injection assessment: negative aspiration, negative parasthesia and local visualized surrounding nerve  Paresthesia pain: none  Heart rate change: no  Slow fractionated injection: yes  Additional Notes  VSS.  [facility] RN monitoring vitals throughout procedure.  Patient tolerated procedure well.     15 cc of 0.25% ropiv with 1/300k epi used as local

## 2020-07-08 NOTE — ANESTHESIA POSTPROCEDURE EVALUATION
Anesthesia Post Evaluation    Patient: Josefina Blue    Procedure(s) Performed: Procedure(s) (LRB):  ARTHROPLASTY, KNEE (Left)    Final Anesthesia Type: CSE    Patient location during evaluation: floor  Patient participation: Yes- Able to Participate  Level of consciousness: awake and alert  Post-procedure vital signs: reviewed and stable  Pain management: adequate  Airway patency: patent    PONV status at discharge: No PONV  Anesthetic complications: no      Cardiovascular status: blood pressure returned to baseline  Respiratory status: unassisted  Hydration status: euvolemic  Follow-up not needed.          Vitals Value Taken Time   /74 07/08/20 1613   Temp 36.9 °C (98.4 °F) 07/08/20 1613   Pulse 72 07/08/20 1613   Resp 18 07/08/20 1624   SpO2 94 % 07/08/20 1613         Event Time   Out of Recovery 11:20:00         Pain/Cherrie Score: Pain Rating Prior to Med Admin: 2 (7/8/2020  4:24 PM)  Pain Rating Post Med Admin: 5 (7/8/2020 12:00 PM)  Cherrie Score: 10 (7/8/2020 10:40 AM)

## 2020-07-08 NOTE — INTERVAL H&P NOTE
The patient has been examined and the H&P has been reviewed:    I concur with the findings and changes have been noted since the H&P was written: dental clearance has been received    Anesthesia/Surgery risks, benefits and alternative options discussed and understood by patient/family.          Active Hospital Problems    Diagnosis  POA    Primary osteoarthritis of left knee [M17.12]  Yes      Resolved Hospital Problems   No resolved problems to display.

## 2020-07-08 NOTE — PT/OT/SLP EVAL
Physical Therapy Evaluation and Treatment     Patient Name:  Josefina Blue   MRN:  [id number]    Recommendations:     Discharge Recommendations:  outpatient PT(7/10/2020)   Discharge Equipment Recommendations: bedside commode, walker, rolling   Barriers to discharge: None    Assessment:     Josefina lBue is a 60 y.o. female admitted with a medical diagnosis of Primary osteoarthritis of left knee.  She presents with the following impairments/functional limitations:  weakness, impaired sensation, impaired functional mobilty, gait instability, impaired balance, decreased lower extremity function, pain, decreased ROM, impaired skin, orthopedic precautions. Patient tolerated PT session well. She ambulated 80ft with RW and CGA. No LOB or SOB noted. She is okay to ambulate with nursing staff assistance and RW.     Rehab Prognosis: Good; patient would benefit from acute skilled PT services to address these deficits and reach maximum level of function.    Recent Surgery: Procedure(s) (LRB):  ARTHROPLASTY, KNEE (Left) Day of Surgery    Plan:     During this hospitalization, patient to be seen daily to address the identified rehab impairments via therapeutic exercises, gait training, therapeutic activities and progress toward the following goals:    · Plan of Care Expires:  07/10/20    Subjective     Chief Complaint: Pain in back of left knee at hamstring.   Patient/Family Comments/goals: To walk without pain.   Pain/Comfort:  · Pain Rating 1: 3/10  · Location - Side 1: Left  · Location - Orientation 1: posterior  · Location 1: knee  · Pain Addressed 1: Pre-medicate for activity, Distraction, Cessation of Activity, Nurse notified    Living Environment:  Patient lives with her  in a 9th floor condo. There is an elevator present. Also, there are no steps from the parking garage into her building. Prior to admission, patients level of function was independent for functional mobility until ~10 days ago when she injured her  left knee and started using crutches. Equipment at home: shower chair, crutches, axillary. Upon discharge, patient will have assistance from .    Objective:     Communicated with RN prior to session.  Patient found up in chair with cryotherapy, perineural catheter, peripheral IV, SCD, FCD  upon PT entry to room.    General Precautions: Standard, fall   Orthopedic Precautions:LLE weight bearing as tolerated   Braces: N/A     Exams:  · Cognitive Exam:  Patient is oriented to Person, Place, Time and Situation  · Sensation:    · -       Intact  · RLE ROM: WFL  · RLE Strength: WFL  · LLE ROM: WFL at hip and ankle but limited at knee due to pain   · LLE Strength: grossly 3+/5 but did not formally assess due to pain     Functional Mobility:  · Transfers:     · Sit to Stand:  contact guard assistance with rolling walker x1 from bedside chair with verbal cues for hand placement   · Gait:  Patient ambulated 80ft with Rolling Walker and CGA using swing-through gait. Patient demonstrated decreased [place] and decreased step length during gait due to pain, decreased ROM and decreased strength. Verbal cues provided for gait sequence and RW management.     Therapeutic Activities and Exercises:  Patient educated in:  -PT role and POC  -safety with transfers including hand placement  -gait sequencing and RW management  -OOB activity to maximize recovery with nursing staff assistance and RW    AM-PAC 6 CLICK MOBILITY  Total Score:17     Patient left up in chair with all lines intact, call button in reach, RN notified, and  present.    GOALS:   Multidisciplinary Problems     Physical Therapy Goals        Problem: Physical Therapy Goal    Goal Priority Disciplines Outcome Goal Variances Interventions   Physical Therapy Goal     PT, PT/OT Ongoing, Progressing     Description: Goals to be met by: 7/10/2020    Patient will increase functional independence with mobility by performin. Supine to sit with supervision   2.  Sit to stand transfer with Supervision  3. Gait x250 feet with Supervision using Rolling Walker  4. Ascend/Descend 6 inch curb step with Contact Guard Assistance using Rolling Walker  5. Lower extremity exercise program x30 reps per handout, with supervision                     History:     Past Medical History:   Diagnosis Date    DDD (degenerative disc disease), lumbar 10/7/2019    Hyperlipidemia 8/31/2015    Hypertension     IFG (impaired fasting glucose) 8/31/2015    Neuropathic pain 8/31/2015    Squamous cell cancer of tongue 2012       Past Surgical History:   Procedure Laterality Date    CHOLECYSTECTOMY      GALLBLADDER SURGERY  06/2016    HYSTERECTOMY      KNEE ARTHROSCOPY Right     for torn meniscus    TONGUE SURGERY      TRANSFORAMINAL EPIDURAL INJECTION OF STEROID Bilateral 9/19/2019    Procedure: Injection,steroid,epidural,transforaminal approach LUMBAR TRANSFORAMINAL BILATERAL L4/5 TF NOE;  Surgeon: Tim Kerns MD;  Location: [facility] PAIN [facility]GT;  Service: Pain Management;  Laterality: Bilateral;  NEEDS CONSENT, VIP    TRANSFORAMINAL EPIDURAL INJECTION OF STEROID Bilateral 10/7/2019    Procedure: LUMBAR TRANSFORAMINAL BILATERAL L4/5 TF NOE;  Surgeon: Tim Kerns MD;  Location: [facility] PAIN [facility]GT;  Service: Pain Management;  Laterality: Bilateral;  NEEDS CONSENT, **VIP**       Time Tracking:     PT Received On: 07/08/20  PT Start Time: 1409     PT Stop Time: 1433  PT Total Time (min): 24 min     Billable Minutes: Evaluation  12 and Gait Training 12      Shyla Savage, PT  07/08/2020

## 2020-07-09 VITALS
HEART RATE: 64 BPM | DIASTOLIC BLOOD PRESSURE: 57 MMHG | HEIGHT: 68 IN | OXYGEN SATURATION: 98 % | BODY MASS INDEX: 33.18 KG/M2 | RESPIRATION RATE: 18 BRPM | TEMPERATURE: 98 F | WEIGHT: 218.94 LBS | SYSTOLIC BLOOD PRESSURE: 110 MMHG

## 2020-07-09 PROCEDURE — 97116 GAIT TRAINING THERAPY: CPT

## 2020-07-09 PROCEDURE — 99900035 HC TECH TIME PER 15 MIN (STAT)

## 2020-07-09 PROCEDURE — 94761 N-INVAS EAR/PLS OXIMETRY MLT: CPT

## 2020-07-09 PROCEDURE — 63600175 PHARM REV CODE 636 W HCPCS: Performed by: ANESTHESIOLOGY

## 2020-07-09 PROCEDURE — 97530 THERAPEUTIC ACTIVITIES: CPT

## 2020-07-09 PROCEDURE — 63600175 PHARM REV CODE 636 W HCPCS: Performed by: PHYSICIAN ASSISTANT

## 2020-07-09 PROCEDURE — 25000003 PHARM REV CODE 250: Performed by: PHYSICIAN ASSISTANT

## 2020-07-09 PROCEDURE — 97535 SELF CARE MNGMENT TRAINING: CPT

## 2020-07-09 PROCEDURE — 25000003 PHARM REV CODE 250: Performed by: STUDENT IN AN ORGANIZED HEALTH CARE EDUCATION/TRAINING PROGRAM

## 2020-07-09 PROCEDURE — 25000003 PHARM REV CODE 250: Performed by: ANESTHESIOLOGY

## 2020-07-09 PROCEDURE — 97110 THERAPEUTIC EXERCISES: CPT

## 2020-07-09 RX ADMIN — POLYETHYLENE GLYCOL (3350) 17 G: 17 POWDER, FOR SOLUTION ORAL at 08:07

## 2020-07-09 RX ADMIN — ROPIVACAINE HYDROCHLORIDE 6 ML/HR: 2 INJECTION, SOLUTION EPIDURAL; INFILTRATION at 01:07

## 2020-07-09 RX ADMIN — ONDANSETRON HYDROCHLORIDE 4 MG: 2 SOLUTION INTRAMUSCULAR; INTRAVENOUS at 05:07

## 2020-07-09 RX ADMIN — ACETAMINOPHEN 1000 MG: 500 TABLET ORAL at 05:07

## 2020-07-09 RX ADMIN — TRIAMTERENE AND HYDROCHLOROTHIAZIDE 1 CAPSULE: 25; 37.5 CAPSULE ORAL at 08:07

## 2020-07-09 RX ADMIN — DOCUSATE SODIUM 50MG AND SENNOSIDES 8.6MG 1 TABLET: 8.6; 5 TABLET, FILM COATED ORAL at 08:07

## 2020-07-09 RX ADMIN — OXYCODONE HYDROCHLORIDE 5 MG: 5 TABLET ORAL at 05:07

## 2020-07-09 RX ADMIN — OXYCODONE HYDROCHLORIDE 5 MG: 5 TABLET ORAL at 08:07

## 2020-07-09 RX ADMIN — ACETAMINOPHEN 1000 MG: 500 TABLET ORAL at 12:07

## 2020-07-09 RX ADMIN — ROPIVACAINE HYDROCHLORIDE: 2 INJECTION, SOLUTION EPIDURAL; INFILTRATION at 10:07

## 2020-07-09 RX ADMIN — ASPIRIN 81 MG: 81 TABLET, COATED ORAL at 08:07

## 2020-07-09 RX ADMIN — FAMOTIDINE 20 MG: 20 TABLET, FILM COATED ORAL at 08:07

## 2020-07-09 RX ADMIN — PRAVASTATIN SODIUM 20 MG: 20 TABLET ORAL at 08:07

## 2020-07-09 NOTE — PLAN OF CARE
Patient discharged with Rolling Walker and BSC.     07/09/20 1502   Final Note   Assessment Type Final Discharge Note   Anticipated Discharge Disposition Home   What phone number can be called within the next 1-3 days to see how you are doing after discharge? [phone]   Hospital Follow Up  Appt(s) scheduled? Yes   Discharge plans and expectations educations in teach back method with documentation complete? Yes     Future Appointments   Date Time Provider Department Center   7/10/2020  2:30 PM Nicole Clemente, PT [facility] OP [address]   7/23/2020  8:00 AM GLENN Whyte MD [facility]   8/18/2020  8:15 AM GLENN Whyte MD [facility]   9/9/2020  9:00 AM Shawna Myers MD [facility] O[facility]

## 2020-07-09 NOTE — OP NOTE
?[facility]   OPERATIVE REPORT   ORTHOPAEDIC SURGERY   PROVIDER: DR. CARMELO ORTIZ    PATIENT INFORMATION   Josefina Blue 60 y.o. female 1960   MRN: [id number]   LOCATION: [facility]     DATE OF PROCEDURE: 7/8/2020     PREOPERATIVE DIAGNOSES:   Primary osteoarthritis of left knee     POSTOPERATIVE DIAGNOSES:   Primary osteoarthritis of left knee     PROCEDURES PERFORMED:   Left total knee arthroplasty (CPT 18798)    SURGEON:  CARMELO Ortiz MD - Primary  Bismark Collins MD - Resident - Assisting  BRYN Piña    ANESTHESIA: Spinal with adductor catheter    ESTIMATED BLOOD LOSS: 200 cc    IMPLANTS:   Implant Name Type Inv. Item Serial No.  Lot No. LRB No. Used Action   CEMENT BONE SMPLX HV GENTMYCN - [id number]  CEMENT BONE SMPLX HV GENTMYCN  [employer]. [id number] Left 1 Implanted   CEMENT BONE SMPLX HV GENTMYCN - [id number]  CEMENT BONE SMPLX HV GENTMYCN  [employer]. [id number] Left 1 Implanted   PATELLA TRI 36X10 X3 POLYETHYL - [id number]  PATELLA TRI 36X10 X3 POLYETHYL  [employer]. [id number] Left 1 Implanted   COMP FEM POST STAB GISSELLE SZ 5 LT - [id number]  COMP FEM POST STAB GISSELLE SZ 5 LT  [employer] [id number] Left 1 Implanted   PRIMARY TIBIAL BASE 5. - [id number]  PRIMARY TIBIAL BASE 5.  [employer]. [id number] Left 1 Implanted   TIBIAL POST STAB SZ 5 11X3 LEONEL - [id number]  TIBIAL POST STAB SZ 5 11X3 LEONEL  [employer]. [id number] Left 1 Implanted      SPECIMENS:   Specimen (12h ago, onward)    None        COMPLICATIONS: None.     INTRAOPERATIVE COUNTS: Correct.     PROPHYLACTIC IV ANTIBIOTICS: Given per [facility] Protocol.    INDICATIONS FOR PROCEDURE:  Josefina Blue is a 60 y.o. year-old with a longstanding history of left knee pain.  She has failed extensive conservative care to this point.  Preoperative imaging revealed degenerative joint disease and treatment options were discussed.  She elected to proceed with knee replacement.    Risks and  complications were discussed including, but not limited to risks of anesthetic complications, infections, wound healing complications, aseptic loosening, instability, DVT, pulmonary embolism and death among others and she elected to proceed.    COMPONENTS USED:  The [employer] triathlon system with size femur 5, tibia 5, 11  mm polyethylene, 36 x 10 mm patella.    DESCRIPTION OF PROCEDURE:  The patient was taken to the Operating Room where anesthesia was administered by the Anesthesia Department. She was then placed in the supine position and all superficial neurovascular structures were well padded.  The left lower extremity was then sterilely prepped and draped in the normal fashion.    Under tourniquet control, a 20 cm longitudinal incision was made over the anterior aspect of the knee.  Subcutaneous tissue was sharply dissected down to the deep fascia, which was incised along the line of the incision.  A standard medial parapatellar arthrotomy was made.  The proximal medial tibial plateau was then subperiosteally exposed protecting the medial collateral ligament.  A portion of the infrapatellar fat pad was excised.  The patella was everted and the knee was flexed to 90 degrees.    The extramedullary tibial alignment guide was then placed and the proximal tibial osteotomy was made approximately 4 mm distal to the most shallow surface of the tibial plateau.  This was done perpendicular to the axis of the tibia with approximately 3 degrees posterior slope.    A drill was then used to gain access into the intramedullary canal of the distal femur. The distal femoral cutting guide was placed and the 10 mm distal femoral cut was made in 5 degrees of valgus.  Femur was sized to a size 5.  The size 5 cutting guide was applied and  the anterior femoral condyle, posterior condyle, and chamfer cuts were made. There was no notching anteriorly.  At this time, the cutting guide was removed and the cruciate ligaments, osteophytes,  menisci and any debris was removed from the joint space.  Flexion and extension gaps were checked and were found to be equal at 11 mm. The notch cutting guide for the posterior stabilized housing was placed and the notch cuts were then made.    We then turned our attention back towards the proximal tibia.  This was sized to a size 5, placed in neutral rotation, and the keel was punched in the standard fashion.  Trial sizes of the 5 femur, 5 tibia, and 11 mm polyethylene liner were then placed.    The patellar cutting guide was then used to remove the dorsal 10 mm of the patellar surface to a final thickness of 14 mm.  This was sized to a size 36. Drill holes were placed and patellar trial was placed.    At this time, the knee was placed through range of motion.  Excellent patellofemoral tracking and stability were noted throughout.  Full extension was easily obtained and intraoperative range of motion was from approximately 0 to 125 degrees.    Trial components were removed and the bony surfaces were thoroughly irrigated in a pulse lavage fashion and dried.  Two batches of cement were mixed and the tibial, femoral and patellar components were cemented into position, impacted and held in place as the cement polymerized.  Any excess cement was removed using [place] elevators.  The 11 mm polyethylene was then locked into position.    The wound was once again thoroughly irrigated.  The tourniquet was deflated and any significant bleeding was stopped using electrocautery.  Tranexamic acid was given intravenously pre incision and at the time of component cementation for hemostasis. The wound was irrigated with dilute betadine wash followed by normal saline via pulse lavage prior to closure.     The quadriceps tendon and parapatellar retinaculum were then closed with interrupted figure-of-eight sutures of #1 Vicryl.  Subcutaneous tissue was closed with interrupted inverted stitches #3-0 Vicryl.  Skin was approximated using  staples.  Sterile dressing was applied and she was returned to the Postanesthesia Care Unit in stable condition.    As the attending surgeon I was physically present for the key/critical portions of the procedure.

## 2020-07-09 NOTE — PROGRESS NOTES
[facility]  Orthopedics  Progress Note    Patient Name: Josefina Blue  MRN: [id number]  Admission Date: 7/8/2020  Hospital Length of Stay: 0 days  Attending Provider: GLENN Whyte MD  Primary Care Provider: Jo-Ann Collins MD  Follow-up For: Procedure(s) (LRB):  ARTHROPLASTY, KNEE (Left)    Post-Operative Day: 1 Day Post-Op  Subjective:     Principal Problem:Primary osteoarthritis of left knee    Principal Orthopedic Problem: same    Interval History: Patient seen and examined at bedside.  No acute events overnight.  Pain controlled.  Walked 80 feet with PT yesterday.      Review of patient's allergies indicates:   Allergen Reactions    Aspirin Nausea Only     Can take low dose of Aspirin    Codeine Nausea Only     Can take Codeine if she takes a dose of Zofran with it       Current Facility-Administered Medications   Medication    0.9%  NaCl infusion    acetaminophen tablet 1,000 mg    ALPRAZolam tablet 0.25 mg    aspirin EC tablet 81 mg    bisacodyL suppository 10 mg    famotidine tablet 20 mg    irbesartan tablet 75 mg    melatonin tablet 3 mg    naloxone 0.4 mg/mL injection 0.02 mg    ondansetron injection 4 mg    oxyCODONE immediate release tablet 10 mg    oxyCODONE immediate release tablet 5 mg    polyethylene glycol packet 17 g    pravastatin tablet 20 mg    pregabalin capsule 75 mg    promethazine (PHENERGAN) 6.25 mg in dextrose 5 % 50 mL IVPB    ropivacaine (PF) 2 mg/ml (0.2%) infusion    ropivacaine 0.2% ON-Q C-BLOC 400 ML (SELECT A FLOW)    senna-docusate 8.6-50 mg per tablet 1 tablet    triamterene-hydrochlorothiazide 37.5-25 mg per capsule 1 capsule     Objective:     Vital Signs (Most Recent):  Temp: 98.1 °F (36.7 °C) (07/09/20 0415)  Pulse: 62 (07/09/20 0709)  Resp: 16 (07/09/20 0709)  BP: 116/66 (07/09/20 0415)  SpO2: 100 % (07/09/20 0709) Vital Signs (24h Range):  Temp:  [97.2 °F (36.2 °C)-98.6 °F (37 °C)] 98.1 °F (36.7 °C)  Pulse:  [62-87] 62  Resp:  [12-22]  "16  SpO2:  [93 %-100 %] 100 %  BP: (104-137)/(55-89) 116/66     Weight: 99.3 kg (218 lb 14.7 oz)  Height: 5' 8" (172.7 cm)  Body mass index is 33.29 kg/m².      Intake/Output Summary (Last 24 hours) at 7/9/2020 0728  Last data filed at 7/8/2020 1200  Gross per 24 hour   Intake 1290.2 ml   Output --   Net 1290.2 ml       Ortho/SPM Exam  AAOx4  NAD  Reg rate  No increased WOB  Dressing c/d/i  Polar ice in place  SILT T/SP/DP/Burt/Sa  Motor intact T/SP/DP  WWP extremities  FCDs in place and functioning    Significant Labs: None    Significant Imaging: I have reviewed all pertinent imaging results/findings.   Hardware in satisfactory position      Assessment/Plan:     * Primary osteoarthritis of left knee  60 y.o. female POD1 s/p Left TKA    Pain control: multimodal  PT/OT: WBAT LLE  DVT PPx: ASA 81 BID, FCDs at all times when not ambulating  Abx: postop Ancef  Labs: none  Drain: none  Stewart: none    Dispo: PT recs home with outpatient PT. Will plan on DC today after her round of therapy.       Unspecified essential hypertension: since age 35  Holding diuretics in periop period          Rodo Collins MD  Orthopedics  [facility]  "

## 2020-07-09 NOTE — PLAN OF CARE
Problem: Occupational Therapy Goal  Goal: Occupational Therapy Goal  Description: Goals to be met by: 7-15-20    Patient will increase functional independence with ADLs by performing:    UE Dressing with [place].  LE Dressing with Modified [place].  Grooming while standing at sink with Modified [place].  Toileting from toilet with Modified [place] for hygiene and clothing management.   Toilet transfer to toilet with Modified [place].    Outcome: Met    OT goals met.     Shari Dubose, EBER  7/9/2020

## 2020-07-09 NOTE — ASSESSMENT & PLAN NOTE
60 y.o. female POD1 s/p Left TKA    Pain control: multimodal  PT/OT: WBAT LLE  DVT PPx: ASA 81 BID, FCDs at all times when not ambulating  Abx: postop Ancef  Labs: none  Drain: none  Stewart: none    Dispo: PT recs home with outpatient PT. Will plan on DC today after her round of therapy.

## 2020-07-09 NOTE — PT/OT/SLP PROGRESS
Occupational Therapy   Treatment/Discharge    Name: Josefina Blue  MRN: [id number]  Admitting Diagnosis:  Primary osteoarthritis of left knee  1 Day Post-Op    Recommendations:     Discharge Recommendations: home  Discharge Equipment Recommendations:  bedside commode, walker, rolling  Barriers to discharge:       Assessment:     Josefina Blue is a 60 y.o. female with a medical diagnosis of Primary osteoarthritis of left knee.  Patient is s/p left TKA. Patient completed ADLs at MOD I level during session today. She is functioning near baseline level of function for ADLs s/p left TKA and has  to assist if needed.     Rehab Prognosis:  Good; patient would benefit from acute skilled OT services to address these deficits and reach maximum level of function.       Plan:     · DC from OT     Subjective     Patient reported a good night and some soreness in hamstrings.   Pain/Comfort:  · Pain Rating 1: 0/10    Objective:     Communicated with: RN prior to session.  Patient found up in chair with cryotherapy, FCD, perineural catheter, SCD upon OT entry to room.    General Precautions: Standard, fall   Orthopedic Precautions:LLE weight bearing as tolerated   Braces: N/A     Occupational Performance:     Functional Mobility/Transfers:  · Patient completed Sit <> Stand Transfer with modified independence  with  rolling walker   · Patient completed Toilet Transfer Step Transfer technique with modified independence with  rolling walker   · Patient completed chair transfer with step transfer technique with modified independence with rolling walker   · Functional Mobility: patient ambulated to/from bathroom with use of rolling walker at modified independence     Activities of Daily Living:  · Grooming: modified independence standing at sink to brush teeth and wash face  · Upper Body Dressing: modified independence sitting in chair to don bra and overhead dress  · Lower Body Dressing: modified independence sitting in chair to  don underwear, doff socks and willem shoes    [facility] 6 Click ADL: 24    Treatment & Education:  -Patient educated to dress surgical side first and further dressing techniques s/p surgery   -Patient educated on hand placement for transfers   -Patient educated on rolling walker placement for ADLs  -Patient educated on car transfers  -Patient educated on role of OT and plan of care     Patient left up in chair with all lines intact, call button in reach and RN notifiedEducation:      GOALS:   Multidisciplinary Problems     Occupational Therapy Goals     Not on file          Multidisciplinary Problems (Resolved)        Problem: Occupational Therapy Goal    Goal Priority Disciplines Outcome Interventions   Occupational Therapy Goal   (Resolved)     OT, PT/OT Met    Description: Goals to be met by: 7-15-20    Patient will increase functional independence with ADLs by performing:    UE Dressing with [place].  LE Dressing with Modified [place].  Grooming while standing at sink with Modified [place].  Toileting from toilet with Modified [place] for hygiene and clothing management.   Toilet transfer to toilet with Modified [place].                     Time Tracking:     OT Date of Treatment: 07/09/20  OT Start Time: 0741  OT Stop Time: 0806  OT Total Time (min): 25 min    Billable Minutes:Self Care/Home Management 25    Shari Dubose OT  7/9/2020

## 2020-07-09 NOTE — NURSING
OnQ teaching done with patient and spouse . All questions answered. OnQ contract signed and home care instruction pamphlet provided.  Notified pt and spouse  to expect follow up phone calls on 7/10/20 and 7/11/20.

## 2020-07-09 NOTE — DISCHARGE SUMMARY
"[facility]  Orthopedics  Discharge Summary      Patient Name: Josefina Blue  MRN: [id number]  Admission Date: 7/8/2020  Hospital Length of Stay: 0 days  Discharge Date and Time: No discharge date for patient encounter.  Attending Physician: GLENN Whyte MD   Discharging Provider: Rodo Collins MD  Primary Care Provider: Jo-Ann Collins MD    HPI: Josefina Blue  is here for a completion of her perioperative paperwork. she  Is scheduled to undergo LEFT total knee arthroplasty  on 7/8/20.  She is a healthy individual and does need clearance for this procedure.      Dr. Koch stated that, "Patient is optimized."     Pending dental clearance.     Risks, indications and benefits of the surgical procedure were discussed with the patient. All questions with regard to surgery, rehab, expected return to functional activities, activities of daily living and recreational endeavors were answered to her satisfaction.     Discussed COVID-19 with the patient, they are aware of our current policies and procedures, were given the option of delaying surgery, and they elect to proceed.     Patient was informed and understands the risks of surgery are greater for patients with a current condition or hx of heart disease, obesity, clotting disorders, recurrent infections, steroid use, current or past smoking, and factors such as sedentary     Procedure(s) (LRB):  ARTHROPLASTY, KNEE (Left)      Hospital Course: the patient arrived to pre-op area for proper pre-operative management.  Upon completion of pre-operative preparation, the patient was taken back to the operative theatre.  A Left TKA was performed without complication and the patient was transported to the post anesthesia care unit in stable condition. After appropriate recovery from the anaesthetic agents used during the surgery the patient was transferred to the floor where they received pain medication and physical therapy. The following day, when the " "patient was deemed safe for DC, they were discharged home.              Significant Diagnostic Studies: No pertinent studies.    Pending Diagnostic Studies:     Procedure Component Value Units Date/Time    X-Ray Knee 1 or 2 View Left [[id number]]     Order Status: Sent Lab Status: No result         Final Active Diagnoses:    Diagnosis Date Noted POA    PRINCIPAL PROBLEM:  Primary osteoarthritis of left knee [M17.12] 07/08/2020 Yes    Unspecified essential hypertension: since age 35 [I10] 08/31/2015 Yes      Problems Resolved During this Admission:      Discharged Condition: stable    Disposition: Home or Self Care    Follow Up:    Patient Instructions:      WALKER FOR HOME USE     Order Specific Question Answer Comments   Type of Walker: Adult (5'4"-6'6")    With wheels? Yes    Height: 5' 9" (1.753 m)    Weight: 100.7 kg (222 lb)    Length of need (1-99 months): 3    Please check all that apply: Patient is unable to safely ambulate without equipment.      COMMODE FOR HOME USE     Order Specific Question Answer Comments   Type: Standard    Height: 5' 8" (1.727 m)    Weight: 99.3 kg (218 lb 14.7 oz)    Length of need (1-99 months): 99      Activity as tolerated     Call MD for:  difficulty breathing, headache or visual disturbances     Call MD for:  extreme fatigue     Call MD for:  hives     Call MD for:  persistent dizziness or light-headedness     Call MD for:  persistent nausea and vomiting     Call MD for:  redness, tenderness, or signs of infection (pain, swelling, redness, odor or green/yellow discharge around incision site)     Call MD for:  severe uncontrolled pain     Call MD for:  temperature >100.4     Keep surgical extremity elevated when not ambulating     Leave dressing on - Keep it clean, dry, and intact until clinic visit   Order Comments: Do not remove surgical dressing for 2 weeks post-op. This will be done only by MD at initial post-op visit. If dressing is completely saturated, replace with " identical dressing - silver-impregnated hydrocolloid dressing.     Do not get dressings wet. Do not shower.     If dressing continues to be saturated or there are signs of infection, please call [facility] [phone] for further instructions and to make appt to be seen.     Lifting restrictions   Order Comments: No strenuous exercise or lifting of > 10 lbs     No driving, operating heavy equipment or signing legal documents while taking pain medication     On POD1 remove ace wrap and cotton padding. Leave Aquacel bandage on until 2  week post op visit in clinic     Sponge bath only until clinic visit     Weight bearing as tolerated     Medications:  Reconciled Home Medications:      Medication List      ASK your doctor about these medications    acetaminophen 500 MG tablet  Commonly known as: TYLENOL  Take 500 mg by mouth 2 (two) times daily as needed for Pain.     ALPRAZolam 0.25 MG tablet  Commonly known as: XANAX  TAKE ONE TABLET BY MOUTH AT BEDTIME AS NEEDED.     * aspirin 81 MG EC tablet  Commonly known as: ECOTRIN  Take 1 tablet (81 mg total) by mouth nightly.     * aspirin 81 MG EC tablet  Commonly known as: ECOTRIN  Take 1 tablet (81 mg total) by mouth 2 (two) times daily.     celecoxib 200 MG capsule  Commonly known as: CeleBREX  Take 1 capsule (200 mg total) by mouth 2 (two) times daily.     clobetasoL 0.05 % external solution  Commonly known as: TEMOVATE  Use one to two times daily as needed for scaling or itching to scalp     estradioL 0.01 % (0.1 mg/gram) vaginal cream  Commonly known as: ESTRACE  Place 1 g vaginally once daily.     irbesartan 75 MG tablet  Commonly known as: AVAPRO  Take 1 tablet (75 mg total) by mouth every evening.     melatonin 3 mg Tbdl  Take by mouth.     ondansetron 4 MG tablet  Commonly known as: ZOFRAN  Take 1 tablet (4 mg total) by mouth every 8 (eight) hours as needed.     oxyCODONE 5 MG immediate release tablet  Commonly known as: ROXICODONE  Take 1-2 tablets (5-10 mg  total) by mouth every 6 (six) hours as needed.     pravastatin 20 MG tablet  Commonly known as: PRAVACHOL  Take 1 tablet (20 mg total) by mouth once daily.     tretinoin 0.05 % cream  Commonly known as: RETIN-A  Apply topically nightly. Apply pea-sized amount to face.     triamterene-hydrochlorothiazide 37.5-25 mg 37.5-25 mg per tablet  Commonly known as: MAXZIDE-25  Take 1 tablet by mouth once daily.         * This list has 2 medication(s) that are the same as other medications prescribed for you. Read the directions carefully, and ask your doctor or other care provider to review them with you.                Rodo Collins MD  Orthopedics  [facility]

## 2020-07-09 NOTE — PROGRESS NOTES
Acute Pain Service and Perioperative Surgical Home Progress Note    HPI  Josefina Blue is a 60 y.o., female, with  Past Medical History:   Diagnosis Date    DDD (degenerative disc disease), lumbar 10/7/2019    Hyperlipidemia 8/31/2015    Hypertension     IFG (impaired fasting glucose) 8/31/2015    Neuropathic pain 8/31/2015    Squamous cell cancer of tongue 2012   presents lying quietly without any complaints. Ambulated yesterday with PT, ate dinner without incident, and reports no pain at this time.      Interval history      Surgery:  Procedure(s) (LRB):  ARTHROPLASTY, KNEE (Left)    Post Op Day #: 1    Catheter type: Perineural Adductor Canal    Infusion type: Ropivacaine 0.2%  8 ml/hr basal    Problem List:    Active Hospital Problems    Diagnosis  POA    *Primary osteoarthritis of left knee [M17.12]  Yes    Unspecified essential hypertension: since age 35 [I10]  Yes     Dx updated per 2019 IMO Load        Resolved Hospital Problems   No resolved problems to display.       Subjective:       General appearance of alert, oriented, no complaints   Pain with rest: 1    Numbers   Pain with movement: 1    Numbers   Side Effects    1. Pruritis No    2. Nausea No    3. Motor Blockade No, 0=Ability to raise lower extremities off bed    4. Sedation No, S=sleep, easy to arouse    Schedule Medications:    acetaminophen  1,000 mg Oral Q6H    aspirin  81 mg Oral BID    famotidine  20 mg Oral BID    irbesartan  75 mg Oral QHS    melatonin  3 mg Oral Nightly    polyethylene glycol  17 g Oral Daily    pravastatin  20 mg Oral Daily    pregabalin  75 mg Oral QHS    senna-docusate 8.6-50 mg  1 tablet Oral BID    triamterene-hydrochlorothiazide 37.5-25 mg  1 capsule Oral Daily        Continuous Infusions:   sodium chloride 0.9% 150 mL/hr at 07/08/20 2223    ropivacaine (PF) 2 mg/ml (0.2%) 6 mL/hr (07/09/20 0151)    ropivacaine          PRN Medications:  ALPRAZolam, bisacodyL, naloxone, ondansetron, oxyCODONE,  oxyCODONE, promethazine (PHENERGAN) IVPB, ropivacaine       Antibiotics:  Antibiotics (From admission, onward)    None             Objective:     Catheter site clean, dry, intact          Vital Signs (Most Recent):  Temp: 36.7 °C (98.1 °F) (07/09/20 0415)  Pulse: 71 (07/09/20 0415)  Resp: 17 (07/09/20 0537)  BP: 116/66 (07/09/20 0415)  SpO2: 97 % (07/09/20 0415) Vital Signs Range (Last 24H):  Temp:  [36.2 °C (97.2 °F)-37.1 °C (98.7 °F)]   Pulse:  [70-87]   Resp:  [12-22]   BP: (104-137)/(55-89)   SpO2:  [93 %-100 %]          I & O (Last 24H):    Intake/Output Summary (Last 24 hours) at 7/9/2020 0543  Last data filed at 7/8/2020 1200  Gross per 24 hour   Intake 2290.2 ml   Output --   Net 2290.2 ml       Physical Exam:    GA: Alert, comfortable, no acute distress.   Pulmonary: Clear to auscultation A/P/L. No wheezing, crackles, or rhonchi.  Cardiac: RRR S1 & S2 w/o rubs/murmurs/gallops.   Abdominal:Bowel sounds present. No tenderness to palpation or distension. No appreciable hepatosplenomegaly.   Skin: No jaundice, rashes, or visible lesions.         Laboratory:  CBC: No results for input(s): WBC, RBC, HGB, HCT, PLT, MCV, MCH, MCHC in the last 72 hours.    BMP: No results for input(s): NA, K, CO2, CL, BUN, CREATININE, GLU, MG, PHOS, CALCIUM in the last 72 hours.    No results for input(s): PT, INR, PROTIME, APTT in the last 72 hours.      Anticoagulant dose ASA at 81 mg.    Assessment:         Pain control adequate    Plan:     1) Pain:    Adductor canal perineural catheter in place and infusing. Good level. Multimodal pain regimen with acetaminophen, Celebrex, Lyrica, and prn oxycodone given  Will continue to monitor. Plan to discharge with On-Q on POD #1.   2) HTN well-controlled; will continue home meds   3) FEN/GI: Tolerating liquids, advance diet as tolerated. No flatus. No BM.   4) Dispo: Pt working well with PT/OT. Case management and SW for placement. Plan for discharge POD #1.        Evaluator Toussaint  Dru III, FNP

## 2020-07-09 NOTE — SUBJECTIVE & OBJECTIVE
"Principal Problem:Primary osteoarthritis of left knee    Principal Orthopedic Problem: same    Interval History: Patient seen and examined at bedside.  No acute events overnight.  Pain controlled.  Walked 80 feet with PT yesterday.      Review of patient's allergies indicates:   Allergen Reactions    Aspirin Nausea Only     Can take low dose of Aspirin    Codeine Nausea Only     Can take Codeine if she takes a dose of Zofran with it       Current Facility-Administered Medications   Medication    0.9%  NaCl infusion    acetaminophen tablet 1,000 mg    ALPRAZolam tablet 0.25 mg    aspirin EC tablet 81 mg    bisacodyL suppository 10 mg    famotidine tablet 20 mg    irbesartan tablet 75 mg    melatonin tablet 3 mg    naloxone 0.4 mg/mL injection 0.02 mg    ondansetron injection 4 mg    oxyCODONE immediate release tablet 10 mg    oxyCODONE immediate release tablet 5 mg    polyethylene glycol packet 17 g    pravastatin tablet 20 mg    pregabalin capsule 75 mg    promethazine (PHENERGAN) 6.25 mg in dextrose 5 % 50 mL IVPB    ropivacaine (PF) 2 mg/ml (0.2%) infusion    ropivacaine 0.2% ON-Q C-BLOC 400 ML (SELECT A FLOW)    senna-docusate 8.6-50 mg per tablet 1 tablet    triamterene-hydrochlorothiazide 37.5-25 mg per capsule 1 capsule     Objective:     Vital Signs (Most Recent):  Temp: 98.1 °F (36.7 °C) (07/09/20 0415)  Pulse: 62 (07/09/20 0709)  Resp: 16 (07/09/20 0709)  BP: 116/66 (07/09/20 0415)  SpO2: 100 % (07/09/20 0709) Vital Signs (24h Range):  Temp:  [97.2 °F (36.2 °C)-98.6 °F (37 °C)] 98.1 °F (36.7 °C)  Pulse:  [62-87] 62  Resp:  [12-22] 16  SpO2:  [93 %-100 %] 100 %  BP: (104-137)/(55-89) 116/66     Weight: 99.3 kg (218 lb 14.7 oz)  Height: 5' 8" (172.7 cm)  Body mass index is 33.29 kg/m².      Intake/Output Summary (Last 24 hours) at 7/9/2020 0728  Last data filed at 7/8/2020 1200  Gross per 24 hour   Intake 1290.2 ml   Output --   Net 1290.2 ml       Ortho/SPM Exam  AAOx4  NAD  Reg " rate  No increased WOB  Dressing c/d/i  Polar ice in place  SILT T/SP/DP/Burt/Sa  Motor intact T/SP/DP  WWP extremities  FCDs in place and functioning    Significant Labs: None    Significant Imaging: I have reviewed all pertinent imaging results/findings.   Hardware in satisfactory position

## 2020-07-09 NOTE — PLAN OF CARE
"Patient tolerated PT session well. She ambulated 250ft x2 trials with RW and supervision. She ascended/descended 6" curb step with RW and CGA. She performed L LE therex 2x10. She is ready to discharge from PT standpoint.     Problem: Physical Therapy Goal  Goal: Physical Therapy Goal  Description: Goals to be met by: 7/10/2020    Patient will increase functional independence with mobility by performin. Supine to sit with supervision   2. Sit to stand transfer with Supervision  3. Gait x250 feet with Supervision using Rolling Walker  4. Ascend/Descend 6 inch curb step with Contact Guard Assistance using Rolling Walker  5. Lower extremity exercise program x30 reps per handout, with supervision    Outcome: Met     "

## 2020-07-09 NOTE — PLAN OF CARE
Plan of care reviewed with pt. Pt verbalizes understanding. Pt AA&O x 4, VSS, and no s/s of distress. ON Q ball infusing at 6ml/hr and On Q ball instructions reviewed and given to pt and spouse. PIV x 1 discontinued with catheter intact. Discharge instructions given to and reviewed with pt and pt's spouse. Home equipment delivered to bedside. All safety precautions maintained and no falls this admission. Pt transported via wheelchair by hospital staff to private vehicle.

## 2020-07-09 NOTE — PT/OT/SLP PROGRESS
"Physical Therapy Treatment and Discharge    Patient Name:  Josefina Blue   MRN:  [id number]    Recommendations:     Discharge Recommendations:  outpatient PT(7/10/2020)   Discharge Equipment Recommendations: bedside commode, walker, rolling   Barriers to discharge: None    Assessment:     Josefina Blue is a 60 y.o. female admitted with a medical diagnosis of Primary osteoarthritis of left knee.  She presents with the following impairments/functional limitations:  weakness, impaired sensation, impaired functional mobilty, gait instability, impaired balance, decreased lower extremity function, pain, decreased ROM, impaired skin, orthopedic precautions. Patient tolerated PT session well. She ambulated 250ft x2 trials with RW and supervision. She ascended/descended 6" curb step with RW and CGA. She performed L LE therex 2x10. She is ready to discharge from PT standpoint.     Rehab Prognosis: Good; patient would benefit from acute skilled PT services to address these deficits and reach maximum level of function.    Recent Surgery: Procedure(s) (LRB):  ARTHROPLASTY, KNEE (Left) 1 Day Post-Op    Plan:     During this hospitalization, patient to be seen daily to address the identified rehab impairments via therapeutic exercises, gait training, therapeutic activities and progress toward the following goals:    · Plan of Care Expires:  07/10/20    Subjective     Chief Complaint: Pain in posterior left knee.   Patient/Family Comments/goals: To walk without pain and get back to work.   Pain/Comfort:  · Pain Rating 1: (yes but did not rate with number)  · Location - Side 1: Left  · Location - Orientation 1: posterior  · Location 1: knee  · Pain Addressed 1: Pre-medicate for activity, Distraction, Cessation of Activity, Nurse notified      Objective:     Communicated with RN prior to session.  Patient found up in chair with cryotherapy, perineural catheter upon PT entry to room.     General Precautions: Standard, fall " "  Orthopedic Precautions:LLE weight bearing as tolerated   Braces: N/A     Functional Mobility:  · Mat Mobility:     · Supine to Sit: supervision  · Sit to Supine: supervision  · Transfers:     · Sit to Stand:  supervision with rolling walker x1 from bedside chair and x1 from mat with verbal cues for hand placement   · Gait: Patient ambulated 200ft x2 trials with Rolling Walker and supervision using 3-point gait. Patient demonstrated decreased azalia and decreased step length during gait due to pain, decreased ROM and decreased strength.  · Stairs:  Pt ascended/descended 6" curb step with Rolling Walker and Contact Guard Assistance. Verbal cues provided for technique.       AM-PAC 6 CLICK MOBILITY  Turning over in bed (including adjusting bedclothes, sheets and blankets)?: 4  Sitting down on and standing up from a chair with arms (e.g., wheelchair, bedside commode, etc.): 4  Moving from lying on back to sitting on the side of the bed?: 4  Moving to and from a bed to a chair (including a wheelchair)?: 4  Need to walk in hospital room?: 4  Climbing 3-5 steps with a railing?: 4  Basic Mobility Total Score: 24       Therapeutic Activities and Exercises:  Patient educated in and performed L LE exercises 2x10 for ankle pumps, quad set, glute set, SAQ over bolster, heel slides, hip abd/add, SLR, and LAQ.    Patient educated in:  -PT role and POC  -safety with transfers including hand placement  -gait sequencing and RW management  -OOB activity to maximize recovery to prevent DVT   -SUV transfer  -curb training  -HEP for therex at home with handout provided       Patient left up in chair with all lines intact, call button in reach and RN notified.    GOALS:   Multidisciplinary Problems     Physical Therapy Goals     Not on file          Multidisciplinary Problems (Resolved)        Problem: Physical Therapy Goal    Goal Priority Disciplines Outcome Goal Variances Interventions   Physical Therapy Goal   (Resolved)     PT, " PT/OT Met     Description: Goals to be met by: 7/10/2020    Patient will increase functional independence with mobility by performin. Supine to sit with supervision   2. Sit to stand transfer with Supervision  3. Gait x250 feet with Supervision using Rolling Walker  4. Ascend/Descend 6 inch curb step with Contact Guard Assistance using Rolling Walker  5. Lower extremity exercise program x30 reps per handout, with supervision                     Time Tracking:     PT Received On: 20  PT Start Time: 848     PT Stop Time: 934  PT Total Time (min): 46 min     Billable Minutes: Gait Training 20 Therapeutic Activity 10 and Therapeutic Exercise 16    Treatment Type: Treatment  PT/PTA: PT       Shyla Savage, PT  2020

## 2020-07-10 ENCOUNTER — CLINICAL SUPPORT (OUTPATIENT)
Dept: REHABILITATION | Facility: HOSPITAL | Age: 60
End: 2020-07-10
Payer: COMMERCIAL

## 2020-07-10 DIAGNOSIS — M17.12 PRIMARY OSTEOARTHRITIS OF LEFT KNEE: ICD-10-CM

## 2020-07-10 DIAGNOSIS — G89.29 CHRONIC PAIN OF LEFT KNEE: Primary | ICD-10-CM

## 2020-07-10 DIAGNOSIS — M25.562 CHRONIC PAIN OF LEFT KNEE: Primary | ICD-10-CM

## 2020-07-10 PROCEDURE — 97110 THERAPEUTIC EXERCISES: CPT

## 2020-07-10 PROCEDURE — 97161 PT EVAL LOW COMPLEX 20 MIN: CPT

## 2020-07-10 NOTE — PLAN OF CARE
[facility]  Physical Therapy Initial Evaluation    Name: Josefina Blue  Clinic Number: [id number]    Therapy Diagnosis:   Encounter Diagnoses   Name Primary?    Primary osteoarthritis of left knee     Chronic pain of left knee Yes     Physician: Yola Adair PA-C    Physician Orders: PT Eval and Treat   Medical Diagnosis from Referral: M17.12 (ICD-10-CM) - Primary osteoarthritis of left knee  Evaluation Date: 7/10/2020  Authorization Period Expiration: 12/31/2020  Plan of Care Expiration: 10/3/2020  Visit # / Visits authorized: 1/ 20    Time In: 1440  Time Out: 1515  Total Billable Time: 35 minutes    Precautions: Standard, s/p L TKA on 7/8/2020    PROCEDURES PERFORMED:   Left total knee arthroplasty (CPT 68766)    Subjective   Date of onset: s/p L TKA on 7/8/2020  History of current condition - Josefina reports: that she has had knee pain for years.  Received all types of injections but finally had to go in for TKA.  HAd L TKA on 7/8/2020.       Past Medical History:   Diagnosis Date    DDD (degenerative disc disease), lumbar 10/7/2019    Hyperlipidemia 8/31/2015    Hypertension     IFG (impaired fasting glucose) 8/31/2015    Neuropathic pain 8/31/2015    Squamous cell cancer of tongue 2012     Josefina Blue  has a past surgical history that includes Hysterectomy; Tongue surgery; Knee arthroscopy (Right); Cholecystectomy; Gallbladder surgery (06/2016); Transforaminal epidural injection of steroid (Bilateral, 9/19/2019); Transforaminal epidural injection of steroid (Bilateral, 10/7/2019); and Knee Arthroplasty (Left, 7/8/2020).    Josefina has a current medication list which includes the following prescription(s): acetaminophen, alprazolam, aspirin, aspirin, celecoxib, clobetasol, estradiol, irbesartan, melatonin, ondansetron, oxycodone, pravastatin, tretinoin, and triamterene-hydrochlorothiazide 37.5-25 mg.    Review of patient's allergies indicates:   Allergen Reactions     Aspirin Nausea Only     Can take low dose of Aspirin    Codeine Nausea Only     Can take Codeine if she takes a dose of Zofran with it        Imaging, X-ray: TKA in good alignment    Prior Therapy: PT for R leg  Social History: 1 story house (lives in high rise with elevator) lives with their spouse  Occupation: Pt is a corporate excutive for [employer]  Prior Level of Function: difficulty and pain with walking (used cane for 10 days prior to surgery)  Current Level of Function: currently ambulates with RW, WBAT on the L    Pain:  Current 4/10, worst 8/10, best 0/10   Location: left knee  Description: Aching, Dull and Sharp  Aggravating Factors: unable to pinpoint  Easing Factors: pain medication, ice and rest    Pts goals: To be able to walk without compensation or c/o pain.      Objective     Observation: Pt is healthy and in no acute distress.  Post-op dressing/brace in place. Nerve block in place    Gait: Pt ambulates with RW, WBAT on the L     Functional tests:               SL Squat: Not performed 2/2 post-op.              DL Squat: Not performed 2/2 post-op.              Step-down: Not performed 2/2 post-op.              SLS EO: Not performed 2/2 post-op.              SLS EC: Not performed 2/2 post-op.     Range of Motion:   Knee Right Passive Left Passive   Flexion 112 63   Extension 0 Lacking 1      Lower Extremity Strength:  Formal MMT not performed 2/2 POD#2 and increased pain.  Fair quad activation noted L LE.     Special Tests:  Not performed 2/2 post-op.     Joint Mobility: hypomobile as expected post-op.     Palpation: TTP throughout L LE     Sensation:  Intact; diminished 2/2 residual nerve block.     Flexibility:  Grossly hypomobile quad, hamstring and gastroc as expected on post-op L LE     Edema: mod as expected post-op          CMS Impairment/Limitation/Restriction for FOTO Knee Survey    Therapist reviewed FOTO scores for Josefina Blue on 7/10/2020.   FOTO documents entered into EPIC - see Media  "section.    Limitation Score: TAKE NEXT VISIT%         TREATMENT   Treatment Time In: 1500  Treatment Time Out: 1515  Total Treatment time separate from Evaluation: 15 minutes    Josefina received therapeutic exercises to develop strength, endurance, ROM and flexibility for 15 minutes including:  Knee extension stretch x 5 min  Quad sets (1/2 FR under knee) 10 x 5"   Heel slides 10 x 5"   HSS/HCS w/ strap 3 x 30"       Home Exercises and Patient Education Provided    Education provided re: POC, goals for therapy, role of therapy for care, exercises/HEP    Written Home Exercises Provided: yes.  Exercises were reviewed and Josefina was able to demonstrate them prior to the end of the session.   Pt received a written copy of exercises to perform at home. Josefina demonstrated good  understanding of the education provided.     See EMR under patient instructions for exercises given.   Assessment   Josefina is a 60 y.o. female referred to outpatient Physical Therapy s/p L TKA on 7/8/2020. Pt presents with decreased L knee ROM, decreased LE strength, decreased flexibility, impaired balance/gait, and overall decrease in L LE function.  Pt will benefit from physical therapy, specifically stretching, LE strengthening, and balance/gait training, in order to reduce her c/o pain, improve impairments and functional limitations.    Pt prognosis is Good.   Pt will benefit from skilled outpatient Physical Therapy to address the deficits stated above and in the chart below, provide pt/family education, and to maximize pt's level of independence.     Plan of care discussed with patient: Yes  Pt's spiritual, cultural and educational needs considered and patient is agreeable to the plan of care and goals as stated below:     Anticipated Barriers for therapy: none    Medical Necessity is demonstrated by the following  History  Co-morbidities and personal factors that may impact the plan of care Co-morbidities:   none    Personal Factors:   no " deficits     low   Examination  Body Structures and Functions, activity limitations and participation restrictions that may impact the plan of care Body Regions:   L knee    Body Systems:    gross symmetry  ROM  strength  gross coordinated movement  balance  gait  transfers  motor control  motor learning    Participation Restrictions:   Walking, stairs    Activity limitations:   Learning and applying knowledge  no deficits    General Tasks and Commands  no deficits    Communication  no deficits    Mobility  walking    Self care  no deficits    Domestic Life  shopping  cooking  doing house work (cleaning house, washing dishes, laundry)    Interactions/Relationships  no deficits    Life Areas  no deficits    Community and Social Life  community life  recreation and leisure         high   Clinical Presentation stable and uncomplicated low   Decision Making/ Complexity Score: low     GOALS: Short Term Goals:  6 weeks  1.Report decreased L knee pain  < / =  3/10  to increase tolerance for walking.  2. Increase knee ROM to 0-112 degrees in order to be able to perform ADLs without difficulty.  3. Increase strength to 4-/5 MMT grade in L LE  to increase tolerance for ADL and work activities.  4. Pt to tolerate HEP to improve ROM and independence with ADL's    Long Term Goals: 12 weeks  1.Report decreased L knee pain < / = 0/10  to increase tolerance for walking.  2.Patient goal:  To be able to walk without compensation or c/o pain.    3.Increase strength to >/= 4+/5 in L LE  to increase tolerance for ADL and work activities.  4. Pt will be able to climb stairs with reciprocal gait pattern and min to no c/o pain.    Plan   Plan of care Certification: 7/10/2020 to 10/3/2020.    Outpatient Physical Therapy 2 times weekly for 12 weeks to include the following interventions: Electrical Stimulation IFC/Russian, Gait Training, Manual Therapy, Moist Heat/ Ice, Neuromuscular Re-ed, Patient Education, Therapeutic Activites and  Therapeutic Exercise.     Nicole Clemente, PT, DPT, OCS

## 2020-07-11 NOTE — ADDENDUM NOTE
Addendum  created 07/11/20 1415 by Zac Peres MD    Clinical Note Signed, Intraprocedure Event edited

## 2020-07-11 NOTE — ANESTHESIA POST-OP PAIN MANAGEMENT
Attempted calling patient with no success today to check on their pain and overall status. I left a voice mail message and asked to call back if they have any questions or concerns. Reminded patient that today is the day to remove PNC with blue tip intact.     Zac Peres, CA-3  [facility]

## 2020-07-13 ENCOUNTER — CLINICAL SUPPORT (OUTPATIENT)
Dept: REHABILITATION | Facility: HOSPITAL | Age: 60
End: 2020-07-13
Payer: COMMERCIAL

## 2020-07-13 DIAGNOSIS — G89.29 CHRONIC PAIN OF LEFT KNEE: ICD-10-CM

## 2020-07-13 DIAGNOSIS — M25.562 CHRONIC PAIN OF LEFT KNEE: ICD-10-CM

## 2020-07-13 PROCEDURE — 97112 NEUROMUSCULAR REEDUCATION: CPT

## 2020-07-13 PROCEDURE — 97140 MANUAL THERAPY 1/> REGIONS: CPT

## 2020-07-13 PROCEDURE — 97110 THERAPEUTIC EXERCISES: CPT

## 2020-07-13 NOTE — PROGRESS NOTES
"  Physical Therapy Daily Treatment Note     Name: Josefina [facility]  Clinic Number: [id number]    Therapy Diagnosis:   Encounter Diagnosis   Name Primary?    Chronic pain of left knee      Physician: Yola Adair PA-C    Visit Date: 7/13/2020    Physician Orders: PT Eval and Treat   Medical Diagnosis from Referral: M17.12 (ICD-10-CM) - Primary osteoarthritis of left knee  Evaluation Date: 7/10/2020  Authorization Period Expiration: 12/31/2020  Plan of Care Expiration: 10/3/2020  Visit # / Visits authorized: 2/ 20     Time In: 1000  Time Out: 1055  Total Billable Time: 45 minutes     Precautions: Standard, s/p L TKA on 7/8/2020     PROCEDURES PERFORMED:   Left total knee arthroplasty (CPT 31008)    Subjective     Pt reports: that she is doing much better.  Is glad she got in on Friday to get the new exercises.  She was compliant with home exercise program.  Response to previous treatment: improved knee extension ROM  Functional change: ambulates with B axillary crutches, WBAT    Pain: 5/10  Location: left knee     Objective     Josefina received therapeutic exercises to develop strength, endurance, ROM and flexibility for 25 minutes including:  Knee extension stretch (ankle propped) x 3 min  SL hip abd 2 x 10  Prone hip ext 2 x 10  Knee flexion stretch off EOM 20 x 5"   Heel slides 10 x 5"   HCS/HSS w/ strap 3 x 30"     Josefina participated in neuromuscular re-education activities to improve: motor control for 10 minutes. The following activities were included:  Quad sets (1/2 FR under knee) 30 x 5"   SLR 2 x 10   TKE 20 x 3"     Josefina participated in dynamic functional therapeutic activities to improve functional performance for 0  minutes, including:        Josefina received the following manual therapy techniques: Joint mobilizations and STM were applied to the: L knee for 10 minutes, including:  AP joint mobs for knee extension  Patellar mobs  Roller stick to ITB and quad    Josefina received cold pack for 10 " minutes to to decrease circulation, pain, and swelling.      Home Exercises Provided and Patient Education Provided     Education provided:   - proper technique for exercises    Written Home Exercises Provided: Patient instructed to cont prior HEP.  Exercises were reviewed and Josefina was able to demonstrate them prior to the end of the session.  Josefina demonstrated good  understanding of the education provided.     See EMR under Patient Instructions for exercises provided at IE.    Assessment     Pt tolerated treatment well today.  She presents with 0-98 degrees of L knee ROM.  Pt with good patellar mobility as well.  Decreased lateral knee pain after roller stick to ITB and quad.  Pt able to perform SLR with min to no extensor lag.  Progress strengthening and gait as tolerated.    Josefina is progressing well towards her goals.   Pt prognosis is Good.     Pt will continue to benefit from skilled outpatient physical therapy to address the deficits listed in the problem list box on initial evaluation, provide pt/family education and to maximize pt's level of independence in the home and community environment.     Pt's spiritual, cultural and educational needs considered and pt agreeable to plan of care and goals.    Anticipated barriers to physical therapy: none    GOALS: Short Term Goals:  6 weeks  1.Report decreased L knee pain  < / =  3/10  to increase tolerance for walking.Progressing, not met  2. Increase knee ROM to 0-112 degrees in order to be able to perform ADLs without difficulty. Progressing, not met  3. Increase strength to 4-/5 MMT grade in L LE  to increase tolerance for ADL and work activities. Progressing, not met  4. Pt to tolerate HEP to improve ROM and independence with ADL's Progressing, not met     Long Term Goals: 12 weeks  1.Report decreased L knee pain < / = 0/10  to increase tolerance for walking. Progressing, not met  2.Patient goal:  To be able to walk without compensation or c/o pain.  Progressing, not met  3.Increase strength to >/= 4+/5 in L LE  to increase tolerance for ADL and work activities. Progressing, not met  4. Pt will be able to climb stairs with reciprocal gait pattern and min to no c/o pain. Progressing, not met    Plan     Continue with established Plan of Care towards PT goals, focusing on ROM and quad activation.    Nicole Clemente, PT, DPT, OCS

## 2020-07-15 ENCOUNTER — CLINICAL SUPPORT (OUTPATIENT)
Dept: REHABILITATION | Facility: HOSPITAL | Age: 60
End: 2020-07-15
Payer: COMMERCIAL

## 2020-07-15 DIAGNOSIS — G89.29 CHRONIC PAIN OF LEFT KNEE: ICD-10-CM

## 2020-07-15 DIAGNOSIS — M25.562 CHRONIC PAIN OF LEFT KNEE: ICD-10-CM

## 2020-07-15 PROCEDURE — 97110 THERAPEUTIC EXERCISES: CPT

## 2020-07-15 PROCEDURE — 97140 MANUAL THERAPY 1/> REGIONS: CPT

## 2020-07-15 PROCEDURE — 97112 NEUROMUSCULAR REEDUCATION: CPT

## 2020-07-15 PROCEDURE — 97150 GROUP THERAPEUTIC PROCEDURES: CPT

## 2020-07-15 NOTE — PROGRESS NOTES
"  Physical Therapy Daily Treatment Note     Name: Josefina [facility]  Clinic Number: [id number]    Therapy Diagnosis:   Encounter Diagnosis   Name Primary?    Chronic pain of left knee      Physician: Yola Adair PA-C    Visit Date: 7/15/2020    Physician Orders: PT Eval and Treat   Medical Diagnosis from Referral: M17.12 (ICD-10-CM) - Primary osteoarthritis of left knee  Evaluation Date: 7/10/2020  Authorization Period Expiration: 12/31/2020  Plan of Care Expiration: 10/3/2020  Visit # / Visits authorized: 3/ 20     Time In: 1000  Time Out: 1045  Total Billable Time: 45 minutes     Precautions: Standard, s/p L TKA on 7/8/2020     PROCEDURES PERFORMED:   Left total knee arthroplasty (CPT 82753)    Subjective     Pt reports: that she is doing good.  Does have some pain in the back of knee.    She was compliant with home exercise program.  Response to previous treatment: improved knee extension ROM  Functional change: ambulates with B axillary crutches, WBAT    Pain: 3/10  Location: left knee     Objective     Josefina received therapeutic exercises to develop strength, endurance, ROM and flexibility for 15 minutes including:  Knee extension stretch (ankle propped) x 3 min  SL hip abd 3 x 10  Prone hip ext 3 x 10  Heel slides 20 x 5"   Calf raises x 20  Calf stretch on slant x 2 min  HCS/HSS w/ strap 3 x 30"     Josefina participated in neuromuscular re-education activities to improve: motor control for 12 minutes. The following activities were included:  Quad sets (1/2 FR under knee) 20 x 5"   Quad sets 20 x 5"   SLR 3 x 10   TKE 20 x 3"     Josefina participated in dynamic functional therapeutic activities to improve functional performance for 8 minutes, including:  Gait training with bilateral crutches x 3 laps  Gait training with unilateral crutch x 3 laps      Josefina received the following manual therapy techniques: Joint mobilizations and STM were applied to the: L knee for 10 minutes, including:  AP joint mobs for " knee extension  Patellar mobs  Roller stick to ITB and quad      Home Exercises Provided and Patient Education Provided     Education provided:   - proper technique for exercises    Written Home Exercises Provided: Patient instructed to cont prior HEP.  Exercises were reviewed and Josefina was able to demonstrate them prior to the end of the session.  Josefina demonstrated good  understanding of the education provided.     See EMR under Patient Instructions for exercises provided at IE.    Assessment     Pt tolerated treatment well today.  She presents with 0-102 degrees of L knee ROM.  Pt with min patellar hypomobility initially.  After joint mobilizations, improvements noted.  Initiated gait training today and progressed patient from 2 crutches to 1 crutch.  Pt with good technique after multiple cueing.  Will need to re-assess gait next visit.  Will attempt bike next visit as well.    Josefina is progressing well towards her goals.   Pt prognosis is Good.     Pt will continue to benefit from skilled outpatient physical therapy to address the deficits listed in the problem list box on initial evaluation, provide pt/family education and to maximize pt's level of independence in the home and community environment.     Pt's spiritual, cultural and educational needs considered and pt agreeable to plan of care and goals.    Anticipated barriers to physical therapy: none    GOALS: Short Term Goals:  6 weeks  1.Report decreased L knee pain  < / =  3/10  to increase tolerance for walking.Progressing, not met  2. Increase knee ROM to 0-112 degrees in order to be able to perform ADLs without difficulty. Progressing, not met  3. Increase strength to 4-/5 MMT grade in L LE  to increase tolerance for ADL and work activities. Progressing, not met  4. Pt to tolerate HEP to improve ROM and independence with ADL's Progressing, not met     Long Term Goals: 12 weeks  1.Report decreased L knee pain < / = 0/10  to increase tolerance for  walking. Progressing, not met  2.Patient goal:  To be able to walk without compensation or c/o pain. Progressing, not met  3.Increase strength to >/= 4+/5 in L LE  to increase tolerance for ADL and work activities. Progressing, not met  4. Pt will be able to climb stairs with reciprocal gait pattern and min to no c/o pain. Progressing, not met    Plan     Continue with established Plan of Care towards PT goals, focusing on ROM and quad activation.    Nicole Clemente, PT, DPT, OCS

## 2020-07-20 ENCOUNTER — CLINICAL SUPPORT (OUTPATIENT)
Dept: REHABILITATION | Facility: HOSPITAL | Age: 60
End: 2020-07-20
Payer: COMMERCIAL

## 2020-07-20 DIAGNOSIS — M25.562 CHRONIC PAIN OF LEFT KNEE: ICD-10-CM

## 2020-07-20 DIAGNOSIS — G89.29 CHRONIC PAIN OF LEFT KNEE: ICD-10-CM

## 2020-07-20 PROCEDURE — 97530 THERAPEUTIC ACTIVITIES: CPT

## 2020-07-20 PROCEDURE — 97110 THERAPEUTIC EXERCISES: CPT

## 2020-07-20 PROCEDURE — 97112 NEUROMUSCULAR REEDUCATION: CPT

## 2020-07-20 NOTE — PROGRESS NOTES
S:Josefina Blue presents for post-operative evaluation.     DATE OF PROCEDURE: 7/8/2020   PROCEDURES PERFORMED:   Left total knee arthroplasty     Josefina Blue reports to be doing well 2 wks s/p the above mentioned procedure. Denies fevers, chills, night sweats, chest pain, difficulty breathing, calf pain or tenderness. Going to PT 2 x week at the [place] location. Seeing good progress daily.  Overall pain levels much improved. She is only taking tylenol as needed. She does describe mild numbness and burning over the saphenous nerve distribution following the surgery.     O:  Exam of the left knee shows a well-healed incision.  No signs of infection.  No unusual swelling.  Easy passive extension, flexion to 110° without significant pain.  Central patellar tracking.  Ligamentously stable.  She does have minimal decreased sensation to light touch over the saphenous nerve distribution.  Possibly block related.  The quad is activating well.  No calf swelling.    Prior radiographs of the left knee show the prosthesis to be in good position.  No fracture or complication otherwise    A/P:   - Removed staples and applied steri strips.  Discussed wound care instructions.  - Advised that she avoid getting the incision wet until at least Saturday night.   - I explained that the numbness she was feeling over the saphenous nerve distribution should gradually improve over time.   - Prescribed 5 mg Valium to assist her sleeping at night.  - Plan to follow the rehab plan as previously outlined.   - RTC in 4 weeks and plan for repeat x-rays at that time.

## 2020-07-20 NOTE — PROGRESS NOTES
"  Physical Therapy Daily Treatment Note     Name: Josefina [facility]  Clinic Number: [id number]    Therapy Diagnosis:   Encounter Diagnosis   Name Primary?    Chronic pain of left knee      Physician: Yola Adair PA-C    Visit Date: 7/20/2020    Physician Orders: PT Eval and Treat   Medical Diagnosis from Referral: M17.12 (ICD-10-CM) - Primary osteoarthritis of left knee  Evaluation Date: 7/10/2020  Authorization Period Expiration: 12/31/2020  Plan of Care Expiration: 10/3/2020  Visit # / Visits authorized: 4/ 20     Time In: 1000  Time Out: 1045  Total Billable Time: 45 minutes     Precautions: Standard, s/p L TKA on 7/8/2020     PROCEDURES PERFORMED:   Left total knee arthroplasty (CPT 43256)    Subjective     Pt reports: that she is doing good.  Did have increased swelling on Friday and contacted MD who told her to put her stocking back on and elevate leg.  Since then, decreased swelling and pain.  Is feeling good today.    She was compliant with home exercise program.  Response to previous treatment: full knee extension and improved knee flexion  Functional change: ambulates with one axillary crutch, WBAT    Pain: 3/10  Location: left knee     Objective     Josefina received therapeutic exercises to develop strength, endurance, ROM and flexibility for 15 minutes including:  SL hip abd 3 x 10 NP  Prone hip ext 3 x 10 NP  Heel slides 10 x 10"   Bike x 8 min  Prone quad stretch 3 x 30"   Calf raises x 20 NP  Calf stretch on slant x 2 min NP  HCS/HSS w/ strap 3 x 30" NP    Josefina participated in neuromuscular re-education activities to improve: motor control for 18 minutes. The following activities were included:  SAQ 15 x 10"   Quad sets 15 x 10"   SLR 3 x 15  TKE 20 x 3" NP    Josefina participated in dynamic functional therapeutic activities to improve functional performance for 10 minutes, including:  Hesitation walk w/ small hurdles x 3 laps  Mini squats x 30        Josefina received the following manual therapy " techniques: Joint mobilizations and STM were applied to the: L knee for 2 minutes, including:  Patellar mobs  Roller stick to ITB and quad NP      Home Exercises Provided and Patient Education Provided     Education provided:   - proper technique for exercises    Written Home Exercises Provided: Patient instructed to cont prior HEP.  Exercises were reviewed and Josefina was able to demonstrate them prior to the end of the session.  Josefina demonstrated good  understanding of the education provided.     See EMR under Patient Instructions for exercises provided at .    Assessment     Pt tolerated treatment well today.  She presents with 0-110 degrees of L knee ROM.  Pt able to perform full forward revolutions on bike today.  Pt with good patellar mobility and full knee extension. Worked again on gait today to further normalize walking.  Pt was able to ambulate without AD today, however was easily fatigued.  Initiated squats to further improve strength and functional mobility.  Progress as tolerated.    Josefina is progressing well towards her goals.   Pt prognosis is Good.     Pt will continue to benefit from skilled outpatient physical therapy to address the deficits listed in the problem list box on initial evaluation, provide pt/family education and to maximize pt's level of independence in the home and community environment.     Pt's spiritual, cultural and educational needs considered and pt agreeable to plan of care and goals.    Anticipated barriers to physical therapy: none    GOALS: Short Term Goals:  6 weeks  1.Report decreased L knee pain  < / =  3/10  to increase tolerance for walking.Progressing, not met  2. Increase knee ROM to 0-112 degrees in order to be able to perform ADLs without difficulty. Progressing, not met  3. Increase strength to 4-/5 MMT grade in L LE  to increase tolerance for ADL and work activities. Progressing, not met  4. Pt to tolerate HEP to improve ROM and independence with ADL's  Progressing, not met     Long Term Goals: 12 weeks  1.Report decreased L knee pain < / = 0/10  to increase tolerance for walking. Progressing, not met  2.Patient goal:  To be able to walk without compensation or c/o pain. Progressing, not met  3.Increase strength to >/= 4+/5 in L LE  to increase tolerance for ADL and work activities. Progressing, not met  4. Pt will be able to climb stairs with reciprocal gait pattern and min to no c/o pain. Progressing, not met    Plan     Continue with established Plan of Care towards PT goals, focusing on ROM and quad activation.    Nicole Clemente, PT, DPT, OCS

## 2020-07-23 ENCOUNTER — OFFICE VISIT (OUTPATIENT)
Dept: SPORTS MEDICINE | Facility: CLINIC | Age: 60
End: 2020-07-23
Payer: COMMERCIAL

## 2020-07-23 ENCOUNTER — HOSPITAL ENCOUNTER (OUTPATIENT)
Dept: RADIOLOGY | Facility: HOSPITAL | Age: 60
Discharge: HOME | End: 2020-07-23
Attending: ORTHOPAEDIC SURGERY
Payer: COMMERCIAL

## 2020-07-23 VITALS
HEART RATE: 89 BPM | DIASTOLIC BLOOD PRESSURE: 86 MMHG | HEIGHT: 68 IN | SYSTOLIC BLOOD PRESSURE: 121 MMHG | WEIGHT: 218 LBS | BODY MASS INDEX: 33.04 KG/M2

## 2020-07-23 DIAGNOSIS — M17.0 BILATERAL PRIMARY OSTEOARTHRITIS OF KNEE: Primary | ICD-10-CM

## 2020-07-23 DIAGNOSIS — M17.0 BILATERAL PRIMARY OSTEOARTHRITIS OF KNEE: ICD-10-CM

## 2020-07-23 PROCEDURE — 99024 POSTOP FOLLOW-UP VISIT: CPT | Mod: S$GLB,,, | Performed by: ORTHOPAEDIC SURGERY

## 2020-07-23 PROCEDURE — 99999 PR PBB SHADOW E&M-EST. PATIENT-LVL IV: CPT | Mod: PBBFAC,,, | Performed by: ORTHOPAEDIC SURGERY

## 2020-07-23 PROCEDURE — 99024 PR POST-OP FOLLOW-UP VISIT: ICD-10-PCS | Mod: S$GLB,,, | Performed by: ORTHOPAEDIC SURGERY

## 2020-07-23 PROCEDURE — 99999 PR PBB SHADOW E&M-EST. PATIENT-LVL IV: ICD-10-PCS | Mod: PBBFAC,,, | Performed by: ORTHOPAEDIC SURGERY

## 2020-07-23 RX ORDER — DIAZEPAM 5 MG/1
5 TABLET ORAL EVERY 12 HOURS PRN
Qty: 20 TABLET | Refills: 0 | Status: SHIPPED | OUTPATIENT
Start: 2020-07-23 | End: 2020-11-25 | Stop reason: SDUPTHER

## 2020-07-24 ENCOUNTER — CLINICAL SUPPORT (OUTPATIENT)
Dept: REHABILITATION | Facility: HOSPITAL | Age: 60
End: 2020-07-24
Payer: COMMERCIAL

## 2020-07-24 DIAGNOSIS — G89.29 CHRONIC PAIN OF LEFT KNEE: ICD-10-CM

## 2020-07-24 DIAGNOSIS — M25.562 CHRONIC PAIN OF LEFT KNEE: ICD-10-CM

## 2020-07-24 PROCEDURE — 97530 THERAPEUTIC ACTIVITIES: CPT

## 2020-07-24 PROCEDURE — 97110 THERAPEUTIC EXERCISES: CPT

## 2020-07-24 PROCEDURE — 97112 NEUROMUSCULAR REEDUCATION: CPT

## 2020-07-24 NOTE — PROGRESS NOTES
"  Physical Therapy Daily Treatment Note     Name: Josefina [facility]  Clinic Number: [id number]    Therapy Diagnosis:   Encounter Diagnosis   Name Primary?    Chronic pain of left knee      Physician: Yola Adair PA-C    Visit Date: 7/24/2020    Physician Orders: PT Eval and Treat   Medical Diagnosis from Referral: M17.12 (ICD-10-CM) - Primary osteoarthritis of left knee  Evaluation Date: 7/10/2020  Authorization Period Expiration: 12/31/2020  Plan of Care Expiration: 10/3/2020  Visit # / Visits authorized: 5/ 20     Time In: 1130  Time Out: 1215  Total Billable Time: 45 minutes     Precautions: Standard, s/p L TKA on 7/8/2020     PROCEDURES PERFORMED:   Left total knee arthroplasty (CPT 17773)    Subjective     Pt reports: that she saw MD yesterday and got staples removed.  She was compliant with home exercise program.  Response to previous treatment: full knee extension and improved knee flexion  Functional change: ambulates without AD, decreased TKE during gait    Pain: 3/10  Location: left knee     Objective     Josefina received therapeutic exercises to develop strength, endurance, ROM and flexibility for 8 minutes including:  SL hip abd 3 x 10 NP  Prone hip ext 3 x 10 NP  Heel slides 10 x 10"   Bike x 8 min  Prone quad stretch 3 x 30" NP  Calf raises x 20 NP  Calf stretch on slant x 2 min NP  HCS/HSS w/ strap 3 x 30" NP    Josefina participated in neuromuscular re-education activities to improve: motor control for 20 minutes. The following activities were included:  Russian stim (Quad sets w. 1/2 FR under knee) 10 sec on/ 10 sec off x 10 min  SLR 3 x 15 NP  TKE with yellow super cord 30 x 3"     Josefina participated in dynamic functional therapeutic activities to improve functional performance for 10 minutes, including:  Hesitation walk w/ small hurdles x 5 laps focusing on TKE at IC  Mini squats x 30 NP        Josefina received the following manual therapy techniques: Joint mobilizations and STM were applied to " the: L knee for 5 minutes, including:  Patellar mobs  Roller stick to ITB and quad NP      Home Exercises Provided and Patient Education Provided     Education provided:   - proper technique for exercises    Written Home Exercises Provided: Patient instructed to cont prior HEP.  Exercises were reviewed and Josefina was able to demonstrate them prior to the end of the session.  Josefina demonstrated good  understanding of the education provided.     See EMR under Patient Instructions for exercises provided at IE.    Assessment     Pt tolerated treatment well today.  Pt ambulated without AD today, however lacked heel strike at IC.  Worked on gait focusing on TKE at .  Pt presents with full passive knee extension, however increased difficulty getting full active knee extension.  Initiated Russian stim today to further improve quad strength.  Progress as tolerated.    Josefina is progressing well towards her goals.   Pt prognosis is Good.     Pt will continue to benefit from skilled outpatient physical therapy to address the deficits listed in the problem list box on initial evaluation, provide pt/family education and to maximize pt's level of independence in the home and community environment.     Pt's spiritual, cultural and educational needs considered and pt agreeable to plan of care and goals.    Anticipated barriers to physical therapy: none    GOALS: Short Term Goals:  6 weeks  1.Report decreased L knee pain  < / =  3/10  to increase tolerance for walking.Progressing, not met  2. Increase knee ROM to 0-112 degrees in order to be able to perform ADLs without difficulty. Progressing, not met  3. Increase strength to 4-/5 MMT grade in L LE  to increase tolerance for ADL and work activities. Progressing, not met  4. Pt to tolerate HEP to improve ROM and independence with ADL's Progressing, not met     Long Term Goals: 12 weeks  1.Report decreased L knee pain < / = 0/10  to increase tolerance for walking. Progressing, not  met  2.Patient goal:  To be able to walk without compensation or c/o pain. Progressing, not met  3.Increase strength to >/= 4+/5 in L LE  to increase tolerance for ADL and work activities. Progressing, not met  4. Pt will be able to climb stairs with reciprocal gait pattern and min to no c/o pain. Progressing, not met    Plan     Continue with established Plan of Care towards PT goals, focusing on ROM and quad activation.    Nicole Clemente, PT, DPT, OCS

## 2020-07-28 ENCOUNTER — CLINICAL SUPPORT (OUTPATIENT)
Dept: REHABILITATION | Facility: HOSPITAL | Age: 60
End: 2020-07-28
Payer: COMMERCIAL

## 2020-07-28 DIAGNOSIS — M25.562 CHRONIC PAIN OF LEFT KNEE: ICD-10-CM

## 2020-07-28 DIAGNOSIS — G89.29 CHRONIC PAIN OF LEFT KNEE: ICD-10-CM

## 2020-07-28 PROCEDURE — 97110 THERAPEUTIC EXERCISES: CPT

## 2020-07-28 PROCEDURE — 97112 NEUROMUSCULAR REEDUCATION: CPT

## 2020-07-28 PROCEDURE — 97530 THERAPEUTIC ACTIVITIES: CPT

## 2020-07-28 NOTE — PROGRESS NOTES
"  Physical Therapy Daily Treatment Note     Name: Josefina [facility]  Clinic Number: [id number]    Therapy Diagnosis:   Encounter Diagnosis   Name Primary?    Chronic pain of left knee      Physician: Yola Adair PA-C    Visit Date: 7/28/2020    Physician Orders: PT Eval and Treat   Medical Diagnosis from Referral: M17.12 (ICD-10-CM) - Primary osteoarthritis of left knee  Evaluation Date: 7/10/2020  Authorization Period Expiration: 12/31/2020  Plan of Care Expiration: 10/3/2020  Visit # / Visits authorized: 6/ 20     Time In: 1655  Time Out: 1735  Total Billable Time: 40 minutes     Precautions: Standard, s/p L TKA on 7/8/2020     PROCEDURES PERFORMED:   Left total knee arthroplasty (CPT 88204)    Subjective     Pt reports: that she is doing   She was compliant with home exercise program.  Response to previous treatment: full knee extension and improved knee flexion  Functional change: ambulates without AD, decreased TKE during gait    Pain: 3/10  Location: left knee     Objective     Josefina received therapeutic exercises to develop strength, endurance, ROM and flexibility for 10 minutes including:  SL hip abd 3 x 10 NP  Prone hip ext 3 x 10 NP  Heel slides 10 x 10"   Bike x 8 min NP  Prone quad stretch 3 x 30" NP  Calf raises x 20 NP  Calf stretch on slant x 1 min   HSS on step x 1 min  HCS/HSS w/ strap 3 x 30" NP    Josefina participated in neuromuscular re-education activities to improve: motor control for 10 minutes. The following activities were included:  Russian stim (Quad sets w. 1/2 FR under knee) 10 sec on/ 10 sec off x 5 min  Russian stim (Quad sets w. 1/2 FR under ankle) 10 sec on/ 10 sec off x 5 min  SLR 3 x 15 NP  TKE with yellow super cord 30 x 3"     Josefina participated in dynamic functional therapeutic activities to improve functional performance for 15 minutes, including:  Leg press 90# DL 2 x 15  SL leg press 50# L only 2 x 15  Step ups 6" fwd 2 x 15    Hesitation walk w/ small hurdles x 5 laps " focusing on TKE at IC NP  Mini squats x 30 NP        Josefina received the following manual therapy techniques: Joint mobilizations and STM were applied to the: L knee for 5 minutes, including:  Patellar mobs  Roller stick to ITB and quad NP      Home Exercises Provided and Patient Education Provided     Education provided:   - proper technique for exercises    Written Home Exercises Provided: Patient instructed to cont prior HEP.  Exercises were reviewed and Josefina was able to demonstrate them prior to the end of the session.  Josefina demonstrated good  understanding of the education provided.     See EMR under Patient Instructions for exercises provided at .    Assessment     Pt tolerated treatment well today with progression of exercises.  Russian stim appears to be improving quad strength for full active terminal extension.  Initiated leg press and step ups today to further improve strength and functional mobility.  Knee ROM is 0-118 degrees today.  Progress as tolerated.    Josefina is progressing well towards her goals.   Pt prognosis is Good.     Pt will continue to benefit from skilled outpatient physical therapy to address the deficits listed in the problem list box on initial evaluation, provide pt/family education and to maximize pt's level of independence in the home and community environment.     Pt's spiritual, cultural and educational needs considered and pt agreeable to plan of care and goals.    Anticipated barriers to physical therapy: none    GOALS: Short Term Goals:  6 weeks  1.Report decreased L knee pain  < / =  3/10  to increase tolerance for walking.MET 7/28/2020  2. Increase knee ROM to 0-112 degrees in order to be able to perform ADLs without difficulty. MET 7/28/2020  3. Increase strength to 4-/5 MMT grade in L LE  to increase tolerance for ADL and work activities. Progressing, not met  4. Pt to tolerate HEP to improve ROM and independence with ADL's MET 7/28/2020     Long Term Goals: 12  weeks  1.Report decreased L knee pain < / = 0/10  to increase tolerance for walking. Progressing, not met  2.Patient goal:  To be able to walk without compensation or c/o pain. Progressing, not met  3.Increase strength to >/= 4+/5 in L LE  to increase tolerance for ADL and work activities. Progressing, not met  4. Pt will be able to climb stairs with reciprocal gait pattern and min to no c/o pain. Progressing, not met    Plan     Continue with established Plan of Care towards PT goals, focusing on ROM and quad activation.    Nicole Clemente, PT, DPT, OCS

## 2020-07-31 ENCOUNTER — CLINICAL SUPPORT (OUTPATIENT)
Dept: REHABILITATION | Facility: HOSPITAL | Age: 60
End: 2020-07-31
Payer: COMMERCIAL

## 2020-07-31 DIAGNOSIS — M25.562 CHRONIC PAIN OF LEFT KNEE: ICD-10-CM

## 2020-07-31 DIAGNOSIS — G89.29 CHRONIC PAIN OF LEFT KNEE: ICD-10-CM

## 2020-07-31 PROCEDURE — 97112 NEUROMUSCULAR REEDUCATION: CPT

## 2020-07-31 PROCEDURE — 97110 THERAPEUTIC EXERCISES: CPT

## 2020-07-31 PROCEDURE — 97530 THERAPEUTIC ACTIVITIES: CPT

## 2020-07-31 NOTE — PROGRESS NOTES
"  Physical Therapy Daily Treatment Note     Name: Josefina [facility]  Clinic Number: [id number]    Therapy Diagnosis:   Encounter Diagnosis   Name Primary?    Chronic pain of left knee      Physician: oYla Adair PA-C    Visit Date: 7/31/2020    Physician Orders: PT Eval and Treat   Medical Diagnosis from Referral: M17.12 (ICD-10-CM) - Primary osteoarthritis of left knee  Evaluation Date: 7/10/2020  Authorization Period Expiration: 12/31/2020  Plan of Care Expiration: 10/3/2020  Visit # / Visits authorized: 7/ 20     Time In: 1130   Time Out: 1220  Total Billable Time: 50 minutes     Precautions: Standard, s/p L TKA on 7/8/2020     PROCEDURES PERFORMED:   Left total knee arthroplasty (CPT 12420)    Subjective     Pt reports: that she is doing good.  Has been exercising at home and feels like she is getting stronger  She was compliant with home exercise program.  Response to previous treatment: full knee extension and improved knee flexion  Functional change: ambulates without AD, decreased TKE during gait    Pain: 3/10  Location: left knee     Objective     Josefina received therapeutic exercises to develop strength, endurance, ROM and flexibility for 10 minutes including:  Bike x 8 min   Prone quad stretch 3 x 30"     SL hip abd 3 x 10 NP  Prone hip ext 3 x 10 NP  Heel slides 10 x 10" NP  Calf raises x 20 NP  Calf stretch on slant x 1 min NP  HSS on step x 1 min NP  HCS/HSS w/ strap 3 x 30" NP    Josefina participated in neuromuscular re-education activities to improve: motor control for 15 minutes. The following activities were included:  Russian stim (Quad sets w. 1/2 FR under knee) 10 sec on/ 10 sec off x 5 min - D/C next visit  Tunisian stim (Quad sets w. 1/2 FR under ankle) 10 sec on/ 10 sec off x 5 min - D/C next visit  SLR 3 x 15 NP  TKE with yellow super cord 30 x 3" NP    Josefina participated in dynamic functional therapeutic activities to improve functional performance for 25 minutes, including:  Leg press " "120# DL 2 x 15  SL leg press 60# L only 2 x 15  Step ups 6" fwd 2 x 15  Step downs 3" lat 2 x 5    Hesitation walk w/ small hurdles x 5 laps focusing on TKE at IC NP  Mini squats x 30 NP        Josefina received the following manual therapy techniques: Joint mobilizations and STM were applied to the: L knee for 0 minutes, including:  Patellar mobs  Roller stick to ITB and quad NP      Home Exercises Provided and Patient Education Provided     Education provided:   - proper technique for exercises    Written Home Exercises Provided: Patient instructed to cont prior HEP.  Exercises were reviewed and Josefina was able to demonstrate them prior to the end of the session.  Josefina demonstrated good  understanding of the education provided.     See EMR under Patient Instructions for exercises provided at IE.    Assessment     Pt tolerated treatment well today with progression of exercises.  Pt with good quad activation today therefore discontinue russian stim next visit.  Initiated step downs to further improve eccentric quad strength and functional mobility.  Increased difficulty noted with noted weakness.  ROM is good with hyperextension to ~120 degrees flexion.  Progress as tolerated.    Josefina is progressing well towards her goals.   Pt prognosis is Good.     Pt will continue to benefit from skilled outpatient physical therapy to address the deficits listed in the problem list box on initial evaluation, provide pt/family education and to maximize pt's level of independence in the home and community environment.     Pt's spiritual, cultural and educational needs considered and pt agreeable to plan of care and goals.    Anticipated barriers to physical therapy: none    GOALS: Short Term Goals:  6 weeks  1.Report decreased L knee pain  < / =  3/10  to increase tolerance for walking.MET 7/28/2020  2. Increase knee ROM to 0-112 degrees in order to be able to perform ADLs without difficulty. MET 7/28/2020  3. Increase strength " to 4-/5 MMT grade in L LE  to increase tolerance for ADL and work activities. Progressing, not met  4. Pt to tolerate HEP to improve ROM and independence with ADL's MET 7/28/2020     Long Term Goals: 12 weeks  1.Report decreased L knee pain < / = 0/10  to increase tolerance for walking. Progressing, not met  2.Patient goal:  To be able to walk without compensation or c/o pain. Progressing, not met  3.Increase strength to >/= 4+/5 in L LE  to increase tolerance for ADL and work activities. Progressing, not met  4. Pt will be able to climb stairs with reciprocal gait pattern and min to no c/o pain. Progressing, not met    Plan     Continue with established Plan of Care towards PT goals, focusing on ROM and quad activation.    Nicole Clemente, PT, DPT, OCS

## 2020-08-04 ENCOUNTER — CLINICAL SUPPORT (OUTPATIENT)
Dept: REHABILITATION | Facility: HOSPITAL | Age: 60
End: 2020-08-04
Payer: COMMERCIAL

## 2020-08-04 DIAGNOSIS — M25.562 CHRONIC PAIN OF LEFT KNEE: ICD-10-CM

## 2020-08-04 DIAGNOSIS — G89.29 CHRONIC PAIN OF LEFT KNEE: ICD-10-CM

## 2020-08-04 PROCEDURE — 97110 THERAPEUTIC EXERCISES: CPT

## 2020-08-04 PROCEDURE — 97530 THERAPEUTIC ACTIVITIES: CPT

## 2020-08-04 NOTE — PROGRESS NOTES
"  Physical Therapy Daily Treatment Note     Name: Josefina [facility]  Clinic Number: [id number]    Therapy Diagnosis:   Encounter Diagnosis   Name Primary?    Chronic pain of left knee      Physician: Yola Adair PA-C    Visit Date: 8/4/2020    Physician Orders: PT Eval and Treat   Medical Diagnosis from Referral: M17.12 (ICD-10-CM) - Primary osteoarthritis of left knee  Evaluation Date: 7/10/2020  Authorization Period Expiration: 12/31/2020  Plan of Care Expiration: 10/3/2020  Visit # / Visits authorized: 8/ 20     Time In: 1005  Time Out: 1050  Total Billable Time: 45 minutes     Precautions: Standard, s/p L TKA on 7/8/2020     PROCEDURES PERFORMED:   Left total knee arthroplasty (CPT 84349)    Subjective     Pt reports: that she thinks she may have hurt herself doing leg press at her home gym.  Rested a day and it now feels better.    She was compliant with home exercise program.  Response to previous treatment: full knee extension and improved knee flexion  Functional change: ambulates without AD, decreased TKE during gait    Pain: 3/10  Location: left knee     Objective     Josefina received therapeutic exercises to develop strength, endurance, ROM and flexibility for 15 minutes including:  Bike x 8 min   Hip flexor stretch off EOM w/ strap for quad stretch 3 x 30" L only  Prone quad stretch 3 x 30"  Roller stick to ITB     SL hip abd 3 x 10 NP  Prone hip ext 3 x 10 NP  Heel slides 10 x 10" NP  Calf raises x 20 NP  Calf stretch on slant x 1 min NP  HSS on step x 1 min NP  HCS/HSS w/ strap 3 x 30" NP    Josefina participated in neuromuscular re-education activities to improve: motor control for 5 minutes. The following activities were included:  Quad sets 10 x 10"   Quad sets (1/2 FR under knee) 10 x 10"   TKE with yellow super cord 30 x 3" NP    Josefina participated in dynamic functional therapeutic activities to improve functional performance for 25 minutes, including:  Leg press 120# DL 2 x 15 NP  SL leg press " "60# L only 2 x 15 NP  Lateral walking OTB x 2 laps  Step ups 6" fwd w/ knee drive 3 x 10 B  Step downs 3" lat 2 x 8  Squats to 24" box 3 x 10        Josefina received the following manual therapy techniques: Joint mobilizations and STM were applied to the: L knee for 0 minutes, including:  Patellar mobs  Roller stick to ITB and quad NP      Home Exercises Provided and Patient Education Provided     Education provided:   - proper technique for exercises    Written Home Exercises Provided: Patient instructed to cont prior HEP.  Exercises were reviewed and Josefina was able to demonstrate them prior to the end of the session.  Josefina demonstrated good  understanding of the education provided.     See EMR under Patient Instructions for exercises provided at .    Assessment     Pt tolerated treatment well today with progression of exercises.  Pt with improved eccentric strength today as demonstrated by lateral step downs.  Pt with increased quad stretch during hip flexion off EOM with strap.  Pt with full active knee extension today therefore discontinued Guyanese stim.  Progress as tolerated.    Josefina is progressing well towards her goals.   Pt prognosis is Good.     Pt will continue to benefit from skilled outpatient physical therapy to address the deficits listed in the problem list box on initial evaluation, provide pt/family education and to maximize pt's level of independence in the home and community environment.     Pt's spiritual, cultural and educational needs considered and pt agreeable to plan of care and goals.    Anticipated barriers to physical therapy: none    GOALS: Short Term Goals:  6 weeks  1.Report decreased L knee pain  < / =  3/10  to increase tolerance for walking.MET 7/28/2020  2. Increase knee ROM to 0-112 degrees in order to be able to perform ADLs without difficulty. MET 7/28/2020  3. Increase strength to 4-/5 MMT grade in L LE  to increase tolerance for ADL and work activities. Progressing, not " met  4. Pt to tolerate HEP to improve ROM and independence with ADL's MET 7/28/2020     Long Term Goals: 12 weeks  1.Report decreased L knee pain < / = 0/10  to increase tolerance for walking. Progressing, not met  2.Patient goal:  To be able to walk without compensation or c/o pain. Progressing, not met  3.Increase strength to >/= 4+/5 in L LE  to increase tolerance for ADL and work activities. Progressing, not met  4. Pt will be able to climb stairs with reciprocal gait pattern and min to no c/o pain. Progressing, not met    Plan     Continue with established Plan of Care towards PT goals, focusing on ROM and quad activation.    Nicole Clemente, PT, DPT, OCS

## 2020-08-06 ENCOUNTER — CLINICAL SUPPORT (OUTPATIENT)
Dept: REHABILITATION | Facility: HOSPITAL | Age: 60
End: 2020-08-06
Payer: COMMERCIAL

## 2020-08-06 DIAGNOSIS — G89.29 CHRONIC PAIN OF LEFT KNEE: ICD-10-CM

## 2020-08-06 DIAGNOSIS — M25.562 CHRONIC PAIN OF LEFT KNEE: ICD-10-CM

## 2020-08-06 PROCEDURE — 97110 THERAPEUTIC EXERCISES: CPT

## 2020-08-06 NOTE — PROGRESS NOTES
"  Physical Therapy Daily Treatment Note     Name: Josefina [facility]  Clinic Number: [id number]    Therapy Diagnosis:   Encounter Diagnosis   Name Primary?    Chronic pain of left knee      Physician: Yola Adair PA-C    Visit Date: 8/6/2020    Physician Orders: PT Eval and Treat   Medical Diagnosis from Referral: M17.12 (ICD-10-CM) - Primary osteoarthritis of left knee  Evaluation Date: 7/10/2020  Authorization Period Expiration: 12/31/2020  Plan of Care Expiration: 10/3/2020  Visit # / Visits authorized: 9/ 20     Time In: 1130  Time Out: 1210  Total Billable Time: 40 minutes     Precautions: Standard, s/p L TKA on 7/8/2020     PROCEDURES PERFORMED:   Left total knee arthroplasty (CPT 50119)    Subjective     Pt reports: that she has been having increased posterior knee pain.  States that she has a few knots on the backside of knee.    She was compliant with home exercise program.  Response to previous treatment: full knee extension and improved knee flexion  Functional change: ambulates without AD, decreased TKE during gait    Pain: 3/10  Location: left knee     Objective     Josefina received therapeutic exercises to develop strength, endurance, ROM and flexibility for 40 minutes including:  Bike x 8 min   HCS/HSS w/ strap 3 x 30"  Hip flexor stretch off EOM w/ strap for quad stretch 3 x 30" L only  Roller stick to HS/calf and ITB  SLR 2# 3 x 10  SL hip abd 2# 3 x 10   Prone hip ext 2# 3 x 10  SL clam OTB 10 x 10"       Prone quad stretch 3 x 30" NP  Heel slides 10 x 10" NP  Calf raises x 20 NP  Calf stretch on slant x 1 min NP  HSS on step x 1 min NP      Josefina participated in neuromuscular re-education activities to improve: motor control for 0 minutes. The following activities were included:  Quad sets 10 x 10"   Quad sets (1/2 FR under knee) 10 x 10"   TKE with yellow super cord 30 x 3" NP    Josefina participated in dynamic functional therapeutic activities to improve functional performance for 0 minutes, " "including:  Leg press 120# DL 2 x 15 NP  SL leg press 60# L only 2 x 15 NP  Lateral walking OTB x 2 laps  Step ups 6" fwd w/ knee drive 3 x 10 B  Step downs 3" lat 2 x 8  Squats to 24" box 3 x 10        Josefina received the following manual therapy techniques: Joint mobilizations and STM were applied to the: L knee for 0 minutes, including:  Patellar mobs        Home Exercises Provided and Patient Education Provided     Education provided:   - proper technique for exercises    Written Home Exercises Provided: Patient instructed to cont prior HEP.  Exercises were reviewed and Josefina was able to demonstrate them prior to the end of the session.  Josefina demonstrated good  understanding of the education provided.     See EMR under Patient Instructions for exercises provided at .    Assessment     Pt tolerated treatment fair today.  Increased c/o posterior knee pain.  Pt appears to have inflamed semimembranous bursa. Kept exercises on the mat today to decrease stress through the joint and decrease inflammation.  Resume functional exercises next visit if decreased pain.    Josefina is progressing well towards her goals.   Pt prognosis is Good.     Pt will continue to benefit from skilled outpatient physical therapy to address the deficits listed in the problem list box on initial evaluation, provide pt/family education and to maximize pt's level of independence in the home and community environment.     Pt's spiritual, cultural and educational needs considered and pt agreeable to plan of care and goals.    Anticipated barriers to physical therapy: none    GOALS: Short Term Goals:  6 weeks  1.Report decreased L knee pain  < / =  3/10  to increase tolerance for walking.MET 7/28/2020  2. Increase knee ROM to 0-112 degrees in order to be able to perform ADLs without difficulty. MET 7/28/2020  3. Increase strength to 4-/5 MMT grade in L LE  to increase tolerance for ADL and work activities. Progressing, not met  4. Pt to " tolerate HEP to improve ROM and independence with ADL's MET 7/28/2020     Long Term Goals: 12 weeks  1.Report decreased L knee pain < / = 0/10  to increase tolerance for walking. Progressing, not met  2.Patient goal:  To be able to walk without compensation or c/o pain. Progressing, not met  3.Increase strength to >/= 4+/5 in L LE  to increase tolerance for ADL and work activities. Progressing, not met  4. Pt will be able to climb stairs with reciprocal gait pattern and min to no c/o pain. Progressing, not met    Plan     Continue with established Plan of Care towards PT goals, focusing on ROM and quad activation.    Nicole Clemente, PT, DPT, OCS

## 2020-08-07 ENCOUNTER — TELEPHONE (OUTPATIENT)
Dept: SPORTS MEDICINE | Facility: CLINIC | Age: 60
End: 2020-08-07

## 2020-08-07 NOTE — TELEPHONE ENCOUNTER
Spoke with Josefina regarding her message on my chart. She had been walking after therapy more than usual and felt increased swelling and stiffness the following day last Tuesday. Explained that she likely over did it with her activity. Recommended that she reduce her activity as well as ice, elevate and compress her knee to improve the pain. Her therapist was aware of her pain and ensured she avoided weight bearing exercises until her pain improves. She is not concerned and appreciated the advice. She will contact us if she has any further questions or concerns.    Yao Smith Ms, OTC,   Clinical Assistant to Dr. Whyte

## 2020-08-10 ENCOUNTER — CLINICAL SUPPORT (OUTPATIENT)
Dept: REHABILITATION | Facility: HOSPITAL | Age: 60
End: 2020-08-10
Payer: COMMERCIAL

## 2020-08-10 DIAGNOSIS — G89.29 CHRONIC PAIN OF LEFT KNEE: ICD-10-CM

## 2020-08-10 DIAGNOSIS — M25.562 CHRONIC PAIN OF LEFT KNEE: ICD-10-CM

## 2020-08-10 PROCEDURE — 97110 THERAPEUTIC EXERCISES: CPT | Mod: CQ

## 2020-08-10 PROCEDURE — 97530 THERAPEUTIC ACTIVITIES: CPT | Mod: CQ

## 2020-08-10 NOTE — PROGRESS NOTES
"  Physical Therapy Daily Treatment Note     Name: Josefina [facility]  Clinic Number: [id number]    Therapy Diagnosis:   Encounter Diagnosis   Name Primary?    Chronic pain of left knee      Physician: Yola Adair PA-C    Visit Date: 8/10/2020    Physician Orders: PT Eval and Treat   Medical Diagnosis from Referral: M17.12 (ICD-10-CM) - Primary osteoarthritis of left knee  Evaluation Date: 7/10/2020  Authorization Period Expiration: 12/31/2020  Plan of Care Expiration: 10/3/2020  Visit # / Visits authorized: 10/ 20     Time In: 1135  Time Out: 1224  Total Billable Time: 49 minutes     Precautions: Standard, s/p L TKA on 7/8/2020     PROCEDURES PERFORMED:   Left total knee arthroplasty (CPT 77958)    Subjective     Pt reports: Only been performing table exs at home and utilizing ice more. Experiencing decreased symptoms but have not pushed it. Calf S helped symptoms this morning.    She was compliant with home exercise program.  Response to previous treatment: full knee extension and improved knee flexion  Functional change: ambulates without AD, decreased TKE during gait    Pain: 3/10  Location: left knee     Objective     Josefina received therapeutic exercises to develop strength, endurance, ROM and flexibility for 29 minutes including:  Bike x 8 min   Gostroc S on wedge 2x1'  HCS/HSS w/ strap 3 x 30"-NP  Hip flexor stretch off EOM w/ strap for quad stretch 3 x 30" L only  Roller stick to HS/calf and ITB  SL clam OTB 10 x 10"       Prone quad stretch 3 x 30" NP  Heel slides 10 x 10" NP  Calf raises x 20 NP  Calf stretch on slant x 1 min NP  HSS on step x 1 min NP  SLR 2# 3 x 10-NP  SL hip abd 2# 3 x 10 -NP  Prone hip ext 2# 3 x 10-NP    Josefina participated in neuromuscular re-education activities to improve: motor control for 0 minutes. The following activities were included:  Quad sets 10 x 10"   Quad sets (1/2 FR under knee) 10 x 10"   TKE with yellow super cord 30 x 3" NP    Josefina participated in dynamic " "functional therapeutic activities to improve functional performance for 20 minutes, including:  Leg press 120# DL 3 x 15   Step ups 6" fwd/ lat 3x10    NP:  SL leg press 60# L only 2 x 15  Lateral walking OTB x 2 laps  Step downs 3" lat 2 x 8  Squats to 24" box 3 x 10        Josefina received the following manual therapy techniques: Joint mobilizations and STM were applied to the: L knee for 0 minutes, including:  Patellar mobs        Home Exercises Provided and Patient Education Provided     Education provided:   - proper technique for exercises    Written Home Exercises Provided: Patient instructed to cont prior HEP.  Exercises were reviewed and Josefina was able to demonstrate them prior to the end of the session.  Josefina demonstrated good  understanding of the education provided.     See EMR under Patient Instructions for exercises provided at IE.    Assessment     Pt amb into clinic w/ decreased posterior knee symptoms. STM and gastroc stretching helped to alleviate symptoms.Able to reintroduce functional ex today w/o knee pain. Some knee tightness reported w/ standing ex.     Josefina is progressing well towards her goals.   Pt prognosis is Good.     Pt will continue to benefit from skilled outpatient physical therapy to address the deficits listed in the problem list box on initial evaluation, provide pt/family education and to maximize pt's level of independence in the home and community environment.     Pt's spiritual, cultural and educational needs considered and pt agreeable to plan of care and goals.    Anticipated barriers to physical therapy: none    GOALS: Short Term Goals:  6 weeks  1.Report decreased L knee pain  < / =  3/10  to increase tolerance for walking.MET 7/28/2020  2. Increase knee ROM to 0-112 degrees in order to be able to perform ADLs without difficulty. MET 7/28/2020  3. Increase strength to 4-/5 MMT grade in L LE  to increase tolerance for ADL and work activities. Progressing, not met  4. Pt " to tolerate HEP to improve ROM and independence with ADL's MET 7/28/2020     Long Term Goals: 12 weeks  1.Report decreased L knee pain < / = 0/10  to increase tolerance for walking. Progressing, not met  2.Patient goal:  To be able to walk without compensation or c/o pain. Progressing, not met  3.Increase strength to >/= 4+/5 in L LE  to increase tolerance for ADL and work activities. Progressing, not met  4. Pt will be able to climb stairs with reciprocal gait pattern and min to no c/o pain. Progressing, not met    Plan     Continue with established Plan of Care towards PT goals, focusing on ROM and quad activation.    Sabrina Grubbs, PTA,

## 2020-08-12 ENCOUNTER — TELEPHONE (OUTPATIENT)
Dept: SPORTS MEDICINE | Facility: CLINIC | Age: 60
End: 2020-08-12

## 2020-08-12 ENCOUNTER — CLINICAL SUPPORT (OUTPATIENT)
Dept: REHABILITATION | Facility: HOSPITAL | Age: 60
End: 2020-08-12
Payer: COMMERCIAL

## 2020-08-12 DIAGNOSIS — M25.562 CHRONIC PAIN OF LEFT KNEE: ICD-10-CM

## 2020-08-12 DIAGNOSIS — G89.29 CHRONIC PAIN OF LEFT KNEE: ICD-10-CM

## 2020-08-12 DIAGNOSIS — Z03.818 ENCOUNTER FOR OBSERVATION FOR SUSPECTED EXPOSURE TO OTHER BIOLOGICAL AGENTS RULED OUT: ICD-10-CM

## 2020-08-12 PROCEDURE — 97110 THERAPEUTIC EXERCISES: CPT | Mod: CQ

## 2020-08-12 NOTE — PROGRESS NOTES
S:Josefina Blue presents for post-operative evaluation.     DATE OF PROCEDURE: 7/8/2020   PROCEDURES PERFORMED:   Left total knee arthroplasty     Josefina Blue reports to be doing well 5 wks s/p the above mentioned procedure. Denies fevers, chills, night sweats, chest pain, difficulty breathing, calf pain or tenderness. Going to PT 2 x week at the [place] location. Seeing good progress daily which has been going well. She is only taking tylenol as needed.     O: *** Exam of the left knee shows a well-healed incision.  No signs of infection.  No unusual swelling.  Easy passive extension, flexion to 110° without significant pain.  Central patellar tracking.  Ligamentously stable. The quad is activating well.  No calf swelling.    Prior radiographs of the left knee show the prosthesis to be in good position.  No fracture or complication otherwise    A/P:   - Discussed wound care instructions.  - Plan to follow the rehab plan as previously outlined.   - RTC in 6 weeks and plan for repeat x-rays at that time.

## 2020-08-12 NOTE — PROGRESS NOTES
"  Physical Therapy Daily Treatment Note     Name: Josefina [facility]  Clinic Number: [id number]    Therapy Diagnosis:   Encounter Diagnosis   Name Primary?    Chronic pain of left knee      Physician: Yola Adair PA-C    Visit Date: 8/12/2020    Physician Orders: PT Eval and Treat   Medical Diagnosis from Referral: M17.12 (ICD-10-CM) - Primary osteoarthritis of left knee  Evaluation Date: 7/10/2020  Authorization Period Expiration: 12/31/2020  Plan of Care Expiration: 10/3/2020  Visit # / Visits authorized: 11/ 20     Time In: 1130  Time Out: 12:20  Total Billable Time: 50 minutes     Precautions: Standard, s/p L TKA on 7/8/2020     PROCEDURES PERFORMED:   Left total knee arthroplasty (CPT 01583)    Subjective     Pt reports: still having posterior knee pain mainly when walking. Have MD appt tomorrow w/ Dr. Whyte    She was compliant with home exercise program.  Response to previous treatment: full knee extension and improved knee flexion  Functional change: ambulates without AD, decreased TKE during gait    Pain: 3/10  Location: left knee     Objective   R knee AROM: 2 hyper (after manual) -0-115  L knee AROM : 2 hyper -0-125  Josefina received therapeutic exercises to develop strength, endurance, ROM and flexibility for 45 minutes including:  Bike x 8 min   Gostroc S on wedge 2x1'  Heel slides  SL clam OTB 10 x 10"   B QS R knee proped  Gait training  SLR #2 2x15  SL hip abd #2 3" hold 5x5  Prone hip ext #2 2x15    Josefina participated in neuromuscular re-education activities to improve: motor control for 0 minutes. The following activities were included:  Quad sets 10 x 10"   Quad sets (1/2 FR under knee) 10 x 10"   TKE with yellow super cord 30 x 3" NP    Josefina participated in dynamic functional therapeutic activities to improve functional performance for 00 minutes, including:  Leg press 120# DL 3 x 15   Step ups 6" fwd/ lat 3x10      Josefina received the following manual therapy techniques: Joint " mobilizations and STM were applied to the: L knee for 5 minutes, including:  R Femoral AP mobs        Home Exercises Provided and Patient Education Provided     Education provided:   - proper technique for exercises    Written Home Exercises Provided: Patient instructed to cont prior HEP.  Exercises were reviewed and Josefina was able to demonstrate them prior to the end of the session.  Josefina demonstrated good  understanding of the education provided.     See EMR under Patient Instructions for exercises provided at IE.    Assessment     Pt presents today w/ still c/o posterior knee pain that is mainly present when she walks. freq ice seems to help decrease symptom level. Upon observation pt appears to have decreased knee ext on R LE vs L w/ amb. Measurements were taken to compare ROM and R knee ROM is less than L. Pt did not have full knee ext on R which could be contributing to L knee pain. After R Femoral glides pt was able to achieve hyperext on R. Pt was then asked to walk and noted decrease symptoms on L after improving R knee ROM. Pt advised to cont knee ext S on B LE w/ QS. Pt still presents w/ decreased L quad strength as compared to R. Pt ED on consciously thinking about quad activation w/ heel strike during walking on B LEs. Seeing MD tomorrow for further assessment.     Josefina is progressing well towards her goals.   Pt prognosis is Good.     Pt will continue to benefit from skilled outpatient physical therapy to address the deficits listed in the problem list box on initial evaluation, provide pt/family education and to maximize pt's level of independence in the home and community environment.     Pt's spiritual, cultural and educational needs considered and pt agreeable to plan of care and goals.    Anticipated barriers to physical therapy: none    GOALS: Short Term Goals:  6 weeks  1.Report decreased L knee pain  < / =  3/10  to increase tolerance for walking.MET 7/28/2020  2. Increase knee ROM to  0-112 degrees in order to be able to perform ADLs without difficulty. MET 7/28/2020  3. Increase strength to 4-/5 MMT grade in L LE  to increase tolerance for ADL and work activities. Progressing, not met  4. Pt to tolerate HEP to improve ROM and independence with ADL's MET 7/28/2020     Long Term Goals: 12 weeks  1.Report decreased L knee pain < / = 0/10  to increase tolerance for walking. Progressing, not met  2.Patient goal:  To be able to walk without compensation or c/o pain. Progressing, not met  3.Increase strength to >/= 4+/5 in L LE  to increase tolerance for ADL and work activities. Progressing, not met  4. Pt will be able to climb stairs with reciprocal gait pattern and min to no c/o pain. Progressing, not met    Plan     Continue with established Plan of Care towards PT goals, focusing on ROM and quad activation.    Sabrina Grubbs, PTA,

## 2020-08-12 NOTE — TELEPHONE ENCOUNTER
Called patient and told her that the appointment was re-scheduled to 1:30 pm. She was fine with the new appointment. Well see her tomorrow.    Yao Smith Ms, OTC,   Clinical Assistant to Dr. Whyte

## 2020-08-13 ENCOUNTER — OFFICE VISIT (OUTPATIENT)
Dept: SPORTS MEDICINE | Facility: CLINIC | Age: 60
End: 2020-08-13
Payer: COMMERCIAL

## 2020-08-13 VITALS
DIASTOLIC BLOOD PRESSURE: 99 MMHG | HEIGHT: 68 IN | BODY MASS INDEX: 33.65 KG/M2 | WEIGHT: 222 LBS | SYSTOLIC BLOOD PRESSURE: 157 MMHG | HEART RATE: 99 BPM

## 2020-08-13 DIAGNOSIS — Z47.89 SURGICAL AFTERCARE, MUSCULOSKELETAL SYSTEM: Primary | ICD-10-CM

## 2020-08-13 PROCEDURE — 99024 POSTOP FOLLOW-UP VISIT: CPT | Mod: S$GLB,,, | Performed by: ORTHOPAEDIC SURGERY

## 2020-08-13 PROCEDURE — 99999 PR PBB SHADOW E&M-EST. PATIENT-LVL III: ICD-10-PCS | Mod: PBBFAC,,, | Performed by: ORTHOPAEDIC SURGERY

## 2020-08-13 PROCEDURE — 99024 PR POST-OP FOLLOW-UP VISIT: ICD-10-PCS | Mod: S$GLB,,, | Performed by: ORTHOPAEDIC SURGERY

## 2020-08-13 PROCEDURE — 99999 PR PBB SHADOW E&M-EST. PATIENT-LVL III: CPT | Mod: PBBFAC,,, | Performed by: ORTHOPAEDIC SURGERY

## 2020-08-13 NOTE — PROGRESS NOTES
S:Josefina Blue presents for post-operative evaluation.      DATE OF PROCEDURE: 7/8/2020   PROCEDURES PERFORMED:   Left total knee arthroplasty      Josefina Blue  returns approximately 5 weeks out from her surgery.  She has made excellent progress and has been more active lately as a result.  On her feet a good bit.  Was really having minimal pain until recently this past weekend when she had some increased pain after a long day on her feet.  She thinks she may have overdone things.  Pain is localized both over the medial and lateral aspect of the knee and also posteriorly.  No wound care issues.  No fever.  The pain has respond appropriately to rest and oral medications since then.     O:  Exam of the left knee shows a well-healed incision.  Range of motion is excellent.  Full extension, flexion to 125°.  Central patellar tracking.  She has some mild generalized tenderness around the knee.  Intact extensor mechanism.     Prior radiographs of the left knee show the prosthesis to be in good position.  No fracture or complication otherwise     A/P:   I agree with the patient.  I think she has overdone things a little bit in terms of activity.  Overall range of motion is very good for this stage of her recovery and I am pleased with that aspect of her care.  Pain has not been an issue since surgery and I expect this to get better with appropriate oral anti-inflammatory medication, rest, and activity modifications.  Will see her back in approximately 4 weeks for repeat x-rays.  All questions answered.  She has my cell number and will contact me should she have any issues.

## 2020-08-18 ENCOUNTER — CLINICAL SUPPORT (OUTPATIENT)
Dept: REHABILITATION | Facility: HOSPITAL | Age: 60
End: 2020-08-18
Payer: COMMERCIAL

## 2020-08-18 DIAGNOSIS — G89.29 CHRONIC PAIN OF LEFT KNEE: ICD-10-CM

## 2020-08-18 DIAGNOSIS — M25.562 CHRONIC PAIN OF LEFT KNEE: ICD-10-CM

## 2020-08-18 PROCEDURE — 97110 THERAPEUTIC EXERCISES: CPT

## 2020-08-18 PROCEDURE — 97112 NEUROMUSCULAR REEDUCATION: CPT

## 2020-08-18 NOTE — PROGRESS NOTES
"  Physical Therapy Daily Treatment Note     Name: Josefina [facility]  Clinic Number: [id number]    Therapy Diagnosis:   Encounter Diagnosis   Name Primary?    Chronic pain of left knee      Physician: Yola Adair PA-C    Visit Date: 8/18/2020    Physician Orders: PT Eval and Treat   Medical Diagnosis from Referral: M17.12 (ICD-10-CM) - Primary osteoarthritis of left knee  Evaluation Date: 7/10/2020  Authorization Period Expiration: 12/31/2020  Plan of Care Expiration: 10/3/2020  Visit # / Visits authorized: 12/ 20     Time In: 1345  Time Out: 1430  Total Billable Time: 45 minutes     Precautions: Standard, s/p L TKA on 7/8/2020     PROCEDURES PERFORMED:   Left total knee arthroplasty (CPT 03084)    Subjective     Pt reports: that she is doing much better.  She was compliant with home exercise program.  Response to previous treatment: full knee extension and improved knee flexion  Functional change: ambulates without AD, decreased TKE during gait    Pain: 1/10  Location: left knee     Objective   R knee AROM: 2 hyper (after manual) -0-115  L knee AROM : 2 hyper -0-125    Josefina received therapeutic exercises to develop strength, endurance, ROM and flexibility for 30 minutes including:  Bike x 8 min   Heel raises x 30  Gostroc S on wedge 2x1'  Prone quad stretch R only 3 x 30"   SL clam OTB 10 x 10"   Gait training  Roller stick to ITB x 3 min    ---not performed today---  SLR #2 2x15  SL hip abd #2 3" hold 5x5  Prone hip ext #2 2x15    Josefina participated in neuromuscular re-education activities to improve: motor control for 15 minutes. The following activities were included:  Quad sets (1/2 FR under B ankles) 10 x 10"   Quad sets (1/2 FR under B knees) 10 x 10"   TKE with yellow super cord 30 x 3" B    Josefina participated in dynamic functional therapeutic activities to improve functional performance for 00 minutes, including:  Leg press 120# DL 3 x 15   Step ups 6" fwd/ lat 3x10      Josefina received the following " manual therapy techniques: Joint mobilizations and STM were applied to the: L knee for 0 minutes, including:  R Femoral AP mobs        Home Exercises Provided and Patient Education Provided     Education provided:   - proper technique for exercises    Written Home Exercises Provided: Patient instructed to cont prior HEP.  Exercises were reviewed and Josefina was able to demonstrate them prior to the end of the session.  Josefina demonstrated good  understanding of the education provided.     See EMR under Patient Instructions for exercises provided at IE.    Assessment     Pt tolerated treatment well today with significantly less pain and swelling.  Pt with improved R knee ROM getting 0 degrees of active knee extension.  Improvement in L knee pain with improved R knee ROM.  Roller stick to ITB also decreased L knee pain.  Progress as tolerated.    Josefina is progressing well towards her goals.   Pt prognosis is Good.     Pt will continue to benefit from skilled outpatient physical therapy to address the deficits listed in the problem list box on initial evaluation, provide pt/family education and to maximize pt's level of independence in the home and community environment.     Pt's spiritual, cultural and educational needs considered and pt agreeable to plan of care and goals.    Anticipated barriers to physical therapy: none    GOALS: Short Term Goals:  6 weeks  1.Report decreased L knee pain  < / =  3/10  to increase tolerance for walking.MET 7/28/2020  2. Increase knee ROM to 0-112 degrees in order to be able to perform ADLs without difficulty. MET 7/28/2020  3. Increase strength to 4-/5 MMT grade in L LE  to increase tolerance for ADL and work activities. Progressing, not met  4. Pt to tolerate HEP to improve ROM and independence with ADL's MET 7/28/2020     Long Term Goals: 12 weeks  1.Report decreased L knee pain < / = 0/10  to increase tolerance for walking. Progressing, not met  2.Patient goal:  To be able to  walk without compensation or c/o pain. Progressing, not met  3.Increase strength to >/= 4+/5 in L LE  to increase tolerance for ADL and work activities. Progressing, not met  4. Pt will be able to climb stairs with reciprocal gait pattern and min to no c/o pain. Progressing, not met    Plan     Continue with established Plan of Care towards PT goals, focusing on ROM and quad activation.    Nicole Clemente, PT,

## 2020-08-21 ENCOUNTER — CLINICAL SUPPORT (OUTPATIENT)
Dept: REHABILITATION | Facility: HOSPITAL | Age: 60
End: 2020-08-21
Payer: COMMERCIAL

## 2020-08-21 DIAGNOSIS — M25.562 CHRONIC PAIN OF LEFT KNEE: ICD-10-CM

## 2020-08-21 DIAGNOSIS — G89.29 CHRONIC PAIN OF LEFT KNEE: ICD-10-CM

## 2020-08-21 PROCEDURE — 97110 THERAPEUTIC EXERCISES: CPT | Mod: CQ

## 2020-08-21 PROCEDURE — 97112 NEUROMUSCULAR REEDUCATION: CPT | Mod: CQ

## 2020-08-21 NOTE — PROGRESS NOTES
"  Physical Therapy Daily Treatment Note     Name: Josefina [facility]  Clinic Number: [id number]    Therapy Diagnosis:   Encounter Diagnosis   Name Primary?    Chronic pain of left knee      Physician: Yola Adair PA-C    Visit Date: 8/21/2020    Physician Orders: PT Eval and Treat   Medical Diagnosis from Referral: M17.12 (ICD-10-CM) - Primary osteoarthritis of left knee  Evaluation Date: 7/10/2020  Authorization Period Expiration: 12/31/2020  Plan of Care Expiration: 10/3/2020  Visit # / Visits authorized: 13/ 20     Time In: 1:46  Time Out: 2:30  Total Billable Time: 44 minutes     Precautions: Standard, s/p L TKA on 7/8/2020     PROCEDURES PERFORMED:   Left total knee arthroplasty (CPT 86120)    Subjective     Pt reports: Knee feels great. Just some stiffness in am and prolong sitting.   She was compliant with home exercise program.  Response to previous treatment: full knee extension and improved knee flexion  Functional change: ambulates without AD, decreased TKE during gait    Pain: 1/10  Location: left knee     Objective   L knee AROM : 3 hyper -0-120    Josefina received therapeutic exercises to develop strength, endurance, ROM and flexibility for 20 minutes including:  Bike x 5 min   Heel raises x 30  Gostroc S on wedge 2x1'  Prone quad stretch R only 3 x 30" B   SL clam OTB B 3x30"       Josefina participated in neuromuscular re-education activities to improve: motor control for 19 minutes. The following activities were included:  Quad sets (1/2 FR under B ankles) 10 x 10"   Quad sets (1/2 FR under B knees) 10 x 10" #2  Retro walking w/ TKE  Orange band  SLS 3x30" on BP    Josefina participated in dynamic functional therapeutic activities to improve functional performance for 5 minutes, including:  Sit to stand 20" w/ airex 3x12      Josefina received the following manual therapy techniques: Joint mobilizations and STM were applied to the: L knee for 0 minutes, including:  R Femoral AP mobs        Home " "Exercises Provided and Patient Education Provided     Education provided:   - proper technique for exercises    Written Home Exercises Provided: Patient instructed to cont prior HEP.  Exercises were reviewed and Josefina was able to demonstrate them prior to the end of the session.  Josefina demonstrated good  understanding of the education provided.     See EMR under Patient Instructions for exercises provided at IE.    Assessment   L knee pain has reduce since working on R LE ext. Introduced standing ex back. Attempted squatting to 20" step but had to add airex 2* R knee discomfort, L felt fine. balance challenged w/ uneven surface.      Josefina is progressing well towards her goals.   Pt prognosis is Good.     Pt will continue to benefit from skilled outpatient physical therapy to address the deficits listed in the problem list box on initial evaluation, provide pt/family education and to maximize pt's level of independence in the home and community environment.     Pt's spiritual, cultural and educational needs considered and pt agreeable to plan of care and goals.    Anticipated barriers to physical therapy: none    GOALS: Short Term Goals:  6 weeks  1.Report decreased L knee pain  < / =  3/10  to increase tolerance for walking.MET 7/28/2020  2. Increase knee ROM to 0-112 degrees in order to be able to perform ADLs without difficulty. MET 7/28/2020  3. Increase strength to 4-/5 MMT grade in L LE  to increase tolerance for ADL and work activities. Progressing, not met  4. Pt to tolerate HEP to improve ROM and independence with ADL's MET 7/28/2020     Long Term Goals: 12 weeks  1.Report decreased L knee pain < / = 0/10  to increase tolerance for walking. Progressing, not met  2.Patient goal:  To be able to walk without compensation or c/o pain. Progressing, not met  3.Increase strength to >/= 4+/5 in L LE  to increase tolerance for ADL and work activities. Progressing, not met  4. Pt will be able to climb stairs with " reciprocal gait pattern and min to no c/o pain. Progressing, not met    Plan     Continue with established Plan of Care towards PT goals, focusing on ROM and quad activation.    Sabrina Grubbs, PTA,

## 2020-08-23 DIAGNOSIS — Z03.818 ENCOUNTER FOR OBSERVATION FOR SUSPECTED EXPOSURE TO OTHER BIOLOGICAL AGENTS RULED OUT: ICD-10-CM

## 2020-08-25 ENCOUNTER — CLINICAL SUPPORT (OUTPATIENT)
Dept: REHABILITATION | Facility: HOSPITAL | Age: 60
End: 2020-08-25
Payer: COMMERCIAL

## 2020-08-25 DIAGNOSIS — G89.29 CHRONIC PAIN OF LEFT KNEE: ICD-10-CM

## 2020-08-25 DIAGNOSIS — M25.562 CHRONIC PAIN OF LEFT KNEE: ICD-10-CM

## 2020-08-25 PROCEDURE — 97112 NEUROMUSCULAR REEDUCATION: CPT | Mod: CQ

## 2020-08-25 PROCEDURE — 97110 THERAPEUTIC EXERCISES: CPT | Mod: CQ

## 2020-08-25 NOTE — PROGRESS NOTES
"  Physical Therapy Daily Treatment Note     Name: [facility]  Clinic Number: [id number]    Therapy Diagnosis:   Encounter Diagnosis   Name Primary?    Chronic pain of left knee      Physician: Yola Adari PA-C    Visit Date: 8/25/2020    Physician Orders: PT Eval and Treat   Medical Diagnosis from Referral: M17.12 (ICD-10-CM) - Primary osteoarthritis of left knee  Evaluation Date: 7/10/2020  Authorization Period Expiration: 12/31/2020  Plan of Care Expiration: 10/3/2020  Visit # / Visits authorized: 14/ 20     Time In: 10:45  Time Out: 11:30  Total Billable Time: 45 minutes     Precautions: Standard, s/p L TKA on 7/8/2020     PROCEDURES PERFORMED:   Left total knee arthroplasty (CPT 23083)    Subjective     Pt reports: HS feels tight todya  Response to previous treatment: full knee extension and improved knee flexion  Functional change: ambulates without AD, decreased TKE during gait    Pain: 1/10  Location: left knee     Objective   L knee AROM : 3 hyper -0-120    Josefina received therapeutic exercises to develop strength, endurance, ROM and flexibility for 20 minutes including:  Bike x 5 min   Heel raises x 30  HS/GS stretch w/ strap EOT  Prone quad stretch R only 3 x 30" B   SL clam OTB B 3x30"       Josefina participated in neuromuscular re-education activities to improve: motor control for 20 minutes. The following activities were included:  SLS 3x30" on BP  LAQ #2 tempo 3/3/3  Wall sits 3x45"    NP:  Quad sets (1/2 FR under B ankles) 10 x 10" -NP  Quad sets (1/2 FR under B knees) 10 x 10" #2-NP  Retro walking w/ TKE  Orange band-NP    Josefina participated in dynamic functional therapeutic activities to improve functional performance for 5 minutes, including:  Sit to stand 20" w/ airex 3x12      Josefina received the following manual therapy techniques: Joint mobilizations and STM were applied to the: L knee for 0 minutes, including:  R Femoral AP mobs        Home Exercises Provided and Patient Education " "Provided     Education provided:   - proper technique for exercises    Written Home Exercises Provided: Patient instructed to cont prior HEP.  Exercises were reviewed and Josefina was able to demonstrate them prior to the end of the session.  Josefina demonstrated good  understanding of the education provided.     See EMR under Patient Instructions for exercises provided at IE.    Assessment   L knee posterior symptoms decreased after quad and HS stretches. Advised pt to perform stretches at least 1x/day. She has not been compliant all week w/ stretching. Pt performed sts on 20" w/ airex but can prob do w/o airex next treatment. #2 was not enough resistance for LAQ, increase next session. Maintaining ext on B LEs. Pt would like to go over next session what she can do at her gym.      Josefina is progressing well towards her goals.   Pt prognosis is Good.     Pt will continue to benefit from skilled outpatient physical therapy to address the deficits listed in the problem list box on initial evaluation, provide pt/family education and to maximize pt's level of independence in the home and community environment.     Pt's spiritual, cultural and educational needs considered and pt agreeable to plan of care and goals.    Anticipated barriers to physical therapy: none    GOALS: Short Term Goals:  6 weeks  1.Report decreased L knee pain  < / =  3/10  to increase tolerance for walking.MET 7/28/2020  2. Increase knee ROM to 0-112 degrees in order to be able to perform ADLs without difficulty. MET 7/28/2020  3. Increase strength to 4-/5 MMT grade in L LE  to increase tolerance for ADL and work activities. Progressing, not met  4. Pt to tolerate HEP to improve ROM and independence with ADL's MET 7/28/2020     Long Term Goals: 12 weeks  1.Report decreased L knee pain < / = 0/10  to increase tolerance for walking. Progressing, not met  2.Patient goal:  To be able to walk without compensation or c/o pain. Progressing, not " met  3.Increase strength to >/= 4+/5 in L LE  to increase tolerance for ADL and work activities. Progressing, not met  4. Pt will be able to climb stairs with reciprocal gait pattern and min to no c/o pain. Progressing, not met    Plan     Continue with established Plan of Care towards PT goals, focusing on ROM and quad activation.    Sabrina Grubbs, PTA,

## 2020-08-27 ENCOUNTER — CLINICAL SUPPORT (OUTPATIENT)
Dept: REHABILITATION | Facility: HOSPITAL | Age: 60
End: 2020-08-27
Payer: COMMERCIAL

## 2020-08-27 DIAGNOSIS — G89.29 CHRONIC PAIN OF LEFT KNEE: ICD-10-CM

## 2020-08-27 DIAGNOSIS — M25.562 CHRONIC PAIN OF LEFT KNEE: ICD-10-CM

## 2020-08-27 PROCEDURE — 97110 THERAPEUTIC EXERCISES: CPT

## 2020-08-27 PROCEDURE — 97530 THERAPEUTIC ACTIVITIES: CPT

## 2020-08-27 NOTE — PROGRESS NOTES
"  Physical Therapy Daily Treatment Note     Name: [facility]  Clinic Number: [id number]    Therapy Diagnosis:   Encounter Diagnosis   Name Primary?    Chronic pain of left knee      Physician: Yola Adair PA-C    Visit Date: 8/27/2020    Physician Orders: PT Eval and Treat   Medical Diagnosis from Referral: M17.12 (ICD-10-CM) - Primary osteoarthritis of left knee  Evaluation Date: 7/10/2020  Authorization Period Expiration: 12/31/2020  Plan of Care Expiration: 10/3/2020  Visit # / Visits authorized: 15/ 20     Time In: 10:45  Time Out: 11:30  Total Billable Time: 45 minutes     Precautions: Standard, s/p L TKA on 7/8/2020     PROCEDURES PERFORMED:   Left total knee arthroplasty (CPT 94986)    Subjective     Pt reports: that she feels good.  Response to previous treatment: full knee extension and improved knee flexion  Functional change: ambulates without AD, decreased TKE during gait    Pain: 1/10  Location: left knee     Objective   L knee AROM : 3 hyper -0-125    Josefina received therapeutic exercises to develop strength, endurance, ROM and flexibility for 10 minutes including:  Bike x 5 min   Heel raises x 30 NP  HS/GS stretch w/ strap EOT NP  Prone quad stretch R only 3 x 30" B   SL clam OTB B 3x30" NP      Josefina participated in neuromuscular re-education activities to improve: motor control for 0 minutes. The following activities were included:  SLS 3x30" on BP  LAQ #2 tempo 3/3/3  Wall sits 3x45"    NP:  Quad sets (1/2 FR under B ankles) 10 x 10" -NP  Quad sets (1/2 FR under B knees) 10 x 10" #2-NP  Retro walking w/ TKE  Orange band-NP    Josefina participated in dynamic functional therapeutic activities to improve functional performance for 25 minutes, including:  Sit to stand 20" w/ airex 3x12 NP  Squats to 20" box x 30  Leg press # x 30  Leg press SL 60# x 30  HSC machine 25# x 30      Josefina received the following manual therapy techniques: Joint mobilizations and STM were applied to the: L " knee for 0 minutes, including:  R Femoral AP mobs        Home Exercises Provided and Patient Education Provided     Education provided:   - proper technique for exercises    Written Home Exercises Provided: Patient instructed to cont prior HEP.  Exercises were reviewed and Josefina was able to demonstrate them prior to the end of the session.  Josefina demonstrated good  understanding of the education provided.     See EMR under Patient Instructions for exercises provided at IE.    Assessment   Pt tolerated treatment well today.  Performed exercises that she could do at her gym.  Advised pt to start with light weight and work her way up as the weights at her gym may be different (free vs machine) than at PT.  Encouraged pt to perform LE exercises 2-3x/week focusing more on endurance.      Josefina is progressing well towards her goals.   Pt prognosis is Good.     Pt will continue to benefit from skilled outpatient physical therapy to address the deficits listed in the problem list box on initial evaluation, provide pt/family education and to maximize pt's level of independence in the home and community environment.     Pt's spiritual, cultural and educational needs considered and pt agreeable to plan of care and goals.    Anticipated barriers to physical therapy: none    GOALS: Short Term Goals:  6 weeks  1.Report decreased L knee pain  < / =  3/10  to increase tolerance for walking.MET 7/28/2020  2. Increase knee ROM to 0-112 degrees in order to be able to perform ADLs without difficulty. MET 7/28/2020  3. Increase strength to 4-/5 MMT grade in L LE  to increase tolerance for ADL and work activities. Progressing, not met  4. Pt to tolerate HEP to improve ROM and independence with ADL's MET 7/28/2020     Long Term Goals: 12 weeks  1.Report decreased L knee pain < / = 0/10  to increase tolerance for walking. Progressing, not met  2.Patient goal:  To be able to walk without compensation or c/o pain. Progressing, not  met  3.Increase strength to >/= 4+/5 in L LE  to increase tolerance for ADL and work activities. Progressing, not met  4. Pt will be able to climb stairs with reciprocal gait pattern and min to no c/o pain. Progressing, not met    Plan     Continue with established Plan of Care towards PT goals, focusing on ROM and quad activation.    Nicole Clemente, PT,

## 2020-09-06 ENCOUNTER — PATIENT OUTREACH (OUTPATIENT)
Dept: ADMINISTRATIVE | Facility: OTHER | Age: 60
End: 2020-09-06

## 2020-09-06 NOTE — PROGRESS NOTES
Care Everywhere: updated  Immunization: updated  Health Maintenance: updated  Media Review: review for outside colon cancer report   Legacy Review:   Order placed:   Upcoming appts:  Colonoscopy case request 5/20/2020

## 2020-09-07 ENCOUNTER — PATIENT MESSAGE (OUTPATIENT)
Dept: INTERNAL MEDICINE | Facility: CLINIC | Age: 60
End: 2020-09-07

## 2020-09-09 ENCOUNTER — HOSPITAL ENCOUNTER (OUTPATIENT)
Dept: CARDIOLOGY | Facility: HOSPITAL | Age: 60
Discharge: HOME | End: 2020-09-09
Attending: INTERNAL MEDICINE
Payer: COMMERCIAL

## 2020-09-09 ENCOUNTER — OFFICE VISIT (OUTPATIENT)
Dept: OBSTETRICS AND GYNECOLOGY | Facility: CLINIC | Age: 60
End: 2020-09-09
Attending: OBSTETRICS & GYNECOLOGY
Payer: COMMERCIAL

## 2020-09-09 VITALS
RESPIRATION RATE: 18 BRPM | HEART RATE: 76 BPM | DIASTOLIC BLOOD PRESSURE: 76 MMHG | BODY MASS INDEX: 34.56 KG/M2 | SYSTOLIC BLOOD PRESSURE: 108 MMHG | WEIGHT: 228 LBS | HEIGHT: 68 IN

## 2020-09-09 VITALS
DIASTOLIC BLOOD PRESSURE: 76 MMHG | WEIGHT: 228.63 LBS | SYSTOLIC BLOOD PRESSURE: 118 MMHG | HEIGHT: 68 IN | BODY MASS INDEX: 34.65 KG/M2

## 2020-09-09 DIAGNOSIS — N95.2 ATROPHIC VAGINITIS: ICD-10-CM

## 2020-09-09 DIAGNOSIS — Z01.419 WELL WOMAN EXAM: Primary | ICD-10-CM

## 2020-09-09 DIAGNOSIS — R94.31 ABNORMAL EKG: ICD-10-CM

## 2020-09-09 DIAGNOSIS — N89.8 VAGINAL DISCHARGE: ICD-10-CM

## 2020-09-09 LAB
ASCENDING AORTA: 3.12 CM
BSA FOR ECHO PROCEDURE: 2.23 M2
CV ECHO LV RWT: 0.37 CM
CV STRESS BASE HR: 76 BPM
DIASTOLIC BLOOD PRESSURE: 79 MMHG
DOP CALC LVOT AREA: 3.1 CM2
DOP CALC LVOT DIAMETER: 2 CM
DOP CALC LVOT PEAK VEL: 0.87 M/S
DOP CALC LVOT STROKE VOLUME: 52.44 CM3
DOP CALCLVOT PEAK VEL VTI: 16.7 CM
E WAVE DECELERATION TIME: 233.04 MSEC
E/A RATIO: 0.97
E/E' RATIO: 6.56 M/S
ECHO LV POSTERIOR WALL: 0.8 CM (ref 0.6–1.1)
FRACTIONAL SHORTENING: 35 % (ref 28–44)
INTERVENTRICULAR SEPTUM: 1 CM (ref 0.6–1.1)
IVRT: 122.74 MSEC
LA MAJOR: 4.99 CM
LA MINOR: 5.13 CM
LA WIDTH: 3.7 CM
LEFT ATRIUM SIZE: 3.72 CM
LEFT ATRIUM VOLUME INDEX: 27.4 ML/M2
LEFT ATRIUM VOLUME: 59.19 CM3
LEFT INTERNAL DIMENSION IN SYSTOLE: 2.79 CM (ref 2.1–4)
LEFT VENTRICLE DIASTOLIC VOLUME INDEX: 38.83 ML/M2
LEFT VENTRICLE DIASTOLIC VOLUME: 83.91 ML
LEFT VENTRICLE MASS INDEX: 57 G/M2
LEFT VENTRICLE SYSTOLIC VOLUME INDEX: 13.6 ML/M2
LEFT VENTRICLE SYSTOLIC VOLUME: 29.42 ML
LEFT VENTRICULAR INTERNAL DIMENSION IN DIASTOLE: 4.32 CM (ref 3.5–6)
LEFT VENTRICULAR MASS: 124.23 G
LV LATERAL E/E' RATIO: 4.54 M/S
LV SEPTAL E/E' RATIO: 11.8 M/S
MV PEAK A VEL: 0.61 M/S
MV PEAK E VEL: 0.59 M/S
MV STENOSIS PRESSURE HALF TIME: 67.58 MS
MV VALVE AREA P 1/2 METHOD: 3.26 CM2
OHS CV CPX 1 MINUTE RECOVERY HEART RATE: 133 BPM
OHS CV CPX 85 PERCENT MAX PREDICTED HEART RATE MALE: 130
OHS CV CPX MAX PREDICTED HEART RATE: 153
OHS CV CPX PATIENT IS FEMALE: 1
OHS CV CPX PATIENT IS MALE: 0
OHS CV CPX PEAK DIASTOLIC BLOOD PRESSURE: 69 MMHG
OHS CV CPX PEAK HEAR RATE: 133 BPM
OHS CV CPX PEAK RATE PRESSURE PRODUCT: NORMAL
OHS CV CPX PEAK SYSTOLIC BLOOD PRESSURE: 156 MMHG
OHS CV CPX PERCENT MAX PREDICTED HEART RATE ACHIEVED: 87
OHS CV CPX RATE PRESSURE PRODUCT PRESENTING: 9728
PISA TR MAX VEL: 1.99 M/S
PULM VEIN S/D RATIO: 1.55
PV PEAK D VEL: 0.31 M/S
PV PEAK S VEL: 0.48 M/S
RA MAJOR: 4.96 CM
RA PRESSURE: 3 MMHG
RA WIDTH: 2.8 CM
RIGHT VENTRICULAR END-DIASTOLIC DIMENSION: 2.98 CM
RV TISSUE DOPPLER FREE WALL SYSTOLIC VELOCITY 1 (APICAL 4 CHAMBER VIEW): 10.9 CM/S
SINUS: 3.3 CM
STJ: 2.63 CM
SYSTOLIC BLOOD PRESSURE: 128 MMHG
TDI LATERAL: 0.13 M/S
TDI SEPTAL: 0.05 M/S
TDI: 0.09 M/S
TR MAX PG: 16 MMHG
TRICUSPID ANNULAR PLANE SYSTOLIC EXCURSION: 2.19 CM
TV REST PULMONARY ARTERY PRESSURE: 19 MMHG

## 2020-09-09 PROCEDURE — 3078F DIAST BP <80 MM HG: CPT | Mod: CPTII,S$GLB,, | Performed by: OBSTETRICS & GYNECOLOGY

## 2020-09-09 PROCEDURE — 3074F PR MOST RECENT SYSTOLIC BLOOD PRESSURE < 130 MM HG: ICD-10-PCS | Mod: CPTII,S$GLB,, | Performed by: OBSTETRICS & GYNECOLOGY

## 2020-09-09 PROCEDURE — 63600150 PHARM REV CODE 636: Performed by: INTERNAL MEDICINE

## 2020-09-09 PROCEDURE — 87480 CANDIDA DNA DIR PROBE: CPT

## 2020-09-09 PROCEDURE — 99386 PREV VISIT NEW AGE 40-64: CPT | Mod: S$GLB,,, | Performed by: OBSTETRICS & GYNECOLOGY

## 2020-09-09 PROCEDURE — 3008F BODY MASS INDEX DOCD: CPT | Mod: CPTII,S$GLB,, | Performed by: OBSTETRICS & GYNECOLOGY

## 2020-09-09 PROCEDURE — 93351 STRESS ECHO (CUPID ONLY): ICD-10-PCS | Mod: 26,,, | Performed by: INTERNAL MEDICINE

## 2020-09-09 PROCEDURE — 87510 GARDNER VAG DNA DIR PROBE: CPT

## 2020-09-09 PROCEDURE — 3074F SYST BP LT 130 MM HG: CPT | Mod: CPTII,S$GLB,, | Performed by: OBSTETRICS & GYNECOLOGY

## 2020-09-09 PROCEDURE — 99999 PR PBB SHADOW E&M-EST. PATIENT-LVL IV: CPT | Mod: PBBFAC,,, | Performed by: OBSTETRICS & GYNECOLOGY

## 2020-09-09 PROCEDURE — 3078F PR MOST RECENT DIASTOLIC BLOOD PRESSURE < 80 MM HG: ICD-10-PCS | Mod: CPTII,S$GLB,, | Performed by: OBSTETRICS & GYNECOLOGY

## 2020-09-09 PROCEDURE — 99999 PR PBB SHADOW E&M-EST. PATIENT-LVL IV: ICD-10-PCS | Mod: PBBFAC,,, | Performed by: OBSTETRICS & GYNECOLOGY

## 2020-09-09 PROCEDURE — 93351 STRESS TTE COMPLETE: CPT | Mod: 26,,, | Performed by: INTERNAL MEDICINE

## 2020-09-09 PROCEDURE — 99386 PR PREVENTIVE VISIT,NEW,40-64: ICD-10-PCS | Mod: S$GLB,,, | Performed by: OBSTETRICS & GYNECOLOGY

## 2020-09-09 PROCEDURE — 3008F PR BODY MASS INDEX (BMI) DOCUMENTED: ICD-10-PCS | Mod: CPTII,S$GLB,, | Performed by: OBSTETRICS & GYNECOLOGY

## 2020-09-09 PROCEDURE — 93351 STRESS TTE COMPLETE: CPT

## 2020-09-09 RX ORDER — DOBUTAMINE HYDROCHLORIDE 100 MG/100ML
20 INJECTION INTRAVENOUS
Status: COMPLETED | OUTPATIENT
Start: 2020-09-09 | End: 2020-09-09

## 2020-09-09 RX ORDER — ESTRADIOL 0.1 MG/G
2 CREAM VAGINAL DAILY
Qty: 42.5 G | Refills: 1 | Status: SHIPPED | OUTPATIENT
Start: 2020-09-09 | End: 2020-10-27 | Stop reason: SDUPTHER

## 2020-09-09 RX ADMIN — DOBUTAMINE IN DEXTROSE 20 MCG/KG/MIN: 100 INJECTION, SOLUTION INTRAVENOUS at 11:09

## 2020-09-09 NOTE — NURSING NOTE
Patient verified by 2 identifiers and allergies reviewed.  22g IV placed to Rt AC.  DSE explained to patient, consent obtained & testing completed.  Pt tolerated testing well. IV removed post testing.  Post study discharge instructions reviewed with patient, patient verbalized understanding.  Patient discharged to home in stable condition.  
none

## 2020-09-09 NOTE — PROGRESS NOTES
CC: Well woman exam    Josefina Blue is a 60 y.o. female  status post hyst presents for a well woman exam.  No current issues, problems, or complaints.       history of LSC cautery of endometriosis in her 30s, then RALH  for abnormal bleeding.     History of mild dysplasia;  Normal pap 1.5 years ago.  MMG is up to date.    Using estrace 1g vaginally 2-3 x per week and still has dryness with intercourse.    Past Medical History:   Diagnosis Date    DDD (degenerative disc disease), lumbar 10/7/2019    Hyperlipidemia 2015    Hypertension     IFG (impaired fasting glucose) 2015    Neuropathic pain 2015    Squamous cell cancer of tongue      Past Surgical History:   Procedure Laterality Date    CHOLECYSTECTOMY      GALLBLADDER SURGERY  2016    HYSTERECTOMY      KNEE ARTHROPLASTY Left 2020    Procedure: ARTHROPLASTY, KNEE;  Surgeon: GLENN Whyte MD;  Location: [facility] OR;  Service: Orthopedics;  Laterality: Left;  regional w/catheter   Spinal, Adductor  Cloidine/Epi/Ketorolac/Ropivacaine Injection 30cc      KNEE ARTHROSCOPY Right     for torn meniscus    TONGUE SURGERY      TRANSFORAMINAL EPIDURAL INJECTION OF STEROID Bilateral 2019    Procedure: Injection,steroid,epidural,transforaminal approach LUMBAR TRANSFORAMINAL BILATERAL L4/5 TF NOE;  Surgeon: Tim Kerns MD;  Location: [facility];  Service: Pain Management;  Laterality: Bilateral;  NEEDS CONSENT, VIP    TRANSFORAMINAL EPIDURAL INJECTION OF STEROID Bilateral 10/7/2019    Procedure: LUMBAR TRANSFORAMINAL BILATERAL L4/5 TF NOE;  Surgeon: Tim Kerns MD;  Location: [facility];  Service: Pain Management;  Laterality: Bilateral;  NEEDS CONSENT, **VIP**     Family History   Problem Relation Age of Onset    Alcohol abuse Mother     Cirrhosis Mother     Cancer Father 74        prostate, throat, lung- smoker    Hyperlipidemia Father     Diabetes Sister     Hypertension Sister     Drug abuse Brother   "   Heart disease Brother     Hyperlipidemia Brother     No Known Problems Maternal Aunt     No Known Problems Maternal Uncle     No Known Problems Paternal Aunt     No Known Problems Paternal Uncle     No Known Problems Maternal Grandmother     No Known Problems Maternal Grandfather     No Known Problems Paternal Grandmother     No Known Problems Paternal Grandfather     Amblyopia Neg Hx     Blindness Neg Hx     Cataracts Neg Hx     Glaucoma Neg Hx     Macular degeneration Neg Hx     Retinal detachment Neg Hx     Strabismus Neg Hx     Stroke Neg Hx     Thyroid disease Neg Hx      Social History     Tobacco Use    Smoking status: Former Smoker     Packs/day: 0.25     Years: 1.00     Pack years: 0.25    Smokeless tobacco: Never Used   Substance Use Topics    Alcohol use: Yes     Alcohol/week: 0.0 standard drinks     Frequency: 2-4 times a month     Drinks per session: 1 or 2     Binge frequency: Never     Comment: once a week    Drug use: No     OB History        0    Para   0    Term   0       0    AB   0    Living   0       SAB   0    TAB   0    Ectopic   0    Multiple   0    Live Births                     /76 (BP Location: Left arm, Patient Position: Sitting, BP Method: Medium (Manual))   Ht 5' 8" (1.727 m)   Wt 103.7 kg (228 lb 9.9 oz)   BMI 34.76 kg/m²     ROS:    GENERAL: Denies weight gain or weight loss. Feeling well overall.   SKIN: Denies rash or lesions.   HEAD: Denies head injury or headache.   NODES: Denies enlarged lymph nodes.   CHEST: Denies chest pain or shortness of breath.   CARDIOVASCULAR: Denies palpitations or left sided chest pain.   ABDOMEN: No abdominal pain, constipation, diarrhea, nausea, vomiting or rectal bleeding.   URINARY: No frequency, dysuria, hematuria, or burning on urination.  REPRODUCTIVE: See HPI.   BREASTS: The patient performs breast self-examination and denies pain, lumps, or nipple discharge.   HEMATOLOGIC: No easy bruisability " or excessive bleeding.   MUSCULOSKELETAL: Denies joint pain or swelling.   NEUROLOGIC: Denies syncope or weakness.   PSYCHIATRIC: Denies depression, anxiety or mood swings.      PHYSICAL EXAM:     APPEARANCE: Well nourished, well developed, in no acute distress.  AFFECT: WNL, alert and oriented x 3.  SKIN: No acne or hirsutism.  NECK: Neck symmetric without masses or thyromegaly.  NODES: No inguinal, cervical, axillary or femoral lymph node enlargement.  CHEST: Good respiratory effort.   ABDOMEN: Soft. No tenderness or masses. No hepatosplenomegaly. No hernias.  BREASTS: Symmetrical, no skin changes or visible lesions. No palpable masses, nipple discharge bilaterally.  PELVIC: Normal external female genitalia without lesions. Normal hair distribution. Adequate perineal body, normal urethral meatus. Vagina atrophic without lesions.  White discharge noted - ? Estrace cream vs. Yeast.  Patient asymptomatic. No significant cystocele or rectocele. Bimanual exam shows uterus and cervix to be surgically absent. Adnexa without masses or tenderness.  EXTREMITIES: No edema.    ASSESSMENT    ICD-10-CM ICD-9-CM    1. Well woman exam  Z01.419 V72.31 DXA Bone Density Spine And Hip   2. Atrophic vaginitis  N95.2 627.3 estradioL (ESTRACE) 0.01 % (0.1 mg/gram) vaginal cream   3. Vaginal discharge  N89.8 623.5 Vaginosis Screen by DNA Probe        Increase vaginal estrace dose  List of lubricants given  Patient was counseled today on A.C.S. Pap guidelines and recommendations for yearly pelvic exams, mammograms and monthly self breast exams; to see her PCP for other health maintenance.

## 2020-09-10 ENCOUNTER — CLINICAL SUPPORT (OUTPATIENT)
Dept: REHABILITATION | Facility: HOSPITAL | Age: 60
End: 2020-09-10
Attending: PHYSICIAN ASSISTANT
Payer: COMMERCIAL

## 2020-09-10 DIAGNOSIS — G89.29 CHRONIC PAIN OF LEFT KNEE: ICD-10-CM

## 2020-09-10 DIAGNOSIS — M25.562 CHRONIC PAIN OF LEFT KNEE: ICD-10-CM

## 2020-09-10 PROCEDURE — 97112 NEUROMUSCULAR REEDUCATION: CPT

## 2020-09-10 PROCEDURE — 97530 THERAPEUTIC ACTIVITIES: CPT

## 2020-09-10 PROCEDURE — 97110 THERAPEUTIC EXERCISES: CPT

## 2020-09-10 NOTE — PROGRESS NOTES
"  Physical Therapy Daily Treatment Note     Name: [facility]  Clinic Number: [id number]    Therapy Diagnosis:   Encounter Diagnosis   Name Primary?    Chronic pain of left knee      Physician: Yola Adair PA-C    Visit Date: 9/10/2020    Physician Orders: PT Eval and Treat   Medical Diagnosis from Referral: M17.12 (ICD-10-CM) - Primary osteoarthritis of left knee  Evaluation Date: 7/10/2020  Authorization Period Expiration: 12/31/2020  Plan of Care Expiration: 10/3/2020  Visit # / Visits authorized: 16/ 20     Time In: 1725  Time Out: 1810  Total Billable Time: 45 minutes     Precautions: Standard, s/p L TKA on 7/8/2020     PROCEDURES PERFORMED:   Left total knee arthroplasty (CPT 91780)    Subjective     Pt reports: that she was doing really good but then thinks she overdid it yesterday on the [facility].  States that she added weight and then experienced some cramping.  Wanted to increase weight to improve strength because she will be walking a lot this weekend.  Response to previous treatment: full knee extension and improved knee flexion  Functional change: ambulates without AD, decreased TKE during gait    Pain: 1/10  Location: left knee     Objective   L knee AROM : 3 hyper -0-125    Josefina received therapeutic exercises to develop strength, endurance, ROM and flexibility for 25 minutes including:  Bike x 10 min   Heel raises x 30 NP  HS/GS stretch w/ strap EOT 3 x 30"   Calf stretch on slant 3 x 30"   Roller stick to ITB   Prone quad stretch R only 3 x 30" B NP  SL clam OTB B 3x45"      Josefina participated in neuromuscular re-education activities to improve: motor control for 10 minutes. The following activities were included:  SLS 3x30" on BP  LAQ #2 tempo 3/3/3 NP  Wall sits w/ GTB 3x45"    NP:  Quad sets (1/2 FR under B ankles) 10 x 10" -NP  Quad sets (1/2 FR under B knees) 10 x 10" #2-NP  Retro walking w/ TKE  Orange band-NP    Josefina participated in dynamic functional therapeutic activities to " "improve functional performance for 10 minutes, including:  Plank 3 x 30"   Sit to stand 24" 3x15           Josefina received the following manual therapy techniques: Joint mobilizations and STM were applied to the: L knee for 0 minutes, including:  R Femoral AP mobs        Home Exercises Provided and Patient Education Provided     Education provided:   - proper technique for exercises    Written Home Exercises Provided: Patient instructed to cont prior HEP.  Exercises were reviewed and Josefina was able to demonstrate them prior to the end of the session.  Josefina demonstrated good  understanding of the education provided.     See EMR under Patient Instructions for exercises provided at .    Assessment   Pt tolerated treatment well today.  After bike and stretches, decreased HS/ITB pain noted.  Focused on increased reps and increased holds today to further improve endurance.  Educated pt again on endurance vs strength.  Instructed pt to perform higher rep, lower weight exercises at gym to further improve endurance and decrease pain.  Progress as tolerated.      Josefina is progressing well towards her goals.   Pt prognosis is Good.     Pt will continue to benefit from skilled outpatient physical therapy to address the deficits listed in the problem list box on initial evaluation, provide pt/family education and to maximize pt's level of independence in the home and community environment.     Pt's spiritual, cultural and educational needs considered and pt agreeable to plan of care and goals.    Anticipated barriers to physical therapy: none    GOALS: Short Term Goals:  6 weeks  1.Report decreased L knee pain  < / =  3/10  to increase tolerance for walking.MET 7/28/2020  2. Increase knee ROM to 0-112 degrees in order to be able to perform ADLs without difficulty. MET 7/28/2020  3. Increase strength to 4-/5 MMT grade in L LE  to increase tolerance for ADL and work activities. Progressing, not met  4. Pt to tolerate HEP to " improve ROM and independence with ADL's MET 7/28/2020     Long Term Goals: 12 weeks  1.Report decreased L knee pain < / = 0/10  to increase tolerance for walking. Progressing, not met  2.Patient goal:  To be able to walk without compensation or c/o pain. Progressing, not met  3.Increase strength to >/= 4+/5 in L LE  to increase tolerance for ADL and work activities. Progressing, not met  4. Pt will be able to climb stairs with reciprocal gait pattern and min to no c/o pain. Progressing, not met    Plan     Continue with established Plan of Care towards PT goals, focusing on ROM and quad activation.    Nicole Clemente, PT, DPT, OCS

## 2020-09-12 LAB
CANDIDA RRNA VAG QL PROBE: NOT DETECTED
G VAGINALIS RRNA GENITAL QL PROBE: NOT DETECTED
T VAGINALIS RRNA GENITAL QL PROBE: NOT DETECTED

## 2020-09-21 NOTE — PROGRESS NOTES
DATE OF PROCEDURE: 7/8/2020   PROCEDURES PERFORMED:   Left total knee arthroplasty      Josefina Blue  returns approximately 11 weeks out from her surgery. Going to PT 2 x week at the [place] location which has been going well.  Patient is extremely pleased with her progress of her left knee.  She is doing so well that she would like to discuss right total knee arthroplasty.  She has had right knee pain now for several months.  Same is left side.  Pain present with day-to-day activity.  Disrupts sleep at night.  This is a quality of life issue for her.  No back or radicular complaints at this time.  No ipsilateral hip pain.  Prior conservative treatment has included injections, therapy, oral medication.    Pain Score:   1    REVIEW OF SYSTEMS:   Constitution: Negative. Negative for chills, fever and night sweats.    Hematologic/Lymphatic: Negative for bleeding problem. Does not bruise/bleed easily.   Skin: Negative for dry skin, itching and rash.   Musculoskeletal: Negative for falls. Positive for right knee pain and  muscle weakness.     PAST MEDICAL HISTORY:   Past Medical History:   Diagnosis Date    DDD (degenerative disc disease), lumbar 10/7/2019    Hyperlipidemia 8/31/2015    Hypertension     IFG (impaired fasting glucose) 8/31/2015    Neuropathic pain 8/31/2015    Squamous cell cancer of tongue 2012       PAST SURGICAL HISTORY:   Past Surgical History:   Procedure Laterality Date    CHOLECYSTECTOMY      GALLBLADDER SURGERY  06/2016    HYSTERECTOMY      KNEE ARTHROPLASTY Left 7/8/2020    Procedure: ARTHROPLASTY, KNEE;  Surgeon: GLENN Whyte MD;  Location: [facility];  Service: Orthopedics;  Laterality: Left;  regional w/catheter   Spinal, Adductor  Cloidine/Epi/Ketorolac/Ropivacaine Injection 30cc      KNEE ARTHROSCOPY Right     for torn meniscus    TONGUE SURGERY      TRANSFORAMINAL EPIDURAL INJECTION OF STEROID Bilateral 9/19/2019    Procedure: Injection,steroid,epidural,transforaminal  approach LUMBAR TRANSFORAMINAL BILATERAL L4/5 TF NOE;  Surgeon: Tim Kerns MD;  Location: [facility];  Service: Pain Management;  Laterality: Bilateral;  NEEDS CONSENT, VIP    TRANSFORAMINAL EPIDURAL INJECTION OF STEROID Bilateral 10/7/2019    Procedure: LUMBAR TRANSFORAMINAL BILATERAL L4/5 TF NOE;  Surgeon: Tim Kerns MD;  Location: [facility];  Service: Pain Management;  Laterality: Bilateral;  NEEDS CONSENT, **VIP**       FAMILY HISTORY:   Family History   Problem Relation Age of Onset    Alcohol abuse Mother     Cirrhosis Mother     Cancer Father 74        prostate, throat, lung- smoker    Hyperlipidemia Father     Diabetes Sister     Hypertension Sister     Drug abuse Brother     Heart disease Brother     Hyperlipidemia Brother     No Known Problems Maternal Aunt     No Known Problems Maternal Uncle     No Known Problems Paternal Aunt     No Known Problems Paternal Uncle     No Known Problems Maternal Grandmother     No Known Problems Maternal Grandfather     No Known Problems Paternal Grandmother     No Known Problems Paternal Grandfather     Amblyopia Neg Hx     Blindness Neg Hx     Cataracts Neg Hx     Glaucoma Neg Hx     Macular degeneration Neg Hx     Retinal detachment Neg Hx     Strabismus Neg Hx     Stroke Neg Hx     Thyroid disease Neg Hx        SOCIAL HISTORY:   Social History     Socioeconomic History    Marital status:      Spouse name: Not on file    Number of children: Not on file    Years of education: Not on file    Highest education level: Not on file   Occupational History    Occupation: chief nursing officer   Social Needs    Financial resource strain: Not hard at all    Food insecurity     Worry: Never true     Inability: Never true    Transportation needs     Medical: No     Non-medical: No   Tobacco Use    Smoking status: Former Smoker     Packs/day: 0.25     Years: 1.00     Pack years: 0.25    Smokeless tobacco: Never Used   Substance  and Sexual Activity    Alcohol use: Yes     Alcohol/week: 0.0 standard drinks     Frequency: 2-4 times a month     Drinks per session: 1 or 2     Binge frequency: Never     Comment: once a week    Drug use: No    Sexual activity: Yes     Birth control/protection: None   Lifestyle    Physical activity     Days per week: 2 days     Minutes per session: 30 min    Stress: Only a little   Relationships    Social connections     Talks on phone: More than three times a week     Gets together: Three times a week     Attends Yazdanism service: Not on file     Active member of club or organization: No     Attends meetings of clubs or organizations: Never     Relationship status:    Other Topics Concern    Are you pregnant or think you may be? Not Asked    Breast-feeding Not Asked   Social History Narrative    Lives with  and 1 dog       MEDICATIONS:     Current Outpatient Medications:     ALPRAZolam (XANAX) 0.25 MG tablet, TAKE ONE TABLET BY MOUTH AT BEDTIME AS NEEDED., Disp: 30 tablet, Rfl: 2    aspirin (ECOTRIN) 81 MG EC tablet, Take 1 tablet (81 mg total) by mouth nightly. (Patient not taking: Reported on 9/24/2020), Disp: , Rfl: 0    celecoxib (CELEBREX) 200 MG capsule, Take 1 capsule (200 mg total) by mouth 2 (two) times daily., Disp: 60 capsule, Rfl: 2    cephALEXin (KEFLEX) 500 MG capsule, Take 4 capsule, 30 minutes prior to dental procedure. For one dose., Disp: 4 capsule, Rfl: 0    clobetasol (TEMOVATE) 0.05 % external solution, Use one to two times daily as needed for scaling or itching to scalp, Disp: 60 mL, Rfl: 3    diazePAM (VALIUM) 5 MG tablet, Take 1 tablet (5 mg total) by mouth every 12 (twelve) hours as needed (s/p total knee, prescribed to help her sleep.)., Disp: 20 tablet, Rfl: 0    estradioL (ESTRACE) 0.01 % (0.1 mg/gram) vaginal cream, Place 1 g vaginally once daily. (Patient not taking: Reported on 9/24/2020), Disp: 42.5 g, Rfl: 0    estradioL (ESTRACE) 0.01 % (0.1  "mg/gram) vaginal cream, Place 2 g vaginally once daily., Disp: 42.5 g, Rfl: 1    irbesartan (AVAPRO) 75 MG tablet, Take 1 tablet (75 mg total) by mouth every evening. (Patient not taking: Reported on 9/24/2020), Disp: 90 tablet, Rfl: 3    melatonin 3 mg TbDL, Take by mouth., Disp: , Rfl:     pravastatin (PRAVACHOL) 20 MG tablet, Take 1 tablet (20 mg total) by mouth once daily. (Patient not taking: Reported on 9/24/2020), Disp: 90 tablet, Rfl: 0    triamterene-hydrochlorothiazide 37.5-25 mg (MAXZIDE-25) 37.5-25 mg per tablet, Take 1 tablet by mouth once daily. (Patient not taking: Reported on 9/24/2020), Disp: 90 tablet, Rfl: 3    ALLERGIES:   Review of patient's allergies indicates:   Allergen Reactions    Aspirin Nausea Only     Can take low dose of Aspirin; can take buffered low dose aspirin only    Codeine Nausea Only     Can take Codeine if she takes a dose of Zofran with it        PHYSICAL EXAMINATION:  /80   Pulse 88   Ht 5' 8" (1.727 m)   Wt 102.1 kg (225 lb)   BMI 34.21 kg/m²   General: Well-developed well-nourished 60 y.o. femalein no acute distress   Cardiovascular: Regular rhythm by palpation of distal pulse, normal color and temperature, no concerning varicosities on symptomatic side   Lungs: No labored breathing or wheezing appreciated   Neuro: Alert and oriented ×3   Psychiatric: well oriented to person, place and time, demonstrates normal mood and affect   Skin: No rashes, lesions or ulcers, normal temperature, turgor, and texture on involved extremity    Ortho/SPM Exam  Exam of the left knee shows a well-healed incision.  Range of motion is excellent.  Full extension, flexion to 130°.  Central patellar tracking.  She has no tenderness around the knee.  Intact extensor mechanism.    Examination of the right knee demonstrates intact extensor mechanism.  No significant soft tissue swelling.  No effusion. Central patellar tracking with positive grind test. No patellar apprehension.  " Mildly decreased patellar mobility. Full extension with flexion to 130 with significant pain. Pain with forced flexion and extension. Prominent tenderness along the medial>lateral joint line.  Pain medially with varus load. Ligamentously stable.  Painless passive internal and external rotation of the hip.  No significant PVD.  2+ dorsalis pedis pulse.  No midline back tenderness.  Quad activates well.  She does have some increased adipose tissue around the knee.      IMAGING:  X-rays including standing, weight bearing AP and flexion bilateral knees,  reviewed by me show:   Advanced right knee osteoarthritis, tricompartmental with bone-on-bone involvement medially.  Intra-articular varus deformity.  Left knee demonstrates a well-positioned total knee arthroplasty prosthesis without any acute osseous abnormalities including a type of fracture or osteolysis.     ASSESSMENT:      ICD-10-CM ICD-9-CM   1. Primary osteoarthritis of right knee  M17.11 715.16   2. Surgical aftercare, musculoskeletal system  Z47.89 V58.78   3. Pre-op testing  Z01.818 V72.84       PLAN:     Patient's left knee is doing well.  No complaints in that regard.  She would like to proceed with right total knee arthroplasty.  The details of such were discussed as before to include the expected postop rehab and recovery course.  Again I discussed with her the risk for continued or recurrent pain postop which can occur in upwards of 30% of patients with nuisance symptoms, stiffness, infection, DVT/PE among others.  She verbalized understanding and wishes to proceed.  She will again and knee preoperative medical and dental clearances.    Plan for right total knee arthroplasty.    Informed Consent:    The details of the surgical procedure were explained, including the location of probable incisions and a description of possible hardware and/or grafts to be used. Alternatives to both operative and non-operative options with associated risks and benefits  were discussed. The patient understands the likely convalescence after surgery and, in particular, the expected postop rehab and recovery course. The outlined risks and potential complications of the proposed procedure include but are not limited to: infection, poor wound healing, scarring, deformity, stiffness, swelling, continued or recurrent pain, instability, hardware or prosthetic failure if implanted, symptomatic hardware requiring removal, dislocation, weakness, neurovascular injury, numbness, chronic regional pain disorder, tissue nonhealing/irreparability/retear, subsequent contralateral limb injury or pathology, chondral injury, arthritis, fracture, blood clot formation, inability to return to previous level of activity, anesthetic or regional block complication up to death, need for additional procedure as indicated intraoperatively, and potential need for further surgery.    The patient was also informed and understands that the risks of surgery are greater for patients with a current condition or history of heart disease, obesity, clotting disorders, recurrent infections, steroid use, current or past smoking, and factors such as sedentary lifestyle and noncompliance with medications, therapy or follow-up. The degree of the increased risk is hard to estimate with any degree of precision. If applicable, smoking cessation was discussed.     All questions were answered. The patient has verbalized understanding of these issues and wishes to proceed with the surgery as discussed.      Procedures

## 2020-09-22 ENCOUNTER — PATIENT MESSAGE (OUTPATIENT)
Dept: SPORTS MEDICINE | Facility: CLINIC | Age: 60
End: 2020-09-22

## 2020-09-23 RX ORDER — ALPRAZOLAM 0.25 MG/1
TABLET ORAL
Qty: 30 TABLET | Refills: 2 | Status: SHIPPED | OUTPATIENT
Start: 2020-09-23 | End: 2021-06-06 | Stop reason: SDUPTHER

## 2020-09-24 ENCOUNTER — HOSPITAL ENCOUNTER (OUTPATIENT)
Dept: RADIOLOGY | Facility: HOSPITAL | Age: 60
Discharge: HOME | End: 2020-09-24
Attending: ORTHOPAEDIC SURGERY
Payer: COMMERCIAL

## 2020-09-24 ENCOUNTER — OFFICE VISIT (OUTPATIENT)
Dept: SPORTS MEDICINE | Facility: CLINIC | Age: 60
End: 2020-09-24
Payer: COMMERCIAL

## 2020-09-24 VITALS
WEIGHT: 225 LBS | SYSTOLIC BLOOD PRESSURE: 117 MMHG | BODY MASS INDEX: 34.1 KG/M2 | DIASTOLIC BLOOD PRESSURE: 80 MMHG | HEIGHT: 68 IN | HEART RATE: 88 BPM

## 2020-09-24 DIAGNOSIS — Z47.89 SURGICAL AFTERCARE, MUSCULOSKELETAL SYSTEM: ICD-10-CM

## 2020-09-24 DIAGNOSIS — M17.11 PRIMARY OSTEOARTHRITIS OF RIGHT KNEE: Primary | ICD-10-CM

## 2020-09-24 DIAGNOSIS — Z01.818 PRE-OP TESTING: ICD-10-CM

## 2020-09-24 PROCEDURE — 73564 XR KNEE ORTHO BILAT WITH FLEXION: ICD-10-PCS | Mod: 26,50,, | Performed by: RADIOLOGY

## 2020-09-24 PROCEDURE — 3008F BODY MASS INDEX DOCD: CPT | Mod: CPTII,S$GLB,, | Performed by: ORTHOPAEDIC SURGERY

## 2020-09-24 PROCEDURE — 73564 X-RAY EXAM KNEE 4 OR MORE: CPT | Mod: TC,50

## 2020-09-24 PROCEDURE — 99999 PR PBB SHADOW E&M-EST. PATIENT-LVL III: ICD-10-PCS | Mod: PBBFAC,,, | Performed by: ORTHOPAEDIC SURGERY

## 2020-09-24 PROCEDURE — 3074F PR MOST RECENT SYSTOLIC BLOOD PRESSURE < 130 MM HG: ICD-10-PCS | Mod: CPTII,S$GLB,, | Performed by: ORTHOPAEDIC SURGERY

## 2020-09-24 PROCEDURE — 99999 PR PBB SHADOW E&M-EST. PATIENT-LVL III: CPT | Mod: PBBFAC,,, | Performed by: ORTHOPAEDIC SURGERY

## 2020-09-24 PROCEDURE — 99213 OFFICE O/P EST LOW 20 MIN: CPT | Mod: 24,S$GLB,, | Performed by: ORTHOPAEDIC SURGERY

## 2020-09-24 PROCEDURE — 73564 X-RAY EXAM KNEE 4 OR MORE: CPT | Mod: 26,50,, | Performed by: RADIOLOGY

## 2020-09-24 PROCEDURE — 3079F DIAST BP 80-89 MM HG: CPT | Mod: CPTII,S$GLB,, | Performed by: ORTHOPAEDIC SURGERY

## 2020-09-24 PROCEDURE — 3074F SYST BP LT 130 MM HG: CPT | Mod: CPTII,S$GLB,, | Performed by: ORTHOPAEDIC SURGERY

## 2020-09-24 PROCEDURE — 3079F PR MOST RECENT DIASTOLIC BLOOD PRESSURE 80-89 MM HG: ICD-10-PCS | Mod: CPTII,S$GLB,, | Performed by: ORTHOPAEDIC SURGERY

## 2020-09-24 PROCEDURE — 99213 PR OFFICE/OUTPT VISIT, EST, LEVL III, 20-29 MIN: ICD-10-PCS | Mod: 24,S$GLB,, | Performed by: ORTHOPAEDIC SURGERY

## 2020-09-24 PROCEDURE — 3008F PR BODY MASS INDEX (BMI) DOCUMENTED: ICD-10-PCS | Mod: CPTII,S$GLB,, | Performed by: ORTHOPAEDIC SURGERY

## 2020-09-24 RX ORDER — CEPHALEXIN 500 MG/1
2000 CAPSULE ORAL ONCE
Qty: 4 CAPSULE | Refills: 0 | Status: SHIPPED | OUTPATIENT
Start: 2020-09-24 | End: 2020-09-30

## 2020-09-25 DIAGNOSIS — M17.11 OSTEOARTHRITIS OF RIGHT KNEE, UNSPECIFIED OSTEOARTHRITIS TYPE: Primary | ICD-10-CM

## 2020-09-28 ENCOUNTER — PATIENT MESSAGE (OUTPATIENT)
Dept: SPORTS MEDICINE | Facility: CLINIC | Age: 60
End: 2020-09-28

## 2020-09-28 ENCOUNTER — TELEPHONE (OUTPATIENT)
Dept: SPORTS MEDICINE | Facility: CLINIC | Age: 60
End: 2020-09-28

## 2020-09-28 NOTE — TELEPHONE ENCOUNTER
Left VM for patient to return call in regards to rescheduling pre-op appointment with Palomo and jeffrey test.

## 2020-09-29 ENCOUNTER — TELEPHONE (OUTPATIENT)
Dept: SPORTS MEDICINE | Facility: CLINIC | Age: 60
End: 2020-09-29

## 2020-09-29 ENCOUNTER — PATIENT MESSAGE (OUTPATIENT)
Dept: OTHER | Facility: OTHER | Age: 60
End: 2020-09-29

## 2020-09-29 ENCOUNTER — CLINICAL SUPPORT (OUTPATIENT)
Dept: REHABILITATION | Facility: HOSPITAL | Age: 60
End: 2020-09-29
Payer: COMMERCIAL

## 2020-09-29 DIAGNOSIS — M25.562 CHRONIC PAIN OF LEFT KNEE: ICD-10-CM

## 2020-09-29 DIAGNOSIS — G89.29 CHRONIC PAIN OF LEFT KNEE: ICD-10-CM

## 2020-09-29 PROCEDURE — 97140 MANUAL THERAPY 1/> REGIONS: CPT

## 2020-09-29 PROCEDURE — 97112 NEUROMUSCULAR REEDUCATION: CPT

## 2020-09-29 PROCEDURE — 97110 THERAPEUTIC EXERCISES: CPT

## 2020-09-29 PROCEDURE — 97530 THERAPEUTIC ACTIVITIES: CPT

## 2020-09-29 NOTE — PROGRESS NOTES
"  Physical Therapy Daily Treatment Note     Name: [facility]  Clinic Number: [id number]    Therapy Diagnosis:   Encounter Diagnosis   Name Primary?    Chronic pain of left knee      Physician: Yola Adair PA-C    Visit Date: 9/29/2020    Physician Orders: PT Eval and Treat   Medical Diagnosis from Referral: M17.12 (ICD-10-CM) - Primary osteoarthritis of left knee  Evaluation Date: 7/10/2020  Authorization Period Expiration: 12/31/2020  Plan of Care Expiration: 10/3/2020  Visit # / Visits authorized: 17/ 20     Time In: 1730  Time Out: 1815  Total Billable Time: 45 minutes     Precautions: Standard, s/p L TKA on 7/8/2020     PROCEDURES PERFORMED:   Left total knee arthroplasty (CPT 60131)    Subjective     Pt reports: that she injured her R knee on her trip.  States she hyperflexed it when she fell on the steps.  Went to MD and is getting R TKR on 11/11/2020.    Response to previous treatment: full knee extension and improved knee flexion  Functional change: ambulates without AD, decreased TKE during gait    Pain: 1/10  Location: left knee     Objective   L knee AROM : 3 hyper -0-125    Josefina received therapeutic exercises to develop strength, endurance, ROM and flexibility for 12 minutes including:  Bike x 10 min   Heel raises x 30 NP  HS/GS stretch w/ strap EOT 3 x 30" B  Calf stretch on slant 3 x 30" NP  Roller stick to ITB NP  Prone quad stretch R only 3 x 30" B NP  SL clam OTB B 3x45" NP      Josefina participated in neuromuscular re-education activities to improve: motor control for 8 minutes. The following activities were included:  Quad sets (1/2 FR under B ankles) 20 x 5"   Quad sets (1/2 FR under B knees) 20 x 5"         NP:  SLS 3x30" on BP  LAQ #2 tempo 3/3/3 NP  Wall sits w/ GTB 3x45"  Retro walking w/ TKE  Orange band-NP    Josefina participated in dynamic functional therapeutic activities to improve functional performance for 10 minutes, including:  Plank 3 x 30"   Sit to stand 24" 3x15 "           Josefina received the following manual therapy techniques: Joint mobilizations and STM were applied to the: L knee for 15 minutes, including:  R Femoral AP mobs  Patellar mobs on the R        Home Exercises Provided and Patient Education Provided     Education provided:   - proper technique for exercises    Written Home Exercises Provided: Patient instructed to cont prior HEP.  Exercises were reviewed and Josefina was able to demonstrate them prior to the end of the session.  Josefina demonstrated good  understanding of the education provided.     See EMR under Patient Instructions for exercises provided at IE.    Assessment   Pt tolerated treatment well today.  Worked on R knee mobility and stretching today to decrease R knee pain and improve gait.  Pt with increased posterior knee tightness and decreased patellar mobility on the R. Progress as tolerated.      Josefina is progressing well towards her goals.   Pt prognosis is Good.     Pt will continue to benefit from skilled outpatient physical therapy to address the deficits listed in the problem list box on initial evaluation, provide pt/family education and to maximize pt's level of independence in the home and community environment.     Pt's spiritual, cultural and educational needs considered and pt agreeable to plan of care and goals.    Anticipated barriers to physical therapy: none    GOALS: Short Term Goals:  6 weeks  1.Report decreased L knee pain  < / =  3/10  to increase tolerance for walking.MET 7/28/2020  2. Increase knee ROM to 0-112 degrees in order to be able to perform ADLs without difficulty. MET 7/28/2020  3. Increase strength to 4-/5 MMT grade in L LE  to increase tolerance for ADL and work activities. Progressing, not met  4. Pt to tolerate HEP to improve ROM and independence with ADL's MET 7/28/2020     Long Term Goals: 12 weeks  1.Report decreased L knee pain < / = 0/10  to increase tolerance for walking. Progressing, not met  2.Patient  goal:  To be able to walk without compensation or c/o pain. Progressing, not met  3.Increase strength to >/= 4+/5 in L LE  to increase tolerance for ADL and work activities. Progressing, not met  4. Pt will be able to climb stairs with reciprocal gait pattern and min to no c/o pain. Progressing, not met    Plan     Continue with established Plan of Care towards PT goals, focusing on ROM and quad activation.    Nicole Clemente, PT, DPT, OCS

## 2020-09-29 NOTE — TELEPHONE ENCOUNTER
Spoke with patient to reschedule surgery to 11/11/20, pre-op appointment with Palomo to 11/4/20 and covid test to 11/8/20.

## 2020-09-30 ENCOUNTER — PATIENT MESSAGE (OUTPATIENT)
Dept: SPORTS MEDICINE | Facility: CLINIC | Age: 60
End: 2020-09-30

## 2020-10-01 DIAGNOSIS — Z47.89 SURGICAL AFTERCARE, MUSCULOSKELETAL SYSTEM: Primary | ICD-10-CM

## 2020-10-01 RX ORDER — CEPHALEXIN 500 MG/1
CAPSULE ORAL
Qty: 4 CAPSULE | Refills: 0 | Status: SHIPPED | OUTPATIENT
Start: 2020-10-01 | End: 2021-05-24

## 2020-10-06 ENCOUNTER — PATIENT MESSAGE (OUTPATIENT)
Dept: REHABILITATION | Facility: HOSPITAL | Age: 60
End: 2020-10-06

## 2020-10-08 ENCOUNTER — CLINICAL SUPPORT (OUTPATIENT)
Dept: REHABILITATION | Facility: HOSPITAL | Age: 60
End: 2020-10-08
Payer: COMMERCIAL

## 2020-10-08 DIAGNOSIS — M25.562 CHRONIC PAIN OF LEFT KNEE: ICD-10-CM

## 2020-10-08 DIAGNOSIS — G89.29 CHRONIC PAIN OF LEFT KNEE: ICD-10-CM

## 2020-10-08 PROCEDURE — 97530 THERAPEUTIC ACTIVITIES: CPT

## 2020-10-08 PROCEDURE — 97110 THERAPEUTIC EXERCISES: CPT

## 2020-10-08 NOTE — PROGRESS NOTES
"  Physical Therapy Daily Treatment Note     Name: Josefina [facility]  Clinic Number: [id number]    Therapy Diagnosis:   Encounter Diagnosis   Name Primary?    Chronic pain of left knee      Physician: Yola Adair PA-C    Visit Date: 10/8/2020    Physician Orders: PT Eval and Treat   Medical Diagnosis from Referral: M17.12 (ICD-10-CM) - Primary osteoarthritis of left knee  Evaluation Date: 7/10/2020  Authorization Period Expiration: 12/31/2020  Plan of Care Expiration: 10/3/2020  Visit # / Visits authorized: 18/ 20     Time In: 1730  Time Out: 1815  Total Billable Time: 40 minutes     Precautions: Standard, s/p L TKA on 7/8/2020     PROCEDURES PERFORMED:   Left total knee arthroplasty (CPT 68528)    Subjective     Pt reports: that her L knee is doing great, R knee continues to hurt.  Response to previous treatment: full knee extension and improved knee flexion  Functional change: ambulates without AD, decreased TKE during gait    Pain: 1/10  Location: left knee     Objective   L knee AROM : 3 hyper -0-125    Josefina received therapeutic exercises to develop strength, endurance, ROM and flexibility for 15 minutes including:  Bike x 10 min   HS/GS stretch w/ strap EOT 3 x 30" B  Calf stretch on slant 3 x 30"     Heel raises x 30 NP  Roller stick to ITB NP  Prone quad stretch R only 3 x 30" B NP  SL clam OTB B 3x45" NP      Josefina participated in neuromuscular re-education activities to improve: motor control for 0 minutes. The following activities were included:    NP:  Quad sets (1/2 FR under B ankles) 20 x 5"   Quad sets (1/2 FR under B knees) 20 x 5"   SLS 3x30" on BP  LAQ #2 tempo 3/3/3 NP  Wall sits w/ GTB 3x45"  Retro walking w/ TKE  Orange band-NP    Josefina participated in dynamic functional therapeutic activities to improve functional performance for 25 minutes, including:  Sit to stand 24" 3x15 NP   Step ups with knee drive 6" 2 x 15 B    3 rounds  Step downs 6" L only x 10  Plank x 45"  Wall sit x " "30"            Josefina received the following manual therapy techniques: Joint mobilizations and STM were applied to the: L knee for 10 minutes, including:  R Femoral AP mobs  Patellar mobs on the R        Home Exercises Provided and Patient Education Provided     Education provided:   - proper technique for exercises    Written Home Exercises Provided: Patient instructed to cont prior HEP.  Exercises were reviewed and Josefina was able to demonstrate them prior to the end of the session.  Josefina demonstrated good  understanding of the education provided.     See EMR under Patient Instructions for exercises provided at IE.    Assessment     Pt tolerated treatment well today.  Continued to work on R knee extension ROM to prepare for R TKA scheduled for November.  Pt lacking patellar mobility and initially lacking knee extension.  After joint mobs and stretching, pt able to achieve 0 degrees of knee extension on the R.  Unable to perform step downs on the R secondary to increased pain.  Pt with increased strength on L LE vs R LE.  Progress as tolerated.      Josefina is progressing well towards her goals.   Pt prognosis is Good.     Pt will continue to benefit from skilled outpatient physical therapy to address the deficits listed in the problem list box on initial evaluation, provide pt/family education and to maximize pt's level of independence in the home and community environment.     Pt's spiritual, cultural and educational needs considered and pt agreeable to plan of care and goals.    Anticipated barriers to physical therapy: none    GOALS: Short Term Goals:  6 weeks  1.Report decreased L knee pain  < / =  3/10  to increase tolerance for walking.MET 7/28/2020  2. Increase knee ROM to 0-112 degrees in order to be able to perform ADLs without difficulty. MET 7/28/2020  3. Increase strength to 4-/5 MMT grade in L LE  to increase tolerance for ADL and work activities. MET 10/8/2020  4. Pt to tolerate HEP to improve ROM " and independence with ADL's MET 7/28/2020     Long Term Goals: 12 weeks  1.Report decreased L knee pain < / = 0/10  to increase tolerance for walking. MET 10/8/2020  2.Patient goal:  To be able to walk without compensation or c/o pain. Partially MET (compensation from R LE) 10/8/2020  3.Increase strength to >/= 4+/5 in L LE  to increase tolerance for ADL and work activities. Progressing, not met  4. Pt will be able to climb stairs with reciprocal gait pattern and min to no c/o pain. Progressing, not met    Plan     Continue with established Plan of Care towards PT goals, focusing on ROM and quad activation.    Nicole Clemente, PT, DPT, OCS

## 2020-10-19 ENCOUNTER — CLINICAL SUPPORT (OUTPATIENT)
Dept: REHABILITATION | Facility: HOSPITAL | Age: 60
End: 2020-10-19
Payer: COMMERCIAL

## 2020-10-19 DIAGNOSIS — M25.562 CHRONIC PAIN OF LEFT KNEE: ICD-10-CM

## 2020-10-19 DIAGNOSIS — G89.29 CHRONIC PAIN OF LEFT KNEE: ICD-10-CM

## 2020-10-19 PROCEDURE — 97110 THERAPEUTIC EXERCISES: CPT | Mod: CQ

## 2020-10-19 PROCEDURE — 97530 THERAPEUTIC ACTIVITIES: CPT | Mod: CQ

## 2020-10-19 NOTE — PROGRESS NOTES
"  Physical Therapy Daily Treatment Note     Name: Josefina Blue  Clinic Number: [id number]    Therapy Diagnosis:   Encounter Diagnosis   Name Primary?    Chronic pain of left knee      Physician: Yola Adair PA-C    Visit Date: 10/19/2020    Physician Orders: PT Eval and Treat   Medical Diagnosis from Referral: M17.12 (ICD-10-CM) - Primary osteoarthritis of left knee  Evaluation Date: 7/10/2020  Authorization Period Expiration: 12/31/2020  Plan of Care Expiration: 10/3/2020  Visit # / Visits authorized: 19/ 20     Time In: 5:30 pm  Time Out: 6:10 pm  Total Billable Time: 40 minutes     Precautions: Standard, s/p L TKA on 7/8/2020     PROCEDURES PERFORMED:   Left total knee arthroplasty (CPT 45998)    Subjective     Pt reports: R medial knee pain  Response to previous treatment: full knee extension and improved knee flexion  Functional change: ambulates without AD, decreased TKE during gait    Pain: 1/10  Location: left knee     Objective   L knee AROM : 3 hyper -0-125    Josefina received therapeutic exercises to develop strength, endurance, ROM and flexibility for 15 minutes including:  Bike x 10 min   HS/GS stretch w/ strap EOT 3 x 30" B  Calf stretch on slant 3 x 30"   PF S 3x30"    Heel raises x 30 NP  Roller stick to ITB NP  Prone quad stretch R only 3 x 30" B NP  SL clam OTB B 3x45" NP      Josefina participated in neuromuscular re-education activities to improve: motor control for 0 minutes. The following activities were included:    NP:  Quad sets (1/2 FR under B ankles) 20 x 5"   Quad sets (1/2 FR under B knees) 20 x 5"   SLS 3x30" on BP  LAQ #2 tempo 3/3/3 NP  Wall sits w/ GTB 3x45"  Retro walking w/ TKE  Orange band-NP    Josefina participated in dynamic functional therapeutic activities to improve functional performance for 25 minutes, including:  Sit to stand 24" 3x15 NP   Step ups with knee drive 6" 3x10 B  Ecc R LE Leg press 3x12 #70    3 rounds  Step downs 6" L only x 10  Plank x 45"  Wall sit x " "30"            Josefina received the following manual therapy techniques: Joint mobilizations and STM were applied to the: L knee for 00 minutes, including:  R Femoral AP mobs  Patellar mobs on the R        Home Exercises Provided and Patient Education Provided     Education provided:   - proper technique for exercises    Written Home Exercises Provided: Patient instructed to cont prior HEP.  Exercises were reviewed and Josefina was able to demonstrate them prior to the end of the session.  Josefina demonstrated good  understanding of the education provided.     See EMR under Patient Instructions for exercises provided at .    Assessment     PT reported some PF symptoms which improved after stretching. Still unable to step down on R on step but was able to work on ecc quad strength on leg press.       Josefina is progressing well towards her goals.   Pt prognosis is Good.     Pt will continue to benefit from skilled outpatient physical therapy to address the deficits listed in the problem list box on initial evaluation, provide pt/family education and to maximize pt's level of independence in the home and community environment.     Pt's spiritual, cultural and educational needs considered and pt agreeable to plan of care and goals.    Anticipated barriers to physical therapy: none    GOALS: Short Term Goals:  6 weeks  1.Report decreased L knee pain  < / =  3/10  to increase tolerance for walking.MET 7/28/2020  2. Increase knee ROM to 0-112 degrees in order to be able to perform ADLs without difficulty. MET 7/28/2020  3. Increase strength to 4-/5 MMT grade in L LE  to increase tolerance for ADL and work activities. MET 10/8/2020  4. Pt to tolerate HEP to improve ROM and independence with ADL's MET 7/28/2020     Long Term Goals: 12 weeks  1.Report decreased L knee pain < / = 0/10  to increase tolerance for walking. MET 10/8/2020  2.Patient goal:  To be able to walk without compensation or c/o pain. Partially MET " (compensation from R LE) 10/8/2020  3.Increase strength to >/= 4+/5 in L LE  to increase tolerance for ADL and work activities. Progressing, not met  4. Pt will be able to climb stairs with reciprocal gait pattern and min to no c/o pain. Progressing, not met    Plan     Continue with established Plan of Care towards PT goals, focusing on ROM and quad activation.    Sabrina Grubbs, PTA,

## 2020-10-22 ENCOUNTER — PATIENT MESSAGE (OUTPATIENT)
Dept: SPORTS MEDICINE | Facility: CLINIC | Age: 60
End: 2020-10-22

## 2020-10-27 DIAGNOSIS — N95.2 ATROPHIC VAGINITIS: ICD-10-CM

## 2020-10-28 RX ORDER — ESTRADIOL 0.1 MG/G
2 CREAM VAGINAL DAILY
Qty: 42.5 G | Refills: 1 | Status: SHIPPED | OUTPATIENT
Start: 2020-10-28 | End: 2021-06-06 | Stop reason: SDUPTHER

## 2020-10-30 ENCOUNTER — TELEPHONE (OUTPATIENT)
Dept: INTERNAL MEDICINE | Facility: CLINIC | Age: 60
End: 2020-10-30

## 2020-10-30 DIAGNOSIS — Z01.818 PRE-OP TESTING: Primary | ICD-10-CM

## 2020-10-30 DIAGNOSIS — M79.604 PAIN OF RIGHT LOWER EXTREMITY: ICD-10-CM

## 2020-10-30 NOTE — PRE-PROCEDURE INSTRUCTIONS
Chart review; triage plan initiated.   Phone contact-discussed plan to have lab drawn and clearance by PCP-message was sent to Dr Collins. Ms Blue agreed to have lab drawn next week on Tuesday, 11/3/20.  Phone contact 11/4/20-pt returned call and was provided medication instructions for the morning of surgery, reminded NPO after MN except for sips of water with AM meds; shower with antibacterial soap the night before and the morning of surgery, wash hair and do not apply anything to body the morning of surgery. Pt verbalized understanding.

## 2020-10-30 NOTE — ANESTHESIA PAT ROS NOTE
10/30/2020  Josefina Blue is a 60 y.o., female.      Pre-op Assessment          Review of Systems         Anesthesia Assessment: Preoperative EQUATION    Planned Procedure: Procedure(s) (LRB):  ARTHROPLASTY, KNEE, TOTAL (Right)  Requested Anesthesia Type:Regional  Surgeon: GLENN Whyte MD  Service: Orthopedics  Known or anticipated Date of Surgery:11/11/2020    Surgeon notes: reviewed    Previous anesthesia records:ARTHROPLASTY, KNEE (Left Knee) 7/8/20 C[facility]    Last PCP note: within [facility] , Dr Jo-Ann Collins 5/20/20  Subspecialty notes: Pain Management 10/2019, Ob-gyn 9/9/20    Other important co-morbidities:  HLD, HTN and lumbar DDD (NOE 10/2019), SCC tongue s/p L partial glossectomy 2013 and exc lesion 2018      Tests already available:  A1C 6.1, Hepatic Panel 7/1/20; EKG 7/1/20, DSE 9/9/20             Plan:    Testing:  BMP, Hematology Profile and PT/INR   Pre-anesthesia  visit       Visit focus: not necessary since last TKA was 4 months ago     Consultation:Patient's PCP for a statement of optimization -will request clearance from Dr Collins      Navigation: Tests to be scheduled.              Consults to be scheduled.             Results will be tracked by Preop Clinic.     Addendum 11/2/20, per PCP, Dr Collins: Based on chart review and last visit and negative stress 9/2020, medically optimized for TKA with Dr. Whyte.  /Tracy

## 2020-10-30 NOTE — TELEPHONE ENCOUNTER
----- Message from Carito Corcoran RN sent at 10/30/2020  4:09 PM CDT -----  Your pt will have her second total knee arthroplasty surgery on 11/11/20 with Dr Whyte. The pt will need medical clearance for this surgery.   She was cleared in July by Dr Koch for the Left knee arthroplasty surgery.   Would it be possible to provide clearance by chart review?    Thank you,  Carito Corcoran RN  Pre-op Center

## 2020-11-02 ENCOUNTER — PATIENT MESSAGE (OUTPATIENT)
Dept: ADMINISTRATIVE | Facility: OTHER | Age: 60
End: 2020-11-02

## 2020-11-02 ENCOUNTER — PATIENT OUTREACH (OUTPATIENT)
Dept: ADMINISTRATIVE | Facility: OTHER | Age: 60
End: 2020-11-02

## 2020-11-02 DIAGNOSIS — Z12.11 ENCOUNTER FOR FIT (FECAL IMMUNOCHEMICAL TEST) SCREENING: Primary | ICD-10-CM

## 2020-11-02 NOTE — PROGRESS NOTES
Care Everywhere: updated  Immunization: updated(delay in links)   Health Maintenance: updated  Media Review: review for outside colon cancer report  Legacy Review:   Order placed: fecal immunochemical test   Upcoming appts

## 2020-11-02 NOTE — TELEPHONE ENCOUNTER
Based on chart review and last visit and negative stress 9/2020, medically optimized for TKA with Dr. Whyte.

## 2020-11-03 ENCOUNTER — LAB VISIT (OUTPATIENT)
Dept: LAB | Facility: HOSPITAL | Age: 60
End: 2020-11-03
Attending: ANESTHESIOLOGY
Payer: COMMERCIAL

## 2020-11-03 DIAGNOSIS — Z01.818 PRE-OP TESTING: ICD-10-CM

## 2020-11-03 DIAGNOSIS — M79.604 PAIN OF RIGHT LOWER EXTREMITY: ICD-10-CM

## 2020-11-03 LAB
ANION GAP SERPL CALC-SCNC: 11 MMOL/L (ref 8–16)
BUN SERPL-MCNC: 19 MG/DL (ref 6–20)
CALCIUM SERPL-MCNC: 9.4 MG/DL (ref 8.7–10.5)
CHLORIDE SERPL-SCNC: 101 MMOL/L (ref 95–110)
CO2 SERPL-SCNC: 26 MMOL/L (ref 23–29)
CREAT SERPL-MCNC: 0.9 MG/DL (ref 0.5–1.4)
ERYTHROCYTE [DISTWIDTH] IN BLOOD BY AUTOMATED COUNT: 13.1 % (ref 11.5–14.5)
EST. GFR  (AFRICAN AMERICAN): >60 ML/MIN/1.73 M^2
EST. GFR  (NON AFRICAN AMERICAN): >60 ML/MIN/1.73 M^2
GLUCOSE SERPL-MCNC: 121 MG/DL (ref 70–110)
HCT VFR BLD AUTO: 41.8 % (ref 37–48.5)
HGB BLD-MCNC: 12.9 G/DL (ref 12–16)
INR PPP: 0.9 (ref 0.8–1.2)
MCH RBC QN AUTO: 27.3 PG (ref 27–31)
MCHC RBC AUTO-ENTMCNC: 30.9 G/DL (ref 32–36)
MCV RBC AUTO: 89 FL (ref 82–98)
PLATELET # BLD AUTO: 234 K/UL (ref 150–350)
PMV BLD AUTO: 11.1 FL (ref 9.2–12.9)
POTASSIUM SERPL-SCNC: 3.9 MMOL/L (ref 3.5–5.1)
PROTHROMBIN TIME: 10.2 SEC (ref 9–12.5)
RBC # BLD AUTO: 4.72 M/UL (ref 4–5.4)
SODIUM SERPL-SCNC: 138 MMOL/L (ref 136–145)
WBC # BLD AUTO: 5.85 K/UL (ref 3.9–12.7)

## 2020-11-03 PROCEDURE — 85027 COMPLETE CBC AUTOMATED: CPT

## 2020-11-03 PROCEDURE — 80048 BASIC METABOLIC PNL TOTAL CA: CPT

## 2020-11-03 PROCEDURE — 85610 PROTHROMBIN TIME: CPT

## 2020-11-03 PROCEDURE — 36415 COLL VENOUS BLD VENIPUNCTURE: CPT

## 2020-11-04 ENCOUNTER — PATIENT MESSAGE (OUTPATIENT)
Dept: SPORTS MEDICINE | Facility: CLINIC | Age: 60
End: 2020-11-04

## 2020-11-04 ENCOUNTER — OFFICE VISIT (OUTPATIENT)
Dept: SPORTS MEDICINE | Facility: CLINIC | Age: 60
End: 2020-11-04
Payer: COMMERCIAL

## 2020-11-04 VITALS
SYSTOLIC BLOOD PRESSURE: 107 MMHG | HEIGHT: 68 IN | BODY MASS INDEX: 34.86 KG/M2 | HEART RATE: 88 BPM | DIASTOLIC BLOOD PRESSURE: 78 MMHG | WEIGHT: 230 LBS

## 2020-11-04 DIAGNOSIS — M17.11 PRIMARY OSTEOARTHRITIS OF RIGHT KNEE: Primary | ICD-10-CM

## 2020-11-04 PROCEDURE — 99499 UNLISTED E&M SERVICE: CPT | Mod: S$GLB,,, | Performed by: PHYSICIAN ASSISTANT

## 2020-11-04 PROCEDURE — 99999 PR PBB SHADOW E&M-EST. PATIENT-LVL III: ICD-10-PCS | Mod: PBBFAC,,, | Performed by: PHYSICIAN ASSISTANT

## 2020-11-04 PROCEDURE — 99999 PR PBB SHADOW E&M-EST. PATIENT-LVL III: CPT | Mod: PBBFAC,,, | Performed by: PHYSICIAN ASSISTANT

## 2020-11-04 PROCEDURE — 99499 NO LOS: ICD-10-PCS | Mod: S$GLB,,, | Performed by: PHYSICIAN ASSISTANT

## 2020-11-04 RX ORDER — MUPIROCIN 20 MG/G
1 OINTMENT TOPICAL 2 TIMES DAILY
Status: CANCELLED | OUTPATIENT
Start: 2020-11-04 | End: 2020-11-09

## 2020-11-04 RX ORDER — LIDOCAINE HYDROCHLORIDE 10 MG/ML
1 INJECTION, SOLUTION EPIDURAL; INFILTRATION; INTRACAUDAL; PERINEURAL
Status: CANCELLED | OUTPATIENT
Start: 2020-11-04

## 2020-11-04 RX ORDER — AMOXICILLIN 250 MG
1 CAPSULE ORAL 2 TIMES DAILY
Status: CANCELLED | OUTPATIENT
Start: 2020-11-04

## 2020-11-04 RX ORDER — ONDANSETRON 2 MG/ML
4 INJECTION INTRAMUSCULAR; INTRAVENOUS EVERY 8 HOURS PRN
Status: CANCELLED | OUTPATIENT
Start: 2020-11-04

## 2020-11-04 RX ORDER — SODIUM CHLORIDE 9 MG/ML
INJECTION, SOLUTION INTRAVENOUS
Status: CANCELLED | OUTPATIENT
Start: 2020-11-04

## 2020-11-04 RX ORDER — ACETAMINOPHEN 500 MG
1000 TABLET ORAL EVERY 6 HOURS
Status: CANCELLED | OUTPATIENT
Start: 2020-11-04 | End: 2020-11-06

## 2020-11-04 RX ORDER — TALC
6 POWDER (GRAM) TOPICAL NIGHTLY PRN
Status: CANCELLED | OUTPATIENT
Start: 2020-11-04

## 2020-11-04 RX ORDER — CELECOXIB 200 MG/1
200 CAPSULE ORAL DAILY
Qty: 14 CAPSULE | Refills: 0 | Status: SHIPPED | OUTPATIENT
Start: 2020-11-04 | End: 2020-11-25 | Stop reason: SDUPTHER

## 2020-11-04 RX ORDER — SODIUM CHLORIDE 0.9 % (FLUSH) 0.9 %
10 SYRINGE (ML) INJECTION
Status: CANCELLED | OUTPATIENT
Start: 2020-11-04

## 2020-11-04 RX ORDER — ROPIVACAINE HYDROCHLORIDE 2 MG/ML
8 INJECTION, SOLUTION EPIDURAL; INFILTRATION; PERINEURAL CONTINUOUS
Status: CANCELLED | OUTPATIENT
Start: 2020-11-04

## 2020-11-04 RX ORDER — FENTANYL CITRATE 50 UG/ML
25 INJECTION, SOLUTION INTRAMUSCULAR; INTRAVENOUS EVERY 5 MIN PRN
Status: CANCELLED | OUTPATIENT
Start: 2020-11-04

## 2020-11-04 RX ORDER — MORPHINE SULFATE 10 MG/ML
2 INJECTION, SOLUTION INTRAMUSCULAR; INTRAVENOUS
Status: CANCELLED | OUTPATIENT
Start: 2020-11-04

## 2020-11-04 RX ORDER — OXYCODONE HYDROCHLORIDE 5 MG/1
5 TABLET ORAL EVERY 6 HOURS PRN
Qty: 28 TABLET | Refills: 0 | Status: SHIPPED | OUTPATIENT
Start: 2020-11-04 | End: 2020-11-19

## 2020-11-04 RX ORDER — CELECOXIB 100 MG/1
200 CAPSULE ORAL DAILY
Status: CANCELLED | OUTPATIENT
Start: 2020-11-04

## 2020-11-04 RX ORDER — ONDANSETRON 4 MG/1
4 TABLET, FILM COATED ORAL EVERY 8 HOURS PRN
Qty: 21 TABLET | Refills: 0 | Status: SHIPPED | OUTPATIENT
Start: 2020-11-04 | End: 2020-11-19

## 2020-11-04 RX ORDER — PREGABALIN 25 MG/1
75 CAPSULE ORAL
Status: CANCELLED | OUTPATIENT
Start: 2020-11-04

## 2020-11-04 RX ORDER — SODIUM CHLORIDE 9 MG/ML
INJECTION, SOLUTION INTRAVENOUS CONTINUOUS
Status: CANCELLED | OUTPATIENT
Start: 2020-11-04 | End: 2020-11-05

## 2020-11-04 RX ORDER — BISACODYL 10 MG
10 SUPPOSITORY, RECTAL RECTAL EVERY 12 HOURS PRN
Status: CANCELLED | OUTPATIENT
Start: 2020-11-04

## 2020-11-04 RX ORDER — FAMOTIDINE 20 MG/1
20 TABLET, FILM COATED ORAL 2 TIMES DAILY
Status: CANCELLED | OUTPATIENT
Start: 2020-11-04

## 2020-11-04 RX ORDER — OXYCODONE HYDROCHLORIDE 5 MG/1
5 TABLET ORAL
Status: CANCELLED | OUTPATIENT
Start: 2020-11-04

## 2020-11-04 RX ORDER — POLYETHYLENE GLYCOL 3350 17 G/17G
17 POWDER, FOR SOLUTION ORAL DAILY
Status: CANCELLED | OUTPATIENT
Start: 2020-11-04

## 2020-11-04 RX ORDER — ACETAMINOPHEN 500 MG
1000 TABLET ORAL
Status: CANCELLED | OUTPATIENT
Start: 2020-11-04

## 2020-11-04 RX ORDER — ASPIRIN 81 MG/1
81 TABLET ORAL 2 TIMES DAILY
Qty: 56 TABLET | Refills: 0 | Status: SHIPPED | OUTPATIENT
Start: 2020-11-04 | End: 2020-12-10

## 2020-11-04 RX ORDER — PREGABALIN 25 MG/1
75 CAPSULE ORAL NIGHTLY
Status: CANCELLED | OUTPATIENT
Start: 2020-11-04

## 2020-11-04 RX ORDER — DEXTROMETHORPHAN HYDROBROMIDE, GUAIFENESIN 5; 100 MG/5ML; MG/5ML
650 LIQUID ORAL EVERY 8 HOURS
Qty: 42 TABLET | Refills: 0 | Status: SHIPPED | OUTPATIENT
Start: 2020-11-04 | End: 2020-11-26

## 2020-11-04 RX ORDER — MUPIROCIN 20 MG/G
1 OINTMENT TOPICAL
Status: CANCELLED | OUTPATIENT
Start: 2020-11-04

## 2020-11-04 RX ORDER — CELECOXIB 100 MG/1
400 CAPSULE ORAL
Status: CANCELLED | OUTPATIENT
Start: 2020-11-04

## 2020-11-04 RX ORDER — NALOXONE HCL 0.4 MG/ML
0.02 VIAL (ML) INJECTION
Status: CANCELLED | OUTPATIENT
Start: 2020-11-04

## 2020-11-04 RX ORDER — OXYCODONE HYDROCHLORIDE 5 MG/1
10 TABLET ORAL
Status: CANCELLED | OUTPATIENT
Start: 2020-11-04

## 2020-11-04 RX ORDER — MIDAZOLAM HYDROCHLORIDE 1 MG/ML
1 INJECTION INTRAMUSCULAR; INTRAVENOUS EVERY 5 MIN PRN
Status: CANCELLED | OUTPATIENT
Start: 2020-11-04

## 2020-11-04 RX ORDER — ASPIRIN 81 MG/1
81 TABLET ORAL 2 TIMES DAILY
Status: CANCELLED | OUTPATIENT
Start: 2020-11-04

## 2020-11-04 NOTE — H&P
"Josefina Blue  is here for a completion of her perioperative paperwork. she  Is scheduled to undergo R TKA on 11/11/2020.  She does need clearance for this procedure.     PCP clearance: "Based on chart review and last visit and negative stress 9/2020, medically optimized for TKA with Dr. Whyte."    Dental clearance pending    Risks, indications and benefits of the surgical procedure were discussed with the patient. All questions with regard to surgery, rehab, expected return to functional activities, activities of daily living and recreational endeavors were answered to her satisfaction.    Discussed COVID-19 with the patient, they are aware of our current policies and procedures, were given the option of delaying surgery, and they elect to proceed.    Patient was informed and understands the risks of surgery are greater for patients with a current condition or hx of heart disease, obesity, clotting disorders, recurrent infections, steroid use, current or past smoking, and factors such as sedentary lifestyle and noncompliance with medications, therapy or f/u. The degree of the increased risk is hard to estimate w/ any degree of precision.    Once no other questions were asked, a brief history and physical exam was then performed.    PAST MEDICAL HISTORY:   Past Medical History:   Diagnosis Date    DDD (degenerative disc disease), lumbar 10/7/2019    Hyperlipidemia 8/31/2015    Hypertension     IFG (impaired fasting glucose) 8/31/2015    Neuropathic pain 8/31/2015    Squamous cell cancer of tongue 2012     PAST SURGICAL HISTORY:   Past Surgical History:   Procedure Laterality Date    CHOLECYSTECTOMY      GALLBLADDER SURGERY  06/2016    HYSTERECTOMY      KNEE ARTHROPLASTY Left 7/8/2020    Procedure: ARTHROPLASTY, KNEE;  Surgeon: GLENN Whyte MD;  Location: [facility];  Service: Orthopedics;  Laterality: Left;  regional w/catheter   Spinal, Adductor  Cloidine/Epi/Ketorolac/Ropivacaine Injection 30cc      " KNEE ARTHROSCOPY Right     for torn meniscus    TONGUE SURGERY      TRANSFORAMINAL EPIDURAL INJECTION OF STEROID Bilateral 9/19/2019    Procedure: Injection,steroid,epidural,transforaminal approach LUMBAR TRANSFORAMINAL BILATERAL L4/5 TF NOE;  Surgeon: Tim Kerns MD;  Location: [facility];  Service: Pain Management;  Laterality: Bilateral;  NEEDS CONSENT, VIP    TRANSFORAMINAL EPIDURAL INJECTION OF STEROID Bilateral 10/7/2019    Procedure: LUMBAR TRANSFORAMINAL BILATERAL L4/5 TF NOE;  Surgeon: Tim Kerns MD;  Location: [facility];  Service: Pain Management;  Laterality: Bilateral;  NEEDS CONSENT, **VIP**     FAMILY HISTORY:   Family History   Problem Relation Age of Onset    Alcohol abuse Mother     Cirrhosis Mother     Cancer Father 74        prostate, throat, lung- smoker    Hyperlipidemia Father     Diabetes Sister     Hypertension Sister     Drug abuse Brother     Heart disease Brother     Hyperlipidemia Brother     No Known Problems Maternal Aunt     No Known Problems Maternal Uncle     No Known Problems Paternal Aunt     No Known Problems Paternal Uncle     No Known Problems Maternal Grandmother     No Known Problems Maternal Grandfather     No Known Problems Paternal Grandmother     No Known Problems Paternal Grandfather     Amblyopia Neg Hx     Blindness Neg Hx     Cataracts Neg Hx     Glaucoma Neg Hx     Macular degeneration Neg Hx     Retinal detachment Neg Hx     Strabismus Neg Hx     Stroke Neg Hx     Thyroid disease Neg Hx      SOCIAL HISTORY:   Social History     Socioeconomic History    Marital status:      Spouse name: Not on file    Number of children: Not on file    Years of education: Not on file    Highest education level: Not on file   Occupational History    Occupation: chief nursing officer   Social Needs    Financial resource strain: Not hard at all    Food insecurity     Worry: Never true     Inability: Never true    Transportation needs      Medical: No     Non-medical: No   Tobacco Use    Smoking status: Former Smoker     Packs/day: 0.25     Years: 1.00     Pack years: 0.25    Smokeless tobacco: Never Used   Substance and Sexual Activity    Alcohol use: Yes     Alcohol/week: 0.0 standard drinks     Frequency: 2-4 times a month     Drinks per session: 1 or 2     Binge frequency: Never     Comment: once a week    Drug use: No    Sexual activity: Yes     Birth control/protection: None   Lifestyle    Physical activity     Days per week: 2 days     Minutes per session: 30 min    Stress: Only a little   Relationships    Social connections     Talks on phone: More than three times a week     Gets together: Three times a week     Attends [employer] service: Not on file     Active member of club or organization: No     Attends meetings of clubs or organizations: Never     Relationship status:    Other Topics Concern    Are you pregnant or think you may be? Not Asked    Breast-feeding Not Asked   Social History Narrative    Lives with  and 1 dog       MEDICATIONS:   Current Outpatient Medications:     ALPRAZolam (XANAX) 0.25 MG tablet, TAKE ONE TABLET BY MOUTH AT BEDTIME AS NEEDED., Disp: 30 tablet, Rfl: 2    aspirin (ECOTRIN) 81 MG EC tablet, Take 1 tablet (81 mg total) by mouth nightly. (Patient not taking: Reported on 9/24/2020), Disp: , Rfl: 0    celecoxib (CELEBREX) 200 MG capsule, Take 1 capsule (200 mg total) by mouth 2 (two) times daily., Disp: 60 capsule, Rfl: 2    cephALEXin (KEFLEX) 500 MG capsule, Take 4 capsule, 30 minutes prior to dental procedure. For one dose., Disp: 4 capsule, Rfl: 0    clobetasol (TEMOVATE) 0.05 % external solution, Use one to two times daily as needed for scaling or itching to scalp, Disp: 60 mL, Rfl: 3    diazePAM (VALIUM) 5 MG tablet, Take 1 tablet (5 mg total) by mouth every 12 (twelve) hours as needed (s/p total knee, prescribed to help her sleep.)., Disp: 20 tablet, Rfl: 0    estradioL  (ESTRACE) 0.01 % (0.1 mg/gram) vaginal cream, Place 1 g vaginally once daily. (Patient not taking: Reported on 9/24/2020), Disp: 42.5 g, Rfl: 0    estradioL (ESTRACE) 0.01 % (0.1 mg/gram) vaginal cream, Place 2 gram vaginally once daily., Disp: 42.5 g, Rfl: 1    irbesartan (AVAPRO) 75 MG tablet, Take 1 tablet (75 mg total) by mouth every evening. (Patient not taking: Reported on 9/24/2020), Disp: 90 tablet, Rfl: 3    melatonin 3 mg TbDL, Take by mouth., Disp: , Rfl:     pravastatin (PRAVACHOL) 20 MG tablet, Take 1 tablet (20 mg total) by mouth once daily. (Patient not taking: Reported on 9/24/2020), Disp: 90 tablet, Rfl: 0    triamterene-hydrochlorothiazide 37.5-25 mg (MAXZIDE-25) 37.5-25 mg per tablet, Take 1 tablet by mouth once daily. (Patient not taking: Reported on 9/24/2020), Disp: 90 tablet, Rfl: 3  ALLERGIES:   Review of patient's allergies indicates:   Allergen Reactions    Aspirin Nausea Only     Can take low dose of Aspirin; can take buffered low dose aspirin only    Codeine Nausea Only     Can take Codeine if she takes a dose of Zofran with it       Review of Systems   Constitution: Negative. Negative for chills, fever and night sweats.   HENT: Negative for congestion and headaches.    Eyes: Negative for blurred vision, left vision loss and right vision loss.   Cardiovascular: Negative for chest pain and syncope.   Respiratory: Negative for cough and shortness of breath.    Endocrine: Negative for polydipsia, polyphagia and polyuria.   Hematologic/Lymphatic: Negative for bleeding problem. Does not bruise/bleed easily.   Skin: Negative for dry skin, itching and rash.   Musculoskeletal: Negative for falls and muscle weakness.   Gastrointestinal: Negative for abdominal pain and bowel incontinence.   Genitourinary: Negative for bladder incontinence and nocturia.   Neurological: Negative for disturbances in coordination, loss of balance and seizures.   Psychiatric/Behavioral: Negative for depression.  The patient does not have insomnia.    Allergic/Immunologic: Negative for hives and persistent infections.     PHYSICAL EXAM:  GEN: A&Ox3, WD WN NAD  HEENT: WNL  CHEST: CTAB, no W/R/R  HEART: RRR, no M/R/G   ABD: Soft, NT ND, BS x4 QUADS  MS: Refer to previous note for detailed MS exam  NEURO: CN II-XII intact       The surgical consent was then reviewed with the patient, who agreed with all the contents of the consent form and it was signed.     Due to the serious nature of total joint infection and the high prevalence of community acquired MRSA, vancomycin will be used perioperatively.     PHYSICAL THERAPY:  She was also instructed regarding physical therapy and will begin on POD#1-3. She is doing physical therapy at [facility].    POST OP CARE: Instructions were reviewed including care of the wound and dressing after surgery and when she can shower.     PAIN MANAGEMENT: Josefina Blue was instructed regarding the Polar ice unit that will be in place after surgery and her postoperative pain medications.     MEDICATION:  Roxicodone 5 mg 1-2 q 4 hours PRN for pain  Zofran 4 mg q 8 hours PRN for nausea and vomiting.  Aspirin 81mg BID x 4 weeks for DVT prophylaxis starting on the evening after surgery.  Tylenol 650mg PO q6-8h x 14 days  Celebrex 200mg PO qd x 14 days    Post op meds to be delivered bedside prior to discharge. Deliver to family if patient is in surgery at 5pm.     Patient was instructed to purchase and take Colace to counter possible GI side effects of taking opiates.     DVT prophylaxis was discussed with the patient today including risk factors for developing DVTs and history of DVTs. The patient was asked if any specific recommendations were given from the doctor/s that did pre-operative surgical clearance.      If the patient was previously taking 81mg baby aspirin, they were told to not take additional baby aspirin, using the above stated aspirin and to restart the  81mg aspirin daily after completion of the aspirin dose.      Patient was also told to buy over the counter Prilosec medication and take it once daily for GI protection as long as they are taking NSAIDs or Aspirin.     The patient was told that narcotic pain medications may make them drowsy and instructions were given to not sign legal documents, drive or operate heavy machinery, cars, or equipment while under the influence of narcotic medications.     Dr. Whyte was present in clinic during this pre-op evaluation. The patient was offered the opportunity to ask Dr. Whyte any further questions regarding the procedure which may not have been addressed during their previous informed consent discussion. The patient has declined to see Dr. Whyte today.    As there were no other questions to be asked, she was given my business card along with Dr. Whyte's business card if she has any questions or concerns prior to surgery or in the postop period.     I explained to following and the patient expressed understanding:  The patient is currently aware of the COVID19 pandemic and that proceeding with their surgical procedure could potentially increase exposure to coronavirus in the community. The patient understands that there is the possibility of delayed or cancelled appts or PT visits in the future. They understand that infection with the coronavirus could complicate their surgery recovery. They are aware of the current policies and procedures of [facility] and the government regarding the pandemic and they were given the option of delaying my surgery. The patient elects to proceed with surgery at this time.      Patient denies having any symptoms such as cough, SOB, fever, loss of taste/smell.     The patient has been scheduled to undergo coronavirus testing on the day prior to surgery.      The patient was instructed to practice strict social distancing, hand washing/hygiene, respiratory hygiene, and cough etiquette from  now until 6 weeks following surgery to reduce the risk of rahul coronavirus.

## 2020-11-04 NOTE — H&P (VIEW-ONLY)
"Josefina Blue  is here for a completion of her perioperative paperwork. she  Is scheduled to undergo R TKA on 11/11/2020.  She does need clearance for this procedure.     PCP clearance: "Based on chart review and last visit and negative stress 9/2020, medically optimized for TKA with Dr. Whyte."    Dental clearance pending    Risks, indications and benefits of the surgical procedure were discussed with the patient. All questions with regard to surgery, rehab, expected return to functional activities, activities of daily living and recreational endeavors were answered to her satisfaction.    Discussed COVID-19 with the patient, they are aware of our current policies and procedures, were given the option of delaying surgery, and they elect to proceed.    Patient was informed and understands the risks of surgery are greater for patients with a current condition or hx of heart disease, obesity, clotting disorders, recurrent infections, steroid use, current or past smoking, and factors such as sedentary lifestyle and noncompliance with medications, therapy or f/u. The degree of the increased risk is hard to estimate w/ any degree of precision.    Once no other questions were asked, a brief history and physical exam was then performed.    PAST MEDICAL HISTORY:   Past Medical History:   Diagnosis Date    DDD (degenerative disc disease), lumbar 10/7/2019    Hyperlipidemia 8/31/2015    Hypertension     IFG (impaired fasting glucose) 8/31/2015    Neuropathic pain 8/31/2015    Squamous cell cancer of tongue 2012     PAST SURGICAL HISTORY:   Past Surgical History:   Procedure Laterality Date    CHOLECYSTECTOMY      GALLBLADDER SURGERY  06/2016    HYSTERECTOMY      KNEE ARTHROPLASTY Left 7/8/2020    Procedure: ARTHROPLASTY, KNEE;  Surgeon: GLENN Whyte MD;  Location: [facility];  Service: Orthopedics;  Laterality: Left;  regional w/catheter   Spinal, Adductor  Cloidine/Epi/Ketorolac/Ropivacaine Injection 30cc      " KNEE ARTHROSCOPY Right     for torn meniscus    TONGUE SURGERY      TRANSFORAMINAL EPIDURAL INJECTION OF STEROID Bilateral 9/19/2019    Procedure: Injection,steroid,epidural,transforaminal approach LUMBAR TRANSFORAMINAL BILATERAL L4/5 TF NOE;  Surgeon: Tim Kerns MD;  Location: [facility];  Service: Pain Management;  Laterality: Bilateral;  NEEDS CONSENT, VIP    TRANSFORAMINAL EPIDURAL INJECTION OF STEROID Bilateral 10/7/2019    Procedure: LUMBAR TRANSFORAMINAL BILATERAL L4/5 TF NOE;  Surgeon: Tim Kerns MD;  Location: [facility];  Service: Pain Management;  Laterality: Bilateral;  NEEDS CONSENT, **VIP**     FAMILY HISTORY:   Family History   Problem Relation Age of Onset    Alcohol abuse Mother     Cirrhosis Mother     Cancer Father 74        prostate, throat, lung- smoker    Hyperlipidemia Father     Diabetes Sister     Hypertension Sister     Drug abuse Brother     Heart disease Brother     Hyperlipidemia Brother     No Known Problems Maternal Aunt     No Known Problems Maternal Uncle     No Known Problems Paternal Aunt     No Known Problems Paternal Uncle     No Known Problems Maternal Grandmother     No Known Problems Maternal Grandfather     No Known Problems Paternal Grandmother     No Known Problems Paternal Grandfather     Amblyopia Neg Hx     Blindness Neg Hx     Cataracts Neg Hx     Glaucoma Neg Hx     Macular degeneration Neg Hx     Retinal detachment Neg Hx     Strabismus Neg Hx     Stroke Neg Hx     Thyroid disease Neg Hx      SOCIAL HISTORY:   Social History     Socioeconomic History    Marital status:      Spouse name: Not on file    Number of children: Not on file    Years of education: Not on file    Highest education level: Not on file   Occupational History    Occupation: chief nursing officer   Social Needs    Financial resource strain: Not hard at all    Food insecurity     Worry: Never true     Inability: Never true    Transportation needs      Medical: No     Non-medical: No   Tobacco Use    Smoking status: Former Smoker     Packs/day: 0.25     Years: 1.00     Pack years: 0.25    Smokeless tobacco: Never Used   Substance and Sexual Activity    Alcohol use: Yes     Alcohol/week: 0.0 standard drinks     Frequency: 2-4 times a month     Drinks per session: 1 or 2     Binge frequency: Never     Comment: once a week    Drug use: No    Sexual activity: Yes     Birth control/protection: None   Lifestyle    Physical activity     Days per week: 2 days     Minutes per session: 30 min    Stress: Only a little   Relationships    Social connections     Talks on phone: More than three times a week     Gets together: Three times a week     Attends Religion service: Not on file     Active member of club or organization: No     Attends meetings of clubs or organizations: Never     Relationship status:    Other Topics Concern    Are you pregnant or think you may be? Not Asked    Breast-feeding Not Asked   Social History Narrative    Lives with  and 1 dog       MEDICATIONS:   Current Outpatient Medications:     ALPRAZolam (XANAX) 0.25 MG tablet, TAKE ONE TABLET BY MOUTH AT BEDTIME AS NEEDED., Disp: 30 tablet, Rfl: 2    aspirin (ECOTRIN) 81 MG EC tablet, Take 1 tablet (81 mg total) by mouth nightly. (Patient not taking: Reported on 9/24/2020), Disp: , Rfl: 0    celecoxib (CELEBREX) 200 MG capsule, Take 1 capsule (200 mg total) by mouth 2 (two) times daily., Disp: 60 capsule, Rfl: 2    cephALEXin (KEFLEX) 500 MG capsule, Take 4 capsule, 30 minutes prior to dental procedure. For one dose., Disp: 4 capsule, Rfl: 0    clobetasol (TEMOVATE) 0.05 % external solution, Use one to two times daily as needed for scaling or itching to scalp, Disp: 60 mL, Rfl: 3    diazePAM (VALIUM) 5 MG tablet, Take 1 tablet (5 mg total) by mouth every 12 (twelve) hours as needed (s/p total knee, prescribed to help her sleep.)., Disp: 20 tablet, Rfl: 0    estradioL  (ESTRACE) 0.01 % (0.1 mg/gram) vaginal cream, Place 1 g vaginally once daily. (Patient not taking: Reported on 9/24/2020), Disp: 42.5 g, Rfl: 0    estradioL (ESTRACE) 0.01 % (0.1 mg/gram) vaginal cream, Place 2 gram vaginally once daily., Disp: 42.5 g, Rfl: 1    irbesartan (AVAPRO) 75 MG tablet, Take 1 tablet (75 mg total) by mouth every evening. (Patient not taking: Reported on 9/24/2020), Disp: 90 tablet, Rfl: 3    melatonin 3 mg TbDL, Take by mouth., Disp: , Rfl:     pravastatin (PRAVACHOL) 20 MG tablet, Take 1 tablet (20 mg total) by mouth once daily. (Patient not taking: Reported on 9/24/2020), Disp: 90 tablet, Rfl: 0    triamterene-hydrochlorothiazide 37.5-25 mg (MAXZIDE-25) 37.5-25 mg per tablet, Take 1 tablet by mouth once daily. (Patient not taking: Reported on 9/24/2020), Disp: 90 tablet, Rfl: 3  ALLERGIES:   Review of patient's allergies indicates:   Allergen Reactions    Aspirin Nausea Only     Can take low dose of Aspirin; can take buffered low dose aspirin only    Codeine Nausea Only     Can take Codeine if she takes a dose of Zofran with it       Review of Systems   Constitution: Negative. Negative for chills, fever and night sweats.   HENT: Negative for congestion and headaches.    Eyes: Negative for blurred vision, left vision loss and right vision loss.   Cardiovascular: Negative for chest pain and syncope.   Respiratory: Negative for cough and shortness of breath.    Endocrine: Negative for polydipsia, polyphagia and polyuria.   Hematologic/Lymphatic: Negative for bleeding problem. Does not bruise/bleed easily.   Skin: Negative for dry skin, itching and rash.   Musculoskeletal: Negative for falls and muscle weakness.   Gastrointestinal: Negative for abdominal pain and bowel incontinence.   Genitourinary: Negative for bladder incontinence and nocturia.   Neurological: Negative for disturbances in coordination, loss of balance and seizures.   Psychiatric/Behavioral: Negative for depression.  The patient does not have insomnia.    Allergic/Immunologic: Negative for hives and persistent infections.     PHYSICAL EXAM:  GEN: A&Ox3, WD WN NAD  HEENT: WNL  CHEST: CTAB, no W/R/R  HEART: RRR, no M/R/G   ABD: Soft, NT ND, BS x4 QUADS  MS: Refer to previous note for detailed MS exam  NEURO: CN II-XII intact       The surgical consent was then reviewed with the patient, who agreed with all the contents of the consent form and it was signed.     Due to the serious nature of total joint infection and the high prevalence of community acquired MRSA, vancomycin will be used perioperatively.     PHYSICAL THERAPY:  She was also instructed regarding physical therapy and will begin on POD#1-3. She is doing physical therapy at [facility].    POST OP CARE: Instructions were reviewed including care of the wound and dressing after surgery and when she can shower.     PAIN MANAGEMENT: Josefina Blue was instructed regarding the Polar ice unit that will be in place after surgery and her postoperative pain medications.     MEDICATION:  Roxicodone 5 mg 1-2 q 4 hours PRN for pain  Zofran 4 mg q 8 hours PRN for nausea and vomiting.  Aspirin 81mg BID x 4 weeks for DVT prophylaxis starting on the evening after surgery.  Tylenol 650mg PO q6-8h x 14 days  Celebrex 200mg PO qd x 14 days    Post op meds to be delivered bedside prior to discharge. Deliver to family if patient is in surgery at 5pm.     Patient was instructed to purchase and take Colace to counter possible GI side effects of taking opiates.     DVT prophylaxis was discussed with the patient today including risk factors for developing DVTs and history of DVTs. The patient was asked if any specific recommendations were given from the doctor/s that did pre-operative surgical clearance.      If the patient was previously taking 81mg baby aspirin, they were told to not take additional baby aspirin, using the above stated aspirin and to restart the  81mg aspirin daily after completion of the aspirin dose.      Patient was also told to buy over the counter Prilosec medication and take it once daily for GI protection as long as they are taking NSAIDs or Aspirin.     The patient was told that narcotic pain medications may make them drowsy and instructions were given to not sign legal documents, drive or operate heavy machinery, cars, or equipment while under the influence of narcotic medications.     Dr. Whyte was present in clinic during this pre-op evaluation. The patient was offered the opportunity to ask Dr. Whyte any further questions regarding the procedure which may not have been addressed during their previous informed consent discussion. The patient has declined to see Dr. Whyte today.    As there were no other questions to be asked, she was given my business card along with Dr. Whyte's business card if she has any questions or concerns prior to surgery or in the postop period.     I explained to following and the patient expressed understanding:  The patient is currently aware of the COVID19 pandemic and that proceeding with their surgical procedure could potentially increase exposure to coronavirus in the community. The patient understands that there is the possibility of delayed or cancelled appts or PT visits in the future. They understand that infection with the coronavirus could complicate their surgery recovery. They are aware of the current policies and procedures of [facility] and the government regarding the pandemic and they were given the option of delaying my surgery. The patient elects to proceed with surgery at this time.      Patient denies having any symptoms such as cough, SOB, fever, loss of taste/smell.     The patient has been scheduled to undergo coronavirus testing on the day prior to surgery.      The patient was instructed to practice strict social distancing, hand washing/hygiene, respiratory hygiene, and cough etiquette from  now until 6 weeks following surgery to reduce the risk of rahul coronavirus.

## 2020-11-05 ENCOUNTER — TELEPHONE (OUTPATIENT)
Dept: SPORTS MEDICINE | Facility: CLINIC | Age: 60
End: 2020-11-05

## 2020-11-05 NOTE — TELEPHONE ENCOUNTER
Left VM for patient to return call in regards to dental clearance. No number to contact dentist's office, so seeing if she can reach out to them or call back to provide their number so I can reach out and have clearance faxed to our office for upcoming surgery.

## 2020-11-05 NOTE — TELEPHONE ENCOUNTER
----- Message from Leonie Liang sent at 11/5/2020  3:42 PM CST -----  Regarding: pt advice  Contact: pt @ [phone]  Pt returning missed call from Dr. Whyte's office regarding dental clearance, please contact Dr. ALEYDA Blue @ [phone] or fax [phone] (questioning same number)

## 2020-11-05 NOTE — TELEPHONE ENCOUNTER
----- Message from Nicolas Vitale PA-C sent at 11/4/2020 11:26 AM CST -----  Regarding: dental clearance  Can you contact patient's dentist from previous dental clearance to make sure she is still cleared?    ThanksPalomo

## 2020-11-05 NOTE — TELEPHONE ENCOUNTER
Spoke with patient's dentist office for dental clearance for upcoming surgery. Dr. Blue is out of the office until Monday, but they will fax over clearance then.    Dr. CHAMP Blue's Office  #[phone]  Fax#[phone]

## 2020-11-08 ENCOUNTER — LAB VISIT (OUTPATIENT)
Dept: SPORTS MEDICINE | Facility: CLINIC | Age: 60
End: 2020-11-08
Payer: COMMERCIAL

## 2020-11-08 DIAGNOSIS — Z01.818 PRE-OP TESTING: ICD-10-CM

## 2020-11-08 LAB — SARS-COV-2 RNA RESP QL NAA+PROBE: NOT DETECTED

## 2020-11-08 PROCEDURE — U0003 INFECTIOUS AGENT DETECTION BY NUCLEIC ACID (DNA OR RNA); SEVERE ACUTE RESPIRATORY SYNDROME CORONAVIRUS 2 (SARS-COV-2) (CORONAVIRUS DISEASE [COVID-19]), AMPLIFIED PROBE TECHNIQUE, MAKING USE OF HIGH THROUGHPUT TECHNOLOGIES AS DESCRIBED BY CMS-2020-01-R: HCPCS

## 2020-11-09 ENCOUNTER — TELEPHONE (OUTPATIENT)
Dept: SPORTS MEDICINE | Facility: CLINIC | Age: 60
End: 2020-11-09

## 2020-11-09 ENCOUNTER — ANESTHESIA EVENT (OUTPATIENT)
Dept: SURGERY | Facility: HOSPITAL | Age: 60
End: 2020-11-09
Payer: COMMERCIAL

## 2020-11-09 NOTE — TELEPHONE ENCOUNTER
Spoke with Renu at Dr. Blue's dental office in regards to patient's surgery clearance. She has the letter and will fax it over this morning.    Dr. CHAMP Blue's Office  #[phone]  Fax#[phone]

## 2020-11-10 ENCOUNTER — TELEPHONE (OUTPATIENT)
Dept: SPORTS MEDICINE | Facility: CLINIC | Age: 60
End: 2020-11-10

## 2020-11-10 NOTE — TELEPHONE ENCOUNTER
Left  for patient for surgery arrival time (7AM) and location ([facility]) for surgery tomorrow. Advised to return call if she has any questions.

## 2020-11-11 ENCOUNTER — HOSPITAL ENCOUNTER (OUTPATIENT)
Facility: HOSPITAL | Age: 60
Discharge: HOME | End: 2020-11-12
Attending: ORTHOPAEDIC SURGERY | Admitting: ORTHOPAEDIC SURGERY
Payer: COMMERCIAL

## 2020-11-11 ENCOUNTER — ANESTHESIA (OUTPATIENT)
Dept: SURGERY | Facility: HOSPITAL | Age: 60
End: 2020-11-11
Payer: COMMERCIAL

## 2020-11-11 ENCOUNTER — PATIENT MESSAGE (OUTPATIENT)
Dept: SPORTS MEDICINE | Facility: CLINIC | Age: 60
End: 2020-11-11

## 2020-11-11 DIAGNOSIS — M17.11 PRIMARY OSTEOARTHRITIS OF RIGHT KNEE: ICD-10-CM

## 2020-11-11 PROCEDURE — 94770 HC EXHALED C02 TEST: CPT

## 2020-11-11 PROCEDURE — 25000003 PHARM REV CODE 250: Performed by: PHYSICIAN ASSISTANT

## 2020-11-11 PROCEDURE — 76942 PR U/S GUIDANCE FOR NEEDLE GUIDANCE: ICD-10-PCS | Mod: 26,,, | Performed by: STUDENT IN AN ORGANIZED HEALTH CARE EDUCATION/TRAINING PROGRAM

## 2020-11-11 PROCEDURE — 25000003 PHARM REV CODE 250: Performed by: ANESTHESIOLOGY

## 2020-11-11 PROCEDURE — 71000039 HC RECOVERY, EACH ADD'L HOUR: Performed by: ORTHOPAEDIC SURGERY

## 2020-11-11 PROCEDURE — 36000711: Performed by: ORTHOPAEDIC SURGERY

## 2020-11-11 PROCEDURE — 27447 PR TOTAL KNEE ARTHROPLASTY: ICD-10-PCS | Mod: RT,,, | Performed by: ORTHOPAEDIC SURGERY

## 2020-11-11 PROCEDURE — 25000003 PHARM REV CODE 250: Performed by: ORTHOPAEDIC SURGERY

## 2020-11-11 PROCEDURE — 27201423 OPTIME MED/SURG SUP & DEVICES STERILE SUPPLY: Performed by: ORTHOPAEDIC SURGERY

## 2020-11-11 PROCEDURE — 97161 PT EVAL LOW COMPLEX 20 MIN: CPT

## 2020-11-11 PROCEDURE — 63600175 PHARM REV CODE 636 W HCPCS: Performed by: PHYSICIAN ASSISTANT

## 2020-11-11 PROCEDURE — 27447 TOTAL KNEE ARTHROPLASTY: CPT | Mod: RT,,, | Performed by: ORTHOPAEDIC SURGERY

## 2020-11-11 PROCEDURE — 63600175 PHARM REV CODE 636 W HCPCS: Performed by: STUDENT IN AN ORGANIZED HEALTH CARE EDUCATION/TRAINING PROGRAM

## 2020-11-11 PROCEDURE — 76942 ECHO GUIDE FOR BIOPSY: CPT | Mod: 26,,, | Performed by: STUDENT IN AN ORGANIZED HEALTH CARE EDUCATION/TRAINING PROGRAM

## 2020-11-11 PROCEDURE — 64448 NJX AA&/STRD FEM NRV NFS IMG: CPT | Mod: 59,RT,, | Performed by: STUDENT IN AN ORGANIZED HEALTH CARE EDUCATION/TRAINING PROGRAM

## 2020-11-11 PROCEDURE — 97535 SELF CARE MNGMENT TRAINING: CPT

## 2020-11-11 PROCEDURE — D9220A PRA ANESTHESIA: ICD-10-PCS | Mod: CRNA,,, | Performed by: NURSE ANESTHETIST, CERTIFIED REGISTERED

## 2020-11-11 PROCEDURE — 97110 THERAPEUTIC EXERCISES: CPT

## 2020-11-11 PROCEDURE — 63600175 PHARM REV CODE 636 W HCPCS: Performed by: ORTHOPAEDIC SURGERY

## 2020-11-11 PROCEDURE — C1713 ANCHOR/SCREW BN/BN,TIS/BN: HCPCS | Performed by: ORTHOPAEDIC SURGERY

## 2020-11-11 PROCEDURE — 36000710: Performed by: ORTHOPAEDIC SURGERY

## 2020-11-11 PROCEDURE — 25000003 PHARM REV CODE 250: Performed by: STUDENT IN AN ORGANIZED HEALTH CARE EDUCATION/TRAINING PROGRAM

## 2020-11-11 PROCEDURE — D9220A PRA ANESTHESIA: Mod: ANES,,, | Performed by: STUDENT IN AN ORGANIZED HEALTH CARE EDUCATION/TRAINING PROGRAM

## 2020-11-11 PROCEDURE — D9220A PRA ANESTHESIA: Mod: CRNA,,, | Performed by: NURSE ANESTHETIST, CERTIFIED REGISTERED

## 2020-11-11 PROCEDURE — D9220A PRA ANESTHESIA: ICD-10-PCS | Mod: ANES,,, | Performed by: STUDENT IN AN ORGANIZED HEALTH CARE EDUCATION/TRAINING PROGRAM

## 2020-11-11 PROCEDURE — 25000003 PHARM REV CODE 250: Performed by: NURSE ANESTHETIST, CERTIFIED REGISTERED

## 2020-11-11 PROCEDURE — 97165 OT EVAL LOW COMPLEX 30 MIN: CPT

## 2020-11-11 PROCEDURE — 27100025 HC TUBING, SET FLUID WARMER: Performed by: STUDENT IN AN ORGANIZED HEALTH CARE EDUCATION/TRAINING PROGRAM

## 2020-11-11 PROCEDURE — 94761 N-INVAS EAR/PLS OXIMETRY MLT: CPT

## 2020-11-11 PROCEDURE — 64448 PR NERVE BLOCK INJ, ANES/STEROID, FEMORAL, CONT INFUSION, INCL IMAG GUIDANCE: ICD-10-PCS | Mod: 59,RT,, | Performed by: STUDENT IN AN ORGANIZED HEALTH CARE EDUCATION/TRAINING PROGRAM

## 2020-11-11 PROCEDURE — 76942 ECHO GUIDE FOR BIOPSY: CPT | Performed by: STUDENT IN AN ORGANIZED HEALTH CARE EDUCATION/TRAINING PROGRAM

## 2020-11-11 PROCEDURE — 97116 GAIT TRAINING THERAPY: CPT

## 2020-11-11 PROCEDURE — 63600175 PHARM REV CODE 636 W HCPCS: Performed by: NURSE ANESTHETIST, CERTIFIED REGISTERED

## 2020-11-11 PROCEDURE — 37000009 HC ANESTHESIA EA ADD 15 MINS: Performed by: ORTHOPAEDIC SURGERY

## 2020-11-11 PROCEDURE — 37000008 HC ANESTHESIA 1ST 15 MINUTES: Performed by: ORTHOPAEDIC SURGERY

## 2020-11-11 PROCEDURE — 71000033 HC RECOVERY, INTIAL HOUR: Performed by: ORTHOPAEDIC SURGERY

## 2020-11-11 PROCEDURE — 64448 NJX AA&/STRD FEM NRV NFS IMG: CPT | Performed by: STUDENT IN AN ORGANIZED HEALTH CARE EDUCATION/TRAINING PROGRAM

## 2020-11-11 PROCEDURE — 99900035 HC TECH TIME PER 15 MIN (STAT)

## 2020-11-11 PROCEDURE — C1776 JOINT DEVICE (IMPLANTABLE): HCPCS | Performed by: ORTHOPAEDIC SURGERY

## 2020-11-11 DEVICE — PATELLA TRIATHLON 36X10 SYMTRC: Type: IMPLANTABLE DEVICE | Site: KNEE | Status: FUNCTIONAL

## 2020-11-11 DEVICE — TIBIAL POST STAB SZ 5 11X3 POL: Type: IMPLANTABLE DEVICE | Site: KNEE | Status: FUNCTIONAL

## 2020-11-11 DEVICE — CEMENT BONE SMPLX HV GENTMYCN: Type: IMPLANTABLE DEVICE | Site: KNEE | Status: FUNCTIONAL

## 2020-11-11 DEVICE — COMP FEM POST STAB CEM SZ 5 RT: Type: IMPLANTABLE DEVICE | Site: KNEE | Status: FUNCTIONAL

## 2020-11-11 DEVICE — PRIMARY TIBIAL BASE 5.: Type: IMPLANTABLE DEVICE | Site: KNEE | Status: FUNCTIONAL

## 2020-11-11 RX ORDER — PREGABALIN 75 MG/1
75 CAPSULE ORAL NIGHTLY
Status: DISCONTINUED | OUTPATIENT
Start: 2020-11-11 | End: 2020-11-12 | Stop reason: HOSPADM

## 2020-11-11 RX ORDER — PHENYLEPHRINE HYDROCHLORIDE 10 MG/ML
INJECTION INTRAVENOUS
Status: DISCONTINUED | OUTPATIENT
Start: 2020-11-11 | End: 2020-11-11

## 2020-11-11 RX ORDER — MIDAZOLAM HYDROCHLORIDE 1 MG/ML
INJECTION, SOLUTION INTRAMUSCULAR; INTRAVENOUS
Status: DISCONTINUED | OUTPATIENT
Start: 2020-11-11 | End: 2020-11-11

## 2020-11-11 RX ORDER — TRIAMTERENE AND HYDROCHLOROTHIAZIDE 37.5; 25 MG/1; MG/1
1 CAPSULE ORAL DAILY
Status: DISCONTINUED | OUTPATIENT
Start: 2020-11-12 | End: 2020-11-12 | Stop reason: HOSPADM

## 2020-11-11 RX ORDER — ONDANSETRON 2 MG/ML
4 INJECTION INTRAMUSCULAR; INTRAVENOUS EVERY 8 HOURS PRN
Status: DISCONTINUED | OUTPATIENT
Start: 2020-11-11 | End: 2020-11-12 | Stop reason: HOSPADM

## 2020-11-11 RX ORDER — OXYCODONE HYDROCHLORIDE 5 MG/1
5 TABLET ORAL
Status: DISCONTINUED | OUTPATIENT
Start: 2020-11-11 | End: 2020-11-12 | Stop reason: HOSPADM

## 2020-11-11 RX ORDER — ROPIVACAINE HYDROCHLORIDE 5 MG/ML
INJECTION, SOLUTION EPIDURAL; INFILTRATION; PERINEURAL
Status: COMPLETED | OUTPATIENT
Start: 2020-11-11 | End: 2020-11-11

## 2020-11-11 RX ORDER — FAMOTIDINE 20 MG/1
20 TABLET, FILM COATED ORAL 2 TIMES DAILY
Status: DISCONTINUED | OUTPATIENT
Start: 2020-11-11 | End: 2020-11-12 | Stop reason: HOSPADM

## 2020-11-11 RX ORDER — MUPIROCIN 20 MG/G
1 OINTMENT TOPICAL
Status: COMPLETED | OUTPATIENT
Start: 2020-11-11 | End: 2020-11-11

## 2020-11-11 RX ORDER — ACETAMINOPHEN 500 MG
1000 TABLET ORAL
Status: DISCONTINUED | OUTPATIENT
Start: 2020-11-11 | End: 2020-11-11 | Stop reason: SDUPTHER

## 2020-11-11 RX ORDER — CELECOXIB 200 MG/1
200 CAPSULE ORAL DAILY
Status: DISCONTINUED | OUTPATIENT
Start: 2020-11-12 | End: 2020-11-12 | Stop reason: HOSPADM

## 2020-11-11 RX ORDER — ROPIVACAINE HYDROCHLORIDE 2 MG/ML
8 INJECTION, SOLUTION EPIDURAL; INFILTRATION; PERINEURAL CONTINUOUS
Status: DISCONTINUED | OUTPATIENT
Start: 2020-11-11 | End: 2020-11-11

## 2020-11-11 RX ORDER — FENTANYL CITRATE 50 UG/ML
25 INJECTION, SOLUTION INTRAMUSCULAR; INTRAVENOUS EVERY 5 MIN PRN
Status: DISCONTINUED | OUTPATIENT
Start: 2020-11-11 | End: 2020-11-11 | Stop reason: HOSPADM

## 2020-11-11 RX ORDER — PROPOFOL 10 MG/ML
VIAL (ML) INTRAVENOUS
Status: DISCONTINUED | OUTPATIENT
Start: 2020-11-11 | End: 2020-11-11

## 2020-11-11 RX ORDER — ROPIVACAINE HYDROCHLORIDE 2 MG/ML
6 INJECTION, SOLUTION EPIDURAL; INFILTRATION; PERINEURAL CONTINUOUS
Status: DISCONTINUED | OUTPATIENT
Start: 2020-11-11 | End: 2020-11-12 | Stop reason: HOSPADM

## 2020-11-11 RX ORDER — MORPHINE SULFATE 2 MG/ML
2 INJECTION, SOLUTION INTRAMUSCULAR; INTRAVENOUS
Status: DISCONTINUED | OUTPATIENT
Start: 2020-11-11 | End: 2020-11-12 | Stop reason: HOSPADM

## 2020-11-11 RX ORDER — PREGABALIN 75 MG/1
75 CAPSULE ORAL
Status: COMPLETED | OUTPATIENT
Start: 2020-11-11 | End: 2020-11-11

## 2020-11-11 RX ORDER — LIDOCAINE HYDROCHLORIDE 20 MG/ML
INJECTION INTRAVENOUS
Status: DISCONTINUED | OUTPATIENT
Start: 2020-11-11 | End: 2020-11-11

## 2020-11-11 RX ORDER — SODIUM CHLORIDE 9 MG/ML
INJECTION, SOLUTION INTRAVENOUS CONTINUOUS PRN
Status: DISCONTINUED | OUTPATIENT
Start: 2020-11-11 | End: 2020-11-11

## 2020-11-11 RX ORDER — TALC
6 POWDER (GRAM) TOPICAL NIGHTLY PRN
Status: DISCONTINUED | OUTPATIENT
Start: 2020-11-11 | End: 2020-11-11 | Stop reason: HOSPADM

## 2020-11-11 RX ORDER — FAMOTIDINE 10 MG/ML
INJECTION INTRAVENOUS
Status: DISCONTINUED | OUTPATIENT
Start: 2020-11-11 | End: 2020-11-11

## 2020-11-11 RX ORDER — ONDANSETRON 2 MG/ML
INJECTION INTRAMUSCULAR; INTRAVENOUS
Status: DISCONTINUED | OUTPATIENT
Start: 2020-11-11 | End: 2020-11-11

## 2020-11-11 RX ORDER — ACETAMINOPHEN 500 MG
1000 TABLET ORAL EVERY 6 HOURS
Status: DISCONTINUED | OUTPATIENT
Start: 2020-11-11 | End: 2020-11-12 | Stop reason: HOSPADM

## 2020-11-11 RX ORDER — SODIUM CHLORIDE 9 MG/ML
INJECTION, SOLUTION INTRAVENOUS CONTINUOUS
Status: DISCONTINUED | OUTPATIENT
Start: 2020-11-11 | End: 2020-11-12 | Stop reason: HOSPADM

## 2020-11-11 RX ORDER — ACETAMINOPHEN 500 MG
1000 TABLET ORAL
Status: COMPLETED | OUTPATIENT
Start: 2020-11-11 | End: 2020-11-11

## 2020-11-11 RX ORDER — HALOPERIDOL 5 MG/ML
0.5 INJECTION INTRAMUSCULAR EVERY 10 MIN PRN
Status: DISCONTINUED | OUTPATIENT
Start: 2020-11-11 | End: 2020-11-11 | Stop reason: HOSPADM

## 2020-11-11 RX ORDER — TRANEXAMIC ACID 100 MG/ML
1000 INJECTION, SOLUTION INTRAVENOUS
Status: COMPLETED | OUTPATIENT
Start: 2020-11-11 | End: 2020-11-11

## 2020-11-11 RX ORDER — HYDROMORPHONE HYDROCHLORIDE 1 MG/ML
0.2 INJECTION, SOLUTION INTRAMUSCULAR; INTRAVENOUS; SUBCUTANEOUS EVERY 5 MIN PRN
Status: DISCONTINUED | OUTPATIENT
Start: 2020-11-11 | End: 2020-11-11 | Stop reason: HOSPADM

## 2020-11-11 RX ORDER — SODIUM CHLORIDE 0.9 % (FLUSH) 0.9 %
10 SYRINGE (ML) INJECTION
Status: DISCONTINUED | OUTPATIENT
Start: 2020-11-11 | End: 2020-11-11 | Stop reason: HOSPADM

## 2020-11-11 RX ORDER — POLYETHYLENE GLYCOL 3350 17 G/17G
17 POWDER, FOR SOLUTION ORAL DAILY
Status: DISCONTINUED | OUTPATIENT
Start: 2020-11-11 | End: 2020-11-12 | Stop reason: HOSPADM

## 2020-11-11 RX ORDER — SODIUM CHLORIDE 9 MG/ML
INJECTION, SOLUTION INTRAVENOUS
Status: COMPLETED | OUTPATIENT
Start: 2020-11-11 | End: 2020-11-11

## 2020-11-11 RX ORDER — CELECOXIB 200 MG/1
400 CAPSULE ORAL
Status: DISCONTINUED | OUTPATIENT
Start: 2020-11-11 | End: 2020-11-11 | Stop reason: SDUPTHER

## 2020-11-11 RX ORDER — MIDAZOLAM HYDROCHLORIDE 1 MG/ML
0.5 INJECTION INTRAMUSCULAR; INTRAVENOUS
Status: DISCONTINUED | OUTPATIENT
Start: 2020-11-11 | End: 2022-05-09

## 2020-11-11 RX ORDER — KETAMINE HYDROCHLORIDE 100 MG/ML
INJECTION, SOLUTION INTRAMUSCULAR; INTRAVENOUS
Status: DISCONTINUED | OUTPATIENT
Start: 2020-11-11 | End: 2020-11-11

## 2020-11-11 RX ORDER — AMOXICILLIN 250 MG
1 CAPSULE ORAL 2 TIMES DAILY
Status: DISCONTINUED | OUTPATIENT
Start: 2020-11-11 | End: 2020-11-12 | Stop reason: HOSPADM

## 2020-11-11 RX ORDER — MUPIROCIN 20 MG/G
1 OINTMENT TOPICAL 2 TIMES DAILY
Status: DISCONTINUED | OUTPATIENT
Start: 2020-11-11 | End: 2020-11-12 | Stop reason: HOSPADM

## 2020-11-11 RX ORDER — PROPOFOL 10 MG/ML
VIAL (ML) INTRAVENOUS CONTINUOUS PRN
Status: DISCONTINUED | OUTPATIENT
Start: 2020-11-11 | End: 2020-11-11

## 2020-11-11 RX ORDER — FENTANYL CITRATE 50 UG/ML
25 INJECTION, SOLUTION INTRAMUSCULAR; INTRAVENOUS EVERY 5 MIN PRN
Status: DISCONTINUED | OUTPATIENT
Start: 2020-11-11 | End: 2022-05-09

## 2020-11-11 RX ORDER — OXYCODONE HYDROCHLORIDE 5 MG/1
5 TABLET ORAL
Status: DISCONTINUED | OUTPATIENT
Start: 2020-11-11 | End: 2020-11-11 | Stop reason: HOSPADM

## 2020-11-11 RX ORDER — ALPRAZOLAM 0.25 MG/1
0.25 TABLET ORAL NIGHTLY PRN
Status: DISCONTINUED | OUTPATIENT
Start: 2020-11-11 | End: 2020-11-12 | Stop reason: HOSPADM

## 2020-11-11 RX ORDER — CEFAZOLIN SODIUM 1 G/3ML
2 INJECTION, POWDER, FOR SOLUTION INTRAMUSCULAR; INTRAVENOUS
Status: COMPLETED | OUTPATIENT
Start: 2020-11-11 | End: 2020-11-12

## 2020-11-11 RX ORDER — CEFAZOLIN SODIUM 1 G/3ML
2 INJECTION, POWDER, FOR SOLUTION INTRAMUSCULAR; INTRAVENOUS
Status: COMPLETED | OUTPATIENT
Start: 2020-11-11 | End: 2020-11-11

## 2020-11-11 RX ORDER — CELECOXIB 200 MG/1
400 CAPSULE ORAL ONCE
Status: DISCONTINUED | OUTPATIENT
Start: 2020-11-11 | End: 2022-05-09

## 2020-11-11 RX ORDER — NALOXONE HCL 0.4 MG/ML
0.02 VIAL (ML) INJECTION
Status: DISCONTINUED | OUTPATIENT
Start: 2020-11-11 | End: 2020-11-12 | Stop reason: HOSPADM

## 2020-11-11 RX ORDER — DEXAMETHASONE SODIUM PHOSPHATE 4 MG/ML
INJECTION, SOLUTION INTRA-ARTICULAR; INTRALESIONAL; INTRAMUSCULAR; INTRAVENOUS; SOFT TISSUE
Status: DISCONTINUED | OUTPATIENT
Start: 2020-11-11 | End: 2020-11-11

## 2020-11-11 RX ORDER — MIDAZOLAM HYDROCHLORIDE 1 MG/ML
1 INJECTION INTRAMUSCULAR; INTRAVENOUS EVERY 5 MIN PRN
Status: DISCONTINUED | OUTPATIENT
Start: 2020-11-11 | End: 2020-11-11 | Stop reason: HOSPADM

## 2020-11-11 RX ORDER — OXYCODONE HYDROCHLORIDE 10 MG/1
10 TABLET ORAL
Status: DISCONTINUED | OUTPATIENT
Start: 2020-11-11 | End: 2020-11-12 | Stop reason: HOSPADM

## 2020-11-11 RX ORDER — ROPIVACAINE/EPI/CLONIDINE/KET 2.46-0.005
SYRINGE (ML) INJECTION
Status: DISCONTINUED | OUTPATIENT
Start: 2020-11-11 | End: 2020-11-11 | Stop reason: HOSPADM

## 2020-11-11 RX ORDER — BISACODYL 10 MG
10 SUPPOSITORY, RECTAL RECTAL EVERY 12 HOURS PRN
Status: DISCONTINUED | OUTPATIENT
Start: 2020-11-11 | End: 2020-11-12 | Stop reason: HOSPADM

## 2020-11-11 RX ORDER — ROPIVACAINE HYDROCHLORIDE 2 MG/ML
6 INJECTION, SOLUTION EPIDURAL; INFILTRATION; PERINEURAL CONTINUOUS
Status: DISCONTINUED | OUTPATIENT
Start: 2020-11-11 | End: 2020-11-11

## 2020-11-11 RX ORDER — ASPIRIN 81 MG/1
81 TABLET ORAL 2 TIMES DAILY
Status: DISCONTINUED | OUTPATIENT
Start: 2020-11-11 | End: 2020-11-12 | Stop reason: HOSPADM

## 2020-11-11 RX ORDER — IRBESARTAN 75 MG/1
75 TABLET ORAL NIGHTLY
Status: DISCONTINUED | OUTPATIENT
Start: 2020-11-11 | End: 2020-11-12 | Stop reason: HOSPADM

## 2020-11-11 RX ORDER — LIDOCAINE HYDROCHLORIDE 10 MG/ML
1 INJECTION, SOLUTION EPIDURAL; INFILTRATION; INTRACAUDAL; PERINEURAL
Status: DISCONTINUED | OUTPATIENT
Start: 2020-11-11 | End: 2020-11-11 | Stop reason: HOSPADM

## 2020-11-11 RX ORDER — LIDOCAINE HYDROCHLORIDE 20 MG/ML
INJECTION, SOLUTION EPIDURAL; INFILTRATION; INTRACAUDAL; PERINEURAL
Status: DISCONTINUED | OUTPATIENT
Start: 2020-11-11 | End: 2020-11-11

## 2020-11-11 RX ORDER — VANCOMYCIN HYDROCHLORIDE 1 G/20ML
INJECTION, POWDER, LYOPHILIZED, FOR SOLUTION INTRAVENOUS
Status: DISCONTINUED | OUTPATIENT
Start: 2020-11-11 | End: 2020-11-11 | Stop reason: HOSPADM

## 2020-11-11 RX ADMIN — MEPIVACAINE HYDROCHLORIDE 5 ML/HR: 15 INJECTION, SOLUTION EPIDURAL; INFILTRATION at 09:11

## 2020-11-11 RX ADMIN — FENTANYL CITRATE 100 MCG: 50 INJECTION INTRAMUSCULAR; INTRAVENOUS at 07:11

## 2020-11-11 RX ADMIN — ACETAMINOPHEN 1000 MG: 500 TABLET ORAL at 02:11

## 2020-11-11 RX ADMIN — PROPOFOL 150 MCG/KG/MIN: 10 INJECTION, EMULSION INTRAVENOUS at 08:11

## 2020-11-11 RX ADMIN — PREGABALIN 75 MG: 75 CAPSULE ORAL at 09:11

## 2020-11-11 RX ADMIN — LIDOCAINE HYDROCHLORIDE 4 ML: 20 INJECTION, SOLUTION EPIDURAL; INFILTRATION; INTRACAUDAL; PERINEURAL at 09:11

## 2020-11-11 RX ADMIN — DEXAMETHASONE SODIUM PHOSPHATE 8 MG: 4 INJECTION, SOLUTION INTRAMUSCULAR; INTRAVENOUS at 08:11

## 2020-11-11 RX ADMIN — SODIUM CHLORIDE: 0.9 INJECTION, SOLUTION INTRAVENOUS at 07:11

## 2020-11-11 RX ADMIN — MIDAZOLAM HYDROCHLORIDE 2 MG: 1 INJECTION, SOLUTION INTRAMUSCULAR; INTRAVENOUS at 08:11

## 2020-11-11 RX ADMIN — OXYCODONE 5 MG: 5 TABLET ORAL at 07:11

## 2020-11-11 RX ADMIN — POLYETHYLENE GLYCOL 3350 17 G: 17 POWDER, FOR SOLUTION ORAL at 02:11

## 2020-11-11 RX ADMIN — CIPROFLOXACIN HYDROCHLORIDE: 3 OINTMENT OPHTHALMIC at 09:11

## 2020-11-11 RX ADMIN — SODIUM CHLORIDE: 0.9 INJECTION, SOLUTION INTRAVENOUS at 08:11

## 2020-11-11 RX ADMIN — ACETAMINOPHEN 1000 MG: 500 TABLET ORAL at 07:11

## 2020-11-11 RX ADMIN — OXYCODONE 5 MG: 5 TABLET ORAL at 03:11

## 2020-11-11 RX ADMIN — PREGABALIN 75 MG: 75 CAPSULE ORAL at 07:11

## 2020-11-11 RX ADMIN — MIDAZOLAM 2 MG: 1 INJECTION INTRAMUSCULAR; INTRAVENOUS at 07:11

## 2020-11-11 RX ADMIN — KETAMINE HYDROCHLORIDE 30 MG: 100 INJECTION, SOLUTION, CONCENTRATE INTRAMUSCULAR; INTRAVENOUS at 08:11

## 2020-11-11 RX ADMIN — PROPOFOL 100 MG: 10 INJECTION, EMULSION INTRAVENOUS at 08:11

## 2020-11-11 RX ADMIN — FAMOTIDINE 20 MG: 20 TABLET, FILM COATED ORAL at 09:11

## 2020-11-11 RX ADMIN — CEFAZOLIN 2 G: 330 INJECTION, POWDER, FOR SOLUTION INTRAMUSCULAR; INTRAVENOUS at 05:11

## 2020-11-11 RX ADMIN — MEPIVACAINE HYDROCHLORIDE 2.25 ML: 20 INJECTION, SOLUTION EPIDURAL; INFILTRATION at 08:11

## 2020-11-11 RX ADMIN — MUPIROCIN 1 G: 20 OINTMENT TOPICAL at 07:11

## 2020-11-11 RX ADMIN — SODIUM CHLORIDE, SODIUM GLUCONATE, SODIUM ACETATE, POTASSIUM CHLORIDE, MAGNESIUM CHLORIDE, SODIUM PHOSPHATE, DIBASIC, AND POTASSIUM PHOSPHATE: .53; .5; .37; .037; .03; .012; .00082 INJECTION, SOLUTION INTRAVENOUS at 09:11

## 2020-11-11 RX ADMIN — FAMOTIDINE 20 MG: 10 INJECTION, SOLUTION INTRAVENOUS at 08:11

## 2020-11-11 RX ADMIN — ROPIVACAINE HYDROCHLORIDE 6 ML/HR: 2 INJECTION, SOLUTION EPIDURAL; INFILTRATION at 12:11

## 2020-11-11 RX ADMIN — LIDOCAINE HYDROCHLORIDE 50 MG: 20 INJECTION, SOLUTION INTRAVENOUS at 08:11

## 2020-11-11 RX ADMIN — ASPIRIN 81 MG: 81 TABLET, COATED ORAL at 09:11

## 2020-11-11 RX ADMIN — ONDANSETRON 4 MG: 2 INJECTION INTRAMUSCULAR; INTRAVENOUS at 03:11

## 2020-11-11 RX ADMIN — OXYCODONE HYDROCHLORIDE 10 MG: 5 TABLET ORAL at 12:11

## 2020-11-11 RX ADMIN — ROPIVACAINE HYDROCHLORIDE 7.5 ML: 5 INJECTION, SOLUTION EPIDURAL; INFILTRATION; PERINEURAL at 08:11

## 2020-11-11 RX ADMIN — PHENYLEPHRINE HYDROCHLORIDE 200 MCG: 10 INJECTION INTRAVENOUS at 09:11

## 2020-11-11 RX ADMIN — DOCUSATE SODIUM 50MG AND SENNOSIDES 8.6MG 1 TABLET: 8.6; 5 TABLET, FILM COATED ORAL at 09:11

## 2020-11-11 RX ADMIN — MUPIROCIN 1 G: 20 OINTMENT TOPICAL at 09:11

## 2020-11-11 RX ADMIN — VANCOMYCIN HYDROCHLORIDE 1500 MG: 1.5 INJECTION, POWDER, LYOPHILIZED, FOR SOLUTION INTRAVENOUS at 07:11

## 2020-11-11 RX ADMIN — TRANEXAMIC ACID 1000 MG: 100 INJECTION, SOLUTION INTRAVENOUS at 09:11

## 2020-11-11 RX ADMIN — SODIUM CHLORIDE: 0.9 INJECTION, SOLUTION INTRAVENOUS at 11:11

## 2020-11-11 RX ADMIN — FLUORESCEIN SODIUM AND BENOXINATE HYDROCHLORIDE 1 DROP: 4; 2.5 SOLUTION OPHTHALMIC at 03:11

## 2020-11-11 RX ADMIN — SODIUM CHLORIDE: 0.9 INJECTION, SOLUTION INTRAVENOUS at 12:11

## 2020-11-11 RX ADMIN — TRANEXAMIC ACID 1000 MG: 100 INJECTION, SOLUTION INTRAVENOUS at 10:11

## 2020-11-11 RX ADMIN — ONDANSETRON 4 MG: 2 INJECTION, SOLUTION INTRAMUSCULAR; INTRAVENOUS at 08:11

## 2020-11-11 RX ADMIN — CEFAZOLIN 2 G: 330 INJECTION, POWDER, FOR SOLUTION INTRAMUSCULAR; INTRAVENOUS at 08:11

## 2020-11-11 NOTE — TRANSFER OF CARE
"Anesthesia Transfer of Care Note    Patient: Josefina Blue    Procedure(s) Performed: Procedure(s) (LRB):  ARTHROPLASTY, KNEE, TOTAL (Right)    Patient location: PACU    Anesthesia Type: general    Transport from OR: Transported from OR on 6-10 L/min O2 by face mask with adequate spontaneous ventilation    Post pain: adequate analgesia    Post assessment: no apparent anesthetic complications and tolerated procedure well    Post vital signs: stable    Level of consciousness: sedated    Nausea/Vomiting: no nausea/vomiting    Complications: none    Transfer of care protocol was followed      Last vitals:   Visit Vitals  /61 (BP Location: Right arm, Patient Position: Lying)   Pulse 103   Temp 36.7 °C (98.1 °F) (Oral)   Resp 18   Ht 5' 8" (1.727 m)   Wt 102.1 kg (225 lb)   SpO2 96%   Breastfeeding No   BMI 34.21 kg/m²     "

## 2020-11-11 NOTE — PLAN OF CARE
Problem: Occupational Therapy Goal  Goal: Occupational Therapy Goal  Description: Goals to be met by: 11/13/20    Patient will increase functional independence with ADLs by performing:    UE Dressing with Modified Victor.  LE Dressing with Supervision.  Grooming while standing at sink with Modified Victor.  Toileting from toilet with Modified Victor for hygiene and clothing management.   Toilet transfer to toilet with Modified Victor.    Outcome: Ongoing, Progressing    OT evaluation with goals established. Patient contact guard assistance for bed mobility and toilet transfer/task.     Shari Dubose, OT  11/11/2020

## 2020-11-11 NOTE — BRIEF OP NOTE
"[facility]  Brief Operative Note    Surgery Date: 11/11/2020     Surgeon(s) and Role:     * GLENN Whyte MD - Primary    Assisting Surgeon: None    Pre-op Diagnosis:  Osteoarthritis of right knee, unspecified osteoarthritis type [M17.11]    Post-op Diagnosis:  Post-Op Diagnosis Codes:     * Osteoarthritis of right knee, unspecified osteoarthritis type [M17.11]    Procedure(s) (LRB):  ARTHROPLASTY, KNEE, TOTAL (Right)    Anesthesia: Regional    Description of the findings of the procedure(s): right total knee arthroplasty     Estimated Blood Loss: * No values recorded between 11/11/2020  9:07 AM and 11/11/2020 11:42 AM *         Specimens:   Specimen (12h ago, onward)    None            Discharge Note    OUTCOME: Patient tolerated treatment/procedure well without complication and is now ready for discharge.    DISPOSITION: Home or Self Care    FINAL DIAGNOSIS:  <principal problem not specified>    FOLLOWUP: In clinic    DISCHARGE INSTRUCTIONS:    Discharge Procedure Orders   COMMODE FOR HOME USE     Order Specific Question Answer Comments   Type: Standard    Height: 5' 8" (1.727 m)    Weight: 102.1 kg (225 lb)    Does patient have medical equipment at home? none    Length of need (1-99 months): 99      BATH/SHOWER CHAIR FOR HOME USE     Order Specific Question Answer Comments   Height: 5' 8" (1.727 m)    Weight: 102.1 kg (225 lb)    Does patient have medical equipment at home? none    Length of need (1-99 months): 99    Type: With back      "

## 2020-11-11 NOTE — ANESTHESIA PREPROCEDURE EVALUATION
11/11/2020  Josefina Blue is a 60 y.o., female.    Anesthesia Evaluation    I have reviewed the Patient Summary Reports.    I have reviewed the Nursing Notes. I have reviewed the NPO Status.   I have reviewed the Medications.     Review of Systems  Anesthesia Hx:  Denies Family Hx of Anesthesia complications.   Denies Personal Hx of Anesthesia complications.   Cardiovascular:   Exercise tolerance: good Hypertension Denies Valvular problems/Murmurs.  Denies CAD.     Denies Angina.      Functional Capacity 4 METS    Pulmonary:   Denies COPD.  Denies Asthma.  Denies Sleep Apnea.    Renal/:   Denies Chronic Renal Disease.     Hepatic/GI:   Denies GERD.    Musculoskeletal:   Arthritis     Neurological:   Denies TIA. Denies CVA. Denies Seizures.    Endocrine:   Denies Diabetes.        Physical Exam  General:  Obesity    Airway/Jaw/Neck:  Airway Findings: Mouth Opening: Normal Tongue: Normal  General Airway Assessment: Adult  Mallampati: II  TM Distance: Normal, at least 6 cm  Jaw/Neck Findings:  Neck ROM: Normal ROM       Chest/Lungs:  Chest/Lungs Findings: Clear to auscultation     Heart/Vascular:  Heart Findings: Rate: Normal  Rhythm: Regular Rhythm  Sounds: Normal  Heart murmur: negative            Anesthesia Plan  Type of Anesthesia, risks & benefits discussed:  Anesthesia Type:  CSE, MAC, regional  Patient's Preference:   Intra-op Monitoring Plan: standard ASA monitors  Intra-op Monitoring Plan Comments:   Post Op Pain Control Plan: multimodal analgesia, IV/PO Opioids PRN and per primary service following discharge from PACU  Post Op Pain Control Plan Comments:   Induction:   IV  Beta Blocker:         Informed Consent: Patient understands risks and agrees with Anesthesia plan.  Questions answered. Anesthesia consent signed with patient.  ASA Score: 2     Day of Surgery Review of History & Physical:    H&P  update referred to the provider.         Ready For Surgery From Anesthesia Perspective.

## 2020-11-11 NOTE — INTERVAL H&P NOTE
The patient has been examined and the H&P has been reviewed:    I concur with the findings and no changes have occurred since H&P was written.    Surgery risks, benefits and alternative options discussed and understood by patient/family.          Active Hospital Problems    Diagnosis  POA    Primary osteoarthritis of right knee [M17.11]  Yes      Resolved Hospital Problems   No resolved problems to display.

## 2020-11-11 NOTE — PT/OT/SLP EVAL
"Occupational Therapy   Evaluation    Name: Josefina Blue  MRN: [id number]  Admitting Diagnosis:  <principal problem not specified> Day of Surgery    Recommendations:     Discharge Recommendations: home  Discharge Equipment Recommendations:  bedside commode, shower chair  Barriers to discharge:  None    Assessment:     Josefina Blue is a 60 y.o. female with a medical diagnosis of <principal problem not specified>.  She presents s/p right TKA. Patient completed bed mobility and toilet transfer at contact guard assistance. Patient would benefit from skilled OT needs s/p right TKA to increase independence with ADLs and ADL transfers. Performance deficits affecting function: weakness, impaired self care skills, impaired functional mobilty, gait instability, impaired balance, decreased lower extremity function, decreased ROM.      Rehab Prognosis: Good; patient would benefit from acute skilled OT services to address these deficits and reach maximum level of function.       Plan:     Patient to be seen daily to address the above listed problems via self-care/home management, therapeutic activities, therapeutic exercises  · Plan of Care Expires: 11/13/20  · Plan of Care Reviewed with: patient, spouse    Subjective     Chief Complaint: "something is in my eye"  Patient/Family Comments/goals: "play double tennis     Occupational Profile:  Living Environment: Patient lives with their spouse in 9th floor condo with elevator and has a walk in shower: rolling walker: patient's  stated they needed a shower chair and another bedside commode  Previous level of function: independent with ADLs: left TKA in July2020  Roles and Routines: Works for [employer]   Equipment Used at Home:  walker, rolling, crutches, axillary  Assistance upon Discharge:      Pain/Comfort:  · Pain Rating 1: (no pain knee: left eye pain due to irritation)  · Pain Addressed 1: Nurse notified    Patients cultural, spiritual, Taoism conflicts given " the current situation: no    Objective:     Communicated with: Nursing prior to session.  Patient found supine with cryotherapy, FCD, peripheral IV, perineural catheter upon OT entry to room.    General Precautions: Standard, fall   Orthopedic Precautions:RLE weight bearing as tolerated   Braces: N/A     Occupational Performance:    Bed Mobility:    · Patient completed Scooting/Bridging with contact guard assistance  · Patient completed Supine to Sit with contact guard assistance    Functional Mobility/Transfers:  · Patient completed Sit <> Stand Transfer with contact guard assistance  with  rolling walker   · Patient completed Toilet Transfer Step Transfer technique with contact guard assistance with  rolling walker   · Patient completed chair transfer with step transfer technique with contact guard assistance with rolling walker   · Functional Mobility: patient ambulated to/from bathroom with use of rolling walker at contact guard assistance     Activities of Daily Living:  · Grooming: contact guard assistance standing at sink to wash hands  · Upper Body Dressing: minimum assistance sitting edge of bed to don gown for back, IV in hand   · Toileting: contact guard assistance completed on bedside commode placed over toilet     Cognitive/Visual Perceptual:  Cognitive/Psychosocial Skills:     -       Oriented to: Person, Place and Time   -       Follows Commands/attention:Follows multistep  commands    Physical Exam:  Upper Extremity Range of Motion:     -       Right Upper Extremity: WFL  -       Left Upper Extremity: WFL  Upper Extremity Strength:    -       Right Upper Extremity: WFL  -       Left Upper Extremity: WFL    AMPAC 6 Click ADL:  AMPAC Total Score: 19    Treatment & Education:    Patient educated on hand placement for transfers    Patient educated on rolling walker placement for ADLs  Patient educated on polar ice use   Patient educated on role OT and plan of care s/p surgery at [facility],  white board updated as needed       Patient left up in chair with all lines intact, call button in reach, RN notified,  present and food had arrived    GOALS:   Multidisciplinary Problems     Occupational Therapy Goals        Problem: Occupational Therapy Goal    Goal Priority Disciplines Outcome Interventions   Occupational Therapy Goal     OT, PT/OT Ongoing, Progressing    Description: Goals to be met by: 11/13/20    Patient will increase functional independence with ADLs by performing:    UE Dressing with Modified [place].  LE Dressing with Supervision.  Grooming while standing at sink with Modified [place].  Toileting from toilet with Modified [place] for hygiene and clothing management.   Toilet transfer to toilet with Modified [place].                     History:     Past Medical History:   Diagnosis Date    DDD (degenerative disc disease), lumbar 10/7/2019    Hyperlipidemia 8/31/2015    Hypertension     IFG (impaired fasting glucose) 8/31/2015    Neuropathic pain 8/31/2015    Squamous cell cancer of tongue 2012       Past Surgical History:   Procedure Laterality Date    CHOLECYSTECTOMY      GALLBLADDER SURGERY  06/2016    HYSTERECTOMY      KNEE ARTHROPLASTY Left 7/8/2020    Procedure: ARTHROPLASTY, KNEE;  Surgeon: GLENN Whyte MD;  Location: [facility] OR;  Service: Orthopedics;  Laterality: Left;  regional w/catheter   Spinal, Adductor  Cloidine/Epi/Ketorolac/Ropivacaine Injection 30cc      KNEE ARTHROSCOPY Right     for torn meniscus    TONGUE SURGERY      TRANSFORAMINAL EPIDURAL INJECTION OF STEROID Bilateral 9/19/2019    Procedure: Injection,steroid,epidural,transforaminal approach LUMBAR TRANSFORAMINAL BILATERAL L4/5 TF NOE;  Surgeon: Tim Kerns MD;  Location: [facility] PAIN [facility]GT;  Service: Pain Management;  Laterality: Bilateral;  NEEDS CONSENT, VIP    TRANSFORAMINAL EPIDURAL INJECTION OF STEROID Bilateral 10/7/2019    Procedure: LUMBAR TRANSFORAMINAL BILATERAL L4/5  TF NOE;  Surgeon: Tim Kerns MD;  [facility];  Service: Pain Management;  Laterality: Bilateral;  NEEDS CONSENT, **VIP**       Time Tracking:     OT Date of Treatment: 11/11/20  OT Start Time: 1330  OT Stop Time: 1351  OT Total Time (min): 21 min    Billable Minutes:Evaluation 10  Self Care/Home Management 11    Shari Dubose OT  11/11/2020

## 2020-11-11 NOTE — ANESTHESIA POSTPROCEDURE EVALUATION
Anesthesia Post Evaluation    Patient: Josefina Blue    Procedure(s) Performed: Procedure(s) (LRB):  ARTHROPLASTY, KNEE, TOTAL (Right)    Final Anesthesia Type: CSE    Patient location during evaluation: floor  Patient participation: Yes- Able to Participate  Level of consciousness: awake and alert  Post-procedure vital signs: reviewed and stable  Pain management: adequate  Airway patency: patent    PONV status at discharge: No PONV  Anesthetic complications: yes  Perioperative Events: corneal abrasion        Cardiovascular status: hypertensive (due for home antihypertensive)  Respiratory status: unassisted  Hydration status: euvolemic  Follow-up not needed.          Vitals Value Taken Time   /86 11/11/20 1628   Temp 36 °C (96.8 °F) 11/11/20 1628   Pulse 91 11/11/20 1628   Resp 18 11/11/20 1628   SpO2 96 % 11/11/20 1628         Event Time   Out of Recovery 13:03:00         Pain/Cherrie Score: Pain Rating Prior to Med Admin: 5 (11/11/2020  3:43 PM)  Pain Rating Post Med Admin: 2 (11/11/2020  3:26 PM)  Cherrie Score: 9 (11/11/2020 12:50 PM)

## 2020-11-11 NOTE — NURSING
Pt arrived to unit via hospital bed from PACU.  Pt aaox4, vss, no s/s of distress noted.  Pt c/o pain to right knee 1/10.  Also c/o pain and redness to left eye.  IVF infusing as well as PNC @ 6cc/hr.  Polar Ice to right knee.  Dressing to rle cdi.  FCD and SCD on.  Call light placed within reach.  Spouse at bedside.

## 2020-11-11 NOTE — ANESTHESIA PROCEDURE NOTES
CSE    Patient location during procedure: OR  Start time: 11/11/2020 8:38 AM  Timeout: 11/11/2020 8:37 AM  End time: 11/11/2020 8:41 AM    Staffing  Authorizing Provider: Jae Bowen MD  Performing Provider: Jae Bowen MD    Preanesthetic Checklist  Completed: patient identified, site marked, surgical consent, pre-op evaluation, timeout performed, IV checked, risks and benefits discussed and monitors and equipment checked  CSE  Patient position: sitting  Prep: ChloraPrep and site prepped and draped  Patient monitoring: heart rate, cardiac monitor, continuous pulse ox, continuous capnometry and frequent blood pressure checks  Approach: midline  Spinal Needle  Needle type: pencil-tip   Needle gauge: 25 G  Needle length: 5 in  Epidural Needle  Injection technique: MERLE air  Needle type: Tuohy   Needle gauge: 17 G  Needle length: 3.5 in  Needle insertion depth: 6.5 cm  Location: L3-4  Needle localization: anatomical landmarks  Catheter  Catheter type: side hole  Catheter size: 19 G  Catheter at skin depth: 12 cm  Assessment  Intrathecal Medications:  administered: primary anesthetic mcg of

## 2020-11-11 NOTE — PLAN OF CARE
Problem: Physical Therapy Goal  Goal: Physical Therapy Goal  Description: Goals to be met by: 2020     Patient will increase functional independence with mobility by performin. Supine <> sit with supervision  2. Sit <> stand transfer with Supervision with RW  3. Gait  x 275 feet with Stand-by Assistance using Rolling Walker.   4. Ascend/Descend 6 inch curb step with Contact Guard Assistance using Rolling Walker.  5. Lower extremity exercise program x 20 reps per handout, with assistance as needed    PT evaluation was performed and Plan of care was initiated.    2020 1458 by Diana Corona, PT  Outcome: Ongoing, Progressing

## 2020-11-11 NOTE — PLAN OF CARE
11/11/20 1544   Discharge Assessment   Assessment Type Discharge Planning Assessment   Confirmed/corrected address and phone number on facesheet? Yes   Assessment information obtained from? Patient;Medical Record   Expected Length of Stay (days) 1   Communicated expected length of stay with patient/caregiver yes   Prior to hospitilization cognitive status: Alert/Oriented   Prior to hospitalization functional status: Assistive Equipment   Current cognitive status: Alert/Oriented   Current Functional Status: Assistive Equipment;Needs Assistance   Facility Arrived From: N/A   Lives With spouse   Able to Return to Prior Arrangements yes   Is patient able to care for self after discharge? Unable to determine at this time (comments)   Who are your caregiver(s) and their phone number(s)? spouse - [place] [phone]   Patient's perception of discharge disposition home or selfcare   Readmission Within the Last 30 Days no previous admission in last 30 days   Patient currently being followed by outpatient case management? No   Patient currently receives any other outside agency services? No   Equipment Currently Used at Home walker, rolling;crutches   Do you have any problems affording any of your prescribed medications? No   Is the patient taking medications as prescribed? yes   Does the patient have transportation home? Yes  (pt's spouse is available for transport at d/c)   Transportation Anticipated car, drives self;family or friend will provide   Dialysis Name and Scheduled days N/A   Does the patient receive services at the [facility]? No   Discharge Plan A Home with family   Discharge Plan B Home with family;Home Health   DME Needed Upon Discharge  shower chair;bedside commode   Patient/Family in Agreement with Plan yes

## 2020-11-11 NOTE — PROGRESS NOTES
Pt requested to have something applied to left eye to keep closed d/t pain.  2 - 2x2s and paper tape applied.  Pt states that it helps relieve pain.  Will continue to monitor.

## 2020-11-11 NOTE — PLAN OF CARE
Pt more awake and alert.  Up in chair and working with PT and OT.  Pt c/o pain to right knee 2/10, pain to left eye 5/10.  Will admin prn pain meds as ordered.  Dressing to rle remains cdi.  No numbness, no tingling present. Call light within reach.  Spouse at bedside.

## 2020-11-11 NOTE — ANESTHESIA PROCEDURE NOTES
Adductor canal catheter    Patient location during procedure: pre-op   Block not for primary anesthetic.  Reason for block: at surgeon's request and post-op pain management   Post-op Pain Location: Right knee  Start time: 11/11/2020 7:51 AM  Timeout: 11/11/2020 7:50 AM   End time: 11/11/2020 8:07 AM    Staffing  Authorizing Provider: Jae Bowen MD  Performing Provider: Jae Bowen MD    Preanesthetic Checklist  Completed: patient identified, site marked, surgical consent, pre-op evaluation, timeout performed, IV checked, risks and benefits discussed and monitors and equipment checked  Peripheral Block  Patient position: supine  Prep: ChloraPrep and site prepped and draped  Patient monitoring: heart rate, cardiac monitor, continuous pulse ox, continuous capnometry and frequent blood pressure checks  Block type: adductor canal  Laterality: right  Injection technique: continuous  Needle  Needle type: Tuohy   Needle gauge: 17 G  Needle length: 3.5 in  Needle localization: anatomical landmarks and ultrasound guidance  Catheter type: spring wound  Catheter size: 19 G  Test dose: lidocaine 1.5% with Epi 1-to-200,000 and negative   -ultrasound image captured on disc.  Assessment  Injection assessment: negative aspiration, negative parasthesia and local visualized surrounding nerve  Paresthesia pain: none  Heart rate change: no  Slow fractionated injection: yes  Additional Notes  VSS.  [facility] RN monitoring vitals throughout procedure.  Patient tolerated procedure well. 15cc of 0.25% ropi + 1:300k epi

## 2020-11-11 NOTE — PT/OT/SLP EVAL
Physical Therapy Evaluation    Patient Name:  Josefina Blue   MRN:  [id number]    Recommendations:     Discharge Recommendations:  home, outpatient PT   Discharge Equipment Recommendations: bedside commode, shower chair   Barriers to discharge: None    Assessment:     Josefina Blue is a 60 y.o. female admitted with a medical diagnosis of <principal problem not specified>.  She presents with the following impairments/functional limitations:  weakness, impaired functional mobilty, gait instability, impaired balance, impaired self care skills, decreased lower extremity function, decreased ROM, orthopedic precautions, pain. Pt tolerated PT session well and is motivated to work to maximize her functional mobility. She requires SBA for sit<> stand transfers with RW and to amb 190 ft with RW WBAT RLE. Pt is okay to amb with RW and assist of 1 person overnight.    Rehab Prognosis: Good; patient would benefit from acute skilled PT services to address these deficits and reach maximum level of function.    Recent Surgery: Procedure(s) (LRB):  ARTHROPLASTY, KNEE, TOTAL (Right) Day of Surgery    Plan:     During this hospitalization, patient to be seen daily to address the identified rehab impairments via gait training, therapeutic activities, therapeutic exercises and progress toward the following goals:    · Plan of Care Expires:  12/11/20    Subjective     Chief Complaint: Right oblique knee pain and left eye pain like corneal abrasion  Patient/Family Comments/goals: I push myself and am an overachiever. Want to be able to do stair and be physically active. Used BSC in shower after left knee surgery and it rusted so need new BSC and shower chair and are willing to pay out of pocket.  Pain/Comfort:  · Pain Rating 1: 2/10  · Location - Side 1: Right  · Location - Orientation 1: (oblique)  · Location 1: knee  · Pain Addressed 1: Pre-medicate for activity, Reposition, Distraction  · Pain Rating Post-Intervention 1: 4/10  · Pain  Rating 2: 5/10  · Location - Side 2: Left  · Location - Orientation 2: (scratchy teary)  · Location 2: eye  · Pain Addressed 2: Nurse notified  · Pain Rating Post-Intervention 2: 5/10    Patients cultural, spiritual, Evangelical conflicts given the current situation: no    Living Environment:  Lives with  in high rise [facility]; approx 160 ft distance to enter condo from parking area. No stairs; have walk in shower  Prior to admission, patients level of function was amb IND with no AD but could not do stairs due to knee pain; working.  Equipment used at home: walker, rolling, crutches, axillary.  DME owned (not currently used): crutches.  Upon discharge, patient will have assistance from .    Objective:     Communicated with RN, pt and pt's  prior to session.  Patient found up in chair with cryotherapy, FCD, SCD, perineural catheter, peripheral IV(FCD R; SCD L)  upon PT entry to room.    General Precautions: Standard, fall   Orthopedic Precautions:RLE weight bearing as tolerated   Braces: N/A     Exams:  · Cognitive Exam:  Patient is oriented to Person, Place, Time and Situation  · Gross Motor Coordination:  WFL  · Postural Exam:  Patient presented with the following abnormalities:    · -       No postural abnormalities identified  · RUE ROM: WFL  · RUE Strength: WFL  · LUE ROM: WFL  · LUE Strength: WFL  · RLE ROM: limited knee flex and ext due to pain  · RLE Strength: WFL for amb and transfers with RW; decreased strength quads and HS  · LLE ROM: WFL  · LLE Strength: WFL    Functional Mobility:  · Transfers:     · Sit to Stand:  stand by assistance with rolling walker  · Toilet Transfer: stand by assistance with  rolling walker  using  Step Transfer using armrests of 3 in 1 commode over toilet  · Gait: CGA to amb 25 ft to bathroom then SBA to amb 190 ft with RW WBAT RLE progressing from step to gait pattern to step through gait pattern with mildly decreased [place]; no LOB or SOB  · Balance: SBA for  balance and safety for wiping in standing after urinating 350 CC; SBA to wash hands at sink with RW available for support but not needed    Therapeutic Activities and Exercises:   Patient and pt's  were educated in:  - PT role, Plan of Care and goals prior to discharge  -safety with transfers including hand placement  -gait sequencing and RW management  -OOB activity to maximize recovery including ambulating with nursing staff assistance and RW while in the hospital  -white board was updated  -polar ice use and management  -performing ankle pumps, quad sets and glute sets overnight and performed 1 set of 10 reps        AM-PAC 6 CLICK MOBILITY  Total Score:18     Patient left up in chair with all lines intact, call button in reach, RN notified and  present.    GOALS:   Multidisciplinary Problems     Physical Therapy Goals        Problem: Physical Therapy Goal    Goal Priority Disciplines Outcome Goal Variances Interventions   Physical Therapy Goal     PT, PT/OT Ongoing, Progressing     Description: Goals to be met by: 2020     Patient will increase functional independence with mobility by performin. Supine <> sit with supervision  2. Sit <> stand transfer with Supervision with RW  3. Gait  x 275 feet with Stand-by Assistance using Rolling Walker.   4. Ascend/Descend 6 inch curb step with Contact Guard Assistance using Rolling Walker.  5. Lower extremity exercise program x 20 reps per handout, with assistance as needed                     History:     Past Medical History:   Diagnosis Date    DDD (degenerative disc disease), lumbar 10/7/2019    Hyperlipidemia 2015    Hypertension     IFG (impaired fasting glucose) 2015    Neuropathic pain 2015    Squamous cell cancer of tongue        Past Surgical History:   Procedure Laterality Date    CHOLECYSTECTOMY      GALLBLADDER SURGERY  2016    HYSTERECTOMY      KNEE ARTHROPLASTY Left 2020    Procedure:  ARTHROPLASTY, KNEE;  Surgeon: GLENN Whyte MD;  Location[facility];  Service: Orthopedics;  Laterality: Left;  regional w/catheter   Spinal, Adductor  Cloidine/Epi/Ketorolac/Ropivacaine Injection 30cc      KNEE ARTHROSCOPY Right     for torn meniscus    TONGUE SURGERY      TRANSFORAMINAL EPIDURAL INJECTION OF STEROID Bilateral 9/19/2019    Procedure: Injection,steroid,epidural,transforaminal approach LUMBAR TRANSFORAMINAL BILATERAL L4/5 TF NOE;  Surgeon: Tim Kerns MD;  Location: [facility] PAIN [facility]GT;  Service: Pain Management;  Laterality: Bilateral;  NEEDS CONSENT, VIP    TRANSFORAMINAL EPIDURAL INJECTION OF STEROID Bilateral 10/7/2019    Procedure: LUMBAR TRANSFORAMINAL BILATERAL L4/5 TF NOE;  Surgeon: Tim Kerns MD;  Location: [facility]IN [facility]GT;  Service: Pain Management;  Laterality: Bilateral;  NEEDS CONSENT, **VIP**       Time Tracking:     PT Received On: 11/11/20  PT Start Time: 1419     PT Stop Time: 1450  PT Total Time (min): 31 min     Billable Minutes: Evaluation 8, Gait Training 15 and Therapeutic Exercise 8      Diana Corona, PT  11/11/2020

## 2020-11-11 NOTE — PROGRESS NOTES
"Dr. Bettencourt notified that pt is c/o left eye feeling "scratchy, red, and painful".  Per MD, will order something as soon as she can get access to computer.    "

## 2020-11-11 NOTE — PROGRESS NOTES
Dr. Bettencourt administered eye drops to left eye which gave the pt immediate relief.  Pt still preferred for eye to remain closed; therefore, 2 -2x2s and paper tape re-applied.  Will continue to monitor.

## 2020-11-12 ENCOUNTER — TELEPHONE (OUTPATIENT)
Dept: SPORTS MEDICINE | Facility: CLINIC | Age: 60
End: 2020-11-12

## 2020-11-12 VITALS
HEIGHT: 68 IN | SYSTOLIC BLOOD PRESSURE: 111 MMHG | BODY MASS INDEX: 34.1 KG/M2 | TEMPERATURE: 97 F | DIASTOLIC BLOOD PRESSURE: 71 MMHG | OXYGEN SATURATION: 98 % | RESPIRATION RATE: 18 BRPM | WEIGHT: 225 LBS | HEART RATE: 76 BPM

## 2020-11-12 DIAGNOSIS — Z96.651 HISTORY OF TOTAL RIGHT KNEE REPLACEMENT: ICD-10-CM

## 2020-11-12 DIAGNOSIS — M25.561 ACUTE PAIN OF RIGHT KNEE: Primary | ICD-10-CM

## 2020-11-12 PROCEDURE — 94761 N-INVAS EAR/PLS OXIMETRY MLT: CPT

## 2020-11-12 PROCEDURE — 25000003 PHARM REV CODE 250: Performed by: PHYSICIAN ASSISTANT

## 2020-11-12 PROCEDURE — 97110 THERAPEUTIC EXERCISES: CPT | Mod: CQ

## 2020-11-12 PROCEDURE — 99212 OFFICE O/P EST SF 10 MIN: CPT | Mod: ,,, | Performed by: ANESTHESIOLOGY

## 2020-11-12 PROCEDURE — 97535 SELF CARE MNGMENT TRAINING: CPT

## 2020-11-12 PROCEDURE — 25000003 PHARM REV CODE 250: Performed by: ORTHOPAEDIC SURGERY

## 2020-11-12 PROCEDURE — 99900035 HC TECH TIME PER 15 MIN (STAT)

## 2020-11-12 PROCEDURE — 63600175 PHARM REV CODE 636 W HCPCS: Performed by: PHYSICIAN ASSISTANT

## 2020-11-12 PROCEDURE — 99212 PR OFFICE/OUTPT VISIT, EST, LEVL II, 10-19 MIN: ICD-10-PCS | Mod: ,,, | Performed by: ANESTHESIOLOGY

## 2020-11-12 PROCEDURE — 25000003 PHARM REV CODE 250: Performed by: ANESTHESIOLOGY

## 2020-11-12 PROCEDURE — 97530 THERAPEUTIC ACTIVITIES: CPT | Mod: CQ

## 2020-11-12 PROCEDURE — 63600175 PHARM REV CODE 636 W HCPCS: Performed by: STUDENT IN AN ORGANIZED HEALTH CARE EDUCATION/TRAINING PROGRAM

## 2020-11-12 PROCEDURE — 97116 GAIT TRAINING THERAPY: CPT | Mod: CQ

## 2020-11-12 RX ORDER — CIPROFLOXACIN HYDROCHLORIDE 3 MG/ML
1 SOLUTION/ DROPS OPHTHALMIC EVERY 6 HOURS
Qty: 10 ML | Refills: 0 | Status: SHIPPED | OUTPATIENT
Start: 2020-11-12 | End: 2021-01-01

## 2020-11-12 RX ORDER — METHOCARBAMOL 750 MG/1
750 TABLET, FILM COATED ORAL 4 TIMES DAILY
Status: DISCONTINUED | OUTPATIENT
Start: 2020-11-12 | End: 2020-11-12

## 2020-11-12 RX ORDER — METHOCARBAMOL 750 MG/1
750 TABLET, FILM COATED ORAL 4 TIMES DAILY
Qty: 40 TABLET | Refills: 0 | Status: SHIPPED | OUTPATIENT
Start: 2020-11-12 | End: 2020-11-22

## 2020-11-12 RX ORDER — METHOCARBAMOL 750 MG/1
750 TABLET, FILM COATED ORAL 4 TIMES DAILY
Status: DISCONTINUED | OUTPATIENT
Start: 2020-11-12 | End: 2020-11-12 | Stop reason: HOSPADM

## 2020-11-12 RX ADMIN — ROPIVACAINE HYDROCHLORIDE: 2 INJECTION, SOLUTION EPIDURAL; INFILTRATION at 08:11

## 2020-11-12 RX ADMIN — ROPIVACAINE HYDROCHLORIDE 6 ML/HR: 2 INJECTION, SOLUTION EPIDURAL; INFILTRATION at 03:11

## 2020-11-12 RX ADMIN — OXYCODONE 5 MG: 5 TABLET ORAL at 06:11

## 2020-11-12 RX ADMIN — TRIAMTERENE AND HYDROCHLOROTHIAZIDE 1 CAPSULE: 25; 37.5 CAPSULE ORAL at 08:11

## 2020-11-12 RX ADMIN — ACETAMINOPHEN 1000 MG: 500 TABLET ORAL at 07:11

## 2020-11-12 RX ADMIN — FAMOTIDINE 20 MG: 20 TABLET, FILM COATED ORAL at 08:11

## 2020-11-12 RX ADMIN — ACETAMINOPHEN 1000 MG: 500 TABLET ORAL at 12:11

## 2020-11-12 RX ADMIN — CELECOXIB 200 MG: 200 CAPSULE ORAL at 08:11

## 2020-11-12 RX ADMIN — CEFAZOLIN 2 G: 330 INJECTION, POWDER, FOR SOLUTION INTRAMUSCULAR; INTRAVENOUS at 12:11

## 2020-11-12 RX ADMIN — DOCUSATE SODIUM 50MG AND SENNOSIDES 8.6MG 1 TABLET: 8.6; 5 TABLET, FILM COATED ORAL at 08:11

## 2020-11-12 RX ADMIN — ASPIRIN 81 MG: 81 TABLET, COATED ORAL at 08:11

## 2020-11-12 RX ADMIN — OXYCODONE 5 MG: 5 TABLET ORAL at 12:11

## 2020-11-12 RX ADMIN — METHOCARBAMOL 750 MG: 750 TABLET ORAL at 10:11

## 2020-11-12 RX ADMIN — SODIUM CHLORIDE: 0.9 INJECTION, SOLUTION INTRAVENOUS at 12:11

## 2020-11-12 RX ADMIN — CIPROFLOXACIN HYDROCHLORIDE: 3 OINTMENT OPHTHALMIC at 08:11

## 2020-11-12 RX ADMIN — OXYCODONE 5 MG: 5 TABLET ORAL at 11:11

## 2020-11-12 RX ADMIN — MUPIROCIN 1 G: 20 OINTMENT TOPICAL at 08:11

## 2020-11-12 RX ADMIN — POLYETHYLENE GLYCOL 3350 17 G: 17 POWDER, FOR SOLUTION ORAL at 08:11

## 2020-11-12 RX ADMIN — ONDANSETRON 4 MG: 2 INJECTION INTRAMUSCULAR; INTRAVENOUS at 12:11

## 2020-11-12 RX ADMIN — IRBESARTAN 75 MG: 75 TABLET, FILM COATED ORAL at 12:11

## 2020-11-12 NOTE — PLAN OF CARE
Pt up in chair.  Pt aaox4, vss, no s/s of distress noted.  Pt states pain to rle 4/10.  PNC @ 6cc/hr.  Polar Ice intact.  Call light within reach.

## 2020-11-12 NOTE — PT/OT/SLP PROGRESS
Physical Therapy Treatment    Patient Name:  Josefina Blue   MRN:  [id number]    Recommendations:     Discharge Recommendations:  home, outpatient PT   Discharge Equipment Recommendations: bedside commode, shower chair   Barriers to discharge: None    Assessment:     Josefina Blue is a 60 y.o. female admitted with a medical diagnosis of <principal problem not specified>.  She presents with the following impairments/functional limitations:  weakness, gait instability, decreased ROM, decreased lower extremity function, pain, orthopedic precautions, edema, impaired functional mobilty. Mrs. Blue tolerated treatment well as evidence of no increase pain during session. She met 5/6 goals.  Mrs. Blue tolerated increase distance with gait training without difficulty.  She demonstrated good R LE stability and balance with OOB mobility using RW.  Noted fair strength while performing exercises. Mrs. Blue is  safe to be discharged home and will require family assistance.      Rehab Prognosis: Good; patient would benefit from acute skilled PT services to address these deficits and reach maximum level of function.    Recent Surgery: Procedure(s) (LRB):  ARTHROPLASTY, KNEE, TOTAL (Right) 1 Day Post-Op    Plan:     During this hospitalization, patient to be seen daily to address the identified rehab impairments via gait training, therapeutic activities, therapeutic exercises and progress toward the following goals:    · Plan of Care Expires:  12/11/20    Subjective     Chief Complaint: Minimal R knee pain  Patient/Family Comments/goals: It hurts in back of my knee.  Pain/Comfort:  · Pain Rating 1: 3/10  · Location - Side 1: Right  · Location 1: knee      Objective:     Communicated with NSG prior to session.  Patient found up in chair with cryotherapy, SCD, FCD, perineural catheter upon PT entry to room.     General Precautions: Standard, fall   Orthopedic Precautions:RLE weight bearing as tolerated   Braces:        Functional Mobility:  · Bed Mobility:     · Supine to Sit: supervision  · Sit to Supine: supervision  · Transfers:   From , bed, bedside commode and mat  · Sit to Stand:  stand by assistance with rolling walker  · Gait: amb 250 feet and 200 feet with RW SBA. No LOB or SOB.  Patient demonstrated step through gait pattern, good heel strike and posture.  Occasional VC for proper sequence and increase R knee extension during stance phase.  · Balance: Good  Stairs: Ascend and descend 6inch curb step x2 trials with RW CGA. VC's for technique and sequence  ·       AM-PAC 6 CLICK MOBILITY  Turning over in bed (including adjusting bedclothes, sheets and blankets)?: 4  Sitting down on and standing up from a chair with arms (e.g., wheelchair, bedside commode, etc.): 4  Moving from lying on back to sitting on the side of the bed?: 4  Moving to and from a bed to a chair (including a wheelchair)?: 4  Need to walk in hospital room?: 4  Climbing 3-5 steps with a railing?: 3  Basic Mobility Total Score: 23       Therapeutic Activities and Exercises:  Patient performed established TKR exercises AP,GS,QS,hip ABD/ADD, HS, SAQ, LAQ and SLR x10-15 reps RLE  Patient given HEP  Patient demonstrated good balance standing while washing her hands at the sink.  Patient educated on car transfers  Patient educated on bed mobility, transfers, HEP, curb step, and safety with OOB mobility.  Patient stated she felt comfortable being discharged home and had no concerns.            Patient left up in chair with all lines intact, call button in reach and NSG notified..    GOALS:   Multidisciplinary Problems     Physical Therapy Goals        Problem: Physical Therapy Goal    Goal Priority Disciplines Outcome Goal Variances Interventions   Physical Therapy Goal     PT, PT/OT Ongoing, Progressing     Description: Goals to be met by: 2020     Patient will increase functional independence with mobility by performin. Supine <> sit with  supervision  2. Sit <> stand transfer with Supervision with RW  3. Gait  x 275 feet with Stand-by Assistance using Rolling Walker.   4. Ascend/Descend 6 inch curb step with Contact Guard Assistance using Rolling Walker.  5. Lower extremity exercise program x 20 reps per handout, with assistance as needed                     Time Tracking:     PT Received On: 11/12/20  PT Start Time: 1000     PT Stop Time: 1057  PT Total Time (min): 57 min     Billable Minutes: Gait Training 20, Therapeutic Activity 25 and Therapeutic Exercise 12    Treatment Type: Treatment  PT/PTA: PTA     PTA Visit Number: 1     Ludwig Goncalves II, PTA  11/12/2020

## 2020-11-12 NOTE — PLAN OF CARE
Problem: Occupational Therapy Goal  Goal: Occupational Therapy Goal  Description: Goals to be met by: 11/13/20    Patient will increase functional independence with ADLs by performing:    UE Dressing with Modified [place].  LE Dressing with Supervision.  Grooming while standing at sink with Modified [place].  Toileting from toilet with Modified [place] for hygiene and clothing management.   Toilet transfer to toilet with Modified [place].    Outcome: Met    OT goals met for discharge home. Will have assistance of  as needed.     Shari Dubose, OT  11/12/2020

## 2020-11-12 NOTE — DISCHARGE SUMMARY
[facility]  Discharge Summary      Admit Date: 11/11/2020    Discharge Date and Time:  11/12/2020 7:53 AM    Attending Physician: GLENN Whyte MD     Reason for Admission: right tka     Procedures Performed: Procedure(s) (LRB):  ARTHROPLASTY, KNEE, TOTAL (Right)    Hospital Course (synopsis of major diagnoses, care, treatment, and services provided during the course of the hospital stay): pain well controlled. Worked with pt voiding on own.      Consults:     Significant Diagnostic Studies:     Final Diagnoses:    Principal Problem: <principal problem not specified>   Secondary Diagnoses:     Discharged Condition: stable    Disposition: Home or Self Care    Follow Up/Patient Instructions:     Medications:  Reconciled Home Medications:        Medication List      CONTINUE taking these medications    acetaminophen 650 MG Tbsr  Commonly known as: TYLENOL  Take 1 tablet (650 mg total) by mouth every 8 (eight) hours. for 14 days     ALPRAZolam 0.25 MG tablet  Commonly known as: XANAX  TAKE ONE TABLET BY MOUTH AT BEDTIME AS NEEDED.     * aspirin 81 MG EC tablet  Commonly known as: ECOTRIN  Take 1 tablet (81 mg total) by mouth nightly.     * aspirin 81 MG EC tablet  Commonly known as: ECOTRIN  Take 1 tablet (81 mg total) by mouth 2 (two) times daily.     celecoxib 200 MG capsule  Commonly known as: CeleBREX  Take 1 capsule (200 mg total) by mouth once daily. for 14 days     clobetasoL 0.05 % external solution  Commonly known as: TEMOVATE  Use one to two times daily as needed for scaling or itching to scalp     diazePAM 5 MG tablet  Commonly known as: VALIUM  Take 1 tablet (5 mg total) by mouth every 12 (twelve) hours as needed (s/p total knee, prescribed to help her sleep.).     * estradioL 0.01 % (0.1 mg/gram) vaginal cream  Commonly known as: ESTRACE  Place 1 g vaginally once daily.     * estradioL 0.01 % (0.1 mg/gram) vaginal cream  Commonly known as: ESTRACE  Place 2 gram vaginally once  "daily.     irbesartan 75 MG tablet  Commonly known as: AVAPRO  Take 1 tablet (75 mg total) by mouth every evening.     melatonin 3 mg Tbdl  Take by mouth.     pravastatin 20 MG tablet  Commonly known as: PRAVACHOL  Take 1 tablet (20 mg total) by mouth once daily.     triamterene-hydrochlorothiazide 37.5-25 mg 37.5-25 mg per tablet  Commonly known as: MAXZIDE-25  Take 1 tablet by mouth once daily.         * This list has 4 medication(s) that are the same as other medications prescribed for you. Read the directions carefully, and ask your doctor or other care provider to review them with you.            ASK your doctor about these medications    cephALEXin 500 MG capsule  Commonly known as: KEFLEX  Take 4 capsule, 30 minutes prior to dental procedure. For one dose.     ondansetron 4 MG tablet  Commonly known as: ZOFRAN  Take 1 tablet (4 mg total) by mouth every 8 (eight) hours as needed.  Ask about: Should I take this medication?     oxyCODONE 5 MG immediate release tablet  Commonly known as: ROXICODONE  Take 1 tablet (5 mg total) by mouth every 6 (six) hours as needed.  Ask about: Should I take this medication?          Discharge Procedure Orders   COMMODE FOR HOME USE     Order Specific Question Answer Comments   Type: Standard    Height: 5' 8" (1.727 m)    Weight: 102.1 kg (225 lb)    Does patient have medical equipment at home? none    Length of need (1-99 months): 99      BATH/SHOWER CHAIR FOR HOME USE     Order Specific Question Answer Comments   Height: 5' 8" (1.727 m)    Weight: 102.1 kg (225 lb)    Does patient have medical equipment at home? none    Length of need (1-99 months): 99    Type: With back      Diet general     Call MD for:  temperature >100.4     Call MD for:  persistent nausea and vomiting     Call MD for:  severe uncontrolled pain     Call MD for:  difficulty breathing, headache or visual disturbances     Call MD for:  redness, tenderness, or signs of infection (pain, swelling, redness, odor or " green/yellow discharge around incision site)     Call MD for:  hives     Call MD for:  persistent dizziness or light-headedness     Leave dressing on - Keep it clean, dry, and intact until clinic visit     Follow-up Information     Call Jo-Ann Collins MD.    Specialty: Internal Medicine  Why: As needed  Contact information:  [address] [facility] [address]  [phone]             [facility] - Sports Med [facility] On 11/25/2020.    Specialty: Sports Medicine  Why: Post op Wednesday 11/25 @ 10:30am with Nicolas Vitale PA-C  Contact information:  [address]  [facility] [address]  [phone]  Additional information:  Please park in surface lot or garage and check in on the first floor, Building [address]

## 2020-11-12 NOTE — OP NOTE
?[facility]   OPERATIVE REPORT   ORTHOPAEDIC SURGERY   PROVIDER: DR. CARMELO ORTIZ    PATIENT INFORMATION   Josefina Blue 60 y.o. female 1960   MRN: [id number]   LOCATION: [facility]     DATE OF PROCEDURE: 11/11/2020     PREOPERATIVE DIAGNOSES:   Osteoarthritis of right knee    POSTOPERATIVE DIAGNOSES:   Osteoarthritis of right knee    PROCEDURES PERFORMED:   Right total knee arthroplasty (CPT 44371)    SURGEON:  CARMELO Ortiz MD - Primary  Michael Villagomez MD - Fellow - First Assist  SMA Jaron    First Assistant Duties: Due to the complexity of the case and the need for significant intra-operative decision making, first assistant duties were medically necessary. The chronicity of the disease process and the level of deformity dictated that first assistant duties be undertaken. Assistance was provided for joint exposure, manipulation, and retractor placement. There was no qualified resident available for the procedure.    ANESTHESIA: Spinal with adductor catheter and local anesthetic injection (CHELI)    ESTIMATED BLOOD LOSS: 200 mL    IMPLANTS:   Implant Name Type Inv. Item Serial No.  Lot No. LRB No. Used Action   PIN FIX TEMP 1/8X2.5IN LF STER - [id number]  PIN FIX TEMP 1/8X2.5IN LF STER  [employer] KAV[employer]. [id number] Right 1 Implanted and Explanted   CEMENT BONE SMPLX HV GENTMYCN - [id number]  CEMENT BONE SMPLX HV GENTMYCN  [employer]. [id number] Right 2 Implanted   COMP FEM POST STAB GISSELLE SZ 5 RT - [id number]  COMP FEM POST STAB GISSELLE SZ 5 RT  [employer]. [id number] Right 1 Implanted   PATELLA TRIATHLON 36X10 SYMTRC - [id number]  PATELLA TRIATHLON 36X10 SYMTRC  [employer] KAV[employer]. [id number] Right 1 Implanted   PRIMARY TIBIAL BASE 5. - [id number]  PRIMARY TIBIAL BASE 5.  [employer]AV[employer]. [id number] Right 1 Implanted   TIBIAL POST STAB SZ 5 11X3 LEONEL - [id number]  TIBIAL POST STAB SZ 5 11X3 LEONEL  [employer]AV[employer]. [id number] Right 1 Implanted      SPECIMENS:    Specimen (12h ago, onward)    None        COMPLICATIONS: None.     INTRAOPERATIVE COUNTS: Correct.     PROPHYLACTIC IV ANTIBIOTICS: Given per [facility] Protocol.    INDICATIONS FOR PROCEDURE:  Josefina Blue is a 60 y.o. year-old with a longstanding history of right knee pain.  She has failed extensive conservative care to this point.  Preoperative imaging revealed degenerative joint disease and treatment options were discussed.  She elected to proceed with knee replacement.    Risks and complications were discussed including, but not limited to risks of anesthetic complications, infections, wound healing complications, aseptic loosening, instability, DVT, pulmonary embolism and death among others and she elected to proceed.    COMPONENTS USED:  The [employer] triathlon system with size femur 5, tibia 5, 11  mm polyethylene, 36 mm patella.    DESCRIPTION OF PROCEDURE:  The patient was taken to the Operating Room where anesthesia was administered by the Anesthesia Department. She was then placed in the supine position and all superficial neurovascular structures were well padded.  The right lower extremity was then sterilely prepped and draped in the normal fashion.  Patient had a 10-15 degree preoperative flexion contracture.    Under tourniquet control, a 20 cm longitudinal incision was made over the anterior aspect of the knee.  Subcutaneous tissue was sharply dissected down to the deep fascia, which was incised along the line of the incision.  A standard medial parapatellar arthrotomy was made.  The proximal medial tibial plateau was then subperiosteally exposed protecting the medial collateral ligament.  A portion of the infrapatellar fat pad was excised.  The patella was everted and the knee was flexed to 90 degrees.    The extramedullary tibial alignment guide was then placed and the proximal tibial osteotomy was made approximately 4 mm distal to the most shallow surface of the tibial plateau.  This was done  perpendicular to the axis of the tibia with approximately 3 degrees posterior slope.  Of note this was a tight knee and an additional 4 mm proximal tibial osteotomy was also required in this case to ensure appropriate flexion and extension gaps.    A drill was then used to gain access into the intramedullary canal of the distal femur. The distal femoral cutting guide was placed and the 10 mm distal femoral cut was made in 5 degrees of valgus.  Femur was sized to a size 5.  The size 5 cutting guide was applied and  the anterior femoral condyle, posterior condyle, and chamfer cuts were made.  Unfortunately there was some notching present in this case.  It was determined that no stem would be required on the femoral side however.  Of note, the patient had a size 5 femoral component on the contralateral knee with no notching.  From a medial to lateral direction on the femur, a size 6 would not fit appropriately.  At this time, the cutting guide was removed and the cruciate ligaments, osteophytes, menisci and any debris was removed from the joint space.  Flexion and extension gaps were checked and were found to be equal at 11 mm. The notch cutting guide for the posterior stabilized housing was placed and the notch cuts were then made.    We then turned our attention back towards the proximal tibia.  This was sized to a size 5, placed in neutral rotation, and the keel was punched in the standard fashion.  Trial sizes of the 5 femur, 5 tibia, and 11 mm polyethylene liner were then placed.    The patellar cutting guide was then used to remove the dorsal 10 mm of the patellar surface to a final thickness of 12 mm.  This was sized to a size 36. Drill holes were placed and patellar trial was placed.    At this time, the knee was placed through range of motion.  Excellent patellofemoral tracking and stability were noted throughout.  Full extension was easily obtained and intraoperative range of motion was from approximately 0  to 125 degrees.    Trial components were removed and the bony surfaces were thoroughly irrigated in a pulse lavage fashion and dried.  Two batches of cement were mixed and the tibial, femoral and patellar components were cemented into position, impacted and held in place as the cement polymerized.  Any excess cement was removed using [place] elevators.  The 11 mm polyethylene was then locked into position.    The wound was once again thoroughly irrigated.  The tourniquet was deflated and any significant bleeding was stopped using electrocautery.  Tranexamic acid was given intravenously pre incision and at the time of component cementation for hemostasis. The wound was irrigated with dilute betadine wash followed by normal saline via pulse lavage prior to closure.     The quadriceps tendon and parapatellar retinaculum were then closed with interrupted figure-of-eight sutures of #1 Vicryl.  Subcutaneous tissue was closed with interrupted inverted stitches #3-0 Vicryl.  Skin was approximated using staples.  Sterile dressing was applied and she was returned to the Postanesthesia Care Unit in stable condition.    As the attending surgeon I was physically present for the key/critical portions of the procedure.

## 2020-11-12 NOTE — PROGRESS NOTES
Acute Pain Service and Perioperative Surgical Home Progress Note    HPI  Josefina Blue is a 60 y.o., female. No acute events overnight.     Interval history      Surgery:  Procedure(s) (LRB):  ARTHROPLASTY, KNEE, TOTAL (Right)    Post Op Day #: 1    Catheter type: Perineural Adductor Canal    Infusion type: Ropivacaine 0.2%  6 ml/hr basal    Problem List:    Active Hospital Problems    Diagnosis  POA    Primary osteoarthritis of right knee [M17.11]  Yes      Resolved Hospital Problems   No resolved problems to display.       Subjective:       General appearance of alert, oriented, no complaints   Pain with rest: 1    Numbers   Pain with movement: 3    Numbers   Side Effects    1. Pruritis No    2. Nausea No    3. Motor Blockade No, 0=Ability to raise lower extremities off bed    4. Sedation No, 1=awake and alert    Schedule Medications:    acetaminophen  1,000 mg Oral Q6H    aspirin  81 mg Oral BID    celecoxib  200 mg Oral Daily    celecoxib  400 mg Oral Once    ciprofloxacin HCl   Both Eyes QID    famotidine  20 mg Oral BID    irbesartan  75 mg Oral QHS    mupirocin  1 g Nasal BID    polyethylene glycol  17 g Oral Daily    pregabalin  75 mg Oral QHS    senna-docusate 8.6-50 mg  1 tablet Oral BID    triamterene-hydrochlorothiazide 37.5-25 mg  1 capsule Oral Daily        Continuous Infusions:   sodium chloride 0.9% 150 mL/hr at 11/12/20 0006    ropivacaine (PF) 2 mg/ml (0.2%) 6 mL/hr (11/12/20 0350)    ropivacaine          PRN Medications:  ALPRAZolam, bisacodyL, fentaNYL, midazolam, morphine, naloxone, ondansetron, oxyCODONE, oxyCODONE, promethazine (PHENERGAN) IVPB, ropivacaine       Antibiotics:  Antibiotics (From admission, onward)    Start     Stop Route Frequency Ordered    11/11/20 2100  mupirocin 2 % ointment 1 g      11/16 2059 Nasl 2 times daily 11/11/20 1354    11/11/20 1700  ciprofloxacin HCl 0.3 % ophthalmic ointment      11/14 1659 Both Eyes 4 times daily 11/11/20 1517              Objective:     Catheter site clean, dry, intact          Vital Signs (Most Recent):  Temp: 98.4 °F (36.9 °C) (11/12/20 0404)  Pulse: 79 (11/12/20 0404)  Resp: 16 (11/12/20 0404)  BP: 124/64 (11/12/20 0404)  SpO2: 98 % (11/12/20 0404) Vital Signs Range (Last 24H):  Temp:  [96.8 °F (36 °C)-98.4 °F (36.9 °C)]   Pulse:  []   Resp:  [12-20]   BP: (108-171)/(58-86)   SpO2:  [95 %-100 %]          I & O (Last 24H):    Intake/Output Summary (Last 24 hours) at 11/12/2020 0522  Last data filed at 11/12/2020 0200  Gross per 24 hour   Intake 3714.5 ml   Output 3550 ml   Net 164.5 ml       Physical Exam:    GA: Alert, comfortable, no acute distress.   Pulmonary: Clear to auscultation A/P/L. No wheezing, crackles, or rhonchi.  Cardiac: RRR S1 & S2 w/o rubs/murmurs/gallops.   Abdominal:Bowel sounds present. No tenderness to palpation or distension. No appreciable hepatosplenomegaly.   Skin: No jaundice, rashes, or visible lesions.         Laboratory:  CBC: No results for input(s): WBC, RBC, HGB, HCT, PLT, MCV, MCH, MCHC in the last 72 hours.    BMP: No results for input(s): NA, K, CO2, CL, BUN, CREATININE, GLU, MG, PHOS, CALCIUM in the last 72 hours.    No results for input(s): PT, INR, PROTIME, APTT in the last 72 hours.      Anticoagulant dose ASA 81mg at 2116 on 11/11/2020.    Assessment:         Pain control adequate    Plan:     1) Pain:    Adductor canal perineural catheter in place and infusing. Good level. Multimodal pain regimen with acetaminophen, Celebrex, Lyrica, and prn oxycodone given  Will continue to monitor. Plan to discharge with On-Q on POD #1.   2) Corneal Abrasion: Pain improved overnight. Continue cipro eye ointment, will monitor during her stay.    3) HTN: Stable, continue home medications.    4) FEN/GI: Tolerating liquids, advance diet as tolerated. (-) flatus. (-) BM.   5) Dispo: Pt working well with PT/OT. Case management and SW for placement. Plan for discharge POD #1.        Evaluator Dorian FIERRO  CAITLIN Bolaños   I have seen the patient, reviewed the Nurse Practitioner's assessment and plan. I have personally interviewed and examined the patient at bedside and: agree with the findings.   Pain well controlled; some thigh pain so will add robaxin to help  Eye with minimal pain ; cipro drops script  and instruction sheet given to patient and all questions answered  D/C home today after PT/OT goals met

## 2020-11-12 NOTE — PLAN OF CARE
Problem: Physical Therapy Goal  Goal: Physical Therapy Goal  Description: Goals to be met by: 2020     Patient will increase functional independence with mobility by performin. Supine <> sit with supervision Met  2. Sit <> stand transfer with Supervision with RW Met  3. Gait  x 275 feet with Stand-by Assistance using Rolling Walker. Met  4. Ascend/Descend 6 inch curb step with Contact Guard Assistance using Rolling Walker. Met  5. Lower extremity exercise program x 20 reps per handout, with assistance as needed    Outcome: Ongoing, Progressing   Ludwig Goncalves,PTA

## 2020-11-12 NOTE — PROGRESS NOTES
Progress Note    Admit Date: 11/11/2020   LOS: 0 days     SUBJECTIVE:     POD1 s/p Right TKA     Pain well controlled, worked with pt YESTERDAY without issues. Denies CP/SOB. Denies foot weakness.     Scheduled Meds:   acetaminophen  1,000 mg Oral Q6H    aspirin  81 mg Oral BID    celecoxib  200 mg Oral Daily    celecoxib  400 mg Oral Once    ciprofloxacin HCl   Both Eyes QID    famotidine  20 mg Oral BID    irbesartan  75 mg Oral QHS    mupirocin  1 g Nasal BID    polyethylene glycol  17 g Oral Daily    pregabalin  75 mg Oral QHS    senna-docusate 8.6-50 mg  1 tablet Oral BID    triamterene-hydrochlorothiazide 37.5-25 mg  1 capsule Oral Daily     Continuous Infusions:   sodium chloride 0.9% 150 mL/hr at 11/12/20 0006    ropivacaine (PF) 2 mg/ml (0.2%) 6 mL/hr (11/12/20 0350)    ropivacaine       PRN Meds:ALPRAZolam, bisacodyL, fentaNYL, midazolam, morphine, naloxone, ondansetron, oxyCODONE, oxyCODONE, promethazine (PHENERGAN) IVPB, ropivacaine    Review of patient's allergies indicates:   Allergen Reactions    Aspirin Nausea Only     Can take low dose of Aspirin; can take buffered low dose aspirin only    Codeine Nausea Only     Can take Codeine if she takes a dose of Zofran with it       Review of Systems  All 10 systems reivewed - negative except as noted in HPI.     OBJECTIVE:     Vital Signs (Most Recent)  Temp: 96.9 °F (36.1 °C) (11/12/20 0700)  Pulse: 76 (11/12/20 0718)  Resp: 16 (11/12/20 0718)  BP: 111/71 (11/12/20 0700)  SpO2: 98 % (11/12/20 0718)    Vital Signs Range (Last 24H):  Temp:  [96.8 °F (36 °C)-98.4 °F (36.9 °C)]   Pulse:  []   Resp:  [12-20]   BP: (108-171)/(58-86)   SpO2:  [95 %-100 %]     I & O (Last 24H):    Intake/Output Summary (Last 24 hours) at 11/12/2020 0751  Last data filed at 11/12/2020 0625  Gross per 24 hour   Intake 3714.5 ml   Output 4650 ml   Net -935.5 ml     Physical Exam:    RLE dressing intact.   Intact ehl gs ta   NVI distally.      Laboratory:    Diagnostic Results:    Imaging reviewed hardware in place. Appropriate alignment.     ASSESSMENT/PLAN:     Plan:     POD1 s/p Right TKA   Discharge today as expected.

## 2020-11-12 NOTE — PT/OT/SLP PROGRESS
Occupational Therapy   Treatment/Discharge    Name: Josefina Blue  MRN: [id number]  Admitting Diagnosis:  <principal problem not specified>  1 Day Post-Op    Recommendations:     Discharge Recommendations: home  Discharge Equipment Recommendations:  bedside commode, shower chair  Barriers to discharge:  None    Assessment:     Josefina Blue is a 60 y.o. female with a medical diagnosis of <principal problem not specified>. Patient is s/p right TKA. She completed lower body dressing at supervision. She is appropriate for discharge home with assistance of  as needed. Performance deficits affecting function are weakness, impaired functional mobilty, gait instability, impaired self care skills, impaired balance, decreased ROM, decreased lower extremity function.     Rehab Prognosis:  Good; patient would benefit from acute skilled OT services to address these deficits and reach maximum level of function.       Plan:     · DC from OT    Subjective     Patient reported she wanted to get dressed prior to physical therapy.   Pain/Comfort:  · Pain Rating 1: 0/10    Objective:     Communicated with: RN prior to session.  Patient found supine with SCD, cryotherapy, FCD, perineural catheter upon OT entry to room.    General Precautions: Standard, fall   Orthopedic Precautions:RLE weight bearing as tolerated   Braces: N/A     Occupational Performance:     Bed Mobility:    · Patient completed Scooting/Bridging with modified independence  · Patient completed Supine to Sit with modified independence     Functional Mobility/Transfers:  · Patient completed Sit <> Stand Transfer with modified independence  with  rolling walker   · Patient completed Toilet Transfer Step Transfer technique with modified independence with  rolling walker   · Patient completed chair transfer with step transfer technique with modified independence with rolling walker   · Functional Mobility: patient ambulated to/from bathroom with use of rolling  walker at supervision     Activities of Daily Living:  · Grooming: modified independence standing at sink to wash hands, brush teeth and wash face  · Upper Body Dressing: modified independence sitting in chair to don bra and overhead dress  · Lower Body Dressing: supervision sitting in chair to don underwear, doff socks and don shoes  · Toileting: modified independence completed on bedside commode placed over toilet      [facility] 6 Click ADL: 23    Treatment & Education:  -Patient educated to dress surgical side first and further dressing techniques s/p surgery   -Patient educated on hand placement for transfers   -Patient educated on rolling walker placement for ADLs  -Patient educated on proper foot wear s/p surgery   -Patient educated on car transfers s/p surgery  -Patient educated on polar ice use  -Patient educated on role OT and plan of care s/p surgery at [facility], white board updated as needed     Patient left up in chair with all lines intact, call button in reach and RN notifiedEducation:      GOALS:   Multidisciplinary Problems     Occupational Therapy Goals     Not on file          Multidisciplinary Problems (Resolved)        Problem: Occupational Therapy Goal    Goal Priority Disciplines Outcome Interventions   Occupational Therapy Goal   (Resolved)     OT, PT/OT Met    Description: Goals to be met by: 11/13/20    Patient will increase functional independence with ADLs by performing:    UE Dressing with Modified [place].  LE Dressing with Supervision.  Grooming while standing at sink with Modified [place].  Toileting from toilet with Modified [place] for hygiene and clothing management.   Toilet transfer to toilet with Modified [place].                     Time Tracking:     OT Date of Treatment: 11/12/20  OT Start Time: 0850  OT Stop Time: 0918  OT Total Time (min): 28 min    Billable Minutes:Self Care/Home Management 28    Shari Dubose OT  11/12/2020

## 2020-11-12 NOTE — PROGRESS NOTES
Pt discharged from unit.  Discharge info given to pt and spouse and both                  verbalized understanding.  IV removed from left hand w/ catheter intact, no redness or swelling noted to area.  Pt left unit via w/c and was accompanied by staff to front of hospital to meet ride.  All personal belongings sent with pt.  No complications with discharge noted.                        .

## 2020-11-12 NOTE — PLAN OF CARE
Pt will discharge to home. Pt's RW is in place and BSC and shower chair were received from [facility] (paid for by pt as not covered by insurance). Pt's 1st outpt PT appt is 11/13.    Jo-Ann Collins MD  Call  As needed  [address]  [facility] [id number]  [phone]   [facility] - Sports Med 1st Fl  On 11/25/2020  Post op Wednesday 11/25 @ 10:30am with Nicolas Vitale PA-C  [address]  [facility] [address]  [phone]     Future Appointments   Date Time Provider Department Center   11/13/2020 11:30 AM Nicole Clemente, PT [facility] OP [facility] [place]   11/25/2020 10:30 AM Nicolas Vitale PA-C [facility] SPORTS [place]   12/24/2020 11:00 AM GLENN Whyte MD [facility] SP[facility]TS [place]        11/12/20 0921   Final Note   Assessment Type Final Discharge Note   Anticipated Discharge Disposition Home   Hospital Follow Up  Appt(s) scheduled? Yes   Post-Acute Status   Post-Acute Authorization HME   Discharge Delays None known at this time

## 2020-11-12 NOTE — PROGRESS NOTES
On-Q teaching done w/patient & patient's spouse, Bismark.  Both verbalized understanding.  Bismark, spouse, agrees to stay with pt for next 48 hrs while med infusing. All questions answered. On-Q contract signed, home care instxn pamphlet, home care supplies provided to patient upon discharge. Encouraged to contact anesthesia using # on back of brochure with any questions/concerns. Informed that pt/fmly will receive follow up calls on the next 2 days.

## 2020-11-13 ENCOUNTER — CLINICAL SUPPORT (OUTPATIENT)
Dept: REHABILITATION | Facility: HOSPITAL | Age: 60
End: 2020-11-13
Attending: ORTHOPAEDIC SURGERY
Payer: COMMERCIAL

## 2020-11-13 DIAGNOSIS — M25.561 ACUTE PAIN OF RIGHT KNEE: ICD-10-CM

## 2020-11-13 DIAGNOSIS — M25.561 RIGHT KNEE PAIN, UNSPECIFIED CHRONICITY: ICD-10-CM

## 2020-11-13 DIAGNOSIS — Z96.651 HISTORY OF TOTAL RIGHT KNEE REPLACEMENT: ICD-10-CM

## 2020-11-13 PROCEDURE — 97110 THERAPEUTIC EXERCISES: CPT

## 2020-11-13 PROCEDURE — 97161 PT EVAL LOW COMPLEX 20 MIN: CPT

## 2020-11-13 NOTE — PROGRESS NOTES
11/13/2020 1133    Patient called at home. Reports pain very well controlled with On-Q. Denies signs of local anesthetic toxicity. PNC dressing remains clean, dry, and intact. All questions answered. Encouraged to call if any issues arise.

## 2020-11-13 NOTE — PLAN OF CARE
[facility]  Physical Therapy Initial Evaluation    Name: Josefina Blue  Clinic Number: [id number]    Therapy Diagnosis:   Encounter Diagnoses   Name Primary?    Acute pain of right knee     History of total right knee replacement     Right knee pain, unspecified chronicity      Physician: GLENN Whyte MD    Physician Orders: PT Eval and Treat   Medical Diagnosis from Referral:   M25.561 (ICD-10-CM) - Acute pain of right knee   Z96.651 (ICD-10-CM) - History of total right knee replacement     Evaluation Date: 11/13/2020  Authorization Period Expiration: 12/31/2020  Plan of Care Expiration: 3/5/2020  Visit # / Visits authorized: 1/ 20    Time In: 1130  Time Out: 1215  Total Billable Time: 45 minutes    Precautions: Standard, s/p R TKA on 11/11/2020    PROCEDURES PERFORMED on 11/11/2020  Right total knee arthroplasty (CPT 67758)    Subjective   Date of onset: 11/11/2020  History of current condition - Josefina reports: that she has had R knee pain for years.  Had steroid injections and also the gel injections.  Had L TKA on 7/8/2020 and just got R TKA on 11/11/2020.         Past Medical History:   Diagnosis Date    DDD (degenerative disc disease), lumbar 10/7/2019    Hyperlipidemia 8/31/2015    Hypertension     IFG (impaired fasting glucose) 8/31/2015    Neuropathic pain 8/31/2015    Squamous cell cancer of tongue 2012     Josefina Blue  has a past surgical history that includes Hysterectomy; Tongue surgery; Knee arthroscopy (Right); Cholecystectomy; Gallbladder surgery (06/2016); Transforaminal epidural injection of steroid (Bilateral, 9/19/2019); Transforaminal epidural injection of steroid (Bilateral, 10/7/2019); Knee Arthroplasty (Left, 7/8/2020); and Total knee arthroplasty (Right, 11/11/2020).    Josefina has a current medication list which includes the following prescription(s): acetaminophen, alprazolam, aspirin, aspirin, celecoxib, cephalexin, ciprofloxacin hcl,  clobetasol, diazepam, estradiol, estradiol, irbesartan, melatonin, methocarbamol, ondansetron, oxycodone, pravastatin, and triamterene-hydrochlorothiazide 37.5-25 mg, and the following Facility-Administered Medications: celecoxib, fentanyl, and midazolam.    Review of patient's allergies indicates:   Allergen Reactions    Aspirin Nausea Only     Can take low dose of Aspirin; can take buffered low dose aspirin only    Codeine Nausea Only     Can take Codeine if she takes a dose of Zofran with it        Imaging, X-ray: There is a right TKA in place good alignment no complication.    Prior Therapy: PT for R leg and L TKA  Social History: 1 story house (lives in high rise with elevator) lives with their spouse  Occupation: Pt is a corporate excutive for [employer]  Prior Level of Function: difficulty and pain with walking   Current Level of Function: currently ambulates with RW, WBAT on the R    Pain:  Current 4/10, worst 6/10, best 1/10   Location: right knee   Description: sore, sharp, achy  Aggravating Factors: prolonged walking  Easing Factors: pain medication, ice and rest, Tylenol and Celebrex    Pts goals: To return to tennis - doubles    Objective     Observation: Pt is healthy and in no acute distress.  Post-op dressing/brace in place.    Gait: Pt ambulates with RW, WBAT on the R.      Functional tests:               SL Squat: Not performed 2/2 post-op.              DL Squat: Not performed 2/2 post-op.              Step-down: Not performed 2/2 post-op.              SLS EO: Not performed 2/2 post-op.              SLS EC: Not performed 2/2 post-op.     Range of Motion:   Knee Right Passive Left Passive   Flexion 75 120   Extension 5 hyper 8 hyper      Lower Extremity Strength:  Formal MMT not performed 2/2 POD#2 and increased pain.  Good quad activation noted R LE.     Special Tests:  Not performed 2/2 post-op.     Joint Mobility: hypomobile patella as expected post-op.     Palpation: gross TTP to R knee    "  Sensation:  Intact; diminished 2/2 residual nerve block.     Flexibility:  Grossly hypomobile quad, hamstring and gastroc as expected on post-op R LE     Edema: mod as expected post-op        CMS Impairment/Limitation/Restriction for FOTO Knee Survey    Therapist reviewed FOTO scores for Joseifna Blue on 11/13/2020.   FOTO documents entered into [employer] - see Media section.    Limitation Score: 76%         TREATMENT   Treatment Time In: 1200  Treatment Time Out: 1215  Total Treatment time separate from Evaluation: 15 minutes    Josefina received therapeutic exercises to develop strength, endurance, ROM and flexibility for 15 minutes including:  Quad sets (ankle propped) 15 x 5"   SLR x 10  Heel slides 20 x 5"   HSS/HCS 3 x 30"       Home Exercises and Patient Education Provided    Education provided re: POC, goals for therapy, role of therapy for care, exercises/HEP    Written Home Exercises Provided: yes.  Exercises were reviewed and Josefina was able to demonstrate them prior to the end of the session.   Pt received a written copy of exercises to perform at home. Josefina demonstrated good  understanding of the education provided.     See EMR under patient instructions for exercises given.   Assessment   Josefina is a 60 y.o. female referred to outpatient Physical Therapy s/p R TKA on 11/11/2020.  Pt presents with decreased ROM, decreased strength, impaired balance/gait, and overall decrease in R LE functional mobility.  Pt will benefit from physical therapy, specifically quad firing, strengthening, ROM, and balance/gait training, in order to reduce her c/o pain, improve impairments and functional limitations.    Pt prognosis is Good.   Pt will benefit from skilled outpatient Physical Therapy to address the deficits stated above and in the chart below, provide pt/family education, and to maximize pt's level of independence.     Plan of care discussed with patient: Yes  Pt's spiritual, cultural and educational needs " considered and patient is agreeable to the plan of care and goals as stated below:     Anticipated Barriers for therapy: none    Medical Necessity is demonstrated by the following  History  Co-morbidities and personal factors that may impact the plan of care Co-morbidities:   none    Personal Factors:   no deficits     low   Examination  Body Structures and Functions, activity limitations and participation restrictions that may impact the plan of care Body Regions:   R knee    Body Systems:    gross symmetry  ROM  strength  gross coordinated movement  balance  gait  transfers  transitions  motor control  motor learning    Participation Restrictions:   Tennis, walking, stairs    Activity limitations:   Learning and applying knowledge  no deficits    General Tasks and Commands  no deficits    Communication  no deficits    Mobility  walking    Self care  no deficits    Domestic Life  no deficits    Interactions/Relationships  no deficits    Life Areas  no deficits    Community and Social Life  community life  recreation and leisure         high   Clinical Presentation stable and uncomplicated low   Decision Making/ Complexity Score: low     GOALS: Short Term Goals:  8 weeks  1.Report decreased R knee pain  < / =  3-4/10  to increase tolerance for walking.  2. Increase knee ROM to 8 hyperextension to 120 degrees flexion in order to be able to perform ADLs without difficulty.  3. Increase strength by 4-/5 MMT grade in R LE  to increase tolerance for ADL and work activities.  4. Pt to tolerate HEP to improve ROM and independence with ADL's    Long Term Goals: 16 weeks  1.Report decreased R knee pain < / = 0-1/10  to increase tolerance for walking/stairs.  2.Patient goal: To return to playing doubles tennis.  3.Increase strength to >/= 4+/5 in R LE  to increase tolerance for ADL and work activities.  4. Pt will be able to perform stairs with reciprocal gait pattern with ascending/descending stairs and without c/o pain or  compensation.    Plan   Plan of care Certification: 11/13/2020 to 3/5/2020.    Outpatient Physical Therapy 2 times weekly for 16 weeks to include the following interventions: Electrical Stimulation Russian/IFC, Gait Training, Manual Therapy, Moist Heat/ Ice, Neuromuscular Re-ed, Patient Education, Therapeutic Activites and Therapeutic Exercise.     Nicole Clemente, PT, DPT, OCS

## 2020-11-14 NOTE — CARE UPDATE
11/14/2020    Called and spoke to patient about OnQ PNC.  Pain well controlled.  Denies tinnitus, metallic taste in mouth, weakness in extremity.  Denies erythema, warmth, tenderness, bleeding at site of catheter entry.  Dressing c/d/i.  Catheter to be removed later today. Dicussed the importance of making sure that the blue tip remains intact.  All questions answered.  Encouraged patient to call the provided number if any issues arise.    Ernie Anders MD PGY 5  Pager: [phone]

## 2020-11-16 ENCOUNTER — CLINICAL SUPPORT (OUTPATIENT)
Dept: REHABILITATION | Facility: HOSPITAL | Age: 60
End: 2020-11-16
Attending: ORTHOPAEDIC SURGERY
Payer: COMMERCIAL

## 2020-11-16 DIAGNOSIS — M25.561 RIGHT KNEE PAIN, UNSPECIFIED CHRONICITY: ICD-10-CM

## 2020-11-16 PROCEDURE — 97112 NEUROMUSCULAR REEDUCATION: CPT

## 2020-11-16 PROCEDURE — 97110 THERAPEUTIC EXERCISES: CPT

## 2020-11-16 NOTE — PROGRESS NOTES
"  Physical Therapy Daily Treatment Note     Name: Josefina [facility]  Clinic Number: [id number]    Therapy Diagnosis:   Encounter Diagnosis   Name Primary?    Right knee pain, unspecified chronicity      Physician: GLENN Whyte MD    Visit Date: 11/16/2020    Physician Orders: PT Eval and Treat   Medical Diagnosis from Referral:   M25.561 (ICD-10-CM) - Acute pain of right knee   Z96.651 (ICD-10-CM) - History of total right knee replacement      Evaluation Date: 11/13/2020  Authorization Period Expiration: 12/31/2020  Plan of Care Expiration: 3/5/2020  Visit # / Visits authorized: 2/ 20     Time In: 1045  Time Out: 1130  Total Billable Time: 45 minutes     Precautions: Standard, s/p R TKA on 11/11/2020     PROCEDURES PERFORMED on 11/11/2020  Right total knee arthroplasty (CPT 53561)    Subjective     Pt reports: that after her nerve block came out, she had increased pain.  Unable to do SLR.  She was compliant with home exercise program.  Response to previous treatment: n/a  Functional change: Pt ambulates with RW, WBAT on the R    Pain: 6/10  Location: right knee     Objective     Josefina received therapeutic exercises to develop strength, endurance, ROM and flexibility for 25 minutes including:  Heels slides 20 x 5"   SL hip abd 2 x 10   Prone hip ext 3 x 10  SLR x 8  HSS/HCS w/ strap 3 x 30"       Josefina participated in neuromuscular re-education activities to improve: motor control for 15 minutes. The following activities were included:  SAQ 30 x 5"   Quad sets 30 x 5"   Quad sets (ankle on 1/2 FR) 30 x 5"       Josefina received the following manual therapy techniques: Joint mobilizations were applied to the: R knee for 5 minutes, including:  Patellar mobs      Home Exercises Provided and Patient Education Provided     Education provided:   - proper technique for exercises    Written Home Exercises Provided: Patient instructed to cont prior HEP.  Exercises were reviewed and Josefina was able to demonstrate them prior " to the end of the session.  Josefina demonstrated good  understanding of the education provided.     See EMR under Patient Instructions for exercises provided at IE.    Assessment     Pt tolerated treatment well today.  Pt continues to present with hyperextension and was able to achieve 95 degrees of knee flexion ROM today.  Good quad firing noted, however difficulty noted with SAQ and SLR.  Will progress as tolerated.      Josefina is progressing well towards her goals.   Pt prognosis is Good.     Pt will continue to benefit from skilled outpatient physical therapy to address the deficits listed in the problem list box on initial evaluation, provide pt/family education and to maximize pt's level of independence in the home and community environment.     Pt's spiritual, cultural and educational needs considered and pt agreeable to plan of care and goals.    Anticipated barriers to physical therapy: none       GOALS: Short Term Goals:  8 weeks  1.Report decreased R knee pain  < / =  3-4/10  to increase tolerance for walking. Progressing, not met  2. Increase knee ROM to 8 hyperextension to 120 degrees flexion in order to be able to perform ADLs without difficulty. Progressing, not met  3. Increase strength by 4-/5 MMT grade in R LE  to increase tolerance for ADL and work activities. Progressing, not met  4. Pt to tolerate HEP to improve ROM and independence with ADL's. Progressing, not met     Long Term Goals: 16 weeks  1.Report decreased R knee pain < / = 0-1/10  to increase tolerance for walking/stairs. Progressing, not met  2.Patient goal: To return to playing doubles tennis. Progressing, not met  3.Increase strength to >/= 4+/5 in R LE  to increase tolerance for ADL and work activities. Progressing, not met  4. Pt will be able to perform stairs with reciprocal gait pattern with ascending/descending stairs and without c/o pain or compensation. Progressing, not met    Plan     Continue with established Plan of Care  towards PT goals, focusing on ROM and strengthening.    Nicole Clemente, PT, DPT, OCS

## 2020-11-18 ENCOUNTER — CLINICAL SUPPORT (OUTPATIENT)
Dept: REHABILITATION | Facility: HOSPITAL | Age: 60
End: 2020-11-18
Attending: ORTHOPAEDIC SURGERY
Payer: COMMERCIAL

## 2020-11-18 DIAGNOSIS — M25.561 RIGHT KNEE PAIN, UNSPECIFIED CHRONICITY: ICD-10-CM

## 2020-11-18 PROCEDURE — 97112 NEUROMUSCULAR REEDUCATION: CPT

## 2020-11-18 PROCEDURE — 97110 THERAPEUTIC EXERCISES: CPT

## 2020-11-18 NOTE — PROGRESS NOTES
"  Physical Therapy Daily Treatment Note     Name: Josefina [facility]  Clinic Number: [id number]    Therapy Diagnosis:   Encounter Diagnosis   Name Primary?    Right knee pain, unspecified chronicity      Physician: GLENN Whyte MD    Visit Date: 11/18/2020    Physician Orders: PT Eval and Treat   Medical Diagnosis from Referral:   M25.561 (ICD-10-CM) - Acute pain of right knee   Z96.651 (ICD-10-CM) - History of total right knee replacement      Evaluation Date: 11/13/2020  Authorization Period Expiration: 12/31/2020  Plan of Care Expiration: 3/5/2020  Visit # / Visits authorized: 3/ 20     Time In: 1300  Time Out: 1345  Total Billable Time: 45 minutes     Precautions: Standard, s/p R TKA on 11/11/2020     PROCEDURES PERFORMED on 11/11/2020  Right total knee arthroplasty (CPT 50936)    Subjective     Pt reports: that she is doing much better.  Has only taken Tylenol in the past 2 days.  She was compliant with home exercise program.  Response to previous treatment: n/a  Functional change: Pt ambulates with RW, WBAT on the R    Pain: 6/10  Location: right knee     Objective     Josefina received therapeutic exercises to develop strength, endurance, ROM and flexibility for 25 minutes including:  Heels slides 20 x 5"   SL hip abd 2 x 10 then 1 x 5  Prone hip ext 3 x 10  SLR 2 x 8  HSS/HCS w/ strap 3 x 30"   Roller stick to quad, ITB, HS, and calf x 5 min  Bike - NEXT      Josefina participated in neuromuscular re-education activities to improve: motor control for 15 minutes. The following activities were included:  SAQ 20 x 5"   Quad sets (1/2 FR under knee) 20 x 5"   Quad sets 20 x 5"   Quad sets (ankle on 1/2 FR) 20 x 5"       Josefina received the following manual therapy techniques: Joint mobilizations were applied to the: R knee for 5 minutes, including:  Patellar mobs      Home Exercises Provided and Patient Education Provided     Education provided:   - proper technique for exercises    Written Home Exercises Provided: " Patient instructed to cont prior HEP.  Exercises were reviewed and Josefina was able to demonstrate them prior to the end of the session.  Josefina demonstrated good  understanding of the education provided.     See EMR under Patient Instructions for exercises provided at IE.    Assessment     Pt tolerated treatment well today.  Pt with improved quad strength, however continues to have difficulty with SLR.  Pt presents with 101 degrees of knee flexion and continues to have hyperextension.  Next visit, attempt bike.      Josefina is progressing well towards her goals.   Pt prognosis is Good.     Pt will continue to benefit from skilled outpatient physical therapy to address the deficits listed in the problem list box on initial evaluation, provide pt/family education and to maximize pt's level of independence in the home and community environment.     Pt's spiritual, cultural and educational needs considered and pt agreeable to plan of care and goals.    Anticipated barriers to physical therapy: none       GOALS: Short Term Goals:  8 weeks  1.Report decreased R knee pain  < / =  3-4/10  to increase tolerance for walking. Progressing, not met  2. Increase knee ROM to 8 hyperextension to 120 degrees flexion in order to be able to perform ADLs without difficulty. Progressing, not met  3. Increase strength by 4-/5 MMT grade in R LE  to increase tolerance for ADL and work activities. Progressing, not met  4. Pt to tolerate HEP to improve ROM and independence with ADL's. Progressing, not met     Long Term Goals: 16 weeks  1.Report decreased R knee pain < / = 0-1/10  to increase tolerance for walking/stairs. Progressing, not met  2.Patient goal: To return to playing doubles tennis. Progressing, not met  3.Increase strength to >/= 4+/5 in R LE  to increase tolerance for ADL and work activities. Progressing, not met  4. Pt will be able to perform stairs with reciprocal gait pattern with ascending/descending stairs and without c/o  pain or compensation. Progressing, not met    Plan     Continue with established Plan of Care towards PT goals, focusing on ROM and strengthening.    Nicole Clemente, PT, DPT, OCS

## 2020-11-24 ENCOUNTER — CLINICAL SUPPORT (OUTPATIENT)
Dept: REHABILITATION | Facility: HOSPITAL | Age: 60
End: 2020-11-24
Attending: ORTHOPAEDIC SURGERY
Payer: COMMERCIAL

## 2020-11-24 DIAGNOSIS — M25.561 RIGHT KNEE PAIN, UNSPECIFIED CHRONICITY: ICD-10-CM

## 2020-11-24 PROCEDURE — 97112 NEUROMUSCULAR REEDUCATION: CPT

## 2020-11-24 PROCEDURE — 97110 THERAPEUTIC EXERCISES: CPT

## 2020-11-24 NOTE — PROGRESS NOTES
"  Physical Therapy Daily Treatment Note     Name: Josefina [facility]  Clinic Number: [id number]    Therapy Diagnosis:   Encounter Diagnosis   Name Primary?    Right knee pain, unspecified chronicity      Physician: GLENN Whyte MD    Visit Date: 11/24/2020    Physician Orders: PT Eval and Treat   Medical Diagnosis from Referral:   M25.561 (ICD-10-CM) - Acute pain of right knee   Z96.651 (ICD-10-CM) - History of total right knee replacement      Evaluation Date: 11/13/2020  Authorization Period Expiration: 12/31/2020  Plan of Care Expiration: 3/5/2020  Visit # / Visits authorized: 4/ 20     Time In: 1345  Time Out: 1435  Total Billable Time: 50 minutes     Precautions: Standard, s/p R TKA on 11/11/2020     PROCEDURES PERFORMED on 11/11/2020  Right total knee arthroplasty (CPT 40411)    Subjective     Pt reports: that she is feeling good and went down to using only 1 crutch.  She was compliant with home exercise program.  Response to previous treatment: n/a  Functional change: Pt ambulates with RW, WBAT on the R    Pain: 3/10  Location: right knee     Objective       Josefina received therapeutic exercises to develop strength, endurance, ROM and flexibility for 25 minutes including:  Heels slides 20 x 5"   SL hip abd 3 x 10  Prone hip ext 3 x 10  SLR 3 x 8  HSS/HCS w/ strap 3 x 30" NP  Roller stick to quad, ITB, HS, and calf x 5 min NP  Bike - NEXT      Josefina participated in neuromuscular re-education activities to improve: motor control for 20 minutes. The following activities were included:  Russian stim 10 sec on/ 10 sec off:   SAQ x 5 min  Quad sets (1/2 FR under knee) x 5 min  Quad sets x 5 min  Quad sets (ankle on 1/2 FR) x 5 min      Josefina received the following manual therapy techniques: Joint mobilizations were applied to the: R knee for 5 minutes, including:  Patellar mobs      Home Exercises Provided and Patient Education Provided     Education provided:   - proper technique for exercises    Written Home " Exercises Provided: Patient instructed to cont prior HEP.  Exercises were reviewed and Josefina was able to demonstrate them prior to the end of the session.  Josefina demonstrated good  understanding of the education provided.     See EMR under Patient Instructions for exercises provided at IE.    Assessment     Pt tolerated treatment well today.  Pt comes into therapy today with potentially increase in drainage from knee.  However, unsure if there is active bleeding. Pt presents with 108 degrees of knee flexion and continues to have hyperextension.  Performed light exercises on table today.  Will initiate bike and leg press next visit.    Josefina is progressing well towards her goals.   Pt prognosis is Good.     Pt will continue to benefit from skilled outpatient physical therapy to address the deficits listed in the problem list box on initial evaluation, provide pt/family education and to maximize pt's level of independence in the home and community environment.     Pt's spiritual, cultural and educational needs considered and pt agreeable to plan of care and goals.    Anticipated barriers to physical therapy: none       GOALS: Short Term Goals:  8 weeks  1.Report decreased R knee pain  < / =  3-4/10  to increase tolerance for walking. Progressing, not met  2. Increase knee ROM to 8 hyperextension to 120 degrees flexion in order to be able to perform ADLs without difficulty. Progressing, not met  3. Increase strength by 4-/5 MMT grade in R LE  to increase tolerance for ADL and work activities. Progressing, not met  4. Pt to tolerate HEP to improve ROM and independence with ADL's. Progressing, not met     Long Term Goals: 16 weeks  1.Report decreased R knee pain < / = 0-1/10  to increase tolerance for walking/stairs. Progressing, not met  2.Patient goal: To return to playing doubles tennis. Progressing, not met  3.Increase strength to >/= 4+/5 in R LE  to increase tolerance for ADL and work activities. Progressing,  not met  4. Pt will be able to perform stairs with reciprocal gait pattern with ascending/descending stairs and without c/o pain or compensation. Progressing, not met    Plan     Continue with established Plan of Care towards PT goals, focusing on ROM and strengthening.    Nicole Clemente, PT, DPT, OCS

## 2020-11-25 ENCOUNTER — OFFICE VISIT (OUTPATIENT)
Dept: SPORTS MEDICINE | Facility: CLINIC | Age: 60
End: 2020-11-25
Payer: COMMERCIAL

## 2020-11-25 ENCOUNTER — TELEPHONE (OUTPATIENT)
Dept: SPORTS MEDICINE | Facility: CLINIC | Age: 60
End: 2020-11-25

## 2020-11-25 ENCOUNTER — HOSPITAL ENCOUNTER (OUTPATIENT)
Dept: RADIOLOGY | Facility: HOSPITAL | Age: 60
Discharge: HOME | End: 2020-11-25
Attending: PHYSICIAN ASSISTANT
Payer: COMMERCIAL

## 2020-11-25 VITALS
SYSTOLIC BLOOD PRESSURE: 118 MMHG | HEIGHT: 68 IN | DIASTOLIC BLOOD PRESSURE: 75 MMHG | BODY MASS INDEX: 34.1 KG/M2 | WEIGHT: 225 LBS | HEART RATE: 97 BPM

## 2020-11-25 DIAGNOSIS — Z96.651 S/P TOTAL KNEE REPLACEMENT USING CEMENT, RIGHT: Primary | ICD-10-CM

## 2020-11-25 DIAGNOSIS — Z96.651 S/P TOTAL KNEE REPLACEMENT USING CEMENT, RIGHT: ICD-10-CM

## 2020-11-25 DIAGNOSIS — M17.0 BILATERAL PRIMARY OSTEOARTHRITIS OF KNEE: ICD-10-CM

## 2020-11-25 PROCEDURE — 99024 POSTOP FOLLOW-UP VISIT: CPT | Mod: S$GLB,,, | Performed by: PHYSICIAN ASSISTANT

## 2020-11-25 PROCEDURE — 99999 PR PBB SHADOW E&M-EST. PATIENT-LVL III: CPT | Mod: PBBFAC,,, | Performed by: PHYSICIAN ASSISTANT

## 2020-11-25 PROCEDURE — 73560 X-RAY EXAM OF KNEE 1 OR 2: CPT | Mod: 26,RT,, | Performed by: RADIOLOGY

## 2020-11-25 PROCEDURE — 3008F PR BODY MASS INDEX (BMI) DOCUMENTED: ICD-10-PCS | Mod: CPTII,S$GLB,, | Performed by: PHYSICIAN ASSISTANT

## 2020-11-25 PROCEDURE — 73560 XR KNEE 1 OR 2 VIEW RIGHT: ICD-10-PCS | Mod: 26,RT,, | Performed by: RADIOLOGY

## 2020-11-25 PROCEDURE — 3008F BODY MASS INDEX DOCD: CPT | Mod: CPTII,S$GLB,, | Performed by: PHYSICIAN ASSISTANT

## 2020-11-25 PROCEDURE — 99024 PR POST-OP FOLLOW-UP VISIT: ICD-10-PCS | Mod: S$GLB,,, | Performed by: PHYSICIAN ASSISTANT

## 2020-11-25 PROCEDURE — 1125F AMNT PAIN NOTED PAIN PRSNT: CPT | Mod: S$GLB,,, | Performed by: PHYSICIAN ASSISTANT

## 2020-11-25 PROCEDURE — 73560 X-RAY EXAM OF KNEE 1 OR 2: CPT | Mod: TC,RT

## 2020-11-25 PROCEDURE — 99999 PR PBB SHADOW E&M-EST. PATIENT-LVL III: ICD-10-PCS | Mod: PBBFAC,,, | Performed by: PHYSICIAN ASSISTANT

## 2020-11-25 PROCEDURE — 1125F PR PAIN SEVERITY QUANTIFIED, PAIN PRESENT: ICD-10-PCS | Mod: S$GLB,,, | Performed by: PHYSICIAN ASSISTANT

## 2020-11-25 RX ORDER — CELECOXIB 200 MG/1
200 CAPSULE ORAL DAILY
Qty: 14 CAPSULE | Refills: 0 | Status: SHIPPED | OUTPATIENT
Start: 2020-11-25 | End: 2020-12-09

## 2020-11-25 RX ORDER — DIAZEPAM 5 MG/1
5 TABLET ORAL EVERY 12 HOURS PRN
Qty: 20 TABLET | Refills: 0 | Status: SHIPPED | OUTPATIENT
Start: 2020-11-25 | End: 2021-05-24

## 2020-11-25 NOTE — TELEPHONE ENCOUNTER
Left VM for patient to return call to reschedule 6wk post-op appointment with Dr. Whyte due to him being out on 12/24/20.

## 2020-11-25 NOTE — PROGRESS NOTES
S:Josefina Blue presents for post-operative evaluation.     DATE OF PROCEDURE: 11/11/2020       PROCEDURES PERFORMED:   Right total knee arthroplasty (CPT 50079)     SURGEON:  CARMELO Whyte MD - Primary  Michael Villagomez MD - Fellow - First Assist  SMA Jaron    Josefina Blue reports to be doing well 2wk s/p the above mentioned procedure. Denies fevers, chills, night sweats, chest pain, difficulty breathing, calf pain or tenderness. Going to PT 2xWeek at the  [facility]. Seeing good progress daily. Pain levels are improving. Has d/c'd pain medication.  She states that she will still take Celebrex before physical therapy.  She has been pushing herself hard in physical therapy and at home.  She does have little bit of soreness in the knee, however overall she is doing very well.    O: The incisions are healing well.  No signs of infection.  Staples were removed. No significant pain or unusual tenderness.  Range of motion and today:  Patient is able to fully extend the knee, she is able to obtain 110° of flexion with minimal discomfort.    Radiographs: Xray of right knee taken in clinic today, images reviewed by me, demonstrates postoperative hardware and proper positioning without evidence of fracture or dislocation.    A/P: Keep incision dry for 5 more days.  Mepore dressing applied in clinic today. Continue aspirin for 2 more weeks.  I will send in refill of Celebrex.  Patient previously received Valium prescription to help her sleep with her left total knee, requesting this medication again for her right total knee.  Continue PT along with flexion and extension exercises at home. Plan to follow the rehab plan as previously outlined. RTC in 4 weeks with  for 6 week post op visit.

## 2020-12-01 ENCOUNTER — CLINICAL SUPPORT (OUTPATIENT)
Dept: REHABILITATION | Facility: HOSPITAL | Age: 60
End: 2020-12-01
Attending: ORTHOPAEDIC SURGERY
Payer: COMMERCIAL

## 2020-12-01 DIAGNOSIS — M25.561 RIGHT KNEE PAIN, UNSPECIFIED CHRONICITY: ICD-10-CM

## 2020-12-01 PROCEDURE — 97112 NEUROMUSCULAR REEDUCATION: CPT | Mod: CQ

## 2020-12-01 PROCEDURE — 97110 THERAPEUTIC EXERCISES: CPT | Mod: CQ

## 2020-12-01 NOTE — PROGRESS NOTES
"  Physical Therapy Daily Treatment Note     Name: Josefina [facility]  Clinic Number: [id number]    Therapy Diagnosis:   Encounter Diagnosis   Name Primary?    Right knee pain, unspecified chronicity      Physician: GLENN Whyte MD    Visit Date: 12/1/2020    Physician Orders: PT Eval and Treat   Medical Diagnosis from Referral:   M25.561 (ICD-10-CM) - Acute pain of right knee   Z96.651 (ICD-10-CM) - History of total right knee replacement      Evaluation Date: 11/13/2020  Authorization Period Expiration: 12/31/2020  Plan of Care Expiration: 3/5/2020  Visit # / Visits authorized: 5/ 20     Time In: 12:20  Time Out: 1:00  Total Billable Time: 40 minutes     Precautions: Standard, s/p R TKA on 11/11/2020     PROCEDURES PERFORMED on 11/11/2020  Right total knee arthroplasty (CPT 69993)    Subjective     Pt reports: knee is a little stiff today and feels like she has a pocket of fluid lateral/ superior knee  She was compliant with home exercise program.  Response to previous treatment: n/a  Functional change: Pt ambulates with RW, WBAT on the R    Pain: 3/10  Location: right knee     Objective   ROM: 5-0-120    Josefina received therapeutic exercises to develop strength, endurance, ROM and flexibility for 30 minutes including:  Bike x 10 min lvl 5  Prone Quad S  Heels slides 30 x 5"   SL hip abd 5x5 5" hold  SLR 5" hold 5x5  Shuttle #50 3x10    Josefina participated in neuromuscular re-education activities to improve: motor control for 10 minutes. The following activities were included:  Standing TKE yellow crossfit band  Bridges w/ RTB 5" 4x5      Josefina received the following manual therapy techniques: Joint mobilizations were applied to the: R knee for 00 minutes, including:  Patellar mobs      Home Exercises Provided and Patient Education Provided     Education provided:   - proper technique for exercises    Written Home Exercises Provided: Patient instructed to cont prior HEP.  Exercises were reviewed and Josefina was " able to demonstrate them prior to the end of the session.  Josefina demonstrated good  understanding of the education provided.     See EMR under Patient Instructions for exercises provided at IE.    Assessment     Pt amb into session w/ walking stick just for safety on public ground. Able to perform full rev on bike today. Knee FL has improved to 120. Good quad activation w/o Stim unit.Slight ext lag w/ SLR. Amb w/ slight FL knee. Ed pt on Quad function during heel strike.  Attempted STS but not ready for them yet 2* strength. Squatting motion better farhad on shuttle.     Josefina is progressing well towards her goals.   Pt prognosis is Good.     Pt will continue to benefit from skilled outpatient physical therapy to address the deficits listed in the problem list box on initial evaluation, provide pt/family education and to maximize pt's level of independence in the home and community environment.     Pt's spiritual, cultural and educational needs considered and pt agreeable to plan of care and goals.    Anticipated barriers to physical therapy: none       GOALS: Short Term Goals:  8 weeks  1.Report decreased R knee pain  < / =  3-4/10  to increase tolerance for walking. Progressing, not met  2. Increase knee ROM to 8 hyperextension to 120 degrees flexion in order to be able to perform ADLs without difficulty. Progressing, not met  3. Increase strength by 4-/5 MMT grade in R LE  to increase tolerance for ADL and work activities. Progressing, not met  4. Pt to tolerate HEP to improve ROM and independence with ADL's. Progressing, not met     Long Term Goals: 16 weeks  1.Report decreased R knee pain < / = 0-1/10  to increase tolerance for walking/stairs. Progressing, not met  2.Patient goal: To return to playing doubles tennis. Progressing, not met  3.Increase strength to >/= 4+/5 in R LE  to increase tolerance for ADL and work activities. Progressing, not met  4. Pt will be able to perform stairs with reciprocal gait  pattern with ascending/descending stairs and without c/o pain or compensation. Progressing, not met    Plan     Continue with established Plan of Care towards PT goals, focusing on ROM and strengthening.    Sabrina Grubbs, PTA,

## 2020-12-03 ENCOUNTER — CLINICAL SUPPORT (OUTPATIENT)
Dept: REHABILITATION | Facility: HOSPITAL | Age: 60
End: 2020-12-03
Attending: ORTHOPAEDIC SURGERY
Payer: COMMERCIAL

## 2020-12-03 DIAGNOSIS — M25.561 RIGHT KNEE PAIN, UNSPECIFIED CHRONICITY: ICD-10-CM

## 2020-12-03 PROCEDURE — 97110 THERAPEUTIC EXERCISES: CPT

## 2020-12-03 NOTE — PROGRESS NOTES
"  Physical Therapy Daily Treatment Note     Name: Josefina [facility]  Clinic Number: [id number]    Therapy Diagnosis:   Encounter Diagnosis   Name Primary?    Right knee pain, unspecified chronicity      Physician: GLENN Whyte MD    Visit Date: 12/3/2020    Physician Orders: PT Eval and Treat   Medical Diagnosis from Referral:   M25.561 (ICD-10-CM) - Acute pain of right knee   Z96.651 (ICD-10-CM) - History of total right knee replacement      Evaluation Date: 11/13/2020  Authorization Period Expiration: 12/31/2020  Plan of Care Expiration: 3/5/2020  Visit # / Visits authorized: 6/ 20     Time In: 1430  Time Out: 1515  Total Billable Time: 45 minutes     Precautions: Standard, s/p R TKA on 11/11/2020     PROCEDURES PERFORMED on 11/11/2020  Right total knee arthroplasty (CPT 20117)    Subjective     Pt reports: that she is doing good but feels like R leg is longer than the L now.  She was compliant with home exercise program.  Response to previous treatment: n/a  Functional change: Pt ambulates with RW, WBAT on the R    Pain: 3/10  Location: right knee     Objective   ROM: 5-0-120    Josefina received therapeutic exercises to develop strength, endurance, ROM and flexibility for 40 minutes including:  Bike x 10 min lvl 5  Prone Quad S 3 x 30"   Heels slides 30 x 5" NP  SL hip abd 5x5 5" hold  SLR 5" hold 5x5  LAQ 30 x 5"   Leg press eccentric 60# 3 x 10 R only      Josefina participated in neuromuscular re-education activities to improve: motor control for 0 minutes. The following activities were included:  Standing TKE yellow crossfit band  Bridges w/ RTB 5" 4x5      Josefina participated in dynamic functional therapeutic activities to improve functional performance for 5  minutes, including:  Sit to stand (airex in chair) 2 x 10       Josefina received the following manual therapy techniques: Joint mobilizations were applied to the: R knee for 00 minutes, including:  Patellar mobs      Home Exercises Provided and Patient " Education Provided     Education provided:   - proper technique for exercises    Written Home Exercises Provided: Patient instructed to cont prior HEP.  Exercises were reviewed and Josefina was able to demonstrate them prior to the end of the session.  Josefina demonstrated good  understanding of the education provided.     See EMR under Patient Instructions for exercises provided at IE.    Assessment     Pt ambulated into clinic without AD today, and decreased knee extension at initial contact.  Lack of full knee extension at IC secondary to decreased quad strength.  Focused on functional sit to stand today.  Pt able to perform but difficulty noted with stand to sit due to lack of eccentric quad control.  Encouraged pt to start performing bike and leg press (light weight, high rep) at home for HEP in addition to her previous HEP.    Josefina is progressing well towards her goals.   Pt prognosis is Good.     Pt will continue to benefit from skilled outpatient physical therapy to address the deficits listed in the problem list box on initial evaluation, provide pt/family education and to maximize pt's level of independence in the home and community environment.     Pt's spiritual, cultural and educational needs considered and pt agreeable to plan of care and goals.    Anticipated barriers to physical therapy: none       GOALS: Short Term Goals:  8 weeks  1.Report decreased R knee pain  < / =  3-4/10  to increase tolerance for walking. Progressing, not met  2. Increase knee ROM to 8 hyperextension to 120 degrees flexion in order to be able to perform ADLs without difficulty. Progressing, not met  3. Increase strength by 4-/5 MMT grade in R LE  to increase tolerance for ADL and work activities. Progressing, not met  4. Pt to tolerate HEP to improve ROM and independence with ADL's. Progressing, not met     Long Term Goals: 16 weeks  1.Report decreased R knee pain < / = 0-1/10  to increase tolerance for walking/stairs.  Progressing, not met  2.Patient goal: To return to playing doubles tennis. Progressing, not met  3.Increase strength to >/= 4+/5 in R LE  to increase tolerance for ADL and work activities. Progressing, not met  4. Pt will be able to perform stairs with reciprocal gait pattern with ascending/descending stairs and without c/o pain or compensation. Progressing, not met    Plan     Continue with established Plan of Care towards PT goals, focusing on ROM and strengthening.    Nicole Clemente, PT, DPT, OCS

## 2020-12-08 ENCOUNTER — CLINICAL SUPPORT (OUTPATIENT)
Dept: REHABILITATION | Facility: HOSPITAL | Age: 60
End: 2020-12-08
Attending: ORTHOPAEDIC SURGERY
Payer: COMMERCIAL

## 2020-12-08 DIAGNOSIS — M25.561 RIGHT KNEE PAIN, UNSPECIFIED CHRONICITY: ICD-10-CM

## 2020-12-08 PROCEDURE — 97112 NEUROMUSCULAR REEDUCATION: CPT

## 2020-12-08 PROCEDURE — 97140 MANUAL THERAPY 1/> REGIONS: CPT

## 2020-12-08 PROCEDURE — 97110 THERAPEUTIC EXERCISES: CPT

## 2020-12-08 NOTE — PROGRESS NOTES
"  Physical Therapy Daily Treatment Note     Name: Josefina [facility]  Clinic Number: [id number]    Therapy Diagnosis:   Encounter Diagnosis   Name Primary?    Right knee pain, unspecified chronicity      Physician: GLENN Whyte MD    Visit Date: 12/8/2020    Physician Orders: PT Eval and Treat   Medical Diagnosis from Referral:   M25.561 (ICD-10-CM) - Acute pain of right knee   Z96.651 (ICD-10-CM) - History of total right knee replacement      Evaluation Date: 11/13/2020  Authorization Period Expiration: 12/31/2020  Plan of Care Expiration: 3/5/2020  Visit # / Visits authorized: 7/ 20     Time In: 1300  Time Out: 1345  Total Billable Time: 45 minutes     Precautions: Standard, s/p R TKA on 11/11/2020     PROCEDURES PERFORMED on 11/11/2020  Right total knee arthroplasty (CPT 45767)    Subjective     Pt reports: that she has a "knot" superior and lateral to knee cap.    She was compliant with home exercise program.  Response to previous treatment: n/a  Functional change: Pt ambulates with RW, WBAT on the R    Pain: 3/10  Location: right knee     Objective   ROM: 5-0-120    Josefina received therapeutic exercises to develop strength, endurance, ROM and flexibility for 25 minutes including:  Roller stick to quad/ITB x 5 min  Bike x 10 min lvl 5  Prone Quad S 3 x 30"   Heels slides 20 x 5"       ---not performed today---  SL hip abd 5x5 5" hold  SLR 5" hold 5x5    Leg press eccentric 60# 3 x 10 R only      Josefina participated in neuromuscular re-education activities to improve: motor control for 10 minutes. The following activities were included:  LAQ 30 x 5"   Quad set (1/2 FR under knee) 20 x 5"   Quad set 20 x 5"     ----not performed today---  Standing TKE yellow crossfit band  Bridges w/ RTB 5" 4x5      Josefina participated in dynamic functional therapeutic activities to improve functional performance for 0  minutes, including:  Sit to stand (airex in chair) 2 x 10       Josefina received the following manual therapy " techniques: Joint mobilizations were applied to the: R knee for 10 minutes, including:  Patellar mobs  Cupping to superior medial quad       Home Exercises Provided and Patient Education Provided     Education provided:   - proper technique for exercises    Written Home Exercises Provided: Patient instructed to cont prior HEP.  Exercises were reviewed and Josefina was able to demonstrate them prior to the end of the session.  Josefina demonstrated good  understanding of the education provided.     See EMR under Patient Instructions for exercises provided at IE.    Assessment     Pt ambulated into clinic without AD today, and decreased knee extension at initial contact.  Pt presents with tightness in distal/lateral quad, possible inflamed bursa.  Worked mostly in NWB for quad strengthening and ROM today to decrease stress on the joint.  Progress as tolerated.    Josefina is progressing well towards her goals.   Pt prognosis is Good.     Pt will continue to benefit from skilled outpatient physical therapy to address the deficits listed in the problem list box on initial evaluation, provide pt/family education and to maximize pt's level of independence in the home and community environment.     Pt's spiritual, cultural and educational needs considered and pt agreeable to plan of care and goals.    Anticipated barriers to physical therapy: none       GOALS: Short Term Goals:  8 weeks  1.Report decreased R knee pain  < / =  3-4/10  to increase tolerance for walking. Progressing, not met  2. Increase knee ROM to 8 hyperextension to 120 degrees flexion in order to be able to perform ADLs without difficulty. Progressing, not met  3. Increase strength by 4-/5 MMT grade in R LE  to increase tolerance for ADL and work activities. Progressing, not met  4. Pt to tolerate HEP to improve ROM and independence with ADL's. Progressing, not met     Long Term Goals: 16 weeks  1.Report decreased R knee pain < / = 0-1/10  to increase tolerance  for walking/stairs. Progressing, not met  2.Patient goal: To return to playing doubles tennis. Progressing, not met  3.Increase strength to >/= 4+/5 in R LE  to increase tolerance for ADL and work activities. Progressing, not met  4. Pt will be able to perform stairs with reciprocal gait pattern with ascending/descending stairs and without c/o pain or compensation. Progressing, not met    Plan     Continue with established Plan of Care towards PT goals, focusing on ROM and strengthening.    Nicole Clemente, PT, DPT, OCS

## 2020-12-11 ENCOUNTER — PATIENT MESSAGE (OUTPATIENT)
Dept: OTHER | Facility: OTHER | Age: 60
End: 2020-12-11

## 2020-12-11 ENCOUNTER — CLINICAL SUPPORT (OUTPATIENT)
Dept: REHABILITATION | Facility: HOSPITAL | Age: 60
End: 2020-12-11
Attending: ORTHOPAEDIC SURGERY
Payer: COMMERCIAL

## 2020-12-11 ENCOUNTER — TELEPHONE (OUTPATIENT)
Dept: SPORTS MEDICINE | Facility: CLINIC | Age: 60
End: 2020-12-11

## 2020-12-11 DIAGNOSIS — M25.561 RIGHT KNEE PAIN, UNSPECIFIED CHRONICITY: ICD-10-CM

## 2020-12-11 PROCEDURE — 97112 NEUROMUSCULAR REEDUCATION: CPT

## 2020-12-11 PROCEDURE — 97110 THERAPEUTIC EXERCISES: CPT

## 2020-12-11 NOTE — PROGRESS NOTES
"  Physical Therapy Daily Treatment Note     Name: Josefina [facility]  Clinic Number: [id number]    Therapy Diagnosis:   Encounter Diagnosis   Name Primary?    Right knee pain, unspecified chronicity      Physician: GLENN Whyte MD    Visit Date: 12/11/2020    Physician Orders: PT Eval and Treat   Medical Diagnosis from Referral:   M25.561 (ICD-10-CM) - Acute pain of right knee   Z96.651 (ICD-10-CM) - History of total right knee replacement      Evaluation Date: 11/13/2020  Authorization Period Expiration: 12/31/2020  Plan of Care Expiration: 3/5/2020  Visit # / Visits authorized: 7/ 20     Time In: 1045  Time Out: 1130  Total Billable Time: 45 minutes     Precautions: Standard, s/p R TKA on 11/11/2020     PROCEDURES PERFORMED on 11/11/2020  Right total knee arthroplasty (CPT 66062)    Subjective     Pt reports: that she is doing better than last visit   She was compliant with home exercise program.  Response to previous treatment: n/a  Functional change: Pt ambulates I with decreased stance time on the R    Pain: 3/10  Location: right knee     Objective   ROM: 5-0-120    Josefina received therapeutic exercises to develop strength, endurance, ROM and flexibility for 30 minutes including:  Roller stick to quad/ITB x 5 min  Bike x 10 min lvl 5  Calf stretch on slant x 2 min  SL hip abd 3 x 10  SLR 3 x 10  Prone Quad S 3 x 30"   Leg press eccentric 60# 3 x 10 R only - NEXT increase resistance      Josefina participated in neuromuscular re-education activities to improve: motor control for 10 minutes. The following activities were included:  LAQ 30 x 5"   SLS 3 x 30" - NEXT add airex    ----not performed today---  Standing TKE yellow crossfit band  Bridges w/ RTB 5" 4x5      Josefina participated in dynamic functional therapeutic activities to improve functional performance for 0  minutes, including:  Sit to stand (airex in chair) 2 x 10       Josefina received the following manual therapy techniques: Joint mobilizations were " applied to the: R knee for 5 minutes, including:  Cross friction massage to incision      Home Exercises Provided and Patient Education Provided     Education provided:   - proper technique for exercises    Written Home Exercises Provided: Patient instructed to cont prior HEP.  Exercises were reviewed and Josefina was able to demonstrate them prior to the end of the session.  Josefina demonstrated good  understanding of the education provided.     See EMR under Patient Instructions for exercises provided at IE.    Assessment     Pt tolerated progression of exercises well today. Focused on stretching, quad and hip strengthening.  Resumed leg press today secondary to patient's improvement in pain and mobility.  However, kept resistance low to not overload the joint.  Next visit, increase resistance on leg press.    Josefina is progressing well towards her goals.   Pt prognosis is Good.     Pt will continue to benefit from skilled outpatient physical therapy to address the deficits listed in the problem list box on initial evaluation, provide pt/family education and to maximize pt's level of independence in the home and community environment.     Pt's spiritual, cultural and educational needs considered and pt agreeable to plan of care and goals.    Anticipated barriers to physical therapy: none       GOALS: Short Term Goals:  8 weeks   1.Report decreased R knee pain  < / =  3-4/10  to increase tolerance for walking. Progressing, not met  2. Increase knee ROM to 8 hyperextension to 120 degrees flexion in order to be able to perform ADLs without difficulty. Progressing, not met  3. Increase strength by 4-/5 MMT grade in R LE  to increase tolerance for ADL and work activities. Progressing, not met  4. Pt to tolerate HEP to improve ROM and independence with ADL's. Progressing, not met     Long Term Goals: 16 weeks  1.Report decreased R knee pain < / = 0-1/10  to increase tolerance for walking/stairs. Progressing, not  met  2.Patient goal: To return to playing doubles tennis. Progressing, not met  3.Increase strength to >/= 4+/5 in R LE  to increase tolerance for ADL and work activities. Progressing, not met  4. Pt will be able to perform stairs with reciprocal gait pattern with ascending/descending stairs and without c/o pain or compensation. Progressing, not met    Plan     Continue with established Plan of Care towards PT goals, focusing on ROM and strengthening.    Nicole Clemente, PT, DPT, OCS

## 2020-12-15 ENCOUNTER — CLINICAL SUPPORT (OUTPATIENT)
Dept: REHABILITATION | Facility: HOSPITAL | Age: 60
End: 2020-12-15
Attending: ORTHOPAEDIC SURGERY
Payer: COMMERCIAL

## 2020-12-15 DIAGNOSIS — M25.561 RIGHT KNEE PAIN, UNSPECIFIED CHRONICITY: ICD-10-CM

## 2020-12-15 PROCEDURE — 97140 MANUAL THERAPY 1/> REGIONS: CPT

## 2020-12-15 PROCEDURE — 97110 THERAPEUTIC EXERCISES: CPT

## 2020-12-15 PROCEDURE — 97112 NEUROMUSCULAR REEDUCATION: CPT

## 2020-12-15 NOTE — PROGRESS NOTES
"  Physical Therapy Daily Treatment Note     Name: Josefina [facility]  Clinic Number: [id number]    Therapy Diagnosis:   Encounter Diagnosis   Name Primary?    Right knee pain, unspecified chronicity      Physician: GLENN Whyte MD    Visit Date: 12/15/2020    Physician Orders: PT Eval and Treat   Medical Diagnosis from Referral:   M25.561 (ICD-10-CM) - Acute pain of right knee   Z96.651 (ICD-10-CM) - History of total right knee replacement      Evaluation Date: 11/13/2020  Authorization Period Expiration: 12/31/2020  Plan of Care Expiration: 3/5/2020  Visit # / Visits authorized: 9/ 20     Time In: 1310  Time Out: 1355  Total Billable Time: 45 minutes     Precautions: Standard, s/p R TKA on 11/11/2020     PROCEDURES PERFORMED on 11/11/2020  Right total knee arthroplasty (CPT 72769)    Subjective     Pt reports: that she is doing good.  Has been icing less.    She was compliant with home exercise program.  Response to previous treatment: n/a  Functional change: Pt ambulates I with decreased stance time on the R    Pain: 3/10  Location: right knee     Objective   ROM: 5-0-120    Josefina received therapeutic exercises to develop strength, endurance, ROM and flexibility for 25 minutes including:  Bike x 10 min lvl 5.5  Heel raises x 30  Calf stretch on slant x 2 min  SL hip abd 3 x 10 NP  SLR 3 x 10 NP  Prone Quad S 3 x 30"   Leg press eccentric 80# 3 x 10 R only       Josefina participated in neuromuscular re-education activities to improve: motor control for 10 minutes. The following activities were included:  LAQ 30 x 5" NP  SLS airex 3 x 30"   Standing TKE yellow crossfit band 30 x 3"    ----not performed today---  Bridges w/ RTB 5" 4x5      Josefina participated in dynamic functional therapeutic activities to improve functional performance for 0  minutes, including:  Sit to stand (airex in chair) 2 x 10   Gait training  - NEXT    Josefina received the following manual therapy techniques: Joint mobilizations were applied " to the: R knee for 10 minutes, including:  Cross friction massage to incision  Hawk tools to lateral superior      Home Exercises Provided and Patient Education Provided     Education provided:   - proper technique for exercises    Written Home Exercises Provided: Patient instructed to cont prior HEP.  Exercises were reviewed and Josefina was able to demonstrate them prior to the end of the session.  Josefina demonstrated good  understanding of the education provided.     See EMR under Patient Instructions for exercises provided at IE.    Assessment     Pt tolerated progression of exercises well today. Decreased myofascial soft tissue mobility noted around superior lateral knee today.  Hawk tools to area improved soft tissue mobility and pt's c/o pain, however inflamed bursa noted.  Pt able to perform eccentric leg press with increased resistance with only 75 degrees of knee flexion.  Progress as tolerated.    Josefina is progressing well towards her goals.   Pt prognosis is Good.     Pt will continue to benefit from skilled outpatient physical therapy to address the deficits listed in the problem list box on initial evaluation, provide pt/family education and to maximize pt's level of independence in the home and community environment.     Pt's spiritual, cultural and educational needs considered and pt agreeable to plan of care and goals.    Anticipated barriers to physical therapy: none       GOALS: Short Term Goals:  8 weeks   1.Report decreased R knee pain  < / =  3-4/10  to increase tolerance for walking. Progressing, not met  2. Increase knee ROM to 8 hyperextension to 120 degrees flexion in order to be able to perform ADLs without difficulty. Progressing, not met  3. Increase strength by 4-/5 MMT grade in R LE  to increase tolerance for ADL and work activities. Progressing, not met  4. Pt to tolerate HEP to improve ROM and independence with ADL's. Progressing, not met     Long Term Goals: 16 weeks  1.Report  decreased R knee pain < / = 0-1/10  to increase tolerance for walking/stairs. Progressing, not met  2.Patient goal: To return to playing doubles tennis. Progressing, not met  3.Increase strength to >/= 4+/5 in R LE  to increase tolerance for ADL and work activities. Progressing, not met  4. Pt will be able to perform stairs with reciprocal gait pattern with ascending/descending stairs and without c/o pain or compensation. Progressing, not met    Plan     Continue with established Plan of Care towards PT goals, focusing on ROM and strengthening.    Nicole Clemente, PT, DPT, OCS

## 2020-12-22 ENCOUNTER — CLINICAL SUPPORT (OUTPATIENT)
Dept: REHABILITATION | Facility: HOSPITAL | Age: 60
End: 2020-12-22
Attending: ORTHOPAEDIC SURGERY
Payer: COMMERCIAL

## 2020-12-22 ENCOUNTER — IMMUNIZATION (OUTPATIENT)
Dept: INTERNAL MEDICINE | Facility: CLINIC | Age: 60
End: 2020-12-22
Payer: COMMERCIAL

## 2020-12-22 DIAGNOSIS — Z23 NEED FOR VACCINATION: ICD-10-CM

## 2020-12-22 DIAGNOSIS — M25.561 RIGHT KNEE PAIN, UNSPECIFIED CHRONICITY: ICD-10-CM

## 2020-12-22 PROCEDURE — 97110 THERAPEUTIC EXERCISES: CPT

## 2020-12-22 PROCEDURE — 97530 THERAPEUTIC ACTIVITIES: CPT

## 2020-12-22 PROCEDURE — 0001A COVID-19, MRNA, LNP-S, PF, 30 MCG/0.3 ML DOSE VACCINE: ICD-10-PCS | Mod: CV19,,, | Performed by: INTERNAL MEDICINE

## 2020-12-22 PROCEDURE — 91300 COVID-19, MRNA, LNP-S, PF, 30 MCG/0.3 ML DOSE VACCINE: CPT | Mod: ,,, | Performed by: INTERNAL MEDICINE

## 2020-12-22 PROCEDURE — 91300 COVID-19, MRNA, LNP-S, PF, 30 MCG/0.3 ML DOSE VACCINE: ICD-10-PCS | Mod: ,,, | Performed by: INTERNAL MEDICINE

## 2020-12-22 PROCEDURE — 0001A COVID-19, MRNA, LNP-S, PF, 30 MCG/0.3 ML DOSE VACCINE: CPT | Mod: CV19,,, | Performed by: INTERNAL MEDICINE

## 2020-12-22 NOTE — PROGRESS NOTES
"  Physical Therapy Daily Treatment Note     Name: Josefina [facility]  Clinic Number: [id number]    Therapy Diagnosis:   Encounter Diagnosis   Name Primary?    Right knee pain, unspecified chronicity      Physician: GLENN Whyte MD    Visit Date: 12/22/2020    Physician Orders: PT Eval and Treat   Medical Diagnosis from Referral:   M25.561 (ICD-10-CM) - Acute pain of right knee   Z96.651 (ICD-10-CM) - History of total right knee replacement      Evaluation Date: 11/13/2020  Authorization Period Expiration: 12/31/2020  Plan of Care Expiration: 3/5/2020  Visit # / Visits authorized: 10/ 20     Time In: 1045  Time Out: 1130  Total Billable Time: 45 minutes     Precautions: Standard, s/p R TKA on 11/11/2020     PROCEDURES PERFORMED on 11/11/2020  Right total knee arthroplasty (CPT 97009)    Subjective     Pt reports: that she is doing really good.  Only has pulling pain at lateral knee when walking.  She was compliant with home exercise program.  Response to previous treatment: n/a  Functional change: Pt ambulates I with decreased stance time on the R    Pain: 3/10  Location: right knee     Objective   ROM: 5-0-120    Josefina received therapeutic exercises to develop strength, endurance, ROM and flexibility for 25 minutes including:  Bike x 10 min lvl 6.0  Roller stick to ITB and HS  Heel raises x 30  Calf stretch on slant x 2 min  SL hip abd 3 x 10 NP  SLR 3 x 10 NP  Prone Quad S 3 x 30" NP  Leg press eccentric 80# 3 x 10 R only       Josefina participated in neuromuscular re-education activities to improve: motor control for 0 minutes. The following activities were included:  LAQ 30 x 5" NP  SLS airex 3 x 30"   Standing TKE yellow crossfit band 30 x 3"      Josefina participated in dynamic functional therapeutic activities to improve functional performance for 20  minutes, including:  Step ups 6" 3 x 10 R  Step downs 3" - attempted but unable to perform  Gait training over small hurdles x 5 laps  Bridges 30 x 5" "       Josefina received the following manual therapy techniques: Joint mobilizations were applied to the: R knee for 0 minutes, including:  Cross friction massage to incision  Hawk tools to lateral superior      Home Exercises Provided and Patient Education Provided     Education provided:   - proper technique for exercises    Written Home Exercises Provided: Patient instructed to cont prior HEP.  Exercises were reviewed and Josefina was able to demonstrate them prior to the end of the session.  Josefina demonstrated good  understanding of the education provided.     See EMR under Patient Instructions for exercises provided at IE.    Assessment     Pt tolerated treatment well today.  Improvements noted in myofascial mobility with decreased tightness near lateral superior patella bursa.  Focused on LE strengthening and gait training today.  Pt required VCs for proper quad activation during IC during gait.  Attempted step downs, but pt unable to perform secondary to weakness.  Progress as tolerated.    Josefina is progressing well towards her goals.   Pt prognosis is Good.     Pt will continue to benefit from skilled outpatient physical therapy to address the deficits listed in the problem list box on initial evaluation, provide pt/family education and to maximize pt's level of independence in the home and community environment.     Pt's spiritual, cultural and educational needs considered and pt agreeable to plan of care and goals.    Anticipated barriers to physical therapy: none       GOALS: Short Term Goals:  8 weeks   1.Report decreased R knee pain  < / =  3-4/10  to increase tolerance for walking. Progressing, not met  2. Increase knee ROM to 8 hyperextension to 120 degrees flexion in order to be able to perform ADLs without difficulty. Progressing, not met  3. Increase strength by 4-/5 MMT grade in R LE  to increase tolerance for ADL and work activities. Progressing, not met  4. Pt to tolerate HEP to improve ROM and  independence with ADL's. Progressing, not met     Long Term Goals: 16 weeks  1.Report decreased R knee pain < / = 0-1/10  to increase tolerance for walking/stairs. Progressing, not met  2.Patient goal: To return to playing doubles tennis. Progressing, not met  3.Increase strength to >/= 4+/5 in R LE  to increase tolerance for ADL and work activities. Progressing, not met  4. Pt will be able to perform stairs with reciprocal gait pattern with ascending/descending stairs and without c/o pain or compensation. Progressing, not met    Plan     Continue with established Plan of Care towards PT goals, focusing on ROM and strengthening.    Nicole Clemente, PT, DPT, OCS

## 2020-12-28 ENCOUNTER — CLINICAL SUPPORT (OUTPATIENT)
Dept: REHABILITATION | Facility: HOSPITAL | Age: 60
End: 2020-12-28
Attending: ORTHOPAEDIC SURGERY
Payer: COMMERCIAL

## 2020-12-28 DIAGNOSIS — M25.561 RIGHT KNEE PAIN, UNSPECIFIED CHRONICITY: ICD-10-CM

## 2020-12-28 PROCEDURE — 97140 MANUAL THERAPY 1/> REGIONS: CPT | Mod: CQ

## 2020-12-28 PROCEDURE — 97110 THERAPEUTIC EXERCISES: CPT | Mod: CQ

## 2020-12-28 NOTE — PROGRESS NOTES
"  Physical Therapy Daily Treatment Note     Name: Josefina [facility]  Clinic Number: [id number]    Therapy Diagnosis:   Encounter Diagnosis   Name Primary?    Right knee pain, unspecified chronicity      Physician: GLENN Whyte MD    Visit Date: 12/28/2020    Physician Orders: PT Eval and Treat   Medical Diagnosis from Referral:   M25.561 (ICD-10-CM) - Acute pain of right knee   Z96.651 (ICD-10-CM) - History of total right knee replacement      Evaluation Date: 11/13/2020  Authorization Period Expiration: 12/31/2020  Plan of Care Expiration: 3/5/2020  Visit # / Visits authorized: 11/ 20     Time In: 3:15  Time Out: 3:55  Total Billable Time: 40 minutes     Precautions: Standard, s/p R TKA on 11/11/2020     PROCEDURES PERFORMED on 11/11/2020  Right total knee arthroplasty (CPT 95486)    Subjective     Pt reports: having pain L knee now superior/lateral patella. R knee feels stiff. Have to leave 5 min early  She was compliant with home exercise program.  Response to previous treatment: n/a  Functional change: Pt ambulates I with decreased stance time on the R    Pain: 3/10  Location: right knee     Objective   ROM: 5-0-120    Josefina received therapeutic exercises to develop strength, endurance, ROM and flexibility for 25 minutes including:  Bike x 10 min lvl 6.0  Roller stick to ITB and HS B   Prone quad S w/ round bolster 3x30  Leg press eccentric 80# B w/ BTB around knees    NP:   Heel raises x 30  Calf stretch on slant x 2 min    Josefina participated in neuromuscular re-education activities to improve: motor control for 0 minutes. The following activities were included:  LAQ 30 x 5" NP  SLS airex 3 x 30"   Standing TKE yellow crossfit band 30 x 3"      Josefina participated in dynamic functional therapeutic activities to improve functional performance for 05  minutes, including:  Lateral walks BTB 1 lap    Step ups 6" 3 x 10 R  Step downs 3" - attempted but unable to perform  Gait training over small hurdles x 5 " "laps  Bridges 30 x 5"       Josefina received the following manual therapy techniques: Joint mobilizations were applied to the: R knee for 10 minutes, including:  STM to L lateral retinaculum and vastus lateralis tendon    Home Exercises Provided and Patient Education Provided     Education provided:   - proper technique for exercises    Written Home Exercises Provided: Patient instructed to cont prior HEP.  Exercises were reviewed and Josefina was able to demonstrate them prior to the end of the session.  Josefina demonstrated good  understanding of the education provided.     See EMR under Patient Instructions for exercises provided at IE.    Assessment     Pt amb into clinic limping and w/ decreased full R knee ext. After getting off bike R knee felt less stiff. R knee started off w/ 2 degrees hyper ext but after manual stretching was able to gain 3 degrees. Pt got a good quad S w/ bloster under knee. After stretching L quad as well as STM to lateral patella Pt experienced decreased L knee symptoms. Pt still seems to have weak glut med was well as deviated gait which could be contributing to L knee symptoms. Pt Ed again on thinking about firing glut and quad during amb. Will start performing SL ABD and clams for endurance based reps and will stretch quads w/ hip ext at home to max quad stretch.     Josefina is progressing well towards her goals.   Pt prognosis is Good.     Pt will continue to benefit from skilled outpatient physical therapy to address the deficits listed in the problem list box on initial evaluation, provide pt/family education and to maximize pt's level of independence in the home and community environment.     Pt's spiritual, cultural and educational needs considered and pt agreeable to plan of care and goals.    Anticipated barriers to physical therapy: none       GOALS: Short Term Goals:  8 weeks   1.Report decreased R knee pain  < / =  3-4/10  to increase tolerance for walking. Progressing, not " met  2. Increase knee ROM to 8 hyperextension to 120 degrees flexion in order to be able to perform ADLs without difficulty. Progressing, not met  3. Increase strength by 4-/5 MMT grade in R LE  to increase tolerance for ADL and work activities. Progressing, not met  4. Pt to tolerate HEP to improve ROM and independence with ADL's. Progressing, not met     Long Term Goals: 16 weeks  1.Report decreased R knee pain < / = 0-1/10  to increase tolerance for walking/stairs. Progressing, not met  2.Patient goal: To return to playing doubles tennis. Progressing, not met  3.Increase strength to >/= 4+/5 in R LE  to increase tolerance for ADL and work activities. Progressing, not met  4. Pt will be able to perform stairs with reciprocal gait pattern with ascending/descending stairs and without c/o pain or compensation. Progressing, not met    Plan     Continue with established Plan of Care towards PT goals, focusing on ROM and strengthening.    Sabrina Grubbs, PTA, DPT, OCS

## 2020-12-28 NOTE — PROGRESS NOTES
S:Josefina Blue presents for post-operative evaluation.     DATE OF PROCEDURE: 11/11/2020   PROCEDURES PERFORMED:   Right total knee arthroplasty     DATE OF PROCEDURE: 7/8/2020   PROCEDURES PERFORMED:   Left total knee arthroplasty     Josefina Blue reports to be doing well 6 wk s/p the above mentioned procedure on the right knee. Going to PT 2xWeek at the  [place] location. Seeing good progress daily. Pain levels are improving.  Doing very well overall.  States she has experienced some recurrent pain in the left knee which he localizes over the anterior infrapatellar region laterally.  Present with going up and down stairs.  Better with rest.  No longer takes Celebrex.     O:  Exam of the right knee shows a well-healed incision.  Good quad firing.  No significant effusion.  Full active extension, flexion to 125° with central patellar tracking.  No mid flexion instability.  Ligamentously stable.  Exam of the left knee shows the same.  Central patellar tracking.  Tender over the lateral infrapatellar region in the area of the fat pad.  No knee effusion.  Very good range of motion.    X-rays of both knees demonstrate appropriate prosthesis positioning.  Mild lateral patellar tilt on the left side.    A/P:  The patient has relied more on the left side recently due to recovery from her right-sided surgery.  This is likely some degree of a contributing factor towards her anterior infrapatellar knee pain.  Central patellar tracking on exam.  I believe this will get better with further therapy and time.  Plan to start back on Celebrex which should help as well.  Otherwise continue rehab for both knees.  Focus on quadriceps strengthening and functional exercises.  Discussed the potential benefits of weight loss as well.  Return to clinic in 2 months.

## 2020-12-30 ENCOUNTER — CLINICAL SUPPORT (OUTPATIENT)
Dept: REHABILITATION | Facility: HOSPITAL | Age: 60
End: 2020-12-30
Attending: ORTHOPAEDIC SURGERY
Payer: COMMERCIAL

## 2020-12-30 DIAGNOSIS — M25.561 RIGHT KNEE PAIN, UNSPECIFIED CHRONICITY: ICD-10-CM

## 2020-12-30 PROCEDURE — 97110 THERAPEUTIC EXERCISES: CPT | Mod: CQ

## 2020-12-30 PROCEDURE — 97530 THERAPEUTIC ACTIVITIES: CPT | Mod: CQ

## 2020-12-30 NOTE — PROGRESS NOTES
Physical Therapy Daily Treatment Note     Name: Josefina [facility]  Clinic Number: [id number]    Therapy Diagnosis:   Encounter Diagnosis   Name Primary?    Right knee pain, unspecified chronicity      Physician: GLENN Whyte MD    Visit Date: 12/30/2020    Physician Orders: PT Eval and Treat   Medical Diagnosis from Referral:   M25.561 (ICD-10-CM) - Acute pain of right knee   Z96.651 (ICD-10-CM) - History of total right knee replacement      Evaluation Date: 11/13/2020  Authorization Period Expiration: 12/31/2020  Plan of Care Expiration: 3/5/2020  Visit # / Visits authorized: 12/ 20     Time In: 11:35  Time Out: 12:15  Total Billable Time: 40 minutes     Precautions: Standard, s/p R TKA on 11/11/2020     PROCEDURES PERFORMED on 11/11/2020  Right total knee arthroplasty (CPT 18793)    Subjective     Pt reports: having pain L knee now superior/lateral patella. R knee feels stiff. Have to leave 5 min early  She was compliant with home exercise program.  Response to previous treatment: n/a  Functional change: Pt ambulates I with decreased stance time on the R    Pain: 3/10  Location: right knee     Objective   ROM: 5-0-120    Josefina received therapeutic exercises to develop strength, endurance, ROM and flexibility for 20 minutes including:  Bike x 5 min lvl 6.0  Prone quad S w/ round bolster 3x30  Static lunge 3X10    NP:   Heel raises x 30  Calf stretch on slant x 2 min  Roller stick to ITB and HS B     Josefina participated in neuromuscular re-education activities to improve: motor control for 0 minutes. The following activities were included:      Josefina participated in dynamic functional therapeutic activities to improve functional performance for 20 minutes, including:  Lateral walks/ Monster walk fwd and back BTB 2 lap  SL shuttle B 1 red 3x10    Josefina received the following manual therapy techniques: Joint mobilizations were applied to the: R knee for 00 minutes, including:  STM to L lateral retinaculum and  vastus lateralis tendon    Home Exercises Provided and Patient Education Provided     Education provided:   - proper technique for exercises    Written Home Exercises Provided: Patient instructed to cont prior HEP.  Exercises were reviewed and Josefina was able to demonstrate them prior to the end of the session.  Josefina demonstrated good  understanding of the education provided.     See EMR under Patient Instructions for exercises provided at IE.    Assessment     Pt amb into session w/ decreased antalgic gait and TKE on R. No L knee symptoms since previous tx. Focused on hip and quad strengthening today. R Quad ecc control weakness w/ staic lunge. Cont to progress as farhad.     Josefina is progressing well towards her goals.   Pt prognosis is Good.     Pt will continue to benefit from skilled outpatient physical therapy to address the deficits listed in the problem list box on initial evaluation, provide pt/family education and to maximize pt's level of independence in the home and community environment.     Pt's spiritual, cultural and educational needs considered and pt agreeable to plan of care and goals.    Anticipated barriers to physical therapy: none       GOALS: Short Term Goals:  8 weeks   1.Report decreased R knee pain  < / =  3-4/10  to increase tolerance for walking. Progressing, not met  2. Increase knee ROM to 8 hyperextension to 120 degrees flexion in order to be able to perform ADLs without difficulty. Progressing, not met  3. Increase strength by 4-/5 MMT grade in R LE  to increase tolerance for ADL and work activities. Progressing, not met  4. Pt to tolerate HEP to improve ROM and independence with ADL's. Progressing, not met     Long Term Goals: 16 weeks  1.Report decreased R knee pain < / = 0-1/10  to increase tolerance for walking/stairs. Progressing, not met  2.Patient goal: To return to playing doubles tennis. Progressing, not met  3.Increase strength to >/= 4+/5 in R LE  to increase tolerance for  ADL and work activities. Progressing, not met  4. Pt will be able to perform stairs with reciprocal gait pattern with ascending/descending stairs and without c/o pain or compensation. Progressing, not met    Plan     Continue with established Plan of Care towards PT goals, focusing on ROM and strengthening.    Sabrina Grubbs, PTA,

## 2020-12-31 ENCOUNTER — OFFICE VISIT (OUTPATIENT)
Dept: SPORTS MEDICINE | Facility: CLINIC | Age: 60
End: 2020-12-31
Payer: COMMERCIAL

## 2020-12-31 ENCOUNTER — HOSPITAL ENCOUNTER (OUTPATIENT)
Dept: RADIOLOGY | Facility: HOSPITAL | Age: 60
Discharge: HOME | End: 2020-12-31
Attending: ORTHOPAEDIC SURGERY
Payer: COMMERCIAL

## 2020-12-31 VITALS
HEIGHT: 68 IN | SYSTOLIC BLOOD PRESSURE: 129 MMHG | HEART RATE: 86 BPM | WEIGHT: 232.5 LBS | BODY MASS INDEX: 35.24 KG/M2 | DIASTOLIC BLOOD PRESSURE: 79 MMHG

## 2020-12-31 DIAGNOSIS — Z96.651 S/P TOTAL KNEE REPLACEMENT USING CEMENT, RIGHT: ICD-10-CM

## 2020-12-31 DIAGNOSIS — M25.561 BILATERAL KNEE PAIN: ICD-10-CM

## 2020-12-31 DIAGNOSIS — Z96.651 S/P TOTAL KNEE REPLACEMENT USING CEMENT, RIGHT: Primary | ICD-10-CM

## 2020-12-31 DIAGNOSIS — M25.562 BILATERAL KNEE PAIN: ICD-10-CM

## 2020-12-31 PROCEDURE — 99999 PR PBB SHADOW E&M-EST. PATIENT-LVL III: ICD-10-PCS | Mod: PBBFAC,,, | Performed by: ORTHOPAEDIC SURGERY

## 2020-12-31 PROCEDURE — 77073 BONE LENGTH STUDIES: CPT | Mod: TC

## 2020-12-31 PROCEDURE — 99024 POSTOP FOLLOW-UP VISIT: CPT | Mod: S$GLB,,, | Performed by: ORTHOPAEDIC SURGERY

## 2020-12-31 PROCEDURE — 99999 PR PBB SHADOW E&M-EST. PATIENT-LVL III: CPT | Mod: PBBFAC,,, | Performed by: ORTHOPAEDIC SURGERY

## 2020-12-31 PROCEDURE — 73560 X-RAY EXAM OF KNEE 1 OR 2: CPT | Mod: TC,50

## 2020-12-31 PROCEDURE — 73560 X-RAY EXAM OF KNEE 1 OR 2: CPT | Mod: 26,RT,, | Performed by: RADIOLOGY

## 2020-12-31 PROCEDURE — 1125F AMNT PAIN NOTED PAIN PRSNT: CPT | Mod: S$GLB,,, | Performed by: ORTHOPAEDIC SURGERY

## 2020-12-31 PROCEDURE — 77073 XR HIP TO ANKLE: ICD-10-PCS | Mod: 26,,, | Performed by: RADIOLOGY

## 2020-12-31 PROCEDURE — 77073 BONE LENGTH STUDIES: CPT | Mod: 26,,, | Performed by: RADIOLOGY

## 2020-12-31 PROCEDURE — 3008F PR BODY MASS INDEX (BMI) DOCUMENTED: ICD-10-PCS | Mod: CPTII,S$GLB,, | Performed by: ORTHOPAEDIC SURGERY

## 2020-12-31 PROCEDURE — 73560 XR KNEE 1 OR 2 VIEW BILATERAL: ICD-10-PCS | Mod: 26,RT,, | Performed by: RADIOLOGY

## 2020-12-31 PROCEDURE — 1125F PR PAIN SEVERITY QUANTIFIED, PAIN PRESENT: ICD-10-PCS | Mod: S$GLB,,, | Performed by: ORTHOPAEDIC SURGERY

## 2020-12-31 PROCEDURE — 73560 X-RAY EXAM OF KNEE 1 OR 2: CPT | Mod: 26,LT,, | Performed by: RADIOLOGY

## 2020-12-31 PROCEDURE — 3008F BODY MASS INDEX DOCD: CPT | Mod: CPTII,S$GLB,, | Performed by: ORTHOPAEDIC SURGERY

## 2020-12-31 PROCEDURE — 73565 X-RAY EXAM OF KNEES: CPT | Mod: 26,,, | Performed by: RADIOLOGY

## 2020-12-31 PROCEDURE — 73565 X-RAY EXAM OF KNEES: CPT | Mod: TC

## 2020-12-31 PROCEDURE — 99024 PR POST-OP FOLLOW-UP VISIT: ICD-10-PCS | Mod: S$GLB,,, | Performed by: ORTHOPAEDIC SURGERY

## 2020-12-31 PROCEDURE — 73565 XR KNEE AP STANDING BILATERAL: ICD-10-PCS | Mod: 26,,, | Performed by: RADIOLOGY

## 2020-12-31 RX ORDER — CELECOXIB 200 MG/1
200 CAPSULE ORAL DAILY
Qty: 30 CAPSULE | Refills: 1 | Status: SHIPPED | OUTPATIENT
Start: 2020-12-31 | End: 2021-02-21 | Stop reason: SDUPTHER

## 2021-01-04 ENCOUNTER — PATIENT MESSAGE (OUTPATIENT)
Dept: ADMINISTRATIVE | Facility: HOSPITAL | Age: 61
End: 2021-01-04

## 2021-01-04 DIAGNOSIS — I10 BENIGN ESSENTIAL HYPERTENSION: ICD-10-CM

## 2021-01-04 RX ORDER — TRIAMTERENE/HYDROCHLOROTHIAZID 37.5-25 MG
1 TABLET ORAL DAILY
Qty: 90 TABLET | Refills: 3 | Status: SHIPPED | OUTPATIENT
Start: 2021-01-04 | End: 2021-12-20 | Stop reason: SDUPTHER

## 2021-01-05 ENCOUNTER — CLINICAL SUPPORT (OUTPATIENT)
Dept: REHABILITATION | Facility: HOSPITAL | Age: 61
End: 2021-01-05
Attending: ORTHOPAEDIC SURGERY
Payer: COMMERCIAL

## 2021-01-05 DIAGNOSIS — M25.561 RIGHT KNEE PAIN, UNSPECIFIED CHRONICITY: ICD-10-CM

## 2021-01-05 PROCEDURE — 97530 THERAPEUTIC ACTIVITIES: CPT | Mod: CQ

## 2021-01-05 PROCEDURE — 97110 THERAPEUTIC EXERCISES: CPT | Mod: CQ

## 2021-01-12 ENCOUNTER — IMMUNIZATION (OUTPATIENT)
Dept: INTERNAL MEDICINE | Facility: CLINIC | Age: 61
End: 2021-01-12
Payer: COMMERCIAL

## 2021-01-12 DIAGNOSIS — Z23 NEED FOR VACCINATION: Primary | ICD-10-CM

## 2021-01-12 PROCEDURE — 91300 COVID-19, MRNA, LNP-S, PF, 30 MCG/0.3 ML DOSE VACCINE: CPT | Mod: PBBFAC

## 2021-01-12 PROCEDURE — 0002A COVID-19, MRNA, LNP-S, PF, 30 MCG/0.3 ML DOSE VACCINE: CPT | Mod: PBBFAC

## 2021-01-13 ENCOUNTER — PATIENT MESSAGE (OUTPATIENT)
Dept: ADMINISTRATIVE | Facility: OTHER | Age: 61
End: 2021-01-13

## 2021-01-15 ENCOUNTER — CLINICAL SUPPORT (OUTPATIENT)
Dept: REHABILITATION | Facility: HOSPITAL | Age: 61
End: 2021-01-15
Attending: ORTHOPAEDIC SURGERY
Payer: COMMERCIAL

## 2021-01-15 DIAGNOSIS — M25.561 RIGHT KNEE PAIN, UNSPECIFIED CHRONICITY: ICD-10-CM

## 2021-01-15 PROCEDURE — 97110 THERAPEUTIC EXERCISES: CPT

## 2021-01-15 PROCEDURE — 97530 THERAPEUTIC ACTIVITIES: CPT

## 2021-01-19 ENCOUNTER — CLINICAL SUPPORT (OUTPATIENT)
Dept: REHABILITATION | Facility: HOSPITAL | Age: 61
End: 2021-01-19
Attending: ORTHOPAEDIC SURGERY
Payer: COMMERCIAL

## 2021-01-19 DIAGNOSIS — M25.561 RIGHT KNEE PAIN, UNSPECIFIED CHRONICITY: ICD-10-CM

## 2021-01-19 PROCEDURE — 97110 THERAPEUTIC EXERCISES: CPT

## 2021-01-19 PROCEDURE — 97530 THERAPEUTIC ACTIVITIES: CPT

## 2021-02-20 ENCOUNTER — PATIENT MESSAGE (OUTPATIENT)
Dept: INTERNAL MEDICINE | Facility: CLINIC | Age: 61
End: 2021-02-20

## 2021-02-20 DIAGNOSIS — I10 ESSENTIAL HYPERTENSION: ICD-10-CM

## 2021-02-21 DIAGNOSIS — Z96.651 S/P TOTAL KNEE REPLACEMENT USING CEMENT, RIGHT: ICD-10-CM

## 2021-02-21 DIAGNOSIS — I10 ESSENTIAL HYPERTENSION: ICD-10-CM

## 2021-02-21 RX ORDER — IRBESARTAN 75 MG/1
75 TABLET ORAL NIGHTLY
Qty: 90 TABLET | Refills: 3 | Status: SHIPPED | OUTPATIENT
Start: 2021-02-21 | End: 2021-02-22

## 2021-02-21 RX ORDER — CELECOXIB 200 MG/1
200 CAPSULE ORAL DAILY
Qty: 30 CAPSULE | Refills: 1 | Status: SHIPPED | OUTPATIENT
Start: 2021-02-21 | End: 2021-03-19 | Stop reason: SDUPTHER

## 2021-02-22 ENCOUNTER — DOCUMENTATION ONLY (OUTPATIENT)
Dept: REHABILITATION | Facility: OTHER | Age: 61
End: 2021-02-22

## 2021-02-22 RX ORDER — IRBESARTAN 75 MG/1
75 TABLET ORAL NIGHTLY
Qty: 90 TABLET | Refills: 3 | Status: SHIPPED | OUTPATIENT
Start: 2021-02-22 | End: 2022-05-09 | Stop reason: SDUPTHER

## 2021-02-23 ENCOUNTER — PATIENT OUTREACH (OUTPATIENT)
Dept: ADMINISTRATIVE | Facility: OTHER | Age: 61
End: 2021-02-23

## 2021-02-25 ENCOUNTER — HOSPITAL ENCOUNTER (OUTPATIENT)
Dept: RADIOLOGY | Facility: HOSPITAL | Age: 61
Discharge: HOME | End: 2021-02-25
Attending: ORTHOPAEDIC SURGERY
Payer: COMMERCIAL

## 2021-02-25 ENCOUNTER — OFFICE VISIT (OUTPATIENT)
Dept: SPORTS MEDICINE | Facility: CLINIC | Age: 61
End: 2021-02-25
Payer: COMMERCIAL

## 2021-02-25 VITALS
HEART RATE: 87 BPM | DIASTOLIC BLOOD PRESSURE: 81 MMHG | HEIGHT: 68 IN | WEIGHT: 232 LBS | BODY MASS INDEX: 35.16 KG/M2 | SYSTOLIC BLOOD PRESSURE: 141 MMHG

## 2021-02-25 DIAGNOSIS — M76.62 LEFT ACHILLES TENDINITIS: Primary | ICD-10-CM

## 2021-02-25 DIAGNOSIS — M25.561 RIGHT KNEE PAIN, UNSPECIFIED CHRONICITY: ICD-10-CM

## 2021-02-25 DIAGNOSIS — M76.62 LEFT ACHILLES TENDINITIS: ICD-10-CM

## 2021-02-25 DIAGNOSIS — Z96.651 S/P TOTAL KNEE REPLACEMENT USING CEMENT, RIGHT: ICD-10-CM

## 2021-02-25 DIAGNOSIS — Z96.652 S/P TOTAL KNEE REPLACEMENT USING CEMENT, LEFT: ICD-10-CM

## 2021-02-25 PROCEDURE — 3077F SYST BP >= 140 MM HG: CPT | Mod: CPTII,S$GLB,, | Performed by: ORTHOPAEDIC SURGERY

## 2021-02-25 PROCEDURE — 3079F PR MOST RECENT DIASTOLIC BLOOD PRESSURE 80-89 MM HG: ICD-10-PCS | Mod: CPTII,S$GLB,, | Performed by: ORTHOPAEDIC SURGERY

## 2021-02-25 PROCEDURE — 3079F DIAST BP 80-89 MM HG: CPT | Mod: CPTII,S$GLB,, | Performed by: ORTHOPAEDIC SURGERY

## 2021-02-25 PROCEDURE — 3077F PR MOST RECENT SYSTOLIC BLOOD PRESSURE >= 140 MM HG: ICD-10-PCS | Mod: CPTII,S$GLB,, | Performed by: ORTHOPAEDIC SURGERY

## 2021-02-25 PROCEDURE — 73564 X-RAY EXAM KNEE 4 OR MORE: CPT | Mod: 26,RT,, | Performed by: RADIOLOGY

## 2021-02-25 PROCEDURE — 73564 X-RAY EXAM KNEE 4 OR MORE: CPT | Mod: TC,RT

## 2021-02-25 PROCEDURE — 73650 X-RAY EXAM OF HEEL: CPT | Mod: 26,LT,, | Performed by: RADIOLOGY

## 2021-02-25 PROCEDURE — 73564 XR KNEE ORTHO RIGHT WITH FLEXION: ICD-10-PCS | Mod: 26,RT,, | Performed by: RADIOLOGY

## 2021-02-25 PROCEDURE — 73562 XR KNEE ORTHO RIGHT WITH FLEXION: ICD-10-PCS | Mod: 26,LT,, | Performed by: RADIOLOGY

## 2021-02-25 PROCEDURE — 99999 PR PBB SHADOW E&M-EST. PATIENT-LVL IV: CPT | Mod: PBBFAC,,, | Performed by: ORTHOPAEDIC SURGERY

## 2021-02-25 PROCEDURE — 73650 X-RAY EXAM OF HEEL: CPT | Mod: TC,LT

## 2021-02-25 PROCEDURE — 1126F PR PAIN SEVERITY QUANTIFIED, NO PAIN PRESENT: ICD-10-PCS | Mod: S$GLB,,, | Performed by: ORTHOPAEDIC SURGERY

## 2021-02-25 PROCEDURE — 99213 PR OFFICE/OUTPT VISIT, EST, LEVL III, 20-29 MIN: ICD-10-PCS | Mod: S$GLB,,, | Performed by: ORTHOPAEDIC SURGERY

## 2021-02-25 PROCEDURE — 73562 X-RAY EXAM OF KNEE 3: CPT | Mod: 26,LT,, | Performed by: RADIOLOGY

## 2021-02-25 PROCEDURE — 99999 PR PBB SHADOW E&M-EST. PATIENT-LVL IV: ICD-10-PCS | Mod: PBBFAC,,, | Performed by: ORTHOPAEDIC SURGERY

## 2021-02-25 PROCEDURE — 73650 XR CALCANEUS 2 VIEW LEFT: ICD-10-PCS | Mod: 26,LT,, | Performed by: RADIOLOGY

## 2021-02-25 PROCEDURE — 3008F BODY MASS INDEX DOCD: CPT | Mod: CPTII,S$GLB,, | Performed by: ORTHOPAEDIC SURGERY

## 2021-02-25 PROCEDURE — 1126F AMNT PAIN NOTED NONE PRSNT: CPT | Mod: S$GLB,,, | Performed by: ORTHOPAEDIC SURGERY

## 2021-02-25 PROCEDURE — 99213 OFFICE O/P EST LOW 20 MIN: CPT | Mod: S$GLB,,, | Performed by: ORTHOPAEDIC SURGERY

## 2021-02-25 PROCEDURE — 3008F PR BODY MASS INDEX (BMI) DOCUMENTED: ICD-10-PCS | Mod: CPTII,S$GLB,, | Performed by: ORTHOPAEDIC SURGERY

## 2021-02-25 RX ORDER — METHYLPREDNISOLONE 4 MG/1
TABLET ORAL
Qty: 1 PACKAGE | Refills: 0 | Status: SHIPPED | OUTPATIENT
Start: 2021-02-25 | End: 2021-09-14 | Stop reason: SDUPTHER

## 2021-02-25 RX ORDER — METHYLPREDNISOLONE 4 MG/1
TABLET ORAL
Qty: 1 PACKAGE | Refills: 0 | Status: SHIPPED | OUTPATIENT
Start: 2021-02-25 | End: 2021-06-22 | Stop reason: SDUPTHER

## 2021-03-02 ENCOUNTER — PATIENT MESSAGE (OUTPATIENT)
Dept: ORTHOPEDICS | Facility: CLINIC | Age: 61
End: 2021-03-02

## 2021-03-02 DIAGNOSIS — M48.062 SPINAL STENOSIS OF LUMBAR REGION WITH NEUROGENIC CLAUDICATION: ICD-10-CM

## 2021-03-02 DIAGNOSIS — M43.16 SPONDYLOLISTHESIS OF LUMBAR REGION: Primary | ICD-10-CM

## 2021-03-03 ENCOUNTER — TELEPHONE (OUTPATIENT)
Dept: PAIN MEDICINE | Facility: OTHER | Age: 61
End: 2021-03-03

## 2021-03-05 ENCOUNTER — TELEPHONE (OUTPATIENT)
Dept: PAIN MEDICINE | Facility: OTHER | Age: 61
End: 2021-03-05

## 2021-03-05 ENCOUNTER — PATIENT MESSAGE (OUTPATIENT)
Dept: PAIN MEDICINE | Facility: OTHER | Age: 61
End: 2021-03-05

## 2021-03-05 DIAGNOSIS — M43.16 SPONDYLOLISTHESIS OF LUMBAR REGION: Primary | ICD-10-CM

## 2021-03-08 DIAGNOSIS — Z12.11 SPECIAL SCREENING FOR MALIGNANT NEOPLASM OF COLON: Primary | ICD-10-CM

## 2021-03-15 ENCOUNTER — PATIENT MESSAGE (OUTPATIENT)
Dept: PAIN MEDICINE | Facility: OTHER | Age: 61
End: 2021-03-15

## 2021-03-16 DIAGNOSIS — M43.16 SPONDYLOLISTHESIS OF LUMBAR REGION: Primary | ICD-10-CM

## 2021-03-22 ENCOUNTER — HOSPITAL ENCOUNTER (OUTPATIENT)
Facility: OTHER | Age: 61
Discharge: HOME | End: 2021-03-22
Attending: ANESTHESIOLOGY | Admitting: ANESTHESIOLOGY
Payer: COMMERCIAL

## 2021-03-22 VITALS
WEIGHT: 219 LBS | RESPIRATION RATE: 14 BRPM | HEART RATE: 72 BPM | DIASTOLIC BLOOD PRESSURE: 79 MMHG | OXYGEN SATURATION: 98 % | BODY MASS INDEX: 33.19 KG/M2 | SYSTOLIC BLOOD PRESSURE: 131 MMHG | HEIGHT: 68 IN

## 2021-03-22 DIAGNOSIS — M54.16 LUMBAR RADICULOPATHY: ICD-10-CM

## 2021-03-22 DIAGNOSIS — G89.29 CHRONIC PAIN: ICD-10-CM

## 2021-03-22 DIAGNOSIS — M51.36 DDD (DEGENERATIVE DISC DISEASE), LUMBAR: Primary | ICD-10-CM

## 2021-03-22 PROCEDURE — 25500020 PHARM REV CODE 255: Performed by: ANESTHESIOLOGY

## 2021-03-22 PROCEDURE — 64483 PR EPIDURAL INJ, ANES/STEROID, TRANSFORAMINAL, LUMB/SACR, SNGL LEVL: ICD-10-PCS | Mod: 50,,, | Performed by: ANESTHESIOLOGY

## 2021-03-22 PROCEDURE — 25000003 PHARM REV CODE 250: Performed by: ANESTHESIOLOGY

## 2021-03-22 PROCEDURE — 64483 NJX AA&/STRD TFRM EPI L/S 1: CPT | Mod: 50,,, | Performed by: ANESTHESIOLOGY

## 2021-03-22 PROCEDURE — A4216 STERILE WATER/SALINE, 10 ML: HCPCS | Performed by: ANESTHESIOLOGY

## 2021-03-22 PROCEDURE — 63600175 PHARM REV CODE 636 W HCPCS: Performed by: ANESTHESIOLOGY

## 2021-03-22 PROCEDURE — 64483 NJX AA&/STRD TFRM EPI L/S 1: CPT | Mod: 50 | Performed by: ANESTHESIOLOGY

## 2021-03-22 RX ORDER — SODIUM CHLORIDE 9 MG/ML
INJECTION, SOLUTION INTRAMUSCULAR; INTRAVENOUS; SUBCUTANEOUS
Status: DISCONTINUED | OUTPATIENT
Start: 2021-03-22 | End: 2021-03-22 | Stop reason: HOSPADM

## 2021-03-22 RX ORDER — LIDOCAINE HYDROCHLORIDE 10 MG/ML
INJECTION INFILTRATION; PERINEURAL
Status: DISCONTINUED | OUTPATIENT
Start: 2021-03-22 | End: 2021-03-22 | Stop reason: HOSPADM

## 2021-03-22 RX ORDER — SODIUM CHLORIDE 9 MG/ML
INJECTION, SOLUTION INTRAVENOUS CONTINUOUS
Status: DISCONTINUED | OUTPATIENT
Start: 2021-03-22 | End: 2021-03-22 | Stop reason: HOSPADM

## 2021-03-22 RX ORDER — FENTANYL CITRATE 50 UG/ML
INJECTION, SOLUTION INTRAMUSCULAR; INTRAVENOUS
Status: DISCONTINUED | OUTPATIENT
Start: 2021-03-22 | End: 2021-03-22 | Stop reason: HOSPADM

## 2021-03-22 RX ORDER — LIDOCAINE HYDROCHLORIDE 10 MG/ML
INJECTION, SOLUTION EPIDURAL; INFILTRATION; INTRACAUDAL; PERINEURAL
Status: DISCONTINUED | OUTPATIENT
Start: 2021-03-22 | End: 2021-03-22 | Stop reason: HOSPADM

## 2021-03-22 RX ORDER — MIDAZOLAM HYDROCHLORIDE 1 MG/ML
INJECTION INTRAMUSCULAR; INTRAVENOUS
Status: DISCONTINUED | OUTPATIENT
Start: 2021-03-22 | End: 2021-03-22 | Stop reason: HOSPADM

## 2021-03-22 RX ORDER — DEXAMETHASONE SODIUM PHOSPHATE 100 MG/10ML
INJECTION INTRAMUSCULAR; INTRAVENOUS
Status: DISCONTINUED | OUTPATIENT
Start: 2021-03-22 | End: 2021-03-22 | Stop reason: HOSPADM

## 2021-03-23 ENCOUNTER — PATIENT MESSAGE (OUTPATIENT)
Dept: INTERNAL MEDICINE | Facility: CLINIC | Age: 61
End: 2021-03-23

## 2021-03-23 RX ORDER — ZOLPIDEM TARTRATE 5 MG/1
5 TABLET ORAL NIGHTLY PRN
Qty: 30 TABLET | Refills: 1 | Status: SHIPPED | OUTPATIENT
Start: 2021-03-23 | End: 2021-11-09 | Stop reason: SDUPTHER

## 2021-04-05 ENCOUNTER — PATIENT MESSAGE (OUTPATIENT)
Dept: ADMINISTRATIVE | Facility: HOSPITAL | Age: 61
End: 2021-04-05

## 2021-04-09 ENCOUNTER — TELEPHONE (OUTPATIENT)
Dept: SPORTS MEDICINE | Facility: CLINIC | Age: 61
End: 2021-04-09

## 2021-04-22 ENCOUNTER — PATIENT MESSAGE (OUTPATIENT)
Dept: SPORTS MEDICINE | Facility: CLINIC | Age: 61
End: 2021-04-22

## 2021-04-23 ENCOUNTER — PATIENT OUTREACH (OUTPATIENT)
Dept: ADMINISTRATIVE | Facility: OTHER | Age: 61
End: 2021-04-23

## 2021-04-26 ENCOUNTER — OFFICE VISIT (OUTPATIENT)
Dept: SPORTS MEDICINE | Facility: CLINIC | Age: 61
End: 2021-04-26
Payer: COMMERCIAL

## 2021-04-26 ENCOUNTER — TELEPHONE (OUTPATIENT)
Dept: RADIOLOGY | Facility: HOSPITAL | Age: 61
End: 2021-04-26

## 2021-04-26 ENCOUNTER — HOSPITAL ENCOUNTER (OUTPATIENT)
Dept: RADIOLOGY | Facility: HOSPITAL | Age: 61
Discharge: HOME | End: 2021-04-26
Attending: ORTHOPAEDIC SURGERY
Payer: COMMERCIAL

## 2021-04-26 DIAGNOSIS — Z96.651 S/P TOTAL KNEE REPLACEMENT USING CEMENT, RIGHT: ICD-10-CM

## 2021-04-26 DIAGNOSIS — M25.521 RIGHT ELBOW PAIN: Primary | ICD-10-CM

## 2021-04-26 DIAGNOSIS — M77.11 LATERAL EPICONDYLITIS OF RIGHT ELBOW: Primary | ICD-10-CM

## 2021-04-26 DIAGNOSIS — M25.521 RIGHT ELBOW PAIN: ICD-10-CM

## 2021-04-26 DIAGNOSIS — Z96.652 S/P TOTAL KNEE REPLACEMENT USING CEMENT, LEFT: ICD-10-CM

## 2021-04-26 PROCEDURE — 73080 X-RAY EXAM OF ELBOW: CPT | Mod: 26,RT,, | Performed by: RADIOLOGY

## 2021-04-26 PROCEDURE — 73080 XR ELBOW COMPLETE 3 VIEW RIGHT: ICD-10-PCS | Mod: 26,RT,, | Performed by: RADIOLOGY

## 2021-04-26 PROCEDURE — 99999 PR PBB SHADOW E&M-EST. PATIENT-LVL I: ICD-10-PCS | Mod: PBBFAC,,, | Performed by: ORTHOPAEDIC SURGERY

## 2021-04-26 PROCEDURE — 99999 PR PBB SHADOW E&M-EST. PATIENT-LVL I: CPT | Mod: PBBFAC,,, | Performed by: ORTHOPAEDIC SURGERY

## 2021-04-26 PROCEDURE — 20605 DRAIN/INJ JOINT/BURSA W/O US: CPT | Mod: RT,S$GLB,, | Performed by: ORTHOPAEDIC SURGERY

## 2021-04-26 PROCEDURE — 99213 PR OFFICE/OUTPT VISIT, EST, LEVL III, 20-29 MIN: ICD-10-PCS | Mod: 25,S$GLB,, | Performed by: ORTHOPAEDIC SURGERY

## 2021-04-26 PROCEDURE — 99213 OFFICE O/P EST LOW 20 MIN: CPT | Mod: 25,S$GLB,, | Performed by: ORTHOPAEDIC SURGERY

## 2021-04-26 PROCEDURE — 20605 INTERMEDIATE JOINT ASPIRATION/INJECTION: R ELBOW: ICD-10-PCS | Mod: RT,S$GLB,, | Performed by: ORTHOPAEDIC SURGERY

## 2021-04-26 PROCEDURE — 73080 X-RAY EXAM OF ELBOW: CPT | Mod: TC,PN,RT

## 2021-04-26 RX ORDER — TRIAMCINOLONE ACETONIDE 40 MG/ML
40 INJECTION, SUSPENSION INTRA-ARTICULAR; INTRAMUSCULAR
Status: DISCONTINUED | OUTPATIENT
Start: 2021-04-26 | End: 2021-04-26 | Stop reason: HOSPADM

## 2021-04-26 RX ORDER — DICLOFENAC SODIUM 10 MG/G
2 GEL TOPICAL 2 TIMES DAILY
Qty: 1 TUBE | Refills: 0 | Status: SHIPPED | OUTPATIENT
Start: 2021-04-26 | End: 2022-11-30

## 2021-04-26 RX ADMIN — TRIAMCINOLONE ACETONIDE 40 MG: 40 INJECTION, SUSPENSION INTRA-ARTICULAR; INTRAMUSCULAR at 03:04

## 2021-04-29 ENCOUNTER — PATIENT MESSAGE (OUTPATIENT)
Dept: ENDOSCOPY | Facility: HOSPITAL | Age: 61
End: 2021-04-29

## 2021-04-29 DIAGNOSIS — Z01.818 PRE-OP TESTING: ICD-10-CM

## 2021-04-29 DIAGNOSIS — Z12.11 COLON CANCER SCREENING: Primary | ICD-10-CM

## 2021-04-29 RX ORDER — SODIUM, POTASSIUM,MAG SULFATES 17.5-3.13G
1 SOLUTION, RECONSTITUTED, ORAL ORAL DAILY
Qty: 1 KIT | Refills: 0 | Status: SHIPPED | OUTPATIENT
Start: 2021-04-29 | End: 2021-06-13

## 2021-05-07 ENCOUNTER — PATIENT MESSAGE (OUTPATIENT)
Dept: PAIN MEDICINE | Facility: CLINIC | Age: 61
End: 2021-05-07

## 2021-05-07 DIAGNOSIS — M43.16 SPONDYLOLISTHESIS OF LUMBAR REGION: Primary | ICD-10-CM

## 2021-05-10 ENCOUNTER — TELEPHONE (OUTPATIENT)
Dept: PAIN MEDICINE | Facility: OTHER | Age: 61
End: 2021-05-10

## 2021-05-10 ENCOUNTER — HOSPITAL ENCOUNTER (OUTPATIENT)
Facility: OTHER | Age: 61
Discharge: HOME | End: 2021-05-10
Attending: ANESTHESIOLOGY | Admitting: ANESTHESIOLOGY
Payer: COMMERCIAL

## 2021-05-10 VITALS
DIASTOLIC BLOOD PRESSURE: 52 MMHG | OXYGEN SATURATION: 96 % | HEIGHT: 69 IN | WEIGHT: 219 LBS | TEMPERATURE: 98 F | SYSTOLIC BLOOD PRESSURE: 126 MMHG | BODY MASS INDEX: 32.44 KG/M2 | HEART RATE: 70 BPM | RESPIRATION RATE: 16 BRPM

## 2021-05-10 DIAGNOSIS — M54.17 LUMBOSACRAL RADICULOPATHY: ICD-10-CM

## 2021-05-10 DIAGNOSIS — M51.36 DDD (DEGENERATIVE DISC DISEASE), LUMBAR: Primary | ICD-10-CM

## 2021-05-10 DIAGNOSIS — M43.16 SPONDYLOLISTHESIS OF LUMBAR REGION: Primary | ICD-10-CM

## 2021-05-10 PROCEDURE — 64483 PR EPIDURAL INJ, ANES/STEROID, TRANSFORAMINAL, LUMB/SACR, SNGL LEVL: ICD-10-PCS | Mod: RT,,, | Performed by: ANESTHESIOLOGY

## 2021-05-10 PROCEDURE — 25500020 PHARM REV CODE 255: Performed by: ANESTHESIOLOGY

## 2021-05-10 PROCEDURE — 64484 PRA INJECT ANES/STEROID FORAMEN LUMBAR/SACRAL W IMG GUIDE ,EA ADD LEVEL: ICD-10-PCS | Mod: RT,,, | Performed by: ANESTHESIOLOGY

## 2021-05-10 PROCEDURE — 64484 NJX AA&/STRD TFRM EPI L/S EA: CPT | Mod: RT,,, | Performed by: ANESTHESIOLOGY

## 2021-05-10 PROCEDURE — 25000003 PHARM REV CODE 250: Performed by: ANESTHESIOLOGY

## 2021-05-10 PROCEDURE — 64484 NJX AA&/STRD TFRM EPI L/S EA: CPT | Mod: RT | Performed by: ANESTHESIOLOGY

## 2021-05-10 PROCEDURE — 64483 NJX AA&/STRD TFRM EPI L/S 1: CPT | Mod: RT | Performed by: ANESTHESIOLOGY

## 2021-05-10 PROCEDURE — 64483 NJX AA&/STRD TFRM EPI L/S 1: CPT | Mod: RT,,, | Performed by: ANESTHESIOLOGY

## 2021-05-10 PROCEDURE — 63600175 PHARM REV CODE 636 W HCPCS: Performed by: ANESTHESIOLOGY

## 2021-05-10 RX ORDER — FENTANYL CITRATE 50 UG/ML
INJECTION, SOLUTION INTRAMUSCULAR; INTRAVENOUS
Status: DISCONTINUED | OUTPATIENT
Start: 2021-05-10 | End: 2021-05-10 | Stop reason: HOSPADM

## 2021-05-10 RX ORDER — MIDAZOLAM HYDROCHLORIDE 1 MG/ML
INJECTION INTRAMUSCULAR; INTRAVENOUS
Status: DISCONTINUED | OUTPATIENT
Start: 2021-05-10 | End: 2021-05-10 | Stop reason: HOSPADM

## 2021-05-10 RX ORDER — LIDOCAINE HYDROCHLORIDE 10 MG/ML
INJECTION, SOLUTION EPIDURAL; INFILTRATION; INTRACAUDAL; PERINEURAL
Status: DISCONTINUED | OUTPATIENT
Start: 2021-05-10 | End: 2021-05-10 | Stop reason: HOSPADM

## 2021-05-10 RX ORDER — DEXAMETHASONE SODIUM PHOSPHATE 100 MG/10ML
INJECTION INTRAMUSCULAR; INTRAVENOUS
Status: DISCONTINUED | OUTPATIENT
Start: 2021-05-10 | End: 2021-05-10 | Stop reason: HOSPADM

## 2021-05-10 RX ORDER — LIDOCAINE HYDROCHLORIDE 10 MG/ML
INJECTION INFILTRATION; PERINEURAL
Status: DISCONTINUED | OUTPATIENT
Start: 2021-05-10 | End: 2021-05-10 | Stop reason: HOSPADM

## 2021-05-14 ENCOUNTER — PATIENT MESSAGE (OUTPATIENT)
Dept: ORTHOPEDICS | Facility: CLINIC | Age: 61
End: 2021-05-14

## 2021-05-14 DIAGNOSIS — M43.16 SPONDYLOLISTHESIS OF LUMBAR REGION: Primary | ICD-10-CM

## 2021-05-18 ENCOUNTER — PATIENT MESSAGE (OUTPATIENT)
Dept: ORTHOPEDICS | Facility: CLINIC | Age: 61
End: 2021-05-18

## 2021-05-19 RX ORDER — METHOCARBAMOL 750 MG/1
750 TABLET, FILM COATED ORAL 3 TIMES DAILY PRN
Qty: 30 TABLET | Refills: 0 | Status: SHIPPED | OUTPATIENT
Start: 2021-05-19 | End: 2021-05-24 | Stop reason: SDUPTHER

## 2021-05-20 ENCOUNTER — PATIENT MESSAGE (OUTPATIENT)
Dept: ORTHOPEDICS | Facility: CLINIC | Age: 61
End: 2021-05-20

## 2021-05-20 ENCOUNTER — PATIENT MESSAGE (OUTPATIENT)
Dept: SPORTS MEDICINE | Facility: CLINIC | Age: 61
End: 2021-05-20

## 2021-05-20 ENCOUNTER — PATIENT MESSAGE (OUTPATIENT)
Dept: PAIN MEDICINE | Facility: CLINIC | Age: 61
End: 2021-05-20

## 2021-05-20 ENCOUNTER — TELEPHONE (OUTPATIENT)
Dept: SPORTS MEDICINE | Facility: CLINIC | Age: 61
End: 2021-05-20

## 2021-05-20 DIAGNOSIS — M54.50 ACUTE LOW BACK PAIN WITHOUT SCIATICA, UNSPECIFIED BACK PAIN LATERALITY: Primary | ICD-10-CM

## 2021-05-21 ENCOUNTER — TELEPHONE (OUTPATIENT)
Dept: SPORTS MEDICINE | Facility: CLINIC | Age: 61
End: 2021-05-21

## 2021-05-24 ENCOUNTER — OFFICE VISIT (OUTPATIENT)
Dept: ORTHOPEDICS | Facility: CLINIC | Age: 61
End: 2021-05-24
Payer: COMMERCIAL

## 2021-05-24 ENCOUNTER — PATIENT MESSAGE (OUTPATIENT)
Dept: SPORTS MEDICINE | Facility: CLINIC | Age: 61
End: 2021-05-24

## 2021-05-24 ENCOUNTER — PATIENT OUTREACH (OUTPATIENT)
Dept: ADMINISTRATIVE | Facility: OTHER | Age: 61
End: 2021-05-24

## 2021-05-24 VITALS — HEIGHT: 69 IN | BODY MASS INDEX: 32.65 KG/M2 | WEIGHT: 220.44 LBS

## 2021-05-24 DIAGNOSIS — M43.16 SPONDYLOLISTHESIS OF LUMBAR REGION: Primary | ICD-10-CM

## 2021-05-24 DIAGNOSIS — M51.36 DDD (DEGENERATIVE DISC DISEASE), LUMBAR: ICD-10-CM

## 2021-05-24 DIAGNOSIS — M48.062 SPINAL STENOSIS OF LUMBAR REGION WITH NEUROGENIC CLAUDICATION: ICD-10-CM

## 2021-05-24 PROCEDURE — 3008F BODY MASS INDEX DOCD: CPT | Mod: CPTII,S$GLB,, | Performed by: PHYSICIAN ASSISTANT

## 2021-05-24 PROCEDURE — 3008F PR BODY MASS INDEX (BMI) DOCUMENTED: ICD-10-PCS | Mod: CPTII,S$GLB,, | Performed by: PHYSICIAN ASSISTANT

## 2021-05-24 PROCEDURE — 99999 PR PBB SHADOW E&M-EST. PATIENT-LVL III: CPT | Mod: PBBFAC,,, | Performed by: PHYSICIAN ASSISTANT

## 2021-05-24 PROCEDURE — 99999 PR PBB SHADOW E&M-EST. PATIENT-LVL III: ICD-10-PCS | Mod: PBBFAC,,, | Performed by: PHYSICIAN ASSISTANT

## 2021-05-24 PROCEDURE — 1125F PR PAIN SEVERITY QUANTIFIED, PAIN PRESENT: ICD-10-PCS | Mod: S$GLB,,, | Performed by: PHYSICIAN ASSISTANT

## 2021-05-24 PROCEDURE — 1125F AMNT PAIN NOTED PAIN PRSNT: CPT | Mod: S$GLB,,, | Performed by: PHYSICIAN ASSISTANT

## 2021-05-24 PROCEDURE — 99213 PR OFFICE/OUTPT VISIT, EST, LEVL III, 20-29 MIN: ICD-10-PCS | Mod: S$GLB,,, | Performed by: PHYSICIAN ASSISTANT

## 2021-05-24 PROCEDURE — 99213 OFFICE O/P EST LOW 20 MIN: CPT | Mod: S$GLB,,, | Performed by: PHYSICIAN ASSISTANT

## 2021-05-24 RX ORDER — METHOCARBAMOL 750 MG/1
750 TABLET, FILM COATED ORAL 3 TIMES DAILY PRN
Qty: 60 TABLET | Refills: 0 | Status: SHIPPED | OUTPATIENT
Start: 2021-05-24 | End: 2021-07-26 | Stop reason: SDUPTHER

## 2021-05-25 ENCOUNTER — HOSPITAL ENCOUNTER (OUTPATIENT)
Dept: RADIOLOGY | Facility: HOSPITAL | Age: 61
Discharge: HOME | End: 2021-05-25
Attending: PHYSICIAN ASSISTANT
Payer: COMMERCIAL

## 2021-05-25 ENCOUNTER — OFFICE VISIT (OUTPATIENT)
Dept: SPORTS MEDICINE | Facility: CLINIC | Age: 61
End: 2021-05-25
Payer: COMMERCIAL

## 2021-05-25 VITALS
DIASTOLIC BLOOD PRESSURE: 78 MMHG | HEIGHT: 69 IN | WEIGHT: 220 LBS | BODY MASS INDEX: 32.58 KG/M2 | SYSTOLIC BLOOD PRESSURE: 132 MMHG

## 2021-05-25 DIAGNOSIS — M25.561 PAIN IN BOTH KNEES, UNSPECIFIED CHRONICITY: ICD-10-CM

## 2021-05-25 DIAGNOSIS — Z96.651 S/P TOTAL KNEE REPLACEMENT USING CEMENT, RIGHT: ICD-10-CM

## 2021-05-25 DIAGNOSIS — Z96.652 S/P TOTAL KNEE REPLACEMENT USING CEMENT, LEFT: Primary | ICD-10-CM

## 2021-05-25 DIAGNOSIS — M25.562 PAIN IN BOTH KNEES, UNSPECIFIED CHRONICITY: ICD-10-CM

## 2021-05-25 PROCEDURE — 73564 X-RAY EXAM KNEE 4 OR MORE: CPT | Mod: 26,50,, | Performed by: RADIOLOGY

## 2021-05-25 PROCEDURE — 3008F BODY MASS INDEX DOCD: CPT | Mod: CPTII,S$GLB,, | Performed by: PHYSICIAN ASSISTANT

## 2021-05-25 PROCEDURE — 3008F PR BODY MASS INDEX (BMI) DOCUMENTED: ICD-10-PCS | Mod: CPTII,S$GLB,, | Performed by: PHYSICIAN ASSISTANT

## 2021-05-25 PROCEDURE — 1126F AMNT PAIN NOTED NONE PRSNT: CPT | Mod: S$GLB,,, | Performed by: PHYSICIAN ASSISTANT

## 2021-05-25 PROCEDURE — 99999 PR PBB SHADOW E&M-EST. PATIENT-LVL III: ICD-10-PCS | Mod: PBBFAC,,, | Performed by: PHYSICIAN ASSISTANT

## 2021-05-25 PROCEDURE — 99999 PR PBB SHADOW E&M-EST. PATIENT-LVL III: CPT | Mod: PBBFAC,,, | Performed by: PHYSICIAN ASSISTANT

## 2021-05-25 PROCEDURE — 99213 PR OFFICE/OUTPT VISIT, EST, LEVL III, 20-29 MIN: ICD-10-PCS | Mod: S$GLB,,, | Performed by: PHYSICIAN ASSISTANT

## 2021-05-25 PROCEDURE — 73564 X-RAY EXAM KNEE 4 OR MORE: CPT | Mod: TC,50

## 2021-05-25 PROCEDURE — 73564 XR KNEE ORTHO BILAT WITH FLEXION: ICD-10-PCS | Mod: 26,50,, | Performed by: RADIOLOGY

## 2021-05-25 PROCEDURE — 99213 OFFICE O/P EST LOW 20 MIN: CPT | Mod: S$GLB,,, | Performed by: PHYSICIAN ASSISTANT

## 2021-05-25 PROCEDURE — 1126F PR PAIN SEVERITY QUANTIFIED, NO PAIN PRESENT: ICD-10-PCS | Mod: S$GLB,,, | Performed by: PHYSICIAN ASSISTANT

## 2021-05-26 ENCOUNTER — CLINICAL SUPPORT (OUTPATIENT)
Dept: REHABILITATION | Facility: OTHER | Age: 61
End: 2021-05-26
Attending: PHYSICIAN ASSISTANT
Payer: COMMERCIAL

## 2021-05-26 DIAGNOSIS — M53.86 DECREASED RANGE OF MOTION OF LUMBAR SPINE: ICD-10-CM

## 2021-05-26 DIAGNOSIS — R29.898 DECREASED STRENGTH OF TRUNK AND BACK: ICD-10-CM

## 2021-05-26 DIAGNOSIS — M43.16 SPONDYLOLISTHESIS OF LUMBAR REGION: ICD-10-CM

## 2021-05-26 PROCEDURE — 97750 PHYSICAL PERFORMANCE TEST: CPT | Mod: 32

## 2021-06-02 RX ORDER — KETOCONAZOLE 20 MG/G
CREAM TOPICAL
Qty: 30 G | Refills: 1 | Status: SHIPPED | OUTPATIENT
Start: 2021-06-02 | End: 2021-11-09

## 2021-06-06 ENCOUNTER — PATIENT MESSAGE (OUTPATIENT)
Dept: ORTHOPEDICS | Facility: CLINIC | Age: 61
End: 2021-06-06

## 2021-06-06 DIAGNOSIS — N95.2 ATROPHIC VAGINITIS: ICD-10-CM

## 2021-06-07 RX ORDER — ALPRAZOLAM 0.25 MG/1
TABLET ORAL
Qty: 30 TABLET | Refills: 2 | Status: SHIPPED | OUTPATIENT
Start: 2021-06-07 | End: 2021-11-09 | Stop reason: SDUPTHER

## 2021-06-07 RX ORDER — CELECOXIB 200 MG/1
200 CAPSULE ORAL DAILY
Qty: 30 CAPSULE | Refills: 0 | Status: SHIPPED | OUTPATIENT
Start: 2021-06-07 | End: 2021-07-26 | Stop reason: SDUPTHER

## 2021-06-07 RX ORDER — ESTRADIOL 0.1 MG/G
2 CREAM VAGINAL DAILY
Qty: 42.5 G | Refills: 1 | Status: SHIPPED | OUTPATIENT
Start: 2021-06-07 | End: 2022-08-04 | Stop reason: SDUPTHER

## 2021-06-07 RX ORDER — CELECOXIB 200 MG/1
200 CAPSULE ORAL DAILY
COMMUNITY
Start: 2021-05-09 | End: 2021-06-07 | Stop reason: SDUPTHER

## 2021-06-08 ENCOUNTER — CLINICAL SUPPORT (OUTPATIENT)
Dept: REHABILITATION | Facility: OTHER | Age: 61
End: 2021-06-08
Attending: PHYSICIAN ASSISTANT
Payer: COMMERCIAL

## 2021-06-08 DIAGNOSIS — R29.898 DECREASED STRENGTH OF TRUNK AND BACK: ICD-10-CM

## 2021-06-08 DIAGNOSIS — M53.86 DECREASED RANGE OF MOTION OF LUMBAR SPINE: Primary | ICD-10-CM

## 2021-06-08 PROCEDURE — 97110 THERAPEUTIC EXERCISES: CPT | Mod: CQ

## 2021-06-10 ENCOUNTER — CLINICAL SUPPORT (OUTPATIENT)
Dept: REHABILITATION | Facility: OTHER | Age: 61
End: 2021-06-10
Attending: PHYSICIAN ASSISTANT
Payer: COMMERCIAL

## 2021-06-10 DIAGNOSIS — R29.898 DECREASED STRENGTH OF TRUNK AND BACK: ICD-10-CM

## 2021-06-10 DIAGNOSIS — M53.86 DECREASED RANGE OF MOTION OF LUMBAR SPINE: Primary | ICD-10-CM

## 2021-06-10 PROCEDURE — 97110 THERAPEUTIC EXERCISES: CPT | Mod: CQ

## 2021-06-14 ENCOUNTER — ANESTHESIA (OUTPATIENT)
Dept: ENDOSCOPY | Facility: HOSPITAL | Age: 61
End: 2021-06-14
Payer: COMMERCIAL

## 2021-06-14 ENCOUNTER — ANESTHESIA EVENT (OUTPATIENT)
Dept: ENDOSCOPY | Facility: HOSPITAL | Age: 61
End: 2021-06-14
Payer: COMMERCIAL

## 2021-06-14 ENCOUNTER — HOSPITAL ENCOUNTER (OUTPATIENT)
Facility: HOSPITAL | Age: 61
Discharge: HOME | End: 2021-06-14
Attending: INTERNAL MEDICINE | Admitting: INTERNAL MEDICINE
Payer: COMMERCIAL

## 2021-06-14 VITALS
RESPIRATION RATE: 17 BRPM | WEIGHT: 215 LBS | HEIGHT: 68 IN | DIASTOLIC BLOOD PRESSURE: 73 MMHG | TEMPERATURE: 97 F | HEART RATE: 62 BPM | BODY MASS INDEX: 32.58 KG/M2 | SYSTOLIC BLOOD PRESSURE: 113 MMHG | OXYGEN SATURATION: 99 %

## 2021-06-14 DIAGNOSIS — Z12.11 SCREEN FOR COLON CANCER: ICD-10-CM

## 2021-06-14 DIAGNOSIS — Z80.0 FAMILY HISTORY OF ESOPHAGEAL CANCER: Primary | ICD-10-CM

## 2021-06-14 PROCEDURE — 45380 COLONOSCOPY AND BIOPSY: CPT | Mod: 33,,, | Performed by: INTERNAL MEDICINE

## 2021-06-14 PROCEDURE — E9220 PRA ENDO ANESTHESIA: ICD-10-PCS | Mod: 33,,, | Performed by: NURSE ANESTHETIST, CERTIFIED REGISTERED

## 2021-06-14 PROCEDURE — E9220 PRA ENDO ANESTHESIA: HCPCS | Mod: 33,,, | Performed by: NURSE ANESTHETIST, CERTIFIED REGISTERED

## 2021-06-14 PROCEDURE — 45380 PR COLONOSCOPY,BIOPSY: ICD-10-PCS | Mod: 33,,, | Performed by: INTERNAL MEDICINE

## 2021-06-14 PROCEDURE — 88305 TISSUE EXAM BY PATHOLOGIST: CPT | Mod: 26,,, | Performed by: PATHOLOGY

## 2021-06-14 PROCEDURE — 45380 COLONOSCOPY AND BIOPSY: CPT | Performed by: INTERNAL MEDICINE

## 2021-06-14 PROCEDURE — 88305 TISSUE EXAM BY PATHOLOGIST: CPT | Performed by: PATHOLOGY

## 2021-06-14 PROCEDURE — 27201012 HC FORCEPS, HOT/COLD, DISP: Performed by: INTERNAL MEDICINE

## 2021-06-14 PROCEDURE — 88305 TISSUE EXAM BY PATHOLOGIST: ICD-10-PCS | Mod: 26,,, | Performed by: PATHOLOGY

## 2021-06-14 PROCEDURE — 25000003 PHARM REV CODE 250: Performed by: INTERNAL MEDICINE

## 2021-06-14 PROCEDURE — 37000008 HC ANESTHESIA 1ST 15 MINUTES: Performed by: INTERNAL MEDICINE

## 2021-06-14 PROCEDURE — 63600175 PHARM REV CODE 636 W HCPCS: Performed by: NURSE ANESTHETIST, CERTIFIED REGISTERED

## 2021-06-14 PROCEDURE — 25000003 PHARM REV CODE 250: Performed by: NURSE ANESTHETIST, CERTIFIED REGISTERED

## 2021-06-14 PROCEDURE — 37000009 HC ANESTHESIA EA ADD 15 MINS: Performed by: INTERNAL MEDICINE

## 2021-06-14 RX ORDER — PROPOFOL 10 MG/ML
VIAL (ML) INTRAVENOUS
Status: DISCONTINUED | OUTPATIENT
Start: 2021-06-14 | End: 2021-06-14

## 2021-06-14 RX ORDER — PROPOFOL 10 MG/ML
VIAL (ML) INTRAVENOUS CONTINUOUS PRN
Status: DISCONTINUED | OUTPATIENT
Start: 2021-06-14 | End: 2021-06-14

## 2021-06-14 RX ORDER — LIDOCAINE HYDROCHLORIDE 20 MG/ML
INJECTION, SOLUTION EPIDURAL; INFILTRATION; INTRACAUDAL; PERINEURAL
Status: DISCONTINUED | OUTPATIENT
Start: 2021-06-14 | End: 2021-06-14

## 2021-06-14 RX ORDER — SODIUM CHLORIDE 9 MG/ML
INJECTION, SOLUTION INTRAVENOUS CONTINUOUS
Status: DISCONTINUED | OUTPATIENT
Start: 2021-06-14 | End: 2021-06-14 | Stop reason: HOSPADM

## 2021-06-14 RX ADMIN — SODIUM CHLORIDE: 0.9 INJECTION, SOLUTION INTRAVENOUS at 01:06

## 2021-06-14 RX ADMIN — LIDOCAINE HYDROCHLORIDE 100 MG: 20 INJECTION, SOLUTION EPIDURAL; INFILTRATION; INTRACAUDAL at 01:06

## 2021-06-14 RX ADMIN — PROPOFOL 60 MG: 10 INJECTION, EMULSION INTRAVENOUS at 01:06

## 2021-06-14 RX ADMIN — PROPOFOL 150 MCG/KG/MIN: 10 INJECTION, EMULSION INTRAVENOUS at 01:06

## 2021-06-16 ENCOUNTER — CLINICAL SUPPORT (OUTPATIENT)
Dept: REHABILITATION | Facility: OTHER | Age: 61
End: 2021-06-16
Attending: PHYSICIAN ASSISTANT
Payer: COMMERCIAL

## 2021-06-16 DIAGNOSIS — R29.898 DECREASED STRENGTH OF TRUNK AND BACK: ICD-10-CM

## 2021-06-16 DIAGNOSIS — M53.86 DECREASED RANGE OF MOTION OF LUMBAR SPINE: Primary | ICD-10-CM

## 2021-06-16 PROCEDURE — 97110 THERAPEUTIC EXERCISES: CPT | Mod: CQ

## 2021-06-18 ENCOUNTER — PATIENT MESSAGE (OUTPATIENT)
Dept: SPORTS MEDICINE | Facility: CLINIC | Age: 61
End: 2021-06-18

## 2021-06-18 LAB
FINAL PATHOLOGIC DIAGNOSIS: NORMAL
GROSS: NORMAL
Lab: NORMAL

## 2021-06-22 ENCOUNTER — TELEPHONE (OUTPATIENT)
Dept: SPORTS MEDICINE | Facility: CLINIC | Age: 61
End: 2021-06-22

## 2021-06-22 DIAGNOSIS — M77.11 LATERAL EPICONDYLITIS OF RIGHT ELBOW: Primary | ICD-10-CM

## 2021-06-22 DIAGNOSIS — M76.62 LEFT ACHILLES TENDINITIS: ICD-10-CM

## 2021-06-22 RX ORDER — METHYLPREDNISOLONE 4 MG/1
TABLET ORAL
Qty: 1 PACKAGE | Refills: 0 | Status: SHIPPED | OUTPATIENT
Start: 2021-06-22 | End: 2021-09-14

## 2021-06-23 ENCOUNTER — CLINICAL SUPPORT (OUTPATIENT)
Dept: REHABILITATION | Facility: OTHER | Age: 61
End: 2021-06-23
Attending: PHYSICIAN ASSISTANT
Payer: COMMERCIAL

## 2021-06-23 DIAGNOSIS — M53.86 DECREASED RANGE OF MOTION OF LUMBAR SPINE: Primary | ICD-10-CM

## 2021-06-23 DIAGNOSIS — R29.898 DECREASED STRENGTH OF TRUNK AND BACK: ICD-10-CM

## 2021-06-23 PROCEDURE — 97110 THERAPEUTIC EXERCISES: CPT

## 2021-06-25 ENCOUNTER — CLINICAL SUPPORT (OUTPATIENT)
Dept: REHABILITATION | Facility: OTHER | Age: 61
End: 2021-06-25
Attending: PHYSICIAN ASSISTANT
Payer: COMMERCIAL

## 2021-06-25 DIAGNOSIS — M53.86 DECREASED RANGE OF MOTION OF LUMBAR SPINE: Primary | ICD-10-CM

## 2021-06-25 DIAGNOSIS — R29.898 DECREASED STRENGTH OF TRUNK AND BACK: ICD-10-CM

## 2021-06-25 PROCEDURE — 97110 THERAPEUTIC EXERCISES: CPT

## 2021-06-28 ENCOUNTER — CLINICAL SUPPORT (OUTPATIENT)
Dept: REHABILITATION | Facility: OTHER | Age: 61
End: 2021-06-28
Attending: PHYSICIAN ASSISTANT
Payer: COMMERCIAL

## 2021-06-28 ENCOUNTER — PATIENT OUTREACH (OUTPATIENT)
Dept: ADMINISTRATIVE | Facility: OTHER | Age: 61
End: 2021-06-28

## 2021-06-28 DIAGNOSIS — M53.86 DECREASED RANGE OF MOTION OF LUMBAR SPINE: Primary | ICD-10-CM

## 2021-06-28 DIAGNOSIS — R29.898 DECREASED STRENGTH OF TRUNK AND BACK: ICD-10-CM

## 2021-06-28 PROCEDURE — 97110 THERAPEUTIC EXERCISES: CPT

## 2021-06-29 ENCOUNTER — OFFICE VISIT (OUTPATIENT)
Dept: SPORTS MEDICINE | Facility: CLINIC | Age: 61
End: 2021-06-29
Payer: COMMERCIAL

## 2021-06-29 VITALS
SYSTOLIC BLOOD PRESSURE: 128 MMHG | DIASTOLIC BLOOD PRESSURE: 84 MMHG | BODY MASS INDEX: 33.15 KG/M2 | WEIGHT: 218 LBS | HEART RATE: 72 BPM

## 2021-06-29 DIAGNOSIS — M77.11 LATERAL EPICONDYLITIS OF RIGHT ELBOW: Primary | ICD-10-CM

## 2021-06-29 DIAGNOSIS — M62.81 QUADRICEPS WEAKNESS: ICD-10-CM

## 2021-06-29 PROCEDURE — 99999 PR PBB SHADOW E&M-EST. PATIENT-LVL III: ICD-10-PCS | Mod: PBBFAC,,, | Performed by: ORTHOPAEDIC SURGERY

## 2021-06-29 PROCEDURE — 1125F AMNT PAIN NOTED PAIN PRSNT: CPT | Mod: S$GLB,,, | Performed by: ORTHOPAEDIC SURGERY

## 2021-06-29 PROCEDURE — 20605 DRAIN/INJ JOINT/BURSA W/O US: CPT | Mod: RT,S$GLB,, | Performed by: ORTHOPAEDIC SURGERY

## 2021-06-29 PROCEDURE — 99213 OFFICE O/P EST LOW 20 MIN: CPT | Mod: 25,S$GLB,, | Performed by: ORTHOPAEDIC SURGERY

## 2021-06-29 PROCEDURE — 99999 PR PBB SHADOW E&M-EST. PATIENT-LVL III: CPT | Mod: PBBFAC,,, | Performed by: ORTHOPAEDIC SURGERY

## 2021-06-29 PROCEDURE — 99213 PR OFFICE/OUTPT VISIT, EST, LEVL III, 20-29 MIN: ICD-10-PCS | Mod: 25,S$GLB,, | Performed by: ORTHOPAEDIC SURGERY

## 2021-06-29 PROCEDURE — 1125F PR PAIN SEVERITY QUANTIFIED, PAIN PRESENT: ICD-10-PCS | Mod: S$GLB,,, | Performed by: ORTHOPAEDIC SURGERY

## 2021-06-29 PROCEDURE — 3008F BODY MASS INDEX DOCD: CPT | Mod: CPTII,S$GLB,, | Performed by: ORTHOPAEDIC SURGERY

## 2021-06-29 PROCEDURE — 20605 INTERMEDIATE JOINT ASPIRATION/INJECTION: R ELBOW: ICD-10-PCS | Mod: RT,S$GLB,, | Performed by: ORTHOPAEDIC SURGERY

## 2021-06-29 PROCEDURE — 3008F PR BODY MASS INDEX (BMI) DOCUMENTED: ICD-10-PCS | Mod: CPTII,S$GLB,, | Performed by: ORTHOPAEDIC SURGERY

## 2021-06-29 RX ADMIN — TRIAMCINOLONE ACETONIDE 40 MG: 40 INJECTION, SUSPENSION INTRA-ARTICULAR; INTRAMUSCULAR at 09:06

## 2021-07-02 ENCOUNTER — CLINICAL SUPPORT (OUTPATIENT)
Dept: REHABILITATION | Facility: OTHER | Age: 61
End: 2021-07-02
Attending: PHYSICIAN ASSISTANT
Payer: COMMERCIAL

## 2021-07-02 DIAGNOSIS — R29.898 DECREASED STRENGTH OF TRUNK AND BACK: ICD-10-CM

## 2021-07-02 DIAGNOSIS — M53.86 DECREASED RANGE OF MOTION OF LUMBAR SPINE: Primary | ICD-10-CM

## 2021-07-02 PROCEDURE — 97110 THERAPEUTIC EXERCISES: CPT

## 2021-07-05 RX ORDER — TRIAMCINOLONE ACETONIDE 40 MG/ML
40 INJECTION, SUSPENSION INTRA-ARTICULAR; INTRAMUSCULAR
Status: DISCONTINUED | OUTPATIENT
Start: 2021-06-29 | End: 2021-07-05 | Stop reason: HOSPADM

## 2021-07-06 ENCOUNTER — CLINICAL SUPPORT (OUTPATIENT)
Dept: REHABILITATION | Facility: HOSPITAL | Age: 61
End: 2021-07-06
Payer: COMMERCIAL

## 2021-07-06 ENCOUNTER — CLINICAL SUPPORT (OUTPATIENT)
Dept: REHABILITATION | Facility: OTHER | Age: 61
End: 2021-07-06
Attending: PHYSICIAN ASSISTANT
Payer: COMMERCIAL

## 2021-07-06 DIAGNOSIS — M25.521 RIGHT ELBOW PAIN: ICD-10-CM

## 2021-07-06 DIAGNOSIS — R29.898 DECREASED STRENGTH OF TRUNK AND BACK: ICD-10-CM

## 2021-07-06 DIAGNOSIS — M53.86 DECREASED RANGE OF MOTION OF LUMBAR SPINE: Primary | ICD-10-CM

## 2021-07-06 PROCEDURE — 97165 OT EVAL LOW COMPLEX 30 MIN: CPT

## 2021-07-06 PROCEDURE — 97110 THERAPEUTIC EXERCISES: CPT

## 2021-07-07 ENCOUNTER — PATIENT MESSAGE (OUTPATIENT)
Dept: ADMINISTRATIVE | Facility: HOSPITAL | Age: 61
End: 2021-07-07

## 2021-07-08 ENCOUNTER — CLINICAL SUPPORT (OUTPATIENT)
Dept: REHABILITATION | Facility: OTHER | Age: 61
End: 2021-07-08
Attending: PHYSICIAN ASSISTANT
Payer: COMMERCIAL

## 2021-07-08 ENCOUNTER — PATIENT OUTREACH (OUTPATIENT)
Dept: ADMINISTRATIVE | Facility: HOSPITAL | Age: 61
End: 2021-07-08

## 2021-07-08 DIAGNOSIS — M53.86 DECREASED RANGE OF MOTION OF LUMBAR SPINE: Primary | ICD-10-CM

## 2021-07-08 DIAGNOSIS — R29.898 DECREASED STRENGTH OF TRUNK AND BACK: ICD-10-CM

## 2021-07-08 PROCEDURE — 97110 THERAPEUTIC EXERCISES: CPT

## 2021-07-12 ENCOUNTER — CLINICAL SUPPORT (OUTPATIENT)
Dept: REHABILITATION | Facility: OTHER | Age: 61
End: 2021-07-12
Attending: PHYSICIAN ASSISTANT
Payer: COMMERCIAL

## 2021-07-12 DIAGNOSIS — M53.86 DECREASED RANGE OF MOTION OF LUMBAR SPINE: Primary | ICD-10-CM

## 2021-07-12 DIAGNOSIS — R29.898 DECREASED STRENGTH OF TRUNK AND BACK: ICD-10-CM

## 2021-07-12 PROCEDURE — 97110 THERAPEUTIC EXERCISES: CPT

## 2021-07-14 PROBLEM — M25.521 RIGHT ELBOW PAIN: Status: ACTIVE | Noted: 2021-07-14

## 2021-07-21 ENCOUNTER — CLINICAL SUPPORT (OUTPATIENT)
Dept: REHABILITATION | Facility: OTHER | Age: 61
End: 2021-07-21
Attending: PHYSICIAN ASSISTANT
Payer: COMMERCIAL

## 2021-07-21 DIAGNOSIS — M53.86 DECREASED RANGE OF MOTION OF LUMBAR SPINE: Primary | ICD-10-CM

## 2021-07-21 DIAGNOSIS — R29.898 DECREASED STRENGTH OF TRUNK AND BACK: ICD-10-CM

## 2021-07-21 PROCEDURE — 97110 THERAPEUTIC EXERCISES: CPT

## 2021-07-26 RX ORDER — CELECOXIB 200 MG/1
200 CAPSULE ORAL DAILY
Qty: 30 CAPSULE | Refills: 0 | Status: SHIPPED | OUTPATIENT
Start: 2021-07-26 | End: 2021-10-16 | Stop reason: SDUPTHER

## 2021-07-26 RX ORDER — METHOCARBAMOL 750 MG/1
750 TABLET, FILM COATED ORAL 3 TIMES DAILY PRN
Qty: 60 TABLET | Refills: 0 | Status: SHIPPED | OUTPATIENT
Start: 2021-07-26 | End: 2021-08-18

## 2021-08-16 ENCOUNTER — CLINICAL SUPPORT (OUTPATIENT)
Dept: REHABILITATION | Facility: OTHER | Age: 61
End: 2021-08-16
Attending: PHYSICIAN ASSISTANT
Payer: COMMERCIAL

## 2021-08-16 DIAGNOSIS — M53.86 DECREASED RANGE OF MOTION OF LUMBAR SPINE: Primary | ICD-10-CM

## 2021-08-16 DIAGNOSIS — R29.898 DECREASED STRENGTH OF TRUNK AND BACK: ICD-10-CM

## 2021-08-16 PROCEDURE — 97110 THERAPEUTIC EXERCISES: CPT

## 2021-08-20 ENCOUNTER — CLINICAL SUPPORT (OUTPATIENT)
Dept: REHABILITATION | Facility: OTHER | Age: 61
End: 2021-08-20
Attending: PHYSICIAN ASSISTANT
Payer: COMMERCIAL

## 2021-08-20 DIAGNOSIS — M53.86 DECREASED RANGE OF MOTION OF LUMBAR SPINE: Primary | ICD-10-CM

## 2021-08-20 DIAGNOSIS — R29.898 DECREASED STRENGTH OF TRUNK AND BACK: ICD-10-CM

## 2021-08-20 PROCEDURE — 97110 THERAPEUTIC EXERCISES: CPT

## 2021-09-13 ENCOUNTER — LAB VISIT (OUTPATIENT)
Dept: PRIMARY CARE CLINIC | Facility: OTHER | Age: 61
End: 2021-09-13
Attending: INTERNAL MEDICINE
Payer: COMMERCIAL

## 2021-09-13 DIAGNOSIS — Z20.822 ENCOUNTER FOR LABORATORY TESTING FOR COVID-19 VIRUS: ICD-10-CM

## 2021-09-13 LAB — SARS-COV-2 RNA RESP QL NAA+PROBE: NOT DETECTED

## 2021-09-13 PROCEDURE — U0003 INFECTIOUS AGENT DETECTION BY NUCLEIC ACID (DNA OR RNA); SEVERE ACUTE RESPIRATORY SYNDROME CORONAVIRUS 2 (SARS-COV-2) (CORONAVIRUS DISEASE [COVID-19]), AMPLIFIED PROBE TECHNIQUE, MAKING USE OF HIGH THROUGHPUT TECHNOLOGIES AS DESCRIBED BY CMS-2020-01-R: HCPCS | Performed by: INTERNAL MEDICINE

## 2021-09-14 ENCOUNTER — TELEPHONE (OUTPATIENT)
Dept: INFECTIOUS DISEASES | Facility: CLINIC | Age: 61
End: 2021-09-14

## 2021-09-14 DIAGNOSIS — M17.12 PRIMARY OSTEOARTHRITIS OF LEFT KNEE: ICD-10-CM

## 2021-09-14 DIAGNOSIS — M76.62 LEFT ACHILLES TENDINITIS: ICD-10-CM

## 2021-09-14 RX ORDER — METHYLPREDNISOLONE 4 MG/1
TABLET ORAL
Qty: 1 PACKAGE | Refills: 0 | Status: SHIPPED | OUTPATIENT
Start: 2021-09-14 | End: 2021-10-05

## 2021-09-14 RX ORDER — METHYLPREDNISOLONE 4 MG/1
TABLET ORAL
Qty: 1 PACKAGE | Refills: 0 | Status: SHIPPED | OUTPATIENT
Start: 2021-09-14 | End: 2021-09-14 | Stop reason: SDUPTHER

## 2021-09-14 RX ORDER — AZITHROMYCIN 500 MG/1
500 TABLET, FILM COATED ORAL DAILY
Qty: 3 TABLET | Refills: 0 | Status: SHIPPED | OUTPATIENT
Start: 2021-09-14 | End: 2021-09-18

## 2021-09-14 RX ORDER — DOXYCYCLINE HYCLATE 100 MG
100 TABLET ORAL 2 TIMES DAILY
Qty: 10 TABLET | Refills: 1 | Status: SHIPPED | OUTPATIENT
Start: 2021-09-14 | End: 2021-11-09

## 2021-09-14 RX ORDER — SCOLOPAMINE TRANSDERMAL SYSTEM 1 MG/1
1 PATCH, EXTENDED RELEASE TRANSDERMAL
Qty: 4 PATCH | Refills: 1 | Status: SHIPPED | OUTPATIENT
Start: 2021-09-14 | End: 2021-11-09

## 2021-09-14 RX ORDER — AZITHROMYCIN 500 MG/1
500 TABLET, FILM COATED ORAL DAILY
Qty: 3 TABLET | Refills: 0 | Status: SHIPPED | OUTPATIENT
Start: 2021-09-14 | End: 2021-09-14 | Stop reason: SDUPTHER

## 2021-09-28 ENCOUNTER — IMMUNIZATION (OUTPATIENT)
Dept: INTERNAL MEDICINE | Facility: CLINIC | Age: 61
End: 2021-09-28
Payer: COMMERCIAL

## 2021-09-28 DIAGNOSIS — Z23 NEED FOR VACCINATION: Primary | ICD-10-CM

## 2021-09-28 PROCEDURE — 0003A COVID-19, MRNA, LNP-S, PF, 30 MCG/0.3 ML DOSE VACCINE: CPT | Mod: CV19,PBBFAC | Performed by: INTERNAL MEDICINE

## 2021-09-28 PROCEDURE — 91300 COVID-19, MRNA, LNP-S, PF, 30 MCG/0.3 ML DOSE VACCINE: CPT | Mod: PBBFAC | Performed by: INTERNAL MEDICINE

## 2021-10-18 RX ORDER — CELECOXIB 200 MG/1
200 CAPSULE ORAL DAILY
Qty: 30 CAPSULE | Refills: 0 | Status: SHIPPED | OUTPATIENT
Start: 2021-10-18 | End: 2022-02-09 | Stop reason: SDUPTHER

## 2021-11-09 ENCOUNTER — OFFICE VISIT (OUTPATIENT)
Dept: INTERNAL MEDICINE | Facility: CLINIC | Age: 61
End: 2021-11-09
Payer: COMMERCIAL

## 2021-11-09 VITALS
HEIGHT: 68 IN | BODY MASS INDEX: 33.19 KG/M2 | WEIGHT: 219 LBS | HEART RATE: 91 BPM | SYSTOLIC BLOOD PRESSURE: 122 MMHG | DIASTOLIC BLOOD PRESSURE: 80 MMHG | OXYGEN SATURATION: 98 % | TEMPERATURE: 99 F

## 2021-11-09 DIAGNOSIS — E78.5 HYPERLIPIDEMIA, UNSPECIFIED HYPERLIPIDEMIA TYPE: ICD-10-CM

## 2021-11-09 DIAGNOSIS — F41.8 SITUATIONAL ANXIETY: ICD-10-CM

## 2021-11-09 DIAGNOSIS — G47.00 INSOMNIA, UNSPECIFIED TYPE: ICD-10-CM

## 2021-11-09 DIAGNOSIS — R73.01 IFG (IMPAIRED FASTING GLUCOSE): ICD-10-CM

## 2021-11-09 DIAGNOSIS — Z00.00 ANNUAL PHYSICAL EXAM: Primary | ICD-10-CM

## 2021-11-09 DIAGNOSIS — I10 BENIGN ESSENTIAL HYPERTENSION: ICD-10-CM

## 2021-11-09 PROCEDURE — 99999 PR PBB SHADOW E&M-EST. PATIENT-LVL III: ICD-10-PCS | Mod: PBBFAC,,, | Performed by: INTERNAL MEDICINE

## 2021-11-09 PROCEDURE — 1159F PR MEDICATION LIST DOCUMENTED IN MEDICAL RECORD: ICD-10-PCS | Mod: CPTII,S$GLB,, | Performed by: INTERNAL MEDICINE

## 2021-11-09 PROCEDURE — 99999 PR PBB SHADOW E&M-EST. PATIENT-LVL III: CPT | Mod: PBBFAC,,, | Performed by: INTERNAL MEDICINE

## 2021-11-09 PROCEDURE — 3008F BODY MASS INDEX DOCD: CPT | Mod: CPTII,S$GLB,, | Performed by: INTERNAL MEDICINE

## 2021-11-09 PROCEDURE — 3008F PR BODY MASS INDEX (BMI) DOCUMENTED: ICD-10-PCS | Mod: CPTII,S$GLB,, | Performed by: INTERNAL MEDICINE

## 2021-11-09 PROCEDURE — 4010F PR ACE/ARB THEARPY RXD/TAKEN: ICD-10-PCS | Mod: CPTII,S$GLB,, | Performed by: INTERNAL MEDICINE

## 2021-11-09 PROCEDURE — 99396 PR PREVENTIVE VISIT,EST,40-64: ICD-10-PCS | Mod: S$GLB,,, | Performed by: INTERNAL MEDICINE

## 2021-11-09 PROCEDURE — 1160F RVW MEDS BY RX/DR IN RCRD: CPT | Mod: CPTII,S$GLB,, | Performed by: INTERNAL MEDICINE

## 2021-11-09 PROCEDURE — 1160F PR REVIEW ALL MEDS BY PRESCRIBER/CLIN PHARMACIST DOCUMENTED: ICD-10-PCS | Mod: CPTII,S$GLB,, | Performed by: INTERNAL MEDICINE

## 2021-11-09 PROCEDURE — 4010F ACE/ARB THERAPY RXD/TAKEN: CPT | Mod: CPTII,S$GLB,, | Performed by: INTERNAL MEDICINE

## 2021-11-09 PROCEDURE — 3079F DIAST BP 80-89 MM HG: CPT | Mod: CPTII,S$GLB,, | Performed by: INTERNAL MEDICINE

## 2021-11-09 PROCEDURE — 3074F SYST BP LT 130 MM HG: CPT | Mod: CPTII,S$GLB,, | Performed by: INTERNAL MEDICINE

## 2021-11-09 PROCEDURE — 1159F MED LIST DOCD IN RCRD: CPT | Mod: CPTII,S$GLB,, | Performed by: INTERNAL MEDICINE

## 2021-11-09 PROCEDURE — 3079F PR MOST RECENT DIASTOLIC BLOOD PRESSURE 80-89 MM HG: ICD-10-PCS | Mod: CPTII,S$GLB,, | Performed by: INTERNAL MEDICINE

## 2021-11-09 PROCEDURE — 3074F PR MOST RECENT SYSTOLIC BLOOD PRESSURE < 130 MM HG: ICD-10-PCS | Mod: CPTII,S$GLB,, | Performed by: INTERNAL MEDICINE

## 2021-11-09 PROCEDURE — 99396 PREV VISIT EST AGE 40-64: CPT | Mod: S$GLB,,, | Performed by: INTERNAL MEDICINE

## 2021-11-09 RX ORDER — ZOLPIDEM TARTRATE 5 MG/1
5 TABLET ORAL NIGHTLY PRN
Qty: 30 TABLET | Refills: 1 | Status: SHIPPED | OUTPATIENT
Start: 2021-11-09 | End: 2022-04-21 | Stop reason: SDUPTHER

## 2021-11-09 RX ORDER — ALPRAZOLAM 0.25 MG/1
0.25 TABLET ORAL NIGHTLY PRN
Qty: 30 TABLET | Refills: 2 | Status: SHIPPED | OUTPATIENT
Start: 2021-11-09 | End: 2022-05-09 | Stop reason: SDUPTHER

## 2021-11-29 ENCOUNTER — OFFICE VISIT (OUTPATIENT)
Dept: DERMATOLOGY | Facility: CLINIC | Age: 61
End: 2021-11-29
Payer: COMMERCIAL

## 2021-11-29 ENCOUNTER — PATIENT OUTREACH (OUTPATIENT)
Dept: ADMINISTRATIVE | Facility: OTHER | Age: 61
End: 2021-11-29
Payer: COMMERCIAL

## 2021-11-29 DIAGNOSIS — L71.9 ROSACEA: Primary | ICD-10-CM

## 2021-11-29 PROCEDURE — 99999 PR PBB SHADOW E&M-EST. PATIENT-LVL III: ICD-10-PCS | Mod: PBBFAC,,, | Performed by: DERMATOLOGY

## 2021-11-29 PROCEDURE — 99204 PR OFFICE/OUTPT VISIT, NEW, LEVL IV, 45-59 MIN: ICD-10-PCS | Mod: S$GLB,,, | Performed by: DERMATOLOGY

## 2021-11-29 PROCEDURE — 99999 PR PBB SHADOW E&M-EST. PATIENT-LVL III: CPT | Mod: PBBFAC,,, | Performed by: DERMATOLOGY

## 2021-11-29 PROCEDURE — 4010F PR ACE/ARB THEARPY RXD/TAKEN: ICD-10-PCS | Mod: CPTII,S$GLB,, | Performed by: DERMATOLOGY

## 2021-11-29 PROCEDURE — 99204 OFFICE O/P NEW MOD 45 MIN: CPT | Mod: S$GLB,,, | Performed by: DERMATOLOGY

## 2021-11-29 PROCEDURE — 4010F ACE/ARB THERAPY RXD/TAKEN: CPT | Mod: CPTII,S$GLB,, | Performed by: DERMATOLOGY

## 2021-11-29 RX ORDER — DOXYCYCLINE 100 MG/1
CAPSULE ORAL
Qty: 30 CAPSULE | Refills: 2 | Status: SHIPPED | OUTPATIENT
Start: 2021-11-29 | End: 2022-04-01 | Stop reason: SDUPTHER

## 2021-12-18 DIAGNOSIS — Z80.0 FAMILY HISTORY OF ESOPHAGEAL CANCER: Primary | ICD-10-CM

## 2021-12-19 ENCOUNTER — LAB VISIT (OUTPATIENT)
Dept: EMERGENCY MEDICINE | Facility: HOSPITAL | Age: 61
End: 2021-12-19
Attending: INTERNAL MEDICINE
Payer: COMMERCIAL

## 2021-12-19 ENCOUNTER — INFUSION (OUTPATIENT)
Dept: INFECTIOUS DISEASES | Facility: HOSPITAL | Age: 61
End: 2021-12-19
Attending: INTERNAL MEDICINE
Payer: COMMERCIAL

## 2021-12-19 VITALS
BODY MASS INDEX: 32.58 KG/M2 | HEART RATE: 76 BPM | OXYGEN SATURATION: 99 % | SYSTOLIC BLOOD PRESSURE: 139 MMHG | RESPIRATION RATE: 18 BRPM | HEIGHT: 69 IN | WEIGHT: 220 LBS | TEMPERATURE: 99 F | DIASTOLIC BLOOD PRESSURE: 68 MMHG

## 2021-12-19 DIAGNOSIS — U07.1 COVID-19 VIRUS DETECTED: ICD-10-CM

## 2021-12-19 DIAGNOSIS — Z20.822 ENCOUNTER FOR LABORATORY TESTING FOR COVID-19 VIRUS: ICD-10-CM

## 2021-12-19 DIAGNOSIS — U07.1 COVID-19: Primary | ICD-10-CM

## 2021-12-19 DIAGNOSIS — Z20.822 COVID-19 RULED OUT: ICD-10-CM

## 2021-12-19 DIAGNOSIS — Z20.822 COVID-19 RULED OUT: Primary | ICD-10-CM

## 2021-12-19 LAB
CTP QC/QA: YES
SARS-COV-2 RDRP RESP QL NAA+PROBE: POSITIVE

## 2021-12-19 PROCEDURE — M0243 CASIRIVI AND IMDEVI INFUSION: HCPCS | Performed by: INTERNAL MEDICINE

## 2021-12-19 PROCEDURE — 25000003 PHARM REV CODE 250: Performed by: INTERNAL MEDICINE

## 2021-12-19 PROCEDURE — 63600175 PHARM REV CODE 636 W HCPCS: Performed by: INTERNAL MEDICINE

## 2021-12-19 PROCEDURE — U0002 COVID-19 LAB TEST NON-CDC: HCPCS

## 2021-12-19 RX ORDER — EPINEPHRINE 0.3 MG/.3ML
0.3 INJECTION SUBCUTANEOUS
Status: DISCONTINUED | OUTPATIENT
Start: 2021-12-19 | End: 2022-05-09

## 2021-12-19 RX ORDER — ALBUTEROL SULFATE 90 UG/1
2 AEROSOL, METERED RESPIRATORY (INHALATION)
Status: DISCONTINUED | OUTPATIENT
Start: 2021-12-19 | End: 2022-05-09

## 2021-12-19 RX ORDER — ONDANSETRON 4 MG/1
4 TABLET, ORALLY DISINTEGRATING ORAL ONCE AS NEEDED
Status: DISCONTINUED | OUTPATIENT
Start: 2021-12-19 | End: 2022-05-09

## 2021-12-19 RX ORDER — SODIUM CHLORIDE 0.9 % (FLUSH) 0.9 %
10 SYRINGE (ML) INJECTION
Status: DISCONTINUED | OUTPATIENT
Start: 2021-12-19 | End: 2022-05-09

## 2021-12-19 RX ORDER — DIPHENHYDRAMINE HYDROCHLORIDE 50 MG/ML
25 INJECTION INTRAMUSCULAR; INTRAVENOUS ONCE AS NEEDED
Status: DISCONTINUED | OUTPATIENT
Start: 2021-12-19 | End: 2022-05-09

## 2021-12-19 RX ORDER — ACETAMINOPHEN 325 MG/1
650 TABLET ORAL ONCE AS NEEDED
Status: DISCONTINUED | OUTPATIENT
Start: 2021-12-19 | End: 2022-05-09

## 2021-12-19 RX ADMIN — CASIRIVIMAB AND IMDEVIMAB 600 MG: 600; 600 INJECTION, SOLUTION, CONCENTRATE INTRAVENOUS at 12:12

## 2021-12-20 ENCOUNTER — PATIENT MESSAGE (OUTPATIENT)
Dept: SPORTS MEDICINE | Facility: CLINIC | Age: 61
End: 2021-12-20
Payer: COMMERCIAL

## 2021-12-20 DIAGNOSIS — I10 BENIGN ESSENTIAL HYPERTENSION: ICD-10-CM

## 2021-12-20 RX ORDER — FLUTICASONE PROPIONATE AND SALMETEROL 250; 50 UG/1; UG/1
1 POWDER RESPIRATORY (INHALATION) 2 TIMES DAILY
Qty: 60 EACH | Refills: 3 | Status: SHIPPED | OUTPATIENT
Start: 2021-12-20 | End: 2022-05-09

## 2021-12-20 RX ORDER — TRIAMTERENE/HYDROCHLOROTHIAZID 37.5-25 MG
1 TABLET ORAL DAILY
Qty: 90 TABLET | Refills: 3 | Status: SHIPPED | OUTPATIENT
Start: 2021-12-20 | End: 2022-05-09 | Stop reason: SDUPTHER

## 2021-12-29 ENCOUNTER — PATIENT MESSAGE (OUTPATIENT)
Dept: INTERNAL MEDICINE | Facility: CLINIC | Age: 61
End: 2021-12-29
Payer: COMMERCIAL

## 2021-12-29 RX ORDER — ALBUTEROL SULFATE 90 UG/1
2 AEROSOL, METERED RESPIRATORY (INHALATION) EVERY 6 HOURS PRN
Qty: 18 G | Refills: 4 | Status: SHIPPED | OUTPATIENT
Start: 2021-12-29 | End: 2022-05-09

## 2022-01-05 ENCOUNTER — OFFICE VISIT (OUTPATIENT)
Dept: OPTOMETRY | Facility: CLINIC | Age: 62
End: 2022-01-05
Payer: COMMERCIAL

## 2022-01-05 DIAGNOSIS — H52.203 MYOPIA OF BOTH EYES WITH ASTIGMATISM AND PRESBYOPIA: ICD-10-CM

## 2022-01-05 DIAGNOSIS — H25.13 NUCLEAR SCLEROSIS OF BOTH EYES: Primary | ICD-10-CM

## 2022-01-05 DIAGNOSIS — Z97.3 WEARS CONTACT LENSES: ICD-10-CM

## 2022-01-05 DIAGNOSIS — Z97.3 WEARS CONTACT LENSES: Primary | ICD-10-CM

## 2022-01-05 DIAGNOSIS — H52.13 MYOPIA OF BOTH EYES WITH ASTIGMATISM AND PRESBYOPIA: ICD-10-CM

## 2022-01-05 DIAGNOSIS — H52.4 MYOPIA OF BOTH EYES WITH ASTIGMATISM AND PRESBYOPIA: ICD-10-CM

## 2022-01-05 PROCEDURE — 92015 PR REFRACTION: ICD-10-PCS | Mod: S$GLB,,,

## 2022-01-05 PROCEDURE — 92310 PR CONTACT LENS FITTING (NO CHANGE): ICD-10-PCS | Mod: S$GLB,,,

## 2022-01-05 PROCEDURE — 92014 COMPRE OPH EXAM EST PT 1/>: CPT | Mod: S$GLB,,,

## 2022-01-05 PROCEDURE — 92015 DETERMINE REFRACTIVE STATE: CPT | Mod: S$GLB,,,

## 2022-01-05 PROCEDURE — 99499 UNLISTED E&M SERVICE: CPT | Mod: S$GLB,,,

## 2022-01-05 PROCEDURE — 99999 PR PBB SHADOW E&M-EST. PATIENT-LVL III: CPT | Mod: PBBFAC,,,

## 2022-01-05 PROCEDURE — 92310 CONTACT LENS FITTING OU: CPT | Mod: S$GLB,,,

## 2022-01-05 PROCEDURE — 92014 PR EYE EXAM, EST PATIENT,COMPREHESV: ICD-10-PCS | Mod: S$GLB,,,

## 2022-01-05 PROCEDURE — 99999 PR PBB SHADOW E&M-EST. PATIENT-LVL III: ICD-10-PCS | Mod: PBBFAC,,,

## 2022-01-05 PROCEDURE — 99499 NO LOS: ICD-10-PCS | Mod: S$GLB,,,

## 2022-01-05 NOTE — PROGRESS NOTES
HPI     Concerns About Ocular Health      Additional comments: Patient presents with blur at near with MF CL. No   complaints at distance. Feels like she needs to use some readers to see   fine print. Patient reports good comfort with lenses and will dispose of   them daily. Does not sleep in the CL or use AT.     Would like updated glasses because she feels like she cannot see that well   with her old pair as well as she can see in her CL.     No other ocular or visual complaints.           Last edited by Ovidio Pratt, OD on 1/5/2022  9:09 AM. (History)            Assessment /Plan     For exam results, see Encounter Report.    Nuclear sclerosis of both eyes  -Not visually significant or affecting ADL's. Monitor annually    Myopia of both eyes with astigmatism and presbyopia  -  Glasses Prescription (1/5/2022)        Sphere Cylinder Axis    Right -1.75 +0.50 155    Left -1.75 +0.50 015    Type: SVL    Expiration Date: 1/6/2023        Contact Lens Prescription (1/5/2022)        Brand Base Curve Diameter Sphere Cylinder Add    Right Dailies Total 1 Multifocal 8.50 14.1 -1.75 Sphere High    Left Dailies Total 1 Multifocal 8.50 14.1 -1.75 Sphere High    Expiration Date: 1/6/2023    Replacement: Daily    Solutions: OptiFree PureMoist    Wearing Schedule: Daily wear        Spectacle Rx and contact lens Rx given. Discussed different options for glasses. Patient prefers distance only and taking off glasses to read. RTC 1 year routine eye exam and dilated eye exam.      Wears contact lenses

## 2022-01-27 ENCOUNTER — PATIENT MESSAGE (OUTPATIENT)
Dept: ENDOSCOPY | Facility: HOSPITAL | Age: 62
End: 2022-01-27
Payer: COMMERCIAL

## 2022-01-27 ENCOUNTER — TELEPHONE (OUTPATIENT)
Dept: ENDOSCOPY | Facility: HOSPITAL | Age: 62
End: 2022-01-27
Payer: COMMERCIAL

## 2022-01-27 NOTE — TELEPHONE ENCOUNTER
Contacted pt to schedule EGD. Pt states that she is in a meeting and will call back to schedule.  Provided with my personal phone.

## 2022-02-09 RX ORDER — CELECOXIB 200 MG/1
200 CAPSULE ORAL DAILY
Qty: 30 CAPSULE | Refills: 0 | Status: SHIPPED | OUTPATIENT
Start: 2022-02-09 | End: 2022-04-07 | Stop reason: SDUPTHER

## 2022-03-08 ENCOUNTER — TELEPHONE (OUTPATIENT)
Dept: DERMATOLOGY | Facility: CLINIC | Age: 62
End: 2022-03-08
Payer: COMMERCIAL

## 2022-03-08 NOTE — TELEPHONE ENCOUNTER
----- Message from Teo Red MA sent at 3/4/2022  4:20 PM CST -----    ----- Message -----  From: Ting Sands  Sent: 3/4/2022   3:53 PM CST  To: Cele PETERSON Staff    Type :  Needs Medical Advice    Who Called: pt   Symptoms (please be specific):  Botox  How long has patient had these symptoms:   Botox  Pharmacy name and phone #:   no   Would the patient rather a call back or a response via [facility]?  Call   Best Call Back Number: [phone]

## 2022-03-08 NOTE — TELEPHONE ENCOUNTER
"Spoke with pt, informed her that next available appt for botox would be 6/2. Pt stated she needed an appt for fillers as well, informed pt that Dr. Oakley does not do fillers. Pt states she will see someone else as she "needs both done".  "

## 2022-03-09 ENCOUNTER — OFFICE VISIT (OUTPATIENT)
Dept: DERMATOLOGY | Facility: CLINIC | Age: 62
End: 2022-03-09
Payer: COMMERCIAL

## 2022-03-09 DIAGNOSIS — Z41.1 ENCOUNTER FOR COSMETIC PROCEDURE: Primary | ICD-10-CM

## 2022-03-20 ENCOUNTER — PATIENT MESSAGE (OUTPATIENT)
Dept: DERMATOLOGY | Facility: CLINIC | Age: 62
End: 2022-03-20
Payer: COMMERCIAL

## 2022-03-21 ENCOUNTER — PATIENT MESSAGE (OUTPATIENT)
Dept: DERMATOLOGY | Facility: CLINIC | Age: 62
End: 2022-03-21
Payer: COMMERCIAL

## 2022-03-21 ENCOUNTER — PATIENT OUTREACH (OUTPATIENT)
Dept: ADMINISTRATIVE | Facility: OTHER | Age: 62
End: 2022-03-21
Payer: COMMERCIAL

## 2022-03-22 ENCOUNTER — PROCEDURE VISIT (OUTPATIENT)
Dept: DERMATOLOGY | Facility: CLINIC | Age: 62
End: 2022-03-22
Payer: COMMERCIAL

## 2022-03-22 DIAGNOSIS — Z09 POSTOP CHECK: Primary | ICD-10-CM

## 2022-03-22 PROCEDURE — 99024 PR POST-OP FOLLOW-UP VISIT: ICD-10-PCS | Mod: S$GLB,,, | Performed by: DERMATOLOGY

## 2022-03-22 PROCEDURE — 99024 POSTOP FOLLOW-UP VISIT: CPT | Mod: S$GLB,,, | Performed by: DERMATOLOGY

## 2022-03-22 NOTE — PROGRESS NOTES
Patient is here today for follow up from previous cosmetic Botox treatment. She feels that her medial brows are too low and her lateral brows are too high when she raises them.   She denies any history of adverse reaction to Botox or history of neuromuscular disease.     On physical exam, she still has some residual downward movement in procerus and medial corrugators (portion underlying the medial eyebrows).    Verbal consent was obtained for touch up of Botox.    Patient agreed to proceed with treatment. 7 units total were injected today:  1 in frontalis (1/2 unit at each inferolateral frontalis)  6 in glabella (0-2-2-2-0)    Will avoid using Botox in frontalis next time to see if this helps with opening the glabellar area.    Patient tolerated well with no complications. She was instructed not to rub or massage the treated areas and that she should avoid lying down, bending upside down, and strenuous exercise for the rest of the day.  Instructed to wait two weeks to assess response before presenting for a touch up injection, if needed.      Botox dilution: 10u / 0.2mL (10u / 0.4mL for frontalis)  Lot #: [id number]  Exp date: 04/2024

## 2022-04-01 ENCOUNTER — PATIENT MESSAGE (OUTPATIENT)
Dept: DERMATOLOGY | Facility: CLINIC | Age: 62
End: 2022-04-01
Payer: COMMERCIAL

## 2022-04-01 DIAGNOSIS — L71.9 ROSACEA: ICD-10-CM

## 2022-04-01 RX ORDER — DOXYCYCLINE 100 MG/1
CAPSULE ORAL
Qty: 30 CAPSULE | Refills: 1 | Status: SHIPPED | OUTPATIENT
Start: 2022-04-01 | End: 2022-05-09

## 2022-04-06 NOTE — PROGRESS NOTES
CC: Bilateral knee f/u    DATE OF PROCEDURE: 11/11/2020   PROCEDURES PERFORMED:   Right total knee arthroplasty      DATE OF PROCEDURE: 7/8/2020   PROCEDURES PERFORMED:   Left total knee arthroplasty     Josefina Blue returns for follow-up both knees.  She states overall she has been doing very well.  No issues with day-to-day activity.  She does have some pulling pain over the front part of her right knee when going down stairs only.  Otherwise no issues with day-to-day activities.  She has been able to play tennis regularly, 3 to 4 times a week without a problem.  Doubles tennis.  Recently this past Sunday she did play singles tennis and had to stop 45 minutes into her match due to again some pain over the front part of her knee.  Since Sunday this has gotten significantly better with some stretching exercises.    PAST MEDICAL HISTORY:   Past Medical History:   Diagnosis Date    DDD (degenerative disc disease), lumbar 10/7/2019    Hyperlipidemia 8/31/2015    Hypertension     IFG (impaired fasting glucose) 8/31/2015    Neuropathic pain 8/31/2015    Squamous cell cancer of tongue 2012     PAST SURGICAL HISTORY:  Past Surgical History:   Procedure Laterality Date    CHOLECYSTECTOMY      COLONOSCOPY N/A 6/14/2021    Procedure: COLONOSCOPY;  Surgeon: Gentry Dickson MD;  Location: [address]);  Service: Endoscopy;  Laterality: N/A;  covid test 6/11 primary care, instructions sent to [email or web address]. 6/11 Pt. fully vaccinated. Completed in Jan. 2021.[facility]    GALLBLADDER SURGERY  06/2016    HYSTERECTOMY      KNEE ARTHROPLASTY Left 7/8/2020    Procedure: ARTHROPLASTY, KNEE;  Surgeon: GLENN Whyte MD;  Location: [facility];  Service: Orthopedics;  Laterality: Left;  regional w/catheter   Spinal, Adductor  Cloidine/Epi/Ketorolac/Ropivacaine Injection 30cc      KNEE ARTHROSCOPY Right     for torn meniscus    TONGUE SURGERY      TOTAL KNEE ARTHROPLASTY Right 11/11/2020    Procedure: ARTHROPLASTY, KNEE,  TOTAL;  Surgeon: GLENN Whyte MD;  Location: [facility];  Service: Orthopedics;  Laterality: Right;  outpatient with 23hr observation  regional with cathter- spinal and adductor    TRANSFORAMINAL EPIDURAL INJECTION OF STEROID Bilateral 9/19/2019    Procedure: Injection,steroid,epidural,transforaminal approach LUMBAR TRANSFORAMINAL BILATERAL L4/5 TF NOE;  Surgeon: Tim Kerns MD;  Location: [facility];  Service: Pain Management;  Laterality: Bilateral;  NEEDS CONSENT, VIP    TRANSFORAMINAL EPIDURAL INJECTION OF STEROID Bilateral 10/7/2019    Procedure: LUMBAR TRANSFORAMINAL BILATERAL L4/5 TF NOE;  Surgeon: Tim Kerns MD;  Location: [facility];  Service: Pain Management;  Laterality: Bilateral;  NEEDS CONSENT, **VIP**    TRANSFORAMINAL EPIDURAL INJECTION OF STEROID Bilateral 3/22/2021    Procedure: LUMBAR TRANSFORAMINAL BILATERAL L4/5 DIRECT REFERRAL;  Surgeon: Tim Kerns MD;  Location: [facility];  Service: Pain Management;  Laterality: Bilateral;  NEEDS CONSENT, URGENT  *VIP*    TRANSFORAMINAL EPIDURAL INJECTION OF STEROID Right 5/10/2021    Procedure: LUMBAR TRANSFORAMINAL RIGHT L4/5, L5/S1 DIRECT REFERRAL;  Surgeon: Tim Kerns MD;  Location: [facility];  Service: Pain Management;  Laterality: Right;  NEEDS CONSENT, MEDICALLY URGENT  **VIP**     FAMILY HISTORY:  Family History   Problem Relation Age of Onset    Alcohol abuse Mother     Cirrhosis Mother     Cancer Father 74        prostate, throat, lung- smoker    Hyperlipidemia Father     Diabetes Sister     Hypertension Sister     Drug abuse Brother     Heart disease Brother     Hyperlipidemia Brother     No Known Problems Maternal Aunt     No Known Problems Maternal Uncle     No Known Problems Paternal Aunt     No Known Problems Paternal Uncle     No Known Problems Maternal Grandmother     No Known Problems Maternal Grandfather     No Known Problems Paternal Grandmother     No Known Problems Paternal Grandfather      Amblyopia Neg Hx     Blindness Neg Hx     Cataracts Neg Hx     Glaucoma Neg Hx     Macular degeneration Neg Hx     Retinal detachment Neg Hx     Strabismus Neg Hx     Stroke Neg Hx     Thyroid disease Neg Hx      MEDICATIONS:    Current Outpatient Medications:     albuterol (VENTOLIN HFA) 90 mcg/actuation inhaler, Inhale 2 puffs into the lungs every 6 (six) hours as needed for Wheezing. Rescue, Disp: 18 g, Rfl: 4    ALPRAZolam (XANAX) 0.25 MG tablet, Take 1 tablet (0.25 mg total) by mouth nightly as needed., Disp: 30 tablet, Rfl: 2    aspirin (ECOTRIN) 81 MG EC tablet, Take 1 tablet (81 mg total) by mouth nightly., Disp: , Rfl: 0    celecoxib (CELEBREX) 200 MG capsule, Take 1 capsule (200 mg total) by mouth once daily., Disp: 30 capsule, Rfl: 0    clobetasol (TEMOVATE) 0.05 % external solution, Use one to two times daily as needed for scaling or itching to scalp, Disp: 60 mL, Rfl: 3    diclofenac sodium (VOLTAREN) 1 % Gel, Apply 2 g topically 2 (two) times daily., Disp: 1 Tube, Rfl: 0    doxycycline (VIBRAMYCIN) 100 MG Cap, Take 1 capsule by mouth once a day with food and not within 1 hour prior to lying down, Disp: 30 capsule, Rfl: 1    estradioL (ESTRACE) 0.01 % (0.1 mg/gram) vaginal cream, Place 2 gram vaginally once daily., Disp: 42.5 g, Rfl: 1    fluticasone-salmeterol diskus inhaler 250-50 mcg, Inhale 1 puff into the lungs 2 (two) times daily. (Controller), Disp: 60 each, Rfl: 3    irbesartan (AVAPRO) 75 MG tablet, Take 1 tablet (75 mg total) by mouth every evening., Disp: 90 tablet, Rfl: 3    melatonin 3 mg TbDL, Take by mouth., Disp: , Rfl:     triamterene-hydrochlorothiazide 37.5-25 mg (MAXZIDE-25) 37.5-25 mg per tablet, Take 1 tablet by mouth once daily., Disp: 90 tablet, Rfl: 3    zolpidem (AMBIEN) 5 MG Tab, Take 1 tablet (5 mg total) by mouth nightly as needed (insomnia)., Disp: 30 tablet, Rfl: 1    Current Facility-Administered Medications:     acetaminophen tablet 650 mg, 650 mg,  "Oral, Once PRN, Saud Bennett MD    albuterol inhaler 2 puff, 2 puff, Inhalation, Q20 Min PRN, Saud Bennett MD    diphenhydrAMINE injection 25 mg, 25 mg, Intravenous, Once PRN, Saud Bennett MD    EPINEPHrine (EPIPEN) 0.3 mg/0.3 mL pen injection 0.3 mg, 0.3 mg, Intramuscular, PRN, Saud Bennett MD    methylPREDNISolone sodium succinate injection 40 mg, 40 mg, Intravenous, Once PRN, Saud Bennett MD    ondansetron disintegrating tablet 4 mg, 4 mg, Oral, Once PRN, Saud Bennett MD    sodium chloride 0.9% 500 mL flush bag, , Intravenous, PRN, Saud Bennett MD    sodium chloride 0.9% flush 10 mL, 10 mL, Intravenous, PRN, Saud Bennett MD    Facility-Administered Medications Ordered in Other Visits:     celecoxib capsule 400 mg, 400 mg, Oral, Once, Ernie Anders MD    fentaNYL injection 25 mcg, 25 mcg, Intravenous, Q5 Min PRN, Ernie Anders MD, 100 mcg at 11/11/20 0750    midazolam (VERSED) 1 mg/mL injection 0.5 mg, 0.5 mg, Intravenous, PRN, Ernie Anders MD    ALLERGIES:  Review of patient's allergies indicates:   Allergen Reactions    Aspirin Nausea Only     Can take low dose of Aspirin; can take buffered low dose aspirin only    Codeine Nausea Only     Can take Codeine if she takes a dose of Zofran with it     REVIEW OF SYSTEMS:  Constitution: Negative. Negative for chills, fever and night sweats.    Hematologic/Lymphatic: Negative for bleeding problem. Does not bruise/bleed easily.   Skin: Negative for dry skin, itching and rash.   Musculoskeletal: Negative for falls.  Negative for bilateral knee pain and muscle weakness.     All other review of symptoms were reviewed and found to be noncontributory.     PHYSICAL EXAMINATION:  Vitals:  /80   Pulse 71   Ht 5' 9" (1.753 m)   Wt 98.4 kg (217 lb)   BMI 32.05 kg/m²    General: Well-developed well-nourished 61 y.o. femalein no acute distress   Cardiovascular: Regular " rhythm by palpation of distal pulse, normal color and temperature, no concerning varicosities on symptomatic side   Lungs: No labored breathing or wheezing appreciated   Neuro: Alert and oriented ×3   Psychiatric: well oriented to person, place and time, demonstrates normal mood and affect   Skin: No rashes, lesions or ulcers, normal temperature, turgor, and texture on uninvolved extremity    Ortho/SPM Exam  Exam of both knees demonstrates no swelling or effusion.  Full active extension, flexion to 125-130° with central patellar tracking.  Tenderness over the right inferior pole patella and patellar tendon.  Bilateral hip abductor weakness with difficulty on single leg bridge.  Tight hamstrings.  No joint line tenderness.  Stable to varus and valgus stress.  No mid flexion instability.    IMAGING:  X-rays of both knees demonstrate well-fixed total knee prostheses bilaterally.  No lucencies or evidence of loosening.  Mild lateral patellar tilt on the left.  Mild lateral patellar reactive bone on the right.    ASSESSMENT:      ICD-10-CM ICD-9-CM   1. Acute pain of right knee  M25.561 719.46   2. History of bilateral knee replacement  Z96.653 V43.65        PLAN:     The patient presents with some recent activity-related anterior knee pain.  Self limited.  She does have some underlying biomechanical deficiencies which are contributory.  She has stable total knee prostheses and overall has been doing well.  We discussed hip abductor strengthening, VMO strengthening and hamstring stretching activity.  Discussed tennis participation modifications as needed.  Also discussed potential benefit of pre participation anti-inflammatory medication.  May return to clinic as needed.  We will see her back yearly for routine radiographic checks. Script given for Celebrex.

## 2022-04-07 ENCOUNTER — OFFICE VISIT (OUTPATIENT)
Dept: SPORTS MEDICINE | Facility: CLINIC | Age: 62
End: 2022-04-07
Payer: COMMERCIAL

## 2022-04-07 ENCOUNTER — HOSPITAL ENCOUNTER (OUTPATIENT)
Dept: RADIOLOGY | Facility: HOSPITAL | Age: 62
Discharge: HOME | End: 2022-04-07
Attending: ORTHOPAEDIC SURGERY
Payer: COMMERCIAL

## 2022-04-07 VITALS
HEART RATE: 71 BPM | WEIGHT: 217 LBS | SYSTOLIC BLOOD PRESSURE: 131 MMHG | DIASTOLIC BLOOD PRESSURE: 80 MMHG | BODY MASS INDEX: 32.14 KG/M2 | HEIGHT: 69 IN

## 2022-04-07 DIAGNOSIS — M25.561 PAIN IN BOTH KNEES, UNSPECIFIED CHRONICITY: ICD-10-CM

## 2022-04-07 DIAGNOSIS — M25.562 PAIN IN BOTH KNEES, UNSPECIFIED CHRONICITY: ICD-10-CM

## 2022-04-07 DIAGNOSIS — Z96.653 HISTORY OF BILATERAL KNEE REPLACEMENT: ICD-10-CM

## 2022-04-07 DIAGNOSIS — M25.561 ACUTE PAIN OF RIGHT KNEE: Primary | ICD-10-CM

## 2022-04-07 PROCEDURE — 3079F PR MOST RECENT DIASTOLIC BLOOD PRESSURE 80-89 MM HG: ICD-10-PCS | Mod: CPTII,S$GLB,, | Performed by: ORTHOPAEDIC SURGERY

## 2022-04-07 PROCEDURE — 99999 PR PBB SHADOW E&M-EST. PATIENT-LVL IV: ICD-10-PCS | Mod: PBBFAC,,, | Performed by: ORTHOPAEDIC SURGERY

## 2022-04-07 PROCEDURE — 99213 PR OFFICE/OUTPT VISIT, EST, LEVL III, 20-29 MIN: ICD-10-PCS | Mod: S$GLB,,, | Performed by: ORTHOPAEDIC SURGERY

## 2022-04-07 PROCEDURE — 3008F BODY MASS INDEX DOCD: CPT | Mod: CPTII,S$GLB,, | Performed by: ORTHOPAEDIC SURGERY

## 2022-04-07 PROCEDURE — 73564 X-RAY EXAM KNEE 4 OR MORE: CPT | Mod: TC,50

## 2022-04-07 PROCEDURE — 3008F PR BODY MASS INDEX (BMI) DOCUMENTED: ICD-10-PCS | Mod: CPTII,S$GLB,, | Performed by: ORTHOPAEDIC SURGERY

## 2022-04-07 PROCEDURE — 4010F ACE/ARB THERAPY RXD/TAKEN: CPT | Mod: CPTII,S$GLB,, | Performed by: ORTHOPAEDIC SURGERY

## 2022-04-07 PROCEDURE — 3079F DIAST BP 80-89 MM HG: CPT | Mod: CPTII,S$GLB,, | Performed by: ORTHOPAEDIC SURGERY

## 2022-04-07 PROCEDURE — 99999 PR PBB SHADOW E&M-EST. PATIENT-LVL IV: CPT | Mod: PBBFAC,,, | Performed by: ORTHOPAEDIC SURGERY

## 2022-04-07 PROCEDURE — 1159F PR MEDICATION LIST DOCUMENTED IN MEDICAL RECORD: ICD-10-PCS | Mod: CPTII,S$GLB,, | Performed by: ORTHOPAEDIC SURGERY

## 2022-04-07 PROCEDURE — 99213 OFFICE O/P EST LOW 20 MIN: CPT | Mod: S$GLB,,, | Performed by: ORTHOPAEDIC SURGERY

## 2022-04-07 PROCEDURE — 1159F MED LIST DOCD IN RCRD: CPT | Mod: CPTII,S$GLB,, | Performed by: ORTHOPAEDIC SURGERY

## 2022-04-07 PROCEDURE — 3075F SYST BP GE 130 - 139MM HG: CPT | Mod: CPTII,S$GLB,, | Performed by: ORTHOPAEDIC SURGERY

## 2022-04-07 PROCEDURE — 73564 XR KNEE ORTHO BILAT WITH FLEXION: ICD-10-PCS | Mod: 26,50,, | Performed by: RADIOLOGY

## 2022-04-07 PROCEDURE — 4010F PR ACE/ARB THEARPY RXD/TAKEN: ICD-10-PCS | Mod: CPTII,S$GLB,, | Performed by: ORTHOPAEDIC SURGERY

## 2022-04-07 PROCEDURE — 73564 X-RAY EXAM KNEE 4 OR MORE: CPT | Mod: 26,50,, | Performed by: RADIOLOGY

## 2022-04-07 PROCEDURE — 3075F PR MOST RECENT SYSTOLIC BLOOD PRESS GE 130-139MM HG: ICD-10-PCS | Mod: CPTII,S$GLB,, | Performed by: ORTHOPAEDIC SURGERY

## 2022-04-07 RX ORDER — CELECOXIB 200 MG/1
200 CAPSULE ORAL DAILY
Qty: 40 CAPSULE | Refills: 2 | Status: SHIPPED | OUTPATIENT
Start: 2022-04-07 | End: 2022-06-06

## 2022-04-15 NOTE — PROGRESS NOTES
GI referral placed and lomotil refilled   [facility]      Name: Josefina Blue  Clinic Number: [id number]    Therapy Diagnosis:   Encounter Diagnoses   Name Primary?    Poor mobility     Chronic bilateral low back pain without sciatica Yes     Physician: Violeta Jiménez PA-C    Visit Date: 2/10/2020    Physician Orders: PT Eval and Treat   Medical Diagnosis from Referral:Chronic bilateral low back pain without sciatica [M54.5, G89.29]  Spondylosis of lumbar region without myelopathy or radiculopathy [M47.816]  Spinal stenosis of lumbar region without neurogenic claudication [M48.061]  Evaluation Date: 12/3/2019  Authorization Period Expiration: 2020  Plan of Care Expiration: 3/7/20  Reassessment Due: 3/5/2020  Visit # / Visits authorized:      Time In: 7:00  Time Out: 8:00  Total Billable Time: 45 minutes     Precautions: R knee scope, L meniscus tears, DDD, HTN     Pattern of pain determined: 1 PEN      Subjective   Josefina states she is not having any back pain today. States she was able to walk throughout weekend without back pain, mild knee pain. Will get second injection soon.     Patient reports tolerating previous visit fair, with moderate soreness that last approximately one day.   Patient reports their pain to be 0/10 on a 0-10 scale with 0 being no pain and 10 being the worst pain imaginable.  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       Objective     Baseline Isometric Testing on Med X equipment: Testing administered by PT  Date of testin2019  ROM 0-48 deg   Max Peak Torque 120    Min Peak Torque 27    Flex/Ext Ratio 4.44:1   % below normative data 33     Baseline Isometric Testing on Med X equipment: Testing administered by PT  Date of testin/15/2020  ROM 0-51 deg    Max Peak Torque 197   Min Peak Torque 59   Flex/Ext Ratio 3.33:1   % above normative data 3     Outcomes:  Oswestry Questionnaire Review 1/15/2020 12/3/2019 11/18/2019   Pain Intensity 0 1 2   Personal Care (Washing, Dressing) 0 1 0   Lifting 2 2 4   Walking 0 1 0   Sitting 0 0 2   Standing 2 2 2   Sleeping 0 2 1   Social Life 2 2 0   Traveling 0 1 0   Changing Degree of Pain 0 1 2   Score 6 13 13       Treatment    Pt was instructed in and performed the following:     Josefina received therapeutic exercises to develop/improved posture, cardiovascular endurance, muscular endurance, lumbar/cervical ROM, strength and muscular endurance for 60 minutes including the following exercises:   [facility] 2/10/2020   Visit Number 14   VAS Pain Rating 0   Recumbent Bike Seat Pos. -   Time 10   Flexion in Lying 10   Manual Therapy -   Lumbar Extension Seat Pad -   Femur Restraint -   Top Dead Center -   Counterweight -   Lumbar Flexion -   Lumbar Extension -   Lumbar Peak Torque -   Min Torque -   Test Percent Below Normative Data -   Lumbar Weight 61   Repetitions 20   Rating of Perceived Exertion 3   Ice - Z Lie (in min.) 10     Exercises completed today:  Double knee to chest 10x  Posterior pelvic tilt with LE extension 10x  Piriformis stretch 3x 20 sec   Prone Glute set c/ prone leg lift 10x  Side lying hip abd 15x  Prone on Elbows 2x30 sec    Supine straight leg raise 15x    Bridges 10x (Hold due to knee pain)    Peripheral muscle strengthening which included 1 set of 15-20 repetitions at a slow, controlled 10-13 second per rep pace focused on strengthening supporting musculature for improved body mechanics and functional mobility.  Pt and therapist focused on proper form during treatment to ensure optimal strengthening of each targeted muscle group.  Machines were utilized including torso rotation, leg extension, leg curl, chest press, upright row. Tricep extension, bicep curl, leg press, and hip abduction added  "visit 3    Josefina received the following manual therapy techniques: none        Home Exercises Provided and Patient Education Provided     Education provided:   - Patient received education in benefits of progressing mobility and strengthening gradually  - Discussed exertion scale, slow progressive resistance protocol to promote safe and healthy strengthening of supportive musculature  by performing 15-20 reps, 7 sec per rep, and increasing weight by 5 % at 20 reps only if there ex done safely, slowly, using correct musculature, exertion and no pain.  Patient expressed understanding.  -Pt educated on strategies to safely perform examination and exercise using "Stop Light" analogy to describe how to avoid or stop irritating motions, proceed with caution with some movements, and progress positive exercises.     Written Home Exercises Provided: Patient instructed to cont prior HEP.  Exercises were reviewed and Josefina was able to demonstrate them prior to the end of the session.  Josefina demonstrated good  understanding of the education provided.   Seated Posterior pelvic tilt   Added Non weight bearing LE strengthening exercises  See EMR under Patient Instructions for exercises provided prior visit.    Assessment     Pt able to complete all components of session with no adverse effects. Increased repetitions on medx machine with appropriate RPE.  Patient is making good progress towards established goals.  Pt will continue to benefit from skilled outpatient physical therapy to address the deficits stated in the impairment chart, provide pt/family education and to maximize pt's level of independence in the home and community environment.     Anticipated Barriers for therapy: Schedule  Pt's spiritual, cultural and educational needs considered and pt agreeable to plan of care and goals as stated below:       GOALS: Pt is in agreement with the following goals.     Short term goals:  6 weeks or 10 visits   1.  Pt will " demonstrate increased lumbar ROM by at least 3 degrees from the initial ROM value with improvements noted in functional ROM and ability to perform ADLs.  (MET 1/15/2020)  2.  Pt will demonstrate increased maximum isometric torque value by 15% when compared to the initial value resulting in improved ability to perform bending, lifting, and carrying activities safely, confidently.  (MET 1/15/2020)  3.  Patient report a reduction in worst pain score by 1-2 points for improved tolerance for lifting items off the ground.  (MET 2/7/2020)  4.  Pt able to perform HEP correctly with minimal cueing or supervision from therapist to encourage independent management of symptoms.   (MET 2/7/2020)        Long term goals: 10 weeks or 20 visits   1. Pt will demonstrate increased lumbar ROM by at least 6 degrees from initial ROM value, resulting in improved ability to perform functional fwd bending while standing and sitting.   (PROGRESSING, NOT MET)  2. Pt will demonstrate increased maximum isometric torque value by 30% when compared to the initial value resulting in improved ability to perform bending, lifting, and carrying activities safely, confidently.  (PROGRESSING, NOT MET)  3. Pt to demonstrate ability to independently control and reduce their pain through posture positioning and mechanical movements throughout a typical day.  (PROGRESSING, NOT MET)  4.  Pt will demonstrate reduced pain and improved functional outcomes as reported on the Oswestry Disability Index by reaching a score of 8 or less in order to demonstrate subjective improvement in pt's condition.    (PROGRESSING, NOT MET)  5. Pt will demonstrate independence with the HEP at discharge  (PROGRESSING, NOT MET)  6.  Pt will be able to turn over from lying on her stomach without increased pain  (PROGRESSING, NOT MET)        Plan   Continue with established Plan of Care towards established PT goals.

## 2022-04-21 DIAGNOSIS — G47.00 INSOMNIA, UNSPECIFIED TYPE: ICD-10-CM

## 2022-04-21 RX ORDER — ZOLPIDEM TARTRATE 5 MG/1
5 TABLET ORAL NIGHTLY PRN
Qty: 30 TABLET | Refills: 1 | Status: SHIPPED | OUTPATIENT
Start: 2022-04-21 | End: 2022-07-17 | Stop reason: SDUPTHER

## 2022-05-06 ENCOUNTER — IMMUNIZATION (OUTPATIENT)
Dept: PRIMARY CARE CLINIC | Facility: CLINIC | Age: 62
End: 2022-05-06
Payer: COMMERCIAL

## 2022-05-06 DIAGNOSIS — Z23 NEED FOR VACCINATION: Primary | ICD-10-CM

## 2022-05-06 PROCEDURE — 91305 COVID-19, MRNA, LNP-S, PF, 30 MCG/0.3 ML DOSE VACCINE (PFIZER): CPT | Mod: PBBFAC | Performed by: EMERGENCY MEDICINE

## 2022-05-09 ENCOUNTER — LAB VISIT (OUTPATIENT)
Dept: LAB | Facility: HOSPITAL | Age: 62
End: 2022-05-09
Attending: INTERNAL MEDICINE
Payer: COMMERCIAL

## 2022-05-09 ENCOUNTER — OFFICE VISIT (OUTPATIENT)
Dept: INTERNAL MEDICINE | Facility: CLINIC | Age: 62
End: 2022-05-09
Payer: COMMERCIAL

## 2022-05-09 VITALS
BODY MASS INDEX: 31.69 KG/M2 | HEIGHT: 69 IN | DIASTOLIC BLOOD PRESSURE: 74 MMHG | SYSTOLIC BLOOD PRESSURE: 122 MMHG | WEIGHT: 213.94 LBS | TEMPERATURE: 99 F | HEART RATE: 67 BPM | OXYGEN SATURATION: 98 %

## 2022-05-09 DIAGNOSIS — K76.0 FATTY LIVER: ICD-10-CM

## 2022-05-09 DIAGNOSIS — E78.2 MIXED HYPERLIPIDEMIA: ICD-10-CM

## 2022-05-09 DIAGNOSIS — F51.01 PRIMARY INSOMNIA: ICD-10-CM

## 2022-05-09 DIAGNOSIS — Z00.00 ANNUAL PHYSICAL EXAM: Primary | ICD-10-CM

## 2022-05-09 DIAGNOSIS — M54.17 LUMBOSACRAL RADICULOPATHY: ICD-10-CM

## 2022-05-09 DIAGNOSIS — Z00.00 ANNUAL PHYSICAL EXAM: ICD-10-CM

## 2022-05-09 DIAGNOSIS — F41.8 SITUATIONAL ANXIETY: ICD-10-CM

## 2022-05-09 DIAGNOSIS — E53.8 VITAMIN B12 DEFICIENCY: ICD-10-CM

## 2022-05-09 DIAGNOSIS — Z12.31 ENCOUNTER FOR SCREENING MAMMOGRAM FOR MALIGNANT NEOPLASM OF BREAST: ICD-10-CM

## 2022-05-09 DIAGNOSIS — I10 BENIGN ESSENTIAL HYPERTENSION: ICD-10-CM

## 2022-05-09 DIAGNOSIS — R73.03 PREDIABETES: ICD-10-CM

## 2022-05-09 DIAGNOSIS — N95.2 ATROPHIC VAGINITIS: ICD-10-CM

## 2022-05-09 DIAGNOSIS — Z85.810 HISTORY OF TONGUE CANCER: ICD-10-CM

## 2022-05-09 PROBLEM — R60.9 EDEMA: Status: RESOLVED | Noted: 2020-07-01 | Resolved: 2022-05-09

## 2022-05-09 PROBLEM — M17.12 PRIMARY OSTEOARTHRITIS OF LEFT KNEE: Status: RESOLVED | Noted: 2020-07-08 | Resolved: 2022-05-09

## 2022-05-09 PROBLEM — M25.562 CHRONIC PAIN OF BOTH KNEES: Status: RESOLVED | Noted: 2019-06-06 | Resolved: 2022-05-09

## 2022-05-09 PROBLEM — G89.29 CHRONIC PAIN: Status: RESOLVED | Noted: 2019-09-19 | Resolved: 2022-05-09

## 2022-05-09 PROBLEM — H00.024 HORDEOLUM INTERNUM OF LEFT UPPER EYELID: Status: RESOLVED | Noted: 2020-01-27 | Resolved: 2022-05-09

## 2022-05-09 PROBLEM — M54.50 CHRONIC BILATERAL LOW BACK PAIN WITHOUT SCIATICA: Status: RESOLVED | Noted: 2019-07-22 | Resolved: 2022-05-09

## 2022-05-09 PROBLEM — M17.11 PRIMARY OSTEOARTHRITIS OF RIGHT KNEE: Status: RESOLVED | Noted: 2020-11-11 | Resolved: 2022-05-09

## 2022-05-09 PROBLEM — R11.0 NAUSEA: Status: RESOLVED | Noted: 2020-07-01 | Resolved: 2022-05-09

## 2022-05-09 PROBLEM — G89.29 CHRONIC BILATERAL LOW BACK PAIN WITHOUT SCIATICA: Status: RESOLVED | Noted: 2019-07-22 | Resolved: 2022-05-09

## 2022-05-09 PROBLEM — Z79.02 LONG TERM (CURRENT) USE OF ANTITHROMBOTICS/ANTIPLATELETS: Status: RESOLVED | Noted: 2020-07-01 | Resolved: 2022-05-09

## 2022-05-09 PROBLEM — Z74.09 POOR MOBILITY: Status: RESOLVED | Noted: 2019-12-03 | Resolved: 2022-05-09

## 2022-05-09 PROBLEM — R29.898 DECREASED STRENGTH OF TRUNK AND BACK: Status: RESOLVED | Noted: 2021-05-26 | Resolved: 2022-05-09

## 2022-05-09 PROBLEM — M25.521 RIGHT ELBOW PAIN: Status: RESOLVED | Noted: 2021-07-14 | Resolved: 2022-05-09

## 2022-05-09 PROBLEM — Z97.3 WEARS CONTACT LENSES: Status: RESOLVED | Noted: 2022-01-05 | Resolved: 2022-05-09

## 2022-05-09 PROBLEM — G89.29 CHRONIC PAIN OF BOTH KNEES: Status: RESOLVED | Noted: 2019-06-06 | Resolved: 2022-05-09

## 2022-05-09 PROBLEM — M25.561 RIGHT KNEE PAIN: Status: RESOLVED | Noted: 2020-11-13 | Resolved: 2022-05-09

## 2022-05-09 PROBLEM — Z12.11 SCREEN FOR COLON CANCER: Status: RESOLVED | Noted: 2021-06-14 | Resolved: 2022-05-09

## 2022-05-09 PROBLEM — B95.8 STAPH INFECTION: Status: RESOLVED | Noted: 2020-07-01 | Resolved: 2022-05-09

## 2022-05-09 PROBLEM — M25.561 CHRONIC PAIN OF BOTH KNEES: Status: RESOLVED | Noted: 2019-06-06 | Resolved: 2022-05-09

## 2022-05-09 PROBLEM — H00.14 CHALAZION OF LEFT UPPER EYELID: Status: RESOLVED | Noted: 2020-01-27 | Resolved: 2022-05-09

## 2022-05-09 PROBLEM — M25.561 CHRONIC PAIN OF BOTH KNEES: Status: ACTIVE | Noted: 2019-06-06

## 2022-05-09 PROBLEM — M53.86 DECREASED RANGE OF MOTION OF LUMBAR SPINE: Status: RESOLVED | Noted: 2021-05-26 | Resolved: 2022-05-09

## 2022-05-09 PROBLEM — M17.0 BILATERAL PRIMARY OSTEOARTHRITIS OF KNEE: Status: RESOLVED | Noted: 2020-01-21 | Resolved: 2022-05-09

## 2022-05-09 PROBLEM — R79.89 ELEVATED LFTS: Status: RESOLVED | Noted: 2020-07-01 | Resolved: 2022-05-09

## 2022-05-09 PROBLEM — F40.243 ANXIETY WITH FLYING: Status: ACTIVE | Noted: 2022-05-09

## 2022-05-09 LAB
ALBUMIN SERPL BCP-MCNC: 4.4 G/DL (ref 3.5–5.2)
ALP SERPL-CCNC: 51 U/L (ref 55–135)
ALT SERPL W/O P-5'-P-CCNC: 39 U/L (ref 10–44)
ANION GAP SERPL CALC-SCNC: 10 MMOL/L (ref 8–16)
AST SERPL-CCNC: 28 U/L (ref 10–40)
BASOPHILS # BLD AUTO: 0.04 K/UL (ref 0–0.2)
BASOPHILS NFR BLD: 0.9 % (ref 0–1.9)
BILIRUB SERPL-MCNC: 0.6 MG/DL (ref 0.1–1)
BUN SERPL-MCNC: 14 MG/DL (ref 8–23)
CALCIUM SERPL-MCNC: 9.9 MG/DL (ref 8.7–10.5)
CHLORIDE SERPL-SCNC: 104 MMOL/L (ref 95–110)
CHOLEST SERPL-MCNC: 228 MG/DL (ref 120–199)
CHOLEST/HDLC SERPL: 5.7 {RATIO} (ref 2–5)
CO2 SERPL-SCNC: 26 MMOL/L (ref 23–29)
CREAT SERPL-MCNC: 0.8 MG/DL (ref 0.5–1.4)
DIFFERENTIAL METHOD: NORMAL
EOSINOPHIL # BLD AUTO: 0.1 K/UL (ref 0–0.5)
EOSINOPHIL NFR BLD: 2 % (ref 0–8)
ERYTHROCYTE [DISTWIDTH] IN BLOOD BY AUTOMATED COUNT: 12.9 % (ref 11.5–14.5)
EST. GFR  (AFRICAN AMERICAN): >60 ML/MIN/1.73 M^2
EST. GFR  (NON AFRICAN AMERICAN): >60 ML/MIN/1.73 M^2
ESTIMATED AVG GLUCOSE: 123 MG/DL (ref 68–131)
GLUCOSE SERPL-MCNC: 123 MG/DL (ref 70–110)
HBA1C MFR BLD: 5.9 % (ref 4–5.6)
HCT VFR BLD AUTO: 40.3 % (ref 37–48.5)
HDLC SERPL-MCNC: 40 MG/DL (ref 40–75)
HDLC SERPL: 17.5 % (ref 20–50)
HGB BLD-MCNC: 13.1 G/DL (ref 12–16)
IMM GRANULOCYTES # BLD AUTO: 0 K/UL (ref 0–0.04)
IMM GRANULOCYTES NFR BLD AUTO: 0 % (ref 0–0.5)
LDLC SERPL CALC-MCNC: 157.8 MG/DL (ref 63–159)
LYMPHOCYTES # BLD AUTO: 2 K/UL (ref 1–4.8)
LYMPHOCYTES NFR BLD: 43.3 % (ref 18–48)
MCH RBC QN AUTO: 27.6 PG (ref 27–31)
MCHC RBC AUTO-ENTMCNC: 32.5 G/DL (ref 32–36)
MCV RBC AUTO: 85 FL (ref 82–98)
MONOCYTES # BLD AUTO: 0.4 K/UL (ref 0.3–1)
MONOCYTES NFR BLD: 8.2 % (ref 4–15)
NEUTROPHILS # BLD AUTO: 2.1 K/UL (ref 1.8–7.7)
NEUTROPHILS NFR BLD: 45.6 % (ref 38–73)
NONHDLC SERPL-MCNC: 188 MG/DL
NRBC BLD-RTO: 0 /100 WBC
PLATELET # BLD AUTO: 228 K/UL (ref 150–450)
PMV BLD AUTO: 11.3 FL (ref 9.2–12.9)
POTASSIUM SERPL-SCNC: 4.6 MMOL/L (ref 3.5–5.1)
PROT SERPL-MCNC: 7.1 G/DL (ref 6–8.4)
RBC # BLD AUTO: 4.74 M/UL (ref 4–5.4)
SODIUM SERPL-SCNC: 140 MMOL/L (ref 136–145)
TRIGL SERPL-MCNC: 151 MG/DL (ref 30–150)
TSH SERPL DL<=0.005 MIU/L-ACNC: 0.95 UIU/ML (ref 0.4–4)
VIT B12 SERPL-MCNC: 676 PG/ML (ref 210–950)
WBC # BLD AUTO: 4.5 K/UL (ref 3.9–12.7)

## 2022-05-09 PROCEDURE — 80053 COMPREHEN METABOLIC PANEL: CPT | Performed by: INTERNAL MEDICINE

## 2022-05-09 PROCEDURE — 99396 PREV VISIT EST AGE 40-64: CPT | Mod: S$GLB,,, | Performed by: INTERNAL MEDICINE

## 2022-05-09 PROCEDURE — 3078F PR MOST RECENT DIASTOLIC BLOOD PRESSURE < 80 MM HG: ICD-10-PCS | Mod: CPTII,S$GLB,, | Performed by: INTERNAL MEDICINE

## 2022-05-09 PROCEDURE — 1159F PR MEDICATION LIST DOCUMENTED IN MEDICAL RECORD: ICD-10-PCS | Mod: CPTII,S$GLB,, | Performed by: INTERNAL MEDICINE

## 2022-05-09 PROCEDURE — 36415 COLL VENOUS BLD VENIPUNCTURE: CPT | Performed by: INTERNAL MEDICINE

## 2022-05-09 PROCEDURE — 99396 PR PREVENTIVE VISIT,EST,40-64: ICD-10-PCS | Mod: S$GLB,,, | Performed by: INTERNAL MEDICINE

## 2022-05-09 PROCEDURE — 3074F SYST BP LT 130 MM HG: CPT | Mod: CPTII,S$GLB,, | Performed by: INTERNAL MEDICINE

## 2022-05-09 PROCEDURE — 3074F PR MOST RECENT SYSTOLIC BLOOD PRESSURE < 130 MM HG: ICD-10-PCS | Mod: CPTII,S$GLB,, | Performed by: INTERNAL MEDICINE

## 2022-05-09 PROCEDURE — 84443 ASSAY THYROID STIM HORMONE: CPT | Performed by: INTERNAL MEDICINE

## 2022-05-09 PROCEDURE — 3008F BODY MASS INDEX DOCD: CPT | Mod: CPTII,S$GLB,, | Performed by: INTERNAL MEDICINE

## 2022-05-09 PROCEDURE — 85025 COMPLETE CBC W/AUTO DIFF WBC: CPT | Performed by: INTERNAL MEDICINE

## 2022-05-09 PROCEDURE — 99999 PR PBB SHADOW E&M-EST. PATIENT-LVL III: CPT | Mod: PBBFAC,,, | Performed by: INTERNAL MEDICINE

## 2022-05-09 PROCEDURE — 83036 HEMOGLOBIN GLYCOSYLATED A1C: CPT | Performed by: INTERNAL MEDICINE

## 2022-05-09 PROCEDURE — 80061 LIPID PANEL: CPT | Performed by: INTERNAL MEDICINE

## 2022-05-09 PROCEDURE — 4010F ACE/ARB THERAPY RXD/TAKEN: CPT | Mod: CPTII,S$GLB,, | Performed by: INTERNAL MEDICINE

## 2022-05-09 PROCEDURE — 3078F DIAST BP <80 MM HG: CPT | Mod: CPTII,S$GLB,, | Performed by: INTERNAL MEDICINE

## 2022-05-09 PROCEDURE — 4010F PR ACE/ARB THEARPY RXD/TAKEN: ICD-10-PCS | Mod: CPTII,S$GLB,, | Performed by: INTERNAL MEDICINE

## 2022-05-09 PROCEDURE — 99999 PR PBB SHADOW E&M-EST. PATIENT-LVL III: ICD-10-PCS | Mod: PBBFAC,,, | Performed by: INTERNAL MEDICINE

## 2022-05-09 PROCEDURE — 1159F MED LIST DOCD IN RCRD: CPT | Mod: CPTII,S$GLB,, | Performed by: INTERNAL MEDICINE

## 2022-05-09 PROCEDURE — 82607 VITAMIN B-12: CPT | Performed by: INTERNAL MEDICINE

## 2022-05-09 PROCEDURE — 3008F PR BODY MASS INDEX (BMI) DOCUMENTED: ICD-10-PCS | Mod: CPTII,S$GLB,, | Performed by: INTERNAL MEDICINE

## 2022-05-09 RX ORDER — ALPRAZOLAM 0.25 MG/1
0.25 TABLET ORAL NIGHTLY PRN
Qty: 30 TABLET | Refills: 2 | Status: SHIPPED | OUTPATIENT
Start: 2022-05-09 | End: 2022-12-06 | Stop reason: SDUPTHER

## 2022-05-09 RX ORDER — IRBESARTAN 75 MG/1
75 TABLET ORAL NIGHTLY
Qty: 90 TABLET | Refills: 3 | Status: SHIPPED | OUTPATIENT
Start: 2022-05-09 | End: 2022-09-08

## 2022-05-09 RX ORDER — TRIAMTERENE/HYDROCHLOROTHIAZID 37.5-25 MG
1 TABLET ORAL DAILY
Qty: 90 TABLET | Refills: 3 | Status: SHIPPED | OUTPATIENT
Start: 2022-05-09 | End: 2023-07-05 | Stop reason: SDUPTHER

## 2022-05-09 NOTE — PROGRESS NOTES
[facility]    Chief Complaint      Chief Complaint   Patient presents with    Establish Care     History of Present Illness      Josefina Blue is a 61 y.o. female who presents today for establish care.  Patient comes to appointment alone.  Ortho: Isabell (knees), Derm: Kerisit/Mermilliod, Pain: Eissa, OBGYN: Myers    Problem List Items Addressed This Visit     Benign essential hypertension    Current Assessment & Plan     BP controlled on irbesartan 75 mg daily and Maxzide.  No CP/SOB/HA.           Relevant Medications    irbesartan (AVAPRO) 75 MG tablet    triamterene-hydrochlorothiazide 37.5-25 mg (MAXZIDE-25) 37.5-25 mg per tablet    Hyperlipidemia    Current Assessment & Plan     Stable on no meds. Previously on Zocor or Lipitor with leg cramps.  Family hx of HLD.  The ASCVD Risk score (Ayeshayuliya VARGAS JrMarci, et al., 2013) failed to calculate for the following reasons:    Cannot find a previous HDL lab    Cannot find a previous total cholesterol lab             History of tongue cancer    Current Assessment & Plan     SCC of the L tongue s/p L partial glossectomy in 2013 (done at [place], was living in [place] at the time, Dr. Parker) and excision of tongue lesion and L sialodochoplasty. Saw Dr. Marroquin last 4/2018 for scar revision.           Fatty liver    Current Assessment & Plan     LFT's stable on 11/2020 labs.           Lumbosacral radiculopathy    Current Assessment & Plan     H/o ESIs w/ Dr. Kerns - last one 5/10/21 R L4/5 and L5/S1 NOE.  Doing really well, playing tennis.  Lost some weight which helps.  Takes Celebrex PRN.           Prediabetes    Current Assessment & Plan     A1C 6.1 in 7/2020, strong family hx of Diabetes.           Primary insomnia    Current Assessment & Plan     Takes melatonin or Ambien PRN. Doesn't take every night, sometimes takes 1/2 tablet which works well for her.           Situational anxiety    Current Assessment & Plan     Stable on xanax PRN, works as system  CNO.           Relevant Medications    ALPRAZolam (XANAX) 0.25 MG tablet    Atrophic vaginitis    Current Assessment & Plan     Stable on premarin, sees Dr. Myers.             Other Visit Diagnoses     Annual physical exam    -  Primary    Relevant Orders    CBC Auto Differential    Lipid Panel    Comprehensive Metabolic Panel    Hemoglobin A1C    TSH    Vitamin B12 deficiency        Relevant Orders    Vitamin B12    Encounter for screening mammogram for malignant neoplasm of breast        Relevant Orders    Mammo Digital Screening Bilat w/ [facility]          Health Maintenance   Topic Date Due    Mammogram  06/12/2022    TETANUS VACCINE  01/01/2023    Lipid Panel  04/08/2024    Hepatitis C Screening  Completed    DEXA Scan  Discontinued       Past Medical History:   Diagnosis Date    DDD (degenerative disc disease), lumbar 10/7/2019    Hyperlipidemia 8/31/2015    Hypertension     IFG (impaired fasting glucose) 8/31/2015    Neuropathic pain 8/31/2015    Squamous cell cancer of tongue 2012       Past Surgical History:   Procedure Laterality Date    CHOLECYSTECTOMY      COLONOSCOPY N/A 06/14/2021    Procedure: COLONOSCOPY;  Surgeon: Gentry Dickson MD;  Location: [address]);  Service: Endoscopy;  Laterality: N/A;  covid test 6/11 primary care, instructions sent to [facility] 6/11 Pt. fully vaccinated. Completed in Jan. 2021.[facility]    GALLBLADDER SURGERY  06/2016    HYSTERECTOMY      JOINT REPLACEMENT  2020    KNEE ARTHROPLASTY Left 07/08/2020    Procedure: ARTHROPLASTY, KNEE;  Surgeon: GLENN Whyte MD;  Location[facility];  Service: Orthopedics;  Laterality: Left;  regional w/catheter   Spinal, Adductor  Cloidine/Epi/Ketorolac/Ropivacaine Injection 30cc      KNEE ARTHROSCOPY Right     for torn meniscus    TONGUE SURGERY      TOTAL KNEE ARTHROPLASTY Right 11/11/2020    Procedure: ARTHROPLASTY, KNEE, TOTAL;  Surgeon: GLENN Whyte MD;  Location: [facility] OR;  Service: Orthopedics;   Laterality: Right;  outpatient with 23hr observation  regional with cathter- spinal and adductor    TRANSFORAMINAL EPIDURAL INJECTION OF STEROID Bilateral 09/19/2019    Procedure: Injection,steroid,epidural,transforaminal approach LUMBAR TRANSFORAMINAL BILATERAL L4/5 TF NOE;  Surgeon: Tim Kerns MD;  Location: [facility];  Service: Pain Management;  Laterality: Bilateral;  NEEDS CONSENT, VIP    TRANSFORAMINAL EPIDURAL INJECTION OF STEROID Bilateral 10/07/2019    Procedure: LUMBAR TRANSFORAMINAL BILATERAL L4/5 TF NOE;  Surgeon: Tim Kerns MD;  Location: [facility];  Service: Pain Management;  Laterality: Bilateral;  NEEDS CONSENT, **VIP**    TRANSFORAMINAL EPIDURAL INJECTION OF STEROID Bilateral 03/22/2021    Procedure: LUMBAR TRANSFORAMINAL BILATERAL L4/5 DIRECT REFERRAL;  Surgeon: Tim Kerns MD;  Location: [facility];  Service: Pain Management;  Laterality: Bilateral;  NEEDS CONSENT, URGENT  *VIP*    TRANSFORAMINAL EPIDURAL INJECTION OF STEROID Right 05/10/2021    Procedure: LUMBAR TRANSFORAMINAL RIGHT L4/5, L5/S1 DIRECT REFERRAL;  Surgeon: Tim Kerns MD;  Location: [facility];  Service: Pain Management;  Laterality: Right;  NEEDS CONSENT, MEDICALLY URGENT  **VIP**       family history includes ALS in her sister; Alcohol abuse in her mother; Cancer (age of onset: 74) in her father; Cirrhosis in her mother; Diabetes in her father and sister; Drug abuse in her brother; Heart disease in her brother and father; Hyperlipidemia in her brother and father; Hypertension in her sister; No Known Problems in her maternal aunt, maternal grandfather, maternal grandmother, maternal uncle, paternal aunt, paternal grandfather, paternal grandmother, and paternal uncle.    Social History     Tobacco Use    Smoking status: Former Smoker     Packs/day: 0.25     Years: 1.00     Pack years: 0.25    Smokeless tobacco: Never Used   Substance Use Topics    Alcohol use: Yes     Alcohol/week: 1.0 standard drink      Types: 1 Drinks containing 0.5 oz of alcohol per week     Comment: once a week    Drug use: No       Review of Systems   Constitutional: Negative for chills and fever.   HENT: Negative for sore throat.    Respiratory: Negative for cough and shortness of breath.    Cardiovascular: Negative for chest pain and palpitations.   Gastrointestinal: Negative for constipation, diarrhea, nausea and vomiting.   Genitourinary: Negative for dysuria and hematuria.   Musculoskeletal: Negative for falls.   Neurological: Negative for headaches.        Outpatient Encounter Medications as of 5/9/2022   Medication Sig Dispense Refill    aspirin (ECOTRIN) 81 MG EC tablet Take 1 tablet (81 mg total) by mouth nightly.  0    celecoxib (CELEBREX) 200 MG capsule Take 1 capsule (200 mg total) by mouth once daily. 40 capsule 2    clobetasol (TEMOVATE) 0.05 % external solution Use one to two times daily as needed for scaling or itching to scalp 60 mL 3    diclofenac sodium (VOLTAREN) 1 % Gel Apply 2 g topically 2 (two) times daily. 1 Tube 0    estradioL (ESTRACE) 0.01 % (0.1 mg/gram) vaginal cream Place 2 gram vaginally once daily. 42.5 g 1    melatonin 3 mg TbDL Take by mouth.      zolpidem (AMBIEN) 5 MG Tab Take 1 tablet (5 mg total) by mouth nightly as needed (insomnia). 30 tablet 1    [DISCONTINUED] ALPRAZolam (XANAX) 0.25 MG tablet Take 1 tablet (0.25 mg total) by mouth nightly as needed. 30 tablet 2    [DISCONTINUED] irbesartan (AVAPRO) 75 MG tablet Take 1 tablet (75 mg total) by mouth every evening. 90 tablet 3    [DISCONTINUED] triamterene-hydrochlorothiazide 37.5-25 mg (MAXZIDE-25) 37.5-25 mg per tablet Take 1 tablet by mouth once daily. 90 tablet 3    ALPRAZolam (XANAX) 0.25 MG tablet Take 1 tablet (0.25 mg total) by mouth nightly as needed for Anxiety. 30 tablet 2    irbesartan (AVAPRO) 75 MG tablet Take 1 tablet (75 mg total) by mouth every evening. 90 tablet 3    triamterene-hydrochlorothiazide 37.5-25 mg (MAXZIDE-25)  37.5-25 mg per tablet Take 1 tablet by mouth once daily. 90 tablet 3    [DISCONTINUED] albuterol (VENTOLIN HFA) 90 mcg/actuation inhaler Inhale 2 puffs into the lungs every 6 (six) hours as needed for Wheezing. Rescue 18 g 4    [DISCONTINUED] doxycycline (VIBRAMYCIN) 100 MG Cap Take 1 capsule by mouth once a day with food and not within 1 hour prior to lying down (Patient not taking: Reported on 5/9/2022) 30 capsule 1    [DISCONTINUED] fluticasone-salmeterol diskus inhaler 250-50 mcg Inhale 1 puff into the lungs 2 (two) times daily. (Controller) 60 each 3     Facility-Administered Encounter Medications as of 5/9/2022   Medication Dose Route Frequency Provider Last Rate Last Admin    [DISCONTINUED] acetaminophen tablet 650 mg  650 mg Oral Once PRN Saud Bennett MD        [DISCONTINUED] albuterol inhaler 2 puff  2 puff Inhalation Q20 Min PRN Saud Bennett MD        [DISCONTINUED] celecoxib capsule 400 mg  400 mg Oral Once Ernie Anders MD        [DISCONTINUED] diphenhydrAMINE injection 25 mg  25 mg Intravenous Once PRN Saud Bennett MD        [DISCONTINUED] EPINEPHrine (EPIPEN) 0.3 mg/0.3 mL pen injection 0.3 mg  0.3 mg Intramuscular PRN Saud Bennett MD        [DISCONTINUED] fentaNYL injection 25 mcg  25 mcg Intravenous Q5 Min PRN Ernie Anders MD   100 mcg at 11/11/20 0750    [DISCONTINUED] methylPREDNISolone sodium succinate injection 40 mg  40 mg Intravenous Once PRN Saud Bennett MD        [DISCONTINUED] midazolam (VERSED) 1 mg/mL injection 0.5 mg  0.5 mg Intravenous PRN Ernie Anders MD        [DISCONTINUED] ondansetron disintegrating tablet 4 mg  4 mg Oral Once PRN Saud Bennett MD        [DISCONTINUED] sodium chloride 0.9% 500 mL flush bag   Intravenous PRN Saud Bennett MD        [DISCONTINUED] sodium chloride 0.9% flush 10 mL  10 mL Intravenous PRN Saud Bennett MD            Review of patient's allergies indicates:  "  Allergen Reactions    Aspirin Nausea Only     Can take low dose of Aspirin; can take buffered low dose aspirin only    Codeine Nausea Only     Can take Codeine if she takes a dose of Zofran with it       Physical Exam      Vital Signs  Temp: 98.7 °F (37.1 °C)  Pulse: 67  SpO2: 98 %  BP: 122/74  Pain Score: 0-No pain  Height and Weight  Height: 5' 9" (175.3 cm)  Weight: 97 kg (213 lb 15.3 oz)  BSA (Calculated - sq m): 2.17 sq meters  BMI (Calculated): 31.6  Weight in (lb) to have BMI = 25: 168.9]    Physical Exam  Constitutional:       Appearance: She is well-developed.   HENT:      Head: Normocephalic and atraumatic.      Right Ear: External ear normal.      Left Ear: External ear normal.   Eyes:      General:         Right eye: No discharge.         Left eye: No discharge.   Cardiovascular:      Rate and Rhythm: Normal rate and regular rhythm.      Heart sounds: Normal heart sounds. No murmur heard.  Pulmonary:      Effort: Pulmonary effort is normal. No respiratory distress.      Breath sounds: Normal breath sounds.   Abdominal:      General: There is no distension.      Palpations: Abdomen is soft.      Tenderness: There is no abdominal tenderness. There is no guarding.   Musculoskeletal:         General: Normal range of motion.      Cervical back: Normal range of motion.   Skin:     General: Skin is warm and dry.   Neurological:      Mental Status: She is alert and oriented to person, place, and time.   Psychiatric:         Behavior: Behavior normal.          Laboratory:  CBC:  No results for input(s): WBC, RBC, HGB, HCT, PLT, MCV, MCH, MCHC in the last 2160 hours.  CMP:  No results for input(s): GLU, CALCIUM, ALBUMIN, PROT, NA, K, CO2, CL, BUN, ALKPHOS, ALT, AST, BILITOT in the last 2160 hours.    Invalid input(s): CREATININ  URINALYSIS:  No results for input(s): COLORU, CLARITYU, SPECGRAV, PHUR, PROTEINUA, GLUCOSEU, BILIRUBINCON, BLOODU, WBCU, RBCU, BACTERIA, MUCUS, NITRITE, LEUKOCYTESUR, UROBILINOGEN, " HYALINECASTS in the last 2160 hours.   LIPIDS:  No results for input(s): TSH, HDL, CHOL, TRIG, LDLCALC, CHOLHDL, NONHDLCHOL, TOTALCHOLEST in the last 2160 hours.  TSH:  No results for input(s): TSH in the last 2160 hours.  A1C:  No results for input(s): HGBA1C in the last 2160 hours.    Radiology:  No results found in the last 30 days.     Assessment/Plan     Josefina Blue is a 61 y.o.female with:    1. Annual physical exam  - CBC Auto Differential; Future  - Lipid Panel; Future  - Comprehensive Metabolic Panel; Future  - Hemoglobin A1C; Future  - TSH; Future    2. Mixed hyperlipidemia    3. History of tongue cancer    4. Benign essential hypertension  - irbesartan (AVAPRO) 75 MG tablet; Take 1 tablet (75 mg total) by mouth every evening.  Dispense: 90 tablet; Refill: 3  - triamterene-hydrochlorothiazide 37.5-25 mg (MAXZIDE-25) 37.5-25 mg per tablet; Take 1 tablet by mouth once daily.  Dispense: 90 tablet; Refill: 3    5. Fatty liver    6. Lumbosacral radiculopathy    7. Prediabetes    8. Primary insomnia    9. Situational anxiety  - ALPRAZolam (XANAX) 0.25 MG tablet; Take 1 tablet (0.25 mg total) by mouth nightly as needed for Anxiety.  Dispense: 30 tablet; Refill: 2    10. Vitamin B12 deficiency  - Vitamin B12; Future    11. Atrophic vaginitis    12. Encounter for screening mammogram for malignant neoplasm of breast  - Mammo Digital Screening Bilat w/ [facility]; Future    -check labs, counseled on continue exercise.  Counseled on low carb diet.   -MMG ordered  -Continue current medications and maintain follow up with specialists.    -Follow up in about 1 year (around 5/9/2023) for Annual Visit.       Nayeli Astudillo MD  [facility]

## 2022-05-09 NOTE — ASSESSMENT & PLAN NOTE
H/o ESIs w/ Dr. Kerns - last one 5/10/21 R L4/5 and L5/S1 NOE.  Doing really well, playing tennis.  Lost some weight which helps.  Takes Celebrex PRN.

## 2022-05-09 NOTE — ASSESSMENT & PLAN NOTE
Takes melatonin or Ambien PRN. Doesn't take every night, sometimes takes 1/2 tablet which works well for her.

## 2022-05-09 NOTE — ASSESSMENT & PLAN NOTE
SCC of the L tongue s/p L partial glossectomy in 2013 (done at [place], was living in [place] at the time, Dr. Parker) and excision of tongue lesion and L sialodochoplasty. Saw Dr. Marroquin last 4/2018 for scar revision.

## 2022-05-09 NOTE — ASSESSMENT & PLAN NOTE
Stable on no meds. Previously on Zocor or Lipitor with leg cramps.  Family hx of HLD.  The ASCVD Risk score (Ayeshayuliya VARGAS Jr., et al., 2013) failed to calculate for the following reasons:    Cannot find a previous HDL lab    Cannot find a previous total cholesterol lab

## 2022-05-10 ENCOUNTER — PATIENT MESSAGE (OUTPATIENT)
Dept: INTERNAL MEDICINE | Facility: CLINIC | Age: 62
End: 2022-05-10
Payer: COMMERCIAL

## 2022-05-10 DIAGNOSIS — E78.2 MIXED HYPERLIPIDEMIA: Primary | ICD-10-CM

## 2022-05-10 RX ORDER — PRAVASTATIN SODIUM 10 MG/1
10 TABLET ORAL DAILY
Qty: 90 TABLET | Refills: 3 | Status: SHIPPED | OUTPATIENT
Start: 2022-05-10 | End: 2023-09-01 | Stop reason: SDUPTHER

## 2022-05-24 RX ORDER — PREDNISONE 20 MG/1
20 TABLET ORAL DAILY
Qty: 30 TABLET | Refills: 1 | Status: SHIPPED | OUTPATIENT
Start: 2022-05-24 | End: 2022-09-08

## 2022-05-24 RX ORDER — CIPROFLOXACIN 500 MG/1
500 TABLET ORAL EVERY 12 HOURS
Qty: 6 TABLET | Refills: 0 | Status: SHIPPED | OUTPATIENT
Start: 2022-05-24 | End: 2022-05-27

## 2022-05-24 RX ORDER — AZITHROMYCIN 250 MG/1
TABLET, FILM COATED ORAL
Qty: 6 TABLET | Refills: 0 | Status: SHIPPED | OUTPATIENT
Start: 2022-05-24 | End: 2022-09-08

## 2022-07-17 DIAGNOSIS — G47.00 INSOMNIA, UNSPECIFIED TYPE: ICD-10-CM

## 2022-07-18 ENCOUNTER — PATIENT MESSAGE (OUTPATIENT)
Dept: SPORTS MEDICINE | Facility: CLINIC | Age: 62
End: 2022-07-18
Payer: COMMERCIAL

## 2022-07-18 DIAGNOSIS — Z96.653 S/P TOTAL KNEE ARTHROPLASTY, BILATERAL: Primary | ICD-10-CM

## 2022-07-18 RX ORDER — ZOLPIDEM TARTRATE 5 MG/1
5 TABLET ORAL NIGHTLY PRN
Qty: 30 TABLET | Refills: 1 | Status: SHIPPED | OUTPATIENT
Start: 2022-07-18 | End: 2022-12-06 | Stop reason: SDUPTHER

## 2022-07-18 RX ORDER — CELECOXIB 200 MG/1
200 CAPSULE ORAL DAILY
Qty: 60 CAPSULE | Refills: 2 | Status: SHIPPED | OUTPATIENT
Start: 2022-07-18 | End: 2023-08-12 | Stop reason: SDUPTHER

## 2022-08-02 ENCOUNTER — PATIENT MESSAGE (OUTPATIENT)
Dept: INTERNAL MEDICINE | Facility: CLINIC | Age: 62
End: 2022-08-02
Payer: COMMERCIAL

## 2022-08-02 ENCOUNTER — PATIENT MESSAGE (OUTPATIENT)
Dept: OPTOMETRY | Facility: CLINIC | Age: 62
End: 2022-08-02
Payer: COMMERCIAL

## 2022-08-02 DIAGNOSIS — R73.03 PREDIABETES: ICD-10-CM

## 2022-08-02 DIAGNOSIS — E78.2 MIXED HYPERLIPIDEMIA: Primary | ICD-10-CM

## 2022-08-03 ENCOUNTER — PATIENT MESSAGE (OUTPATIENT)
Dept: INTERNAL MEDICINE | Facility: CLINIC | Age: 62
End: 2022-08-03
Payer: COMMERCIAL

## 2022-08-04 DIAGNOSIS — N95.2 ATROPHIC VAGINITIS: ICD-10-CM

## 2022-08-04 RX ORDER — ESTRADIOL 0.1 MG/G
2 CREAM VAGINAL DAILY
Qty: 42.5 G | Refills: 1 | Status: CANCELLED | OUTPATIENT
Start: 2022-08-04 | End: 2023-08-04

## 2022-08-05 RX ORDER — ESTRADIOL 0.1 MG/G
2 CREAM VAGINAL DAILY
Qty: 42.5 G | Refills: 0 | Status: SHIPPED | OUTPATIENT
Start: 2022-08-05 | End: 2023-06-26 | Stop reason: SDUPTHER

## 2022-08-15 ENCOUNTER — LAB VISIT (OUTPATIENT)
Dept: LAB | Facility: HOSPITAL | Age: 62
End: 2022-08-15
Attending: INTERNAL MEDICINE
Payer: COMMERCIAL

## 2022-08-15 ENCOUNTER — PATIENT MESSAGE (OUTPATIENT)
Dept: INTERNAL MEDICINE | Facility: CLINIC | Age: 62
End: 2022-08-15
Payer: COMMERCIAL

## 2022-08-15 DIAGNOSIS — R73.03 PREDIABETES: ICD-10-CM

## 2022-08-15 DIAGNOSIS — E78.2 MIXED HYPERLIPIDEMIA: ICD-10-CM

## 2022-08-15 LAB
CHOLEST SERPL-MCNC: 239 MG/DL (ref 120–199)
CHOLEST/HDLC SERPL: 6.1 {RATIO} (ref 2–5)
ESTIMATED AVG GLUCOSE: 120 MG/DL (ref 68–131)
HBA1C MFR BLD: 5.8 % (ref 4–5.6)
HDLC SERPL-MCNC: 39 MG/DL (ref 40–75)
HDLC SERPL: 16.3 % (ref 20–50)
LDLC SERPL CALC-MCNC: 172.2 MG/DL (ref 63–159)
NONHDLC SERPL-MCNC: 200 MG/DL
TRIGL SERPL-MCNC: 139 MG/DL (ref 30–150)

## 2022-08-15 PROCEDURE — 36415 COLL VENOUS BLD VENIPUNCTURE: CPT | Performed by: INTERNAL MEDICINE

## 2022-08-15 PROCEDURE — 83036 HEMOGLOBIN GLYCOSYLATED A1C: CPT | Performed by: NURSE PRACTITIONER

## 2022-08-15 PROCEDURE — 80061 LIPID PANEL: CPT | Performed by: INTERNAL MEDICINE

## 2022-08-25 ENCOUNTER — TELEPHONE (OUTPATIENT)
Dept: ENDOCRINOLOGY | Facility: CLINIC | Age: 62
End: 2022-08-25
Payer: COMMERCIAL

## 2022-08-25 ENCOUNTER — PATIENT MESSAGE (OUTPATIENT)
Dept: ENDOCRINOLOGY | Facility: CLINIC | Age: 62
End: 2022-08-25
Payer: COMMERCIAL

## 2022-08-25 NOTE — TELEPHONE ENCOUNTER
----- Message from Liat Francois MD sent at 8/25/2022  8:45 AM CDT -----  Can you please contact her at [phone] to set-up new pt visit. Can be in any spot convenient for her

## 2022-09-01 ENCOUNTER — PATIENT MESSAGE (OUTPATIENT)
Dept: SPORTS MEDICINE | Facility: CLINIC | Age: 62
End: 2022-09-01
Payer: COMMERCIAL

## 2022-09-08 ENCOUNTER — OFFICE VISIT (OUTPATIENT)
Dept: ENDOCRINOLOGY | Facility: CLINIC | Age: 62
End: 2022-09-08
Payer: COMMERCIAL

## 2022-09-08 VITALS
OXYGEN SATURATION: 97 % | HEIGHT: 69 IN | WEIGHT: 205.5 LBS | HEART RATE: 71 BPM | DIASTOLIC BLOOD PRESSURE: 82 MMHG | BODY MASS INDEX: 30.44 KG/M2 | SYSTOLIC BLOOD PRESSURE: 120 MMHG

## 2022-09-08 DIAGNOSIS — R73.03 PREDIABETES: Primary | ICD-10-CM

## 2022-09-08 DIAGNOSIS — E78.2 MIXED HYPERLIPIDEMIA: ICD-10-CM

## 2022-09-08 DIAGNOSIS — K76.0 FATTY LIVER: ICD-10-CM

## 2022-09-08 DIAGNOSIS — I10 BENIGN ESSENTIAL HYPERTENSION: ICD-10-CM

## 2022-09-08 PROCEDURE — 3008F BODY MASS INDEX DOCD: CPT | Mod: CPTII,S$GLB,, | Performed by: INTERNAL MEDICINE

## 2022-09-08 PROCEDURE — 3074F SYST BP LT 130 MM HG: CPT | Mod: CPTII,S$GLB,, | Performed by: INTERNAL MEDICINE

## 2022-09-08 PROCEDURE — 3079F DIAST BP 80-89 MM HG: CPT | Mod: CPTII,S$GLB,, | Performed by: INTERNAL MEDICINE

## 2022-09-08 PROCEDURE — 1159F PR MEDICATION LIST DOCUMENTED IN MEDICAL RECORD: ICD-10-PCS | Mod: CPTII,S$GLB,, | Performed by: INTERNAL MEDICINE

## 2022-09-08 PROCEDURE — 4010F PR ACE/ARB THEARPY RXD/TAKEN: ICD-10-PCS | Mod: CPTII,S$GLB,, | Performed by: INTERNAL MEDICINE

## 2022-09-08 PROCEDURE — 3008F PR BODY MASS INDEX (BMI) DOCUMENTED: ICD-10-PCS | Mod: CPTII,S$GLB,, | Performed by: INTERNAL MEDICINE

## 2022-09-08 PROCEDURE — 99999 PR PBB SHADOW E&M-EST. PATIENT-LVL IV: CPT | Mod: PBBFAC,,, | Performed by: INTERNAL MEDICINE

## 2022-09-08 PROCEDURE — 3074F PR MOST RECENT SYSTOLIC BLOOD PRESSURE < 130 MM HG: ICD-10-PCS | Mod: CPTII,S$GLB,, | Performed by: INTERNAL MEDICINE

## 2022-09-08 PROCEDURE — 99999 PR PBB SHADOW E&M-EST. PATIENT-LVL IV: ICD-10-PCS | Mod: PBBFAC,,, | Performed by: INTERNAL MEDICINE

## 2022-09-08 PROCEDURE — 99204 OFFICE O/P NEW MOD 45 MIN: CPT | Mod: S$GLB,,, | Performed by: INTERNAL MEDICINE

## 2022-09-08 PROCEDURE — 1159F MED LIST DOCD IN RCRD: CPT | Mod: CPTII,S$GLB,, | Performed by: INTERNAL MEDICINE

## 2022-09-08 PROCEDURE — 3044F HG A1C LEVEL LT 7.0%: CPT | Mod: CPTII,S$GLB,, | Performed by: INTERNAL MEDICINE

## 2022-09-08 PROCEDURE — 3079F PR MOST RECENT DIASTOLIC BLOOD PRESSURE 80-89 MM HG: ICD-10-PCS | Mod: CPTII,S$GLB,, | Performed by: INTERNAL MEDICINE

## 2022-09-08 PROCEDURE — 99204 PR OFFICE/OUTPT VISIT, NEW, LEVL IV, 45-59 MIN: ICD-10-PCS | Mod: S$GLB,,, | Performed by: INTERNAL MEDICINE

## 2022-09-08 PROCEDURE — 1160F PR REVIEW ALL MEDS BY PRESCRIBER/CLIN PHARMACIST DOCUMENTED: ICD-10-PCS | Mod: CPTII,S$GLB,, | Performed by: INTERNAL MEDICINE

## 2022-09-08 PROCEDURE — 4010F ACE/ARB THERAPY RXD/TAKEN: CPT | Mod: CPTII,S$GLB,, | Performed by: INTERNAL MEDICINE

## 2022-09-08 PROCEDURE — 1160F RVW MEDS BY RX/DR IN RCRD: CPT | Mod: CPTII,S$GLB,, | Performed by: INTERNAL MEDICINE

## 2022-09-08 PROCEDURE — 3044F PR MOST RECENT HEMOGLOBIN A1C LEVEL <7.0%: ICD-10-PCS | Mod: CPTII,S$GLB,, | Performed by: INTERNAL MEDICINE

## 2022-09-08 RX ORDER — SEMAGLUTIDE 1.34 MG/ML
0.5 INJECTION, SOLUTION SUBCUTANEOUS
Qty: 1 PEN | Refills: 11 | Status: SHIPPED | OUTPATIENT
Start: 2022-09-08 | End: 2022-11-28

## 2022-09-08 RX ORDER — IRBESARTAN 75 MG/1
75 TABLET ORAL NIGHTLY
Qty: 90 TABLET | Refills: 3 | Status: SHIPPED | OUTPATIENT
Start: 2022-09-08 | End: 2023-09-28

## 2022-09-08 NOTE — ASSESSMENT & PLAN NOTE
As above  Excellent weight loss with diet change however recently has hit plateau. Will start ozempic as above which anticipate will help with further weight loss and reduction in metabolic complications

## 2022-09-08 NOTE — PROGRESS NOTES
Josefina Blue is a 62 y.o. female with HTN, HLD [facility] of prediabetes, metabolic syndrome    History of Present Illness  Prediabetes  Metabolic syndrome  Has has elevation in A1c beginning in 2015 in our system with levels consistently in prediabetes range    Over the last year has worked very hard to lose weight with diet (low carb, Mediterranean) and exercise (tennis several hours a week). Has been successful with about 25 pound weight loss but now hitting plateau and has not seen significant reduction in A1c. Presents to discuss medical management of prediabetes/metabolic syndrome    HLD  Taking pravastatin 5 mg daily. Unable to tolerate higher doses due to cramps  Lab Results   Component Value Date    LDLCALC 172.2 (H) 08/15/2022       HTN  On losartan, HCTZ-triametrene    Current Outpatient Medications:     aspirin (ECOTRIN) 81 MG EC tablet, Take 1 tablet (81 mg total) by mouth nightly., Disp: , Rfl: 0    celecoxib (CELEBREX) 200 MG capsule, Take 1 capsule (200 mg total) by mouth once daily., Disp: 60 capsule, Rfl: 2    estradioL (ESTRACE) 0.01 % (0.1 mg/gram) vaginal cream, Place 2 gram vaginally once daily., Disp: 42.5 g, Rfl: 0    melatonin 3 mg TbDL, Take by mouth., Disp: , Rfl:     pravastatin (PRAVACHOL) 10 MG tablet, Take 1 tablet (10 mg total) by mouth once daily. (Patient taking differently: Take 5 mg by mouth once daily.), Disp: 90 tablet, Rfl: 3    triamterene-hydrochlorothiazide 37.5-25 mg (MAXZIDE-25) 37.5-25 mg per tablet, Take 1 tablet by mouth once daily., Disp: 90 tablet, Rfl: 3    zolpidem (AMBIEN) 5 MG Tab, Take 1 tablet (5 mg total) by mouth nightly as needed (insomnia)., Disp: 30 tablet, Rfl: 1    ALPRAZolam (XANAX) 0.25 MG tablet, Take 1 tablet (0.25 mg total) by mouth nightly as needed for Anxiety. (Patient not taking: Reported on 9/8/2022), Disp: 30 tablet, Rfl: 2    clobetasol (TEMOVATE) 0.05 % external solution, Use one to two times daily as needed  for scaling or itching to scalp (Patient not taking: Reported on 9/8/2022), Disp: 60 mL, Rfl: 3    diclofenac sodium (VOLTAREN) 1 % Gel, Apply 2 g topically 2 (two) times daily. (Patient not taking: Reported on 9/8/2022), Disp: 1 Tube, Rfl: 0    irbesartan (AVAPRO) 75 MG tablet, Take 1 tablet (75 mg total) by mouth every evening., Disp: 90 tablet, Rfl: 3    semaglutide (OZEMPIC) 0.25 mg or 0.5 mg(2 mg/1.5 mL) pen injector, Start with 0.25 mg weekly for 4 weeks and then increase to 0.5 mg if you are tolerating this dose, Disp: 1 pen, Rfl: 11    ROS as above    Objective:     Vitals:    09/08/22 0836   BP: 120/82   Pulse: 71     Wt Readings from Last 3 Encounters:   09/08/22 93.2 kg (205 lb 7.5 oz)   05/09/22 97 kg (213 lb 15.3 oz)   04/07/22 98.4 kg (217 lb)     Body mass index is 30.34 kg/m².  Physical Exam  Constitutional:       Appearance: She is well-developed.   HENT:      Head: Normocephalic.   Eyes:      Conjunctiva/sclera: Conjunctivae normal.   Pulmonary:      Effort: Pulmonary effort is normal.   Musculoskeletal:         General: Normal range of motion.   Skin:     General: Skin is warm.      Findings: No rash.   Neurological:      Mental Status: She is alert and oriented to person, place, and time.       Labs    Chemistry        Component Value Date/Time     05/09/2022 0948    K 4.6 05/09/2022 0948     05/09/2022 0948    CO2 26 05/09/2022 0948    BUN 14 05/09/2022 0948    CREATININE 0.8 05/09/2022 0948     (H) 05/09/2022 0948        Component Value Date/Time    CALCIUM 9.9 05/09/2022 0948    ALKPHOS 51 (L) 05/09/2022 0948    AST 28 05/09/2022 0948    ALT 39 05/09/2022 0948    BILITOT 0.6 05/09/2022 0948    ESTGFRAFRICA >60.0 05/09/2022 0948    EGFRNONAA >60.0 05/09/2022 0948        Lab Results   Component Value Date    HGBA1C 5.8 (H) 08/15/2022           Assessment and Plan     Prediabetes  A1c persistently in preDM range despite excellent weight loss  Will do trial of Ozempic as I  suspect she will get great benefit with this  Personal history of pancreatitis: denies  Family history of MTC/MEN/endocrine tumors: denies    Ozempic start  Start taking Ozempic 0.25 mg once weekly for 4 weeks then increase to 0.5 mg once weekly for at least 4 weeks    Depending on your glucose we will consider the need to increase to 1 mg and then 2 mg once weekly.             Benign essential hypertension  BP at goal  Cont losartan and HCTZ    Hyperlipidemia  Difficulty in tolerating statins but currently doing well on pravastatin 5 mg daily    BMI 30.0-30.9,adult  As above  Excellent weight loss with diet change however recently has hit plateau. Will start ozempic as above which anticipate will help with further weight loss and reduction in metabolic complications    Fatty liver  Goal for weight loss with diet change, ozempic as above        RTC 6 months        Liat Francois MD

## 2022-09-08 NOTE — ASSESSMENT & PLAN NOTE
A1c persistently in preDM range despite excellent weight loss  Will do trial of Ozempic as I suspect she will get great benefit with this  Personal history of pancreatitis: denies  Family history of MTC/MEN/endocrine tumors: denies    Ozempic start  Start taking Ozempic 0.25 mg once weekly for 4 weeks then increase to 0.5 mg once weekly for at least 4 weeks    Depending on your glucose we will consider the need to increase to 1 mg and then 2 mg once weekly.

## 2022-09-08 NOTE — PATIENT INSTRUCTIONS
Ozempic start  Start taking Ozempic 0.25 mg once weekly for 4 weeks then increase to 0.5 mg once weekly for at least 4 weeks    Depending on your glucose we will consider the need to increase to 1 mg once weekly.     Potential Side Effects of Ozempic:   One of the ways this medication works is by decreasing how fast your stomach empties, which makes you feel full faster after you eat and should help you lose weight. The main side-effects are related to GI upset, such as nausea, bloating and abdominal cramps. You can usually avoid this by eating slowly (take half of your normal portion and eat it over 30 minutes). If you do experience nausea, it does tend to get better after the first few weeks. The most severe reaction is acute pancreatitis which can cause severe abdominal pain radiating to your back. If you experience this, stop the medication and go to the ER. Fortunately this is very rare.

## 2022-09-09 ENCOUNTER — HOSPITAL ENCOUNTER (OUTPATIENT)
Dept: RADIOLOGY | Facility: HOSPITAL | Age: 62
Discharge: HOME | End: 2022-09-09
Attending: INTERNAL MEDICINE
Payer: COMMERCIAL

## 2022-09-09 DIAGNOSIS — Z12.31 ENCOUNTER FOR SCREENING MAMMOGRAM FOR MALIGNANT NEOPLASM OF BREAST: ICD-10-CM

## 2022-09-09 PROCEDURE — 77067 SCR MAMMO BI INCL CAD: CPT | Mod: 26,,, | Performed by: RADIOLOGY

## 2022-09-09 PROCEDURE — 77063 MAMMO DIGITAL SCREENING BILAT WITH TOMO: ICD-10-PCS | Mod: 26,,, | Performed by: RADIOLOGY

## 2022-09-09 PROCEDURE — 77063 BREAST TOMOSYNTHESIS BI: CPT | Mod: 26,,, | Performed by: RADIOLOGY

## 2022-09-09 PROCEDURE — 77067 MAMMO DIGITAL SCREENING BILAT WITH TOMO: ICD-10-PCS | Mod: 26,,, | Performed by: RADIOLOGY

## 2022-09-09 PROCEDURE — 77063 BREAST TOMOSYNTHESIS BI: CPT | Mod: TC

## 2022-09-13 ENCOUNTER — IMMUNIZATION (OUTPATIENT)
Dept: INTERNAL MEDICINE | Facility: CLINIC | Age: 62
End: 2022-09-13
Payer: COMMERCIAL

## 2022-09-13 DIAGNOSIS — Z23 NEED FOR VACCINATION: Primary | ICD-10-CM

## 2022-09-13 PROCEDURE — 91313 COVID-19, MRNA, LNP-S, BIVALENT BOOSTER, PF, 50 MCG/0.5 ML: ICD-10-PCS | Mod: S$GLB,,, | Performed by: INTERNAL MEDICINE

## 2022-09-13 PROCEDURE — 91313 COVID-19, MRNA, LNP-S, BIVALENT BOOSTER, PF, 50 MCG/0.5 ML: CPT | Mod: S$GLB,,, | Performed by: INTERNAL MEDICINE

## 2022-11-11 ENCOUNTER — PATIENT MESSAGE (OUTPATIENT)
Dept: ENDOCRINOLOGY | Facility: CLINIC | Age: 62
End: 2022-11-11
Payer: COMMERCIAL

## 2022-11-28 ENCOUNTER — PATIENT MESSAGE (OUTPATIENT)
Dept: ENDOCRINOLOGY | Facility: CLINIC | Age: 62
End: 2022-11-28
Payer: COMMERCIAL

## 2022-11-28 DIAGNOSIS — R73.03 PREDIABETES: Primary | ICD-10-CM

## 2022-11-28 RX ORDER — SEMAGLUTIDE 1.34 MG/ML
1 INJECTION, SOLUTION SUBCUTANEOUS
Qty: 1 PEN | Refills: 11 | Status: SHIPPED | OUTPATIENT
Start: 2022-11-28 | End: 2023-01-27

## 2022-11-30 ENCOUNTER — OFFICE VISIT (OUTPATIENT)
Dept: OPTOMETRY | Facility: CLINIC | Age: 62
End: 2022-11-30
Payer: COMMERCIAL

## 2022-11-30 DIAGNOSIS — Z46.0 FITTING AND ADJUSTMENT OF SPECTACLES AND CONTACT LENSES: Primary | ICD-10-CM

## 2022-11-30 DIAGNOSIS — H52.4 MYOPIA OF BOTH EYES WITH ASTIGMATISM AND PRESBYOPIA: Primary | ICD-10-CM

## 2022-11-30 DIAGNOSIS — H52.13 MYOPIA OF BOTH EYES WITH ASTIGMATISM AND PRESBYOPIA: Primary | ICD-10-CM

## 2022-11-30 DIAGNOSIS — H52.203 MYOPIA OF BOTH EYES WITH ASTIGMATISM AND PRESBYOPIA: Primary | ICD-10-CM

## 2022-11-30 DIAGNOSIS — Z97.3 WEARS CONTACT LENSES: ICD-10-CM

## 2022-11-30 PROCEDURE — 99999 PR PBB SHADOW E&M-EST. PATIENT-LVL III: ICD-10-PCS | Mod: PBBFAC,,, | Performed by: OPTOMETRIST

## 2022-11-30 PROCEDURE — 92014 COMPRE OPH EXAM EST PT 1/>: CPT | Mod: S$GLB,,, | Performed by: OPTOMETRIST

## 2022-11-30 PROCEDURE — 92014 PR EYE EXAM, EST PATIENT,COMPREHESV: ICD-10-PCS | Mod: S$GLB,,, | Performed by: OPTOMETRIST

## 2022-11-30 PROCEDURE — 92310 PR CONTACT LENS FITTING (NO CHANGE): ICD-10-PCS | Mod: S$GLB,,, | Performed by: OPTOMETRIST

## 2022-11-30 PROCEDURE — 99499 NO LOS: ICD-10-PCS | Mod: S$GLB,,, | Performed by: OPTOMETRIST

## 2022-11-30 PROCEDURE — 92310 CONTACT LENS FITTING OU: CPT | Mod: S$GLB,,, | Performed by: OPTOMETRIST

## 2022-11-30 PROCEDURE — 3044F PR MOST RECENT HEMOGLOBIN A1C LEVEL <7.0%: ICD-10-PCS | Mod: CPTII,S$GLB,, | Performed by: OPTOMETRIST

## 2022-11-30 PROCEDURE — 99999 PR PBB SHADOW E&M-EST. PATIENT-LVL III: CPT | Mod: PBBFAC,,, | Performed by: OPTOMETRIST

## 2022-11-30 PROCEDURE — 99499 UNLISTED E&M SERVICE: CPT | Mod: S$GLB,,, | Performed by: OPTOMETRIST

## 2022-11-30 PROCEDURE — 3044F HG A1C LEVEL LT 7.0%: CPT | Mod: CPTII,S$GLB,, | Performed by: OPTOMETRIST

## 2022-11-30 PROCEDURE — 92015 PR REFRACTION: ICD-10-PCS | Mod: S$GLB,,, | Performed by: OPTOMETRIST

## 2022-11-30 PROCEDURE — 92015 DETERMINE REFRACTIVE STATE: CPT | Mod: S$GLB,,, | Performed by: OPTOMETRIST

## 2022-11-30 PROCEDURE — 4010F ACE/ARB THERAPY RXD/TAKEN: CPT | Mod: CPTII,S$GLB,, | Performed by: OPTOMETRIST

## 2022-11-30 PROCEDURE — 4010F PR ACE/ARB THEARPY RXD/TAKEN: ICD-10-PCS | Mod: CPTII,S$GLB,, | Performed by: OPTOMETRIST

## 2022-11-30 NOTE — PROGRESS NOTES
"HPI     Contact Lens Fit     Additional comments: CL Fit           Comments    CC: Pt feels like she has been having a "strain" at times in OU when   trying to focus, for the distance and near, with her current Contact   Lenses, over the past several months now.  TAY:1/5/2022  (+) Changes in vision   (-) Pain  (-) Irritation   (-) Itching   (-) Flashes  (-) Floaters  (+) Glasses wearer  (+) CL wearer  (-) Uses eye gtts  Does patient want a refraction today? Yes  (-) Eye injury  (-) Eye surgery   (+)POHx  Nuclear sclerosis of both eyes  Myopia of both eyes with astigmatism and presbyopia  (-)FOHx  (-)DM          Last edited by Mona Garcia, OD on 11/30/2022  4:05 PM.            Assessment /Plan     For exam results, see Encounter Report.    Myopia of both eyes with astigmatism and presbyopia    Wears contact lenses      1-2.   Eyeglass Final Rx       Eyeglass Final Rx         Sphere Cylinder [place] Dist VA    Right -1.50 +0.50 165 20/20    Left -1.50 +0.50 015 20/20      Type: DVO    Expiration Date: 11/30/2023                   Contact Lens Current Rx       Current Contact Lens Rx (Trial Lens, Dispensed)         Brand Base Curve Diameter Sphere Cylinder Add Dist VA Near VA Centration    Right             Left Dailies Total 1 Multifocal 8.50 14.1 -1.75 Sphere High 20/25 J2 Well-centered        Movement    Right     Left Adequate   Pt states in the past, she has tried to take out one of her contact lenses when they started to dry out while working on the computer. When doing this, she noticed that taking the right lens out improved her near vision and she could tolerate this change and actually liked it. Taking out the left lens, however, she could not tolerate as well. Pt is measuring OD as her dominant eye but is willing to trial wearing a lens OS only.               Updated CL Rx. Initially good comfort and vision. Given CL trials to take home. If happy with comfort and vision of trial lenses, may order annual " supply. If issues arise, RTC for CL f/u. Reviewed proper CL care and hygiene. Monitor yearly unless changes noted sooner.      RTC in 1 year for annual eye exam unless changes noted sooner.

## 2022-12-06 ENCOUNTER — PATIENT MESSAGE (OUTPATIENT)
Dept: OPTOMETRY | Facility: CLINIC | Age: 62
End: 2022-12-06
Payer: COMMERCIAL

## 2022-12-06 DIAGNOSIS — G47.00 INSOMNIA, UNSPECIFIED TYPE: ICD-10-CM

## 2022-12-06 DIAGNOSIS — F41.8 SITUATIONAL ANXIETY: ICD-10-CM

## 2022-12-07 RX ORDER — ZOLPIDEM TARTRATE 5 MG/1
5 TABLET ORAL NIGHTLY PRN
Qty: 30 TABLET | Refills: 1 | Status: SHIPPED | OUTPATIENT
Start: 2022-12-07 | End: 2023-05-19 | Stop reason: SDUPTHER

## 2022-12-07 RX ORDER — ALPRAZOLAM 0.25 MG/1
0.25 TABLET ORAL NIGHTLY PRN
Qty: 30 TABLET | Refills: 5 | Status: SHIPPED | OUTPATIENT
Start: 2022-12-07 | End: 2023-09-01 | Stop reason: SDUPTHER

## 2022-12-07 NOTE — TELEPHONE ENCOUNTER
No new care gaps identified.  [facility] Embedded Care Gaps. Reference number: [id number]. 12/06/2022   10:57:21 PM CST

## 2022-12-16 RX ORDER — DIAZEPAM 10 MG/1
10 TABLET ORAL 3 TIMES DAILY
Qty: 30 TABLET | Refills: 1 | Status: SHIPPED | OUTPATIENT
Start: 2022-12-16 | End: 2023-09-13

## 2022-12-16 RX ORDER — DIAZEPAM 10 MG/1
10 TABLET ORAL 3 TIMES DAILY
Qty: 30 TABLET | Refills: 0 | Status: SHIPPED | OUTPATIENT
Start: 2022-12-16 | End: 2022-12-16 | Stop reason: CLARIF

## 2023-01-27 ENCOUNTER — PATIENT MESSAGE (OUTPATIENT)
Dept: ENDOCRINOLOGY | Facility: CLINIC | Age: 63
End: 2023-01-27
Payer: COMMERCIAL

## 2023-01-27 RX ORDER — SEMAGLUTIDE 2.68 MG/ML
2 INJECTION, SOLUTION SUBCUTANEOUS
Qty: 1 PEN | Refills: 11 | Status: SHIPPED | OUTPATIENT
Start: 2023-01-27 | End: 2024-01-05 | Stop reason: SDUPTHER

## 2023-02-27 ENCOUNTER — PATIENT MESSAGE (OUTPATIENT)
Dept: ENDOCRINOLOGY | Facility: CLINIC | Age: 63
End: 2023-02-27
Payer: COMMERCIAL

## 2023-03-04 ENCOUNTER — PATIENT MESSAGE (OUTPATIENT)
Dept: ADMINISTRATIVE | Facility: OTHER | Age: 63
End: 2023-03-04
Payer: COMMERCIAL

## 2023-03-09 ENCOUNTER — LAB VISIT (OUTPATIENT)
Dept: LAB | Facility: HOSPITAL | Age: 63
End: 2023-03-09
Attending: INTERNAL MEDICINE
Payer: COMMERCIAL

## 2023-03-09 DIAGNOSIS — R73.03 PREDIABETES: ICD-10-CM

## 2023-03-09 LAB
ESTIMATED AVG GLUCOSE: 111 MG/DL (ref 68–131)
HBA1C MFR BLD: 5.5 % (ref 4–5.6)

## 2023-03-09 PROCEDURE — 83036 HEMOGLOBIN GLYCOSYLATED A1C: CPT | Performed by: INTERNAL MEDICINE

## 2023-03-09 PROCEDURE — 36415 COLL VENOUS BLD VENIPUNCTURE: CPT | Performed by: INTERNAL MEDICINE

## 2023-03-10 ENCOUNTER — OFFICE VISIT (OUTPATIENT)
Dept: ENDOCRINOLOGY | Facility: CLINIC | Age: 63
End: 2023-03-10
Payer: COMMERCIAL

## 2023-03-10 DIAGNOSIS — R73.03 PREDIABETES: Primary | ICD-10-CM

## 2023-03-10 DIAGNOSIS — K76.0 FATTY LIVER: ICD-10-CM

## 2023-03-10 DIAGNOSIS — E78.2 MIXED HYPERLIPIDEMIA: ICD-10-CM

## 2023-03-10 DIAGNOSIS — I10 BENIGN ESSENTIAL HYPERTENSION: ICD-10-CM

## 2023-03-10 PROCEDURE — 4010F ACE/ARB THERAPY RXD/TAKEN: CPT | Mod: CPTII,95,, | Performed by: INTERNAL MEDICINE

## 2023-03-10 PROCEDURE — 1160F PR REVIEW ALL MEDS BY PRESCRIBER/CLIN PHARMACIST DOCUMENTED: ICD-10-PCS | Mod: CPTII,95,, | Performed by: INTERNAL MEDICINE

## 2023-03-10 PROCEDURE — 1159F PR MEDICATION LIST DOCUMENTED IN MEDICAL RECORD: ICD-10-PCS | Mod: CPTII,95,, | Performed by: INTERNAL MEDICINE

## 2023-03-10 PROCEDURE — 1159F MED LIST DOCD IN RCRD: CPT | Mod: CPTII,95,, | Performed by: INTERNAL MEDICINE

## 2023-03-10 PROCEDURE — 3044F HG A1C LEVEL LT 7.0%: CPT | Mod: CPTII,95,, | Performed by: INTERNAL MEDICINE

## 2023-03-10 PROCEDURE — 3044F PR MOST RECENT HEMOGLOBIN A1C LEVEL <7.0%: ICD-10-PCS | Mod: CPTII,95,, | Performed by: INTERNAL MEDICINE

## 2023-03-10 PROCEDURE — 4010F PR ACE/ARB THEARPY RXD/TAKEN: ICD-10-PCS | Mod: CPTII,95,, | Performed by: INTERNAL MEDICINE

## 2023-03-10 PROCEDURE — 99214 PR OFFICE/OUTPT VISIT, EST, LEVL IV, 30-39 MIN: ICD-10-PCS | Mod: 95,,, | Performed by: INTERNAL MEDICINE

## 2023-03-10 PROCEDURE — 1160F RVW MEDS BY RX/DR IN RCRD: CPT | Mod: CPTII,95,, | Performed by: INTERNAL MEDICINE

## 2023-03-10 PROCEDURE — 99214 OFFICE O/P EST MOD 30 MIN: CPT | Mod: 95,,, | Performed by: INTERNAL MEDICINE

## 2023-03-10 NOTE — PROGRESS NOTES
Josefina Blue is a 62 y.o. female with HTN, HLD presenting for follow-up of prediabetes, metabolic syndrome    The patient location is: home in [place]  The chief complaint leading to consultation is:   Chief Complaint   Patient presents with    Diabetes    Hyperlipidemia       Visit type: audiovisual    Face to Face time with patient: 24 minutes  30 minutes of total time spent on the encounter, which includes face to face time and non-face to face time preparing to see the patient (eg, review of tests), Obtaining and/or reviewing separately obtained history, Documenting clinical information in the electronic or other health record, Independently interpreting results (not separately reported) and communicating results to the patient/family/caregiver, or Care coordination (not separately reported).    Each patient to whom he or she provides medical services by telemedicine is:  (1) informed of the relationship between the physician and patient and the respective role of any other health care provider with respect to management of the patient; and (2) notified that he or she may decline to receive medical services by telemedicine and may withdraw from such care at any time.      History of Present Illness  Prediabetes  Metabolic syndrome  Has has elevation in A1c beginning in 2015 in our system with levels consistently in prediabetes range at at times very near to diabetes range with highest 6.4%    Over the last year has worked very hard to lose weight with diet (low carb, Mediterranean) and exercise (tennis several hours a week). Was successful with about 25 pound weight loss but then hitting plateau and has not seen significant reduction in A1c. At initial visit we started ozempic which led to further weight loss (191 pounds currently) and A1c in normal range for first time in years    She is very pleased and tolerating well although cannot take full 2 mg dose. Taking ozempic 1.5 mg weekly  Finds she has been able to  liberalize diet a little and still seeing progress    Concerned that may lose insurance coverage of ozempic        HLD  Taking pravastatin 5 mg daily. Unable to tolerate higher doses due to severe muscle cramps  Lab Results   Component Value Date    LDLCALC 172.2 (H) 08/15/2022       HTN  On losartan, HCTZ-triametrene  /80 this morning. Has signed up for digital DM    Current Outpatient Medications:     ALPRAZolam (XANAX) 0.25 MG tablet, Take 1 tablet (0.25 mg total) by mouth nightly as needed for Anxiety., Disp: 30 tablet, Rfl: 5    aspirin (ECOTRIN) 81 MG EC tablet, Take 1 tablet (81 mg total) by mouth nightly., Disp: , Rfl: 0    celecoxib (CELEBREX) 200 MG capsule, Take 1 capsule (200 mg total) by mouth once daily., Disp: 60 capsule, Rfl: 2    clobetasol (TEMOVATE) 0.05 % external solution, Use one to two times daily as needed for scaling or itching to scalp (Patient not taking: Reported on 9/8/2022), Disp: 60 mL, Rfl: 3    diazePAM (VALIUM) 10 MG Tab, Take 1/2 to 1 tab by mouth as needed for muscle spasm every 6 hours and 1 tab nightly, Disp: 30 tablet, Rfl: 1    estradioL (ESTRACE) 0.01 % (0.1 mg/gram) vaginal cream, Place 2 gram vaginally once daily., Disp: 42.5 g, Rfl: 0    irbesartan (AVAPRO) 75 MG tablet, Take 1 tablet (75 mg total) by mouth every evening., Disp: 90 tablet, Rfl: 3    pravastatin (PRAVACHOL) 10 MG tablet, Take 1 tablet (10 mg total) by mouth once daily. (Patient taking differently: Take 5 mg by mouth once daily.), Disp: 90 tablet, Rfl: 3    semaglutide (OZEMPIC) 2 mg/dose (8 mg/3 mL) PnIj, Inject 2 mg into the skin every 7 days., Disp: 1 pen, Rfl: 11    triamterene-hydrochlorothiazide 37.5-25 mg (MAXZIDE-25) 37.5-25 mg per tablet, Take 1 tablet by mouth once daily., Disp: 90 tablet, Rfl: 3    zolpidem (AMBIEN) 5 MG Tab, Take 1 tablet (5 mg total) by mouth nightly as needed (insomnia)., Disp: 30 tablet, Rfl: 1    ROS as above    Objective:     There were no vitals filed for this  visit.    Wt Readings from Last 3 Encounters:   09/08/22 93.2 kg (205 lb 7.5 oz)   05/09/22 97 kg (213 lb 15.3 oz)   04/07/22 98.4 kg (217 lb)     There is no height or weight on file to calculate BMI.      Labs    Chemistry        Component Value Date/Time     05/09/2022 0948    K 4.6 05/09/2022 0948     05/09/2022 0948    CO2 26 05/09/2022 0948    BUN 14 05/09/2022 0948    CREATININE 0.8 05/09/2022 0948     (H) 05/09/2022 0948        Component Value Date/Time    CALCIUM 9.9 05/09/2022 0948    ALKPHOS 51 (L) 05/09/2022 0948    AST 28 05/09/2022 0948    ALT 39 05/09/2022 0948    BILITOT 0.6 05/09/2022 0948    ESTGFRAFRICA >60.0 05/09/2022 0948    EGFRNONAA >60.0 05/09/2022 0948        Lab Results   Component Value Date    HGBA1C 5.5 03/09/2023           Assessment and Plan     Prediabetes  Excellent improvement in A1c now in normal range with use of Ozempic  Has also had continued weight loss with this drug  I think it is medically necessary for her to continue Ozempic to maintain glycemic control which she was not able to achieve with diet and exercise alone although despite good weight loss with lifestyle change. If ozempic is stopped I would expect to see A1c rise increasing risk for development of diabetes and other metabolic complications in the future    Benign essential hypertension  BP at goal  Enrolling in digital HTN    Hyperlipidemia  Update lipids soon  Has not been able to tolerate high dose of statin and likely will start PCSK9 pending result of labs    BMI 30.0-30.9,adult  As above  Discussed alternative medications for weight management if unable to continue ozempic including phentermine/Qsymia    Fatty liver  Weight loss as above  Update liver labs with next labs          RTC 6 months        Liat Francois MD

## 2023-03-12 NOTE — ASSESSMENT & PLAN NOTE
Update lipids soon  Has not been able to tolerate high dose of statin and likely will start PCSK9 pending result of labs

## 2023-03-12 NOTE — ASSESSMENT & PLAN NOTE
As above  Discussed alternative medications for weight management if unable to continue ozempic including phentermine/Qsymia

## 2023-03-12 NOTE — ASSESSMENT & PLAN NOTE
Excellent improvement in A1c now in normal range with use of Ozempic  Has also had continued weight loss with this drug  I think it is medically necessary for her to continue Ozempic to maintain glycemic control which she was not able to achieve with diet and exercise alone although despite good weight loss with lifestyle change. If ozempic is stopped I would expect to see A1c rise increasing risk for development of diabetes and other metabolic complications in the future

## 2023-03-13 ENCOUNTER — PATIENT MESSAGE (OUTPATIENT)
Dept: SPORTS MEDICINE | Facility: CLINIC | Age: 63
End: 2023-03-13
Payer: COMMERCIAL

## 2023-03-13 ENCOUNTER — HOSPITAL ENCOUNTER (OUTPATIENT)
Dept: RADIOLOGY | Facility: HOSPITAL | Age: 63
Discharge: HOME | End: 2023-03-13
Attending: ORTHOPAEDIC SURGERY
Payer: COMMERCIAL

## 2023-03-13 ENCOUNTER — OFFICE VISIT (OUTPATIENT)
Dept: SPORTS MEDICINE | Facility: CLINIC | Age: 63
End: 2023-03-13
Payer: COMMERCIAL

## 2023-03-13 DIAGNOSIS — G89.29 CHRONIC PAIN OF RIGHT KNEE: Primary | ICD-10-CM

## 2023-03-13 DIAGNOSIS — M25.561 CHRONIC PAIN OF RIGHT KNEE: Primary | ICD-10-CM

## 2023-03-13 DIAGNOSIS — M76.51 PATELLAR TENDINITIS OF RIGHT KNEE: Primary | ICD-10-CM

## 2023-03-13 DIAGNOSIS — G89.29 CHRONIC PAIN OF RIGHT KNEE: ICD-10-CM

## 2023-03-13 DIAGNOSIS — M25.561 CHRONIC PAIN OF RIGHT KNEE: ICD-10-CM

## 2023-03-13 PROCEDURE — 73562 XR KNEE ORTHO RIGHT WITH FLEXION: ICD-10-PCS | Mod: 26,LT,, | Performed by: RADIOLOGY

## 2023-03-13 PROCEDURE — 73562 X-RAY EXAM OF KNEE 3: CPT | Mod: 26,LT,, | Performed by: RADIOLOGY

## 2023-03-13 PROCEDURE — 4010F PR ACE/ARB THEARPY RXD/TAKEN: ICD-10-PCS | Mod: CPTII,S$GLB,, | Performed by: ORTHOPAEDIC SURGERY

## 2023-03-13 PROCEDURE — 3044F PR MOST RECENT HEMOGLOBIN A1C LEVEL <7.0%: ICD-10-PCS | Mod: CPTII,S$GLB,, | Performed by: ORTHOPAEDIC SURGERY

## 2023-03-13 PROCEDURE — 73564 XR KNEE ORTHO RIGHT WITH FLEXION: ICD-10-PCS | Mod: 26,RT,, | Performed by: RADIOLOGY

## 2023-03-13 PROCEDURE — 4010F ACE/ARB THERAPY RXD/TAKEN: CPT | Mod: CPTII,S$GLB,, | Performed by: ORTHOPAEDIC SURGERY

## 2023-03-13 PROCEDURE — 99213 OFFICE O/P EST LOW 20 MIN: CPT | Mod: S$GLB,,, | Performed by: ORTHOPAEDIC SURGERY

## 2023-03-13 PROCEDURE — 99213 PR OFFICE/OUTPT VISIT, EST, LEVL III, 20-29 MIN: ICD-10-PCS | Mod: S$GLB,,, | Performed by: ORTHOPAEDIC SURGERY

## 2023-03-13 PROCEDURE — 73564 X-RAY EXAM KNEE 4 OR MORE: CPT | Mod: 26,RT,, | Performed by: RADIOLOGY

## 2023-03-13 PROCEDURE — 73564 X-RAY EXAM KNEE 4 OR MORE: CPT | Mod: TC,PN,RT

## 2023-03-13 PROCEDURE — 3044F HG A1C LEVEL LT 7.0%: CPT | Mod: CPTII,S$GLB,, | Performed by: ORTHOPAEDIC SURGERY

## 2023-03-19 NOTE — PROGRESS NOTES
CC:  Right knee pain    DATE OF PROCEDURE: 11/11/2020   PROCEDURES PERFORMED:   Right total knee arthroplasty      DATE OF PROCEDURE: 7/8/2020   PROCEDURES PERFORMED:   Left total knee arthroplasty     Josefina Blue returns for a recent acute onset of anterior right knee pain experienced when getting up from a seated position.  Unclear association with recent tennis activity although she plays doubles tennis regularly.  She point localizes over the proximal extent of the patellar tendon near the patellar attachment.  Reports some swelling recently around this area.  No specific injury mechanism.  Otherwise she states that both knees have been doing very well without any pain at all.  Again she remains quite active.  Treatment to this point has included some initial rest which has provided some relief.    PAST MEDICAL HISTORY:   Past Medical History:   Diagnosis Date    DDD (degenerative disc disease), lumbar 10/7/2019    Hyperlipidemia 8/31/2015    Hypertension     IFG (impaired fasting glucose) 8/31/2015    Neuropathic pain 8/31/2015    Squamous cell cancer of tongue 2012     PAST SURGICAL HISTORY:  Past Surgical History:   Procedure Laterality Date    CHOLECYSTECTOMY      COLONOSCOPY N/A 06/14/2021    Procedure: COLONOSCOPY;  Surgeon: Gentry Dickson MD;  Location: [address]);  Service: Endoscopy;  Laterality: N/A;  covid test 6/11 primary care, instructions sent to [facility] 6/11 Pt. fully vaccinated. Completed in Jan. 2021.[facility]    GALLBLADDER SURGERY  06/2016    HYSTERECTOMY      JOINT REPLACEMENT  2020    KNEE ARTHROPLASTY Left 07/08/2020    Procedure: ARTHROPLASTY, KNEE;  Surgeon: GLENN Whyte MD;  Location: [facility];  Service: Orthopedics;  Laterality: Left;  regional w/catheter   Spinal, Adductor  Cloidine/Epi/Ketorolac/Ropivacaine Injection 30cc      KNEE ARTHROSCOPY Right     for torn meniscus    TONGUE SURGERY      TOTAL KNEE ARTHROPLASTY Right 11/11/2020    Procedure: ARTHROPLASTY,  KNEE, TOTAL;  Surgeon: GLENN Whyte MD;  Location: [facility];  Service: Orthopedics;  Laterality: Right;  outpatient with 23hr observation  regional with cathter- spinal and adductor    TRANSFORAMINAL EPIDURAL INJECTION OF STEROID Bilateral 09/19/2019    Procedure: Injection,steroid,epidural,transforaminal approach LUMBAR TRANSFORAMINAL BILATERAL L4/5 TF NOE;  Surgeon: Tim Kerns MD;  Location: [facility];  Service: Pain Management;  Laterality: Bilateral;  NEEDS CONSENT, VIP    TRANSFORAMINAL EPIDURAL INJECTION OF STEROID Bilateral 10/07/2019    Procedure: LUMBAR TRANSFORAMINAL BILATERAL L4/5 TF NOE;  Surgeon: Tim Kerns MD;  Location: [facility];  Service: Pain Management;  Laterality: Bilateral;  NEEDS CONSENT, **VIP**    TRANSFORAMINAL EPIDURAL INJECTION OF STEROID Bilateral 03/22/2021    Procedure: LUMBAR TRANSFORAMINAL BILATERAL L4/5 DIRECT REFERRAL;  Surgeon: Tim Kerns MD;  Location: [facility];  Service: Pain Management;  Laterality: Bilateral;  NEEDS CONSENT, URGENT  *VIP*    TRANSFORAMINAL EPIDURAL INJECTION OF STEROID Right 05/10/2021    Procedure: LUMBAR TRANSFORAMINAL RIGHT L4/5, L5/S1 DIRECT REFERRAL;  Surgeon: Tim Kerns MD;  Location: [facility];  Service: Pain Management;  Laterality: Right;  NEEDS CONSENT, MEDICALLY URGENT  **VIP**     FAMILY HISTORY:  Family History   Problem Relation Age of Onset    Alcohol abuse Mother     Cirrhosis Mother     Cancer Father 74        prostate, throat, lung- smoker    Hyperlipidemia Father     Diabetes Father     Heart disease Father     Diabetes Sister     Hypertension Sister     ALS Sister     Drug abuse Brother     Heart disease Brother     Hyperlipidemia Brother     No Known Problems Maternal Grandmother     No Known Problems Maternal Grandfather     No Known Problems Paternal Grandmother     No Known Problems Paternal Grandfather     No Known Problems Maternal Aunt     No Known Problems Maternal Uncle     No Known Problems Paternal  Aunt     No Known Problems Paternal Uncle     Amblyopia Neg Hx     Blindness Neg Hx     Cataracts Neg Hx     Glaucoma Neg Hx     Macular degeneration Neg Hx     Retinal detachment Neg Hx     Strabismus Neg Hx     Stroke Neg Hx     Thyroid disease Neg Hx      MEDICATIONS:    Current Outpatient Medications:     ALPRAZolam (XANAX) 0.25 MG tablet, Take 1 tablet (0.25 mg total) by mouth nightly as needed for Anxiety., Disp: 30 tablet, Rfl: 5    aspirin (ECOTRIN) 81 MG EC tablet, Take 1 tablet (81 mg total) by mouth nightly., Disp: , Rfl: 0    celecoxib (CELEBREX) 200 MG capsule, Take 1 capsule (200 mg total) by mouth once daily., Disp: 60 capsule, Rfl: 2    diazePAM (VALIUM) 10 MG Tab, Take 1/2 to 1 tab by mouth as needed for muscle spasm every 6 hours and 1 tab nightly, Disp: 30 tablet, Rfl: 1    estradioL (ESTRACE) 0.01 % (0.1 mg/gram) vaginal cream, Place 2 gram vaginally once daily., Disp: 42.5 g, Rfl: 0    irbesartan (AVAPRO) 75 MG tablet, Take 1 tablet (75 mg total) by mouth every evening., Disp: 90 tablet, Rfl: 3    pravastatin (PRAVACHOL) 10 MG tablet, Take 1 tablet (10 mg total) by mouth once daily. (Patient taking differently: Take 5 mg by mouth once daily.), Disp: 90 tablet, Rfl: 3    semaglutide (OZEMPIC) 2 mg/dose (8 mg/3 mL) PnIj, Inject 2 mg into the skin every 7 days., Disp: 1 pen, Rfl: 11    triamterene-hydrochlorothiazide 37.5-25 mg (MAXZIDE-25) 37.5-25 mg per tablet, Take 1 tablet by mouth once daily., Disp: 90 tablet, Rfl: 3    zolpidem (AMBIEN) 5 MG Tab, Take 1 tablet (5 mg total) by mouth nightly as needed (insomnia)., Disp: 30 tablet, Rfl: 1    ALLERGIES:  Review of patient's allergies indicates:   Allergen Reactions    Aspirin Nausea Only     Can take low dose of Aspirin; can take buffered low dose aspirin only    Codeine Nausea Only     Can take Codeine if she takes a dose of Zofran with it     REVIEW OF SYSTEMS:  Constitution: Negative. Negative for chills, fever and night sweats.     Hematologic/Lymphatic: Negative for bleeding problem. Does not bruise/bleed easily.   Skin: Negative for dry skin, itching and rash.   Musculoskeletal: Negative for falls.  Negative for bilateral knee pain and muscle weakness.     All other review of symptoms were reviewed and found to be noncontributory.     PHYSICAL EXAMINATION:  Vitals:  There were no vitals taken for this visit.   General: Well-developed well-nourished 62 y.o. femalein no acute distress   Cardiovascular: Regular rhythm by palpation of distal pulse, normal color and temperature, no concerning varicosities on symptomatic side   Lungs: No labored breathing or wheezing appreciated   Neuro: Alert and oriented ×3   Psychiatric: well oriented to person, place and time, demonstrates normal mood and affect   Skin: No rashes, lesions or ulcers, normal temperature, turgor, and texture on uninvolved extremity    Ortho/SPM Exam  Exam of the right knee demonstrates perhaps some mild soft tissue swelling over the anterior aspect of the knee compared to the other side.  No effusion.  No prepatellar effusion.  Full active extension, flexion to 125-130° with central patellar tracking.  Tenderness over the right inferior pole patella and patellar tendon.  Bilateral hip abductor weakness with difficulty on single leg bridge.  Tight hamstrings.  No joint line tenderness.  Stable to varus and valgus stress.  No mid flexion instability.    IMAGING:  X-rays of both knees demonstrate well-fixed total knee prostheses bilaterally.  Progressive ossification pattern over the anterior infrapatellar region on the right side compared to prior films.  No signs of loosening of the prostheses.    ASSESSMENT:      ICD-10-CM ICD-9-CM   1. Patellar tendinitis of right knee  M76.51 726.64        PLAN:     Similar symptoms to what she had last year when I saw her.  Overall has done well but she has some ongoing biomechanical deficiencies.  Findings most consistent with patellar  tendinitis and there are some progressive infrapatellar ossification changes consistent with that on repeat x-rays.  Discussed therapy, oral medication and activity modifications.  We will continue to watch this closely.  No concern otherwise.

## 2023-03-29 ENCOUNTER — PATIENT MESSAGE (OUTPATIENT)
Dept: PLASTIC SURGERY | Facility: CLINIC | Age: 63
End: 2023-03-29
Payer: COMMERCIAL

## 2023-03-31 ENCOUNTER — OFFICE VISIT (OUTPATIENT)
Dept: PLASTIC SURGERY | Facility: CLINIC | Age: 63
End: 2023-03-31

## 2023-03-31 DIAGNOSIS — Z41.1 ENCOUNTER FOR COSMETIC PROCEDURE: Primary | ICD-10-CM

## 2023-03-31 PROCEDURE — 99024 PR POST-OP FOLLOW-UP VISIT: ICD-10-PCS | Mod: ,,, | Performed by: OTOLARYNGOLOGY

## 2023-03-31 PROCEDURE — 99024 POSTOP FOLLOW-UP VISIT: CPT | Mod: ,,, | Performed by: OTOLARYNGOLOGY

## 2023-04-14 NOTE — PROGRESS NOTES
Patient is here today for cosmetic Botox treatment. She denies any history of adverse reaction to Botox or history of neuromuscular disease. Concerned with dropped corner of mouth and marionette lines.     Discussed benefits and risks of Botox injections, including headache, weakness/paralysis of muscles, asymmetry, eyebrow/lid drooping, pain, bruising, swelling, infection, and rare risk of systemic botulism. Verbal and written consent was obtained.     Patient agreed to proceed with treatment. 8 units total were injected today:  8 units bilateral FABRICE     Patient tolerated well with no complications. She was instructed not to rub or massage the treated areas and that she should avoid lying down, bending upside down, and strenuous exercise for the rest of the day. Instructed to wait two weeks to assess response before presenting for a touch up injection, if needed.  Consider Refyne if marionettes still present after botox.     Lot #: [id number]  Exp date: 04/2025

## 2023-04-17 ENCOUNTER — TELEPHONE (OUTPATIENT)
Dept: PHARMACY | Facility: CLINIC | Age: 63
End: 2023-04-17
Payer: COMMERCIAL

## 2023-05-15 ENCOUNTER — TELEPHONE (OUTPATIENT)
Dept: EMERGENCY MEDICINE | Facility: HOSPITAL | Age: 63
End: 2023-05-15
Payer: COMMERCIAL

## 2023-05-15 DIAGNOSIS — J01.10 ACUTE NON-RECURRENT FRONTAL SINUSITIS: Primary | ICD-10-CM

## 2023-05-15 RX ORDER — PREDNISONE 20 MG/1
20 TABLET ORAL DAILY
Qty: 5 TABLET | Refills: 0 | Status: SHIPPED | OUTPATIENT
Start: 2023-05-15 | End: 2023-05-20

## 2023-05-15 NOTE — TELEPHONE ENCOUNTER
pt with acute sinusitis on Z pack times one day with no sinus pressure relief.  Will add oral pred and advised her to use flonase adn nasal irrigation.

## 2023-05-19 DIAGNOSIS — G47.00 INSOMNIA, UNSPECIFIED TYPE: ICD-10-CM

## 2023-05-19 RX ORDER — ZOLPIDEM TARTRATE 5 MG/1
5 TABLET ORAL NIGHTLY PRN
Qty: 30 TABLET | Refills: 1 | Status: CANCELLED | OUTPATIENT
Start: 2023-05-19 | End: 2023-11-17

## 2023-05-20 DIAGNOSIS — G47.00 INSOMNIA, UNSPECIFIED TYPE: ICD-10-CM

## 2023-05-22 RX ORDER — ZOLPIDEM TARTRATE 5 MG/1
5 TABLET ORAL NIGHTLY PRN
Qty: 30 TABLET | Refills: 1 | Status: SHIPPED | OUTPATIENT
Start: 2023-05-22 | End: 2023-09-01 | Stop reason: SDUPTHER

## 2023-06-16 ENCOUNTER — PATIENT MESSAGE (OUTPATIENT)
Dept: ADMINISTRATIVE | Facility: OTHER | Age: 63
End: 2023-06-16
Payer: COMMERCIAL

## 2023-06-26 ENCOUNTER — OFFICE VISIT (OUTPATIENT)
Dept: OBSTETRICS AND GYNECOLOGY | Facility: CLINIC | Age: 63
End: 2023-06-26
Payer: COMMERCIAL

## 2023-06-26 VITALS
BODY MASS INDEX: 28.13 KG/M2 | SYSTOLIC BLOOD PRESSURE: 128 MMHG | HEIGHT: 68 IN | DIASTOLIC BLOOD PRESSURE: 72 MMHG | WEIGHT: 185.63 LBS

## 2023-06-26 DIAGNOSIS — N95.2 ATROPHIC VAGINITIS: ICD-10-CM

## 2023-06-26 DIAGNOSIS — Z01.419 ENCOUNTER FOR WELL WOMAN EXAM: Primary | ICD-10-CM

## 2023-06-26 DIAGNOSIS — R39.89 SENSATION OF PRESSURE IN BLADDER AREA: ICD-10-CM

## 2023-06-26 LAB
BILIRUB UR QL STRIP: NEGATIVE
CLARITY UR REFRACT.AUTO: CLEAR
COLOR UR AUTO: YELLOW
GLUCOSE UR QL STRIP: NEGATIVE
HGB UR QL STRIP: NEGATIVE
KETONES UR QL STRIP: NEGATIVE
LEUKOCYTE ESTERASE UR QL STRIP: NEGATIVE
NITRITE UR QL STRIP: NEGATIVE
PH UR STRIP: 6 [PH] (ref 5–8)
PROT UR QL STRIP: NEGATIVE
SP GR UR STRIP: 1.01 (ref 1–1.03)
URN SPEC COLLECT METH UR: NORMAL

## 2023-06-26 PROCEDURE — 99386 PREV VISIT NEW AGE 40-64: CPT | Mod: S$GLB,,, | Performed by: STUDENT IN AN ORGANIZED HEALTH CARE EDUCATION/TRAINING PROGRAM

## 2023-06-26 PROCEDURE — 3008F PR BODY MASS INDEX (BMI) DOCUMENTED: ICD-10-PCS | Mod: CPTII,S$GLB,, | Performed by: STUDENT IN AN ORGANIZED HEALTH CARE EDUCATION/TRAINING PROGRAM

## 2023-06-26 PROCEDURE — 99999 PR PBB SHADOW E&M-EST. PATIENT-LVL III: CPT | Mod: PBBFAC,,, | Performed by: STUDENT IN AN ORGANIZED HEALTH CARE EDUCATION/TRAINING PROGRAM

## 2023-06-26 PROCEDURE — 99203 PR OFFICE/OUTPT VISIT, NEW, LEVL III, 30-44 MIN: ICD-10-PCS | Mod: 25,S$GLB,, | Performed by: STUDENT IN AN ORGANIZED HEALTH CARE EDUCATION/TRAINING PROGRAM

## 2023-06-26 PROCEDURE — 81003 URINALYSIS AUTO W/O SCOPE: CPT | Performed by: STUDENT IN AN ORGANIZED HEALTH CARE EDUCATION/TRAINING PROGRAM

## 2023-06-26 PROCEDURE — 87086 URINE CULTURE/COLONY COUNT: CPT | Performed by: STUDENT IN AN ORGANIZED HEALTH CARE EDUCATION/TRAINING PROGRAM

## 2023-06-26 PROCEDURE — 3078F DIAST BP <80 MM HG: CPT | Mod: CPTII,S$GLB,, | Performed by: STUDENT IN AN ORGANIZED HEALTH CARE EDUCATION/TRAINING PROGRAM

## 2023-06-26 PROCEDURE — 99386 PR PREVENTIVE VISIT,NEW,40-64: ICD-10-PCS | Mod: S$GLB,,, | Performed by: STUDENT IN AN ORGANIZED HEALTH CARE EDUCATION/TRAINING PROGRAM

## 2023-06-26 PROCEDURE — 99203 OFFICE O/P NEW LOW 30 MIN: CPT | Mod: 25,S$GLB,, | Performed by: STUDENT IN AN ORGANIZED HEALTH CARE EDUCATION/TRAINING PROGRAM

## 2023-06-26 PROCEDURE — 3044F HG A1C LEVEL LT 7.0%: CPT | Mod: CPTII,S$GLB,, | Performed by: STUDENT IN AN ORGANIZED HEALTH CARE EDUCATION/TRAINING PROGRAM

## 2023-06-26 PROCEDURE — 1159F PR MEDICATION LIST DOCUMENTED IN MEDICAL RECORD: ICD-10-PCS | Mod: CPTII,S$GLB,, | Performed by: STUDENT IN AN ORGANIZED HEALTH CARE EDUCATION/TRAINING PROGRAM

## 2023-06-26 PROCEDURE — 3078F PR MOST RECENT DIASTOLIC BLOOD PRESSURE < 80 MM HG: ICD-10-PCS | Mod: CPTII,S$GLB,, | Performed by: STUDENT IN AN ORGANIZED HEALTH CARE EDUCATION/TRAINING PROGRAM

## 2023-06-26 PROCEDURE — 3044F PR MOST RECENT HEMOGLOBIN A1C LEVEL <7.0%: ICD-10-PCS | Mod: CPTII,S$GLB,, | Performed by: STUDENT IN AN ORGANIZED HEALTH CARE EDUCATION/TRAINING PROGRAM

## 2023-06-26 PROCEDURE — 3074F PR MOST RECENT SYSTOLIC BLOOD PRESSURE < 130 MM HG: ICD-10-PCS | Mod: CPTII,S$GLB,, | Performed by: STUDENT IN AN ORGANIZED HEALTH CARE EDUCATION/TRAINING PROGRAM

## 2023-06-26 PROCEDURE — 3008F BODY MASS INDEX DOCD: CPT | Mod: CPTII,S$GLB,, | Performed by: STUDENT IN AN ORGANIZED HEALTH CARE EDUCATION/TRAINING PROGRAM

## 2023-06-26 PROCEDURE — 81514 NFCT DS BV&VAGINITIS DNA ALG: CPT | Performed by: STUDENT IN AN ORGANIZED HEALTH CARE EDUCATION/TRAINING PROGRAM

## 2023-06-26 PROCEDURE — 4010F ACE/ARB THERAPY RXD/TAKEN: CPT | Mod: CPTII,S$GLB,, | Performed by: STUDENT IN AN ORGANIZED HEALTH CARE EDUCATION/TRAINING PROGRAM

## 2023-06-26 PROCEDURE — 1159F MED LIST DOCD IN RCRD: CPT | Mod: CPTII,S$GLB,, | Performed by: STUDENT IN AN ORGANIZED HEALTH CARE EDUCATION/TRAINING PROGRAM

## 2023-06-26 PROCEDURE — 4010F PR ACE/ARB THEARPY RXD/TAKEN: ICD-10-PCS | Mod: CPTII,S$GLB,, | Performed by: STUDENT IN AN ORGANIZED HEALTH CARE EDUCATION/TRAINING PROGRAM

## 2023-06-26 PROCEDURE — 3074F SYST BP LT 130 MM HG: CPT | Mod: CPTII,S$GLB,, | Performed by: STUDENT IN AN ORGANIZED HEALTH CARE EDUCATION/TRAINING PROGRAM

## 2023-06-26 PROCEDURE — 99999 PR PBB SHADOW E&M-EST. PATIENT-LVL III: ICD-10-PCS | Mod: PBBFAC,,, | Performed by: STUDENT IN AN ORGANIZED HEALTH CARE EDUCATION/TRAINING PROGRAM

## 2023-06-26 RX ORDER — ESTRADIOL 0.1 MG/G
2 CREAM VAGINAL
Qty: 306 G | Refills: 0 | Status: SHIPPED | OUTPATIENT
Start: 2023-06-26 | End: 2023-10-09

## 2023-06-26 NOTE — PROGRESS NOTES
History & Physical  Gynecology      SUBJECTIVE:     Chief Complaint: Well Woman       History of Present Illness:  Josefina Blue is a 63 y.o.  who presents for annual physical exam.   She is reporting urinary pressure and frequency. She has a warm, almost painful sensation in her suprapubic area. She thought maybe it was yeast and tried monistat with no relief. She has nocturia, going 3-4 times per night. Also feels like she has to push/splint to fully empty her bladder. No incontinence. No constipation or diarrhea. She has vaginal atrophy, for which she is using vaginal estrogen once per week. Feels like it could be doing more for her. She states she and her  find a way to work around the dryness and she is able to orgasm, but still dry.    She underwent robotic hysterectomy 5 years ago for AUB. Has some night sweats, no issues with hot flashes during the day. Has not been on HT besides vaginal estrogen. No vaginal discharge, odor, or itching. Declines STI screening today.    She has a history of mild cervical dysplasia but no high grade.    She has no family history of breast or ovarian cancer.   Last mammogram was 2022 and was benign.   She has no family history of colon cancer.   Colonoscopy done in .  DXA on file with normal BMD.      Review of patient's allergies indicates:   Allergen Reactions    Aspirin Nausea Only     Can take low dose of Aspirin; can take buffered low dose aspirin only    Codeine Nausea Only     Can take Codeine if she takes a dose of Zofran with it       Past Medical History:   Diagnosis Date    DDD (degenerative disc disease), lumbar 10/7/2019    Hyperlipidemia 2015    Hypertension     IFG (impaired fasting glucose) 2015    Neuropathic pain 2015    Squamous cell cancer of tongue      Past Surgical History:   Procedure Laterality Date    CHOLECYSTECTOMY      COLONOSCOPY N/A 2021    Procedure: COLONOSCOPY;  Surgeon: Gentry Dickson MD;   Location: [facility] ENDO (4TH FLR);  Service: Endoscopy;  Laterality: N/A;  covid test 6/11 primary care, instructions sent to [facility]. 6/11 Pt. fully vaccinated. Completed in Jan. 2021.[facility]    GALLBLADDER SURGERY  06/2016    HYSTERECTOMY      JOINT REPLACEMENT  2020    KNEE ARTHROPLASTY Left 07/08/2020    Procedure: ARTHROPLASTY, KNEE;  Surgeon: GLENN Whyte MD;  Location: [facility] OR;  Service: Orthopedics;  Laterality: Left;  regional w/catheter   Spinal, Adductor  Cloidine/Epi/Ketorolac/Ropivacaine Injection 30cc      KNEE ARTHROSCOPY Right     for torn meniscus    TONGUE SURGERY      TOTAL KNEE ARTHROPLASTY Right 11/11/2020    Procedure: ARTHROPLASTY, KNEE, TOTAL;  Surgeon: GLENN Whyte MD;  Location: [facility] OR;  Service: Orthopedics;  Laterality: Right;  outpatient with 23hr observation  regional with cathter- spinal and adductor    TRANSFORAMINAL EPIDURAL INJECTION OF STEROID Bilateral 09/19/2019    Procedure: Injection,steroid,epidural,transforaminal approach LUMBAR TRANSFORAMINAL BILATERAL L4/5 TF NOE;  Surgeon: Tim Kerns MD;  Location: [facility] PAIN MGT;  Service: Pain Management;  Laterality: Bilateral;  NEEDS CONSENT, VIP    TRANSFORAMINAL EPIDURAL INJECTION OF STEROID Bilateral 10/07/2019    Procedure: LUMBAR TRANSFORAMINAL BILATERAL L4/5 TF NOE;  Surgeon: Tim Kerns MD;  Location: [facility] PAIN MGT;  Service: Pain Management;  Laterality: Bilateral;  NEEDS CONSENT, **VIP**    TRANSFORAMINAL EPIDURAL INJECTION OF STEROID Bilateral 03/22/2021    Procedure: LUMBAR TRANSFORAMINAL BILATERAL L4/5 DIRECT REFERRAL;  Surgeon: Tim Kerns MD;  Location: [facility] PAIN MGT;  Service: Pain Management;  Laterality: Bilateral;  NEEDS CONSENT, URGENT  *VIP*    TRANSFORAMINAL EPIDURAL INJECTION OF STEROID Right 05/10/2021    Procedure: LUMBAR TRANSFORAMINAL RIGHT L4/5, L5/S1 DIRECT REFERRAL;  Surgeon: Tim Kerns MD;  Location: [facility] PAIN MGT;  Service: Pain Management;  Laterality: Right;  NEEDS CONSENT, MEDICALLY  URGENT  **VIP**     OB History          0    Para   0    Term   0       0    AB   0    Living   0         SAB   0    IAB   0    Ectopic   0    Multiple   0    Live Births               Obstetric Comments   Endo- age 22             Family History   Problem Relation Age of Onset    No Known Problems Paternal Grandfather     No Known Problems Paternal Grandmother     No Known Problems Maternal Grandmother     No Known Problems Maternal Grandfather     Cancer Father 74        prostate, throat, lung- smoker    Hyperlipidemia Father     Diabetes Father     Heart disease Father     Cancer Mother         cervical cancer    Alcohol abuse Mother     Cirrhosis Mother     Drug abuse Brother     Heart disease Brother     Hyperlipidemia Brother     Diabetes Sister     Hypertension Sister     ALS Sister     No Known Problems Maternal Aunt     No Known Problems Maternal Uncle     No Known Problems Paternal Aunt     No Known Problems Paternal Uncle     Amblyopia Neg Hx     Blindness Neg Hx     Cataracts Neg Hx     Glaucoma Neg Hx     Macular degeneration Neg Hx     Retinal detachment Neg Hx     Strabismus Neg Hx     Stroke Neg Hx     Thyroid disease Neg Hx     Breast cancer Neg Hx     Colon cancer Neg Hx     Ovarian cancer Neg Hx      Social History     Tobacco Use    Smoking status: Never    Smokeless tobacco: Never   Substance Use Topics    Alcohol use: Yes     Alcohol/week: 3.0 standard drinks     Types: 2 Glasses of wine, 1 Drinks containing 0.5 oz of alcohol per week     Comment: once a week    Drug use: No       Current Outpatient Medications   Medication Sig    ALPRAZolam (XANAX) 0.25 MG tablet Take 1 tablet (0.25 mg total) by mouth nightly as needed for Anxiety.    aspirin (ECOTRIN) 81 MG EC tablet Take 1 tablet (81 mg total) by mouth nightly.    celecoxib (CELEBREX) 200 MG capsule Take 1 capsule (200 mg total) by mouth once daily.    irbesartan (AVAPRO) 75 MG tablet Take 1 tablet (75 mg total) by mouth every  "evening.    pravastatin (PRAVACHOL) 10 MG tablet Take 1 tablet (10 mg total) by mouth once daily. (Patient taking differently: Take 5 mg by mouth once daily.)    semaglutide (OZEMPIC) 2 mg/dose (8 mg/3 mL) PnIj Inject 2 mg into the skin every 7 days.    triamterene-hydrochlorothiazide 37.5-25 mg (MAXZIDE-25) 37.5-25 mg per tablet Take 1 tablet by mouth once daily.    zolpidem (AMBIEN) 5 MG Tab Take 1 tablet (5 mg total) by mouth nightly as needed (insomnia).    diazePAM (VALIUM) 10 MG Tab Take 1/2 to 1 tab by mouth as needed for muscle spasm every 6 hours and 1 tab nightly    estradioL (ESTRACE) 0.01 % (0.1 mg/gram) vaginal cream Place 2 g vaginally 3 (three) times a week.     No current facility-administered medications for this visit.         Review of Systems:  Review of Systems   Constitutional:  Negative for chills and fever.   Respiratory:  Negative for shortness of breath.    Cardiovascular:  Negative for chest pain.   Gastrointestinal:  Negative for constipation and diarrhea.   Endocrine: Negative for hot flashes.   Genitourinary:  Positive for frequency, pelvic pain ("Pressure") and vaginal dryness. Negative for bladder incontinence, vaginal bleeding and vaginal discharge.   Integumentary:  Negative for breast tenderness.   Breast: Negative for lump and tenderness     OBJECTIVE:     Physical Exam:  Physical Exam  Vitals reviewed. Exam conducted with a chaperone present.   Constitutional:       General: She is not in acute distress.     Appearance: Normal appearance. She is well-developed. She is not ill-appearing or toxic-appearing.   HENT:      Head: Normocephalic and atraumatic.      Nose: Nose normal.   Eyes:      Extraocular Movements: Extraocular movements intact.      Conjunctiva/sclera: Conjunctivae normal.      Pupils: Pupils are equal, round, and reactive to light.   Cardiovascular:      Rate and Rhythm: Normal rate.   Pulmonary:      Effort: Pulmonary effort is normal. No respiratory distress. "   Chest:   Breasts:     Right: Normal. No swelling, bleeding, inverted nipple, mass, nipple discharge, skin change or tenderness.      Left: Normal. No swelling, bleeding, inverted nipple, mass, nipple discharge, skin change or tenderness.   Abdominal:      General: There is no distension.      Palpations: Abdomen is soft. There is no mass.      Tenderness: There is no abdominal tenderness. There is no guarding or rebound.   Genitourinary:     General: Normal vulva.      Vagina: Normal. No vaginal discharge or bleeding.      Uterus: Absent.       Adnexa: Right adnexa normal and left adnexa normal.        Right: No mass, tenderness or fullness.          Left: No mass, tenderness or fullness.        Comments: Uterus and cervix surgically absent. Atrophy noted. Some discomfort in the midline on bimanual exam. No significant prolapse.  Musculoskeletal:         General: No swelling or deformity. Normal range of motion.      Cervical back: Normal range of motion.   Skin:     General: Skin is warm and dry.   Neurological:      Mental Status: She is alert. Mental status is at baseline.   Psychiatric:         Mood and Affect: Mood normal.         Behavior: Behavior normal.         Thought Content: Thought content normal.         ASSESSMENT:       ICD-10-CM ICD-9-CM    1. Encounter for well woman exam  Z01.419 V72.31 Mammo Digital Screening Bilat      2. Sensation of pressure in bladder area  R39.89 596.89 VAGINOSIS SCREEN BY DNA PROBE      CULTURE, URINE      Urinalysis      Ambulatory referral/consult to Physical/Occupational Therapy      3. Atrophic vaginitis  N95.2 627.3 estradioL (ESTRACE) 0.01 % (0.1 mg/gram) vaginal cream             Plan:      WWE   -- Pap not indicated.   -- MMG due in Sept. Order placed.   -- Colon cancer screening utd.   -- DXA with normal BMD.      Atrophy   -- Recommend using vaginal estrogen 3 times per week to see if dryness improves.   -- Information for intrarosa also provided      Urinary  symptoms   -- No significant prolapse on exam   -- Urine dip unremarkable. Will send UA, urine culture.   -- Vaginosis collected   -- Will try pelvic PT      Yamilka Keith MD

## 2023-06-27 LAB — BACTERIA UR CULT: NORMAL

## 2023-06-28 LAB
BACTERIAL VAGINOSIS DNA: NEGATIVE
CANDIDA GLABRATA DNA: NEGATIVE
CANDIDA KRUSEI DNA: NEGATIVE
CANDIDA RRNA VAG QL PROBE: NEGATIVE
T VAGINALIS RRNA GENITAL QL PROBE: NEGATIVE

## 2023-07-05 ENCOUNTER — TELEPHONE (OUTPATIENT)
Dept: INFECTIOUS DISEASES | Facility: CLINIC | Age: 63
End: 2023-07-05
Payer: COMMERCIAL

## 2023-07-05 DIAGNOSIS — I10 BENIGN ESSENTIAL HYPERTENSION: ICD-10-CM

## 2023-07-05 RX ORDER — ALBUTEROL SULFATE 90 UG/1
2 AEROSOL, METERED RESPIRATORY (INHALATION) EVERY 6 HOURS PRN
Qty: 18 G | Refills: 0 | Status: SHIPPED | OUTPATIENT
Start: 2023-07-05 | End: 2024-01-29

## 2023-07-05 RX ORDER — TRIAMTERENE/HYDROCHLOROTHIAZID 37.5-25 MG
1 TABLET ORAL DAILY
Qty: 90 TABLET | Refills: 0 | Status: SHIPPED | OUTPATIENT
Start: 2023-07-05 | End: 2023-09-28

## 2023-07-05 RX ORDER — AZITHROMYCIN 250 MG/1
TABLET, FILM COATED ORAL
Qty: 6 TABLET | Refills: 1 | Status: SHIPPED | OUTPATIENT
Start: 2023-07-05 | End: 2023-10-26

## 2023-07-05 NOTE — TELEPHONE ENCOUNTER
Care Due:                  Date            Visit Type   Department     Provider  --------------------------------------------------------------------------------                                NP -                              PRIMARY      [facility] PRIMARY  Last Visit: 05-      CARE ([facility])   THERESE Astudillo  Next Visit: None Scheduled  None         None Found                                                            Last  Test          Frequency    Reason                     Performed    Due Date  --------------------------------------------------------------------------------    Office Visit  12 months..  pravastatin,               05- 05-                             triamterene-hydrochloroth                             iazide...................    CMP.........  12 months..  pravastatin,               05- 05-                             triamterene-hydrochloroth                             iazide...................    Lipid Panel.  12 months..  pravastatin..............  08-   08-    [facility] Embedded Care Due Messages. Reference number: [id number].   7/05/2023 10:54:45 AM CDT

## 2023-07-05 NOTE — TELEPHONE ENCOUNTER
Respiratory symptoms.   Started 1 week ago with cold symptoms.  No progressed to cough with sputum.  Has occ wheezing also.  No fever.  No SOB.

## 2023-07-13 ENCOUNTER — TELEPHONE (OUTPATIENT)
Dept: INFECTIOUS DISEASES | Facility: CLINIC | Age: 63
End: 2023-07-13
Payer: COMMERCIAL

## 2023-07-13 RX ORDER — FLUTICASONE FUROATE AND VILANTEROL 200; 25 UG/1; UG/1
1 POWDER RESPIRATORY (INHALATION) DAILY
Qty: 60 EACH | Refills: 1 | Status: SHIPPED | OUTPATIENT
Start: 2023-07-13 | End: 2023-10-26

## 2023-07-19 ENCOUNTER — TELEPHONE (OUTPATIENT)
Dept: INFECTIOUS DISEASES | Facility: CLINIC | Age: 63
End: 2023-07-19
Payer: COMMERCIAL

## 2023-08-02 NOTE — PROGRESS NOTES
Patient is here today for cosmetic Botox treatment.   She denies any history of adverse reaction to Botox or history of neuromuscular disease.     Discussed benefits and risks of Botox injections, including headache, weakness/paralysis of muscles, asymmetry, eyebrow/lid drooping, pain, bruising, swelling, infection, and rare risk of systemic botulism. Verbal and written consent was obtained.    Patient agreed to proceed with treatment. 38 units total were injected today:  5 in frontalis  25 in glabella (4-6-5-6-4)  8 in bilateral FABRICE    Patient tolerated well with no complications. She was instructed not to rub or massage the treated areas and that she should avoid lying down, bending upside down, and strenuous exercise for the rest of the day.  Instructed to wait two weeks to assess response before presenting for a touch up injection, if needed.      Botox dilution: 10u / 0.2mL (10u / 0.4mL for frontalis)  Lot #: [id number]  Exp date: 04/2024     Spoke with patient. Notified of lab results. Scheduled for CT tomorrow.

## 2023-08-12 DIAGNOSIS — Z96.653 S/P TOTAL KNEE ARTHROPLASTY, BILATERAL: ICD-10-CM

## 2023-08-14 RX ORDER — CELECOXIB 200 MG/1
200 CAPSULE ORAL DAILY
Qty: 60 CAPSULE | Refills: 2 | Status: SHIPPED | OUTPATIENT
Start: 2023-08-14 | End: 2023-10-26

## 2023-08-21 ENCOUNTER — TELEPHONE (OUTPATIENT)
Dept: INFECTIOUS DISEASES | Facility: CLINIC | Age: 63
End: 2023-08-21
Payer: COMMERCIAL

## 2023-08-29 ENCOUNTER — IMMUNIZATION (OUTPATIENT)
Dept: INFECTIOUS DISEASES | Facility: CLINIC | Age: 63
End: 2023-08-29
Payer: COMMERCIAL

## 2023-08-29 DIAGNOSIS — Z23 NEED FOR VACCINATION: Primary | ICD-10-CM

## 2023-08-29 PROCEDURE — 0134A COVID-19, MRNA, LNP-S, BIVALENT BOOSTER, PF, 50 MCG/0.5 ML: CPT | Mod: S$GLB,,, | Performed by: INTERNAL MEDICINE

## 2023-08-29 PROCEDURE — 91313 COVID-19, MRNA, LNP-S, BIVALENT BOOSTER, PF, 50 MCG/0.5 ML: CPT | Mod: S$GLB,,, | Performed by: INTERNAL MEDICINE

## 2023-08-29 PROCEDURE — 91313 COVID-19, MRNA, LNP-S, BIVALENT BOOSTER, PF, 50 MCG/0.5 ML: ICD-10-PCS | Mod: S$GLB,,, | Performed by: INTERNAL MEDICINE

## 2023-08-29 PROCEDURE — 0134A COVID-19, MRNA, LNP-S, BIVALENT BOOSTER, PF, 50 MCG/0.5 ML: ICD-10-PCS | Mod: S$GLB,,, | Performed by: INTERNAL MEDICINE

## 2023-09-01 DIAGNOSIS — G47.00 INSOMNIA, UNSPECIFIED TYPE: ICD-10-CM

## 2023-09-01 DIAGNOSIS — F41.8 SITUATIONAL ANXIETY: ICD-10-CM

## 2023-09-05 RX ORDER — ALPRAZOLAM 0.25 MG/1
0.25 TABLET ORAL NIGHTLY PRN
Qty: 30 TABLET | Refills: 5 | Status: SHIPPED | OUTPATIENT
Start: 2023-09-05

## 2023-09-05 RX ORDER — PRAVASTATIN SODIUM 10 MG/1
10 TABLET ORAL DAILY
Qty: 90 TABLET | Refills: 3 | Status: SHIPPED | OUTPATIENT
Start: 2023-09-05 | End: 2024-01-29

## 2023-09-06 RX ORDER — ZOLPIDEM TARTRATE 5 MG/1
5 TABLET ORAL NIGHTLY PRN
Qty: 30 TABLET | Refills: 1 | Status: SHIPPED | OUTPATIENT
Start: 2023-09-06 | End: 2023-10-26 | Stop reason: SDUPTHER

## 2023-09-11 ENCOUNTER — TELEPHONE (OUTPATIENT)
Dept: INFECTIOUS DISEASES | Facility: CLINIC | Age: 63
End: 2023-09-11

## 2023-09-13 ENCOUNTER — OFFICE VISIT (OUTPATIENT)
Dept: ENDOCRINOLOGY | Facility: CLINIC | Age: 63
End: 2023-09-13
Payer: COMMERCIAL

## 2023-09-13 DIAGNOSIS — I10 BENIGN ESSENTIAL HYPERTENSION: ICD-10-CM

## 2023-09-13 DIAGNOSIS — R73.03 PREDIABETES: Primary | ICD-10-CM

## 2023-09-13 DIAGNOSIS — Z82.49 FAMILY HISTORY OF CARDIAC DISORDER IN FATHER: ICD-10-CM

## 2023-09-13 DIAGNOSIS — E78.2 MIXED HYPERLIPIDEMIA: ICD-10-CM

## 2023-09-13 PROCEDURE — 3044F PR MOST RECENT HEMOGLOBIN A1C LEVEL <7.0%: ICD-10-PCS | Mod: CPTII,95,, | Performed by: INTERNAL MEDICINE

## 2023-09-13 PROCEDURE — 4010F PR ACE/ARB THEARPY RXD/TAKEN: ICD-10-PCS | Mod: CPTII,95,, | Performed by: INTERNAL MEDICINE

## 2023-09-13 PROCEDURE — 1160F PR REVIEW ALL MEDS BY PRESCRIBER/CLIN PHARMACIST DOCUMENTED: ICD-10-PCS | Mod: CPTII,95,, | Performed by: INTERNAL MEDICINE

## 2023-09-13 PROCEDURE — 3044F HG A1C LEVEL LT 7.0%: CPT | Mod: CPTII,95,, | Performed by: INTERNAL MEDICINE

## 2023-09-13 PROCEDURE — 1159F PR MEDICATION LIST DOCUMENTED IN MEDICAL RECORD: ICD-10-PCS | Mod: CPTII,95,, | Performed by: INTERNAL MEDICINE

## 2023-09-13 PROCEDURE — 4010F ACE/ARB THERAPY RXD/TAKEN: CPT | Mod: CPTII,95,, | Performed by: INTERNAL MEDICINE

## 2023-09-13 PROCEDURE — 1159F MED LIST DOCD IN RCRD: CPT | Mod: CPTII,95,, | Performed by: INTERNAL MEDICINE

## 2023-09-13 PROCEDURE — 99214 OFFICE O/P EST MOD 30 MIN: CPT | Mod: 95,,, | Performed by: INTERNAL MEDICINE

## 2023-09-13 PROCEDURE — 99214 PR OFFICE/OUTPT VISIT, EST, LEVL IV, 30-39 MIN: ICD-10-PCS | Mod: 95,,, | Performed by: INTERNAL MEDICINE

## 2023-09-13 PROCEDURE — 1160F RVW MEDS BY RX/DR IN RCRD: CPT | Mod: CPTII,95,, | Performed by: INTERNAL MEDICINE

## 2023-09-13 NOTE — PROGRESS NOTES
Josefina Blue is a 63 y.o. female with HTN, HLD presenting for follow-up of prediabetes, metabolic syndrome    The patient location is: home in [place]  The chief complaint leading to consultation is:   Chief Complaint   Patient presents with    Prediabetes       Visit type: audiovisual    Face to Face time with patient: 20 minutes  30 minutes of total time spent on the encounter, which includes face to face time and non-face to face time preparing to see the patient (eg, review of tests), Obtaining and/or reviewing separately obtained history, Documenting clinical information in the electronic or other health record, Independently interpreting results (not separately reported) and communicating results to the patient/family/caregiver, or Care coordination (not separately reported).    Each patient to whom he or she provides medical services by telemedicine is:  (1) informed of the relationship between the physician and patient and the respective role of any other health care provider with respect to management of the patient; and (2) notified that he or she may decline to receive medical services by telemedicine and may withdraw from such care at any time.      History of Present Illness  Prediabetes  Metabolic syndrome  Has has elevation in A1c beginning in 2015 in our system with levels consistently in prediabetes range at at times very near to diabetes range with highest 6.4%    Over the last year has worked very hard to lose weight with diet (low carb, Mediterranean) and exercise (tennis several hours a week). Was successful with about 25 pound weight loss but then hitting plateau and has not seen significant reduction in A1c. At initial visit we started ozempic which led to further weight loss and A1c in normal range for first time in years    She is very pleased and tolerating well although cannot take full 2 mg dose. Taking ozempic 1.5 mg weekly and feels better as this dose  Feels great with better energy than  when she was younger. Playing tennis for 2 hours at a time without SOB, chest pain    Lab Results   Component Value Date    HGBA1C 5.5 03/09/2023     Asking about screening for heart disease        HLD  Taking pravastatin 10 mg daily. Unable to tolerate higher doses due to severe muscle cramps  Lab Results   Component Value Date    LDLCALC 172.2 (H) 08/15/2022       HTN  On irbesartan, HCTZ-triametrene  In digital HTN with excellent control    DXA 2015 normal BMD. Plan repeat age 65    Strong family history of HLD, DM, heart disease      Current Outpatient Medications:     albuterol (VENTOLIN HFA) 90 mcg/actuation inhaler, Inhale 2 puffs into the lungs every 6 (six) hours as needed for Wheezing. Rescue, Disp: 18 g, Rfl: 0    ALPRAZolam (XANAX) 0.25 MG tablet, Take 1 tablet (0.25 mg total) by mouth nightly as needed for Anxiety., Disp: 30 tablet, Rfl: 5    aspirin (ECOTRIN) 81 MG EC tablet, Take 1 tablet (81 mg total) by mouth nightly., Disp: , Rfl: 0    azithromycin (ZITHROMAX Z-GUILLERMINA) 250 MG tablet, Take 2 the first day , the one daily, Disp: 6 tablet, Rfl: 1    celecoxib (CELEBREX) 200 MG capsule, Take 1 capsule (200 mg total) by mouth once daily., Disp: 60 capsule, Rfl: 2    estradioL (ESTRACE) 0.01 % (0.1 mg/gram) vaginal cream, Place 2 g vaginally 3 (three) times a week., Disp: 306 g, Rfl: 0    fluticasone furoate-vilanteroL (BREO ELLIPTA) 200-25 mcg/dose DsDv diskus inhaler, Inhale 1 puff into the lungs once daily. Controller, Disp: 60 each, Rfl: 1    irbesartan (AVAPRO) 75 MG tablet, Take 1 tablet (75 mg total) by mouth every evening., Disp: 90 tablet, Rfl: 3    pravastatin (PRAVACHOL) 10 MG tablet, Take 1 tablet (10 mg total) by mouth once daily., Disp: 90 tablet, Rfl: 3    semaglutide (OZEMPIC) 2 mg/dose (8 mg/3 mL) PnIj, Inject 2 mg into the skin every 7 days., Disp: 1 pen, Rfl: 11    triamterene-hydrochlorothiazide 37.5-25 mg (MAXZIDE-25) 37.5-25 mg per tablet, Take 1 tablet by mouth once daily., Disp: 90  tablet, Rfl: 0    zolpidem (AMBIEN) 5 MG Tab, Take 1 tablet (5 mg total) by mouth nightly as needed (insomnia)., Disp: 30 tablet, Rfl: 1    ROS as above    Objective:     There were no vitals filed for this visit.    Wt Readings from Last 3 Encounters:   06/26/23 84.2 kg (185 lb 10 oz)   09/08/22 93.2 kg (205 lb 7.5 oz)   05/09/22 97 kg (213 lb 15.3 oz)     There is no height or weight on file to calculate BMI.      Labs    Chemistry        Component Value Date/Time     05/09/2022 0948    K 4.6 05/09/2022 0948     05/09/2022 0948    CO2 26 05/09/2022 0948    BUN 14 05/09/2022 0948    CREATININE 0.8 05/09/2022 0948     (H) 05/09/2022 0948        Component Value Date/Time    CALCIUM 9.9 05/09/2022 0948    ALKPHOS 51 (L) 05/09/2022 0948    AST 28 05/09/2022 0948    ALT 39 05/09/2022 0948    BILITOT 0.6 05/09/2022 0948    ESTGFRAFRICA >60.0 05/09/2022 0948    EGFRNONAA >60.0 05/09/2022 0948        Lab Results   Component Value Date    HGBA1C 5.5 03/09/2023           Assessment and Plan     Prediabetes  Excellent improvement in A1c now in normal range with use of Ozempic  Has also had continued weight loss with this drug  I think it is medically necessary for her to continue Ozempic to maintain glycemic control which she was not able to achieve with diet and exercise alone although despite good weight loss with lifestyle change. If ozempic is stopped I would expect to see A1c rise increasing risk for development of diabetes and other metabolic complications in the future    Benign essential hypertension  BP at goal in digital program    Hyperlipidemia  Update lipid panel now  Statin dose limited by muscle cramps  Will also obtain coronary calcium score to further risk stratify given family history, personal risk factors  Discussed other treatment options including PCSK9, zetia, bempedoic acid    BMI 30.0-30.9,adult  As above            RTC 12 months        Liat Francois MD

## 2023-09-13 NOTE — ASSESSMENT & PLAN NOTE
Update lipid panel now  Statin dose limited by muscle cramps  Will also obtain coronary calcium score to further risk stratify given family history, personal risk factors  Discussed other treatment options including PCSK9, zetia, bempedoic acid

## 2023-09-18 ENCOUNTER — PATIENT MESSAGE (OUTPATIENT)
Dept: ENDOCRINOLOGY | Facility: CLINIC | Age: 63
End: 2023-09-18
Payer: COMMERCIAL

## 2023-09-21 ENCOUNTER — OFFICE VISIT (OUTPATIENT)
Dept: INFECTIOUS DISEASES | Facility: CLINIC | Age: 63
End: 2023-09-21
Payer: COMMERCIAL

## 2023-09-21 VITALS
HEART RATE: 77 BPM | HEIGHT: 68 IN | TEMPERATURE: 99 F | SYSTOLIC BLOOD PRESSURE: 125 MMHG | BODY MASS INDEX: 28.24 KG/M2 | DIASTOLIC BLOOD PRESSURE: 80 MMHG | WEIGHT: 186.31 LBS

## 2023-09-21 DIAGNOSIS — I10 BENIGN ESSENTIAL HYPERTENSION: Primary | ICD-10-CM

## 2023-09-21 DIAGNOSIS — E78.2 MIXED HYPERLIPIDEMIA: ICD-10-CM

## 2023-09-21 DIAGNOSIS — R35.1 NOCTURIA MORE THAN TWICE PER NIGHT: ICD-10-CM

## 2023-09-21 DIAGNOSIS — Z85.810 HISTORY OF TONGUE CANCER: ICD-10-CM

## 2023-09-21 DIAGNOSIS — R73.03 PREDIABETES: ICD-10-CM

## 2023-09-21 PROCEDURE — 4010F ACE/ARB THERAPY RXD/TAKEN: CPT | Mod: CPTII,S$GLB,, | Performed by: INTERNAL MEDICINE

## 2023-09-21 PROCEDURE — 1159F MED LIST DOCD IN RCRD: CPT | Mod: CPTII,S$GLB,, | Performed by: INTERNAL MEDICINE

## 2023-09-21 PROCEDURE — 3008F BODY MASS INDEX DOCD: CPT | Mod: CPTII,S$GLB,, | Performed by: INTERNAL MEDICINE

## 2023-09-21 PROCEDURE — 1160F PR REVIEW ALL MEDS BY PRESCRIBER/CLIN PHARMACIST DOCUMENTED: ICD-10-PCS | Mod: CPTII,S$GLB,, | Performed by: INTERNAL MEDICINE

## 2023-09-21 PROCEDURE — 99205 PR OFFICE/OUTPT VISIT, NEW, LEVL V, 60-74 MIN: ICD-10-PCS | Mod: S$GLB,,, | Performed by: INTERNAL MEDICINE

## 2023-09-21 PROCEDURE — 4010F PR ACE/ARB THEARPY RXD/TAKEN: ICD-10-PCS | Mod: CPTII,S$GLB,, | Performed by: INTERNAL MEDICINE

## 2023-09-21 PROCEDURE — 99999 PR PBB SHADOW E&M-EST. PATIENT-LVL III: ICD-10-PCS | Mod: PBBFAC,,, | Performed by: INTERNAL MEDICINE

## 2023-09-21 PROCEDURE — 3044F HG A1C LEVEL LT 7.0%: CPT | Mod: CPTII,S$GLB,, | Performed by: INTERNAL MEDICINE

## 2023-09-21 PROCEDURE — 3008F PR BODY MASS INDEX (BMI) DOCUMENTED: ICD-10-PCS | Mod: CPTII,S$GLB,, | Performed by: INTERNAL MEDICINE

## 2023-09-21 PROCEDURE — 1159F PR MEDICATION LIST DOCUMENTED IN MEDICAL RECORD: ICD-10-PCS | Mod: CPTII,S$GLB,, | Performed by: INTERNAL MEDICINE

## 2023-09-21 PROCEDURE — 3074F SYST BP LT 130 MM HG: CPT | Mod: CPTII,S$GLB,, | Performed by: INTERNAL MEDICINE

## 2023-09-21 PROCEDURE — 3044F PR MOST RECENT HEMOGLOBIN A1C LEVEL <7.0%: ICD-10-PCS | Mod: CPTII,S$GLB,, | Performed by: INTERNAL MEDICINE

## 2023-09-21 PROCEDURE — 99999 PR PBB SHADOW E&M-EST. PATIENT-LVL III: CPT | Mod: PBBFAC,,, | Performed by: INTERNAL MEDICINE

## 2023-09-21 PROCEDURE — 99205 OFFICE O/P NEW HI 60 MIN: CPT | Mod: S$GLB,,, | Performed by: INTERNAL MEDICINE

## 2023-09-21 PROCEDURE — 3079F PR MOST RECENT DIASTOLIC BLOOD PRESSURE 80-89 MM HG: ICD-10-PCS | Mod: CPTII,S$GLB,, | Performed by: INTERNAL MEDICINE

## 2023-09-21 PROCEDURE — 3074F PR MOST RECENT SYSTOLIC BLOOD PRESSURE < 130 MM HG: ICD-10-PCS | Mod: CPTII,S$GLB,, | Performed by: INTERNAL MEDICINE

## 2023-09-21 PROCEDURE — 1160F RVW MEDS BY RX/DR IN RCRD: CPT | Mod: CPTII,S$GLB,, | Performed by: INTERNAL MEDICINE

## 2023-09-21 PROCEDURE — 3079F DIAST BP 80-89 MM HG: CPT | Mod: CPTII,S$GLB,, | Performed by: INTERNAL MEDICINE

## 2023-09-28 RX ORDER — IRBESARTAN AND HYDROCHLOROTHIAZIDE 150; 12.5 MG/1; MG/1
1 TABLET, FILM COATED ORAL DAILY
Qty: 30 TABLET | Refills: 11 | Status: SHIPPED | OUTPATIENT
Start: 2023-09-28 | End: 2024-01-29

## 2023-09-30 NOTE — PROGRESS NOTES
[facility] Note    Chief Complaint      Chief Complaint   Patient presents with    Follow-up     History of Present Illness      Josefina Blue is a 63 y.o. female who presents today for establish care.  Patient comes to appointment alone.  Ortho: Isabell (knees), Derm: Kerisit/Mermilliod, Pain: Eissa, GYN, Endocrinology Dr. Francois.    H/O squamous cell cancer of tongue. Had resection.   2018 underwent revision of scar tissue.    BP well controlled. Stopped irbesartan and only taking maxide.    Plating tennis routinely and exercising.    Dr. Francois titrating ozempic dose. Feels better on 1.5.  Following low carb diet. Strong family H/O DM.    Nocturia several times per night.  Started estrogen cream.  Has appointment with urogyn.    Has had issues with leg cramps.  ?statin related.       Problem List Items Addressed This Visit       Benign essential hypertension - Primary    Hyperlipidemia    History of tongue cancer    Prediabetes     Other Visit Diagnoses       Nocturia more than twice per night                Health Maintenance   Topic Date Due    Shingles Vaccine (1 of 2) Never done    Mammogram  10/20/2025    Colorectal Cancer Screening  06/14/2028    Lipid Panel  10/10/2028    TETANUS VACCINE  11/22/2031    Hepatitis C Screening  Completed    DEXA Scan  Discontinued       Past Medical History:   Diagnosis Date    DDD (degenerative disc disease), lumbar 10/7/2019    Hyperlipidemia 8/31/2015    Hypertension     IFG (impaired fasting glucose) 8/31/2015    Neuropathic pain 8/31/2015    Squamous cell cancer of tongue 2012       Past Surgical History:   Procedure Laterality Date    CHOLECYSTECTOMY      COLONOSCOPY N/A 06/14/2021    Procedure: COLONOSCOPY;  Surgeon: Gentry Dickson MD;  Location: [address];  Service: Endoscopy;  Laterality: N/A;  covid test 6/11 primary care, instructions sent to [facility] 6/11 Pt. fully vaccinated. Completed in Jan. 2021.[facility]    GALLBLADDER SURGERY   06/2016    HYSTERECTOMY      JOINT REPLACEMENT  2020    KNEE ARTHROPLASTY Left 07/08/2020    Procedure: ARTHROPLASTY, KNEE;  Surgeon: GLENN Whyte MD;  Location: [facility] OR;  Service: Orthopedics;  Laterality: Left;  regional w/catheter   Spinal, Adductor  Cloidine/Epi/Ketorolac/Ropivacaine Injection 30cc      KNEE ARTHROSCOPY Right     for torn meniscus    TONGUE SURGERY      TOTAL KNEE ARTHROPLASTY Right 11/11/2020    Procedure: ARTHROPLASTY, KNEE, TOTAL;  Surgeon: GLENN Wyhte MD;  Location: [facility] OR;  Service: Orthopedics;  Laterality: Right;  outpatient with 23hr observation  regional with cathter- spinal and adductor    TRANSFORAMINAL EPIDURAL INJECTION OF STEROID Bilateral 09/19/2019    Procedure: Injection,steroid,epidural,transforaminal approach LUMBAR TRANSFORAMINAL BILATERAL L4/5 TF NOE;  Surgeon: Tim Kerns MD;  Location: [facility];  Service: Pain Management;  Laterality: Bilateral;  NEEDS CONSENT, VIP    TRANSFORAMINAL EPIDURAL INJECTION OF STEROID Bilateral 10/07/2019    Procedure: LUMBAR TRANSFORAMINAL BILATERAL L4/5 TF NOE;  Surgeon: Tim Kerns MD;  Location: [facility];  Service: Pain Management;  Laterality: Bilateral;  NEEDS CONSENT, **VIP**    TRANSFORAMINAL EPIDURAL INJECTION OF STEROID Bilateral 03/22/2021    Procedure: LUMBAR TRANSFORAMINAL BILATERAL L4/5 DIRECT REFERRAL;  Surgeon: Tim Kerns MD;  Location: [facility];  Service: Pain Management;  Laterality: Bilateral;  NEEDS CONSENT, URGENT  *VIP*    TRANSFORAMINAL EPIDURAL INJECTION OF STEROID Right 05/10/2021    Procedure: LUMBAR TRANSFORAMINAL RIGHT L4/5, L5/S1 DIRECT REFERRAL;  Surgeon: Tim Kerns MD;  Location: [facility];  Service: Pain Management;  Laterality: Right;  NEEDS CONSENT, MEDICALLY URGENT  **VIP**       family history includes ALS in her sister; Alcohol abuse in her mother; Cancer in her mother; Cancer (age of onset: 74) in her father; Cirrhosis in her mother; Diabetes in her father and sister;  Drug abuse in her brother; Heart disease in her brother and father; Hyperlipidemia in her brother and father; Hypertension in her sister; No Known Problems in her maternal aunt, maternal grandfather, maternal grandmother, maternal uncle, paternal aunt, paternal grandfather, paternal grandmother, and paternal uncle.    Social History     Tobacco Use    Smoking status: Never    Smokeless tobacco: Never   Substance Use Topics    Alcohol use: Yes     Alcohol/week: 3.0 standard drinks of alcohol     Types: 2 Glasses of wine, 1 Drinks containing 0.5 oz of alcohol per week     Comment: once a week    Drug use: No       Review of Systems   Constitutional:  Negative for chills and fever.   HENT:  Negative for sore throat.    Respiratory:  Negative for cough and shortness of breath.    Cardiovascular:  Negative for chest pain and palpitations.   Gastrointestinal:  Negative for constipation, diarrhea, nausea and vomiting.   Genitourinary:  Negative for dysuria and hematuria.   Musculoskeletal:  Negative for falls.   Neurological:  Negative for headaches.   All other systems reviewed and are negative.       Outpatient Encounter Medications as of 9/21/2023   Medication Sig Dispense Refill    albuterol (VENTOLIN HFA) 90 mcg/actuation inhaler Inhale 2 puffs into the lungs every 6 (six) hours as needed for Wheezing. Rescue 18 g 0    ALPRAZolam (XANAX) 0.25 MG tablet Take 1 tablet (0.25 mg total) by mouth nightly as needed for Anxiety. 30 tablet 5    aspirin (ECOTRIN) 81 MG EC tablet Take 1 tablet (81 mg total) by mouth nightly.  0    azithromycin (ZITHROMAX Z-GUILLERMINA) 250 MG tablet Take 2 the first day , the one daily 6 tablet 1    celecoxib (CELEBREX) 200 MG capsule Take 1 capsule (200 mg total) by mouth once daily. 60 capsule 2    fluticasone furoate-vilanteroL (BREO ELLIPTA) 200-25 mcg/dose DsDv diskus inhaler Inhale 1 puff into the lungs once daily. Controller 60 each 1    pravastatin (PRAVACHOL) 10 MG tablet Take 1 tablet (10 mg  "total) by mouth once daily. 90 tablet 3    semaglutide (OZEMPIC) 2 mg/dose (8 mg/3 mL) PnIj Inject 2 mg into the skin every 7 days. 1 pen 11    zolpidem (AMBIEN) 5 MG Tab Take 1 tablet (5 mg total) by mouth nightly as needed (insomnia). 30 tablet 1    [DISCONTINUED] estradioL (ESTRACE) 0.01 % (0.1 mg/gram) vaginal cream Place 2 g vaginally 3 (three) times a week. 306 g 0    [DISCONTINUED] irbesartan (AVAPRO) 75 MG tablet Take 1 tablet (75 mg total) by mouth every evening. 90 tablet 3    [DISCONTINUED] triamterene-hydrochlorothiazide 37.5-25 mg (MAXZIDE-25) 37.5-25 mg per tablet Take 1 tablet by mouth once daily. 90 tablet 0     No facility-administered encounter medications on file as of 9/21/2023.        Review of patient's allergies indicates:   Allergen Reactions    Aspirin Nausea Only     Can take low dose of Aspirin; can take buffered low dose aspirin only    Codeine Nausea Only     Can take Codeine if she takes a dose of Zofran with it       Physical Exam      Vital Signs  Temp: 98.7 °F (37.1 °C)  Temp Source: Oral  Pulse: 77  BP: 125/80  BP Location: Left arm  Pain Score: 0-No pain  Height and Weight  Height: 5' 8" (172.7 cm)  Weight: 84.5 kg (186 lb 4.6 oz)  BSA (Calculated - sq m): 2.01 sq meters  BMI (Calculated): 28.3  Weight in (lb) to have BMI = 25: 164.1]    Physical Exam  Constitutional:       Appearance: She is well-developed.   HENT:      Head: Normocephalic and atraumatic.      Right Ear: External ear normal.      Left Ear: External ear normal.      Nose: Nose normal.      Mouth/Throat:      Pharynx: Oropharynx is clear.   Eyes:      General:         Right eye: No discharge.         Left eye: No discharge.      Conjunctiva/sclera: Conjunctivae normal.   Cardiovascular:      Rate and Rhythm: Normal rate and regular rhythm.      Heart sounds: Normal heart sounds. No murmur heard.  Pulmonary:      Effort: Pulmonary effort is normal. No respiratory distress.      Breath sounds: Normal breath sounds. " "  Abdominal:      General: There is no distension.      Palpations: Abdomen is soft.      Tenderness: There is no abdominal tenderness. There is no guarding.   Musculoskeletal:         General: No swelling. Normal range of motion.      Cervical back: Normal range of motion.   Skin:     General: Skin is warm and dry.   Neurological:      Mental Status: She is alert and oriented to person, place, and time.   Psychiatric:         Mood and Affect: Mood normal.         Behavior: Behavior normal.         Thought Content: Thought content normal.         Judgment: Judgment normal.          Laboratory:  CBC:  No results for input(s): "WBC", "RBC", "HGB", "HCT", "PLT", "MCV", "MCH", "MCHC" in the last 2160 hours.  CMP:  Recent Labs   Lab Result Units 10/10/23  1228   Glucose mg/dL 97   Calcium mg/dL 9.4   Albumin g/dL 4.5   Total Protein g/dL 7.0   Sodium mmol/L 140   Potassium mmol/L 3.9   CO2 mmol/L 25   Chloride mmol/L 105   BUN mg/dL 15   Alkaline Phosphatase U/L 54*   ALT U/L 21   AST U/L 21   Total Bilirubin mg/dL 0.6     URINALYSIS:  Recent Labs   Lab Result Units 10/09/23  0923   Color, UA  Yellow   Specific Gravity, UA  1.010   pH, UA  6.0   Protein, UA  Negative   Nitrite, UA  Negative   Leukocytes, UA  Negative      LIPIDS:  Recent Labs   Lab Result Units 10/10/23  1228   TSH uIU/mL 0.681   HDL mg/dL 49   Cholesterol mg/dL 168   Triglycerides mg/dL 117   LDL Cholesterol mg/dL 95.6   HDL/Cholesterol Ratio % 29.2   Non-HDL Cholesterol mg/dL 119   Total Cholesterol/HDL Ratio  3.4     TSH:  Recent Labs   Lab Result Units 10/10/23  1228   TSH uIU/mL 0.681     A1C:  Recent Labs   Lab Result Units 10/10/23  1228   Hemoglobin A1C % 5.3       Radiology:  No results found in the last 30 days.     Assessment/Plan     Josefina Blue is a 63 y.o.female with:    1. Benign essential hypertension    2. Mixed hyperlipidemia    3. History of tongue cancer    4. Prediabetes    5. Nocturia more than twice per night      Reviewed labs, " studies, health maintenance and vaccines.  MMG ordered  Continue current medications and maintain follow up with specialists.    Pelvic floor PT.  Ca score.  Monitor BP.  Will stop maxide and resume ibesartan.  Order labs.  Follow up 3 months or sooner with issues.    -Follow up in about 3 months (around 12/21/2023).

## 2023-10-09 ENCOUNTER — TELEPHONE (OUTPATIENT)
Dept: OBSTETRICS AND GYNECOLOGY | Facility: CLINIC | Age: 63
End: 2023-10-09
Payer: COMMERCIAL

## 2023-10-09 ENCOUNTER — OFFICE VISIT (OUTPATIENT)
Dept: OBSTETRICS AND GYNECOLOGY | Facility: CLINIC | Age: 63
End: 2023-10-09
Payer: COMMERCIAL

## 2023-10-09 VITALS
WEIGHT: 191 LBS | SYSTOLIC BLOOD PRESSURE: 118 MMHG | DIASTOLIC BLOOD PRESSURE: 82 MMHG | HEIGHT: 68 IN | BODY MASS INDEX: 28.95 KG/M2 | HEART RATE: 70 BPM

## 2023-10-09 DIAGNOSIS — Z01.419 ENCOUNTER FOR WELL WOMAN EXAM: Primary | ICD-10-CM

## 2023-10-09 DIAGNOSIS — R39.89 SENSATION OF PRESSURE IN BLADDER AREA: ICD-10-CM

## 2023-10-09 DIAGNOSIS — N95.1 MENOPAUSAL SYMPTOMS: Primary | ICD-10-CM

## 2023-10-09 DIAGNOSIS — R30.0 DYSURIA: ICD-10-CM

## 2023-10-09 DIAGNOSIS — N95.2 VAGINAL ATROPHY: ICD-10-CM

## 2023-10-09 PROCEDURE — 87086 URINE CULTURE/COLONY COUNT: CPT | Performed by: PHYSICIAN ASSISTANT

## 2023-10-09 PROCEDURE — 3079F PR MOST RECENT DIASTOLIC BLOOD PRESSURE 80-89 MM HG: ICD-10-PCS | Mod: CPTII,S$GLB,, | Performed by: PHYSICIAN ASSISTANT

## 2023-10-09 PROCEDURE — 3079F DIAST BP 80-89 MM HG: CPT | Mod: CPTII,S$GLB,, | Performed by: PHYSICIAN ASSISTANT

## 2023-10-09 PROCEDURE — 4010F ACE/ARB THERAPY RXD/TAKEN: CPT | Mod: CPTII,S$GLB,, | Performed by: PHYSICIAN ASSISTANT

## 2023-10-09 PROCEDURE — 99214 PR OFFICE/OUTPT VISIT, EST, LEVL IV, 30-39 MIN: ICD-10-PCS | Mod: S$GLB,,, | Performed by: PHYSICIAN ASSISTANT

## 2023-10-09 PROCEDURE — 3074F PR MOST RECENT SYSTOLIC BLOOD PRESSURE < 130 MM HG: ICD-10-PCS | Mod: CPTII,S$GLB,, | Performed by: PHYSICIAN ASSISTANT

## 2023-10-09 PROCEDURE — 99999 PR PBB SHADOW E&M-EST. PATIENT-LVL IV: ICD-10-PCS | Mod: PBBFAC,,, | Performed by: PHYSICIAN ASSISTANT

## 2023-10-09 PROCEDURE — 1159F MED LIST DOCD IN RCRD: CPT | Mod: CPTII,S$GLB,, | Performed by: PHYSICIAN ASSISTANT

## 2023-10-09 PROCEDURE — 3074F SYST BP LT 130 MM HG: CPT | Mod: CPTII,S$GLB,, | Performed by: PHYSICIAN ASSISTANT

## 2023-10-09 PROCEDURE — 81003 URINALYSIS AUTO W/O SCOPE: CPT | Performed by: PHYSICIAN ASSISTANT

## 2023-10-09 PROCEDURE — 4010F PR ACE/ARB THEARPY RXD/TAKEN: ICD-10-PCS | Mod: CPTII,S$GLB,, | Performed by: PHYSICIAN ASSISTANT

## 2023-10-09 PROCEDURE — 1159F PR MEDICATION LIST DOCUMENTED IN MEDICAL RECORD: ICD-10-PCS | Mod: CPTII,S$GLB,, | Performed by: PHYSICIAN ASSISTANT

## 2023-10-09 PROCEDURE — 1160F PR REVIEW ALL MEDS BY PRESCRIBER/CLIN PHARMACIST DOCUMENTED: ICD-10-PCS | Mod: CPTII,S$GLB,, | Performed by: PHYSICIAN ASSISTANT

## 2023-10-09 PROCEDURE — 1160F RVW MEDS BY RX/DR IN RCRD: CPT | Mod: CPTII,S$GLB,, | Performed by: PHYSICIAN ASSISTANT

## 2023-10-09 PROCEDURE — 3008F PR BODY MASS INDEX (BMI) DOCUMENTED: ICD-10-PCS | Mod: CPTII,S$GLB,, | Performed by: PHYSICIAN ASSISTANT

## 2023-10-09 PROCEDURE — 99999 PR PBB SHADOW E&M-EST. PATIENT-LVL IV: CPT | Mod: PBBFAC,,, | Performed by: PHYSICIAN ASSISTANT

## 2023-10-09 PROCEDURE — 99214 OFFICE O/P EST MOD 30 MIN: CPT | Mod: S$GLB,,, | Performed by: PHYSICIAN ASSISTANT

## 2023-10-09 PROCEDURE — 3008F BODY MASS INDEX DOCD: CPT | Mod: CPTII,S$GLB,, | Performed by: PHYSICIAN ASSISTANT

## 2023-10-09 PROCEDURE — 3044F HG A1C LEVEL LT 7.0%: CPT | Mod: CPTII,S$GLB,, | Performed by: PHYSICIAN ASSISTANT

## 2023-10-09 PROCEDURE — 3044F PR MOST RECENT HEMOGLOBIN A1C LEVEL <7.0%: ICD-10-PCS | Mod: CPTII,S$GLB,, | Performed by: PHYSICIAN ASSISTANT

## 2023-10-09 RX ORDER — ESTRADIOL 10 UG/1
10 INSERT VAGINAL
Qty: 8 EACH | Refills: 11 | Status: SHIPPED | OUTPATIENT
Start: 2023-10-09 | End: 2023-10-26

## 2023-10-09 NOTE — PROGRESS NOTES
Subjective:      Josefina Blue is a 63 y.o. female who presents for menopausal symptoms. History of endometriosis. Menopause at age 52 or 53. Had symptoms for about 6-12 months and then stopped.  She is s/p hyst in 2008 for abnormal bleeding per previous note. Still has ovaries. Started having night sweats about 6-8 months ago. Denies unexplained weight loss. She has started Ozempic for metabolic syndrome and lost weight with this. She also reports vaginal dryness and dysuria. Having to place pressure to completely empty bladder at times. She is using estrace cream vaginally 2-3x per week. Knows that she has a prolapse and referred to pelvic floor PT but has not been able to get scheduled yet.  She has never been on systemic HRT. Her libido is low and she is interested in testosterone therapy. Also reports dyspareunia despite using estrace cream.  No prior cardiac history, family history of MI prior to age 50. She denies the following contraindications to HRT:  Vaginal bleeding, history of VTE/PE, thrombophilia,  breast cancer, or active liver disease.     She does have history of htn, hyperlipidemia, and prediabetes.    PCP: Katherine Baumgarten, MD    Routine labs: 8/15/2022  WWE: 6/26/2023 with Yamilka Keith MD  Pap smear: S/p hyst  Mammogram: 9/9/2022 TC 7.77 %- Scheduled  DEXA: 8/31/20215 normal, No need to repeat BMD in near future unless clinical change.  Colonoscopy: 6/14/2021 repeat in 7 years    No visits with results within 3 Month(s) from this visit.   Latest known visit with results is:   Office Visit on 06/26/2023   Component Date Value Ref Range Status    Trichomonas vaginalis 06/26/2023 Negative  Negative Final    Kanchan sp 06/26/2023 Negative  Negative Final    Kanchan glabrata DNA 06/26/2023 Negative  Negative Final    Kanchan krusei DNA 06/26/2023 Negative  Negative Final    Bacterial vaginosis DNA 06/26/2023 Negative  Negative Final    Urine Culture, Routine 06/26/2023 No significant growth    Final    Specimen UA 06/26/2023 Urine, Clean Catch   Final    Color, UA 06/26/2023 Yellow  Yellow, Straw, Elizabeth Final    Appearance, UA 06/26/2023 Clear  Clear Final    pH, UA 06/26/2023 6.0  5.0 - 8.0 Final    Specific Gravity, UA 06/26/2023 1.015  1.005 - 1.030 Final    Protein, UA 06/26/2023 Negative  Negative Final    Glucose, UA 06/26/2023 Negative  Negative Final    Ketones, UA 06/26/2023 Negative  Negative Final    Bilirubin (UA) 06/26/2023 Negative  Negative Final    Occult Blood UA 06/26/2023 Negative  Negative Final    Nitrite, UA 06/26/2023 Negative  Negative Final    Leukocytes, UA 06/26/2023 Negative  Negative Final       Past Medical History:   Diagnosis Date    DDD (degenerative disc disease), lumbar 10/7/2019    Hyperlipidemia 8/31/2015    Hypertension     IFG (impaired fasting glucose) 8/31/2015    Neuropathic pain 8/31/2015    Squamous cell cancer of tongue 2012     Past Surgical History:   Procedure Laterality Date    CHOLECYSTECTOMY      COLONOSCOPY N/A 06/14/2021    Procedure: COLONOSCOPY;  Surgeon: Gentry Dickson MD;  Location: [address]);  Service: Endoscopy;  Laterality: N/A;  covid test 6/11 primary care, instructions sent to [facility] 6/11 Pt. fully vaccinated. Completed in Jan. 2021.[facility]    GALLBLADDER SURGERY  06/2016    HYSTERECTOMY      JOINT REPLACEMENT  2020    KNEE ARTHROPLASTY Left 07/08/2020    Procedure: ARTHROPLASTY, KNEE;  Surgeon: GLENN Whyte MD;  Location: [facility];  Service: Orthopedics;  Laterality: Left;  regional w/catheter   Spinal, Adductor  Cloidine/Epi/Ketorolac/Ropivacaine Injection 30cc      KNEE ARTHROSCOPY Right     for torn meniscus    TONGUE SURGERY      TOTAL KNEE ARTHROPLASTY Right 11/11/2020    Procedure: ARTHROPLASTY, KNEE, TOTAL;  Surgeon: GLENN Whyte MD;  Location: [facility];  Service: Orthopedics;  Laterality: Right;  outpatient with 23hr observation  regional with cathter- spinal and adductor    TRANSFORAMINAL EPIDURAL INJECTION  OF STEROID Bilateral 09/19/2019    Procedure: Injection,steroid,epidural,transforaminal approach LUMBAR TRANSFORAMINAL BILATERAL L4/5 TF NOE;  Surgeon: Tim Kerns MD;  Location: [facility];  Service: Pain Management;  Laterality: Bilateral;  NEEDS CONSENT, VIP    TRANSFORAMINAL EPIDURAL INJECTION OF STEROID Bilateral 10/07/2019    Procedure: LUMBAR TRANSFORAMINAL BILATERAL L4/5 TF NOE;  Surgeon: Tim Kerns MD;  Location: [facility];  Service: Pain Management;  Laterality: Bilateral;  NEEDS CONSENT, **VIP**    TRANSFORAMINAL EPIDURAL INJECTION OF STEROID Bilateral 03/22/2021    Procedure: LUMBAR TRANSFORAMINAL BILATERAL L4/5 DIRECT REFERRAL;  Surgeon: Tim Kerns MD;  Location: [facility];  Service: Pain Management;  Laterality: Bilateral;  NEEDS CONSENT, URGENT  *VIP*    TRANSFORAMINAL EPIDURAL INJECTION OF STEROID Right 05/10/2021    Procedure: LUMBAR TRANSFORAMINAL RIGHT L4/5, L5/S1 DIRECT REFERRAL;  Surgeon: Tim Kerns MD;  Location: [facility];  Service: Pain Management;  Laterality: Right;  NEEDS CONSENT, MEDICALLY URGENT  **VIP**     Social History     Tobacco Use    Smoking status: Never    Smokeless tobacco: Never   Substance Use Topics    Alcohol use: Yes     Alcohol/week: 3.0 standard drinks of alcohol     Types: 2 Glasses of wine, 1 Drinks containing 0.5 oz of alcohol per week     Comment: once a week    Drug use: No     Family History   Problem Relation Age of Onset    No Known Problems Paternal Grandfather     No Known Problems Paternal Grandmother     No Known Problems Maternal Grandmother     No Known Problems Maternal Grandfather     Cancer Father 74        prostate, throat, lung- smoker    Hyperlipidemia Father     Diabetes Father     Heart disease Father     Cancer Mother         cervical cancer    Alcohol abuse Mother     Cirrhosis Mother     Drug abuse Brother     Heart disease Brother     Hyperlipidemia Brother     Diabetes Sister     Hypertension Sister     ALS Sister     No  Known Problems Maternal Aunt     No Known Problems Maternal Uncle     No Known Problems Paternal Aunt     No Known Problems Paternal Uncle     Amblyopia Neg Hx     Blindness Neg Hx     Cataracts Neg Hx     Glaucoma Neg Hx     Macular degeneration Neg Hx     Retinal detachment Neg Hx     Strabismus Neg Hx     Stroke Neg Hx     Thyroid disease Neg Hx     Breast cancer Neg Hx     Colon cancer Neg Hx     Ovarian cancer Neg Hx      OB History    Para Term  AB Living   0 0 0 0 0 0   SAB IAB Ectopic Multiple Live Births   0 0 0 0     Obstetric Comments   Endo- age 22       Current Outpatient Medications:     albuterol (VENTOLIN HFA) 90 mcg/actuation inhaler, Inhale 2 puffs into the lungs every 6 (six) hours as needed for Wheezing. Rescue, Disp: 18 g, Rfl: 0    ALPRAZolam (XANAX) 0.25 MG tablet, Take 1 tablet (0.25 mg total) by mouth nightly as needed for Anxiety., Disp: 30 tablet, Rfl: 5    aspirin (ECOTRIN) 81 MG EC tablet, Take 1 tablet (81 mg total) by mouth nightly., Disp: , Rfl: 0    azithromycin (ZITHROMAX Z-GUILLERMINA) 250 MG tablet, Take 2 the first day , the one daily, Disp: 6 tablet, Rfl: 1    celecoxib (CELEBREX) 200 MG capsule, Take 1 capsule (200 mg total) by mouth once daily., Disp: 60 capsule, Rfl: 2    fluticasone furoate-vilanteroL (BREO ELLIPTA) 200-25 mcg/dose DsDv diskus inhaler, Inhale 1 puff into the lungs once daily. Controller, Disp: 60 each, Rfl: 1    irbesartan-hydrochlorothiazide (AVALIDE) 150-12.5 mg per tablet, Take 1 tablet by mouth once daily., Disp: 30 tablet, Rfl: 11    pravastatin (PRAVACHOL) 10 MG tablet, Take 1 tablet (10 mg total) by mouth once daily., Disp: 90 tablet, Rfl: 3    semaglutide (OZEMPIC) 2 mg/dose (8 mg/3 mL) PnIj, Inject 2 mg into the skin every 7 days., Disp: 1 pen, Rfl: 11    zolpidem (AMBIEN) 5 MG Tab, Take 1 tablet (5 mg total) by mouth nightly as needed (insomnia)., Disp: 30 tablet, Rfl: 1    estradioL (IMVEXXY MAINTENANCE PACK) 10 mcg Inst, Place 10 mcg  "vaginally twice a week., Disp: 8 each, Rfl: 11    The 10-year ASCVD risk score (Shahla MARIEE, et al., 2019) is: 6.9%    Values used to calculate the score:      Age: 63 years      Sex: Female      Is Non- : No      Diabetic: No      Tobacco smoker: No      Systolic Blood Pressure: 118 mmHg      Is BP treated: Yes      HDL Cholesterol: 39 mg/dL      Total Cholesterol: 239 mg/dL    Review of Systems:  General: No fever, chills, or weight loss.  Chest: No chest pain, shortness of breath, or palpitations.  Breast: No pain, masses, or nipple discharge.  Vulva: No pain, lesions, or itching.  Vagina: No relaxation, itching, discharge, or lesions.  Abdomen: No pain, nausea, vomiting, diarrhea, or constipation.  Urinary: No incontinence, nocturia, frequency, or dysuria.  Extremities:  No leg cramps, edema, or calf pain.  Neurologic: No headaches, dizziness, or visual changes.    Objective:     Vitals:    10/09/23 0856   BP: 118/82   Pulse: 70   Weight: 86.6 kg (191 lb)   Height: 5' 8" (1.727 m)   PainSc: 0-No pain     Body mass index is 29.04 kg/m².      Physical Exam: Deferred      Assessment:      Menopausal symptoms: Risks and benefits of hormone replacement therapy were discussed. Hormone replacement therapy options, including bioidentical versus non-bioidentical hormones, as well as alternatives discussed. Increased cardiovascular risk with starting estradiol 10+ years after menopause. However, can use transdermal estradiol. We did discuss that transdermal option of estrogen is safer than oral estradiol as transdermal methods decrease risk for blood clots and stroke as they bypass liver metabolism. We also discussed non-hormonal options to help with night sweats, including veozah, gabapentin etc. She declines. Will start with transdermal testosterone to help with libido and localized estradiol for vaginal atrophy.  -     Testosterone, free; Future; Expected date: 10/09/2023  -     Testosterone; " Future; Expected date: 10/09/2023  -     Estradiol; Future; Expected date: 10/09/2023    Vaginal atrophy  -     estradioL (IMVEXXY MAINTENANCE PACK) 10 mcg Inst; Place 10 mcg vaginally twice a week.  Dispense: 8 each; Refill: 11    Dysuria  -     Urine culture  -     Urinalysis        Plan:   Baseline hormone levels today.  D/c estrace cream  Start Imvexxy 10mcg BIW -sent to [facility]  Start Testosterone 1% GEL, apply 1 click to upper inner thigh daily. - Faxed to [facility]  Can increase to 2 clicks daily if needed in 6 weeks.  Reviewed common side effects and risks with testosterone therapy.  Continue Mg Glycinate QHS for night sweats.  Urine culture/UA  She will schedule pelvic floor PT  List of external options provided.  If symptoms are not improving, recommend referral to UroGyn  Follow up in 3 months or sooner PRN    Instructed patient to call if she experiences any side effects or has any questions.    I spent a total of 30 minutes on the day of the visit.This includes face to face time and non-face to face time preparing to see the patient (eg, review of tests), obtaining and/or reviewing separately obtained history, documenting clinical information in the electronic or other health record, independently interpreting results and communicating results to the patient/family/caregiver, or care coordinator.

## 2023-10-10 ENCOUNTER — LAB VISIT (OUTPATIENT)
Dept: LAB | Facility: OTHER | Age: 63
End: 2023-10-10
Payer: COMMERCIAL

## 2023-10-10 ENCOUNTER — CLINICAL SUPPORT (OUTPATIENT)
Dept: REHABILITATION | Facility: HOSPITAL | Age: 63
End: 2023-10-10
Attending: STUDENT IN AN ORGANIZED HEALTH CARE EDUCATION/TRAINING PROGRAM
Payer: COMMERCIAL

## 2023-10-10 DIAGNOSIS — R73.03 PREDIABETES: ICD-10-CM

## 2023-10-10 DIAGNOSIS — N95.1 MENOPAUSAL SYMPTOMS: ICD-10-CM

## 2023-10-10 DIAGNOSIS — M62.89 PELVIC FLOOR TENSION: ICD-10-CM

## 2023-10-10 DIAGNOSIS — M62.89 PELVIC FLOOR DYSFUNCTION: ICD-10-CM

## 2023-10-10 DIAGNOSIS — R27.9 LACK OF COORDINATION: ICD-10-CM

## 2023-10-10 DIAGNOSIS — E78.2 MIXED HYPERLIPIDEMIA: ICD-10-CM

## 2023-10-10 DIAGNOSIS — R39.89 SENSATION OF PRESSURE IN BLADDER AREA: ICD-10-CM

## 2023-10-10 LAB
ALBUMIN SERPL BCP-MCNC: 4.5 G/DL (ref 3.5–5.2)
ALP SERPL-CCNC: 54 U/L (ref 55–135)
ALT SERPL W/O P-5'-P-CCNC: 21 U/L (ref 10–44)
ANION GAP SERPL CALC-SCNC: 10 MMOL/L (ref 8–16)
AST SERPL-CCNC: 21 U/L (ref 10–40)
BILIRUB SERPL-MCNC: 0.6 MG/DL (ref 0.1–1)
BUN SERPL-MCNC: 15 MG/DL (ref 8–23)
CALCIUM SERPL-MCNC: 9.4 MG/DL (ref 8.7–10.5)
CHLORIDE SERPL-SCNC: 105 MMOL/L (ref 95–110)
CO2 SERPL-SCNC: 25 MMOL/L (ref 23–29)
CREAT SERPL-MCNC: 0.9 MG/DL (ref 0.5–1.4)
EST. GFR  (NO RACE VARIABLE): >60 ML/MIN/1.73 M^2
GLUCOSE SERPL-MCNC: 97 MG/DL (ref 70–110)
POTASSIUM SERPL-SCNC: 3.9 MMOL/L (ref 3.5–5.1)
PROT SERPL-MCNC: 7 G/DL (ref 6–8.4)
SODIUM SERPL-SCNC: 140 MMOL/L (ref 136–145)
TSH SERPL DL<=0.005 MIU/L-ACNC: 0.68 UIU/ML (ref 0.4–4)

## 2023-10-10 PROCEDURE — 97161 PT EVAL LOW COMPLEX 20 MIN: CPT | Mod: PO

## 2023-10-10 PROCEDURE — 80061 LIPID PANEL: CPT | Performed by: INTERNAL MEDICINE

## 2023-10-10 PROCEDURE — 84403 ASSAY OF TOTAL TESTOSTERONE: CPT | Performed by: PHYSICIAN ASSISTANT

## 2023-10-10 PROCEDURE — 97530 THERAPEUTIC ACTIVITIES: CPT | Mod: PO

## 2023-10-10 PROCEDURE — 83036 HEMOGLOBIN GLYCOSYLATED A1C: CPT | Performed by: INTERNAL MEDICINE

## 2023-10-10 PROCEDURE — 80053 COMPREHEN METABOLIC PANEL: CPT | Performed by: INTERNAL MEDICINE

## 2023-10-10 PROCEDURE — 84443 ASSAY THYROID STIM HORMONE: CPT | Performed by: INTERNAL MEDICINE

## 2023-10-10 PROCEDURE — 84402 ASSAY OF FREE TESTOSTERONE: CPT | Performed by: PHYSICIAN ASSISTANT

## 2023-10-10 PROCEDURE — 82670 ASSAY OF TOTAL ESTRADIOL: CPT | Performed by: PHYSICIAN ASSISTANT

## 2023-10-10 NOTE — PATIENT INSTRUCTIONS
""BLOW AS YOU GO"  - Before you perform any movement (like getting up from a chair, getting in/out of bed, picking something up)...  - Take a breath in to prepare, and then blow out through your mouth GENTLY the entire time you are moving. The exhale should not be forced or rough. It should be slow, like blowing out birthday candles.  - If you run out of air during the movement, continue to breath normally and try not to hold your breath   - Do this to protect your pelvic floor muscles from excessive pressures that comes with holding your breath (called "a Valsalva maneuver").    BEARING DOWN TECHNIQUE  1. Sit on the toilet comfortably with legs and buttocks relaxed.  2. Put your feet on a step stool (8 inches tall).  3. Lean forward while keeping your back straight.  4. Relax and DROP the pelvic floor muscles.  5. Push your belly out and HOLD THIS.  6. Continue to breathe normally without holding your breath. You can "check yourself" to make sure you're not breath holding by saying "ssss" or counting out loud. You can also make breath last by pretending like you are blowing out birthday candles.       Do not try to push any other way. Don't hold your breath.           DOUBLE VOIDING - Double voiding is a technique that may assist the bladder to empty more effectively when urine is left in the bladder. It involves passing urine more than once each time that you go to the toilet. This makes sure that the bladder is completely empty.    Here are 3 strategies you can try to fully empty your bladder.  You do not have to do all of these things every single time. Find which ones work best for you.     Check to make sure your pelvic floor is relaxed   Do a body scan - make sure your legs, buttocks, and abdominals are relaxed.  Take a couple deep, slow breaths to encourage your pelvic floor muscles to DROP (ie. Try Diaphragmatic Breathing).  Change your pelvic position: lean forward, rock your pelvis forward and backward, " stand up then sit back down.   3. Gently apply pressure over your bladder.    Wait at least 30 seconds to 1 minute to see if a second urine stream begins.     Do not stop your urine stream or push/strain!    Reverse Kegels/Pelvic Floor Drops - relaxation practice     The feeling of dropping your pelvic floor is similar to the moment of relief when you have reached the bathroom; when you urinate or have a bowel movement, you first drop your pelvic floor, and let the pelvic floor muscles (PFM) go. Pay attention to this, and see if you can feel that happening. The key to dropping your pelvic floor is visualization, and Deep Breathing. The best way to consciously release tension from the PFMs is to try to release the muscles while you inhale. When you inhale properly with diaphragmatic breathing, your diaphragm actually lowers to make room for the breath, so it is natural to also lower and relax the pelvic floor muscles at the same time. When you exhale, your diaphragm rises to push the air out, and you then naturally raise or contract your PFMs on the breath out. If you can get this Pelvic Floor Rhythm, reverse kegels will be much easier to do.     You can start by gently rahul your pelvic floor to feel what tightening feels like. Let that go and feel how the tension releases. Really focus on the difference between tension and relaxation. Once you get this, you can go a step further and visualize the muscles between the pubic bone and tailbone lengthen and gently move downwards away from your body. You can visualize your pubic bone moving towards the ceiling and tailbone towards the surface (if you are laying on your back).      It is helpful to take a mirror to look at your contraction and relaxation. When you perform a pelvic floor contraction (Kegel) your clitoris should move slightly downward, your anus should wink, and the perineal body (area between the vagina and anus) should move up and in. On the  reverse Kegel, you should see the anus release and your perineal body move downwards towards the mirror. It should also feel like you are creating more space between the pubic bone and tailbone. Dont make it happen, visualize and let it happen!       Eventually, once you have mastered the art of relaxing your pelvic floor muscles, you will need to check in with your pelvic floor throughout the day, and let go of any tension that you discover.

## 2023-10-11 PROBLEM — R39.89 SENSATION OF PRESSURE IN BLADDER AREA: Status: ACTIVE | Noted: 2023-10-11

## 2023-10-11 PROBLEM — M62.89 PELVIC FLOOR DYSFUNCTION: Status: ACTIVE | Noted: 2023-10-11

## 2023-10-11 PROBLEM — R27.9 LACK OF COORDINATION: Status: ACTIVE | Noted: 2023-10-11

## 2023-10-11 PROBLEM — M62.89 PELVIC FLOOR TENSION: Status: ACTIVE | Noted: 2023-10-11

## 2023-10-11 LAB
BACTERIA UR CULT: NORMAL
BACTERIA UR CULT: NORMAL
CHOLEST SERPL-MCNC: 168 MG/DL (ref 120–199)
CHOLEST/HDLC SERPL: 3.4 {RATIO} (ref 2–5)
ESTIMATED AVG GLUCOSE: 105 MG/DL (ref 68–131)
ESTRADIOL SERPL-MCNC: 13 PG/ML
HBA1C MFR BLD: 5.3 % (ref 4–5.6)
HDLC SERPL-MCNC: 49 MG/DL (ref 40–75)
HDLC SERPL: 29.2 % (ref 20–50)
LDLC SERPL CALC-MCNC: 95.6 MG/DL (ref 63–159)
NONHDLC SERPL-MCNC: 119 MG/DL
TESTOST SERPL-MCNC: 19 NG/DL (ref 5–73)
TRIGL SERPL-MCNC: 117 MG/DL (ref 30–150)

## 2023-10-11 NOTE — PLAN OF CARE
[facility]  Pelvic Health Physical Therapy Initial Evaluation    Date: 10/10/2023   Name: Josefina Blue  Clinic Number: [id number]  Therapy Diagnosis:   Encounter Diagnoses   Name Primary?    Sensation of pressure in bladder area     Pelvic floor dysfunction     Lack of coordination     Pelvic floor tension      Physician: Yamilka Keith MD    Physician Orders: Pelvic PT Eval and Treat  Medical Diagnosis from Referral: Sensation of pressure in bladder area [R39.89]  Evaluation Date: 10/10/2023  Authorization Period Expiration: 12/31/2023  Plan of Care Expiration: 01/30/2024  Visit # / Visits authorized: 1/1    Time In: 4:00 pm  Time Out: 5:10 pm  Total Appointment Time (timed & untimed codes): 70 minutes    Precautions: universal    Subjective     History of current condition - Josefina reports: vaginal prolapse, noticed a change in vaginal walls a few years ago.  About 6 mos ago, issues with urination started, incomplete urination. Is splinting and straining to fully empty. She was using an estrogen cream, switched to the estrogen suppository yesterday. She is also starting testosterone gel topically to inner thigh. She reports having 3x nocturia starting about 6 months ago. Also starting again with night sweats after initial cessation of menopause sx about 9 years ago. She reports some pain with intercourse, attributes this to dryness. She also reports some constipation, going about 1 x every 3 days with harder stools. She has a history of back pain and has been treated for this with injections and PT, reports this is resolved.     OB/GYN History:  prolapse, vaginal dryness, menopause, painful vaginal penetration, endometriosis, and hysterectomy (still has ovaries) and may have had PCOS, not formally diagnosed   Sexually active? Yes  Pain with vaginal exams, intercourse or tampon use? Yes due to dryness  Prolapse symptoms? Yes, incomplete emptying  Bladder/Bowel History:   Frequency of  urination:   Daytime: 6x - 10x           Nighttime: 3x  Difficulty initiating urine stream: No  Urine stream: strong; bears down  Complete emptying: No  Bladder leakage: No  Urinary Urgency: No  Frequency of bowel movements: 1x every 3 days - not as often as she would like  Difficulty initiating BM: Yes  Quality/Shape of BM: [place] Stool Chart 2-3  Does Patient Feel Empty after BM? No  Fiber Supplements or Laxative Use?  Yes, Metamucil about 3 x per week  Colon leakage: No    Prior Therapy/Previous treatment included: None  Social History: lives with their spouse  Current exercise:tennis, 3-4 per week, or gym, walks on incline on treadmill  Occupation: Pt works as a CNO,  of [employer] and job-related duties include mostly sedentary.  Prior Level of Function: Independent   Current Level of Function: Independent     Types of fluid intake: water, coffee, and sparkling water, diet sprite, other diet soda, occasional alcohol   Diet: unremarkable   Habitus:well developed, well nourished    PAIN: She denies being in any pain today    Medical History: Josefina  has a past medical history of DDD (degenerative disc disease), lumbar (10/7/2019), Hyperlipidemia (8/31/2015), Hypertension, IFG (impaired fasting glucose) (8/31/2015), Neuropathic pain (8/31/2015), and Squamous cell cancer of tongue (2012).     Surgical History:  Josefina Blue  has a past surgical history that includes Hysterectomy; Tongue surgery; Knee arthroscopy (Right); Cholecystectomy; Gallbladder surgery (06/2016); Transforaminal epidural injection of steroid (Bilateral, 09/19/2019); Transforaminal epidural injection of steroid (Bilateral, 10/07/2019); Knee Arthroplasty (Left, 07/08/2020); Total knee arthroplasty (Right, 11/11/2020); Transforaminal epidural injection of steroid (Bilateral, 03/22/2021); Transforaminal epidural injection of steroid (Right, 05/10/2021); Colonoscopy (N/A, 06/14/2021); and Joint replacement (2020).    Medications: Josefina has a  current medication list which includes the following prescription(s): albuterol, alprazolam, aspirin, azithromycin, celecoxib, imvexxy maintenance pack, fluticasone furoate-vilanterol, irbesartan-hydrochlorothiazide, pravastatin, ozempic, UNABLE TO FIND, and zolpidem.    Allergies:   Review of patient's allergies indicates:   Allergen Reactions    Aspirin Nausea Only     Can take low dose of Aspirin; can take buffered low dose aspirin only    Codeine Nausea Only     Can take Codeine if she takes a dose of Zofran with it        Imaging See EMR for full imaging workup    Pts goals: reduce nocturia, would like to have more comfortable intercourse for a longer amount of time (~10 min), be able to empty bladder more efficiently    OBJECTIVE     See EMR under MEDIA for written consent provided 10/10/2023  Chaperone: declined    ORTHO SCREEN     Posture in sitting: R leg crossed over L leg  Posture in standing: forward head and forward and rounded shoulders   Adductor Palpation: increased tension     ABDOMINAL WALL ASSESSMENT  Palpation: increased tension   Scarring: laparoscopic surgical scars observed, mobile and non tender  Diastasis: absent     BREATHING MECHANICS ASSESSMENT   Thorax Assessment During Quiet Respiration: WNL excursion of ribcage and Decreased excursion of abdominal wall   Thorax Assessment During Deep Respiration: WNL excursion of ribcage and WNL excursion of abdominal wall    VAGINAL PELVIC FLOOR EXAM    EXTERNAL ASSESSMENT  Introitus: WNL  Skin condition: WNL  Scarring: none   Sensation: WNL   Pain:  none  Voluntary contraction: visible lift and accessory muscle use  Voluntary relaxation: visible drop  Involuntary contraction: reflex tightening  Bearing down: bulge and accessory muscle use  Comments: atrophy of the tissues observed      INTERNAL ASSESSMENT   Pain: tenderness to palpation at left levator ani, mild  Sensation: able to localized pressure appropriately   Vaginal vault: WNL   Muscle Bulk:  atrophy   Muscle Power: 2+/5  Muscle Endurance: 4 sec  # Reps To Fatigue: 4    Fast Contractions in 10 seconds: 6     Quality of contraction: decreased hold, slow relaxation, and incomplete relaxation   Specificity: patient contracts: abdominals, glutes   Coordination: tends to hold breath during pelvic floor muscle contraction; difficulty with quick contraction and quick relax, with incomplete relax  Prolapse check:not assessed today  Comments: Palpation of left Obturator Internus referred into rectum, palpation of right Obturator Internus gave sensation of needing to be stretched      Limitation/Restriction for FOTO Survey    Therapist reviewed FOTO scores for Josefina Blue on 10/10/2023.   FOTO documents entered into EPIC - see Media section.    Limitation Score:          TREATMENT     Treatment Time In: 4:45 pm  Treatment Time Out: 5:10 pm  Total Treatment time (time-based codes) separate from Evaluation: 25 minutes    Therapeutic Activity Patient participated in dynamic functional therapeutic activities to improve functional performance for 25 minutes. Including: Education as described below.     Patient Education provided:   general anatomy/physiology of urinary/ bowel  system, benefits of treatment, and risks of treatment were discussed with the pt. Additionally, bladder irritants, anatomy/physiology of pelvic floor, posture/body mechanics, vulvar irritants, bladder retraining, pelvic wand use, diaphragmatic breathing, double voiding techniques, kegels, proper bearing down techniques, fluid intake/dietary modifications, behavior modifications, and OTC vaginal moisturizers, lubricants, bowel/bladder diary, and pelvic floor drops/reverse kegels were reviewed.     Home Exercises Provided:  Written Home Exercises Provided: yes.  Exercises were reviewed and Josefina was able to demonstrate them prior to the end of the session.    Josefina demonstrated good  understanding of the education provided.     See EMR under  Patient Instructions for exercises provided 10/10/2023.    Assessment     Josefina is a 63 y.o. female referred to outpatient Physical Therapy with a medical diagnosis of Sensation of pressure in bladder area [R39.89]. Pt presents with altered posture, pelvic floor tenderness, decreased pelvic muscle strength, decreased endurance of the pelvic muscles, decreased phasic ability of the pelvic muscles, increased tension of the pelvic muscles, poor quality of pelvic muscle contraction, increased nocturia, poor fluid intake, incomplete urination, and dysfunctional voiding.     Pt prognosis is Fair.   Pt will benefit from skilled outpatient Physical Therapy to address the deficits stated above and in the chart below, provide pt/family education, and to maximize pt's level of independence.     Plan of care discussed with patient: Yes  Pt's spiritual, cultural and educational needs considered and patient is agreeable to the plan of care and goals as stated below:       Anticipated Barriers for therapy: none    Medical Necessity is demonstrated by the following    History  Co-morbidities and personal factors that may impact the plan of care Co-morbidities:   chronic constipation, consumption of bladder irritants, and difficulty sleeping  DDD (degenerative disc disease), lumbar (10/7/2019), Hyperlipidemia (8/31/2015), Hypertension, IFG (impaired fasting glucose) (8/31/2015), Neuropathic pain (8/31/2015),Squamous cell cancer of tongue (2012), Hysterectomy Knee arthroscopy (Right); Cholecystectomy; Gallbladder surgery (06/2016); Transforaminal epidural injection of steroid (Bilateral, 09/19/2019); Transforaminal epidural injection of steroid (Bilateral, 10/07/2019); Knee Arthroplasty (Left, 07/08/2020); Total knee arthroplasty (Right, 11/11/2020); Transforaminal epidural injection of steroid (Bilateral, 03/22/2021); Transforaminal epidural injection of steroid (Right, 05/10/2021); Colonoscopy (N/A, 06/14/2021); and Joint  replacement (2020).    Personal Factors:   no deficits     moderate   Examination  Body Structures and Functions, activity limitations and participation restrictions that may impact the plan of care Body Regions/Systems/Functions:  altered posture, pelvic floor tenderness, decreased pelvic muscle strength, decreased endurance of the pelvic muscles, decreased phasic ability of the pelvic muscles, increased tension of the pelvic muscles, poor quality of pelvic muscle contraction, increased nocturia, poor fluid intake, incomplete urination, and dysfunctional voiding.     Activity Limitations:  full bladder emptying, intercourse/vaginal exam/tampon use without pain, sleep uninterrupted by excessive nocturia, Pain with ADLs, and Participating in social activities and ADLs due to pelvic pressure    Participation Restrictions:  well woman's exam, relationship with spouse/partner, regularly having a comfortable BM, Sleep restrictions, Pelvic pressure with ADLs. , and difficulty starting urine stream or fully emptying bladder     Activity limitations:   Learning and applying knowledge  no deficits    General Tasks and Commands  no deficits    Communication  no deficits    Mobility  no deficits    Self care  no deficits    Domestic Life  no deficits    Interactions/Relationships  no deficits    Life Areas  no deficits    Community and Social Life  no deficits       low   Clinical Presentation stable and uncomplicated low   Decision Making/ Complexity Score: low     Short Term Goals: 8 weeks   Goal Status   Pt will report improved ability to sleep uninterrupted by excessive nocturia 2/7 days per week.     Pt will urinate without crede/Valsalva at leas 50% of the time to prevent adverse effects to adjacent structures.    Pt will utilize improved bearing down techniques with defecation at least 50% of the time to prevent adverse effects to adjacent structures.    Pt will be independent with double voiding techniques to ensure  full bladder emptying and decrease pt's risk of infection.     Pt will be independent in colon massage for more effective stooling.        Long Term Goals: 16 weeks   Goal Status   Pt to be discharged with home plan for carry over after discharge.     Pt will report improved ability to sleep uninterrupted by excessive nocturia 4/7 days per week.     Pt to be independent with pressure management, avoiding breath holding with ADLs at least 75% of the time.    Pt will utilize improved bearing down techniques with defecation at least 00% of the time to prevent adverse effects to adjacent structures.    Pt will urinate without crede/Valsalva at least 90% of the time to prevent adverse effects to adjacent structures.    Pt will report successfully having intercourse with < or = 2/10 pain for an improvement in activity tolerance.        Plan     Plan of care Certification: 10/10/2023 to 01/30/2024.    Outpatient Physical Therapy 0 - 2 times weekly for 16 weeks to include the following interventions: therapeutic exercises, therapeutic activity, neuromuscular re-education, manual therapy, modalities PRN, patient/family education, dry needling, and self care/home management. PT may adjust visit frequency based upon patient's progression through plan of care.    I appreciate your consult and look forward to participating in this patient's care.    Catherine Brunner, PT, DPT

## 2023-10-13 LAB — TESTOST FREE SERPL-MCNC: 0.6 PG/ML

## 2023-10-18 ENCOUNTER — CLINICAL SUPPORT (OUTPATIENT)
Dept: REHABILITATION | Facility: HOSPITAL | Age: 63
End: 2023-10-18
Attending: STUDENT IN AN ORGANIZED HEALTH CARE EDUCATION/TRAINING PROGRAM
Payer: COMMERCIAL

## 2023-10-18 DIAGNOSIS — M62.89 PELVIC FLOOR DYSFUNCTION: ICD-10-CM

## 2023-10-18 DIAGNOSIS — R27.9 LACK OF COORDINATION: ICD-10-CM

## 2023-10-18 DIAGNOSIS — M62.89 PELVIC FLOOR TENSION: ICD-10-CM

## 2023-10-18 PROCEDURE — 97530 THERAPEUTIC ACTIVITIES: CPT | Mod: PO

## 2023-10-18 PROCEDURE — 97112 NEUROMUSCULAR REEDUCATION: CPT | Mod: PO

## 2023-10-18 NOTE — PROGRESS NOTES
Pelvic Health Physical Therapy   Treatment Note     Name: Josefina [facility]  Clinic Number: [id number]    Therapy Diagnosis:   Encounter Diagnoses   Name Primary?    Pelvic floor dysfunction     Lack of coordination     Pelvic floor tension      Physician: Yamilka Keith MD    Visit Date: 10/18/2023    Physician Orders: Pelvic PT Eval and Treat  Medical Diagnosis from Referral: Sensation of pressure in bladder area [R39.89]  Evaluation Date: 10/10/2023  Authorization Period Expiration: 12/31/2023  Plan of Care Expiration: 01/30/2024  Visit # / Visits authorized: 1/20  FOTO: 1/3 (10/10/2023)  Cancelled Visits: 0  No Show Visits: 0    Time In: 8:50 am  Time Out: 9:38 am  Total Billable Time: 48 minutes    Precautions: Standard    Subjective   Pt reports: did very well with relaxation. Can sense her pelvic floor muscles relaxing. Has attempted double voiding, does not find she empties more. Struggles with having to strain at night, 4 x nocturia last night. She completed the bladder diary for 1 day, would like to try it again now that she is back in town.    She was compliant with home exercise program.    Response to previous treatment: Ongoing  Functional change: Ongoing    Pain: 0/10  Location: pelvic region    Constitutional Symptoms Review: The patient denies having any constitutional symptoms.     Josefina's goals for PT: reduce nocturia, would like to have more comfortable intercourse for a longer amount of time (~10 min), be able to empty bladder more efficiently      Objective   Pt verbally consents to treatment today.  Signed consent form already on file.       Josefina received therapeutic exercises to develop  strength, endurance, ROM, flexibility, posture, and core stabilization for 00 minutes including: See table below    Josefina received the following manual therapy techniques: to develop flexibility, extensibility, and desensitization for 00 minutes including: See table below    Josefina participated in  neuromuscular re-education activities to develop Coordination, Control, Down training, Posture, Proprioception, Kinesthetic, Balance, and Sense for 10 minutes including: See table below    Josefina participated in dynamic functional therapeutic activities to improve functional performance for 38  minutes, including:  See table below     Intervention 10/18/23   TherAc Assign Bowel/Bladder Diary x    Review Bowel/Bladder Diary x    Education re: intraabdominal pressure management x    Education re: Double Voiding x    Education re: Blow as you go x    Education re: Improved Bearing Down Techniques x    Education re: Pelvic Drops x    Education re: pelvic wand therapy x    Education re: strategies to mitigate nocturia x    Education re: HEP x    Education re: therapy progression and proposed plan of care x   Neuro Re-Ed Intravaginal treatment to superficial and deep pelvic floor muscles  x    Pelvic Drops x    Diaphragmatic Breathing x   TherEx Standing Obturator Internus Stretch     Modified [place] Pose         Manual Therapy                      10/18/23: tenderness to palpation at bilateral superficial transverse perineal muscle. Reported sensation of soreness/need to stretch of bilateral Obturator Internus. Palpation of right Obturator Internus referred to glutes, with muscle spasm.     Home Exercises Provided and Patient Education Provided     Education provided:   - anatomy/physiology of pelvic floor, bladder retraining, pelvic wand use, diaphragmatic breathing, double voiding techniques, kegels, proper bearing down techniques, and behavior modifications  Discussed progression of plan of care with patient; educated pt in activity modification; reviewed HEP with pt. Pt demonstrated and verbalized understanding of all instruction and was provided with a handout of HEP (see Patient Instructions).    Written Home Exercises Provided: yes.  Exercises were reviewed and Josefina was able to demonstrate them prior to the end  of the session.  Josefina demonstrated fair  understanding of the education provided.     See EMR under Patient Instructions for exercises provided 10/18/2023.    Assessment   Josefina with good tolerance to treatment today. Patient with enderness to palpation at bilateral superficial transverse perineal muscle. Reported sensation of soreness/need to stretch of bilateral Obturator Internus. Palpation of right Obturator Internus referred to glutes, with muscle spasm. Reviewed intraabdominal pressure management and avoiding straining with urination. Patient demonstrated good understanding of all interventions and home program. She is appropriate for continued skilled pelvic floor PT services at this time.       Josefina Is progressing well towards her goals.   Pt prognosis is Fair.     Pt will continue to benefit from skilled outpatient physical therapy to address the deficits listed in the problem list box on initial evaluation, provide pt/family education and to maximize pt's level of independence in the home and community environment.     Pt's spiritual, cultural and educational needs considered and pt agreeable to plan of care and goals.     Anticipated barriers to physical therapy: None    Short Term Goals: 8 weeks   Goal Status   Pt will report improved ability to sleep uninterrupted by excessive nocturia 2/7 days per week.      Pt will urinate without crede/Valsalva at leas 50% of the time to prevent adverse effects to adjacent structures.     Pt will utilize improved bearing down techniques with defecation at least 50% of the time to prevent adverse effects to adjacent structures.     Pt will be independent with double voiding techniques to ensure full bladder emptying and decrease pt's risk of infection.      Pt will be independent in colon massage for more effective stooling.           Long Term Goals: 16 weeks   Goal Status   Pt to be discharged with home plan for carry over after discharge.      Pt will report  improved ability to sleep uninterrupted by excessive nocturia 4/7 days per week.      Pt to be independent with pressure management, avoiding breath holding with ADLs at least 75% of the time.     Pt will utilize improved bearing down techniques with defecation at least 00% of the time to prevent adverse effects to adjacent structures.     Pt will urinate without crede/Valsalva at least 90% of the time to prevent adverse effects to adjacent structures.     Pt will report successfully having intercourse with < or = 2/10 pain for an improvement in activity tolerance.          Plan     Continue per established POC. Review strategies to decrease nocturia, begin with wand if available, external stretches for Obturator Internus.     Catherine Brunner, PT , DPT

## 2023-10-18 NOTE — PATIENT INSTRUCTIONS
"Strategy to decrease bathroom trips at night ---  Restrict fluids at least 2 hours before bed time.  Elevate your legs for 30 min prior to bed time. The legs should be higher than your heart. After 30 min, get up and use the restroom (double void). The idea behind this strategy is using gravity to bring blood back towards your kidneys to try to get your body to make more urine before you go to sleep, so you do not have to get up as often during the night to void.     "BLOW AS YOU GO"  - Before you perform any movement (like getting up from a chair, getting in/out of bed, picking something up)...  - Take a breath in to prepare, and then blow out through your mouth GENTLY the entire time you are moving. The exhale should not be forced or rough. It should be slow, like blowing out birthday candles.  - If you run out of air during the movement, continue to breath normally and try not to hold your breath   - Do this to protect your pelvic floor muscles from excessive pressures that comes with holding your breath (called "a Valsalva maneuver").    BEARING DOWN TECHNIQUE  1. Sit on the toilet comfortably with legs and buttocks relaxed.  2. Put your feet on a step stool (8 inches tall).  3. Lean forward while keeping your back straight.  4. Relax and DROP the pelvic floor muscles.  5. Push your belly out and HOLD THIS.  6. Continue to breathe normally without holding your breath. You can "check yourself" to make sure you're not breath holding by saying "ssss" or counting out loud. You can also make breath last by pretending like you are blowing out birthday candles.       Do not try to push any other way. Don't hold your breath.           DOUBLE VOIDING - Double voiding is a technique that may assist the bladder to empty more effectively when urine is left in the bladder. It involves passing urine more than once each time that you go to the toilet. This makes sure that the bladder is completely empty.    Here are 3 " strategies you can try to fully empty your bladder.  You do not have to do all of these things every single time. Find which ones work best for you.     Check to make sure your pelvic floor is relaxed   Do a body scan - make sure your legs, buttocks, and abdominals are relaxed.  Take a couple deep, slow breaths to encourage your pelvic floor muscles to DROP (ie. Try Diaphragmatic Breathing).  Change your pelvic position: lean forward, rock your pelvis forward and backward, stand up then sit back down.   3. Gently apply pressure over your bladder.    Wait at least 30 seconds to 1 minute to see if a second urine stream begins.     Do not stop your urine stream or push/strain!    Reverse Kegels/Pelvic Floor Drops - relaxation practice     The feeling of dropping your pelvic floor is similar to the moment of relief when you have reached the bathroom; when you urinate or have a bowel movement, you first drop your pelvic floor, and let the pelvic floor muscles (PFM) go. Pay attention to this, and see if you can feel that happening. The key to dropping your pelvic floor is visualization, and Deep Breathing. The best way to consciously release tension from the PFMs is to try to release the muscles while you inhale. When you inhale properly with diaphragmatic breathing, your diaphragm actually lowers to make room for the breath, so it is natural to also lower and relax the pelvic floor muscles at the same time. When you exhale, your diaphragm rises to push the air out, and you then naturally raise or contract your PFMs on the breath out. If you can get this Pelvic Floor Rhythm, reverse kegels will be much easier to do.     You can start by gently rahul your pelvic floor to feel what tightening feels like. Let that go and feel how the tension releases. Really focus on the difference between tension and relaxation. Once you get this, you can go a step further and visualize the muscles between the pubic bone and  tailbone lengthen and gently move downwards away from your body. You can visualize your pubic bone moving towards the ceiling and tailbone towards the surface (if you are laying on your back).      It is helpful to take a mirror to look at your contraction and relaxation. When you perform a pelvic floor contraction (Kegel) your clitoris should move slightly downward, your anus should wink, and the perineal body (area between the vagina and anus) should move up and in. On the reverse Kegel, you should see the anus release and your perineal body move downwards towards the mirror. It should also feel like you are creating more space between the pubic bone and tailbone. Dont make it happen, visualize and let it happen!       Eventually, once you have mastered the art of relaxing your pelvic floor muscles, you will need to check in with your pelvic floor throughout the day, and let go of any tension that you discover.

## 2023-10-20 ENCOUNTER — HOSPITAL ENCOUNTER (OUTPATIENT)
Dept: RADIOLOGY | Facility: HOSPITAL | Age: 63
Discharge: HOME | End: 2023-10-20
Attending: STUDENT IN AN ORGANIZED HEALTH CARE EDUCATION/TRAINING PROGRAM
Payer: COMMERCIAL

## 2023-10-20 DIAGNOSIS — Z01.419 ENCOUNTER FOR WELL WOMAN EXAM: ICD-10-CM

## 2023-10-20 PROCEDURE — 77067 SCR MAMMO BI INCL CAD: CPT | Mod: 26,,, | Performed by: RADIOLOGY

## 2023-10-20 PROCEDURE — 77063 BREAST TOMOSYNTHESIS BI: CPT | Mod: 26,,, | Performed by: RADIOLOGY

## 2023-10-20 PROCEDURE — 77063 MAMMO DIGITAL SCREENING BILAT WITH TOMO: ICD-10-PCS | Mod: 26,,, | Performed by: RADIOLOGY

## 2023-10-20 PROCEDURE — 77067 MAMMO DIGITAL SCREENING BILAT WITH TOMO: ICD-10-PCS | Mod: 26,,, | Performed by: RADIOLOGY

## 2023-10-20 PROCEDURE — 77067 SCR MAMMO BI INCL CAD: CPT | Mod: TC

## 2023-10-23 ENCOUNTER — TELEPHONE (OUTPATIENT)
Dept: INFECTIOUS DISEASES | Facility: CLINIC | Age: 63
End: 2023-10-23
Payer: COMMERCIAL

## 2023-10-23 ENCOUNTER — PATIENT MESSAGE (OUTPATIENT)
Dept: INFECTIOUS DISEASES | Facility: CLINIC | Age: 63
End: 2023-10-23
Payer: COMMERCIAL

## 2023-10-23 ENCOUNTER — TELEPHONE (OUTPATIENT)
Dept: NEUROLOGY | Facility: CLINIC | Age: 63
End: 2023-10-23
Payer: COMMERCIAL

## 2023-10-23 DIAGNOSIS — R20.0 NUMBNESS OF LIP: Primary | ICD-10-CM

## 2023-10-23 NOTE — TELEPHONE ENCOUNTER
6 weeks ago noticed numbness of lip and right index finger. Random times.  No lip swelling.  No new meds before the symptoms began.  No CP. No SOB. BP and HR all good.  Playing tennis and exercising routinely.  Has increased in frequency and duration.  Lasted about 2 hours total with most recent episode.  Will refer to Dr. Suarez.    MRI/MRA of head and neck with contrast.

## 2023-10-23 NOTE — TELEPHONE ENCOUNTER
Received incoming call from patient. Patient requested to be scheduled 10/26 with Dr. Maloney. Patient confirmed appointment date/time/location.

## 2023-10-25 ENCOUNTER — TELEPHONE (OUTPATIENT)
Dept: INFECTIOUS DISEASES | Facility: CLINIC | Age: 63
End: 2023-10-25

## 2023-10-26 ENCOUNTER — CLINICAL SUPPORT (OUTPATIENT)
Dept: REHABILITATION | Facility: HOSPITAL | Age: 63
End: 2023-10-26
Attending: STUDENT IN AN ORGANIZED HEALTH CARE EDUCATION/TRAINING PROGRAM
Payer: COMMERCIAL

## 2023-10-26 ENCOUNTER — OFFICE VISIT (OUTPATIENT)
Dept: NEUROLOGY | Facility: CLINIC | Age: 63
End: 2023-10-26
Payer: COMMERCIAL

## 2023-10-26 VITALS
DIASTOLIC BLOOD PRESSURE: 73 MMHG | SYSTOLIC BLOOD PRESSURE: 116 MMHG | HEIGHT: 68 IN | BODY MASS INDEX: 28.94 KG/M2 | WEIGHT: 190.94 LBS | HEART RATE: 77 BPM

## 2023-10-26 DIAGNOSIS — R39.89 SENSATION OF PRESSURE IN BLADDER AREA: ICD-10-CM

## 2023-10-26 DIAGNOSIS — R20.2 NUMBNESS AND TINGLING IN RIGHT HAND: Primary | ICD-10-CM

## 2023-10-26 DIAGNOSIS — M62.89 PELVIC FLOOR DYSFUNCTION: Primary | ICD-10-CM

## 2023-10-26 DIAGNOSIS — R20.0 NUMBNESS OF LIP: ICD-10-CM

## 2023-10-26 DIAGNOSIS — M62.89 PELVIC FLOOR TENSION: ICD-10-CM

## 2023-10-26 DIAGNOSIS — I10 BENIGN ESSENTIAL HYPERTENSION: ICD-10-CM

## 2023-10-26 DIAGNOSIS — Z85.810 HISTORY OF TONGUE CANCER: ICD-10-CM

## 2023-10-26 DIAGNOSIS — R27.9 LACK OF COORDINATION: ICD-10-CM

## 2023-10-26 DIAGNOSIS — R20.0 NUMBNESS AND TINGLING IN RIGHT HAND: Primary | ICD-10-CM

## 2023-10-26 DIAGNOSIS — E78.2 MIXED HYPERLIPIDEMIA: ICD-10-CM

## 2023-10-26 DIAGNOSIS — G47.00 INSOMNIA, UNSPECIFIED TYPE: ICD-10-CM

## 2023-10-26 DIAGNOSIS — Z00.00 ANNUAL PHYSICAL EXAM: Primary | ICD-10-CM

## 2023-10-26 DIAGNOSIS — G62.9 POLYNEUROPATHY: ICD-10-CM

## 2023-10-26 PROCEDURE — 99204 PR OFFICE/OUTPT VISIT, NEW, LEVL IV, 45-59 MIN: ICD-10-PCS | Mod: S$GLB,,, | Performed by: PSYCHIATRY & NEUROLOGY

## 2023-10-26 PROCEDURE — 1159F MED LIST DOCD IN RCRD: CPT | Mod: CPTII,S$GLB,, | Performed by: PSYCHIATRY & NEUROLOGY

## 2023-10-26 PROCEDURE — 97110 THERAPEUTIC EXERCISES: CPT | Mod: PO

## 2023-10-26 PROCEDURE — 4010F ACE/ARB THERAPY RXD/TAKEN: CPT | Mod: CPTII,S$GLB,, | Performed by: PSYCHIATRY & NEUROLOGY

## 2023-10-26 PROCEDURE — 3078F PR MOST RECENT DIASTOLIC BLOOD PRESSURE < 80 MM HG: ICD-10-PCS | Mod: CPTII,S$GLB,, | Performed by: PSYCHIATRY & NEUROLOGY

## 2023-10-26 PROCEDURE — 3044F PR MOST RECENT HEMOGLOBIN A1C LEVEL <7.0%: ICD-10-PCS | Mod: CPTII,S$GLB,, | Performed by: PSYCHIATRY & NEUROLOGY

## 2023-10-26 PROCEDURE — 99999 PR PBB SHADOW E&M-EST. PATIENT-LVL III: ICD-10-PCS | Mod: PBBFAC,,, | Performed by: PSYCHIATRY & NEUROLOGY

## 2023-10-26 PROCEDURE — 3078F DIAST BP <80 MM HG: CPT | Mod: CPTII,S$GLB,, | Performed by: PSYCHIATRY & NEUROLOGY

## 2023-10-26 PROCEDURE — 99204 OFFICE O/P NEW MOD 45 MIN: CPT | Mod: S$GLB,,, | Performed by: PSYCHIATRY & NEUROLOGY

## 2023-10-26 PROCEDURE — 3074F PR MOST RECENT SYSTOLIC BLOOD PRESSURE < 130 MM HG: ICD-10-PCS | Mod: CPTII,S$GLB,, | Performed by: PSYCHIATRY & NEUROLOGY

## 2023-10-26 PROCEDURE — 97530 THERAPEUTIC ACTIVITIES: CPT | Mod: PO

## 2023-10-26 PROCEDURE — 3074F SYST BP LT 130 MM HG: CPT | Mod: CPTII,S$GLB,, | Performed by: PSYCHIATRY & NEUROLOGY

## 2023-10-26 PROCEDURE — 3008F BODY MASS INDEX DOCD: CPT | Mod: CPTII,S$GLB,, | Performed by: PSYCHIATRY & NEUROLOGY

## 2023-10-26 PROCEDURE — 3008F PR BODY MASS INDEX (BMI) DOCUMENTED: ICD-10-PCS | Mod: CPTII,S$GLB,, | Performed by: PSYCHIATRY & NEUROLOGY

## 2023-10-26 PROCEDURE — 3044F HG A1C LEVEL LT 7.0%: CPT | Mod: CPTII,S$GLB,, | Performed by: PSYCHIATRY & NEUROLOGY

## 2023-10-26 PROCEDURE — 1159F PR MEDICATION LIST DOCUMENTED IN MEDICAL RECORD: ICD-10-PCS | Mod: CPTII,S$GLB,, | Performed by: PSYCHIATRY & NEUROLOGY

## 2023-10-26 PROCEDURE — 99999 PR PBB SHADOW E&M-EST. PATIENT-LVL III: CPT | Mod: PBBFAC,,, | Performed by: PSYCHIATRY & NEUROLOGY

## 2023-10-26 PROCEDURE — 4010F PR ACE/ARB THEARPY RXD/TAKEN: ICD-10-PCS | Mod: CPTII,S$GLB,, | Performed by: PSYCHIATRY & NEUROLOGY

## 2023-10-26 NOTE — PROGRESS NOTES
Pelvic Health Physical Therapy   Treatment Note     Name: Josefina [facility]  Clinic Number: [id number]    Therapy Diagnosis:   Encounter Diagnoses   Name Primary?    Pelvic floor dysfunction Yes    Lack of coordination     Pelvic floor tension      Physician: Yamilka Keith MD    Visit Date: 10/26/2023    Physician Orders: Pelvic PT Eval and Treat  Medical Diagnosis from Referral: Sensation of pressure in bladder area [R39.89]  Evaluation Date: 10/10/2023  Authorization Period Expiration: 12/31/2023  Plan of Care Expiration: 01/30/2024  Visit # / Visits authorized: 2/20  FOTO: 1/3 (10/10/2023)  Cancelled Visits: 0  No Show Visits: 0    Time In: 9:33 am  Time Out: 10:34 am  Total Billable Time: 61 minutes    Precautions: Standard    Subjective   Pt reports: she brought her bowel/bladder diary with her today. She is continuing to avoid straining with voiding. She has ordered the wand but it is not in yet, would like to learn how to use it so she can start while she is out of town.    10/26/23 Day 1 Day 2 Day 3   Fluid intake 54 oz H2O  28 oz coffee  12 oz fresca 32 oz H2O  12 oz Diet Coke  14 oz coffee  20 oz body armour 24 oz H2O  14 oz coffee  Fresca  8 oz beer   Voids per day 11 (mostly 1 urge, occasional 2) 9 (all 1 urge) 8x   Nocturia Not recording yet 2x 2x   Void Intervals .75 - 3 hrs 1-5 hrs 1.75 -4 hrs   Leaks per day 0 0 0   Activities with Leaks N/A N/A N/A         She was compliant with home exercise program.    Response to previous treatment: Ongoing  Functional change: Ongoing    Pain: 0/10  Location: pelvic region    Constitutional Symptoms Review: The patient denies having any constitutional symptoms.     Josefina's goals for PT: reduce nocturia, would like to have more comfortable intercourse for a longer amount of time (~10 min), be able to empty bladder more efficiently      Objective   Pt verbally consents to treatment today.  Signed consent form already on file.       Josefina received therapeutic  exercises to develop  strength, endurance, ROM, flexibility, posture, and core stabilization for 23 minutes including: See table below    Josefina received the following manual therapy techniques: to develop flexibility, extensibility, and desensitization for 00 minutes including: See table below    Josefina participated in neuromuscular re-education activities to develop Coordination, Control, Down training, Posture, Proprioception, Kinesthetic, Balance, and Sense for 00 minutes including: See table below    Josefina participated in dynamic functional therapeutic activities to improve functional performance for 38 minutes, including:  See table below     Intervention 10/26/23 10/18/23   TherAc Assign Bowel/Bladder Diary  x    Review Bowel/Bladder Diary x x    Education re: intraabdominal pressure management x x    Education re: Double Voiding  x    Education re: Blow as you go  x    Education re: Improved Bearing Down Techniques  x    Education re: Pelvic Drops  x    Education re: pelvic wand therapy x x    Education re: strategies to mitigate nocturia x x    Education re: HEP x x    Education re: therapy progression and proposed plan of care x x   Neuro Re-Ed Intravaginal treatment to superficial and deep pelvic floor muscles   x    Pelvic Drops  x    Diaphragmatic Breathing  x   TherEx Standing Obturator Internus Stretch x     Modified [place] Pose DC     Frog Pose DC     Standing adductor stretch x     Standing gr pose - thoracic rotation      Standing hip stretch with leg in ER over table x     Standing gr stretch - thoracic rotation      QL and TFL Stretch x    Manual Therapy                          10/18/23: tenderness to palpation at bilateral superficial transverse perineal muscle. Reported sensation of soreness/need to stretch of bilateral Obturator Internus. Palpation of right Obturator Internus referred to glutes, with muscle spasm.     From 10/10/23 Eval:  VAGINAL PELVIC FLOOR EXAM     EXTERNAL  ASSESSMENT  Introitus: WNL  Skin condition: WNL  Scarring: none   Sensation: WNL   Pain:  none  Voluntary contraction: visible lift and accessory muscle use  Voluntary relaxation: visible drop  Involuntary contraction: reflex tightening  Bearing down: bulge and accessory muscle use  Comments: atrophy of the tissues observed                            INTERNAL ASSESSMENT   Pain: tenderness to palpation at left levator ani, mild  Sensation: able to localized pressure appropriately   Vaginal vault: WNL   Muscle Bulk: atrophy   Muscle Power: 2+/5  Muscle Endurance: 4 sec  # Reps To Fatigue: 4    Fast Contractions in 10 seconds: 6                           Quality of contraction: decreased hold, slow relaxation, and incomplete relaxation   Specificity: patient contracts: abdominals, glutes   Coordination: tends to hold breath during pelvic floor muscle contraction; difficulty with quick contraction and quick relax, with incomplete relax  Prolapse check:not assessed today  Comments: Palpation of left Obturator Internus referred into rectum, palpation of right Obturator Internus gave sensation of needing to be stretched    Home Exercises Provided and Patient Education Provided     Education provided:   - anatomy/physiology of pelvic floor, bladder retraining, pelvic wand use, diaphragmatic breathing, double voiding techniques, kegels, proper bearing down techniques, and behavior modifications  Discussed progression of plan of care with patient; educated pt in activity modification; reviewed HEP with pt. Pt demonstrated and verbalized understanding of all instruction and was provided with a handout of HEP (see Patient Instructions).    Written Home Exercises Provided: yes.  Exercises were reviewed and Josefina was able to demonstrate them prior to the end of the session.  Josefina demonstrated fair  understanding of the education provided.     See EMR under Patient Instructions for exercises provided 10/18/2023.    Assessment    Josefina with good tolerance to treatment today. She demonstrated good understanding of how to perform pelvic wand therapy to begin internal stretching as part of her HEP. Patient with good performance of hip and lower extremity stretches. Patient demonstrated good understanding of all interventions and home program. She is appropriate for continued skilled pelvic floor PT services at this time.       Josefina Is progressing well towards her goals.   Pt prognosis is Fair.     Pt will continue to benefit from skilled outpatient physical therapy to address the deficits listed in the problem list box on initial evaluation, provide pt/family education and to maximize pt's level of independence in the home and community environment.     Pt's spiritual, cultural and educational needs considered and pt agreeable to plan of care and goals.     Anticipated barriers to physical therapy: None    Short Term Goals: 8 weeks   Goal Status   Pt will report improved ability to sleep uninterrupted by excessive nocturia 2/7 days per week.      Pt will urinate without crede/Valsalva at leas 50% of the time to prevent adverse effects to adjacent structures.     Pt will utilize improved bearing down techniques with defecation at least 50% of the time to prevent adverse effects to adjacent structures.     Pt will be independent with double voiding techniques to ensure full bladder emptying and decrease pt's risk of infection.      Pt will be independent in colon massage for more effective stooling.           Long Term Goals: 16 weeks   Goal Status   Pt to be discharged with home plan for carry over after discharge.      Pt will report improved ability to sleep uninterrupted by excessive nocturia 4/7 days per week.      Pt to be independent with pressure management, avoiding breath holding with ADLs at least 75% of the time.     Pt will utilize improved bearing down techniques with defecation at least 00% of the time to prevent adverse  effects to adjacent structures.     Pt will urinate without crede/Valsalva at least 90% of the time to prevent adverse effects to adjacent structures.     Pt will report successfully having intercourse with < or = 2/10 pain for an improvement in activity tolerance.          Plan     Continue per established POC. Review stretches, review wand therapy. Add core strength with pressure management.     Catherine Brunner, PT , DPT

## 2023-10-26 NOTE — PATIENT INSTRUCTIONS
Standing Obturator Internus Stretch    Standing with legs apart, lean over a counter or table and turn toes in to face each other. Keeping knees slightly bent gently shift hips to one side. Feel for a stretch in your opposite inner thigh/buttock region.        Obturator internus stretch: In standing, bring your left knee up on a table keeping your back leg straight. Lean forward until you feel a stretch in glute area. Hold for 30 seconds, repeat 2x/daily.              TRIGGER POINT RELEASE WITH PELVIC WAND  1. Make sure the wand is clean, free of any visible soiling.  2. Lay back comfortably with your back well supported by several pillows and both knees bent.   3. Apply water-based lubricant (ie. Slippery Stuff) on the end of the wand AND vaginal opening.  4. Grab the end of the wand with one hand, and spread your labia with the other hand to visualize the entrance of the vagina; insert the wand.   5. Gently palpate your pelvic floor muscles with the wand for trigger points/muscle spasms.     To find the pelvic floor muscles:   - In the middle at 6 o'clock = Rectum   - Move a little to the right at 5 o'clock = Right levator ani muscle    - Move a little to the left at 7 o'clock = Left levator ani muscle    - A little more to the right at 2/3/4 o'clock = Right obturator internus muscle   - A little more to the left at 8/9/10 o'clock = Left obturator internus muscle   - If you feel sharp, stabbing pain = bone or pudendal nerve (wrong)    6. When you find a sensitive, painful spot (a knot/bump in your muscle), apply constant pressure TO YOUR TOLERANCE.   *Do not apply so much pressure that you cannot tolerate it, your muscles start tightening up, or it leaves you too painful to do again the following day.    7. You may start to feel a pulse, throbbing, or light burning sensations; keep your pressure constant!  8. Hold this until the muscle spasm has diminished and the pain has improved. It may take several minutes.  You can make small semi-circles or back and forth strokes to help massage the muscle spasm.  9. Focus on Diaphragmatic Breathing and DROPPING your pelvic floor muscle (releasing the tension).    10. Once finished, remove wand.  12. Wash with anti-bacterial soap and thoroughly rinse. Let air dry whenever possible. Store in a dry, clean location.   13. Perform for 10 minutes, 5 x per week.    *Don't apply so much pressure that it leaves you too sore to perform again the next day.    STOP if there is increased bleeding, an infection, or extreme pain.

## 2023-10-26 NOTE — PROGRESS NOTES
Outpatient Neurology Consultation    Requesting physician: Dr. Baumgarten    Impression:  Episodic upper lip and R hand numbness:  DDx includes migraine aura without headache, TIAs, focal seizures.  Exam is non-focal but will image to exclude mass lesion.   The former is most likely at this point.  Mild polyneuropathy, likely due to pre-diabetes but will re-check B12/MMA given remote history of B12 deficiency      Plan:  MRI/MRAs tomorrow, as scheduled  CBC, B12, MMA  I will reach out after MRI  We will consider a course of prednisone 60mg/d x 3 if MRI/MRAs unrevealing      Problem List Items Addressed This Visit    None  Visit Diagnoses       Numbness and tingling in right hand    -  Primary    Relevant Orders    CBC auto differential    Vitamin B12    METHYLMALONIC ACID, SERUM    Polyneuropathy        Relevant Orders    CBC auto differential    Vitamin B12    METHYLMALONIC ACID, SERUM            CC: episodic numbness    HPI:  64 y/o WF referred by Dr. Baumgarten for evaluation of episodic upper lip and R hand numbness.       She has had several episodes involving the bilateral upper lip and R index finger (or R 1st - 3rd fingers).  Symptoms started a few months ago and frequency has been as high as twice/week.  Most spells last 5-10 mins although one lasted an hour with R 1st -3rd digit involvement.  Symptoms migrate, starting in the lip and then moving to the hand.  No visual disturbance nor headache nor cognitive disturbance. No identifiable triggers.  There is no history suggestive of migraine in her or family members; there is a history of motion sickness.    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     MRI brain w/ w/out and MRAs neck/brain are scheduled tomorrow.   Past Medical History:   Diagnosis Date    DDD (degenerative disc disease), lumbar 10/7/2019    Hyperlipidemia 8/31/2015    Hypertension     IFG (impaired fasting glucose)  8/31/2015    Neuropathic pain 8/31/2015    Squamous cell cancer of tongue 2012      Past Surgical History:   Procedure Laterality Date    CHOLECYSTECTOMY      COLONOSCOPY N/A 06/14/2021    Procedure: COLONOSCOPY;  Surgeon: eGntry Dickson MD;  Location: [facility] (4TH FLR);  Service: Endoscopy;  Laterality: N/A;  covid test 6/11 primary care, instructions sent to [facility] 6/11 Pt. fully vaccinated. Completed in Jan. 2021.[facility]    GALLBLADDER SURGERY  06/2016    HYSTERECTOMY      JOINT REPLACEMENT  2020    KNEE ARTHROPLASTY Left 07/08/2020    Procedure: ARTHROPLASTY, KNEE;  Surgeon: GLENN Whyte MD;  Location: [facility] OR;  Service: Orthopedics;  Laterality: Left;  regional w/catheter   Spinal, Adductor  Cloidine/Epi/Ketorolac/Ropivacaine Injection 30cc      KNEE ARTHROSCOPY Right     for torn meniscus    TONGUE SURGERY      TOTAL KNEE ARTHROPLASTY Right 11/11/2020    Procedure: ARTHROPLASTY, KNEE, TOTAL;  Surgeon: GLENN Whyte MD;  Location: [facility] OR;  Service: Orthopedics;  Laterality: Right;  outpatient with 23hr observation  regional with cathter- spinal and adductor    TRANSFORAMINAL EPIDURAL INJECTION OF STEROID Bilateral 09/19/2019    Procedure: Injection,steroid,epidural,transforaminal approach LUMBAR TRANSFORAMINAL BILATERAL L4/5 TF NOE;  Surgeon: Tim Kerns MD;  Location: [facility] PAIN MGT;  Service: Pain Management;  Laterality: Bilateral;  NEEDS CONSENT, VIP    TRANSFORAMINAL EPIDURAL INJECTION OF STEROID Bilateral 10/07/2019    Procedure: LUMBAR TRANSFORAMINAL BILATERAL L4/5 TF NOE;  Surgeon: Tim Kerns MD;  Location: [facility] PAIN MGT;  Service: Pain Management;  Laterality: Bilateral;  NEEDS CONSENT, **VIP**    TRANSFORAMINAL EPIDURAL INJECTION OF STEROID Bilateral 03/22/2021    Procedure: LUMBAR TRANSFORAMINAL BILATERAL L4/5 DIRECT REFERRAL;  Surgeon: Tim Kerns MD;  Location: [facility] PAIN MGT;  Service: Pain Management;  Laterality: Bilateral;  NEEDS CONSENT, URGENT  *VIP*    TRANSFORAMINAL  EPIDURAL INJECTION OF STEROID Right 05/10/2021    Procedure: LUMBAR TRANSFORAMINAL RIGHT L4/5, L5/S1 DIRECT REFERRAL;  Surgeon: Tim Kerns MD;  [facility];  Service: Pain Management;  Laterality: Right;  NEEDS CONSENT, MEDICALLY URGENT  **VIP**      Outpatient Medications Marked as Taking for the 10/26/23 encounter (Office Visit) with Wicho Maloney MD   Medication Sig Dispense Refill    albuterol (VENTOLIN HFA) 90 mcg/actuation inhaler Inhale 2 puffs into the lungs every 6 (six) hours as needed for Wheezing. Rescue 18 g 0    ALPRAZolam (XANAX) 0.25 MG tablet Take 1 tablet (0.25 mg total) by mouth nightly as needed for Anxiety. 30 tablet 5    aspirin (ECOTRIN) 81 MG EC tablet Take 1 tablet (81 mg total) by mouth nightly.  0    irbesartan-hydrochlorothiazide (AVALIDE) 150-12.5 mg per tablet Take 1 tablet by mouth once daily. 30 tablet 11    pravastatin (PRAVACHOL) 10 MG tablet Take 1 tablet (10 mg total) by mouth once daily. 90 tablet 3    semaglutide (OZEMPIC) 2 mg/dose (8 mg/3 mL) PnIj Inject 2 mg into the skin every 7 days. 1 pen 11    UNABLE TO FIND medication name: testosterone 1% Gel Apply 1-2 clicks to upper inner thigh as directed 30 g 5    zolpidem (AMBIEN) 5 MG Tab Take 1 tablet (5 mg total) by mouth nightly as needed (insomnia). 30 tablet 1      Review of patient's allergies indicates:   Allergen Reactions    Aspirin Nausea Only     Can take low dose of Aspirin; can take buffered low dose aspirin only    Codeine Nausea Only     Can take Codeine if she takes a dose of Zofran with it      Family History   Problem Relation Age of Onset    No Known Problems Paternal Grandfather     No Known Problems Paternal Grandmother     No Known Problems Maternal Grandmother     No Known Problems Maternal Grandfather     Cancer Father 74        prostate, throat, lung- smoker    Hyperlipidemia Father     Diabetes Father     Heart disease Father     Cancer Mother         cervical cancer    Alcohol abuse  Mother     Cirrhosis Mother     Drug abuse Brother     Heart disease Brother     Hyperlipidemia Brother     Diabetes Sister     Hypertension Sister     ALS Sister     No Known Problems Maternal Aunt     No Known Problems Maternal Uncle     No Known Problems Paternal Aunt     No Known Problems Paternal Uncle     Amblyopia Neg Hx     Blindness Neg Hx     Cataracts Neg Hx     Glaucoma Neg Hx     Macular degeneration Neg Hx     Retinal detachment Neg Hx     Strabismus Neg Hx     Stroke Neg Hx     Thyroid disease Neg Hx     Breast cancer Neg Hx     Colon cancer Neg Hx     Ovarian cancer Neg Hx       Social History     Socioeconomic History    Marital status:    Occupational History    Occupation: chief nursing officer   Tobacco Use    Smoking status: Never    Smokeless tobacco: Never   Substance and Sexual Activity    Alcohol use: Yes     Alcohol/week: 3.0 standard drinks of alcohol     Types: 2 Glasses of wine, 1 Drinks containing 0.5 oz of alcohol per week     Comment: once a week    Drug use: No    Sexual activity: Yes     Partners: Male     Birth control/protection: Post-menopausal, See Surgical Hx, None   Social History Narrative    Lives with  and 1 dog     Social Determinants of Health     Financial Resource Strain: Low Risk  (9/18/2023)    Overall Financial Resource Strain (CARDIA)     Difficulty of Paying Living Expenses: Not hard at all   Food Insecurity: No Food Insecurity (9/18/2023)    Hunger Vital Sign     Worried About Running Out of Food in the Last Year: Never true     Ran Out of Food in the Last Year: Never true   Transportation Needs: No Transportation Needs (9/18/2023)    PRAPARE - Transportation     Lack of Transportation (Medical): No     Lack of Transportation (Non-Medical): No   Physical Activity: Sufficiently Active (9/18/2023)    Exercise Vital Sign     Days of Exercise per Week: 3 days     Minutes of Exercise per Session: 90 min   Stress: No Stress Concern Present (9/18/2023)     "[employer] - Occupational Stress Questionnaire     Feeling of Stress : Only a little   Social Connections: Unknown (9/18/2023)    Social Connection and Isolation Panel [NHANES]     Frequency of Communication with Friends and Family: Twice a week     Frequency of Social Gatherings with Friends and Family: Once a week     Active Member of Clubs or Organizations: Yes     Attends Club or Organization Meetings: More than 4 times per year     Marital Status:    Housing Stability: Low Risk  (9/18/2023)    Housing Stability Vital Sign     Unable to Pay for Housing in the Last Year: No     Number of Places Lived in the Last Year: 1     Unstable Housing in the Last Year: No     /73   Pulse 77   Ht 5' 8" (1.727 m)   Wt 86.6 kg (190 lb 14.7 oz)   BMI 29.03 kg/m²    Well developed, well nourished female  Extremities: no edema    Mental status:   Awake, alert and appropriately oriented   Normal recent and remote memory   Normal attention and concentration   Normal speech and language   Normal fund of knowledge   No extinction  Cranial nerves:   Normal funduscopic - discs sharp   PERRLA   EOMF without nystagmus   VFF   Normal facial sensation   Normal facial movements   Intact hearing bilaterally   Palate elevates symmetrically   Normal SCM and trapezius strength   Tongue midline  Motor:   No pronator drift   Normal FF movements bilaterally   Normal muscle tone, bulk and power   No abnormal movements  Sensory   Intact to LT, temperature, position   Stocking decrease PP  DTRs   2+ and symmetric   Plantar responses are flexor bilaterally  Coordination   Intact to FNF, RAH, and HTS  Gait   Normal base and gait   Normal tandem   No Romberg    Data Reviewed:  Lab Results   Component Value Date    HGBA1C 5.3 10/10/2023     Lab Results   Component Value Date    TSH 0.681 10/10/2023     Lab Results   Component Value Date    MCZGWLJN15 676 05/09/2022     Lab Results   Component Value Date    " WBC 4.50 05/09/2022    HGB 13.1 05/09/2022    HCT 40.3 05/09/2022    MCV 85 05/09/2022     05/09/2022       CMP  Sodium   Date Value Ref Range Status   10/10/2023 140 136 - 145 mmol/L Final     Potassium   Date Value Ref Range Status   10/10/2023 3.9 3.5 - 5.1 mmol/L Final     Chloride   Date Value Ref Range Status   10/10/2023 105 95 - 110 mmol/L Final     CO2   Date Value Ref Range Status   10/10/2023 25 23 - 29 mmol/L Final     Glucose   Date Value Ref Range Status   10/10/2023 97 70 - 110 mg/dL Final     BUN   Date Value Ref Range Status   10/10/2023 15 8 - 23 mg/dL Final     Creatinine   Date Value Ref Range Status   10/10/2023 0.9 0.5 - 1.4 mg/dL Final     Calcium   Date Value Ref Range Status   10/10/2023 9.4 8.7 - 10.5 mg/dL Final     Total Protein   Date Value Ref Range Status   10/10/2023 7.0 6.0 - 8.4 g/dL Final     Albumin   Date Value Ref Range Status   10/10/2023 4.5 3.5 - 5.2 g/dL Final     Total Bilirubin   Date Value Ref Range Status   10/10/2023 0.6 0.1 - 1.0 mg/dL Final     Comment:     For infants and newborns, interpretation of results should be based  on gestational age, weight and in agreement with clinical  observations.    Premature Infant recommended reference ranges:  Up to 24 hours.............<8.0 mg/dL  Up to 48 hours............<12.0 mg/dL  3-5 days..................<15.0 mg/dL  6-29 days.................<15.0 mg/dL       Alkaline Phosphatase   Date Value Ref Range Status   10/10/2023 54 (L) 55 - 135 U/L Final     AST   Date Value Ref Range Status   10/10/2023 21 10 - 40 U/L Final     ALT   Date Value Ref Range Status   10/10/2023 21 10 - 44 U/L Final     Anion Gap   Date Value Ref Range Status   10/10/2023 10 8 - 16 mmol/L Final     eGFR   Date Value Ref Range Status   10/10/2023 >60 >60 mL/min/1.73 m^2 Final     47 mins chart review, face to face, documentation    Wicho Maloney MD

## 2023-10-27 ENCOUNTER — HOSPITAL ENCOUNTER (OUTPATIENT)
Dept: RADIOLOGY | Facility: HOSPITAL | Age: 63
Discharge: HOME | End: 2023-10-27
Attending: INTERNAL MEDICINE
Payer: COMMERCIAL

## 2023-10-27 ENCOUNTER — PATIENT MESSAGE (OUTPATIENT)
Dept: INFECTIOUS DISEASES | Facility: CLINIC | Age: 63
End: 2023-10-27
Payer: COMMERCIAL

## 2023-10-27 DIAGNOSIS — R20.0 NUMBNESS OF LIP: ICD-10-CM

## 2023-10-27 PROCEDURE — 70553 MRI BRAIN STEM W/O & W/DYE: CPT | Mod: TC

## 2023-10-27 PROCEDURE — 25500020 PHARM REV CODE 255: Performed by: INTERNAL MEDICINE

## 2023-10-27 PROCEDURE — A9585 GADOBUTROL INJECTION: HCPCS | Performed by: INTERNAL MEDICINE

## 2023-10-27 PROCEDURE — 70544 MR ANGIOGRAPHY HEAD W/O DYE: CPT | Mod: TC

## 2023-10-27 PROCEDURE — 70547 MRA NECK WITHOUT CONTRAST: ICD-10-PCS | Mod: 26,,, | Performed by: RADIOLOGY

## 2023-10-27 PROCEDURE — 70553 MRI BRAIN W WO CONTRAST: ICD-10-PCS | Mod: 26,,, | Performed by: RADIOLOGY

## 2023-10-27 PROCEDURE — 70544 MR ANGIOGRAPHY HEAD W/O DYE: CPT | Mod: 26,59,, | Performed by: RADIOLOGY

## 2023-10-27 PROCEDURE — 70547 MR ANGIOGRAPHY NECK W/O DYE: CPT | Mod: TC

## 2023-10-27 PROCEDURE — 70547 MR ANGIOGRAPHY NECK W/O DYE: CPT | Mod: 26,,, | Performed by: RADIOLOGY

## 2023-10-27 PROCEDURE — 70553 MRI BRAIN STEM W/O & W/DYE: CPT | Mod: 26,,, | Performed by: RADIOLOGY

## 2023-10-27 PROCEDURE — 70544 MRA BRAIN WITHOUT CONTRAST: ICD-10-PCS | Mod: 26,59,, | Performed by: RADIOLOGY

## 2023-10-27 RX ORDER — ZOLPIDEM TARTRATE 5 MG/1
5 TABLET ORAL NIGHTLY PRN
Qty: 30 TABLET | Refills: 1 | Status: SHIPPED | OUTPATIENT
Start: 2023-10-27 | End: 2024-02-04 | Stop reason: SDUPTHER

## 2023-10-27 RX ORDER — GADOBUTROL 604.72 MG/ML
10 INJECTION INTRAVENOUS
Status: COMPLETED | OUTPATIENT
Start: 2023-10-27 | End: 2023-10-27

## 2023-10-27 RX ADMIN — GADOBUTROL 10 ML: 604.72 INJECTION INTRAVENOUS at 01:10

## 2023-10-28 ENCOUNTER — PATIENT MESSAGE (OUTPATIENT)
Dept: NEUROLOGY | Facility: CLINIC | Age: 63
End: 2023-10-28
Payer: COMMERCIAL

## 2023-10-30 ENCOUNTER — LAB VISIT (OUTPATIENT)
Dept: LAB | Facility: HOSPITAL | Age: 63
End: 2023-10-30
Attending: INTERNAL MEDICINE
Payer: COMMERCIAL

## 2023-10-30 DIAGNOSIS — R20.0 NUMBNESS OF LIP: ICD-10-CM

## 2023-10-30 DIAGNOSIS — Z00.00 ANNUAL PHYSICAL EXAM: ICD-10-CM

## 2023-10-30 DIAGNOSIS — E78.2 MIXED HYPERLIPIDEMIA: ICD-10-CM

## 2023-10-30 DIAGNOSIS — I10 BENIGN ESSENTIAL HYPERTENSION: ICD-10-CM

## 2023-10-30 LAB
ALBUMIN SERPL BCP-MCNC: 4.2 G/DL (ref 3.5–5.2)
ALP SERPL-CCNC: 50 U/L (ref 55–135)
ALT SERPL W/O P-5'-P-CCNC: 18 U/L (ref 10–44)
ANION GAP SERPL CALC-SCNC: 8 MMOL/L (ref 8–16)
AST SERPL-CCNC: 20 U/L (ref 10–40)
BASOPHILS # BLD AUTO: 0.04 K/UL (ref 0–0.2)
BASOPHILS NFR BLD: 0.7 % (ref 0–1.9)
BILIRUB SERPL-MCNC: 0.6 MG/DL (ref 0.1–1)
BUN SERPL-MCNC: 14 MG/DL (ref 8–23)
CALCIUM SERPL-MCNC: 9.5 MG/DL (ref 8.7–10.5)
CHLORIDE SERPL-SCNC: 107 MMOL/L (ref 95–110)
CHOLEST SERPL-MCNC: 154 MG/DL (ref 120–199)
CHOLEST/HDLC SERPL: 3.4 {RATIO} (ref 2–5)
CO2 SERPL-SCNC: 26 MMOL/L (ref 23–29)
CREAT SERPL-MCNC: 0.8 MG/DL (ref 0.5–1.4)
CRP SERPL-MCNC: 0.3 MG/L (ref 0–8.2)
DIFFERENTIAL METHOD: ABNORMAL
EOSINOPHIL # BLD AUTO: 0.1 K/UL (ref 0–0.5)
EOSINOPHIL NFR BLD: 1.6 % (ref 0–8)
ERYTHROCYTE [DISTWIDTH] IN BLOOD BY AUTOMATED COUNT: 12.6 % (ref 11.5–14.5)
EST. GFR  (NO RACE VARIABLE): >60 ML/MIN/1.73 M^2
ESTIMATED AVG GLUCOSE: 108 MG/DL (ref 68–131)
FOLATE SERPL-MCNC: 13.4 NG/ML (ref 4–24)
GLUCOSE SERPL-MCNC: 94 MG/DL (ref 70–110)
HBA1C MFR BLD: 5.4 % (ref 4–5.6)
HCT VFR BLD AUTO: 35.3 % (ref 37–48.5)
HDLC SERPL-MCNC: 45 MG/DL (ref 40–75)
HDLC SERPL: 29.2 % (ref 20–50)
HGB BLD-MCNC: 11.3 G/DL (ref 12–16)
IMM GRANULOCYTES # BLD AUTO: 0.02 K/UL (ref 0–0.04)
IMM GRANULOCYTES NFR BLD AUTO: 0.4 % (ref 0–0.5)
IRON SERPL-MCNC: 94 UG/DL (ref 30–160)
LDLC SERPL CALC-MCNC: 87.4 MG/DL (ref 63–159)
LYMPHOCYTES # BLD AUTO: 2.1 K/UL (ref 1–4.8)
LYMPHOCYTES NFR BLD: 38.4 % (ref 18–48)
MAGNESIUM SERPL-MCNC: 2.3 MG/DL (ref 1.6–2.6)
MCH RBC QN AUTO: 28.5 PG (ref 27–31)
MCHC RBC AUTO-ENTMCNC: 32 G/DL (ref 32–36)
MCV RBC AUTO: 89 FL (ref 82–98)
MONOCYTES # BLD AUTO: 0.4 K/UL (ref 0.3–1)
MONOCYTES NFR BLD: 7.6 % (ref 4–15)
NEUTROPHILS # BLD AUTO: 2.8 K/UL (ref 1.8–7.7)
NEUTROPHILS NFR BLD: 51.3 % (ref 38–73)
NONHDLC SERPL-MCNC: 109 MG/DL
NRBC BLD-RTO: 0 /100 WBC
PLATELET # BLD AUTO: 214 K/UL (ref 150–450)
PMV BLD AUTO: 11.4 FL (ref 9.2–12.9)
POTASSIUM SERPL-SCNC: 4 MMOL/L (ref 3.5–5.1)
PROT SERPL-MCNC: 6.7 G/DL (ref 6–8.4)
RBC # BLD AUTO: 3.97 M/UL (ref 4–5.4)
SATURATED IRON: 28 % (ref 20–50)
SODIUM SERPL-SCNC: 141 MMOL/L (ref 136–145)
TOTAL IRON BINDING CAPACITY: 337 UG/DL (ref 250–450)
TRANSFERRIN SERPL-MCNC: 228 MG/DL (ref 200–375)
TRIGL SERPL-MCNC: 108 MG/DL (ref 30–150)
TSH SERPL DL<=0.005 MIU/L-ACNC: 1.09 UIU/ML (ref 0.4–4)
VIT B12 SERPL-MCNC: 287 PG/ML (ref 210–950)
WBC # BLD AUTO: 5.5 K/UL (ref 3.9–12.7)

## 2023-10-30 PROCEDURE — 80053 COMPREHEN METABOLIC PANEL: CPT | Performed by: INTERNAL MEDICINE

## 2023-10-30 PROCEDURE — 84443 ASSAY THYROID STIM HORMONE: CPT | Performed by: INTERNAL MEDICINE

## 2023-10-30 PROCEDURE — 80061 LIPID PANEL: CPT | Performed by: INTERNAL MEDICINE

## 2023-10-30 PROCEDURE — 83735 ASSAY OF MAGNESIUM: CPT | Performed by: INTERNAL MEDICINE

## 2023-10-30 PROCEDURE — 84466 ASSAY OF TRANSFERRIN: CPT | Performed by: INTERNAL MEDICINE

## 2023-10-30 PROCEDURE — 82607 VITAMIN B-12: CPT | Performed by: INTERNAL MEDICINE

## 2023-10-30 PROCEDURE — 86140 C-REACTIVE PROTEIN: CPT | Performed by: INTERNAL MEDICINE

## 2023-10-30 PROCEDURE — 82746 ASSAY OF FOLIC ACID SERUM: CPT | Performed by: INTERNAL MEDICINE

## 2023-10-30 PROCEDURE — 85025 COMPLETE CBC W/AUTO DIFF WBC: CPT | Performed by: INTERNAL MEDICINE

## 2023-10-30 PROCEDURE — 83036 HEMOGLOBIN GLYCOSYLATED A1C: CPT | Performed by: INTERNAL MEDICINE

## 2023-10-30 PROCEDURE — 36415 COLL VENOUS BLD VENIPUNCTURE: CPT | Performed by: INTERNAL MEDICINE

## 2023-10-30 PROCEDURE — 83540 ASSAY OF IRON: CPT | Performed by: INTERNAL MEDICINE

## 2023-11-09 ENCOUNTER — TELEPHONE (OUTPATIENT)
Dept: INFECTIOUS DISEASES | Facility: CLINIC | Age: 63
End: 2023-11-09
Payer: COMMERCIAL

## 2023-11-09 ENCOUNTER — PATIENT MESSAGE (OUTPATIENT)
Dept: INFECTIOUS DISEASES | Facility: CLINIC | Age: 63
End: 2023-11-09
Payer: COMMERCIAL

## 2023-11-24 NOTE — PROGRESS NOTES
Pelvic Health Physical Therapy   Treatment & Progress Note     Name: Josefina Blue  Clinic Number: [id number]    Therapy Diagnosis:   Encounter Diagnoses   Name Primary?    Pelvic floor dysfunction Yes    Lack of coordination     Pelvic floor tension        Physician: Yamilka Keith MD    Visit Date: 11/28/2023    Physician Orders: Pelvic PT Eval and Treat  Medical Diagnosis from Referral: Sensation of pressure in bladder area [R39.89]  Evaluation Date: 10/10/2023  Authorization Period Expiration: 12/31/2023  Plan of Care Expiration: 01/30/2024  Visit # / Visits authorized: 3/20  FOTO: 3/3 (10/10/2023, 10/26/23, 11/28/23 )  Cancelled Visits: 0  No Show Visits: 0    Time In: 3:00 pm  Time Out: 3:56 pm  Total Billable Time: 56 minutes    Precautions: Standard    Subjective   Pt reports: has had 0 instances of nocturia over past 10 days. Also able to have intercourse x 2 without pain. [place] yeast [place]. Also reports she is not having to strain at all to urinate. Did not bring wand today but will next time. Was using it then stopped when she had a yeast infection and has not resumed yet.    10/26/23 Day 1 Day 2 Day 3   Fluid intake 54 oz H2O  28 oz coffee  12 oz fresca 32 oz H2O  12 oz Diet Coke  14 oz coffee  20 oz body armour 24 oz H2O  14 oz coffee  Fresca  8 oz beer   Voids per day 11 (mostly 1 urge, occasional 2) 9 (all 1 urge) 8x   Nocturia Not recording yet 2x 2x   Void Intervals .75 - 3 hrs 1-5 hrs 1.75 -4 hrs   Leaks per day 0 0 0   Activities with Leaks N/A N/A N/A       She was compliant with home exercise program.    Response to previous treatment: Ongoing  Functional change: Ongoing    Pain: 0/10  Location: pelvic region    Constitutional Symptoms Review: The patient denies having any constitutional symptoms.     Josefina's goals for PT: reduce nocturia, would like to have more comfortable intercourse for a longer amount of time (~10 min), be able to empty bladder more  efficiently      Objective   Pt verbally consents to treatment today.  Signed consent form already on file.       Josefina received therapeutic exercises to develop  strength, endurance, ROM, flexibility, posture, and core stabilization for 10 minutes including: See table below    Josefina received the following manual therapy techniques: to develop flexibility, extensibility, and desensitization for 00 minutes including: See table below    Josefina participated in neuromuscular re-education activities to develop Coordination, Control, Down training, Posture, Proprioception, Kinesthetic, Balance, and Sense for 23 minutes including: See table below    Josefina participated in dynamic functional therapeutic activities to improve functional performance for 23 minutes, including:  See table below     Intervention 11/28/23 Progress 10/26/23 10/18/23   TherAc Assign Bowel/Bladder Diary   x    Review Bowel/Bladder Diary  x x    Education re: intraabdominal pressure management  x x    Education re: Double Voiding   x    Education re: Blow as you go x  x    Education re: Improved Bearing Down Techniques   x    Education re: Pelvic Drops x  x    Education re: pelvic wand therapy x x x    Education re: strategies to mitigate nocturia x x x    Education re: ILU Massage x      Education re: HEP x x x    Education re: therapy progression and proposed plan of care x x x   Neuro Re-Ed Intravaginal treatment to superficial and deep pelvic floor muscles  x  x    Pelvic Drops x  x    Diaphragmatic Breathing x  x    ILU/Colon massage for improved bowel motility/peristalsis      TherEx Standing Obturator Internus Stretch 3 x 35 s bilateral  x     Modified [place] Pose  DC     Frog Pose  DC     Standing adductor stretch 3 x 35 s bilateral  x     Standing gr pose - thoracic rotation       Standing hip stretch with leg in ER over table  x     Standing gr stretch - thoracic rotation       QL and TFL Stretch 3 x 35 s bilateral  x     Supine  leg extension                    Manual Therapy                              11/28/23: 3/5, tenderness to palpation at perineal body and stp bilateral, not tenderness to palpation to deeper muscles or feelings of needing to be stretched; 3/5, 5 seconds, 5 reps 8 in 10 with slow but complete relax.       10/18/23: tenderness to palpation at bilateral superficial transverse perineal muscle. Reported sensation of soreness/need to stretch of bilateral Obturator Internus. Palpation of right Obturator Internus referred to glutes, with muscle spasm.     From 10/10/23 Eval:  VAGINAL PELVIC FLOOR EXAM     EXTERNAL ASSESSMENT  Introitus: WNL  Skin condition: WNL  Scarring: none   Sensation: WNL   Pain:  none  Voluntary contraction: visible lift and accessory muscle use  Voluntary relaxation: visible drop  Involuntary contraction: reflex tightening  Bearing down: bulge and accessory muscle use  Comments: atrophy of the tissues observed                            INTERNAL ASSESSMENT   Pain: tenderness to palpation at left levator ani, mild  Sensation: able to localized pressure appropriately   Vaginal vault: WNL   Muscle Bulk: atrophy   Muscle Power: 2+/5  Muscle Endurance: 4 sec  # Reps To Fatigue: 4    Fast Contractions in 10 seconds: 6                           Quality of contraction: decreased hold, slow relaxation, and incomplete relaxation   Specificity: patient contracts: abdominals, glutes   Coordination: tends to hold breath during pelvic floor muscle contraction; difficulty with quick contraction and quick relax, with incomplete relax  Prolapse check:not assessed today  Comments: Palpation of left Obturator Internus referred into rectum, palpation of right Obturator Internus gave sensation of needing to be stretched    Home Exercises Provided and Patient Education Provided     Education provided:   - anatomy/physiology of pelvic floor, bladder retraining, pelvic wand use, diaphragmatic breathing, double voiding  techniques, kegels, proper bearing down techniques, and behavior modifications  Discussed progression of plan of care with patient; educated pt in activity modification; reviewed HEP with pt. Pt demonstrated and verbalized understanding of all instruction and was provided with a handout of HEP (see Patient Instructions).    Written Home Exercises Provided: yes.  Exercises were reviewed and Josefina was able to demonstrate them prior to the end of the session.  Josefina demonstrated fair  understanding of the education provided.     See EMR under Patient Instructions for exercises provided 10/18/2023.    Assessment   Josefina with good tolerance to treatment today. Patient with marked improvements in nocturia and pain with intercourse per subjective reports. Patient with good tolerance to pelvic exam and intravaginal treatment, with minimal tenderness/sensation of excess muscle tension or tightness. Patient demonstrated good understanding of all interventions and home program. She has met 4/5 short term goals and 4/6 long term goals. She is appropriate for continued skilled pelvic floor PT services at this time.       Josefina Is progressing well towards her goals.   Pt prognosis is Fair.     Pt will continue to benefit from skilled outpatient physical therapy to address the deficits listed in the problem list box on initial evaluation, provide pt/family education and to maximize pt's level of independence in the home and community environment.     Pt's spiritual, cultural and educational needs considered and pt agreeable to plan of care and goals.     Anticipated barriers to physical therapy: None    Short Term Goals: 8 weeks   Goal Status Notes   Pt will report improved ability to sleep uninterrupted by excessive nocturia 2/7 days per week.   Met 11/28: Reports 0 x nocturia over past 10 days   Pt will urinate without crede/Valsalva at leas 50% of the time to prevent adverse effects to adjacent structures.  Met    Pt will  utilize improved bearing down techniques with defecation at least 50% of the time to prevent adverse effects to adjacent structures.  Met    Pt will be independent with double voiding techniques to ensure full bladder emptying and decrease pt's risk of infection.   Met    Pt will be independent in colon massage for more effective stooling.  In Progress          Long Term Goals: 16 weeks   Goal Status Notes   Pt to be discharged with home plan for carry over after discharge.   In Progress    Pt will report improved ability to sleep uninterrupted by excessive nocturia 4/7 days per week.   Met 11/28: Reports 0 x nocturia over past 10 days   Pt to be independent with pressure management, avoiding breath holding with ADLs at least 75% of the time.  Met    Pt will utilize improved bearing down techniques with defecation at least 100% of the time to prevent adverse effects to adjacent structures.  In Progress    Pt will urinate without crede/Valsalva at least 90% of the time to prevent adverse effects to adjacent structures.  Met    Pt will report successfully having intercourse with < or = 2/10 pain for an improvement in activity tolerance.   Met        Plan     Continue per established POC. review HEP, review ILU massage and see if it has been helping, formally teach wand therapy. Add core strength with pressure management.     Catherine Brunner, PT , DPT

## 2023-11-28 ENCOUNTER — CLINICAL SUPPORT (OUTPATIENT)
Dept: REHABILITATION | Facility: HOSPITAL | Age: 63
End: 2023-11-28
Attending: STUDENT IN AN ORGANIZED HEALTH CARE EDUCATION/TRAINING PROGRAM
Payer: COMMERCIAL

## 2023-11-28 DIAGNOSIS — M62.89 PELVIC FLOOR DYSFUNCTION: Primary | ICD-10-CM

## 2023-11-28 DIAGNOSIS — R27.9 LACK OF COORDINATION: ICD-10-CM

## 2023-11-28 DIAGNOSIS — M62.89 PELVIC FLOOR TENSION: ICD-10-CM

## 2023-11-28 PROCEDURE — 97110 THERAPEUTIC EXERCISES: CPT | Mod: PO

## 2023-11-28 PROCEDURE — 97530 THERAPEUTIC ACTIVITIES: CPT | Mod: PO

## 2023-11-28 PROCEDURE — 97112 NEUROMUSCULAR REEDUCATION: CPT | Mod: PO

## 2023-11-28 NOTE — PATIENT INSTRUCTIONS
Abdominal Colon Massage    Lie on your back- preferably on your bed, but a firm sofa or other comfortable surface will do. You might want to wedge a couple of pillows under your knees. Use your hand in the most comfortable way possible- either as a fist, using your fingers, or using the edge of the palm.   Begin on the left side of the body- think of the belly button as the center- just beneath the left rib cage, and massage down toward the pubic bone in a straight line. In other words, draw the letter I from the bottom of your rib cage downward. In doing so, you are massaging your descending colon. Repeat 15x. (Move from 3 to 5)  Now for the L: This time, start on the right side of your body, just under the rib cage to the right of the belly button.  Massage from right to left, then down toward the pubic bone: across, then down, as in the letter L.  This also massages both the descending and the transverse colon.  Repeat 15x. (Move from 2 to 3)  Finally, draw the U: Start to the right of the belly button, but this time, begin at the top edge of the pelvic bone, and massage up toward the right wide of the rib cage, then across to the left, then down to the top of the pelvic bone. Repeat 15x. This massages all the portions of the colon- ascending, transverse, and descending.  (Move from 1 to 2).  This should not cause any pain. Try this after meals or snacks.

## 2023-11-30 NOTE — PROGRESS NOTES
Pelvic Health Physical Therapy   Treatment & Discharge Note     Name: Josefina Blue  Clinic Number: [id number]    Therapy Diagnosis:   Encounter Diagnoses   Name Primary?    Pelvic floor dysfunction     Lack of coordination     Pelvic floor tension     Sensation of pressure in bladder area          Physician: aYmilka Keith MD    Visit Date: 12/5/2023    Physician Orders: Pelvic PT Eval and Treat  Medical Diagnosis from Referral: Sensation of pressure in bladder area [R39.89]  Evaluation Date: 10/10/2023  Authorization Period Expiration: 12/31/2023  Plan of Care Expiration: 01/30/2024  Visit # / Visits authorized: 4/20  FOTO: 3/3 (10/10/2023, 10/26/23, 11/28/23 )  Cancelled Visits: 0  No Show Visits: 0    Time In: 1:47 pm  Time Out: 2:26 pm  Total Billable Time: 39 minutes    Precautions: Standard    Subjective   Pt reports: recently started a new probiotic and has had a normal BM every day for the past few days. Has had 0-1 instances of nocturia. When she does get up, it is closer to 4:30 - 5 am. Has been using the wand every other day. Continues with pain free intercourse.     10/26/23 Day 1 Day 2 Day 3   Fluid intake 54 oz H2O  28 oz coffee  12 oz fresca 32 oz H2O  12 oz Diet Coke  14 oz coffee  20 oz body armour 24 oz H2O  14 oz coffee  Fresca  8 oz beer   Voids per day 11 (mostly 1 urge, occasional 2) 9 (all 1 urge) 8x   Nocturia Not recording yet 2x 2x   Void Intervals .75 - 3 hrs 1-5 hrs 1.75 -4 hrs   Leaks per day 0 0 0   Activities with Leaks N/A N/A N/A       She was compliant with home exercise program.    Response to previous treatment: Ongoing  Functional change: Ongoing    Pain: 0/10  Location: pelvic region    Constitutional Symptoms Review: The patient denies having any constitutional symptoms.     Josefina's goals for PT: reduce nocturia, would like to have more comfortable intercourse for a longer amount of time (~10 min), be able to empty bladder more efficiently      Objective   Pt verbally  consents to treatment today.  Signed consent form already on file.     Josefina received therapeutic exercises to develop  strength, endurance, ROM, flexibility, posture, and core stabilization for 08 minutes including: See table below    Josefina received the following manual therapy techniques: to develop flexibility, extensibility, and desensitization for 00 minutes including: See table below    Josefina participated in neuromuscular re-education activities to develop Coordination, Control, Down training, Posture, Proprioception, Kinesthetic, Balance, and Sense for 08 minutes including: See table below    Josefina participated in dynamic functional therapeutic activities to improve functional performance for 23 minutes, including:  See table below     Intervention 12/5/23 11/28/23 Progress 10/26/23 10/18/23   TherAc Assign Bowel/Bladder Diary    x    Review Bowel/Bladder Diary   x x    Education re: intraabdominal pressure management x  x x    Education re: Double Voiding x   x    Education re: Blow as you go xx x  x    Education re: Improved Bearing Down Techniques    x    Education re: Pelvic Drops  x  x    Education re: pelvic wand therapy x x x x    Education re: strategies to mitigate nocturia x x x x    Education re: ILU Massage  x      Education re: HEP x x x x    Education re: therapy progression and proposed plan of care x x x x   Neuro Re-Ed Intravaginal treatment to superficial and deep pelvic floor muscles   x  x    Pelvic Drops  x  x    Pelvic Wand Therapy x       Diaphragmatic Breathing  x  x    ILU/Colon massage for improved bowel motility/peristalsis       TherEx Standing Obturator Internus Stretch 3 x 35 s bilateral  3 x 35 s bilateral  x     Modified [place] Pose   DC     Frog Pose   DC     Standing adductor stretch 3 x 35 s bilateral  3 x 35 s bilateral  x     Standing gr pose - thoracic rotation        Standing hip stretch with leg in ER over table   x     Standing gr stretch - thoracic  rotation        QL and TFL Stretch  3 x 35 s bilateral  x     Supine leg extension x                              Manual Therapy                                  11/28/23: 3/5, tenderness to palpation at perineal body and stp bilateral, not tenderness to palpation to deeper muscles or feelings of needing to be stretched; 3/5, 5 seconds, 5 reps 8 in 10 with slow but complete relax.       10/18/23: tenderness to palpation at bilateral superficial transverse perineal muscle. Reported sensation of soreness/need to stretch of bilateral Obturator Internus. Palpation of right Obturator Internus referred to glutes, with muscle spasm.     From 10/10/23 Eval:  VAGINAL PELVIC FLOOR EXAM     EXTERNAL ASSESSMENT  Introitus: WNL  Skin condition: WNL  Scarring: none   Sensation: WNL   Pain:  none  Voluntary contraction: visible lift and accessory muscle use  Voluntary relaxation: visible drop  Involuntary contraction: reflex tightening  Bearing down: bulge and accessory muscle use  Comments: atrophy of the tissues observed                            INTERNAL ASSESSMENT   Pain: tenderness to palpation at left levator ani, mild  Sensation: able to localized pressure appropriately   Vaginal vault: WNL   Muscle Bulk: atrophy   Muscle Power: 2+/5  Muscle Endurance: 4 sec  # Reps To Fatigue: 4    Fast Contractions in 10 seconds: 6                           Quality of contraction: decreased hold, slow relaxation, and incomplete relaxation   Specificity: patient contracts: abdominals, glutes   Coordination: tends to hold breath during pelvic floor muscle contraction; difficulty with quick contraction and quick relax, with incomplete relax  Prolapse check:not assessed today  Comments: Palpation of left Obturator Internus referred into rectum, palpation of right Obturator Internus gave sensation of needing to be stretched    Home Exercises Provided and Patient Education Provided     Education provided:   - anatomy/physiology of pelvic  floor, bladder retraining, pelvic wand use, diaphragmatic breathing, double voiding techniques, kegels, proper bearing down techniques, and behavior modifications  Discussed progression of plan of care with patient; educated pt in activity modification; reviewed HEP with pt. Pt demonstrated and verbalized understanding of all instruction and was provided with a handout of HEP (see Patient Instructions).    Written Home Exercises Provided: yes.  Exercises were reviewed and Josefina was able to demonstrate them prior to the end of the session.  Josefina demonstrated fair  understanding of the education provided.     See EMR under Patient Instructions for exercises provided 10/18/2023.    Assessment   Josefina with good tolerance to treatment today. Patient demonstrated good understanding of pelvic wand therapy, able to target areas with tenderness. She responded well to treatment, with mitigation of symptoms as treatment progressed. Patient with continued marked improvements in nocturia and pain with intercourse per subjective reports. Patient with good tolerance to pelvic exam and intravaginal treatment, with minimal tenderness/sensation of excess muscle tension or tightness. She has met all short term goals and 5/6 long term goals. 1/6 long term goals mostly met. Patient has been educated re: discharge HEP to continue progress independently and prevent future issues. She demonstrated good understanding of interventions and is appropriate for discharge from skilled pelvic floor PT services at this time.     Josefina Is progressing well towards her goals.   Pt prognosis is Fair.     Pt's spiritual, cultural and educational needs considered and pt agreeable to plan of care and goals.     Anticipated barriers to physical therapy: None    Short Term Goals: 8 weeks   Goal Status Notes   Pt will report improved ability to sleep uninterrupted by excessive nocturia 2/7 days per week.   Met    Pt will urinate without crede/Valsalva  at leas 50% of the time to prevent adverse effects to adjacent structures.  Met    Pt will utilize improved bearing down techniques with defecation at least 50% of the time to prevent adverse effects to adjacent structures.  Met    Pt will be independent with double voiding techniques to ensure full bladder emptying and decrease pt's risk of infection.   Met    Pt will be independent in colon massage for more effective stooling.  Met          Long Term Goals: 16 weeks   Goal Status Notes   Pt to be discharged with home plan for carry over after discharge.  Met    Pt will report improved ability to sleep uninterrupted by excessive nocturia 4/7 days per week.   Met    Pt to be independent with pressure management, avoiding breath holding with ADLs at least 75% of the time.  Met    Pt will utilize improved bearing down techniques with defecation at least 100% of the time to prevent adverse effects to adjacent structures.  90% Met    Pt will urinate without crede/Valsalva at least 90% of the time to prevent adverse effects to adjacent structures.  Met    Pt will report successfully having intercourse with < or = 2/10 pain for an improvement in activity tolerance.   Met        Plan     Patient has been educated re: discharge HEP to continue progress independently and prevent future issues. She demonstrated good understanding of interventions and is appropriate for discharge from skilled pelvic floor PT services at this time.     Catherine Brunner, PT , DPT

## 2023-12-05 ENCOUNTER — CLINICAL SUPPORT (OUTPATIENT)
Dept: REHABILITATION | Facility: HOSPITAL | Age: 63
End: 2023-12-05
Payer: COMMERCIAL

## 2023-12-05 DIAGNOSIS — M62.89 PELVIC FLOOR DYSFUNCTION: ICD-10-CM

## 2023-12-05 DIAGNOSIS — R27.9 LACK OF COORDINATION: ICD-10-CM

## 2023-12-05 DIAGNOSIS — M62.89 PELVIC FLOOR TENSION: ICD-10-CM

## 2023-12-05 DIAGNOSIS — R39.89 SENSATION OF PRESSURE IN BLADDER AREA: ICD-10-CM

## 2023-12-05 PROCEDURE — 97112 NEUROMUSCULAR REEDUCATION: CPT | Mod: PO

## 2023-12-05 PROCEDURE — 97530 THERAPEUTIC ACTIVITIES: CPT | Mod: PO

## 2023-12-05 PROCEDURE — 97110 THERAPEUTIC EXERCISES: CPT | Mod: PO

## 2023-12-05 NOTE — PATIENT INSTRUCTIONS
Strategy to decrease bathroom trips at night ---  Restrict fluids at least 2 hours before bed time.  Elevate your legs for 30 min prior to bed time. The legs should be higher than your heart. After 30 min, get up and use the restroom (double void). The idea behind this strategy is using gravity to bring blood back towards your kidneys to try to get your body to make more urine before you go to sleep, so you do not have to get up as often during the night to void.      DOUBLE VOIDING - Double voiding is a technique that may assist the bladder to empty more effectively when urine is left in the bladder. It involves passing urine more than once each time that you go to the toilet. This makes sure that the bladder is completely empty.    Here are 3 strategies you can try to fully empty your bladder.  You do not have to do all of these things every single time. Find which ones work best for you.     Check to make sure your pelvic floor is relaxed   Do a body scan - make sure your legs, buttocks, and abdominals are relaxed.  Take a couple deep, slow breaths to encourage your pelvic floor muscles to DROP (ie. Try Diaphragmatic Breathing).  Change your pelvic position: lean forward, rock your pelvis forward and backward, stand up then sit back down.   3. Gently apply pressure over your bladder.    Wait at least 30 seconds to 1 minute to see if a second urine stream begins.     Do not stop your urine stream or push/strain!      Abdominal Colon Massage    Lie on your back- preferably on your bed, but a firm sofa or other comfortable surface will do. You might want to wedge a couple of pillows under your knees. Use your hand in the most comfortable way possible- either as a fist, using your fingers, or using the edge of the palm.   Begin on the left side of the body- think of the belly button as the center- just beneath the left rib cage, and massage down toward the pubic bone in a straight line. In other words, draw the  letter I from the bottom of your rib cage downward. In doing so, you are massaging your descending colon. Repeat 15x. (Move from 3 to 5)  Now for the L: This time, start on the right side of your body, just under the rib cage to the right of the belly button.  Massage from right to left, then down toward the pubic bone: across, then down, as in the letter L.  This also massages both the descending and the transverse colon.  Repeat 15x. (Move from 2 to 3)  Finally, draw the U: Start to the right of the belly button, but this time, begin at the top edge of the pelvic bone, and massage up toward the right wide of the rib cage, then across to the left, then down to the top of the pelvic bone. Repeat 15x. This massages all the portions of the colon- ascending, transverse, and descending.  (Move from 1 to 2).  This should not cause any pain. Try this after meals or snacks.         Standing Obturator Internus Stretch    Standing with legs apart, lean over a counter or table and turn toes in to face each other. Keeping knees slightly bent gently shift hips to one side. Feel for a stretch in your opposite inner thigh/buttock region.        Obturator internus stretch: In standing, bring your left knee up on a table keeping your back leg straight. Lean forward until you feel a stretch in glute area. Hold for 30 seconds, repeat 2x/daily.              TRIGGER POINT RELEASE WITH PELVIC WAND  1. Make sure the wand is clean, free of any visible soiling.  2. Lay back comfortably with your back well supported by several pillows and both knees bent.   3. Apply water-based lubricant (ie. Slippery Stuff) on the end of the wand AND vaginal opening.  4. Grab the end of the wand with one hand, and spread your labia with the other hand to visualize the entrance of the vagina; insert the wand.   5. Gently palpate your pelvic floor muscles with the wand for trigger points/muscle spasms.     To find the pelvic floor muscles:   - In the  "middle at 6 o'clock = Rectum   - Move a little to the right at 5 o'clock = Right levator ani muscle    - Move a little to the left at 7 o'clock = Left levator ani muscle    - A little more to the right at 2/3/4 o'clock = Right obturator internus muscle   - A little more to the left at 8/9/10 o'clock = Left obturator internus muscle   - If you feel sharp, stabbing pain = bone or pudendal nerve (wrong)    6. When you find a sensitive, painful spot (a knot/bump in your muscle), apply constant pressure TO YOUR TOLERANCE.   *Do not apply so much pressure that you cannot tolerate it, your muscles start tightening up, or it leaves you too painful to do again the following day.    7. You may start to feel a pulse, throbbing, or light burning sensations; keep your pressure constant!  8. Hold this until the muscle spasm has diminished and the pain has improved. It may take several minutes. You can make small semi-circles or back and forth strokes to help massage the muscle spasm.  9. Focus on Diaphragmatic Breathing and DROPPING your pelvic floor muscle (releasing the tension).    10. Once finished, remove wand.  12. Wash with anti-bacterial soap and thoroughly rinse. Let air dry whenever possible. Store in a dry, clean location.   13. Perform for 10 minutes, 5 x per week.    *Don't apply so much pressure that it leaves you too sore to perform again the next day.    STOP if there is increased bleeding, an infection, or extreme pain.    "BLOW AS YOU GO"  - Before you perform any movement (like getting up from a chair, getting in/out of bed, picking something up)...  - Take a breath in to prepare, and then blow out through your mouth GENTLY the entire time you are moving. The exhale should not be forced or rough. It should be slow, like blowing out birthday candles.  - If you run out of air during the movement, continue to breath normally and try not to hold your breath   - Do this to protect your pelvic floor muscles from " "excessive pressures that comes with holding your breath (called "a Valsalva maneuver").    BEARING DOWN TECHNIQUE  1. Sit on the toilet comfortably with legs and buttocks relaxed.  2. Put your feet on a step stool (8 inches tall).  3. Lean forward while keeping your back straight.  4. Relax and DROP the pelvic floor muscles.  5. Push your belly out and HOLD THIS.  6. Continue to breathe normally without holding your breath. You can "check yourself" to make sure you're not breath holding by saying "ssss" or counting out loud. You can also make breath last by pretending like you are blowing out birthday candles.       Do not try to push any other way. Don't hold your breath.           Reverse Kegels/Pelvic Floor Drops - relaxation practice     The feeling of dropping your pelvic floor is similar to the moment of relief when you have reached the bathroom; when you urinate or have a bowel movement, you first drop your pelvic floor, and let the pelvic floor muscles (PFM) go. Pay attention to this, and see if you can feel that happening. The key to dropping your pelvic floor is visualization, and Deep Breathing. The best way to consciously release tension from the PFMs is to try to release the muscles while you inhale. When you inhale properly with diaphragmatic breathing, your diaphragm actually lowers to make room for the breath, so it is natural to also lower and relax the pelvic floor muscles at the same time. When you exhale, your diaphragm rises to push the air out, and you then naturally raise or contract your PFMs on the breath out. If you can get this Pelvic Floor Rhythm, reverse kegels will be much easier to do.     You can start by gently rahul your pelvic floor to feel what tightening feels like. Let that go and feel how the tension releases. Really focus on the difference between tension and relaxation. Once you get this, you can go a step further and visualize the muscles between the pubic bone and " tailbone lengthen and gently move downwards away from your body. You can visualize your pubic bone moving towards the ceiling and tailbone towards the surface (if you are laying on your back).      It is helpful to take a mirror to look at your contraction and relaxation. When you perform a pelvic floor contraction (Kegel) your clitoris should move slightly downward, your anus should wink, and the perineal body (area between the vagina and anus) should move up and in. On the reverse Kegel, you should see the anus release and your perineal body move downwards towards the mirror. It should also feel like you are creating more space between the pubic bone and tailbone. Dont make it happen, visualize and let it happen!       Eventually, once you have mastered the art of relaxing your pelvic floor muscles, you will need to check in with your pelvic floor throughout the day, and let go of any tension that you discover.

## 2023-12-12 ENCOUNTER — PATIENT MESSAGE (OUTPATIENT)
Dept: INFECTIOUS DISEASES | Facility: CLINIC | Age: 63
End: 2023-12-12
Payer: COMMERCIAL

## 2023-12-14 ENCOUNTER — PATIENT MESSAGE (OUTPATIENT)
Dept: NEUROLOGY | Facility: CLINIC | Age: 63
End: 2023-12-14
Payer: COMMERCIAL

## 2023-12-14 RX ORDER — PREDNISONE 20 MG/1
60 TABLET ORAL DAILY
Qty: 9 TABLET | Refills: 0 | Status: SHIPPED | OUTPATIENT
Start: 2023-12-14 | End: 2023-12-17

## 2023-12-19 ENCOUNTER — HOSPITAL ENCOUNTER (OUTPATIENT)
Dept: RADIOLOGY | Facility: OTHER | Age: 63
Discharge: HOME | End: 2023-12-19
Attending: INTERNAL MEDICINE
Payer: COMMERCIAL

## 2023-12-19 DIAGNOSIS — Z00.00 ANNUAL PHYSICAL EXAM: ICD-10-CM

## 2023-12-19 PROCEDURE — 77080 DXA BONE DENSITY AXIAL SKELETON 1 OR MORE SITES: ICD-10-PCS | Mod: 26,,, | Performed by: RADIOLOGY

## 2023-12-19 PROCEDURE — 77080 DXA BONE DENSITY AXIAL: CPT | Mod: 26,,, | Performed by: RADIOLOGY

## 2023-12-19 PROCEDURE — 77080 DXA BONE DENSITY AXIAL: CPT | Mod: TC

## 2023-12-21 ENCOUNTER — CLINICAL SUPPORT (OUTPATIENT)
Dept: INFECTIOUS DISEASES | Facility: CLINIC | Age: 63
End: 2023-12-21
Payer: COMMERCIAL

## 2023-12-21 ENCOUNTER — HOSPITAL ENCOUNTER (OUTPATIENT)
Dept: RADIOLOGY | Facility: HOSPITAL | Age: 63
Discharge: HOME | End: 2023-12-21
Attending: INTERNAL MEDICINE
Payer: COMMERCIAL

## 2023-12-21 DIAGNOSIS — Z85.810 HISTORY OF TONGUE CANCER: Primary | ICD-10-CM

## 2023-12-21 DIAGNOSIS — R73.03 PREDIABETES: ICD-10-CM

## 2023-12-21 DIAGNOSIS — Z82.49 FAMILY HISTORY OF CARDIAC DISORDER IN FATHER: ICD-10-CM

## 2023-12-21 PROCEDURE — 75571 CT HRT W/O DYE W/CA TEST: CPT | Mod: TC

## 2023-12-21 PROCEDURE — 90651 HPV VACCINE 9-VALENT 3 DOSE IM: ICD-10-PCS | Mod: S$GLB,,, | Performed by: INTERNAL MEDICINE

## 2023-12-21 PROCEDURE — 90651 9VHPV VACCINE 2/3 DOSE IM: CPT | Mod: S$GLB,,, | Performed by: INTERNAL MEDICINE

## 2023-12-21 PROCEDURE — 75571 CT CALCIUM SCORING CARDIAC: ICD-10-PCS | Mod: 26,,, | Performed by: RADIOLOGY

## 2023-12-21 PROCEDURE — 99999 PR PBB SHADOW E&M-EST. PATIENT-LVL I: CPT | Mod: PBBFAC,,,

## 2023-12-21 PROCEDURE — 90471 IMMUNIZATION ADMIN: CPT | Mod: S$GLB,,, | Performed by: INTERNAL MEDICINE

## 2023-12-21 PROCEDURE — 90472 ZOSTER RECOMBINANT VACCINE: ICD-10-PCS | Mod: S$GLB,,, | Performed by: INTERNAL MEDICINE

## 2023-12-21 PROCEDURE — 90750 HZV VACC RECOMBINANT IM: CPT | Mod: S$GLB,,, | Performed by: INTERNAL MEDICINE

## 2023-12-21 PROCEDURE — 90677 PCV20 VACCINE IM: CPT | Mod: S$GLB,,, | Performed by: INTERNAL MEDICINE

## 2023-12-21 PROCEDURE — 75571 CT HRT W/O DYE W/CA TEST: CPT | Mod: 26,,, | Performed by: RADIOLOGY

## 2023-12-21 PROCEDURE — 90677 PNEUMOCOCCAL CONJUGATE VACCINE 20-VALENT: ICD-10-PCS | Mod: S$GLB,,, | Performed by: INTERNAL MEDICINE

## 2023-12-21 PROCEDURE — 90471 PNEUMOCOCCAL CONJUGATE VACCINE 20-VALENT: ICD-10-PCS | Mod: S$GLB,,, | Performed by: INTERNAL MEDICINE

## 2023-12-21 PROCEDURE — 90472 IMMUNIZATION ADMIN EACH ADD: CPT | Mod: S$GLB,,, | Performed by: INTERNAL MEDICINE

## 2023-12-21 PROCEDURE — 90750 ZOSTER RECOMBINANT VACCINE: ICD-10-PCS | Mod: S$GLB,,, | Performed by: INTERNAL MEDICINE

## 2023-12-21 PROCEDURE — 99999 PR PBB SHADOW E&M-EST. PATIENT-LVL I: ICD-10-PCS | Mod: PBBFAC,,,

## 2023-12-21 NOTE — PROGRESS NOTES
Patient received Shingrix IM to the right deltoid.  And also 2 vaccines IM to the left deltoid, HPV #2 posterior, and prevnar 20 anterior.  Tolerated well and left in NAD

## 2023-12-22 ENCOUNTER — TELEPHONE (OUTPATIENT)
Dept: INFECTIOUS DISEASES | Facility: CLINIC | Age: 63
End: 2023-12-22
Payer: COMMERCIAL

## 2023-12-22 NOTE — TELEPHONE ENCOUNTER
Reviewed CA score.  Plan cardiology visit and stress echo in new year.  Repeat labs also.  Appreciate Dr. Francois's review as well..

## 2024-01-05 ENCOUNTER — PATIENT MESSAGE (OUTPATIENT)
Dept: ENDOCRINOLOGY | Facility: CLINIC | Age: 64
End: 2024-01-05
Payer: COMMERCIAL

## 2024-01-05 RX ORDER — SEMAGLUTIDE 2.68 MG/ML
2 INJECTION, SOLUTION SUBCUTANEOUS
Qty: 3 ML | Refills: 11 | Status: SHIPPED | OUTPATIENT
Start: 2024-01-05

## 2024-01-08 ENCOUNTER — OFFICE VISIT (OUTPATIENT)
Dept: OBSTETRICS AND GYNECOLOGY | Facility: CLINIC | Age: 64
End: 2024-01-08
Payer: COMMERCIAL

## 2024-01-08 DIAGNOSIS — N95.2 VAGINAL ATROPHY: ICD-10-CM

## 2024-01-08 DIAGNOSIS — N95.1 MENOPAUSAL SYMPTOMS: Primary | ICD-10-CM

## 2024-01-08 PROCEDURE — 99213 OFFICE O/P EST LOW 20 MIN: CPT | Mod: 95,,, | Performed by: PHYSICIAN ASSISTANT

## 2024-01-08 PROCEDURE — 1159F MED LIST DOCD IN RCRD: CPT | Mod: CPTII,95,, | Performed by: PHYSICIAN ASSISTANT

## 2024-01-08 PROCEDURE — 1160F RVW MEDS BY RX/DR IN RCRD: CPT | Mod: CPTII,95,, | Performed by: PHYSICIAN ASSISTANT

## 2024-01-08 RX ORDER — ESTRADIOL 10 UG/1
10 INSERT VAGINAL
Qty: 8 EACH | Refills: 11 | Status: SHIPPED | OUTPATIENT
Start: 2024-01-08

## 2024-01-08 RX ORDER — ESTRADIOL 0.1 MG/G
1 CREAM VAGINAL DAILY
Qty: 42.5 G | Refills: 1 | Status: SHIPPED | OUTPATIENT
Start: 2024-01-08 | End: 2025-01-07

## 2024-01-08 NOTE — PROGRESS NOTES
The patient location is: Home  The chief complaint leading to consultation is: follow up menopausal symptoms    Visit type: audiovisual    Face to Face time with patient: 10 minutes  15 minutes of total time spent on the encounter, which includes face to face time and non-face to face time preparing to see the patient (eg, review of tests), Obtaining and/or reviewing separately obtained history, Documenting clinical information in the electronic or other health record, Independently interpreting results (not separately reported) and communicating results to the patient/family/caregiver, or Care coordination (not separately reported).         Each patient to whom he or she provides medical services by telemedicine is:  (1) informed of the relationship between the physician and patient and the respective role of any other health care provider with respect to management of the patient; and (2) notified that he or she may decline to receive medical services by telemedicine and may withdraw from such care at any time.    Notes:   Subjective:      Josefina Blue is a 63 y.o. female who presents for follow-up of hormone replacement therapy and low libido. She started Imvexxy BIW, Testosterone cream 1% 1 click daily and pelvic floor PT. She is over all doing much better. PT is helping with pelvic floor disfunction and can have enjoyable intercourse for the first time in awhile. She continues to use estrace cream as well but only externally. Libido has improved. Denies adverse side effects with testosterone therapy.    PLAN on 10/9/2023:  Start Imvexxy 10mcg BIW -sent to [facility]  Start Testosterone 1% GEL, apply 1 click to upper inner thigh daily. - Faxed to [facility]  Start Pelvic Floor PT      PCP: Katherine Baumgarten, MD    Routine labs: 10/30/2023  WWE: 6/26/2023 with Yamilka Keith MD  Pap smear: S/p hyst  Mammogram: 10/20/2023 TC  7.29 %   DEXA: 12/19/2023 normal  Colonoscopy: 6/14/2021 repeat in 7 years    Lab  Visit on 10/30/2023   Component Date Value Ref Range Status    Specimen UA 10/30/2023 Urine, Clean Catch   Final    Color, UA 10/30/2023 Yellow  Yellow, Straw, Elizabeth Final    Appearance, UA 10/30/2023 Hazy (A)  Clear Final    pH, UA 10/30/2023 6.0  5.0 - 8.0 Final    Specific Gravity, UA 10/30/2023 1.015  1.005 - 1.030 Final    Protein, UA 10/30/2023 Negative  Negative Final    Glucose, UA 10/30/2023 Negative  Negative Final    Ketones, UA 10/30/2023 Negative  Negative Final    Bilirubin (UA) 10/30/2023 Negative  Negative Final    Occult Blood UA 10/30/2023 Negative  Negative Final    Nitrite, UA 10/30/2023 Negative  Negative Final    Leukocytes, UA 10/30/2023 Negative  Negative Final   Lab Visit on 10/30/2023   Component Date Value Ref Range Status    WBC 10/30/2023 5.50  3.90 - 12.70 K/uL Final    RBC 10/30/2023 3.97 (L)  4.00 - 5.40 M/uL Final    Hemoglobin 10/30/2023 11.3 (L)  12.0 - 16.0 g/dL Final    Hematocrit 10/30/2023 35.3 (L)  37.0 - 48.5 % Final    MCV 10/30/2023 89  82 - 98 fL Final    MCH 10/30/2023 28.5  27.0 - 31.0 pg Final    MCHC 10/30/2023 32.0  32.0 - 36.0 g/dL Final    RDW 10/30/2023 12.6  11.5 - 14.5 % Final    Platelets 10/30/2023 214  150 - 450 K/uL Final    MPV 10/30/2023 11.4  9.2 - 12.9 fL Final    Immature Granulocytes 10/30/2023 0.4  0.0 - 0.5 % Final    Gran # (ANC) 10/30/2023 2.8  1.8 - 7.7 K/uL Final    Immature Grans (Abs) 10/30/2023 0.02  0.00 - 0.04 K/uL Final    Lymph # 10/30/2023 2.1  1.0 - 4.8 K/uL Final    Mono # 10/30/2023 0.4  0.3 - 1.0 K/uL Final    Eos # 10/30/2023 0.1  0.0 - 0.5 K/uL Final    Baso # 10/30/2023 0.04  0.00 - 0.20 K/uL Final    nRBC 10/30/2023 0  0 /100 WBC Final    Gran % 10/30/2023 51.3  38.0 - 73.0 % Final    Lymph % 10/30/2023 38.4  18.0 - 48.0 % Final    Mono % 10/30/2023 7.6  4.0 - 15.0 % Final    Eosinophil % 10/30/2023 1.6  0.0 - 8.0 % Final    Basophil % 10/30/2023 0.7  0.0 - 1.9 % Final    Differential Method 10/30/2023 Automated   Final    Sodium  10/30/2023 141  136 - 145 mmol/L Final    Potassium 10/30/2023 4.0  3.5 - 5.1 mmol/L Final    Chloride 10/30/2023 107  95 - 110 mmol/L Final    CO2 10/30/2023 26  23 - 29 mmol/L Final    Glucose 10/30/2023 94  70 - 110 mg/dL Final    BUN 10/30/2023 14  8 - 23 mg/dL Final    Creatinine 10/30/2023 0.8  0.5 - 1.4 mg/dL Final    Calcium 10/30/2023 9.5  8.7 - 10.5 mg/dL Final    Total Protein 10/30/2023 6.7  6.0 - 8.4 g/dL Final    Albumin 10/30/2023 4.2  3.5 - 5.2 g/dL Final    Total Bilirubin 10/30/2023 0.6  0.1 - 1.0 mg/dL Final    Alkaline Phosphatase 10/30/2023 50 (L)  55 - 135 U/L Final    AST 10/30/2023 20  10 - 40 U/L Final    ALT 10/30/2023 18  10 - 44 U/L Final    eGFR 10/30/2023 >60.0  >60 mL/min/1.73 m^2 Final    Anion Gap 10/30/2023 8  8 - 16 mmol/L Final    CRP 10/30/2023 0.3  0.0 - 8.2 mg/L Final    TSH 10/30/2023 1.092  0.400 - 4.000 uIU/mL Final    Cholesterol 10/30/2023 154  120 - 199 mg/dL Final    Triglycerides 10/30/2023 108  30 - 150 mg/dL Final    HDL 10/30/2023 45  40 - 75 mg/dL Final    LDL Cholesterol 10/30/2023 87.4  63.0 - 159.0 mg/dL Final    HDL/Cholesterol Ratio 10/30/2023 29.2  20.0 - 50.0 % Final    Total Cholesterol/HDL Ratio 10/30/2023 3.4  2.0 - 5.0 Final    Non-HDL Cholesterol 10/30/2023 109  mg/dL Final    Vitamin B-12 10/30/2023 287  210 - 950 pg/mL Final    Folate 10/30/2023 13.4  4.0 - 24.0 ng/mL Final    Hemoglobin A1C 10/30/2023 5.4  4.0 - 5.6 % Final    Estimated Avg Glucose 10/30/2023 108  68 - 131 mg/dL Final    Iron 10/30/2023 94  30 - 160 ug/dL Final    Transferrin 10/30/2023 228  200 - 375 mg/dL Final    TIBC 10/30/2023 337  250 - 450 ug/dL Final    Saturated Iron 10/30/2023 28  20 - 50 % Final    Magnesium 10/30/2023 2.3  1.6 - 2.6 mg/dL Final   Lab Visit on 10/10/2023   Component Date Value Ref Range Status    TSH 10/10/2023 0.681  0.400 - 4.000 uIU/mL Final    Sodium 10/10/2023 140  136 - 145 mmol/L Final    Potassium 10/10/2023 3.9  3.5 - 5.1 mmol/L Final    Chloride  10/10/2023 105  95 - 110 mmol/L Final    CO2 10/10/2023 25  23 - 29 mmol/L Final    Glucose 10/10/2023 97  70 - 110 mg/dL Final    BUN 10/10/2023 15  8 - 23 mg/dL Final    Creatinine 10/10/2023 0.9  0.5 - 1.4 mg/dL Final    Calcium 10/10/2023 9.4  8.7 - 10.5 mg/dL Final    Total Protein 10/10/2023 7.0  6.0 - 8.4 g/dL Final    Albumin 10/10/2023 4.5  3.5 - 5.2 g/dL Final    Total Bilirubin 10/10/2023 0.6  0.1 - 1.0 mg/dL Final    Alkaline Phosphatase 10/10/2023 54 (L)  55 - 135 U/L Final    AST 10/10/2023 21  10 - 40 U/L Final    ALT 10/10/2023 21  10 - 44 U/L Final    eGFR 10/10/2023 >60  >60 mL/min/1.73 m^2 Final    Anion Gap 10/10/2023 10  8 - 16 mmol/L Final    Hemoglobin A1C 10/10/2023 5.3  4.0 - 5.6 % Final    Estimated Avg Glucose 10/10/2023 105  68 - 131 mg/dL Final    Cholesterol 10/10/2023 168  120 - 199 mg/dL Final    Triglycerides 10/10/2023 117  30 - 150 mg/dL Final    HDL 10/10/2023 49  40 - 75 mg/dL Final    LDL Cholesterol 10/10/2023 95.6  63.0 - 159.0 mg/dL Final    HDL/Cholesterol Ratio 10/10/2023 29.2  20.0 - 50.0 % Final    Total Cholesterol/HDL Ratio 10/10/2023 3.4  2.0 - 5.0 Final    Non-HDL Cholesterol 10/10/2023 119  mg/dL Final    Testosterone, Free 10/10/2023 0.6  pg/mL Final    Testosterone, Total 10/10/2023 19  5 - 73 ng/dL Final    Estradiol 10/10/2023 13  See Text pg/mL Final   Office Visit on 10/09/2023   Component Date Value Ref Range Status    Urine Culture, Routine 10/09/2023 Multiple organisms isolated. None in predominance.  Repeat if   Final    Urine Culture, Routine 10/09/2023 clinically necessary.   Final    Specimen UA 10/09/2023 Urine, Clean Catch   Final    Color, UA 10/09/2023 Yellow  Yellow, Straw, Elizabeth Final    Appearance, UA 10/09/2023 Clear  Clear Final    pH, UA 10/09/2023 6.0  5.0 - 8.0 Final    Specific Gravity, UA 10/09/2023 1.010  1.005 - 1.030 Final    Protein, UA 10/09/2023 Negative  Negative Final    Glucose, UA 10/09/2023 Negative  Negative Final    Ketones, UA  10/09/2023 Negative  Negative Final    Bilirubin (UA) 10/09/2023 Negative  Negative Final    Occult Blood UA 10/09/2023 Negative  Negative Final    Nitrite, UA 10/09/2023 Negative  Negative Final    Leukocytes, UA 10/09/2023 Negative  Negative Final       Past Medical History:   Diagnosis Date    DDD (degenerative disc disease), lumbar 10/7/2019    Hyperlipidemia 8/31/2015    Hypertension     IFG (impaired fasting glucose) 8/31/2015    Neuropathic pain 8/31/2015    Squamous cell cancer of tongue 2012     Past Surgical History:   Procedure Laterality Date    CHOLECYSTECTOMY      COLONOSCOPY N/A 06/14/2021    Procedure: COLONOSCOPY;  Surgeon: Gentry Dickson MD;  Location: [address]);  Service: Endoscopy;  Laterality: N/A;  covid test 6/11 primary care, instructions sent to [facility]. 6/11 Pt. fully vaccinated. Completed in Jan. 2021.[facility]    GALLBLADDER SURGERY  06/2016    HYSTERECTOMY      JOINT REPLACEMENT  2020    KNEE ARTHROPLASTY Left 07/08/2020    Procedure: ARTHROPLASTY, KNEE;  Surgeon: GLENN Whyte MD;  Location: [facility] OR;  Service: Orthopedics;  Laterality: Left;  regional w/catheter   Spinal, Adductor  Cloidine/Epi/Ketorolac/Ropivacaine Injection 30cc      KNEE ARTHROSCOPY Right     for torn meniscus    TONGUE SURGERY      TOTAL KNEE ARTHROPLASTY Right 11/11/2020    Procedure: ARTHROPLASTY, KNEE, TOTAL;  Surgeon: GLENN Whyte MD;  Location: [facility] OR;  Service: Orthopedics;  Laterality: Right;  outpatient with 23hr observation  regional with cathter- spinal and adductor    TRANSFORAMINAL EPIDURAL INJECTION OF STEROID Bilateral 09/19/2019    Procedure: Injection,steroid,epidural,transforaminal approach LUMBAR TRANSFORAMINAL BILATERAL L4/5 TF NOE;  Surgeon: Tim Kerns MD;  Location: [facility];  Service: Pain Management;  Laterality: Bilateral;  NEEDS CONSENT, VIP    TRANSFORAMINAL EPIDURAL INJECTION OF STEROID Bilateral 10/07/2019    Procedure: LUMBAR TRANSFORAMINAL BILATERAL L4/5 TF  NOE;  Surgeon: Tim Kerns MD;  Location: [facility] [place];  Service: Pain Management;  Laterality: Bilateral;  NEEDS CONSENT, **VIP**    TRANSFORAMINAL EPIDURAL INJECTION OF STEROID Bilateral 03/22/2021    Procedure: LUMBAR TRANSFORAMINAL BILATERAL L4/5 DIRECT REFERRAL;  Surgeon: Tim Kerns MD;  Location: [facility] [place], [facility];  Service: Pain Management;  Laterality: Bilateral;  NEEDS CONSENT, URGENT  *VIP*    TRANSFORAMINAL EPIDURAL INJECTION OF STEROID Right 05/10/2021    Procedure: LUMBAR TRANSFORAMINAL RIGHT L4/5, L5/S1 DIRECT REFERRAL;  Surgeon: Tim Kerns MD;  Location: [facility] [place], [facility];  Service: Pain Management;  Laterality: Right;  NEEDS CONSENT, MEDICALLY URGENT  **VIP**     Social History     Tobacco Use    Smoking status: Never    Smokeless tobacco: Never   Substance Use Topics    Alcohol use: Yes     Alcohol/week: 3.0 standard drinks of alcohol     Types: 2 Glasses of wine, 1 Drinks containing 0.5 oz of alcohol per week     Comment: once a week    Drug use: No     Family History   Problem Relation Age of Onset    No Known Problems Paternal Grandfather     No Known Problems Paternal Grandmother     No Known Problems Maternal Grandmother     No Known Problems Maternal Grandfather     Cancer Father 74        prostate, throat, lung- smoker    Hyperlipidemia Father     Diabetes Father     Heart disease Father     Cancer Mother         cervical cancer    Alcohol abuse Mother     Cirrhosis Mother     Drug abuse Brother     Heart disease Brother     Hyperlipidemia Brother     Diabetes Sister     Hypertension Sister     ALS Sister     No Known Problems Maternal Aunt     No Known Problems Maternal Uncle     No Known Problems Paternal Aunt     No Known Problems Paternal Uncle     Amblyopia Neg Hx     Blindness Neg Hx     Cataracts Neg Hx     Glaucoma Neg Hx     Macular degeneration Neg Hx     Retinal detachment Neg Hx     Strabismus Neg Hx     Stroke Neg Hx     Thyroid disease Neg Hx     Breast cancer Neg Hx     Colon  cancer Neg Hx     Ovarian cancer Neg Hx      OB History    Para Term  AB Living   0 0 0 0 0 0   SAB IAB Ectopic Multiple Live Births   0 0 0 0     Obstetric Comments   Endo- age 22       Current Outpatient Medications:     albuterol (VENTOLIN HFA) 90 mcg/actuation inhaler, Inhale 2 puffs into the lungs every 6 (six) hours as needed for Wheezing. Rescue, Disp: 18 g, Rfl: 0    ALPRAZolam (XANAX) 0.25 MG tablet, Take 1 tablet (0.25 mg total) by mouth nightly as needed for Anxiety., Disp: 30 tablet, Rfl: 5    aspirin (ECOTRIN) 81 MG EC tablet, Take 1 tablet (81 mg total) by mouth nightly., Disp: , Rfl: 0    estradioL (ESTRACE) 0.01 % (0.1 mg/gram) vaginal cream, Place 1 g vaginally once daily., Disp: 42.5 g, Rfl: 1    estradioL (IMVEXXY MAINTENANCE PACK) 10 mcg Inst, Place 10 mcg vaginally twice a week., Disp: 8 each, Rfl: 11    irbesartan-hydrochlorothiazide (AVALIDE) 150-12.5 mg per tablet, Take 1 tablet by mouth once daily., Disp: 30 tablet, Rfl: 11    pravastatin (PRAVACHOL) 10 MG tablet, Take 1 tablet (10 mg total) by mouth once daily., Disp: 90 tablet, Rfl: 3    semaglutide (OZEMPIC) 2 mg/dose (8 mg/3 mL) PnIj, Inject 2 mg into the skin every 7 days., Disp: 3 mL, Rfl: 11    UNABLE TO FIND, medication name: testosterone 1% Gel Apply 1-2 clicks to upper inner thigh as directed, Disp: 30 g, Rfl: 5    zolpidem (AMBIEN) 5 MG Tab, Take 1 tablet (5 mg total) by mouth nightly as needed (insomnia)., Disp: 30 tablet, Rfl: 1    zolpidem (AMBIEN) 5 MG Tab, Take 1 tablet (5 mg total) by mouth nightly as needed for insomnia., Disp: 30 tablet, Rfl: 1    Review of Systems:  General: No fever, chills, or weight loss.  Chest: No chest pain, shortness of breath, or palpitations.  Breast: No pain, masses, or nipple discharge.  Vulva: No pain, lesions, or itching.  Vagina: No relaxation, itching, discharge, or lesions.  Abdomen: No pain, nausea, vomiting, diarrhea, or constipation.  Urinary: No incontinence, nocturia,  frequency, or dysuria.  Extremities:  No leg cramps, edema, or calf pain.  Neurologic: No headaches, dizziness, or visual changes.    Objective:   There were no vitals filed for this visit.  There is no height or weight on file to calculate BMI.      Physical Exam: Deferred       Assessment:    Menopausal symptoms  -     Testosterone, free; Future; Expected date: 01/08/2024  -     Testosterone; Future; Expected date: 01/08/2024    Vaginal atrophy  -     estradioL (ESTRACE) 0.01 % (0.1 mg/gram) vaginal cream; Place 1 g vaginally once daily.  Dispense: 42.5 g; Refill: 1  -     estradioL (IMVEXXY MAINTENANCE PACK) 10 mcg Inst; Place 10 mcg vaginally twice a week.  Dispense: 8 each; Refill: 11        Plan:   Continue imvexxy 10mcg BIW  Continue Estrace cream BIW externally  Continue testosterone cream 1% 1 click daily (from [employer])  Continue pelvic floor PT  Follow up in 6 months with testosterone labs one week prior.      Instructed patient to call if she experiences any side effects or has any questions.    I spent a total of 15 minutes on the day of the visit.This includes face to face time and non-face to face time preparing to see the patient (eg, review of tests), obtaining and/or reviewing separately obtained history, documenting clinical information in the electronic or other health record, independently interpreting results and communicating results to the patient/family/caregiver, or care coordinator.

## 2024-01-24 PROBLEM — I25.10 CORONARY ARTERY DISEASE DUE TO CALCIFIED CORONARY LESION: Status: ACTIVE | Noted: 2024-01-24

## 2024-01-24 PROBLEM — I25.84 CORONARY ARTERY DISEASE DUE TO CALCIFIED CORONARY LESION: Status: ACTIVE | Noted: 2024-01-24

## 2024-01-24 PROBLEM — Z82.49 FAMILY HISTORY OF HEART DISEASE: Status: ACTIVE | Noted: 2024-01-24

## 2024-01-29 ENCOUNTER — PATIENT MESSAGE (OUTPATIENT)
Dept: INFECTIOUS DISEASES | Facility: CLINIC | Age: 64
End: 2024-01-29
Payer: COMMERCIAL

## 2024-01-29 ENCOUNTER — OFFICE VISIT (OUTPATIENT)
Dept: CARDIOLOGY | Facility: CLINIC | Age: 64
End: 2024-01-29
Payer: COMMERCIAL

## 2024-01-29 DIAGNOSIS — I25.10 CORONARY ARTERY DISEASE DUE TO CALCIFIED CORONARY LESION: Primary | ICD-10-CM

## 2024-01-29 DIAGNOSIS — I10 HYPERTENSION, UNSPECIFIED TYPE: ICD-10-CM

## 2024-01-29 DIAGNOSIS — E78.2 MIXED HYPERLIPIDEMIA: ICD-10-CM

## 2024-01-29 DIAGNOSIS — I25.84 CORONARY ARTERY DISEASE DUE TO CALCIFIED CORONARY LESION: Primary | ICD-10-CM

## 2024-01-29 PROCEDURE — 99204 OFFICE O/P NEW MOD 45 MIN: CPT | Mod: 95,,, | Performed by: INTERNAL MEDICINE

## 2024-01-29 RX ORDER — IRBESARTAN 150 MG/1
150 TABLET ORAL NIGHTLY
Qty: 90 TABLET | Refills: 3 | Status: SHIPPED | OUTPATIENT
Start: 2024-01-29 | End: 2024-03-06

## 2024-01-29 RX ORDER — ROSUVASTATIN CALCIUM 10 MG/1
10 TABLET, COATED ORAL DAILY
Qty: 90 TABLET | Refills: 3 | Status: SHIPPED | OUTPATIENT
Start: 2024-01-29 | End: 2025-01-28

## 2024-01-30 ENCOUNTER — TELEPHONE (OUTPATIENT)
Dept: SPORTS MEDICINE | Facility: CLINIC | Age: 64
End: 2024-01-30
Payer: COMMERCIAL

## 2024-01-30 ENCOUNTER — HOSPITAL ENCOUNTER (OUTPATIENT)
Dept: RADIOLOGY | Facility: HOSPITAL | Age: 64
Discharge: HOME | End: 2024-01-30
Attending: ORTHOPAEDIC SURGERY
Payer: COMMERCIAL

## 2024-01-30 ENCOUNTER — OFFICE VISIT (OUTPATIENT)
Dept: SPORTS MEDICINE | Facility: CLINIC | Age: 64
End: 2024-01-30
Payer: COMMERCIAL

## 2024-01-30 VITALS
DIASTOLIC BLOOD PRESSURE: 68 MMHG | HEIGHT: 68 IN | SYSTOLIC BLOOD PRESSURE: 94 MMHG | BODY MASS INDEX: 28.6 KG/M2 | HEART RATE: 75 BPM | WEIGHT: 188.69 LBS

## 2024-01-30 DIAGNOSIS — M79.671 HEEL PAIN, CHRONIC, RIGHT: ICD-10-CM

## 2024-01-30 DIAGNOSIS — M76.60 INSERTIONAL ACHILLES TENDINOPATHY: Primary | ICD-10-CM

## 2024-01-30 DIAGNOSIS — G89.29 HEEL PAIN, CHRONIC, RIGHT: ICD-10-CM

## 2024-01-30 DIAGNOSIS — M92.61 HAGLUND'S DEFORMITY OF RIGHT HEEL: ICD-10-CM

## 2024-01-30 PROCEDURE — 73650 X-RAY EXAM OF HEEL: CPT | Mod: 26,RT,, | Performed by: RADIOLOGY

## 2024-01-30 PROCEDURE — 3074F SYST BP LT 130 MM HG: CPT | Mod: CPTII,S$GLB,, | Performed by: ORTHOPAEDIC SURGERY

## 2024-01-30 PROCEDURE — 4010F ACE/ARB THERAPY RXD/TAKEN: CPT | Mod: CPTII,S$GLB,, | Performed by: ORTHOPAEDIC SURGERY

## 2024-01-30 PROCEDURE — 3078F DIAST BP <80 MM HG: CPT | Mod: CPTII,S$GLB,, | Performed by: ORTHOPAEDIC SURGERY

## 2024-01-30 PROCEDURE — 3008F BODY MASS INDEX DOCD: CPT | Mod: CPTII,S$GLB,, | Performed by: ORTHOPAEDIC SURGERY

## 2024-01-30 PROCEDURE — 1159F MED LIST DOCD IN RCRD: CPT | Mod: CPTII,S$GLB,, | Performed by: ORTHOPAEDIC SURGERY

## 2024-01-30 PROCEDURE — 99999 PR PBB SHADOW E&M-EST. PATIENT-LVL IV: CPT | Mod: PBBFAC,,, | Performed by: ORTHOPAEDIC SURGERY

## 2024-01-30 PROCEDURE — 73650 X-RAY EXAM OF HEEL: CPT | Mod: TC,RT

## 2024-01-30 PROCEDURE — 99214 OFFICE O/P EST MOD 30 MIN: CPT | Mod: S$GLB,,, | Performed by: ORTHOPAEDIC SURGERY

## 2024-01-30 NOTE — TELEPHONE ENCOUNTER
"Attempted to contact pt. Left voicemail. Called to confirm laterality of "heel pain". Asked pt to return call to clinic at [phone] . MyChart sent.   "

## 2024-01-30 NOTE — PROGRESS NOTES
CC: Right heel pain    DATE OF PROCEDURE: 11/11/2020   PROCEDURES PERFORMED:   Right total knee arthroplasty      DATE OF PROCEDURE: 7/8/2020   PROCEDURES PERFORMED:   Left total knee arthroplasty     63 y.o. Female who returns with right heel pain, previously seen for left heel pain in 2021.  Point localizes pain over the posterior Achilles insertion region.  Pain with direct pressure.  Bothersome with playing tennis.  No specific antecedent injury mechanism.  Pain has been present for several weeks.  No numbness or tingling.  No radiation of symptoms.  No pain over the plantar aspect of the heel.  Nothing along the gastroc soleus or Achilles midsubstance.    States the knees are doing great.  No complaints.    Prior Hx 02/25/2021:  Josefina Blue reports to be doing well just under 4 months s/p the above mentioned procedure on the right knee. She had to cancel the original 3 month follow up appointment. Going to PT 2xWeek at the  [place] location. Seeing good progress daily.  Doing very well overall in regards to the her knees and denies significant pain.  Overall much better compared to preop.  She does report a new complaint of left insertional achilles pain for the last 6 weeks with no specific antecedent injury. This is the first malachi she has experiencing this pain. Better with rest.  She has also been taking Celebrex.      REVIEW OF SYSTEMS:   Constitution: Negative. Negative for chills, fever and night sweats.    Hematologic/Lymphatic: Negative for bleeding problem. Does not bruise/bleed easily.   Skin: Negative for dry skin, itching and rash.   Musculoskeletal: Negative for falls. Positive for right heel pain and muscle weakness.     All other review of symptoms were reviewed and found to be noncontributory.     PAST MEDICAL HISTORY:   Past Medical History:   Diagnosis Date    DDD (degenerative disc disease), lumbar 10/7/2019    Hyperlipidemia 8/31/2015    Hypertension     IFG (impaired fasting glucose)  8/31/2015    Neuropathic pain 8/31/2015    Squamous cell cancer of tongue 2012       PAST SURGICAL HISTORY:   Past Surgical History:   Procedure Laterality Date    CHOLECYSTECTOMY      COLONOSCOPY N/A 06/14/2021    Procedure: COLONOSCOPY;  Surgeon: Gentry Dickson MD;  Location: [facility] (4TH FLR);  Service: Endoscopy;  Laterality: N/A;  covid test 6/11 primary care, instructions sent to [facility]. 6/11 Pt. fully vaccinated. Completed in Jan. 2021.[facility]    GALLBLADDER SURGERY  06/2016    HYSTERECTOMY      JOINT REPLACEMENT  2020    KNEE ARTHROPLASTY Left 07/08/2020    Procedure: ARTHROPLASTY, KNEE;  Surgeon: GLENN Whyte MD;  Location: [facility] OR;  Service: Orthopedics;  Laterality: Left;  regional w/catheter   Spinal, Adductor  Cloidine/Epi/Ketorolac/Ropivacaine Injection 30cc      KNEE ARTHROSCOPY Right     for torn meniscus    TONGUE SURGERY      TOTAL KNEE ARTHROPLASTY Right 11/11/2020    Procedure: ARTHROPLASTY, KNEE, TOTAL;  Surgeon: GLENN Whyte MD;  Location: [facility] OR;  Service: Orthopedics;  Laterality: Right;  outpatient with 23hr observation  regional with cathter- spinal and adductor    TRANSFORAMINAL EPIDURAL INJECTION OF STEROID Bilateral 09/19/2019    Procedure: Injection,steroid,epidural,transforaminal approach LUMBAR TRANSFORAMINAL BILATERAL L4/5 TF NOE;  Surgeon: Tim Kerns MD;  Location: [facility] PAIN MGT;  Service: Pain Management;  Laterality: Bilateral;  NEEDS CONSENT, VIP    TRANSFORAMINAL EPIDURAL INJECTION OF STEROID Bilateral 10/07/2019    Procedure: LUMBAR TRANSFORAMINAL BILATERAL L4/5 TF NOE;  Surgeon: Tim Kerns MD;  Location: [facility] PAIN MGT;  Service: Pain Management;  Laterality: Bilateral;  NEEDS CONSENT, **VIP**    TRANSFORAMINAL EPIDURAL INJECTION OF STEROID Bilateral 03/22/2021    Procedure: LUMBAR TRANSFORAMINAL BILATERAL L4/5 DIRECT REFERRAL;  Surgeon: Tim Kerns MD;  Location: [facility] PAIN MGT;  Service: Pain Management;  Laterality: Bilateral;  NEEDS CONSENT,  URGENT  *VIP*    TRANSFORAMINAL EPIDURAL INJECTION OF STEROID Right 05/10/2021    Procedure: LUMBAR TRANSFORAMINAL RIGHT L4/5, L5/S1 DIRECT REFERRAL;  Surgeon: Tim Kerns MD;  Location: [facility];  Service: Pain Management;  Laterality: Right;  NEEDS CONSENT, MEDICALLY URGENT  **VIP**     FAMILY HISTORY:   Family History   Problem Relation Age of Onset    No Known Problems Paternal Grandfather     No Known Problems Paternal Grandmother     No Known Problems Maternal Grandmother     No Known Problems Maternal Grandfather     Cancer Father 74        prostate, throat, lung- smoker    Hyperlipidemia Father     Diabetes Father     Heart disease Father     Cancer Mother         cervical cancer    Alcohol abuse Mother     Cirrhosis Mother     Drug abuse Brother     Heart disease Brother     Hyperlipidemia Brother     Diabetes Sister     Hypertension Sister     ALS Sister     No Known Problems Maternal Aunt     No Known Problems Maternal Uncle     No Known Problems Paternal Aunt     No Known Problems Paternal Uncle     Amblyopia Neg Hx     Blindness Neg Hx     Cataracts Neg Hx     Glaucoma Neg Hx     Macular degeneration Neg Hx     Retinal detachment Neg Hx     Strabismus Neg Hx     Stroke Neg Hx     Thyroid disease Neg Hx     Breast cancer Neg Hx     Colon cancer Neg Hx     Ovarian cancer Neg Hx      SOCIAL HISTORY:   Social History     Socioeconomic History    Marital status:    Occupational History    Occupation: chief nursing officer   Tobacco Use    Smoking status: Never    Smokeless tobacco: Never   Substance and Sexual Activity    Alcohol use: Yes     Alcohol/week: 3.0 standard drinks of alcohol     Types: 2 Glasses of wine, 1 Drinks containing 0.5 oz of alcohol per week     Comment: once a week    Drug use: No    Sexual activity: Yes     Partners: Male     Birth control/protection: Post-menopausal, See Surgical Hx, None   Social History Narrative    Lives with  and 1 dog     Social Determinants  of Health     Financial Resource Strain: Low Risk  (1/26/2024)    Overall Financial Resource Strain (CARDIA)     Difficulty of Paying Living Expenses: Not hard at all   Food Insecurity: No Food Insecurity (1/26/2024)    Hunger Vital Sign     Worried About Running Out of Food in the Last Year: Never true     Ran Out of Food in the Last Year: Never true   Transportation Needs: No Transportation Needs (1/26/2024)    PRAPARE - Transportation     Lack of Transportation (Medical): No     Lack of Transportation (Non-Medical): No   Physical Activity: Sufficiently Active (1/26/2024)    Exercise Vital Sign     Days of Exercise per Week: 4 days     Minutes of Exercise per Session: 70 min   Stress: No Stress Concern Present (1/26/2024)    [employer] - Occupational Stress Questionnaire     Feeling of Stress : Only a little   Social Connections: Unknown (1/26/2024)    Social Connection and Isolation Panel [NHANES]     Frequency of Communication with Friends and Family: Twice a week     Frequency of Social Gatherings with Friends and Family: Twice a week     Active Member of Clubs or Organizations: Yes     Attends Club or Organization Meetings: More than 4 times per year     Marital Status:    Housing Stability: Low Risk  (1/26/2024)    Housing Stability Vital Sign     Unable to Pay for Housing in the Last Year: No     Number of Places Lived in the Last Year: 1     Unstable Housing in the Last Year: No     MEDICATIONS:     Current Outpatient Medications:     ALPRAZolam (XANAX) 0.25 MG tablet, Take 1 tablet (0.25 mg total) by mouth nightly as needed for Anxiety., Disp: 30 tablet, Rfl: 5    aspirin (ECOTRIN) 81 MG EC tablet, Take 1 tablet (81 mg total) by mouth nightly., Disp: , Rfl: 0    estradioL (ESTRACE) 0.01 % (0.1 mg/gram) vaginal cream, Place 1 g vaginally once daily., Disp: 42.5 g, Rfl: 1    estradioL (IMVEXXY MAINTENANCE PACK) 10 mcg Inst, Place 10 mcg vaginally twice a week., Disp: 8  "each, Rfl: 11    irbesartan (AVAPRO) 150 MG tablet, Take 1 tablet (150 mg total) by mouth every evening., Disp: 90 tablet, Rfl: 3    rosuvastatin (CRESTOR) 10 MG tablet, Take 1 tablet (10 mg total) by mouth once daily., Disp: 90 tablet, Rfl: 3    semaglutide (OZEMPIC) 2 mg/dose (8 mg/3 mL) PnIj, Inject 2 mg into the skin every 7 days., Disp: 3 mL, Rfl: 11    UNABLE TO FIND, medication name: testosterone 1% Gel Apply 1-2 clicks to upper inner thigh as directed, Disp: 30 g, Rfl: 5    zolpidem (AMBIEN) 5 MG Tab, Take 1 tablet (5 mg total) by mouth nightly as needed (insomnia)., Disp: 30 tablet, Rfl: 1    zolpidem (AMBIEN) 5 MG Tab, Take 1 tablet (5 mg total) by mouth nightly as needed for insomnia., Disp: 30 tablet, Rfl: 1    ALLERGIES:   Review of patient's allergies indicates:   Allergen Reactions    Aspirin Nausea Only     Can take low dose of Aspirin; can take buffered low dose aspirin only    Codeine Nausea Only     Can take Codeine if she takes a dose of Zofran with it        PHYSICAL EXAMINATION:  BP 94/68   Pulse 75   Ht 5' 8" (1.727 m)   Wt 85.6 kg (188 lb 11.2 oz)   BMI 28.69 kg/m²   General: Well-developed well-nourished 63 y.o. femalein no acute distress   Cardiovascular: Regular rhythm by palpation of distal pulse, normal color and temperature, no concerning varicosities on symptomatic side   Lungs: No labored breathing or wheezing appreciated   Neuro: Alert and oriented ×3   Psychiatric: well oriented to person, place and time, demonstrates normal mood and affect   Skin: No rashes, lesions or ulcers, normal temperature, turgor, and texture on involved extremity    Ortho/SPM Exam  Examination of the right foot/ankle demonstrates some bony prominence along the calcaneal insertion of the Achilles.  Pain to palpation over the Achilles insertion.  Sensitivity to touch.  Bony prominence.  No tenderness along the midsubstance of the Achilles.  Appropriate gastroc soleus strength and bulk.  Able to perform a " double and single heel rise without much pain.  Good Achilles stretch.  Cavus arch bilaterally.  Intact posterior tibialis and peroneal strength.  No pain along the medial calcaneal tubercle.  No pain over the plantar fat pad.  Negative plantar fascial stretch.    IMAGING:  X-rays axial and lateral views of the right calcaneus show Achilles insertional bone spurring with Benoit's deformity.    ASSESSMENT:      ICD-10-CM ICD-9-CM   1. Insertional Achilles tendinopathy  M76.60 726.71   2. Benoit's deformity of right heel  M92.61 732.5   3. Heel pain, chronic, right  M79.671 729.5    G89.29 338.29       PLAN:     Findings discussed with the patient.  Diagnosis of insertional Achilles tendinopathy with bone spurring and Benoit's deformity.  Symptomatic.  Discussed treatment options.  Discussed shoe wear modification and padding around the area.  I will get her in to work with our physical therapy team to set up a home maintenance program.  Can consider referral to a foot and ankle specialist should she fail conservative treatment.  All questions answered.    Procedures

## 2024-02-04 DIAGNOSIS — G47.00 INSOMNIA, UNSPECIFIED TYPE: ICD-10-CM

## 2024-02-05 RX ORDER — ZOLPIDEM TARTRATE 5 MG/1
5 TABLET ORAL NIGHTLY PRN
Qty: 30 TABLET | Refills: 1 | Status: SHIPPED | OUTPATIENT
Start: 2024-02-05 | End: 2024-04-15

## 2024-02-15 ENCOUNTER — CLINICAL SUPPORT (OUTPATIENT)
Dept: INFECTIOUS DISEASES | Facility: CLINIC | Age: 64
End: 2024-02-15
Payer: COMMERCIAL

## 2024-02-15 DIAGNOSIS — I25.84 CORONARY ARTERY DISEASE DUE TO CALCIFIED CORONARY LESION: Primary | ICD-10-CM

## 2024-02-15 DIAGNOSIS — I25.10 CORONARY ARTERY DISEASE DUE TO CALCIFIED CORONARY LESION: Primary | ICD-10-CM

## 2024-02-15 PROCEDURE — 90651 9VHPV VACCINE 2/3 DOSE IM: CPT | Mod: S$GLB,,, | Performed by: INTERNAL MEDICINE

## 2024-02-15 PROCEDURE — 90472 IMMUNIZATION ADMIN EACH ADD: CPT | Mod: S$GLB,,, | Performed by: INTERNAL MEDICINE

## 2024-02-15 PROCEDURE — 90471 IMMUNIZATION ADMIN: CPT | Mod: S$GLB,,, | Performed by: INTERNAL MEDICINE

## 2024-02-15 PROCEDURE — 90750 HZV VACC RECOMBINANT IM: CPT | Mod: S$GLB,,, | Performed by: INTERNAL MEDICINE

## 2024-02-15 NOTE — PROGRESS NOTES
Patient received Shingrix #2 IM to the right deltoid.  And also HPV #3 IM to the left deltoid.  Tolerated well and left in NAD

## 2024-02-28 NOTE — PROGRESS NOTES
Please order screening mamm     The patient location is: [place]  The chief complaint leading to consultation is: CACS    Visit type: audiovisual    Face to Face time with patient: 30 minutes of total time spent on the encounter, which includes face to face time and non-face to face time preparing to see the patient (eg, review of tests), Obtaining and/or reviewing separately obtained history, Documenting clinical information in the electronic or other health record, Independently interpreting results (not separately reported) and communicating results to the patient/family/caregiver, or Care coordination (not separately reported).     Each patient to whom he or she provides medical services by telemedicine is:  (1) informed of the relationship between the physician and patient and the respective role of any other health care provider with respect to management of the patient; and (2) notified that he or she may decline to receive medical services by telemedicine and may withdraw from such care at any time.    Notes:          Cardiology Clinic Note  Reason for Visit: CACS    HPI:     Josefina Blue is a 63 y.o. F, who presents for CACS.    She recently had a coronary artery calcium score performed. Agatston 262, which is 90th - 100th percentile for females her age.  On my review, coronary calcification is predominantly in the mid LAD with mild calcification in LCx/OM. No aortic calcification.    She plays tennis regularly. Now exercising with  a few times per week.  Was on irbe 75 as well as HCTZ 12.5mg qd with triamterene 37.5mg qd.  Changed to irbe-HCTZ. Now 110s/60-70s on days without exercise..  Now, she has been having trouble with playing tennis due to low BP (90/58). Gets weak.  She has recently lost 40lb through exercise, ozempic.    She has leg cramps with pravastatin 5mg qd. Now on 10mg qd for a year.  She is taking red yeast rice.    Medical: HTN, HLD, SCC of tongue  Surgical: Reviewed, as below.  Family: Reviewed, as below. Father  with heart disease. Brother with heart disease.  Social: Reviewed, as below. Never smoked. Chief nursing officer for [employer]    ROS:    Pertinent ROS included in HPI and below.  PMH:     Past Medical History:   Diagnosis Date    DDD (degenerative disc disease), lumbar 10/7/2019    Hyperlipidemia 8/31/2015    Hypertension     IFG (impaired fasting glucose) 8/31/2015    Neuropathic pain 8/31/2015    Squamous cell cancer of tongue 2012     Past Surgical History:   Procedure Laterality Date    CHOLECYSTECTOMY      COLONOSCOPY N/A 06/14/2021    Procedure: COLONOSCOPY;  Surgeon: Gentry Dickson MD;  Location: [facility] ENDO (4TH FLR);  Service: Endoscopy;  Laterality: N/A;  covid test 6/11 primary care, instructions sent to [facility] 6/11 Pt. fully vaccinated. Completed in Jan. 2021.[facility]    GALLBLADDER SURGERY  06/2016    HYSTERECTOMY      JOINT REPLACEMENT  2020    KNEE ARTHROPLASTY Left 07/08/2020    Procedure: ARTHROPLASTY, KNEE;  Surgeon: GLENN Whyte MD;  Location: [facility] OR;  Service: Orthopedics;  Laterality: Left;  regional w/catheter   Spinal, Adductor  Cloidine/Epi/Ketorolac/Ropivacaine Injection 30cc      KNEE ARTHROSCOPY Right     for torn meniscus    TONGUE SURGERY      TOTAL KNEE ARTHROPLASTY Right 11/11/2020    Procedure: ARTHROPLASTY, KNEE, TOTAL;  Surgeon: GLENN Whyte MD;  Location: [facility] OR;  Service: Orthopedics;  Laterality: Right;  outpatient with 23hr observation  regional with cathter- spinal and adductor    TRANSFORAMINAL EPIDURAL INJECTION OF STEROID Bilateral 09/19/2019    Procedure: Injection,steroid,epidural,transforaminal approach LUMBAR TRANSFORAMINAL BILATERAL L4/5 TF NOE;  Surgeon: Tim Kerns MD;  Location: [facility] PAIN T;  Service: Pain Management;  Laterality: Bilateral;  NEEDS CONSENT, VIP    TRANSFORAMINAL EPIDURAL INJECTION OF STEROID Bilateral 10/07/2019    Procedure: LUMBAR TRANSFORAMINAL BILATERAL L4/5 TF NOE;  Surgeon: Tim Kerns MD;  Location: [facility] PAIN T;  Service:  Pain Management;  Laterality: Bilateral;  NEEDS CONSENT, **VIP**    TRANSFORAMINAL EPIDURAL INJECTION OF STEROID Bilateral 03/22/2021    Procedure: LUMBAR TRANSFORAMINAL BILATERAL L4/5 DIRECT REFERRAL;  Surgeon: Tim Kerns MD;  Location: [facility] PAIN MGT;  Service: Pain Management;  Laterality: Bilateral;  NEEDS CONSENT, URGENT  *VIP*    TRANSFORAMINAL EPIDURAL INJECTION OF STEROID Right 05/10/2021    Procedure: LUMBAR TRANSFORAMINAL RIGHT L4/5, L5/S1 DIRECT REFERRAL;  Surgeon: Tim Kerns MD;  Location: [facility] PAIN MGT;  Service: Pain Management;  Laterality: Right;  NEEDS CONSENT, MEDICALLY URGENT  **VIP**     Allergies:     Review of patient's allergies indicates:   Allergen Reactions    Aspirin Nausea Only     Can take low dose of Aspirin; can take buffered low dose aspirin only    Codeine Nausea Only     Can take Codeine if she takes a dose of Zofran with it     Medications:     Current Outpatient Medications:     albuterol (VENTOLIN HFA) 90 mcg/actuation inhaler, Inhale 2 puffs into the lungs every 6 (six) hours as needed for Wheezing. Rescue, Disp: 18 g, Rfl: 0    ALPRAZolam (XANAX) 0.25 MG tablet, Take 1 tablet (0.25 mg total) by mouth nightly as needed for Anxiety., Disp: 30 tablet, Rfl: 5    aspirin (ECOTRIN) 81 MG EC tablet, Take 1 tablet (81 mg total) by mouth nightly., Disp: , Rfl: 0    estradioL (ESTRACE) 0.01 % (0.1 mg/gram) vaginal cream, Place 1 g vaginally once daily., Disp: 42.5 g, Rfl: 1    estradioL (IMVEXXY MAINTENANCE PACK) 10 mcg Inst, Place 10 mcg vaginally twice a week., Disp: 8 each, Rfl: 11    irbesartan-hydrochlorothiazide (AVALIDE) 150-12.5 mg per tablet, Take 1 tablet by mouth once daily., Disp: 30 tablet, Rfl: 11    pravastatin (PRAVACHOL) 10 MG tablet, Take 1 tablet (10 mg total) by mouth once daily., Disp: 90 tablet, Rfl: 3    semaglutide (OZEMPIC) 2 mg/dose (8 mg/3 mL) PnIj, Inject 2 mg into the skin every 7 days., Disp: 3 mL, Rfl: 11    UNABLE TO FIND, medication name: testosterone  1% Gel Apply 1-2 clicks to upper inner thigh as directed, Disp: 30 g, Rfl: 5    zolpidem (AMBIEN) 5 MG Tab, Take 1 tablet (5 mg total) by mouth nightly as needed (insomnia)., Disp: 30 tablet, Rfl: 1    zolpidem (AMBIEN) 5 MG Tab, Take 1 tablet (5 mg total) by mouth nightly as needed for insomnia., Disp: 30 tablet, Rfl: 1   Social History:     Social History     Tobacco Use    Smoking status: Never    Smokeless tobacco: Never   Substance Use Topics    Alcohol use: Yes     Alcohol/week: 3.0 standard drinks of alcohol     Types: 2 Glasses of wine, 1 Drinks containing 0.5 oz of alcohol per week     Comment: once a week     Family History:     Family History   Problem Relation Age of Onset    No Known Problems Paternal Grandfather     No Known Problems Paternal Grandmother     No Known Problems Maternal Grandmother     No Known Problems Maternal Grandfather     Cancer Father 74        prostate, throat, lung- smoker    Hyperlipidemia Father     Diabetes Father     Heart disease Father     Cancer Mother         cervical cancer    Alcohol abuse Mother     Cirrhosis Mother     Drug abuse Brother     Heart disease Brother     Hyperlipidemia Brother     Diabetes Sister     Hypertension Sister     ALS Sister     No Known Problems Maternal Aunt     No Known Problems Maternal Uncle     No Known Problems Paternal Aunt     No Known Problems Paternal Uncle     Amblyopia Neg Hx     Blindness Neg Hx     Cataracts Neg Hx     Glaucoma Neg Hx     Macular degeneration Neg Hx     Retinal detachment Neg Hx     Strabismus Neg Hx     Stroke Neg Hx     Thyroid disease Neg Hx     Breast cancer Neg Hx     Colon cancer Neg Hx     Ovarian cancer Neg Hx      Physical Exam:   There were no vitals taken for this visit.     Physical not exam was not performed due this encounter being a virtual visit.     Labs:     Blood Tests:  Lab Results   Component Value Date     10/30/2023    K 4.0 10/30/2023     10/30/2023    CO2 26 10/30/2023    BUN  14 10/30/2023    CREATININE 0.8 10/30/2023    GLU 94 10/30/2023    HGBA1C 5.4 10/30/2023    MG 2.3 10/30/2023    AST 20 10/30/2023    ALT 18 10/30/2023    ALBUMIN 4.2 10/30/2023    PROT 6.7 10/30/2023    BILITOT 0.6 10/30/2023    WBC 5.50 10/30/2023    HGB 11.3 (L) 10/30/2023    HCT 35.3 (L) 10/30/2023    MCV 89 10/30/2023     10/30/2023    INR 0.9 11/03/2020    TSH 1.092 10/30/2023       Lab Results   Component Value Date    CHOL 154 10/30/2023    HDL 45 10/30/2023    TRIG 108 10/30/2023       Lab Results   Component Value Date    LDLCALC 87.4 10/30/2023       Urine Tests:  Lab Results   Component Value Date    COLORU Yellow 10/30/2023    APPEARANCEUA Hazy (A) 10/30/2023    PHUR 6.0 10/30/2023    SPECGRAV 1.015 10/30/2023    PROTEINUA Negative 10/30/2023    GLUCUA Negative 10/30/2023    KETONESU Negative 10/30/2023    BILIRUBINUA Negative 10/30/2023    OCCULTUA Negative 10/30/2023    NITRITE Negative 10/30/2023    UROBILINOGEN Negative 06/06/2016    LEUKOCYTESUR Negative 10/30/2023       Imaging:     Echocardiogram  None    Stress testing  ARNAV 9/9/20  The stress echo portion of this study is negative for myocardial ischemia.  The ECG portion of this study is negative for myocardial ischemia.  During stress, the following significant arrhythmias were observed: rare PVCs.  Normal left ventricular systolic function. The estimated ejection fraction is 65%.  Normal LV diastolic function.  No wall motion abnormalities.  Normal right ventricular systolic function.  Mild tricuspid regurgitation.  The estimated PA systolic pressure is 19 mmHg.  Normal central venous pressure (3 mmHg).    Treadmill 8/31/15  EKG Conclusions:   1. The EKG portion of this study is negative for ischemia at a moderate workload, and peak heart rate of 155 bpm (98% of predicted).   2. Exercise capacity is average.   3. Blood pressure response to exercise was normal (Presenting BP: 115/76 Peak BP: 173/92).   4. No significant arrhythmias were  present.   5. There were no symptoms of chest discomfort or significant dyspnea throughout the protocol.   6. The Duke treadmill score was 7 suggesting a low probability for future cardiovascular events.     Cath Lab  None    Other  CACS 23  Your total calcium score is 262.  The total calcium score of 262 is between the 90th and 100th percentile for females between ages of 60 and 64.   On my review, coronary calcification is predominantly in the mid LAD, mild in LCx/OM. No aortic calcification.    EK/9/20 - NSR, iRBBB, NSTTA (personally reviewed)    Assessment:     1. Coronary artery disease due to calcified coronary lesion    2. Hypertension, unspecified type    3. Mixed hyperlipidemia        Plan:     Coronary artery disease due to calcified coronary lesion  Mixed hyperlipidemia  Agatston 262 (90-100th percentile) on 23  Active. No angina.    Change pravastatin to rosuvastatin 10mg qd    If able to tolerate, will attempt to titrate   If not able to tolerate, will try atorvastatin    Lipid panel, LP(a), apoB in 3 months    Hypertension  BP at goal but low with exercise  Enrolled in digital HTN, but readings not consistently showing    Stop HCTZ. Continue irbesartan 150mg qd  BP log    Healthy habits    Signed:  Cheko Rodriguez MD  Cardiology     Follow-up:     Future Appointments   Date Time Provider Department Center   2024  8:00 AM Alirio Rodriguez III, MD [facility] CARDIO [facility]   2024  9:10 AM INJECTION, INFECTIOUS DISEASES [facility] ID INJ [facility]   2024  8:00 AM LAB, [facility]   2024  1:30 PM Rosa Toribio PA-C [facility] LETICIA MERLOS [facility]

## 2024-03-06 ENCOUNTER — PATIENT MESSAGE (OUTPATIENT)
Dept: CARDIOLOGY | Facility: CLINIC | Age: 64
End: 2024-03-06
Payer: COMMERCIAL

## 2024-03-06 RX ORDER — IRBESARTAN 75 MG/1
75 TABLET ORAL NIGHTLY
Qty: 90 TABLET | Refills: 3
Start: 2024-03-06 | End: 2024-03-07 | Stop reason: SDUPTHER

## 2024-03-07 RX ORDER — IRBESARTAN 75 MG/1
75 TABLET ORAL NIGHTLY
Qty: 90 TABLET | Refills: 3
Start: 2024-03-07 | End: 2024-03-18 | Stop reason: SDUPTHER

## 2024-03-17 ENCOUNTER — PATIENT MESSAGE (OUTPATIENT)
Dept: CARDIOLOGY | Facility: CLINIC | Age: 64
End: 2024-03-17
Payer: COMMERCIAL

## 2024-03-18 ENCOUNTER — PATIENT MESSAGE (OUTPATIENT)
Dept: SPORTS MEDICINE | Facility: CLINIC | Age: 64
End: 2024-03-18
Payer: COMMERCIAL

## 2024-03-18 RX ORDER — IRBESARTAN 75 MG/1
75 TABLET ORAL NIGHTLY
Qty: 90 TABLET | Refills: 3
Start: 2024-03-18 | End: 2024-03-21

## 2024-03-18 NOTE — PROGRESS NOTES
Spoke c pt. Scheduled f/u R knee appt c BRYN Morse. Confirmed appt date, time, location. Pt will call c additional questions/concerns in interim. Pt expressed understanding & was thankful.

## 2024-03-21 DIAGNOSIS — Z96.651 S/P TOTAL KNEE REPLACEMENT USING CEMENT, RIGHT: ICD-10-CM

## 2024-03-21 DIAGNOSIS — M17.0 BILATERAL PRIMARY OSTEOARTHRITIS OF KNEE: ICD-10-CM

## 2024-03-21 RX ORDER — IRBESARTAN 75 MG/1
75 TABLET ORAL NIGHTLY
Qty: 90 TABLET | Refills: 3 | Status: SHIPPED | OUTPATIENT
Start: 2024-03-21 | End: 2025-03-21

## 2024-03-21 RX ORDER — DIAZEPAM 5 MG/1
5 TABLET ORAL EVERY 12 HOURS PRN
Qty: 20 TABLET | Refills: 0 | Status: SHIPPED | OUTPATIENT
Start: 2024-03-21 | End: 2024-04-23

## 2024-03-25 ENCOUNTER — OFFICE VISIT (OUTPATIENT)
Dept: SPORTS MEDICINE | Facility: CLINIC | Age: 64
End: 2024-03-25
Payer: COMMERCIAL

## 2024-03-25 ENCOUNTER — HOSPITAL ENCOUNTER (OUTPATIENT)
Dept: RADIOLOGY | Facility: HOSPITAL | Age: 64
Discharge: HOME | End: 2024-03-25
Attending: PHYSICIAN ASSISTANT
Payer: COMMERCIAL

## 2024-03-25 VITALS — SYSTOLIC BLOOD PRESSURE: 116 MMHG | DIASTOLIC BLOOD PRESSURE: 72 MMHG | HEART RATE: 81 BPM

## 2024-03-25 DIAGNOSIS — M25.561 RIGHT KNEE PAIN, UNSPECIFIED CHRONICITY: ICD-10-CM

## 2024-03-25 DIAGNOSIS — M70.61 GREATER TROCHANTERIC BURSITIS OF RIGHT HIP: Primary | ICD-10-CM

## 2024-03-25 PROCEDURE — 1160F RVW MEDS BY RX/DR IN RCRD: CPT | Mod: CPTII,S$GLB,, | Performed by: PHYSICIAN ASSISTANT

## 2024-03-25 PROCEDURE — 20610 DRAIN/INJ JOINT/BURSA W/O US: CPT | Mod: RT,S$GLB,, | Performed by: PHYSICIAN ASSISTANT

## 2024-03-25 PROCEDURE — 3078F DIAST BP <80 MM HG: CPT | Mod: CPTII,S$GLB,, | Performed by: PHYSICIAN ASSISTANT

## 2024-03-25 PROCEDURE — 4010F ACE/ARB THERAPY RXD/TAKEN: CPT | Mod: CPTII,S$GLB,, | Performed by: PHYSICIAN ASSISTANT

## 2024-03-25 PROCEDURE — 1159F MED LIST DOCD IN RCRD: CPT | Mod: CPTII,S$GLB,, | Performed by: PHYSICIAN ASSISTANT

## 2024-03-25 PROCEDURE — 99999 PR PBB SHADOW E&M-EST. PATIENT-LVL III: CPT | Mod: PBBFAC,,, | Performed by: PHYSICIAN ASSISTANT

## 2024-03-25 PROCEDURE — 99213 OFFICE O/P EST LOW 20 MIN: CPT | Mod: 25,S$GLB,, | Performed by: PHYSICIAN ASSISTANT

## 2024-03-25 PROCEDURE — 3074F SYST BP LT 130 MM HG: CPT | Mod: CPTII,S$GLB,, | Performed by: PHYSICIAN ASSISTANT

## 2024-03-25 RX ORDER — TRIAMCINOLONE ACETONIDE 40 MG/ML
40 INJECTION, SUSPENSION INTRA-ARTICULAR; INTRAMUSCULAR
Status: COMPLETED | OUTPATIENT
Start: 2024-03-25 | End: 2024-03-25

## 2024-03-25 RX ORDER — CELECOXIB 200 MG/1
200 CAPSULE ORAL 2 TIMES DAILY
Qty: 56 CAPSULE | Refills: 0 | Status: SHIPPED | OUTPATIENT
Start: 2024-03-25 | End: 2024-04-27

## 2024-03-25 RX ORDER — ROPIVACAINE HYDROCHLORIDE 2 MG/ML
2 INJECTION, SOLUTION EPIDURAL; INFILTRATION
Status: COMPLETED | OUTPATIENT
Start: 2024-03-25 | End: 2024-03-25

## 2024-03-25 RX ADMIN — ROPIVACAINE HYDROCHLORIDE 2 ML: 2 INJECTION, SOLUTION EPIDURAL; INFILTRATION at 10:03

## 2024-03-25 RX ADMIN — TRIAMCINOLONE ACETONIDE 40 MG: 40 INJECTION, SUSPENSION INTRA-ARTICULAR; INTRAMUSCULAR at 10:03

## 2024-03-25 NOTE — PROGRESS NOTES
CC: Right hip pain    DATE OF PROCEDURE: 11/11/2020   PROCEDURES PERFORMED:   Right total knee arthroplasty      DATE OF PROCEDURE: 7/8/2020   PROCEDURES PERFORMED:   Left total knee arthroplasty     Interval:  Ms. Rocha presents with R lateral hip pain that radiates down to her mid thigh. Pain is right over greater troch/IT band. Worse with palpation/sleeping on that side. She says this has been bothering her for about 2 weeks but has slowly improved with ice, IT band stretching, and Celebrex. She works out regularly, recently moved. She denies any injury. No groin pain or radicular symptoms.     Previous Hx 1/30/2024:  63 y.o. Female who returns with right heel pain, previously seen for left heel pain in 2021.  Point localizes pain over the posterior Achilles insertion region.  Pain with direct pressure.  Bothersome with playing tennis.  No specific antecedent injury mechanism.  Pain has been present for several weeks.  No numbness or tingling.  No radiation of symptoms.  No pain over the plantar aspect of the heel.  Nothing along the gastroc soleus or Achilles midsubstance.    States the knees are doing great.  No complaints.    Prior Hx 02/25/2021:  Josefina Blue reports to be doing well just under 4 months s/p the above mentioned procedure on the right knee. She had to cancel the original 3 month follow up appointment. Going to PT 2xWeek at the  [place] location. Seeing good progress daily.  Doing very well overall in regards to the her knees and denies significant pain.  Overall much better compared to preop.  She does report a new complaint of left insertional achilles pain for the last 6 weeks with no specific antecedent injury. This is the first malachi she has experiencing this pain. Better with rest.  She has also been taking Celebrex.      REVIEW OF SYSTEMS:   Constitution: Negative. Negative for chills, fever and night sweats.    Hematologic/Lymphatic: Negative for bleeding problem. Does not bruise/bleed  easily.   Skin: Negative for dry skin, itching and rash.   Musculoskeletal: Negative for falls. Positive for right hip pain and muscle weakness.     All other review of symptoms were reviewed and found to be noncontributory.     PAST MEDICAL HISTORY:   Past Medical History:   Diagnosis Date    DDD (degenerative disc disease), lumbar 10/7/2019    Hyperlipidemia 8/31/2015    Hypertension     IFG (impaired fasting glucose) 8/31/2015    Neuropathic pain 8/31/2015    Squamous cell cancer of tongue 2012       PAST SURGICAL HISTORY:   Past Surgical History:   Procedure Laterality Date    CHOLECYSTECTOMY      COLONOSCOPY N/A 06/14/2021    Procedure: COLONOSCOPY;  Surgeon: Gentry Dickson MD;  [facility]  Service: Endoscopy;  Laterality: N/A;  covid test 6/11 primary care, instructions sent to [facility]. 6/11 Pt. fully vaccinated. Completed in Jan. 2021.[facility]    GALLBLADDER SURGERY  06/2016    HYSTERECTOMY      JOINT REPLACEMENT  2020    KNEE ARTHROPLASTY Left 07/08/2020    Procedure: ARTHROPLASTY, KNEE;  Surgeon: GLENN Whyte MD;  Location: [facility] OR;  Service: Orthopedics;  Laterality: Left;  regional w/catheter   Spinal, Adductor  Cloidine/Epi/Ketorolac/Ropivacaine Injection 30cc      KNEE ARTHROSCOPY Right     for torn meniscus    TONGUE SURGERY      TOTAL KNEE ARTHROPLASTY Right 11/11/2020    Procedure: ARTHROPLASTY, KNEE, TOTAL;  Surgeon: GLENN Whyte MD;  Location: [facility] OR;  Service: Orthopedics;  Laterality: Right;  outpatient with 23hr observation  regional with cathter- spinal and adductor    TRANSFORAMINAL EPIDURAL INJECTION OF STEROID Bilateral 09/19/2019    Procedure: Injection,steroid,epidural,transforaminal approach LUMBAR TRANSFORAMINAL BILATERAL L4/5 TF NOE;  Surgeon: Tim Kerns MD;  Location: [facility]IN [facility]GT;  Service: Pain Management;  Laterality: Bilateral;  NEEDS CONSENT, VIP    TRANSFORAMINAL EPIDURAL INJECTION OF STEROID Bilateral 10/07/2019    Procedure: LUMBAR  TRANSFORAMINAL BILATERAL L4/5 TF NOE;  Surgeon: Tim Kerns MD;  Location: [facility];  Service: Pain Management;  Laterality: Bilateral;  NEEDS CONSENT, **VIP**    TRANSFORAMINAL EPIDURAL INJECTION OF STEROID Bilateral 03/22/2021    Procedure: LUMBAR TRANSFORAMINAL BILATERAL L4/5 DIRECT REFERRAL;  Surgeon: Tim Kerns MD;  Location: [facility];  Service: Pain Management;  Laterality: Bilateral;  NEEDS CONSENT, URGENT  *VIP*    TRANSFORAMINAL EPIDURAL INJECTION OF STEROID Right 05/10/2021    Procedure: LUMBAR TRANSFORAMINAL RIGHT L4/5, L5/S1 DIRECT REFERRAL;  Surgeon: Tim Kerns MD;  Location: [facility];  Service: Pain Management;  Laterality: Right;  NEEDS CONSENT, MEDICALLY URGENT  **VIP**     FAMILY HISTORY:   Family History   Problem Relation Age of Onset    No Known Problems Paternal Grandfather     No Known Problems Paternal Grandmother     No Known Problems Maternal Grandmother     No Known Problems Maternal Grandfather     Cancer Father 74        prostate, throat, lung- smoker    Hyperlipidemia Father     Diabetes Father     Heart disease Father     Cancer Mother         cervical cancer    Alcohol abuse Mother     Cirrhosis Mother     Drug abuse Brother     Heart disease Brother     Hyperlipidemia Brother     Diabetes Sister     Hypertension Sister     ALS Sister     No Known Problems Maternal Aunt     No Known Problems Maternal Uncle     No Known Problems Paternal Aunt     No Known Problems Paternal Uncle     Amblyopia Neg Hx     Blindness Neg Hx     Cataracts Neg Hx     Glaucoma Neg Hx     Macular degeneration Neg Hx     Retinal detachment Neg Hx     Strabismus Neg Hx     Stroke Neg Hx     Thyroid disease Neg Hx     Breast cancer Neg Hx     Colon cancer Neg Hx     Ovarian cancer Neg Hx      SOCIAL HISTORY:   Social History     Socioeconomic History    Marital status:    Occupational History    Occupation: chief nursing officer   Tobacco Use    Smoking status: Never    Smokeless  tobacco: Never   Substance and Sexual Activity    Alcohol use: Yes     Alcohol/week: 3.0 standard drinks of alcohol     Types: 2 Glasses of wine, 1 Drinks containing 0.5 oz of alcohol per week     Comment: once a week    Drug use: No    Sexual activity: Yes     Partners: Male     Birth control/protection: Post-menopausal, See Surgical Hx, None   Social History Narrative    Lives with  and 1 dog     Social Determinants of Health     Financial Resource Strain: Low Risk  (1/26/2024)    Overall Financial Resource Strain (CARDIA)     Difficulty of Paying Living Expenses: Not hard at all   Food Insecurity: No Food Insecurity (1/26/2024)    Hunger Vital Sign     Worried About Running Out of Food in the Last Year: Never true     Ran Out of Food in the Last Year: Never true   Transportation Needs: No Transportation Needs (1/26/2024)    PRAPARE - Transportation     Lack of Transportation (Medical): No     Lack of Transportation (Non-Medical): No   Physical Activity: Sufficiently Active (1/26/2024)    Exercise Vital Sign     Days of Exercise per Week: 4 days     Minutes of Exercise per Session: 70 min   Stress: No Stress Concern Present (1/26/2024)    [employer] [place] [employer] - Occupational Stress Questionnaire     Feeling of Stress : Only a little   Social Connections: Unknown (1/26/2024)    Social Connection and Isolation Panel [NHANES]     Frequency of Communication with Friends and Family: Twice a week     Frequency of Social Gatherings with Friends and Family: Twice a week     Active Member of Clubs or Organizations: Yes     Attends Club or Organization Meetings: More than 4 times per year     Marital Status:    Housing Stability: Low Risk  (1/26/2024)    Housing Stability Vital Sign     Unable to Pay for Housing in the Last Year: No     Number of Places Lived in the Last Year: 1     Unstable Housing in the Last Year: No     MEDICATIONS:     Current Outpatient Medications:     ALPRAZolam  (XANAX) 0.25 MG tablet, Take 1 tablet (0.25 mg total) by mouth nightly as needed for Anxiety., Disp: 30 tablet, Rfl: 5    aspirin (ECOTRIN) 81 MG EC tablet, Take 1 tablet (81 mg total) by mouth nightly., Disp: , Rfl: 0    diazePAM (VALIUM) 5 MG tablet, Take 1 tablet (5 mg total) by mouth every 12 (twelve) hours as needed (s/p total knee, prescribed to help her sleep.)., Disp: 20 tablet, Rfl: 0    estradioL (ESTRACE) 0.01 % (0.1 mg/gram) vaginal cream, Place 1 g vaginally once daily., Disp: 42.5 g, Rfl: 1    estradioL (IMVEXXY MAINTENANCE PACK) 10 mcg Inst, Place 10 mcg vaginally twice a week., Disp: 8 each, Rfl: 11    irbesartan (AVAPRO) 75 MG tablet, Take 1 tablet (75 mg total) by mouth every evening., Disp: 90 tablet, Rfl: 3    rosuvastatin (CRESTOR) 10 MG tablet, Take 1 tablet (10 mg total) by mouth once daily., Disp: 90 tablet, Rfl: 3    semaglutide (OZEMPIC) 2 mg/dose (8 mg/3 mL) PnIj, Inject 2 mg into the skin every 7 days., Disp: 3 mL, Rfl: 11    zolpidem (AMBIEN) 5 MG Tab, Take 1 tablet (5 mg total) by mouth nightly as needed for insomnia., Disp: 30 tablet, Rfl: 1    zolpidem (AMBIEN) 5 MG Tab, Take 1 tablet (5 mg total) by mouth nightly as needed (insomnia)., Disp: 30 tablet, Rfl: 1    zolpidem (AMBIEN) 5 MG Tab, Take 1 tablet (5 mg total) by mouth nightly as needed for insomnia., Disp: 30 tablet, Rfl: 1    celecoxib (CELEBREX) 200 MG capsule, Take 1 capsule (200 mg total) by mouth 2 (two) times daily., Disp: 56 capsule, Rfl: 0    UNABLE TO FIND, medication name: testosterone 1% Gel Apply 1-2 clicks to upper inner thigh as directed, Disp: 30 g, Rfl: 5    ALLERGIES:   Review of patient's allergies indicates:   Allergen Reactions    Aspirin Nausea Only     Can take low dose of Aspirin; can take buffered low dose aspirin only    Codeine Nausea Only     Can take Codeine if she takes a dose of Zofran with it        PHYSICAL EXAMINATION:  /72   Pulse 81   General: Well-developed well-nourished 63 y.o.  femalein no acute distress   Cardiovascular: Regular rhythm by palpation of distal pulse, normal color and temperature, no concerning varicosities on symptomatic side   Lungs: No labored breathing or wheezing appreciated   Neuro: Alert and oriented ×3   Psychiatric: well oriented to person, place and time, demonstrates normal mood and affect   Skin: No rashes, lesions or ulcers, normal temperature, turgor, and texture on involved extremity    General    Vitals reviewed.  Constitutional: She is oriented to person, place, and time. She appears well-developed. No distress.   HENT:   Head: Normocephalic and atraumatic.   Eyes: EOM are normal. Pupils are equal, round, and reactive to light.   Cardiovascular:  Normal rate and intact distal pulses.            Pulmonary/Chest: Effort normal. No respiratory distress.   Abdominal: Soft. She exhibits no distension.   Neurological: She is alert and oriented to person, place, and time.   Psychiatric: She has a normal mood and affect. Her behavior is normal.     General Musculoskeletal Exam   Gait: normal         Right Hip Exam     Inspection   Swelling: absent  No deformity of hip.  Quadriceps Atrophy:  Negative    Tenderness   The patient tender to palpation of the trochanteric bursa.    Range of Motion   Abduction:  normal   Adduction:  normal   Extension:  normal   Flexion:  normal   External rotation:  normal   Internal rotation:  normal     Tests   Pain w/ forced internal rotation (CARLOS): absent  Pain w/ forced external rotation (FADIR): absent  Stinchfield test: negative  Log Roll: negative    Comments:  Point tender to palpation over greater troch and IT radiating to about mid thigh. No knee pain.      Muscle Strength   Right Lower Extremity   Hip Abduction: 5/5   Hip Adduction: 5/5   Hip Flexion: 5/5   Ankle Dorsiflexion:  5/5     Vascular Exam       Edema  Right Upper Leg: absent    IMAGING:  The knee is not bothering her today so we will defer films of the knee.      ASSESSMENT:      ICD-10-CM ICD-9-CM   1. Greater trochanteric bursitis of right hip  M70.61 726.5       PLAN:     Findings discussed with the patient.  Diagnosis of right greater troch bursitis.  Symptomatic.  Discussed treatment options. She is slowly but surly improving with conservative treatment but is interested in CSI which I will do today in clinic.  -We have discussed a variety of treatment options including medications, injections, physical therapy and other alternative treatments. Pt is requesting CSI and HEP at this time.    I made the decision to obtain old records of the patient including previous notes and imaging. I independently reviewed and interpreted lab results today as well as prior imaging.     1. Injection Procedure  A time out was performed, including verification of patient ID, procedure, site and side, availability of information and equipment, review of safety issues, and agreement with consent, the procedure site was marked.    After time out was performed, the patient was prepped aseptically with chloraprep swabsticks. A diagnostic and therapeutic injection of 1:2cc Kenalog/Ropivicaine was given under sterile technique using a 22g x 1.5 needle from the Superolateral  aspect of the right Greater trochanteric bursa in the lateral position.      Josefina Blue had no adverse reactions to the medication. Pain decreased. She was instructed to apply ice to the joint for 20 minutes and avoid strenuous activities for 24-36 hours following the injection. She was warned of possible blood sugar and/or blood pressure changes during that time. Following that time, she can resume regular activities.    She was reminded to call the clinic immediately for any adverse side effects as explained in clinic today.    2. Celebrex 200mg 1 time daily PRN for pain management. Patient understands to take with food and/or OTC prilosec to decrease GI side effects.  3. HEP for IT band stretching.  4. Ice  compress to the affected area 2-3x a day for 15-20 minutes as needed for pain management.  5. RTC to see me prn    All of the patient's questions were answered and the patient will contact us if they have any questions or concerns in the interim.

## 2024-04-04 ENCOUNTER — PATIENT MESSAGE (OUTPATIENT)
Dept: NEUROLOGY | Facility: CLINIC | Age: 64
End: 2024-04-04
Payer: COMMERCIAL

## 2024-04-04 ENCOUNTER — PATIENT MESSAGE (OUTPATIENT)
Dept: SPORTS MEDICINE | Facility: CLINIC | Age: 64
End: 2024-04-04
Payer: COMMERCIAL

## 2024-04-05 DIAGNOSIS — M76.31 IT BAND SYNDROME, RIGHT: ICD-10-CM

## 2024-04-05 DIAGNOSIS — M70.61 GREATER TROCHANTERIC BURSITIS OF RIGHT HIP: Primary | ICD-10-CM

## 2024-04-05 RX ORDER — PREDNISONE 20 MG/1
60 TABLET ORAL DAILY
Qty: 9 TABLET | Refills: 0 | Status: SHIPPED | OUTPATIENT
Start: 2024-04-05 | End: 2024-04-08

## 2024-04-12 ENCOUNTER — PATIENT MESSAGE (OUTPATIENT)
Dept: ORTHOPEDICS | Facility: CLINIC | Age: 64
End: 2024-04-12
Payer: COMMERCIAL

## 2024-04-15 ENCOUNTER — OFFICE VISIT (OUTPATIENT)
Dept: OPTOMETRY | Facility: CLINIC | Age: 64
End: 2024-04-15
Payer: COMMERCIAL

## 2024-04-15 ENCOUNTER — HOSPITAL ENCOUNTER (OUTPATIENT)
Dept: RADIOLOGY | Facility: HOSPITAL | Age: 64
Discharge: HOME | End: 2024-04-15
Attending: PHYSICIAN ASSISTANT
Payer: COMMERCIAL

## 2024-04-15 DIAGNOSIS — H52.4 MYOPIA OF BOTH EYES WITH ASTIGMATISM AND PRESBYOPIA: Primary | ICD-10-CM

## 2024-04-15 DIAGNOSIS — H52.13 MYOPIA OF BOTH EYES WITH ASTIGMATISM AND PRESBYOPIA: Primary | ICD-10-CM

## 2024-04-15 DIAGNOSIS — Z98.890 S/P SPINAL SURGERY: ICD-10-CM

## 2024-04-15 DIAGNOSIS — Z97.3 WEARS CONTACT LENSES: ICD-10-CM

## 2024-04-15 DIAGNOSIS — H52.203 MYOPIA OF BOTH EYES WITH ASTIGMATISM AND PRESBYOPIA: Primary | ICD-10-CM

## 2024-04-15 PROCEDURE — 92014 COMPRE OPH EXAM EST PT 1/>: CPT | Mod: ,,, | Performed by: OPTOMETRIST

## 2024-04-15 PROCEDURE — 72100 X-RAY EXAM L-S SPINE 2/3 VWS: CPT | Mod: TC

## 2024-04-15 PROCEDURE — 92310 CONTACT LENS FITTING OU: CPT | Mod: CSM,,, | Performed by: OPTOMETRIST

## 2024-04-15 PROCEDURE — 72100 X-RAY EXAM L-S SPINE 2/3 VWS: CPT | Mod: 26,,, | Performed by: RADIOLOGY

## 2024-04-15 PROCEDURE — 99999 PR PBB SHADOW E&M-EST. PATIENT-LVL III: CPT | Mod: PBBFAC,,, | Performed by: OPTOMETRIST

## 2024-04-15 PROCEDURE — 92015 DETERMINE REFRACTIVE STATE: CPT | Mod: ,,, | Performed by: OPTOMETRIST

## 2024-04-16 ENCOUNTER — OFFICE VISIT (OUTPATIENT)
Dept: ORTHOPEDICS | Facility: CLINIC | Age: 64
End: 2024-04-16
Payer: COMMERCIAL

## 2024-04-16 DIAGNOSIS — M43.16 SPONDYLOLISTHESIS OF LUMBAR REGION: Primary | ICD-10-CM

## 2024-04-16 DIAGNOSIS — M48.062 SPINAL STENOSIS OF LUMBAR REGION WITH NEUROGENIC CLAUDICATION: ICD-10-CM

## 2024-04-16 PROCEDURE — 4010F ACE/ARB THERAPY RXD/TAKEN: CPT | Mod: CPTII,S$GLB,, | Performed by: PHYSICIAN ASSISTANT

## 2024-04-16 PROCEDURE — 99213 OFFICE O/P EST LOW 20 MIN: CPT | Mod: S$GLB,,, | Performed by: PHYSICIAN ASSISTANT

## 2024-04-16 RX ORDER — METHYLPREDNISOLONE 4 MG/1
TABLET ORAL
Qty: 21 TABLET | Refills: 0 | Status: SHIPPED | OUTPATIENT
Start: 2024-04-16

## 2024-04-16 NOTE — PROGRESS NOTES
DATE: 4/16/2024  PATIENT: Josefina Blue    Attending Physician: Jonathan Saul M.D.    HISTORY:  Josefina Blue is a 63 y.o. female who returns to me today for follow up.  She was last seen by me 5/24/2021.  She had an NOE 5/10/21 and 3/22/21.  She previously had injections 10/7/2019 and 9/19/2019.  Today she is doing well but notes about 5 weeks ago she starteed having right hip and low backp ain.  She went to sports and they did a greater troch injection that did not provide any relief.  last weekend she was moving furniture  in [place] and her back pain worsened.  she also reports hearing clicking in her back. Her pain is somehwat improved with stretching but then returns.     The Patient denies myelopathic symptoms such as handwriting changes or difficulty with buttons/coins/keys. Denies perineal paresthesias, bowel/bladder dysfunction.      EXAM:  There were no vitals taken for this visit.    Physical exam stable. Neuro exam stable.       IMAGING:    Today I personally re- reviewed lumbar films that demonstrate grade I anterolisthesis at L4/5 and L5/S1 disc degeneration.  There is multilevel disc degeneration.    MRI lumbar spine from 2019 shows a right/central disc protrusion at L1/2 resulting in moderate to severe stenosis.  There is a central disc protrusion at L4/5 resulting in severe stenosis and moderate stenosis at L3/4.      There is no height or weight on file to calculate BMI.    Hemoglobin A1C   Date Value Ref Range Status   10/30/2023 5.4 4.0 - 5.6 % Final     Comment:     ADA Screening Guidelines:  5.7-6.4%  Consistent with prediabetes  >or=6.5%  Consistent with diabetes    High levels of fetal hemoglobin interfere with the HbA1C  assay. Heterozygous hemoglobin variants (HbS, HgC, etc)do  not significantly interfere with this assay.   However, presence of multiple variants may affect accuracy.     10/10/2023 5.3 4.0 - 5.6 % Final     Comment:     ADA Screening Guidelines:  5.7-6.4%  Consistent  with prediabetes  >or=6.5%  Consistent with diabetes    High levels of fetal hemoglobin interfere with the HbA1C  assay. Heterozygous hemoglobin variants (HbS, HgC, etc)do  not significantly interfere with this assay.   However, presence of multiple variants may affect accuracy.     03/09/2023 5.5 4.0 - 5.6 % Final     Comment:     ADA Screening Guidelines:  5.7-6.4%  Consistent with prediabetes  >or=6.5%  Consistent with diabetes    High levels of fetal hemoglobin interfere with the HbA1C  assay. Heterozygous hemoglobin variants (HbS, HgC, etc)do  not significantly interfere with this assay.   However, presence of multiple variants may affect accuracy.           ASSESSMENT/PLAN:    There are no diagnoses linked to this encounter.    Medrol dose pack sent to the pharmacy.  If she does not have relief, we will get an MRI.

## 2024-04-16 NOTE — PROGRESS NOTES
HPI    Pt is here today for routine eye exam. Patient denies pain/discomfort.   States that she only has been using one contact OS only for about 6 months   due to feeling that rx was too strong when wearing both. States that   distance va is not as sharp with her one contact. Patient deferred   dilation today but is aware the importance of annual DFE and is agreeable   to scheduling DFE only exam soon.   DLS: 2022  (-)Flashes   (-)Floaters   (-)Diplopia   (-)Headaches   (-)Itching   (-)Tearing  (-)Burning  (-)Dryness   (-)Photophobia  (-)Glare   (-)Blurred VA  Past Eye Sx: (-)  Eye Meds: (-)   Last edited by Mona Garcia, OD on 4/16/2024 11:50 AM.            Assessment /Plan     For exam results, see Encounter Report.    Myopia of both eyes with astigmatism and presbyopia    Wears contact lenses      1.   Eyeglass Final Rx       Eyeglass Final Rx         Sphere Cylinder Axis    Right -1.00 +0.50 135    Left -1.00 +0.50 010      Type: DVO    Expiration Date: 4/15/2025                   2. Updated CL Rx. Initially good comfort and vision. Given CL trials to take home. If happy with comfort and vision of trial lenses, may order annual supply. If issues arise, RTC for CL f/u. Reviewed proper CL care and hygiene. Monitor yearly unless changes noted sooner.      Contact Lens Current Rx       Current Contact Lens Rx (Trial Lens, Dispensed)         Brand Base Curve Diameter Sphere Cylinder Add Dist VA Near VA Centration    Right Dailies Total 1 Multifocal 8.5 14.1 -1.00 Sphere High 20/25 20/25 Well-centered    Left Dailies Total 1 Multifocal 8.50 14.1 -1.00 Sphere High 20/25 20/25 Well-centered        Movement Over-Sphere Over-Cyl Over-Dist VA Over-Near VA    Right Adequate +0.25 Sphere 20/20 20/20    Left Adequate +0.25 Sphere 20/20 20/20              Current Contact Lens Rx #2 (Trial Lens, Dispensed)         Brand Base Curve Diameter Sphere Cylinder Add Dist VA Near VA Centration    Right Dailies Total 1  Multifocal 8.5 14.1 -0.75 Sphere High 20/20 20/20 Well-centered    Left Dailies Total 1 Multifocal 8.50 14.1 -0.75 Sphere High 20/20 20/20 Well-centered        Movement Over-Sphere Over-Cyl Over-Dist VA Over-Near VA    Right Adequate        Left Adequate                       RTC ASAP for DFE and in 1 year for annual eye exam unless changes noted sooner.

## 2024-04-19 ENCOUNTER — PATIENT MESSAGE (OUTPATIENT)
Dept: OPTOMETRY | Facility: CLINIC | Age: 64
End: 2024-04-19
Payer: COMMERCIAL

## 2024-04-23 ENCOUNTER — PATIENT MESSAGE (OUTPATIENT)
Dept: OPTOMETRY | Facility: CLINIC | Age: 64
End: 2024-04-23
Payer: COMMERCIAL

## 2024-04-23 ENCOUNTER — OFFICE VISIT (OUTPATIENT)
Dept: OPTOMETRY | Facility: CLINIC | Age: 64
End: 2024-04-23
Payer: COMMERCIAL

## 2024-04-23 DIAGNOSIS — Z97.3 WEARS CONTACT LENSES: Primary | ICD-10-CM

## 2024-04-23 PROCEDURE — 99499 UNLISTED E&M SERVICE: CPT | Mod: S$GLB,,, | Performed by: OPTOMETRIST

## 2024-04-23 NOTE — PROGRESS NOTES
HPI    Patient is here today for CL fit.   Last edited by Soledad Brownlee on 4/23/2024 10:36 AM.            Assessment /Plan     For exam results, see Encounter Report.    Wears contact lenses      Contact Lens Current Rx       Current Contact Lens Rx (Trial Lens, Dispensed)         Brand Base Curve Diameter Sphere Cylinder Add Dist VA Near VA Centration    Right Dailies Total 1 8.5 14.1 -0.50 Sphere  20/20  Well-centered    Left Dailies Total 1 Multifocal 8.50 14.1 -0.75 Sphere High  J2 Well-centered        Movement    Right Adequate    Left Adequate   After last visit, pt played with the 2 sets of trials dispensed and preferred -0.75/HI in OS only--felt both sets of trials in OD decreased distance vision and distance vision was better left uncorrected.                    Contact Lens Final Rx       Final Contact Lens Rx         Brand Base Curve Diameter Sphere Cylinder Add    Right Dailies Total 1 8.5 14.1 -0.50 Sphere     Left Dailies Total 1 Multifocal 8.50 14.1 -0.75 Sphere High      Expiration Date: 4/23/2025    Replacement: Daily    Solutions: OptiFree PureMoist    Wearing Schedule: Daily Wear

## 2024-05-06 ENCOUNTER — LAB VISIT (OUTPATIENT)
Dept: LAB | Facility: HOSPITAL | Age: 64
End: 2024-05-06
Attending: INTERNAL MEDICINE
Payer: COMMERCIAL

## 2024-05-06 DIAGNOSIS — I10 HYPERTENSION, UNSPECIFIED TYPE: ICD-10-CM

## 2024-05-06 DIAGNOSIS — I25.84 CORONARY ARTERY DISEASE DUE TO CALCIFIED CORONARY LESION: ICD-10-CM

## 2024-05-06 DIAGNOSIS — I25.10 CORONARY ARTERY DISEASE DUE TO CALCIFIED CORONARY LESION: ICD-10-CM

## 2024-05-06 DIAGNOSIS — E78.2 MIXED HYPERLIPIDEMIA: ICD-10-CM

## 2024-05-06 PROCEDURE — 36415 COLL VENOUS BLD VENIPUNCTURE: CPT | Performed by: INTERNAL MEDICINE

## 2024-05-06 PROCEDURE — 83695 ASSAY OF LIPOPROTEIN(A): CPT | Performed by: INTERNAL MEDICINE

## 2024-05-08 ENCOUNTER — PATIENT MESSAGE (OUTPATIENT)
Dept: CARDIOLOGY | Facility: CLINIC | Age: 64
End: 2024-05-08
Payer: COMMERCIAL

## 2024-05-09 ENCOUNTER — TELEPHONE (OUTPATIENT)
Dept: CARDIOLOGY | Facility: CLINIC | Age: 64
End: 2024-05-09
Payer: COMMERCIAL

## 2024-05-09 LAB — LPA SERPL-MCNC: 9 MG/DL (ref 0–30)

## 2024-05-13 ENCOUNTER — PATIENT MESSAGE (OUTPATIENT)
Dept: OPTOMETRY | Facility: CLINIC | Age: 64
End: 2024-05-13
Payer: COMMERCIAL

## 2024-05-13 ENCOUNTER — PATIENT MESSAGE (OUTPATIENT)
Dept: CARDIOLOGY | Facility: CLINIC | Age: 64
End: 2024-05-13
Payer: COMMERCIAL

## 2024-05-24 ENCOUNTER — CLINICAL SUPPORT (OUTPATIENT)
Dept: REHABILITATION | Facility: HOSPITAL | Age: 64
End: 2024-05-24
Payer: COMMERCIAL

## 2024-05-24 DIAGNOSIS — M25.551 RIGHT HIP PAIN: Primary | ICD-10-CM

## 2024-05-24 DIAGNOSIS — R53.1 WEAKNESS: ICD-10-CM

## 2024-05-24 PROCEDURE — 97161 PT EVAL LOW COMPLEX 20 MIN: CPT | Performed by: PHYSICAL THERAPIST

## 2024-05-24 PROCEDURE — 97110 THERAPEUTIC EXERCISES: CPT | Performed by: PHYSICAL THERAPIST

## 2024-05-27 ENCOUNTER — OFFICE VISIT (OUTPATIENT)
Dept: OPTOMETRY | Facility: CLINIC | Age: 64
End: 2024-05-27
Payer: COMMERCIAL

## 2024-05-27 DIAGNOSIS — Z46.0 ENCOUNTER FOR FITTING OR ADJUSTMENT OF SPECTACLES OR CONTACT LENSES: Primary | ICD-10-CM

## 2024-05-27 PROBLEM — M25.551 RIGHT HIP PAIN: Status: ACTIVE | Noted: 2024-05-27

## 2024-05-27 PROBLEM — R53.1 WEAKNESS: Status: ACTIVE | Noted: 2024-05-27

## 2024-05-27 PROCEDURE — 92310 CONTACT LENS FITTING OU: CPT | Mod: CSM,S$GLB,, | Performed by: OPTOMETRIST

## 2024-05-27 PROCEDURE — 99499 UNLISTED E&M SERVICE: CPT | Mod: ,,, | Performed by: OPTOMETRIST

## 2024-05-27 NOTE — PATIENT INSTRUCTIONS
Ms.. Blue in today for contact lens follow-up visit.  She expresses dissatisfaction with the last Dalies Total 1 SCLs prescribed by Dr. Garcia.      Repeated trial fitting.      Dispensed trial supply of Dailies Total 1 SCLs    OD 8.5  14.1  +1.00 near    OS  8.5  14.1  -1.00 distance.  Ms. Blue very happy with binocular distance VA (20/20) and binocular near VA (20/20 or J1).  Good lens fit in each eye, and good comfort in each eye.    Wear new trial lenses for several days, and call/email with report on satisfaction with both distance and near VA, and satisfaction on lens comfort.   If happy with lenses, will finalize the contact lens Rx and send the RX to Ms. Blue.     06/05/2024  Addendum,  Received message from Ms. Blue stating that she feels the +1.00 power is too strong for use in the right eye, and she requests trials of either +0.75 or +0.50 (or both) for use in the right eye.  Left trial supply of both +0.75 lenses and+0.50 lenses for Ms. Blue at the  on the 10th floor.  She was advised to let me know what power she prefers for use in the right eye, so that I can finalize the contact lens Rx  Sergei Coyle, OD

## 2024-05-27 NOTE — PLAN OF CARE
[facility]   Physical Therapy Initial Evaluation      Name: Josefina Blue  Clinic Number: [id number]    Therapy Diagnosis: No diagnosis found.     Physician: Mini Rich*    Physician Orders: PT Eval and Treat   Medical Diagnosis from Referral:   M70.61 (ICD-10-CM) - Greater trochanteric bursitis of right hip   M76.31 (ICD-10-CM) - It band syndrome, right     Evaluation Date: 5/24/2024  Authorization Period Expiration: 4/5/25  Plan of Care Expiration: 12/31/24  Progress Note Due: 6/30/24  Visit # / Visits authorized: 1/ 1   FOTO: 1/1    Precautions: Standard     Time In: 1100  Time Out: 1200  Total Appointment Time (timed & untimed codes): 60 minutes    Subjective     Date of onset: 2-3 months    History of current condition - Josefina reports: pt reports insidious R hip pain for a few months. States pain with be random at times after playing tennis. Unsure of specific movements that cause pain. Locates pain lateral R hip and down lateral R hip. Denies N/T, pain radiating past knee, changes in B/B. Plays tennis, has a  she works with. States that lateral resisted movements can some times be provocative. Denies clicking, popping, giving away.     Falls: none    Imaging: :   EXAMINATION:  XR LUMBAR SPINE AP AND LATERAL     CLINICAL HISTORY:  Other specified postprocedural states     TECHNIQUE:  AP, lateral and spot images were performed of the lumbar spine.     COMPARISON:  No 09/17/2019 ne     FINDINGS:  DJD.  There is a grade 1 L4/L5 anterolisthesis.  There is a few mm of L1/L2 and L2/L3 retrolisthesis.  The L2/L3 and the L5/S1 disc spaces are significantly narrowed.  No acute fracture or dislocation.  No bone destruction identified.     Impression:     See above       Prior Therapy: none  Social History:  lives with their family  Occupation: consultant  Prior Level of Function: independent, pain free   Current Level of Function: limited activities secondary to  pain    Pain:  Current 0/10, worst 4/10, best 0/10   Location: right hip    Description: Aching, Dull, and Sharp  Aggravating Factors: see above  Easing Factors: rest, heat, meds    Patients goals: decrease pain     Medical History:   Past Medical History:   Diagnosis Date    DDD (degenerative disc disease), lumbar 10/7/2019    Hyperlipidemia 8/31/2015    Hypertension     IFG (impaired fasting glucose) 8/31/2015    Neuropathic pain 8/31/2015    Squamous cell cancer of tongue 2012       Surgical History:   Josefina Blue  has a past surgical history that includes Hysterectomy; Tongue surgery; Knee arthroscopy (Right); Cholecystectomy; Gallbladder surgery (06/2016); Transforaminal epidural injection of steroid (Bilateral, 09/19/2019); Transforaminal epidural injection of steroid (Bilateral, 10/07/2019); Knee Arthroplasty (Left, 07/08/2020); Total knee arthroplasty (Right, 11/11/2020); Transforaminal epidural injection of steroid (Bilateral, 03/22/2021); Transforaminal epidural injection of steroid (Right, 05/10/2021); Colonoscopy (N/A, 06/14/2021); and Joint replacement (2020).    Medications:   Josefina has a current medication list which includes the following prescription(s): alprazolam, aspirin, estradiol, imvexxy maintenance pack, irbesartan, methylprednisolone, rosuvastatin, ozempic, UNABLE TO FIND, and zolpidem.    Allergies:   Review of patient's allergies indicates:   Allergen Reactions    Aspirin Nausea Only     Can take low dose of Aspirin; can take buffered low dose aspirin only    Codeine Nausea Only     Can take Codeine if she takes a dose of Zofran with it        Objective      Observation: pt presents in gym. No distress noted at this time    Posture: decrease in lumbar lordosis noted    Hip Range of Motion(*=pain):   Right active Right Passive Left active  Left Passive   Flexion 105 110 105 110   Abduction 40 45 40 45   Extension 10 15 10 15   Ext. Rotation 50 70 50 70   Int. Rotation 25 30 30 35        Lower Extremity Strength  Right LE  Left LE    Quadriceps: 5/5 Quadriceps: 5/5   Hamstrings: 5/5 Hamstrings: 5/5   Iliopsoas: 4/5 Iliopsoas: 4/5   Hip extension:  4/5 Hip extension: 4/5   Hip abduction: 4-/5 Hip abduction: 4-/5   Hip ER: 3+/5 Hip ER: 3+/5   Hip IR: 4-/5 Hip IR: 4-/5     Functional Tests:  Bridge Test: single leg mild rotation and drop noted RLE  SL Squat Test: R: dynamic valgus noted, decrease in depth ; L mild dynamic valgus, depth to 60  SLS EO: increased sway noted randall      Special Tests:   RONY: -  FABERs:  -   Hip Scour: -  SLR -  SLR with cross over + RLE    Flexibility:    Ely's test: R = tight ; L = tight    Hamstrings: R = tight ; L = tight    Meg's test: R =  ; L =    Jose test: R = tight ; L = tight    Proximal/distal screen: limited full knee flexion randall, secondary to knee replacements    Joint Mobility: decrease in R IR    Palpation: tenderness to piriformis, glute med      Limitation/Restriction for FOTO  Survey    Therapist reviewed FOTO scores for Josefina Blue on 5/24/2024.   FOTO documents entered into [employer] - see Media section.    Limitation Score: see media         Treatment     Total Treatment time (time-based codes) separate from Evaluation: 20 minutes      Josefina received the treatments listed below:      therapeutic exercises to develop strength and ROM for 20 minutes including:  Issued hep listed in pt instructions  Sleeping pillow knees  No crossing legs  Latera hip clam isometrics 45 sec 5 x   .      Patient Education and Home Exercises     Education provided:   - reviewed hep    Written Home Exercises Provided: yes. Exercises were reviewed and Josefina was able to demonstrate them prior to the end of the session.  Josefina demonstrated good  understanding of the education provided. See EMR under Patient Instructions for exercises provided during therapy sessions.    Assessment     Josefina is a 63 y.o. female referred to outpatient Physical Therapy with a medical  diagnosis of   M70.61 (ICD-10-CM) - Greater trochanteric bursitis of right hip   M76.31 (ICD-10-CM) - It band syndrome, right   . Patient presents with decreased ROM, weakness, adverse neural tension, decreased functional mobility, possible latera hip tendinopathy. Pt would benefit from skilled PT in order to maximize function.     Patient prognosis is Excellent.   Patient will benefit from skilled outpatient Physical Therapy to address the deficits stated above and in the chart below, provide patient /family education, and to maximize patientt's level of independence.     Plan of care discussed with patient: Yes  Patient's spiritual, cultural and educational needs considered and patient is agreeable to the plan of care and goals as stated below:     Anticipated Barriers for therapy: none    Medical Necessity is demonstrated by the following  History  Co-morbidities and personal factors that may impact the plan of care [x] LOW: no personal factors / co-morbidities  [] MODERATE: 1-2 personal factors / co-morbidities  [] HIGH: 3+ personal factors / co-morbidities     Moderate / High Support Documentation:   Co-morbidities affecting plan of care: NA     Personal Factors:   no deficits      Examination  Body Structures and Functions, activity limitations and participation restrictions that may impact the plan of care [x] LOW: addressing 1-2 elements  [] MODERATE: 3+ elements  [] HIGH: 4+ elements (please support below)     Moderate / High Support Documentation: NA      Clinical Presentation [x] LOW: stable  [] MODERATE: Evolving  [] HIGH: Unstable      Decision Making/ Complexity Score: low         Goals:  Short Term Goals: 6 weeks   Pt independent in initial hep  Pain 0-3/10at worst  Improved strength 4/5 grossly or better  Pt reports 25% improvement in function or better    Long Term Goals: 12 weeks   Pt independent in d/c hep  Pain 0/10  Improved strength 5/5 grossly with no pain  No tenderness to palpation  Pt  reports 90% improvement in function  Pt able to play full game of tennis with no pain.   Plan     Plan of care Certification: 5/24/2024 to 12/31/24.    Outpatient Physical Therapy 1-3 times weekly for 12 weeks to include the following interventions: Manual Therapy, Moist Heat/ Ice, Neuromuscular Re-ed, Self Care, Therapeutic Activities, Therapeutic Exercise, and Dry Needling.     Joseph Saleem, PT

## 2024-05-27 NOTE — PROGRESS NOTES
[facility]   Physical Therapy Initial Evaluation      Name: Josefina Blue  Clinic Number: [id number]    Therapy Diagnosis: No diagnosis found.     Physician: Mini Rich*    Physician Orders: PT Eval and Treat   Medical Diagnosis from Referral:   M70.61 (ICD-10-CM) - Greater trochanteric bursitis of right hip   M76.31 (ICD-10-CM) - It band syndrome, right     Evaluation Date: 5/24/2024  Authorization Period Expiration: 4/5/25  Plan of Care Expiration: 12/31/24  Progress Note Due: 6/30/24  Visit # / Visits authorized: 1/ 1   FOTO: 1/1    Precautions: Standard     Time In: 1100  Time Out: 1200  Total Appointment Time (timed & untimed codes): 60 minutes    Subjective     Date of onset: 2-3 months    History of current condition - Josefina reports: pt reports insidious R hip pain for a few months. States pain with be random at times after playing tennis. Unsure of specific movements that cause pain. Locates pain lateral R hip and down lateral R hip. Denies N/T, pain radiating past knee, changes in B/B. Plays tennis, has a  she works with. States that lateral resisted movements can some times be provocative. Denies clicking, popping, giving away.     Falls: none    Imaging: :   EXAMINATION:  XR LUMBAR SPINE AP AND LATERAL     CLINICAL HISTORY:  Other specified postprocedural states     TECHNIQUE:  AP, lateral and spot images were performed of the lumbar spine.     COMPARISON:  No 09/17/2019 ne     FINDINGS:  DJD.  There is a grade 1 L4/L5 anterolisthesis.  There is a few mm of L1/L2 and L2/L3 retrolisthesis.  The L2/L3 and the L5/S1 disc spaces are significantly narrowed.  No acute fracture or dislocation.  No bone destruction identified.     Impression:     See above       Prior Therapy: none  Social History:  lives with their family  Occupation: consultant  Prior Level of Function: independent, pain free   Current Level of Function: limited activities secondary to  pain    Pain:  Current 0/10, worst 4/10, best 0/10   Location: right hip    Description: Aching, Dull, and Sharp  Aggravating Factors: see above  Easing Factors: rest, heat, meds    Patients goals: decrease pain     Medical History:   Past Medical History:   Diagnosis Date    DDD (degenerative disc disease), lumbar 10/7/2019    Hyperlipidemia 8/31/2015    Hypertension     IFG (impaired fasting glucose) 8/31/2015    Neuropathic pain 8/31/2015    Squamous cell cancer of tongue 2012       Surgical History:   Josefina Blue  has a past surgical history that includes Hysterectomy; Tongue surgery; Knee arthroscopy (Right); Cholecystectomy; Gallbladder surgery (06/2016); Transforaminal epidural injection of steroid (Bilateral, 09/19/2019); Transforaminal epidural injection of steroid (Bilateral, 10/07/2019); Knee Arthroplasty (Left, 07/08/2020); Total knee arthroplasty (Right, 11/11/2020); Transforaminal epidural injection of steroid (Bilateral, 03/22/2021); Transforaminal epidural injection of steroid (Right, 05/10/2021); Colonoscopy (N/A, 06/14/2021); and Joint replacement (2020).    Medications:   Josefina has a current medication list which includes the following prescription(s): alprazolam, aspirin, estradiol, imvexxy maintenance pack, irbesartan, methylprednisolone, rosuvastatin, ozempic, UNABLE TO FIND, and zolpidem.    Allergies:   Review of patient's allergies indicates:   Allergen Reactions    Aspirin Nausea Only     Can take low dose of Aspirin; can take buffered low dose aspirin only    Codeine Nausea Only     Can take Codeine if she takes a dose of Zofran with it        Objective      Observation: pt presents in gym. No distress noted at this time    Posture: decrease in lumbar lordosis noted    Hip Range of Motion(*=pain):   Right active Right Passive Left active  Left Passive   Flexion 105 110 105 110   Abduction 40 45 40 45   Extension 10 15 10 15   Ext. Rotation 50 70 50 70   Int. Rotation 25 30 30 35        Lower Extremity Strength  Right LE  Left LE    Quadriceps: 5/5 Quadriceps: 5/5   Hamstrings: 5/5 Hamstrings: 5/5   Iliopsoas: 4/5 Iliopsoas: 4/5   Hip extension:  4/5 Hip extension: 4/5   Hip abduction: 4-/5 Hip abduction: 4-/5   Hip ER: 3+/5 Hip ER: 3+/5   Hip IR: 4-/5 Hip IR: 4-/5     Functional Tests:  Bridge Test: single leg mild rotation and drop noted RLE  SL Squat Test: R: dynamic valgus noted, decrease in depth ; L mild dynamic valgus, depth to 60  SLS EO: increased sway noted randall      Special Tests:   RONY: -  FABERs:  -   Hip Scour: -  SLR -  SLR with cross over + RLE    Flexibility:    Ely's test: R = tight ; L = tight    Hamstrings: R = tight ; L = tight    Meg's test: R =  ; L =    Jose test: R = tight ; L = tight    Proximal/distal screen: limited full knee flexion randall, secondary to knee replacements    Joint Mobility: decrease in R IR    Palpation: tenderness to piriformis, glute med      Limitation/Restriction for FOTO  Survey    Therapist reviewed FOTO scores for Josefina Blue on 5/24/2024.   FOTO documents entered into [employer] - see Media section.    Limitation Score: see media         Treatment     Total Treatment time (time-based codes) separate from Evaluation: 20 minutes      Josefina received the treatments listed below:      therapeutic exercises to develop strength and ROM for 20 minutes including:  Issued hep listed in pt instructions  Sleeping pillow knees  No crossing legs  Latera hip clam isometrics 45 sec 5 x   .      Patient Education and Home Exercises     Education provided:   - reviewed hep    Written Home Exercises Provided: yes. Exercises were reviewed and Josefina was able to demonstrate them prior to the end of the session.  Josefina demonstrated good  understanding of the education provided. See EMR under Patient Instructions for exercises provided during therapy sessions.    Assessment     Josefina is a 63 y.o. female referred to outpatient Physical Therapy with a medical  diagnosis of   M70.61 (ICD-10-CM) - Greater trochanteric bursitis of right hip   M76.31 (ICD-10-CM) - It band syndrome, right   . Patient presents with decreased ROM, weakness, adverse neural tension, decreased functional mobility, possible latera hip tendinopathy. Pt would benefit from skilled PT in order to maximize function.     Patient prognosis is Excellent.   Patient will benefit from skilled outpatient Physical Therapy to address the deficits stated above and in the chart below, provide patient /family education, and to maximize patientt's level of independence.     Plan of care discussed with patient: Yes  Patient's spiritual, cultural and educational needs considered and patient is agreeable to the plan of care and goals as stated below:     Anticipated Barriers for therapy: none    Medical Necessity is demonstrated by the following  History  Co-morbidities and personal factors that may impact the plan of care [x] LOW: no personal factors / co-morbidities  [] MODERATE: 1-2 personal factors / co-morbidities  [] HIGH: 3+ personal factors / co-morbidities     Moderate / High Support Documentation:   Co-morbidities affecting plan of care: NA     Personal Factors:   no deficits      Examination  Body Structures and Functions, activity limitations and participation restrictions that may impact the plan of care [x] LOW: addressing 1-2 elements  [] MODERATE: 3+ elements  [] HIGH: 4+ elements (please support below)     Moderate / High Support Documentation: NA      Clinical Presentation [x] LOW: stable  [] MODERATE: Evolving  [] HIGH: Unstable      Decision Making/ Complexity Score: low         Goals:  Short Term Goals: 6 weeks   Pt independent in initial hep  Pain 0-3/10at worst  Improved strength 4/5 grossly or better  Pt reports 25% improvement in function or better    Long Term Goals: 12 weeks   Pt independent in d/c hep  Pain 0/10  Improved strength 5/5 grossly with no pain  No tenderness to palpation  Pt  reports 90% improvement in function  Pt able to play full game of tennis with no pain.   Plan     Plan of care Certification: 5/24/2024 to 12/31/24.    Outpatient Physical Therapy 1-3 times weekly for 12 weeks to include the following interventions: Manual Therapy, Moist Heat/ Ice, Neuromuscular Re-ed, Self Care, Therapeutic Activities, Therapeutic Exercise, and Dry Needling .     Joseph Saleem, PT

## 2024-05-28 ENCOUNTER — PATIENT MESSAGE (OUTPATIENT)
Dept: INFECTIOUS DISEASES | Facility: CLINIC | Age: 64
End: 2024-05-28
Payer: COMMERCIAL

## 2024-05-28 DIAGNOSIS — F41.8 SITUATIONAL ANXIETY: ICD-10-CM

## 2024-05-28 RX ORDER — ALPRAZOLAM 0.25 MG/1
0.25 TABLET ORAL NIGHTLY PRN
Qty: 30 TABLET | Refills: 5 | Status: SHIPPED | OUTPATIENT
Start: 2024-05-28

## 2024-05-28 NOTE — PROGRESS NOTES
HPI    Patient in for contact lens follow-up.  Contact lenses patient currently wearing:    Brand                Base Curve         Diameter  Power   OD Dailies Total 1  8.50 14.1 -0.50 Sphere   OS Dailies Total 1 8.50 14.1 -0.50 Sphere     Good lens comfort OD and OS       Using reading glasses over Cls.    Previously wore mutifocal lens in one eye and single vision distance lens   in the other eye. But only for a short time  Previously wore multifocal lens in the left eye only, and no contact lens   in the right eye (for near vA) to achieve a modified monoivison effect.                                                                                    Patient's report on visual acuity with contact lenses:  Distance:  Wearing   OTC reading                                                                                          Near :      Not as sharp     Any problems with Contact Lens comfort?  Amaury feels perfect    Contact lens wearing time (hours/per day): 8 - 10 hrs a day    How long have Contact Lenses been in today?  8 hrs     Does patient sleep with the contact lenses in?  No    Contact lens care system/solution used?  N/A               Last edited by Sergei Coyle, OD on 5/27/2024  4:51 PM.            Assessment /Plan     For exam results, see Encounter Report.  1. Encounter for fitting or adjustment of spectacles or contact lenses                     Ms.. Blue in today for contact lens follow-up visit.  She expresses dissatisfaction with the last Dalies Total 1 SCLs prescribed by Dr. Garcia.      Repeated trial fitting.      Dispensed trial supply of Dailies Total 1 SCLs    OD 8.5  14.1  +1.00 near    OS  8.5  14.1  -1.00 distance.  Ms. Blue very happy with binocular distance VA (20/20) and binocular near VA (20/20 or J1).  Good lens fit in each eye, and good comfort in each eye.    Wear new trial lenses for several days, and call/email with report on satisfaction with both distance and  near VA, and satisfaction on lens comfort.   If happy with lenses, will finalize the contact lens Rx and send the RX to Ms. Blue.     06/05/2024  Addendum,  Received message from Ms. Blue stating that she feels the +1.00 power is too strong for use in the right eye, and she requests trials of either +0.75 or +0.50 (or both) for use in the right eye.  Left trial supply of both +0.75 lenses and+0.50 lenses for Ms. Blue at the  on the 10th floor.  She was advised to let me know what power she prefers for use in the right eye, so that I can finalize the contact lens Rx  Sergei Coyle, OD

## 2024-05-30 ENCOUNTER — CLINICAL SUPPORT (OUTPATIENT)
Dept: REHABILITATION | Facility: HOSPITAL | Age: 64
End: 2024-05-30
Payer: COMMERCIAL

## 2024-05-30 DIAGNOSIS — R53.1 WEAKNESS: ICD-10-CM

## 2024-05-30 DIAGNOSIS — M25.551 RIGHT HIP PAIN: Primary | ICD-10-CM

## 2024-05-30 PROCEDURE — 97110 THERAPEUTIC EXERCISES: CPT | Performed by: PHYSICAL THERAPIST

## 2024-06-02 ENCOUNTER — PATIENT MESSAGE (OUTPATIENT)
Dept: OPTOMETRY | Facility: CLINIC | Age: 64
End: 2024-06-02
Payer: COMMERCIAL

## 2024-06-03 NOTE — PROGRESS NOTES
[facility]   Physical Therapy Treatment Note      Name: Josefina [facility] Number: [id number]    Therapy Diagnosis:   Encounter Diagnoses   Name Primary?    Right hip pain Yes    Weakness      Physician: Mini Rich*    Visit Date: 5/30/2024    Therapy Diagnosis: No diagnosis found.     Physician: Mini Rich*     Physician Orders: PT Eval and Treat   Medical Diagnosis from Referral:   M70.61 (ICD-10-CM) - Greater trochanteric bursitis of right hip   M76.31 (ICD-10-CM) - It band syndrome, right      Evaluation Date: 5/24/2024  Authorization Period Expiration: 4/5/25  Plan of Care Expiration: 12/31/24  Progress Note Due: 6/30/24  Visit # / Visits authorized: 1/ 1   FOTO: 1/1     Precautions: Standard      Time In: 1700  Time Out: 1800  Total Appointment Time (timed & untimed codes): 60 minutes  Subjective     Pt reports: feels a little better. Used pillow at night and has slept better.  She was compliant with home exercise program.  Response to previous treatment: n/a  Functional change: n/a    Pain: 2/10  Location: right hip      Objective      Objective Measures updated at progress report unless specified.     Treatment     Josefina received the treatments listed below:      therapeutic exercises to develop strength ROM for 38 minutes including:  Sidelying iso clam 45 sec on 2 mins off, super set with planks  Prone quad stretch  Prone hip flexor stretch  Time includes reassessment and education    manual therapy techniques:  were applied to the:  for  minutes, including:      neuromuscular re-education activities to improve:  for  minutes. The following activities were included:      therapeutic activities to improve functional performance for   minutes, including:    Patient Education and Home Exercises       Education provided:   - reviewed hep    Written Home Exercises Provided: yes. Exercises were reviewed and Josefina was able to demonstrate them prior to  the end of the session.  Josefina demonstrated good  understanding of the education provided. See EMR under Patient Instructions for exercises provided during therapy sessions    Assessment     Improved hip pain at night. Little to no tenderness to palpation noted today along posterior and lateral hip. Reviewed hep. Will reassess next visit.     Josefina Is progressing well towards her goals.   Pt prognosis is Excellent.     Pt will continue to benefit from skilled outpatient physical therapy to address the deficits listed in the problem list box on initial evaluation, provide pt/family education and to maximize pt's level of independence in the home and community environment.     Pt's spiritual, cultural and educational needs considered and pt agreeable to plan of care and goals.     Anticipated barriers to physical therapy: none  Goals:  Short Term Goals: 6 weeks   Pt independent in initial hep  Pain 0-3/10at worst  Improved strength 4/5 grossly or better  Pt reports 25% improvement in function or better     Long Term Goals: 12 weeks   Pt independent in d/c hep  Pain 0/10  Improved strength 5/5 grossly with no pain  No tenderness to palpation  Pt reports 90% improvement in function  Pt able to play full game of tennis with no pain.   Plan      Plan of care Certification: 5/24/2024 to 12/31/24.     Outpatient Physical Therapy 1-3 times weekly for 12 weeks to include the following interventions: Manual Therapy, Moist Heat/ Ice, Neuromuscular Re-ed, Self Care, Therapeutic Activities, Therapeutic Exercise, and Dry Needling.   Joseph Saleem, PT

## 2024-06-05 ENCOUNTER — DOCUMENTATION ONLY (OUTPATIENT)
Dept: OPHTHALMOLOGY | Facility: CLINIC | Age: 64
End: 2024-06-05
Payer: COMMERCIAL

## 2024-06-05 NOTE — PROGRESS NOTES
Assessment /Plan     For exam results, see Encounter Report.    There are no diagnoses linked to this encounter.  Refractive error OD and OS  Wearing SCL's OU              Refer to notes dated 05/27/2024 06/05/2024  Addendum,  Received message from Ms. Blue stating that she feels the +1.00 power is too strong for use in the right eye, and she requests trials of either +0.75 or +0.50 (or both) for use in the right eye.  Left trial supply of both +0.75 lenses and+0.50 lenses for Ms. Blue at the  on the 10th floor.  She was advised to let me know what power she prefers for use in the right eye, so that I can finalize the contact lens Rx  Sergei Coyle, OD

## 2024-06-13 ENCOUNTER — DOCUMENTATION ONLY (OUTPATIENT)
Dept: OPHTHALMOLOGY | Facility: CLINIC | Age: 64
End: 2024-06-13
Payer: COMMERCIAL

## 2024-06-13 NOTE — PROGRESS NOTES
Assessment /Plan     For exam results, see Encounter Report.    There are no diagnoses linked to this encounter.  Refractive error OU  Contact lens OU           Refer to previous optometry encounter notes dated 05/27/2024 ans subsequent documentation.  Received message from patient stating that she is happy with the last trial lenses dispensed to her (+0.75 in OD and -1.00 in OS, and she requests the CL Rx.  Plan:  New CL RX:  Dailies Total 1 SCLs          OD   8.5  14.1  +0.75  near        OS   8.5   14.1   -1.00  distance                   Daily wear.  Clean and soak daily.  Replace daily  Will enter new CL Rx into record dated 05/27/2024, and will send Rx to patient as she requests.     Sergei Coyle, OD

## 2024-06-23 ENCOUNTER — PATIENT MESSAGE (OUTPATIENT)
Dept: OPTOMETRY | Facility: CLINIC | Age: 64
End: 2024-06-23
Payer: COMMERCIAL

## 2024-06-25 ENCOUNTER — LAB VISIT (OUTPATIENT)
Dept: LAB | Facility: HOSPITAL | Age: 64
End: 2024-06-25
Payer: COMMERCIAL

## 2024-06-25 DIAGNOSIS — N95.1 MENOPAUSAL SYMPTOMS: ICD-10-CM

## 2024-06-25 LAB — TESTOST SERPL-MCNC: 21 NG/DL (ref 5–73)

## 2024-06-25 PROCEDURE — 84403 ASSAY OF TOTAL TESTOSTERONE: CPT | Performed by: PHYSICIAN ASSISTANT

## 2024-06-25 PROCEDURE — 36415 COLL VENOUS BLD VENIPUNCTURE: CPT | Performed by: PHYSICIAN ASSISTANT

## 2024-06-25 PROCEDURE — 84402 ASSAY OF FREE TESTOSTERONE: CPT | Performed by: PHYSICIAN ASSISTANT

## 2024-06-27 ENCOUNTER — PATIENT MESSAGE (OUTPATIENT)
Dept: INFECTIOUS DISEASES | Facility: CLINIC | Age: 64
End: 2024-06-27
Payer: COMMERCIAL

## 2024-06-27 RX ORDER — TRETINOIN 0.5 MG/G
CREAM TOPICAL NIGHTLY
Qty: 45 G | Refills: 11 | Status: SHIPPED | OUTPATIENT
Start: 2024-06-27

## 2024-06-28 LAB — TESTOST FREE SERPL-MCNC: 0.6 PG/ML

## 2024-07-03 ENCOUNTER — PATIENT MESSAGE (OUTPATIENT)
Dept: OBSTETRICS AND GYNECOLOGY | Facility: CLINIC | Age: 64
End: 2024-07-03
Payer: COMMERCIAL

## 2024-07-03 DIAGNOSIS — N95.1 MENOPAUSAL SYMPTOMS: ICD-10-CM

## 2024-07-08 ENCOUNTER — OFFICE VISIT (OUTPATIENT)
Dept: OBSTETRICS AND GYNECOLOGY | Facility: CLINIC | Age: 64
End: 2024-07-08
Payer: COMMERCIAL

## 2024-07-08 DIAGNOSIS — N95.2 VAGINAL ATROPHY: ICD-10-CM

## 2024-07-08 DIAGNOSIS — N95.1 MENOPAUSAL SYMPTOMS: Primary | ICD-10-CM

## 2024-07-08 DIAGNOSIS — Z12.31 SCREENING MAMMOGRAM, ENCOUNTER FOR: ICD-10-CM

## 2024-07-08 PROCEDURE — 1159F MED LIST DOCD IN RCRD: CPT | Mod: CPTII,95,, | Performed by: PHYSICIAN ASSISTANT

## 2024-07-08 PROCEDURE — 4010F ACE/ARB THERAPY RXD/TAKEN: CPT | Mod: CPTII,95,, | Performed by: PHYSICIAN ASSISTANT

## 2024-07-08 PROCEDURE — 1160F RVW MEDS BY RX/DR IN RCRD: CPT | Mod: CPTII,95,, | Performed by: PHYSICIAN ASSISTANT

## 2024-07-08 PROCEDURE — 99213 OFFICE O/P EST LOW 20 MIN: CPT | Mod: 95,,, | Performed by: PHYSICIAN ASSISTANT

## 2024-07-08 RX ORDER — ESTRADIOL 10 UG/1
10 INSERT VAGINAL
Qty: 8 EACH | Refills: 11 | Status: SHIPPED | OUTPATIENT
Start: 2024-07-08

## 2024-07-08 RX ORDER — ESTRADIOL 0.1 MG/G
1 CREAM VAGINAL DAILY
Qty: 42.5 G | Refills: 1 | Status: SHIPPED | OUTPATIENT
Start: 2024-07-08 | End: 2025-07-08

## 2024-07-08 NOTE — PROGRESS NOTES
The patient location is: work  The chief complaint leading to consultation is: follow up HRT    Visit type: audiovisual    Face to Face time with patient: 10 minutes  15 minutes of total time spent on the encounter, which includes face to face time and non-face to face time preparing to see the patient (eg, review of tests), Obtaining and/or reviewing separately obtained history, Documenting clinical information in the electronic or other health record, Independently interpreting results (not separately reported) and communicating results to the patient/family/caregiver, or Care coordination (not separately reported).         Each patient to whom he or she provides medical services by telemedicine is:  (1) informed of the relationship between the physician and patient and the respective role of any other health care provider with respect to management of the patient; and (2) notified that he or she may decline to receive medical services by telemedicine and may withdraw from such care at any time.    Notes:    Subjective:      Josefina Blue is a 64 y.o. female who presents for follow-up of hormone replacement therapy.  At her last visit on 1/8/2024, she reported that she was over all doing much better.  PT is helping with pelvic floor disfunction and can have enjoyable intercourse for the first time in awhile. She continues to use estrace cream as well but only externally. Libido has improved. Denies adverse side effects with testosterone therapy.      PLAN on 1/8/2024:  Continue imvexxy 10mcg BIW  Continue Estrace cream BIW externally  Continue testosterone cream 1% 1 click daily (from [facility])  Continue pelvic floor PT    6/25/2024 LABS  Testosterone  21  Free Testosterone 0.6    The patient reports that she is tolerating the testosterone gel well. Denies adverse side effects. She is using just one click per day and would like to try increasing to to 2 clicks daily. Exercising 3x per week with  and playing  tennis regularly. She is feeling strong. Libido, vaginal dryness and dyspareunia have all improved. No complaints today.    PCP: Katherine Baumgarten, MD    Routine labs: 10/30/2023  WWE: 6/26/2023 with Yamilka Keith MD  Pap smear: S/p hyst  Mammogram: 10/20/2023 TC  7.29 %   DEXA: 12/19/2023 normal  Colonoscopy: 6/14/2021 repeat in 7 years    Lab Visit on 06/25/2024   Component Date Value Ref Range Status    Testosterone, Free 06/25/2024 0.6  pg/mL Final    Testosterone, Total 06/25/2024 21  5 - 73 ng/dL Final   Lab Visit on 05/06/2024   Component Date Value Ref Range Status    Lipoprotein (a) 05/06/2024 9  0 - 30 mg/dL Final       Past Medical History:   Diagnosis Date    DDD (degenerative disc disease), lumbar 10/7/2019    Hyperlipidemia 8/31/2015    Hypertension     IFG (impaired fasting glucose) 8/31/2015    Neuropathic pain 8/31/2015    Squamous cell cancer of tongue 2012     Past Surgical History:   Procedure Laterality Date    CHOLECYSTECTOMY      COLONOSCOPY N/A 06/14/2021    Procedure: COLONOSCOPY;  Surgeon: Gentry Dickson MD;  Location: [facility] ([address] [place] [address]);  Service: Endoscopy;  Laterality: N/A;  covid test 6/11 primary care, instructions sent to [facility]. 6/11 Pt. fully vaccinated. Completed in Jan. 2021.[facility]    GALLBLADDER SURGERY  06/2016    HYSTERECTOMY      JOINT REPLACEMENT  2020    KNEE ARTHROPLASTY Left 07/08/2020    Procedure: ARTHROPLASTY, KNEE;  Surgeon: GLENN Whyte MD;  Location: [facility];  Service: Orthopedics;  Laterality: Left;  regional w/catheter   Spinal, Adductor  Cloidine/Epi/Ketorolac/Ropivacaine Injection 30cc      KNEE ARTHROSCOPY Right     for torn meniscus    TONGUE SURGERY      TOTAL KNEE ARTHROPLASTY Right 11/11/2020    Procedure: ARTHROPLASTY, KNEE, TOTAL;  Surgeon: GLENN Whyte MD;  Location: [facility];  Service: Orthopedics;  Laterality: Right;  outpatient with 23hr observation  regional with cathter- spinal and adductor    TRANSFORAMINAL EPIDURAL INJECTION OF  STEROID Bilateral 09/19/2019    Procedure: Injection,steroid,epidural,transforaminal approach LUMBAR TRANSFORAMINAL BILATERAL L4/5 TF NOE;  Surgeon: Tim Kerns MD;  Location: [facility];  Service: Pain Management;  Laterality: Bilateral;  NEEDS CONSENT, VIP    TRANSFORAMINAL EPIDURAL INJECTION OF STEROID Bilateral 10/07/2019    Procedure: LUMBAR TRANSFORAMINAL BILATERAL L4/5 TF NOE;  Surgeon: Tim Kerns MD;  Location: [facility];  Service: Pain Management;  Laterality: Bilateral;  NEEDS CONSENT, **VIP**    TRANSFORAMINAL EPIDURAL INJECTION OF STEROID Bilateral 03/22/2021    Procedure: LUMBAR TRANSFORAMINAL BILATERAL L4/5 DIRECT REFERRAL;  Surgeon: Tim Kerns MD;  Location: [facility];  Service: Pain Management;  Laterality: Bilateral;  NEEDS CONSENT, URGENT  *VIP*    TRANSFORAMINAL EPIDURAL INJECTION OF STEROID Right 05/10/2021    Procedure: LUMBAR TRANSFORAMINAL RIGHT L4/5, L5/S1 DIRECT REFERRAL;  Surgeon: Tim Kerns MD;  Location: [facility];  Service: Pain Management;  Laterality: Right;  NEEDS CONSENT, MEDICALLY URGENT  **VIP**     Social History     Tobacco Use    Smoking status: Never    Smokeless tobacco: Never   Substance Use Topics    Alcohol use: Yes     Alcohol/week: 3.0 standard drinks of alcohol     Types: 2 Glasses of wine, 1 Drinks containing 0.5 oz of alcohol per week     Comment: once a week    Drug use: No     Family History   Problem Relation Name Age of Onset    No Known Problems Paternal Grandfather      No Known Problems Paternal Grandmother      No Known Problems Maternal Grandmother      No Known Problems Maternal Grandfather      Cancer Father Father 74        prostate, throat, lung- smoker    Hyperlipidemia Father Father     Diabetes Father Father     Heart disease Father Father     Cancer Mother varices         cervical cancer    Alcohol abuse Mother varices     Cirrhosis Mother varices     Drug abuse Brother Brother     Heart disease Brother Brother     Hyperlipidemia  Brother Brother     Diabetes Sister Sister     Hypertension Sister Sister     ALS Sister Sister     No Known Problems Maternal Aunt      No Known Problems Maternal Uncle      No Known Problems Paternal Aunt      No Known Problems Paternal Uncle      Amblyopia Neg Hx      Blindness Neg Hx      Cataracts Neg Hx      Glaucoma Neg Hx      Macular degeneration Neg Hx      Retinal detachment Neg Hx      Strabismus Neg Hx      Stroke Neg Hx      Thyroid disease Neg Hx      Breast cancer Neg Hx      Colon cancer Neg Hx      Ovarian cancer Neg Hx       OB History    Para Term  AB Living   0 0 0 0 0 0   SAB IAB Ectopic Multiple Live Births   0 0 0 0     Obstetric Comments   Endo- age 22       Current Outpatient Medications:     ALPRAZolam (XANAX) 0.25 MG tablet, Take 1 tablet (0.25 mg total) by mouth nightly as needed for Anxiety., Disp: 30 tablet, Rfl: 5    aspirin (ECOTRIN) 81 MG EC tablet, Take 1 tablet (81 mg total) by mouth nightly., Disp: , Rfl: 0    estradioL (ESTRACE) 0.01 % (0.1 mg/gram) vaginal cream, Place 1 g vaginally once daily., Disp: 42.5 g, Rfl: 1    estradioL (IMVEXXY MAINTENANCE PACK) 10 mcg Inst, Place 10 mcg vaginally twice a week., Disp: 8 each, Rfl: 11    irbesartan (AVAPRO) 75 MG tablet, Take 1 tablet (75 mg total) by mouth every evening., Disp: 90 tablet, Rfl: 3    methylPREDNISolone (MEDROL DOSEPACK) 4 mg tablet, use as directed, Disp: 21 tablet, Rfl: 0    rosuvastatin (CRESTOR) 10 MG tablet, Take 1 tablet (10 mg total) by mouth once daily., Disp: 90 tablet, Rfl: 3    semaglutide (OZEMPIC) 2 mg/dose (8 mg/3 mL) PnIj, Inject 2 mg into the skin every 7 days., Disp: 3 mL, Rfl: 11    tretinoin (RETIN-A) 0.05 % cream, Apply topically every evening., Disp: 45 g, Rfl: 11    UNABLE TO FIND, medication name: testosterone 1% Gel Apply 1-2 clicks to upper inner thigh as directed, Disp: 30 g, Rfl: 5    zolpidem (AMBIEN) 5 MG Tab, Take 1 tablet (5 mg total) by mouth nightly as needed for insomnia.,  Disp: 30 tablet, Rfl: 1    Review of Systems:  General: No fever, chills, or weight loss.  Chest: No chest pain, shortness of breath, or palpitations.  Breast: No pain, masses, or nipple discharge.  Vulva: No pain, lesions, or itching.  Vagina: No relaxation, itching, discharge, or lesions.  Abdomen: No pain, nausea, vomiting, diarrhea, or constipation.  Urinary: No incontinence, nocturia, frequency, or dysuria.  Extremities:  No leg cramps, edema, or calf pain.  Neurologic: No headaches, dizziness, or visual changes.    Objective:   There were no vitals filed for this visit.  There is no height or weight on file to calculate BMI.      Physical Exam: Deferred       Assessment:    Menopausal symptoms    Screening mammogram, encounter for  -     Mammo Digital Screening [facility]/ [facility]; Future; Expected date: 07/08/2024    Vaginal atrophy  -     estradioL (IMVEXXY MAINTENANCE PACK) 10 mcg Inst; Place 10 mcg vaginally twice a week.  Dispense: 8 each; Refill: 11  -     estradioL (ESTRACE) 0.01 % (0.1 mg/gram) vaginal cream; Place 1 g vaginally once daily.  Dispense: 42.5 g; Refill: 1        Plan:   Reviewed most recent labs  Increase testosterone 1% gel to 2 clicks daily (from [facility])  Reviewed side effects to watch for.  Can reduce to back down to one click daily if side effects  Continue imvexxy 10mcg BIW  Continue Estrace cream BIW externally  Annual mammogram  Follow up in 1 year or sooner PRN    Instructed patient to call if she experiences any side effects or has any questions.    I spent a total of 15 minutes on the day of the visit.This includes face to face time and non-face to face time preparing to see the patient (eg, review of tests), obtaining and/or reviewing separately obtained history, documenting clinical information in the electronic or other health record, independently interpreting results and communicating results to the patient/family/caregiver, or care coordinator.

## 2024-07-26 ENCOUNTER — OFFICE VISIT (OUTPATIENT)
Dept: DERMATOLOGY | Facility: CLINIC | Age: 64
End: 2024-07-26
Payer: COMMERCIAL

## 2024-07-26 VITALS — WEIGHT: 188 LBS | BODY MASS INDEX: 28.59 KG/M2

## 2024-07-26 DIAGNOSIS — L82.1 SK (SEBORRHEIC KERATOSIS): ICD-10-CM

## 2024-07-26 DIAGNOSIS — Z12.83 SKIN EXAM, SCREENING FOR CANCER: ICD-10-CM

## 2024-07-26 DIAGNOSIS — L81.4 LENTIGINES: ICD-10-CM

## 2024-07-26 DIAGNOSIS — D22.9 NEVUS OF MULTIPLE SITES: Primary | ICD-10-CM

## 2024-07-26 PROCEDURE — 99999 PR PBB SHADOW E&M-EST. PATIENT-LVL III: CPT | Mod: PBBFAC,,, | Performed by: DERMATOLOGY

## 2024-07-26 NOTE — PROGRESS NOTES
Subjective:      Patient ID:  Jsoefina Blue is a 64 y.o. female who presents for   Chief Complaint   Patient presents with    Skin Check     TBSE     Would like skin check no lesions of concern.          Review of Systems   Constitutional:  Negative for fever, chills, weight loss, weight gain, fatigue, night sweats and malaise.   Skin:  Positive for daily sunscreen use, activity-related sunscreen use and wears hat.       Objective:   Physical Exam   Constitutional: She appears well-developed and well-nourished. No distress.   Neurological: She is alert and oriented to person, place, and time. She is not disoriented.   Psychiatric: She has a normal mood and affect.   Skin:   Areas Examined (abnormalities noted in diagram):   Head / Face Inspection Performed  Neck Inspection Performed  Chest / Axilla Inspection Performed  Abdomen Inspection Performed  Back Inspection Performed  RUE Inspected  LUE Inspection Performed                 Diagram Legend     Erythematous scaling macule/papule c/w actinic keratosis       Vascular papule c/w angioma      Pigmented verrucoid papule/plaque c/w seborrheic keratosis      Yellow umbilicated papule c/w sebaceous hyperplasia      Irregularly shaped tan macule c/w lentigo     1-2 mm smooth white papules consistent with Milia      Movable subcutaneous cyst with punctum c/w epidermal inclusion cyst      Subcutaneous movable cyst c/w pilar cyst      Firm pink to brown papule c/w dermatofibroma      Pedunculated fleshy papule(s) c/w skin tag(s)      Evenly pigmented macule c/w junctional nevus     Mildly variegated pigmented, slightly irregular-bordered macule c/w mildly atypical nevus      Flesh colored to evenly pigmented papule c/w intradermal nevus       Pink pearly papule/plaque c/w basal cell carcinoma      Erythematous hyperkeratotic cursted plaque c/w SCC      Surgical scar with no sign of skin cancer recurrence      Open and closed comedones      Inflammatory papules and  "pustules      Verrucoid papule consistent consistent with wart     Erythematous eczematous patches and plaques     Dystrophic onycholytic nail with subungual debris c/w onychomycosis     Umbilicated papule    Erythematous-base heme-crusted tan verrucoid plaque consistent with inflamed seborrheic keratosis     Erythematous Silvery Scaling Plaque c/w Psoriasis     See annotation      Assessment / Plan:        Nevus of multiple sites  The "ABCD" rules to observe pigmented lesions were reviewed.  Brochure provided      Lentigines  The "ABCD" rules to observe pigmented lesions were reviewed.      Skin exam, screening for cancer  . No lesions suspicious for malignancy noted.    Recommend daily sun protection/avoidance and use of at least SPF 30, broad spectrum sunscreen (OTC drug).       SK (seborrheic keratosis)  Seborrheic keratosis scattered, told benign no treatment needed.  \             Follow up in about 1 year (around 7/26/2025).  "

## 2024-08-06 ENCOUNTER — TELEPHONE (OUTPATIENT)
Dept: INFECTIOUS DISEASES | Facility: CLINIC | Age: 64
End: 2024-08-06
Payer: COMMERCIAL

## 2024-08-06 DIAGNOSIS — Z00.00 HEALTHCARE MAINTENANCE: Primary | ICD-10-CM

## 2024-08-18 ENCOUNTER — PATIENT MESSAGE (OUTPATIENT)
Dept: CARDIOLOGY | Facility: CLINIC | Age: 64
End: 2024-08-18
Payer: COMMERCIAL

## 2024-08-21 ENCOUNTER — CLINICAL SUPPORT (OUTPATIENT)
Dept: INFECTIOUS DISEASES | Facility: CLINIC | Age: 64
End: 2024-08-21
Payer: COMMERCIAL

## 2024-08-21 DIAGNOSIS — Z00.00 HEALTHCARE MAINTENANCE: Primary | ICD-10-CM

## 2024-08-21 PROCEDURE — 90480 ADMN SARSCOV2 VAC 1/ONLY CMP: CPT | Mod: S$GLB,,, | Performed by: INTERNAL MEDICINE

## 2024-08-21 PROCEDURE — 99999 PR PBB SHADOW E&M-EST. PATIENT-LVL I: CPT | Mod: PBBFAC,,,

## 2024-08-21 PROCEDURE — 91322 SARSCOV2 VAC 50 MCG/0.5ML IM: CPT | Mod: S$GLB,,, | Performed by: INTERNAL MEDICINE

## 2024-08-21 NOTE — PROGRESS NOTES
Patient received Covid Moderna spike vax IM to the right deltoid.  Tolerated well and left in NAD

## 2024-09-03 DIAGNOSIS — J32.9 SINUSITIS, UNSPECIFIED CHRONICITY, UNSPECIFIED LOCATION: ICD-10-CM

## 2024-09-03 DIAGNOSIS — M54.2 NECK PAIN: ICD-10-CM

## 2024-09-03 RX ORDER — HYDROCODONE BITARTRATE AND ACETAMINOPHEN 10; 325 MG/1; MG/1
1 TABLET ORAL EVERY 6 HOURS PRN
Qty: 15 TABLET | Refills: 0 | Status: SHIPPED | OUTPATIENT
Start: 2024-09-03

## 2024-09-03 RX ORDER — AZITHROMYCIN 250 MG/1
TABLET, FILM COATED ORAL
Qty: 6 TABLET | Refills: 0 | Status: SHIPPED | OUTPATIENT
Start: 2024-09-03 | End: 2024-09-09

## 2024-09-03 RX ORDER — LORAZEPAM 1 MG/1
TABLET ORAL
Qty: 30 TABLET | Refills: 0 | Status: SHIPPED | OUTPATIENT
Start: 2024-09-03

## 2024-09-03 RX ORDER — AMOXICILLIN AND CLAVULANATE POTASSIUM 875; 125 MG/1; MG/1
1 TABLET, FILM COATED ORAL 2 TIMES DAILY
Qty: 10 TABLET | Refills: 1 | Status: SHIPPED | OUTPATIENT
Start: 2024-09-03 | End: 2024-09-09

## 2024-09-03 RX ORDER — CIPROFLOXACIN 500 MG/1
500 TABLET ORAL EVERY 12 HOURS
Qty: 6 TABLET | Refills: 0 | Status: SHIPPED | OUTPATIENT
Start: 2024-09-03 | End: 2024-09-07

## 2024-09-03 RX ORDER — DIAZEPAM 10 MG/1
10 TABLET ORAL 3 TIMES DAILY
Qty: 30 TABLET | Refills: 0 | Status: SHIPPED | OUTPATIENT
Start: 2024-09-03 | End: 2024-09-13

## 2024-09-03 RX ORDER — PREDNISONE 20 MG/1
20 TABLET ORAL DAILY
Qty: 30 TABLET | Refills: 1 | Status: SHIPPED | OUTPATIENT
Start: 2024-09-03

## 2024-09-04 ENCOUNTER — PATIENT MESSAGE (OUTPATIENT)
Dept: ENDOCRINOLOGY | Facility: CLINIC | Age: 64
End: 2024-09-04
Payer: COMMERCIAL

## 2024-09-04 ENCOUNTER — PATIENT MESSAGE (OUTPATIENT)
Dept: CARDIOLOGY | Facility: CLINIC | Age: 64
End: 2024-09-04
Payer: COMMERCIAL

## 2024-09-05 ENCOUNTER — PATIENT MESSAGE (OUTPATIENT)
Dept: ENDOCRINOLOGY | Facility: CLINIC | Age: 64
End: 2024-09-05
Payer: COMMERCIAL

## 2024-09-05 DIAGNOSIS — E78.2 MIXED HYPERLIPIDEMIA: ICD-10-CM

## 2024-09-05 DIAGNOSIS — R73.03 PREDIABETES: Primary | ICD-10-CM

## 2024-10-21 DIAGNOSIS — M50.30 DDD (DEGENERATIVE DISC DISEASE), CERVICAL: Primary | ICD-10-CM

## 2024-10-22 ENCOUNTER — PATIENT MESSAGE (OUTPATIENT)
Dept: ENDOCRINOLOGY | Facility: CLINIC | Age: 64
End: 2024-10-22
Payer: COMMERCIAL

## 2024-10-22 DIAGNOSIS — R79.89 LOW TESTOSTERONE: Primary | ICD-10-CM

## 2024-10-22 NOTE — PROGRESS NOTES
"DATE: 10/24/2024  PATIENT: Josefina Blue    Attending Physician: Karan Collins M.D.    HISTORY:  Josefina Blue is a 64 y.o. female who returns to me today for follow up.  She was last seen by BRYN Mcdaniel.  Today she is doing well but notes right upper back and neck pain. Her pain is a 5 at the worst and constantly a 2. She has been treating her pain with Celebrex and is planning on a  massage this week.  The Patient denies myelopathic symptoms such as handwriting changes or difficulty with buttons/coins/keys. Denies perineal paresthesias, bowel/bladder dysfunction.    EXAM:  Ht 5' 8" (1.727 m)   Wt 85.8 kg (189 lb 3.2 oz)   BMI 28.77 kg/m²     My physical examination was notable for the following findings:     Normal station and gait, no difficulty with toe or heel walk.   Cervical skin negative for rashes, lesions, hairy patches and surgical scars.   Cervical range of motion is acceptable.  There is mild tenderness to palpation.  No pain with range of motion of the bilateral shoulders and elbows.  Normal bulk and contour of the bilateral hands.    Bilateral hands warm and well perfused, radial pulses 2+ bilaterally.   Normal strength and tone in all major motor groups in the bilateral upper and lower extremities. Normal sensation to light touch in the C5-T1 and L2-S1 dermatomes bilaterally.   Deep tendon reflexes symmetric 2+ in the bilateral upper and lower extremities.  Negative Inverted Radial Reflex and Cunningham's bilaterally. Negative Babinski bilaterally.     IMAGING:    Today I personally re-reviewed AP, Lat and Flex/Ex  upright C-spine films that demonstrate stepwise anterolisthesis from C3-6. Moderate DDD from C4-7.     Today I personally re- reviewed lumbar films that demonstrate grade I anterolisthesis at L4/5 and L5/S1 disc degeneration.  There is multilevel disc degeneration.     MRI lumbar spine from 2019 shows a right/central disc protrusion at L1/2 resulting in moderate to severe stenosis.  There " is a central disc protrusion at L4/5 resulting in severe stenosis and moderate stenosis at L3/4.    Body mass index is 28.77 kg/m².    Hemoglobin A1C   Date Value Ref Range Status   10/30/2023 5.4 4.0 - 5.6 % Final     Comment:     ADA Screening Guidelines:  5.7-6.4%  Consistent with prediabetes  >or=6.5%  Consistent with diabetes    High levels of fetal hemoglobin interfere with the HbA1C  assay. Heterozygous hemoglobin variants (HbS, HgC, etc)do  not significantly interfere with this assay.   However, presence of multiple variants may affect accuracy.     10/10/2023 5.3 4.0 - 5.6 % Final     Comment:     ADA Screening Guidelines:  5.7-6.4%  Consistent with prediabetes  >or=6.5%  Consistent with diabetes    High levels of fetal hemoglobin interfere with the HbA1C  assay. Heterozygous hemoglobin variants (HbS, HgC, etc)do  not significantly interfere with this assay.   However, presence of multiple variants may affect accuracy.     03/09/2023 5.5 4.0 - 5.6 % Final     Comment:     ADA Screening Guidelines:  5.7-6.4%  Consistent with prediabetes  >or=6.5%  Consistent with diabetes    High levels of fetal hemoglobin interfere with the HbA1C  assay. Heterozygous hemoglobin variants (HbS, HgC, etc)do  not significantly interfere with this assay.   However, presence of multiple variants may affect accuracy.           ASSESSMENT/PLAN:    Josefina was seen today for neck pain.    Diagnoses and all orders for this visit:    DDD (degenerative disc disease), cervical  -     Ambulatory referral/consult to Physical/Occupational Therapy; Future  -     celecoxib (CELEBREX) 200 MG capsule; Take 1 capsule (200 mg total) by mouth daily as needed for Pain.  -     methylPREDNISolone (MEDROL DOSEPACK) 4 mg tablet; use as directed  -     methocarbamoL (ROBAXIN) 500 MG Tab; Take 1-2 tablets (500-1,000 mg total) by mouth 3 (three) times daily as needed (muscle tension).    Spondylolisthesis of cervical region    Cervical radiculopathy  -      Ambulatory referral/consult to Physical/Occupational Therapy; Future  -     celecoxib (CELEBREX) 200 MG capsule; Take 1 capsule (200 mg total) by mouth daily as needed for Pain.  -     methylPREDNISolone (MEDROL DOSEPACK) 4 mg tablet; use as directed  -     methocarbamoL (ROBAXIN) 500 MG Tab; Take 1-2 tablets (500-1,000 mg total) by mouth 3 (three) times daily as needed (muscle tension).        Today we discussed at length all of the different treatment options including anti-inflammatories, acetaminophen, rest, ice, heat, physical therapy including strengthening and stretching exercises, home exercises, ROM, aerobic conditioning, aqua therapy, other modalities including ultrasound, massage, and dry needling, epidural steroid injections and finally surgical intervention.  PT orders placed. Medications sent to the pharmacy. Follow up as needed.  I have provided the patient with a home exercise program. It is the [employer] cervical exercise program. Exercises include walking erectly with neutral head position, supine neutral head position, supine retraction, sitting or standing neck retraction, posture training, forward/backward/sideward isometric strengthening, prone head lifts, supine head lifts, scapular retraction, and neck rotation. Pt will complete each exercise twice daily for 6-8 weeks.

## 2024-10-24 ENCOUNTER — OFFICE VISIT (OUTPATIENT)
Dept: ORTHOPEDICS | Facility: CLINIC | Age: 64
End: 2024-10-24
Payer: COMMERCIAL

## 2024-10-24 ENCOUNTER — HOSPITAL ENCOUNTER (OUTPATIENT)
Dept: RADIOLOGY | Facility: HOSPITAL | Age: 64
Discharge: HOME | End: 2024-10-24
Attending: REGISTERED NURSE
Payer: COMMERCIAL

## 2024-10-24 VITALS — BODY MASS INDEX: 28.67 KG/M2 | WEIGHT: 189.19 LBS | HEIGHT: 68 IN

## 2024-10-24 DIAGNOSIS — M43.12 SPONDYLOLISTHESIS OF CERVICAL REGION: ICD-10-CM

## 2024-10-24 DIAGNOSIS — M54.12 CERVICAL RADICULOPATHY: ICD-10-CM

## 2024-10-24 DIAGNOSIS — M50.30 DDD (DEGENERATIVE DISC DISEASE), CERVICAL: ICD-10-CM

## 2024-10-24 DIAGNOSIS — M50.30 DDD (DEGENERATIVE DISC DISEASE), CERVICAL: Primary | ICD-10-CM

## 2024-10-24 PROCEDURE — 3008F BODY MASS INDEX DOCD: CPT | Mod: CPTII,S$GLB,, | Performed by: REGISTERED NURSE

## 2024-10-24 PROCEDURE — 99999 PR PBB SHADOW E&M-EST. PATIENT-LVL III: CPT | Mod: PBBFAC,,, | Performed by: REGISTERED NURSE

## 2024-10-24 PROCEDURE — 99214 OFFICE O/P EST MOD 30 MIN: CPT | Mod: S$GLB,,, | Performed by: REGISTERED NURSE

## 2024-10-24 PROCEDURE — 72050 X-RAY EXAM NECK SPINE 4/5VWS: CPT | Mod: TC

## 2024-10-24 PROCEDURE — 1159F MED LIST DOCD IN RCRD: CPT | Mod: CPTII,S$GLB,, | Performed by: REGISTERED NURSE

## 2024-10-24 PROCEDURE — 4010F ACE/ARB THERAPY RXD/TAKEN: CPT | Mod: CPTII,S$GLB,, | Performed by: REGISTERED NURSE

## 2024-10-24 PROCEDURE — 72050 X-RAY EXAM NECK SPINE 4/5VWS: CPT | Mod: 26,,, | Performed by: STUDENT IN AN ORGANIZED HEALTH CARE EDUCATION/TRAINING PROGRAM

## 2024-10-24 RX ORDER — METHYLPREDNISOLONE 4 MG/1
TABLET ORAL
Qty: 21 TABLET | Refills: 0 | Status: SHIPPED | OUTPATIENT
Start: 2024-10-24 | End: 2024-11-14

## 2024-10-24 RX ORDER — CELECOXIB 200 MG/1
200 CAPSULE ORAL DAILY PRN
Qty: 90 CAPSULE | Refills: 1 | Status: SHIPPED | OUTPATIENT
Start: 2024-10-24

## 2024-10-24 RX ORDER — METHOCARBAMOL 500 MG/1
500-1000 TABLET, FILM COATED ORAL 3 TIMES DAILY PRN
Qty: 60 TABLET | Refills: 2 | Status: SHIPPED | OUTPATIENT
Start: 2024-10-24 | End: 2024-11-23

## 2024-10-25 ENCOUNTER — TELEPHONE (OUTPATIENT)
Dept: INFECTIOUS DISEASES | Facility: CLINIC | Age: 64
End: 2024-10-25
Payer: COMMERCIAL

## 2024-10-25 NOTE — TELEPHONE ENCOUNTER
Can you see what symptoms she is having? Does she want to see ortho or do physical therapy?       Patient has already seen a NP and they have put her on Celebrex, robaxin and steroid pack,  they also put in physical therapy orders.  Thanks so much she said

## 2024-10-28 DIAGNOSIS — G47.00 INSOMNIA, UNSPECIFIED TYPE: Primary | ICD-10-CM

## 2024-10-28 RX ORDER — ZOLPIDEM TARTRATE 5 MG/1
5 TABLET ORAL NIGHTLY PRN
Qty: 30 TABLET | Refills: 1 | Status: SHIPPED | OUTPATIENT
Start: 2024-10-28

## 2024-10-30 ENCOUNTER — OFFICE VISIT (OUTPATIENT)
Dept: ENDOCRINOLOGY | Facility: CLINIC | Age: 64
End: 2024-10-30
Payer: COMMERCIAL

## 2024-10-30 ENCOUNTER — LAB VISIT (OUTPATIENT)
Dept: LAB | Facility: HOSPITAL | Age: 64
End: 2024-10-30
Attending: INTERNAL MEDICINE
Payer: COMMERCIAL

## 2024-10-30 DIAGNOSIS — R79.89 LOW TESTOSTERONE: ICD-10-CM

## 2024-10-30 DIAGNOSIS — R73.03 PREDIABETES: ICD-10-CM

## 2024-10-30 DIAGNOSIS — E78.2 MIXED HYPERLIPIDEMIA: ICD-10-CM

## 2024-10-30 DIAGNOSIS — E07.9 THYROID DISORDER: ICD-10-CM

## 2024-10-30 DIAGNOSIS — E07.9 THYROID DISORDER: Primary | ICD-10-CM

## 2024-10-30 DIAGNOSIS — I10 HYPERTENSION, UNSPECIFIED TYPE: ICD-10-CM

## 2024-10-30 LAB
CHOLEST SERPL-MCNC: 124 MG/DL (ref 120–199)
CHOLEST/HDLC SERPL: 2.6 {RATIO} (ref 2–5)
ESTIMATED AVG GLUCOSE: 114 MG/DL (ref 68–131)
HBA1C MFR BLD: 5.6 % (ref 4–5.6)
HDLC SERPL-MCNC: 48 MG/DL (ref 40–75)
HDLC SERPL: 38.7 % (ref 20–50)
LDLC SERPL CALC-MCNC: 48.8 MG/DL (ref 63–159)
NONHDLC SERPL-MCNC: 76 MG/DL
TESTOST SERPL-MCNC: <4 NG/DL (ref 5–73)
TRIGL SERPL-MCNC: 136 MG/DL (ref 30–150)
TSH SERPL DL<=0.005 MIU/L-ACNC: 0.77 UIU/ML (ref 0.4–4)

## 2024-10-30 PROCEDURE — 84403 ASSAY OF TOTAL TESTOSTERONE: CPT | Performed by: INTERNAL MEDICINE

## 2024-10-30 PROCEDURE — 3044F HG A1C LEVEL LT 7.0%: CPT | Mod: CPTII,95,, | Performed by: INTERNAL MEDICINE

## 2024-10-30 PROCEDURE — 83036 HEMOGLOBIN GLYCOSYLATED A1C: CPT | Performed by: INTERNAL MEDICINE

## 2024-10-30 PROCEDURE — 1159F MED LIST DOCD IN RCRD: CPT | Mod: CPTII,95,, | Performed by: INTERNAL MEDICINE

## 2024-10-30 PROCEDURE — G2211 COMPLEX E/M VISIT ADD ON: HCPCS | Mod: 95,,, | Performed by: INTERNAL MEDICINE

## 2024-10-30 PROCEDURE — 4010F ACE/ARB THERAPY RXD/TAKEN: CPT | Mod: CPTII,95,, | Performed by: INTERNAL MEDICINE

## 2024-10-30 PROCEDURE — 80061 LIPID PANEL: CPT | Performed by: INTERNAL MEDICINE

## 2024-10-30 PROCEDURE — 36415 COLL VENOUS BLD VENIPUNCTURE: CPT | Performed by: INTERNAL MEDICINE

## 2024-10-30 PROCEDURE — 1160F RVW MEDS BY RX/DR IN RCRD: CPT | Mod: CPTII,95,, | Performed by: INTERNAL MEDICINE

## 2024-10-30 PROCEDURE — 99214 OFFICE O/P EST MOD 30 MIN: CPT | Mod: 95,,, | Performed by: INTERNAL MEDICINE

## 2024-10-30 PROCEDURE — 84443 ASSAY THYROID STIM HORMONE: CPT | Performed by: INTERNAL MEDICINE

## 2024-10-30 PROCEDURE — 84402 ASSAY OF FREE TESTOSTERONE: CPT | Performed by: INTERNAL MEDICINE

## 2024-10-30 RX ORDER — TIRZEPATIDE 7.5 MG/.5ML
7.5 INJECTION, SOLUTION SUBCUTANEOUS
Qty: 2 ML | Refills: 11 | Status: SHIPPED | OUTPATIENT
Start: 2024-10-30

## 2024-10-31 ENCOUNTER — PATIENT MESSAGE (OUTPATIENT)
Dept: OBSTETRICS AND GYNECOLOGY | Facility: CLINIC | Age: 64
End: 2024-10-31
Payer: COMMERCIAL

## 2024-11-04 LAB — TESTOST FREE SERPL-MCNC: <0.4 PG/ML

## 2024-11-11 ENCOUNTER — PATIENT MESSAGE (OUTPATIENT)
Dept: INFECTIOUS DISEASES | Facility: CLINIC | Age: 64
End: 2024-11-11
Payer: COMMERCIAL

## 2024-11-11 ENCOUNTER — CLINICAL SUPPORT (OUTPATIENT)
Dept: INFECTIOUS DISEASES | Facility: CLINIC | Age: 64
End: 2024-11-11
Payer: COMMERCIAL

## 2024-11-11 DIAGNOSIS — Z00.00 HEALTHCARE MAINTENANCE: Primary | ICD-10-CM

## 2024-11-11 PROCEDURE — 99999 PR PBB SHADOW E&M-EST. PATIENT-LVL I: CPT | Mod: PBBFAC,,,

## 2024-11-11 PROCEDURE — 90471 IMMUNIZATION ADMIN: CPT | Mod: S$GLB,,, | Performed by: INTERNAL MEDICINE

## 2024-11-11 PROCEDURE — 90656 IIV3 VACC NO PRSV 0.5 ML IM: CPT | Mod: S$GLB,,, | Performed by: INTERNAL MEDICINE

## 2024-11-14 ENCOUNTER — CLINICAL SUPPORT (OUTPATIENT)
Dept: REHABILITATION | Facility: OTHER | Age: 64
End: 2024-11-14
Payer: COMMERCIAL

## 2024-11-14 DIAGNOSIS — M50.30 DDD (DEGENERATIVE DISC DISEASE), CERVICAL: ICD-10-CM

## 2024-11-14 DIAGNOSIS — M54.12 CERVICAL RADICULOPATHY: ICD-10-CM

## 2024-11-14 PROCEDURE — 97110 THERAPEUTIC EXERCISES: CPT

## 2024-11-14 PROCEDURE — 97161 PT EVAL LOW COMPLEX 20 MIN: CPT

## 2024-11-14 PROCEDURE — 20561 NDL INSJ W/O NJX 3+ MUSC: CPT | Mod: CG

## 2024-11-16 PROBLEM — M54.2 CHRONIC NECK PAIN: Status: ACTIVE | Noted: 2019-09-19

## 2024-11-16 NOTE — PLAN OF CARE
[facility]  Physical Therapy Initial Evaluation  Date: 11/14/2024   Name: Josefina Blue  Clinic Number: [id number]    Therapy Diagnosis:   Encounter Diagnoses   Name Primary?    DDD (degenerative disc disease), cervical     Cervical radiculopathy      Physician: GRETCHEN Mcelroy NP    Physician Orders: PT Eval and Treat   Medical Diagnosis from Referral: DDD (degenerative disc disease), cervical [M50.30], Cervical radiculopathy [M54.12]   Evaluation Date: 11/14/2024  Authorization Period Expiration: 10/24/2025  Plan of Care Expiration: 01/30/2024  Visit # / Visits authorized: 1/ 1   Progress Note Due: 12/13/2024  FOTO: 1/ 1    Precautions: Standard    Time In: 11:00 AM  Time Out: 12:00 PM  Total Appointment Time (timed & untimed codes): 55 minutes    Subjective   Date of onset: several years ago   History of current condition - Josefina reports: long standing history of neck pain that has waxed and waned over time depending on her activity level.  Plays tennis 3 times a week. She was put on some low dose steroids which seemed to help. Aggravating factors include: overhead serving with tennis and prolonged sitting. Easing factors include: medication and movement.      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  No dynamic instability.     Dens is intact.     Vertebral body heights are maintained without evidence for acute fracture or osseous destructive lesion.     Multilevel intervertebral disc space height loss and facet joint arthropathy most prominent at C6-C7.     Lung apices are clear.     Prevertebral soft tissues are unremarkable.       Medical History:   Past Medical History:   Diagnosis Date    DDD (degenerative disc disease), lumbar 10/7/2019    Hyperlipidemia 8/31/2015    Hypertension     IFG (impaired fasting glucose) 8/31/2015    Neuropathic pain 8/31/2015    Squamous cell cancer of tongue 2012       Surgical History:   Josefina Blue  has a past surgical history that includes Hysterectomy; Tongue surgery; Knee arthroscopy (Right); Cholecystectomy; Gallbladder surgery (06/2016); Transforaminal epidural injection of steroid (Bilateral, 09/19/2019); Transforaminal epidural injection of steroid (Bilateral, 10/07/2019); Knee Arthroplasty (Left, 07/08/2020); Total knee arthroplasty (Right, 11/11/2020); Transforaminal epidural injection of steroid (Bilateral, 03/22/2021); Transforaminal epidural injection of steroid (Right, 05/10/2021); Colonoscopy (N/A, 06/14/2021); and Joint replacement (2020).    Medications:   Josefina has a current medication list which includes the following prescription(s): alprazolam, aspirin, celecoxib, estradiol, imvexxy maintenance pack, irbesartan, methocarbamol, rosuvastatin, mounjaro, tretinoin, UNABLE TO FIND, and zolpidem.    Allergies:   Review of patient's allergies indicates:   Allergen Reactions    Aspirin Nausea Only     Can take low dose of Aspirin; can take buffered low dose aspirin only    Codeine Nausea Only     Can take Codeine if she takes a dose of Zofran with it          Objective     Observation: " guarded posture      Posture Alignment: slouched posture;forward head;     Sensation: Light touch: Intact     Cervical Range of Motion:     %   Flexion 56 degrees stetch         Extension 40 degrees       Left Side Bending 17 degrees painful    Right Side Bending 22 degrees    Left rotation    44 degrees stiffness   Right Rotation    32 degrees    *= pain     *denotes pain  Shoulder Range of Motion screen: WFL        Cervical Radiculopathy Clinical Prediction Rule:      Positive Upper Limb Tension Test A negative     Involved cervical rotation < 60 degrees positive   Positive Distraction Test negative   Positive Spurlings A  negative          Upper Extremity Strength  (R) UE   (L) UE     Shoulder elevation: 4/5 Shoulder elevation: 4/5   Shoulder flexion: 4/5 Shoulder flexion: 4/5   Shoulder Abduction: 4/5 Shoulder abduction: 4/5   Shoulder ER 4+/5 Shoulder ER 4+/5   Shoulder IR 4+/5 Shoulder IR 4+/5   Elbow flexion: 4+/5 Elbow flexion: 4+/5   Elbow extension: 4+/5 Elbow extension: 4+/5   Wrist flexion: 4+/5 Wrist flexion: 4+/5   Wrist extension: 4+/5 Wrist extension: 4+/5         Cervical strength  Extension 3+/5 *  Flexion 3+/5  R SB 3/5  L SB 3/5        Right hand dominant        Special Tests:  Distraction -   Compression -   DNF endurance test 5 seconds         Joint Mobility:   Hypomobile cervical segments C2-C7 hypomobile lateral glides      Thoracic mobility: reduced mobility with grade II-III UPA T5-T12     Palpation: hypertonicity present throughout suboccipital musculature, bilateral UT/LS, cervical paraspinals, and anterior cervical mms        Flexibility: reduced flexibility of bilateral UT, LS     Upper Limb Neurodynamic testing:    Right Left   UNT - -   MNT - -   RNT - -      PT Evaluation Completed? Yes  Discussed Plan of Care with patient: Yes       CMS Impairment/Limitation/Restriction for FOTO Survey    Therapist reviewed FOTO scores for Josefina Blue on 11/14/2024.   FOTO documents entered into  EPIC - see Media section.    Intake Score:  See FOTO            TREATMENT   Treatment Time In: 11:30   Treatment Time Out: 12:00 pm  Total Treatment time separate from Evaluation: 25 minutes    Josefina received therapeutic exercises to develop strength, endurance, ROM, flexibility, posture, and core stabilization for 15 minutes including:  +Cervical extension SNAG  +Cervical rotation SNAG   +Seated chin tuck   +Rhomboid stretch   +Upper trap stretch   +Levator scap stretch     Josefina received the following dry needling were applied to decreased pain, promote blood floor and improve flexibility:for 15 minutes, including:  Application of TDN: Pt educated on benefits and potential side effects of dry needling. Educated pt on benefits, precautions, side effects following TDN. Educated pt to use heat following treatment sessions if pt is experiencing pain or soreness. Pt verbalized good understanding of education.  Pt signed written consent to dry needling. Pt gave verbal consent for DN.     Pt received dry needling to the below listed muscles using 40-50mm needles  Bilateral Cervical paraspinals C4-C7  Levator scap (R) insertion   Bilateral upper trap      Winding performed initial with E-stim applied through 2 channels at 2 Hz and 4 mA to tolerance.    Home Exercises and Patient Education Provided    Education provided:   -HEP, POC  -Patient was educated on initial evaluation findings and expectations as well as future PT services, procedures, and expectations for optimal compliance with therapy     Written Home Exercises Provided: yes.  Exercises were reviewed and Josefina was able to demonstrate them prior to the end of the session.  Josefina demonstrated good  understanding of the education provided.     See EMR under Patient Instructions for exercises provided 11/14/2024.    Assessment   Josefina is a 64 y.o. female referred to outpatient Physical Therapy with a medical diagnosis of  DDD (degenerative disc disease),  cervical [M50.30], Cervical radiculopathy [M54.12] . Pt presents to PT with pain, decreased cervical spine strength and flexibility, poor posture, chronic pain, cervical and thoracic hypomobility, and functional deficits with ADLs and work tasks. These deficits are negatively impacting this patient's ability to complete their work duties and activities of daily living.     Pt prognosis is Good.   Pt will benefit from skilled outpatient Physical Therapy to address the deficits stated above and in the chart below, provide pt/family education, and to maximize pt's level of independence.     Plan of care discussed with patient: Yes  Pt's spiritual, cultural and educational needs considered and patient is agreeable to the plan of care and goals as stated below:     Anticipated Barriers for therapy: CHRONICITY OF CONDITION     Medical Necessity is demonstrated by the following  History  Co-morbidities and personal factors that may impact the plan of care [x] LOW: no personal factors / co-morbidities  [] MODERATE: 1-2 personal factors / co-morbidities  [] HIGH: 3+ personal factors / co-morbidities    Moderate / High Support Documentation:   Co-morbidities affecting plan of care: Hypertension  Hyperlipidemia  History of tongue cancer       Personal Factors:   no deficits     Examination  Body Structures and Functions, activity limitations and participation restrictions that may impact the plan of care [x] LOW: addressing 1-2 elements  [] MODERATE: 3+ elements  [] HIGH: 4+ elements (please support below)    Moderate / High Support Documentation: difficulty with prolonged sitting and repetitive OH reaching      Clinical Presentation [x] LOW: stable  [] MODERATE: Evolving  [] HIGH: Unstable     Decision Making/ Complexity Score: low         GOALS:  Short Term Goals:     1.) Pt will improve their FOTO score by 5% to return to PLOF. (Progressing, not met)  2.) Pt will decrease their cervical pain to 4/10 for improved QOL.  (Progressing, not met)  3.) Pt will improve their cervical AROM by 10% for improved functional ability. (Progressing, not met)  4.) Pt will improve their UE strength to 4/5 to return to their PLOF. (Progressing, not met)  5.) Pt will become independent with their HEP to improve strength and tolerance to functional activities. (Progressing, not met)     Long Term Goals:  1.) Pt will improve their FOTO score by 10% to return to PLOF. (Progressing, not met)  2.) Pt will decrease their cervical pain to 2/10 for improved QOL. (Progressing, not met)  3.) Pt will improve their cervical AROM to WNL for improved functional ability. (Progressing, not met)  4.) Pt will improve their upper extremity strength to 5/5 to return to their PLOF. (Progressing, not met)  5.) Pt will tolerate all activities of daily living, work tasks, and recreational activities with no increase in cervical pain to return to PLOF. (Progressing, not met)     Plan   Plan of care Certification: 11/14/2024 to 01/30/2024.    Outpatient Physical Therapy 2 times weekly for 10 weeks to include the following interventions: Cervical/Lumbar Traction, Electrical Stimulation PRN, Gait Training, Manual Therapy, Moist Heat/ Ice, Neuromuscular Re-ed, Patient Education, Self Care, Therapeutic Activities, and Therapeutic Exercise. Dry needling Progress HEP towards D/C. Recommend F/U with MD if symptoms worsen or do not resolve. Patient may be seen by a PTA for treatment to carry out their plan of care.  Face-to-face conferences will be held.     Justice Posada, PT      I CERTIFY THE NEED FOR THESE SERVICES FURNISHED UNDER THIS PLAN OF TREATMENT AND WHILE UNDER MY CARE   Physician's comments:     Physician's Signature: ___________________________________________________

## 2024-12-06 ENCOUNTER — CLINICAL SUPPORT (OUTPATIENT)
Dept: REHABILITATION | Facility: OTHER | Age: 64
End: 2024-12-06
Payer: COMMERCIAL

## 2024-12-06 DIAGNOSIS — G89.29 CHRONIC NECK PAIN: Primary | ICD-10-CM

## 2024-12-06 DIAGNOSIS — M54.2 CHRONIC NECK PAIN: Primary | ICD-10-CM

## 2024-12-06 PROCEDURE — 20561 NDL INSJ W/O NJX 3+ MUSC: CPT | Mod: CG

## 2024-12-06 PROCEDURE — 97112 NEUROMUSCULAR REEDUCATION: CPT

## 2024-12-06 PROCEDURE — 97110 THERAPEUTIC EXERCISES: CPT

## 2024-12-10 NOTE — PROGRESS NOTES
"[facility]   Physical Therapy Treatment Note      Name: Josefina [facility] Number: [id number]    Therapy Diagnosis:   Encounter Diagnosis   Name Primary?    Chronic neck pain Yes     Physician: GRETCHEN Mcelroy NP    Visit Date: 12/6/2024    Physician Orders: PT Eval and Treat   Medical Diagnosis from Referral: DDD (degenerative disc disease), cervical [M50.30], Cervical radiculopathy [M54.12]   Evaluation Date: 11/14/2024  Authorization Period Expiration: 10/24/2025  Plan of Care Expiration: 01/30/2024  Visit # / Visits authorized: 1/ 1   Progress Note Due: 12/13/2024  FOTO: 1/ 1     Precautions: Standard     Time In: 2:30M  Time Out: 12:00 PM  Total Appointment Time (timed & untimed codes): 55 minutes    PTA Visit #: 0/5       Subjective     Patient reports: her neck was really sore for a couple days after the dry needling in the eval, but it felt better after the soreness subsided. Neck pain in the same spots today, but better overall    She was compliant with home exercise program.  Response to previous treatment: soreness  Functional change: none yet, first follow-up    Pain: 3/10  Location: B cervical spine and radiating down L shoulder     Objective      Objective Measures updated at progress report unless specified.     Treatment     Josefina received the treatments listed below:      therapeutic exercises to develop strength, ROM, flexibility, and posture for 35 minutes including:    UBE x2/2'  Seated thoracic extension over FR 15x5"  Supine chin tucks against towel 15x5"  Cervical extension SNAG w. Towel 2x10x5"  Cervical rotation SNAG w/ towel 10x5"  Seated chin tuck 2x10x5"  Rhomboid stretch hands clasped 5x10"  Upper trap stretch 2x30" ea  Levator scap stretch 2x30" ea  rows 60# 2x10  No money RTB 2x10x3"  Horizontal abd RTB 2x10x3"    manual therapy techniques: none were applied to the: n/a for 0 minutes, including:      neuromuscular re-education activities to improve: " "Coordination and Proprioception for 10 minutes. The following activities were included:  Prone W's 2x10x4"  Prone T's 2x10x3"  Cervical medx 150 in/lbs x20 , increase next visit    therapeutic activities to improve functional performance for 0  minutes, including:      Dry needling to improve mobility and decrease pain for 10  minutes:    Application of TDN: Pt educated on benefits and potential side effects of dry needling. Educated pt on benefits, precautions, side effects following TDN. Educated pt to use heat following treatment sessions if pt is experiencing pain or soreness. Pt verbalized good understanding of education.  Pt signed written consent to dry needling. Pt gave verbal consent for DN.     Pt received dry needling to the below listed muscles using 30-40 mm needles  Bilateral Cervical paraspinals C4-C7  Levator scap (R) insertion   Bilateral upper trap      Winding performed initial with E-stim applied through 2 channels at 2 Hz and 4 mA to tolerance.    hot pack for 5 minutes to cervical spine.      Patient Education and Home Exercises       Education provided:   - HEP, POC    Written Home Exercises Provided: Pt instructed to continue prior HEP. Exercises were reviewed and Josefina was able to demonstrate them prior to the end of the session.  Josefina demonstrated good  understanding of the education provided. See Electronic Medical Record under Patient Instructions for exercises provided during therapy sessions    Assessment     Josefina presents today for her first follow-up since initial evaluation with reports of improvement in pain levels since evaluation. Reviewed HEP with good demo and min cueing required. Introduced cervicothoracic mobility and periscapular strengthening exercises with good tolerance. Dry needling of cervical musculature applied with decreased tightness and soreness reported after application. Continue to progress as able.     Josefina Is progressing well towards her goals.   Patient " prognosis is Good.     Patient will continue to benefit from skilled outpatient physical therapy to address the deficits listed in the problem list box on initial evaluation, provide pt/family education and to maximize pt's level of independence in the home and community environment.     Patient's spiritual, cultural and educational needs considered and pt agreeable to plan of care and goals.     Anticipated barriers to physical therapy: chronicity of condition    GOALS:  Short Term Goals:     1.) Pt will improve their FOTO score by 5% to return to PLOF. (Progressing, not met)  2.) Pt will decrease their cervical pain to 4/10 for improved QOL. (Progressing, not met)  3.) Pt will improve their cervical AROM by 10% for improved functional ability. (Progressing, not met)  4.) Pt will improve their UE strength to 4/5 to return to their PLOF. (Progressing, not met)  5.) Pt will become independent with their HEP to improve strength and tolerance to functional activities. (Progressing, not met)     Long Term Goals:  1.) Pt will improve their FOTO score by 10% to return to PLOF. (Progressing, not met)  2.) Pt will decrease their cervical pain to 2/10 for improved QOL. (Progressing, not met)  3.) Pt will improve their cervical AROM to WNL for improved functional ability. (Progressing, not met)  4.) Pt will improve their upper extremity strength to 5/5 to return to their PLOF. (Progressing, not met)  5.) Pt will tolerate all activities of daily living, work tasks, and recreational activities with no increase in cervical pain to return to PLOF. (Progressing, not met)     Plan   Plan of care Certification: 11/14/2024 to 01/30/2024.    Saúl Coughlin, PT

## 2024-12-11 ENCOUNTER — CLINICAL SUPPORT (OUTPATIENT)
Dept: REHABILITATION | Facility: OTHER | Age: 64
End: 2024-12-11
Payer: COMMERCIAL

## 2024-12-11 DIAGNOSIS — M54.2 CHRONIC NECK PAIN: Primary | ICD-10-CM

## 2024-12-11 DIAGNOSIS — G89.29 CHRONIC NECK PAIN: Primary | ICD-10-CM

## 2024-12-11 PROCEDURE — 97112 NEUROMUSCULAR REEDUCATION: CPT

## 2024-12-11 PROCEDURE — 20561 NDL INSJ W/O NJX 3+ MUSC: CPT | Mod: CG

## 2024-12-11 PROCEDURE — 97110 THERAPEUTIC EXERCISES: CPT

## 2024-12-11 NOTE — PROGRESS NOTES
"[facility]   Physical Therapy Treatment Note      Name: Josefina [facility]  Clinic Number: [id number]    Therapy Diagnosis:   Encounter Diagnosis   Name Primary?    Chronic neck pain Yes     Physician: GRETCHEN Mcelroy NP    Visit Date: 12/11/2024    Physician Orders: PT Eval and Treat   Medical Diagnosis from Referral: DDD (degenerative disc disease), cervical [M50.30], Cervical radiculopathy [M54.12]   Evaluation Date: 11/14/2024  Authorization Period Expiration: 10/24/2025  Plan of Care Expiration: 01/30/2024  Visit # / Visits authorized: 3/20  Progress Note Due: 12/13/2024  FOTO: 1/ 1     Precautions: Standard     Time In: 330 PM  Time Out: 430 PM  Total Appointment Time (timed & untimed codes): 55 minutes    PTA Visit #: 0/5       Subjective     Patient reports: she has been in more pain since last visit and her neck feels locked up and her motion has worsened    She was compliant with home exercise program.  Response to previous treatment: soreness  Functional change: none yet    Pain: 7/10  Location: B cervical spine and radiating down L shoulder     Objective      Objective Measures updated at progress report unless specified.     Treatment     Josefina received the treatments listed below:      therapeutic exercises to develop strength, ROM, flexibility, and posture for 35 minutes including:    UBE x2/2'  Seated thoracic extension over FR 15x5"  Supine chin tucks against towel 15x5"  Cervical extension SNAG w. Towel 2x10x5"  Cervical rotation SNAG w/ towel 10x5"  Seated chin tuck 2x10x5"  Rhomboid stretch hands clasped 5x10"  Upper trap stretch 2x30" ea  Levator scap stretch 2x30" ea  rows 60# 2x10  No money RTB 2x10x3"  Horizontal abd RTB 2x10x3"    manual therapy techniques: none were applied to the: n/a for 0 minutes, including:      neuromuscular re-education activities to improve: Coordination and Proprioception for 10 minutes. The following activities were included:  Prone W's " "2x10x4"  Prone T's 2x10x3"  Cervical medx 150 in/lbs x20 , increase next visit    therapeutic activities to improve functional performance for 0  minutes, including:      Dry needling to improve mobility and decrease pain for 10  minutes:    Application of TDN: Pt educated on benefits and potential side effects of dry needling. Educated pt on benefits, precautions, side effects following TDN. Educated pt to use heat following treatment sessions if pt is experiencing pain or soreness. Pt verbalized good understanding of education.  Pt signed written consent to dry needling. Pt gave verbal consent for DN.     Pt received dry needling to the below listed muscles using 30-40 mm needles  Bilateral Cervical paraspinals C4-C7  Levator scap (R) insertion   Bilateral upper trap      Winding performed initial with E-stim applied through 2 channels at 2 Hz and 4 mA to tolerance.    hot pack for 5 minutes to cervical spine.      Patient Education and Home Exercises       Education provided:   - HEP, POC    Written Home Exercises Provided: Pt instructed to continue prior HEP. Exercises were reviewed and Josefina was able to demonstrate them prior to the end of the session.  Josefina demonstrated good  understanding of the education provided. See Electronic Medical Record under Patient Instructions for exercises provided during therapy sessions    Assessment     Josefina presents today with reports of increased pain levels since last visit. Dry needling again applied with pt reporting decreased tightness and soreness after application. Progressed cervicothoracic mobility and periscpaular strengthening activities with good tolerance. Reassessment to be performed next visit.     Josefina Is progressing well towards her goals.   Patient prognosis is Good.     Patient will continue to benefit from skilled outpatient physical therapy to address the deficits listed in the problem list box on initial evaluation, provide pt/family education and " to maximize pt's level of independence in the home and community environment.     Patient's spiritual, cultural and educational needs considered and pt agreeable to plan of care and goals.     Anticipated barriers to physical therapy: chronicity of condition    GOALS:  Short Term Goals:     1.) Pt will improve their FOTO score by 5% to return to PLOF. (Progressing, not met)  2.) Pt will decrease their cervical pain to 4/10 for improved QOL. (Progressing, not met)  3.) Pt will improve their cervical AROM by 10% for improved functional ability. (Progressing, not met)  4.) Pt will improve their UE strength to 4/5 to return to their PLOF. (Progressing, not met)  5.) Pt will become independent with their HEP to improve strength and tolerance to functional activities. (Progressing, not met)     Long Term Goals:  1.) Pt will improve their FOTO score by 10% to return to PLOF. (Progressing, not met)  2.) Pt will decrease their cervical pain to 2/10 for improved QOL. (Progressing, not met)  3.) Pt will improve their cervical AROM to WNL for improved functional ability. (Progressing, not met)  4.) Pt will improve their upper extremity strength to 5/5 to return to their PLOF. (Progressing, not met)  5.) Pt will tolerate all activities of daily living, work tasks, and recreational activities with no increase in cervical pain to return to PLOF. (Progressing, not met)     Plan   Plan of care Certification: 11/14/2024 to 01/30/2024.    Saúl Coughlin, PT

## 2024-12-17 ENCOUNTER — CLINICAL SUPPORT (OUTPATIENT)
Dept: REHABILITATION | Facility: OTHER | Age: 64
End: 2024-12-17
Payer: COMMERCIAL

## 2024-12-17 DIAGNOSIS — G89.29 CHRONIC NECK PAIN: Primary | ICD-10-CM

## 2024-12-17 DIAGNOSIS — M54.2 CHRONIC NECK PAIN: Primary | ICD-10-CM

## 2024-12-17 PROCEDURE — 97112 NEUROMUSCULAR REEDUCATION: CPT

## 2024-12-17 PROCEDURE — 97110 THERAPEUTIC EXERCISES: CPT

## 2024-12-17 PROCEDURE — 20561 NDL INSJ W/O NJX 3+ MUSC: CPT | Mod: CG

## 2024-12-17 PROCEDURE — 97530 THERAPEUTIC ACTIVITIES: CPT

## 2024-12-17 PROCEDURE — 97140 MANUAL THERAPY 1/> REGIONS: CPT | Mod: CG

## 2024-12-30 ENCOUNTER — CLINICAL SUPPORT (OUTPATIENT)
Dept: REHABILITATION | Facility: OTHER | Age: 64
End: 2024-12-30
Payer: COMMERCIAL

## 2024-12-30 DIAGNOSIS — G89.29 CHRONIC NECK PAIN: Primary | ICD-10-CM

## 2024-12-30 DIAGNOSIS — M54.2 CHRONIC NECK PAIN: Primary | ICD-10-CM

## 2024-12-30 PROCEDURE — 97112 NEUROMUSCULAR REEDUCATION: CPT

## 2024-12-30 PROCEDURE — 20560 NDL INSJ W/O NJX 1 OR 2 MUSC: CPT | Mod: CG

## 2024-12-30 PROCEDURE — 97110 THERAPEUTIC EXERCISES: CPT

## 2024-12-30 NOTE — PROGRESS NOTES
"[facility]   Physical Therapy Treatment Note      Name: Josefina [facility]  Clinic Number: [id number]    Therapy Diagnosis:   Encounter Diagnosis   Name Primary?    Chronic neck pain Yes     Physician: GRETCHEN Mcelroy NP    Visit Date: 12/30/2024    Physician Orders: PT Eval and Treat   Medical Diagnosis from Referral: DDD (degenerative disc disease), cervical [M50.30], Cervical radiculopathy [M54.12]   Evaluation Date: 11/14/2024  Authorization Period Expiration: 10/24/2025  Plan of Care Expiration: 01/30/2024  Visit # / Visits authorized: 5/20  Progress Note Due: 1/17/2025  FOTO: 1/ 1    FOTO NEXT VISIT     Precautions: Standard     Time In: 9:30 AM  Time Out: 1025 AM  Total Appointment Time (timed & untimed codes): 55 minutes    PTA Visit #: 0/5       Subjective     Patient reports: neck feels less painful and is easier to move, just one spot on the R side of the neck still bothering her    She was compliant with home exercise program.  Response to previous treatment: decreased pain  Functional change: improved cervical mobility     Pain: 2/10  Location: B cervical spine and radiating down L shoulder     Objective      Objective Measures updated at progress report unless specified.     Treatment     Josefina received the treatments listed below:      therapeutic exercises to develop strength, ROM, flexibility, and posture for 30 minutes including:    UBE x2/2'  Seated thoracic extension over FR 15x5"  Supine chin tucks against towel 15x5"  Cervical extension SNAG w. Towel 2x10x5"  Cervical rotation SNAG w/ towel 10x5"  Seated chin tuck 2x10x5"  Rhomboid stretch hands clasped 5x10"  Upper trap stretch 2x30" ea  Levator scap stretch 2x30" ea  rows 60# 2x10  No money RTB 2x10x3"  Horizontal abd RTB 2x10x3"    manual therapy techniques: none were applied to the: n/a for 0 minutes, including:      neuromuscular re-education activities to improve: Coordination and Proprioception for 10 minutes. " "The following activities were included:  Prone W's 2x10x4"  Prone T's 2x10x3"  Cervical medx 165 in/lbs x18 , increase reps next visit    therapeutic activities to improve functional performance for 0  minutes, including:      Dry needling to improve mobility and decrease pain for 10  minutes:    Application of TDN: Pt educated on benefits and potential side effects of dry needling. Educated pt on benefits, precautions, side effects following TDN. Educated pt to use heat following treatment sessions if pt is experiencing pain or soreness. Pt verbalized good understanding of education.  Pt signed written consent to dry needling. Pt gave verbal consent for DN.     Pt received dry needling to the below listed muscles using 30-40 mm needles  R suboccipitals  R cervical paraspinals      Winding performed initial with E-stim applied through 2 channels at 2 Hz and 4 mA to tolerance.    hot pack for 5 minutes to cervical spine.      Patient Education and Home Exercises       Education provided:   - HEP, POC    Written Home Exercises Provided: Pt instructed to continue prior HEP. Exercises were reviewed and Josefina was able to demonstrate them prior to the end of the session.  Josefina demonstrated good  understanding of the education provided. See Electronic Medical Record under Patient Instructions for exercises provided during therapy sessions    Assessment     Josefina presents today with reports of improvements in pain overall. Dry needling applied to R sided cervical musculature with decreased tightness and soreness reported after application. Progressed cervicothoracic mobility and strengthening exercises with good tolerance. Continue to progress as able.     Josefina Is progressing well towards her goals.   Patient prognosis is Good.     Patient will continue to benefit from skilled outpatient physical therapy to address the deficits listed in the problem list box on initial evaluation, provide pt/family education and to " maximize pt's level of independence in the home and community environment.     Patient's spiritual, cultural and educational needs considered and pt agreeable to plan of care and goals.     Anticipated barriers to physical therapy: chronicity of condition    GOALS:  Short Term Goals:     1.) Pt will improve their FOTO score by 5% to return to PLOF. (Progressing, not met)  2.) Pt will decrease their cervical pain to 4/10 for improved QOL. (Progressing, not met)  3.) Pt will improve their cervical AROM by 10% for improved functional ability. (Progressing, not met)  4.) Pt will improve their UE strength to 4/5 to return to their PLOF. (Progressing, not met)  5.) Pt will become independent with their HEP to improve strength and tolerance to functional activities. (Progressing, not met)     Long Term Goals:  1.) Pt will improve their FOTO score by 10% to return to PLOF. (Progressing, not met)  2.) Pt will decrease their cervical pain to 2/10 for improved QOL. (Progressing, not met)  3.) Pt will improve their cervical AROM to WNL for improved functional ability. (Progressing, not met)  4.) Pt will improve their upper extremity strength to 5/5 to return to their PLOF. (Progressing, not met)  5.) Pt will tolerate all activities of daily living, work tasks, and recreational activities with no increase in cervical pain to return to PLOF. (Progressing, not met)     Plan   Plan of care Certification: 11/14/2024 to 01/30/2024.    Saúl Coughlin, PT   12/30/2024

## 2024-12-30 NOTE — PROGRESS NOTES
[facility]   Physical Therapy Progress Note      Name: Josefina Blue  Clinic Number: [id number]    Therapy Diagnosis:   Encounter Diagnosis   Name Primary?    Chronic neck pain Yes     Physician: GRETCHEN Mcelroy NP    Visit Date: 12/17/2024    Physician Orders: PT Eval and Treat   Medical Diagnosis from Referral: DDD (degenerative disc disease), cervical [M50.30], Cervical radiculopathy [M54.12]   Evaluation Date: 11/14/2024  Authorization Period Expiration: 10/24/2025  Plan of Care Expiration: 01/30/2024  Visit # / Visits authorized: 4/20  Progress Note Due: 1/17/2024  FOTO: 1/ 1     Precautions: Standard     Time In: 9:00 AM  Time Out: 10:10 AM  Total Appointment Time (timed & untimed codes): 70 minutes    PTA Visit #: 0/5       Subjective     Patient reports: pain has been a lot better since last visit, the needling really helped. She has more mobility and less stiffness    She was compliant with home exercise program.  Response to previous treatment: soreness  Functional change: none yet    Pain: 2/10  Location: R cervical spine and     Objective      Objective Measures updated at progress report unless specified.     Cervical Range of Motion:     %   Flexion 56 degrees stetch          Extension 40 degrees       Left Side Bending 21 degrees painful    Right Side Bending 25 degrees    Left rotation    68 degrees    Right Rotation    61 degrees    *= pain      *denotes pain    Upper Extremity Strength  (R) UE   (L) UE     Shoulder elevation: 4/5 Shoulder elevation: 4/5   Shoulder flexion: 4/5 Shoulder flexion: 4/5   Shoulder Abduction: 4/5 Shoulder abduction: 4/5   Shoulder ER 4+/5 Shoulder ER 4+/5   Shoulder IR 4+/5 Shoulder IR 4+/5   Elbow flexion: 4+/5 Elbow flexion: 4+/5   Elbow extension: 4+/5 Elbow extension: 4+/5   Wrist flexion: 4+/5 Wrist flexion: 4+/5   Wrist extension: 4+/5 Wrist extension: 4+/5         Cervical strength  Extension 4/5   Flexion 4/5  R SB 4/5  L SB  "4/5  Treatment     Josefina received the treatments listed below:      therapeutic exercises to develop strength, ROM, flexibility, and posture for 25 minutes including:    UBE x2/2'  Seated thoracic extension over FR 15x5"  Supine chin tucks against towel 15x5"  Cervical extension SNAG w. Towel 2x10x5"  Cervical rotation SNAG w/ towel 10x5"  Seated chin tuck 2x10x5"  Rhomboid stretch hands clasped 5x10"  Upper trap stretch 2x30" ea  Levator scap stretch 2x30" ea  rows 60# 2x10  No money RTB 2x10x3"  Horizontal abd RTB 2x10x3"    manual therapy techniques: none were applied to the: cervical spine for 10 minutes, including:    STM R sided cervical musculature  PROM stretching    neuromuscular re-education activities to improve: Coordination and Proprioception for 10 minutes. The following activities were included:  Prone W's 2x10x4"  Prone T's 2x10x3"  Cervical medx 165 in/lbs x15 , increase reps next visit    therapeutic activities to improve functional performance for 10  minutes, including:    Objective measures and reassessment    Dry needling to improve mobility and decrease pain for 10  minutes:    Application of TDN: Pt educated on benefits and potential side effects of dry needling. Educated pt on benefits, precautions, side effects following TDN. Educated pt to use heat following treatment sessions if pt is experiencing pain or soreness. Pt verbalized good understanding of education.  Pt signed written consent to dry needling. Pt gave verbal consent for DN.     Pt received dry needling to the below listed muscles using 30-40 mm needles  Bilateral Cervical paraspinals C4-C7  Levator scap (R) insertion   Bilateral upper trap      Winding performed initial with E-stim applied through 2 channels at 2 Hz and 4 mA to tolerance.    hot pack for 5 minutes to cervical spine.      Patient Education and Home Exercises       Education provided:   - HEP, POC    Written Home Exercises Provided: Pt instructed to continue " prior HEP. Exercises were reviewed and Josefina was able to demonstrate them prior to the end of the session.  Josefina demonstrated good  understanding of the education provided. See Electronic Medical Record under Patient Instructions for exercises provided during therapy sessions    Assessment     Josefina presents today with reports of improvement in pain levels. Reassessment performed with pt demo gains in all planes of cervical ROM, improved strength of cervical, B UE, and periscapular musculature, and improved thoracic mobility. She continues to have deficits in all mentioned areas and will continue to benefit from skilled therapy to address described deficits and improve pain and function.     Josefina Is progressing well towards her goals.   Patient prognosis is Good.     Patient will continue to benefit from skilled outpatient physical therapy to address the deficits listed in the problem list box on initial evaluation, provide pt/family education and to maximize pt's level of independence in the home and community environment.     Patient's spiritual, cultural and educational needs considered and pt agreeable to plan of care and goals.     Anticipated barriers to physical therapy: chronicity of condition    GOALS:  Short Term Goals:     1.) Pt will improve their FOTO score by 5% to return to PLOF. (Progressing, not met)  2.) Pt will decrease their cervical pain to 4/10 for improved QOL. (Progressing, not met)  3.) Pt will improve their cervical AROM by 10% for improved functional ability. (Progressing, not met)  4.) Pt will improve their UE strength to 4/5 to return to their PLOF. (Progressing, not met)  5.) Pt will become independent with their HEP to improve strength and tolerance to functional activities. (Progressing, not met)     Long Term Goals:  1.) Pt will improve their FOTO score by 10% to return to PLOF. (Progressing, not met)  2.) Pt will decrease their cervical pain to 2/10 for improved QOL.  (Progressing, not met)  3.) Pt will improve their cervical AROM to WNL for improved functional ability. (Progressing, not met)  4.) Pt will improve their upper extremity strength to 5/5 to return to their PLOF. (Progressing, not met)  5.) Pt will tolerate all activities of daily living, work tasks, and recreational activities with no increase in cervical pain to return to PLOF. (Progressing, not met)     Plan   Plan of care Certification: 11/14/2024 to 01/30/2024.    Saúl Coughlin, PT

## 2025-01-05 ENCOUNTER — PATIENT MESSAGE (OUTPATIENT)
Dept: INFECTIOUS DISEASES | Facility: CLINIC | Age: 65
End: 2025-01-05
Payer: COMMERCIAL

## 2025-01-10 RX ORDER — TRETINOIN 1 MG/G
CREAM TOPICAL NIGHTLY
Qty: 45 G | Refills: 11 | Status: SHIPPED | OUTPATIENT
Start: 2025-01-10

## 2025-02-25 DIAGNOSIS — F41.8 SITUATIONAL ANXIETY: ICD-10-CM

## 2025-02-25 DIAGNOSIS — G47.00 INSOMNIA, UNSPECIFIED TYPE: ICD-10-CM

## 2025-02-25 RX ORDER — ALPRAZOLAM 0.25 MG/1
0.25 TABLET ORAL NIGHTLY PRN
Qty: 30 TABLET | Refills: 5 | Status: CANCELLED | OUTPATIENT
Start: 2025-02-25

## 2025-02-25 RX ORDER — ZOLPIDEM TARTRATE 5 MG/1
5 TABLET ORAL NIGHTLY PRN
Qty: 30 TABLET | Refills: 1 | Status: CANCELLED | OUTPATIENT
Start: 2025-02-25

## 2025-02-26 DIAGNOSIS — F41.8 SITUATIONAL ANXIETY: ICD-10-CM

## 2025-02-26 DIAGNOSIS — G47.00 INSOMNIA, UNSPECIFIED TYPE: ICD-10-CM

## 2025-02-26 RX ORDER — IRBESARTAN 75 MG/1
75 TABLET ORAL NIGHTLY
Qty: 90 TABLET | Refills: 3 | Status: SHIPPED | OUTPATIENT
Start: 2025-02-26 | End: 2026-02-26

## 2025-02-26 RX ORDER — ROSUVASTATIN CALCIUM 10 MG/1
10 TABLET, COATED ORAL DAILY
Qty: 90 TABLET | Refills: 3 | Status: SHIPPED | OUTPATIENT
Start: 2025-02-26 | End: 2026-02-26

## 2025-02-27 RX ORDER — ALPRAZOLAM 0.25 MG/1
0.25 TABLET ORAL NIGHTLY PRN
Qty: 30 TABLET | Refills: 5 | Status: SHIPPED | OUTPATIENT
Start: 2025-02-27

## 2025-02-27 RX ORDER — ZOLPIDEM TARTRATE 5 MG/1
5 TABLET ORAL NIGHTLY PRN
Qty: 30 TABLET | Refills: 1 | Status: SHIPPED | OUTPATIENT
Start: 2025-02-27

## 2025-03-03 ENCOUNTER — TELEPHONE (OUTPATIENT)
Dept: SPORTS MEDICINE | Facility: CLINIC | Age: 65
End: 2025-03-03
Payer: COMMERCIAL

## 2025-03-03 NOTE — TELEPHONE ENCOUNTER
"Spoke c pt. Called to discuss upcoming appt as appt notes state "left hip pain".  Pt reports that the pain she is feeling is different than the bursitis she was seen by [facility] for last last year on R side. She does not feel she has a surgical issue but would be interested in an injection if warranted. Discussed r/s appt c [facility] as they specialize in US guided injections, has seen Dr. Cabrera in past for knees. R/s appt c Dr. Cabrera. Confirmed appt date, time, location. Pt will call c additional questions/concerns in interim. Pt expressed understanding & was thankful.    "

## 2025-03-10 ENCOUNTER — TELEPHONE (OUTPATIENT)
Dept: INFECTIOUS DISEASES | Facility: CLINIC | Age: 65
End: 2025-03-10
Payer: COMMERCIAL

## 2025-03-10 RX ORDER — PREDNISONE 10 MG/1
20 TABLET ORAL DAILY
Qty: 10 TABLET | Refills: 0 | Status: SHIPPED | OUTPATIENT
Start: 2025-03-10 | End: 2025-03-15

## 2025-03-10 NOTE — TELEPHONE ENCOUNTER
Symptoms for about a week. Was mostly nasal now more bronchial. Clear sputum.  No fever, No chest pain or pleurisy. No SOB.    Low dose steroid 5 days. Call if no improvement.

## 2025-03-11 ENCOUNTER — TELEPHONE (OUTPATIENT)
Dept: INFECTIOUS DISEASES | Facility: CLINIC | Age: 65
End: 2025-03-11
Payer: COMMERCIAL

## 2025-03-11 RX ORDER — AZITHROMYCIN 250 MG/1
TABLET, FILM COATED ORAL
Qty: 6 TABLET | Refills: 0 | Status: SHIPPED | OUTPATIENT
Start: 2025-03-11

## 2025-03-16 ENCOUNTER — PATIENT MESSAGE (OUTPATIENT)
Dept: ADMINISTRATIVE | Facility: OTHER | Age: 65
End: 2025-03-16
Payer: COMMERCIAL

## 2025-04-13 ENCOUNTER — PATIENT MESSAGE (OUTPATIENT)
Dept: ENDOCRINOLOGY | Facility: CLINIC | Age: 65
End: 2025-04-13
Payer: COMMERCIAL

## 2025-04-14 RX ORDER — TIRZEPATIDE 10 MG/.5ML
10 INJECTION, SOLUTION SUBCUTANEOUS
Qty: 2 ML | Refills: 5 | Status: SHIPPED | OUTPATIENT
Start: 2025-04-14

## 2025-04-25 ENCOUNTER — HOSPITAL ENCOUNTER (OUTPATIENT)
Dept: RADIOLOGY | Facility: HOSPITAL | Age: 65
Discharge: HOME | End: 2025-04-25
Attending: PHYSICIAN ASSISTANT
Payer: COMMERCIAL

## 2025-04-25 ENCOUNTER — OFFICE VISIT (OUTPATIENT)
Dept: SPORTS MEDICINE | Facility: CLINIC | Age: 65
End: 2025-04-25
Payer: COMMERCIAL

## 2025-04-25 VITALS
HEART RATE: 112 BPM | DIASTOLIC BLOOD PRESSURE: 69 MMHG | HEIGHT: 68 IN | SYSTOLIC BLOOD PRESSURE: 93 MMHG | WEIGHT: 184 LBS | BODY MASS INDEX: 27.89 KG/M2

## 2025-04-25 DIAGNOSIS — M25.561 RIGHT KNEE PAIN, UNSPECIFIED CHRONICITY: ICD-10-CM

## 2025-04-25 DIAGNOSIS — M25.551 RIGHT HIP PAIN: Primary | ICD-10-CM

## 2025-04-25 DIAGNOSIS — M25.551 RIGHT HIP PAIN: ICD-10-CM

## 2025-04-25 DIAGNOSIS — Z96.651 S/P TOTAL KNEE ARTHROPLASTY, RIGHT: ICD-10-CM

## 2025-04-25 PROCEDURE — 73564 X-RAY EXAM KNEE 4 OR MORE: CPT | Mod: TC,50

## 2025-04-25 PROCEDURE — 73502 X-RAY EXAM HIP UNI 2-3 VIEWS: CPT | Mod: TC,RT

## 2025-04-25 PROCEDURE — 73502 X-RAY EXAM HIP UNI 2-3 VIEWS: CPT | Mod: 26,RT,, | Performed by: RADIOLOGY

## 2025-04-25 PROCEDURE — 73564 X-RAY EXAM KNEE 4 OR MORE: CPT | Mod: 26,50,, | Performed by: RADIOLOGY

## 2025-04-25 PROCEDURE — 99999 PR PBB SHADOW E&M-EST. PATIENT-LVL IV: CPT | Mod: PBBFAC,,, | Performed by: PHYSICIAN ASSISTANT

## 2025-04-25 NOTE — PROGRESS NOTES
CC: Right knee and hip pain    Josefina presents with right knee and right hip pain. Two weeks ago, she noticed increased stinging, burning discomfort in the right knee with associated swelling, and developed deep right hip pain radiating towards her groin. She has been taking ibuprofen regularly since symptom onset. Her knee discomfort has since resolved, but she still experiences mild right hip discomfort, particularly noticeable when playing tennis. Hip pain is improving.    She denies any changes in activity, noting she has been regularly playing tennis 3 times per week for a long time. She has a history of L4-L5 lumbar spondylosis. Her last relevant     Pain Score: 0-No pain    REVIEW OF SYSTEMS:   Constitution: Negative. Negative for chills, fever and night sweats.    Hematologic/Lymphatic: Negative for bleeding problem. Does not bruise/bleed easily.   Skin: Negative for dry skin, itching and rash.   Musculoskeletal: Negative for falls. Negative for right knee pain, positive for right hip pain and muscle weakness.     All other review of symptoms were reviewed and found to be noncontributory.     PAST MEDICAL HISTORY:   Past Medical History:   Diagnosis Date    DDD (degenerative disc disease), lumbar 10/7/2019    Hyperlipidemia 8/31/2015    Hypertension     IFG (impaired fasting glucose) 8/31/2015    Neuropathic pain 8/31/2015    Squamous cell cancer of tongue 2012       PAST SURGICAL HISTORY:   Past Surgical History:   Procedure Laterality Date    CHOLECYSTECTOMY      COLONOSCOPY N/A 06/14/2021    Procedure: COLONOSCOPY;  Surgeon: Gentry Dickson MD;  Location: [address]);  Service: Endoscopy;  Laterality: N/A;  covid test 6/11 primary care, instructions sent to [facility] 6/11 Pt. fully vaccinated. Completed in Jan. 2021.[facility]    GALLBLADDER SURGERY  06/2016    HYSTERECTOMY      JOINT REPLACEMENT  2020    KNEE ARTHROPLASTY Left 07/08/2020    Procedure: ARTHROPLASTY, KNEE;  Surgeon: GLENN Lopez  MD Isabell;  Location: [facility] OR;  Service: Orthopedics;  Laterality: Left;  regional w/catheter   Spinal, Adductor  Cloidine/Epi/Ketorolac/Ropivacaine Injection 30cc      KNEE ARTHROSCOPY Right     for torn meniscus    TONGUE SURGERY      TOTAL KNEE ARTHROPLASTY Right 11/11/2020    Procedure: ARTHROPLASTY, KNEE, TOTAL;  Surgeon: GLENN Whyte MD;  Location: [facility] OR;  Service: Orthopedics;  Laterality: Right;  outpatient with 23hr observation  regional with cathter- spinal and adductor    TRANSFORAMINAL EPIDURAL INJECTION OF STEROID Bilateral 09/19/2019    Procedure: Injection,steroid,epidural,transforaminal approach LUMBAR TRANSFORAMINAL BILATERAL L4/5 TF NOE;  Surgeon: Tim Kerns MD;  Location: [facility];  Service: Pain Management;  Laterality: Bilateral;  NEEDS CONSENT, VIP    TRANSFORAMINAL EPIDURAL INJECTION OF STEROID Bilateral 10/07/2019    Procedure: LUMBAR TRANSFORAMINAL BILATERAL L4/5 TF NOE;  Surgeon: Tim Kerns MD;  Location: [facility];  Service: Pain Management;  Laterality: Bilateral;  NEEDS CONSENT, **VIP**    TRANSFORAMINAL EPIDURAL INJECTION OF STEROID Bilateral 03/22/2021    Procedure: LUMBAR TRANSFORAMINAL BILATERAL L4/5 DIRECT REFERRAL;  Surgeon: Tim Kerns MD;  Location: [facility];  Service: Pain Management;  Laterality: Bilateral;  NEEDS CONSENT, URGENT  *VIP*    TRANSFORAMINAL EPIDURAL INJECTION OF STEROID Right 05/10/2021    Procedure: LUMBAR TRANSFORAMINAL RIGHT L4/5, L5/S1 DIRECT REFERRAL;  Surgeon: Tim Kerns MD;  Location: [facility];  Service: Pain Management;  Laterality: Right;  NEEDS CONSENT, MEDICALLY URGENT  **VIP**       FAMILY HISTORY:   Family History   Problem Relation Name Age of Onset    No Known Problems Paternal Grandfather      No Known Problems Paternal Grandmother      No Known Problems Maternal Grandmother      No Known Problems Maternal Grandfather      Cancer Father Father 74        prostate, throat, lung- smoker    Hyperlipidemia Father Father  "    Diabetes Father Father     Heart disease Father Father     Cancer Mother varices         cervical cancer    Alcohol abuse Mother varices     Cirrhosis Mother varices     Drug abuse Brother Brother     Heart disease Brother Brother     Hyperlipidemia Brother Brother     Diabetes Sister Sister     Hypertension Sister Sister     ALS Sister Sister     No Known Problems Maternal Aunt      No Known Problems Maternal Uncle      No Known Problems Paternal Aunt      No Known Problems Paternal Uncle      Amblyopia Neg Hx      Blindness Neg Hx      Cataracts Neg Hx      Glaucoma Neg Hx      Macular degeneration Neg Hx      Retinal detachment Neg Hx      Strabismus Neg Hx      Stroke Neg Hx      Thyroid disease Neg Hx      Breast cancer Neg Hx      Colon cancer Neg Hx      Ovarian cancer Neg Hx         SOCIAL HISTORY:   Social History[1]    MEDICATIONS:   Current Medications[2]    ALLERGIES:   Review of patient's allergies indicates:   Allergen Reactions    Aspirin Nausea Only     Can take low dose of Aspirin; can take buffered low dose aspirin only    Codeine Nausea Only     Can take Codeine if she takes a dose of Zofran with it        PHYSICAL EXAMINATION:  BP 93/69   Pulse (!) 112   Ht 5' 8" (1.727 m)   Wt 83.5 kg (183 lb 15.9 oz)   BMI 27.98 kg/m²   General: Well-developed well-nourished 64 y.o. femalein no acute distress   Cardiovascular: Regular rhythm by palpation of distal pulse, normal color and temperature, no concerning varicosities on symptomatic side   Lungs: No labored breathing or wheezing appreciated   Neuro: Alert and oriented ×3   Psychiatric: well oriented to person, place and time, demonstrates normal mood and affect   Skin: No rashes, lesions or ulcers, normal temperature, turgor, and texture on involved extremity    Ortho/SPM Exam  Examination of the right knee demonstrates intact extensor mechanism. No effusion or prepatellar swelling. Central patellar tracking. No patellar apprehension. Normal " patellar mobility. Full extension. Flexion to 130. No pain with forced flexion or extension. No tenderness along the medial and lateral joint line. Negative Yosi's. Negative Lachman. Stable to varus/valgus stress testing at 0 and 30 deg. Negative posterior drawer. Ligamentously stable.    Exam of the right hip demonstrates no pain with log roll. Positive Stinchfield. Extension to 0. Flexion to 120. ER to 70, IR to 20. Tender over psoas. Negative FADIR/CARLOS. No pain over adductors or with adduction against resistance.    IMAGING:  X-rays including standing, weight bearing AP and flexion bilateral knees, RIGHT knee lateral and sunrise views ordered and images reviewed by me show:    FINDINGS:  Postop bilateral knee replacement.  Alignment appears satisfactory.  No significant effusions.    X-rays of the RIGHT hip:   FINDINGS:  Hypertrophic degenerative change about the superolateral aspect of the acetabula bilaterally.  Degenerative change lower lumbar spine and SI joints.  Pelvic calcifications likely phleboliths.  Scattered stool and bowel gas.  No fracture or bone destruction.    ASSESSMENT:    S/p Right TKA  Acute right hip pain, psoas tendonitis  Right hip osteoarthritis  - Discussed plan of care with the patient. The knee is fine today. She does not have any pain and her imaging looks great. The description of her pain does sound almost neuropathic. She does have a history of some previous lumbar radiculopathy that could potentially cause these symptoms. If pain returns that may be something to further evaluate.    As for the hip she does have minor DJD on imaging as well as some psoas tightness on exam today. This discomfort is also steadily declining per the patient with conservative treatment. Taking ibuprofen as needed for pain. I have her a hip and core packet to work on stretching and strengthening of the hip. We did discuss possible future intra-articular hip injection if pain in the hip does not  improve despite OTC medication and exercises. She would also be a good candidate for OMT in the future.      This note was generated with the assistance of ambient listening technology. Verbal consent was obtained by the patient and accompanying visitor(s) for the recording of patient appointment to facilitate this note. I attest to having reviewed and edited the generated note for accuracy, though some syntax or spelling errors may persist. Please contact the author of this note for any clarification.            [1]   Social History  Socioeconomic History    Marital status:    Occupational History    Occupation: chief nursing officer   Tobacco Use    Smoking status: Never    Smokeless tobacco: Never   Substance and Sexual Activity    Alcohol use: Yes     Alcohol/week: 3.0 standard drinks of alcohol     Types: 2 Glasses of wine, 1 Drinks containing 0.5 oz of alcohol per week     Comment: once a week    Drug use: No    Sexual activity: Yes     Partners: Male     Birth control/protection: Post-menopausal, See Surgical Hx, None   Social History Narrative    Lives with  and 1 dog     Social Drivers of Health     Financial Resource Strain: Low Risk  (4/19/2025)    Overall Financial Resource Strain (CARDIA)     Difficulty of Paying Living Expenses: Not hard at all   Food Insecurity: No Food Insecurity (4/19/2025)    Hunger Vital Sign     Worried About Running Out of Food in the Last Year: Never true     Ran Out of Food in the Last Year: Never true   Transportation Needs: No Transportation Needs (4/19/2025)    PRAPARE - Transportation     Lack of Transportation (Medical): No     Lack of Transportation (Non-Medical): No   Physical Activity: Sufficiently Active (4/19/2025)    Exercise Vital Sign     Days of Exercise per Week: 4 days     Minutes of Exercise per Session: 80 min   Stress: Stress Concern Present (4/19/2025)    [employer] - Occupational Stress Questionnaire     Feeling  of Stress : To some extent   Housing Stability: Low Risk  (4/19/2025)    Housing Stability Vital Sign     Unable to Pay for Housing in the Last Year: No     Number of Times Moved in the Last Year: 0     Homeless in the Last Year: No   [2]   Current Outpatient Medications:     ALPRAZolam (XANAX) 0.25 MG tablet, Take 1 tablet (0.25 mg total) by mouth nightly as needed for Anxiety., Disp: 30 tablet, Rfl: 5    aspirin (ECOTRIN) 81 MG EC tablet, Take 1 tablet (81 mg total) by mouth nightly., Disp: , Rfl: 0    estradioL (ESTRACE) 0.01 % (0.1 mg/gram) vaginal cream, Place 1 g vaginally once daily., Disp: 42.5 g, Rfl: 1    estradioL (IMVEXXY MAINTENANCE PACK) 10 mcg Inst, Place 10 mcg vaginally twice a week., Disp: 8 each, Rfl: 11    irbesartan (AVAPRO) 75 MG tablet, Take 1 tablet (75 mg total) by mouth every evening., Disp: 90 tablet, Rfl: 3    rosuvastatin (CRESTOR) 10 MG tablet, Take 1 tablet (10 mg total) by mouth once daily., Disp: 90 tablet, Rfl: 3    tirzepatide (MOUNJARO) 10 mg/0.5 mL PnIj, Inject 10 mg into the skin every 7 days., Disp: 2 mL, Rfl: 5    tretinoin (RETIN-A) 0.1 % cream, Apply topically every evening., Disp: 45 g, Rfl: 11    UNABLE TO FIND, medication name: testosterone 1% Gel Apply 1-2 clicks to upper inner thigh as directed, Disp: 30 g, Rfl: 5    zolpidem (AMBIEN) 5 MG Tab, Take 1 tablet (5 mg total) by mouth nightly as needed for insomnia., Disp: 30 tablet, Rfl: 1    azithromycin (ZITHROMAX) 250 MG tablet, Take 2 po then 1 po daily thereafter., Disp: 6 tablet, Rfl: 0    celecoxib (CELEBREX) 200 MG capsule, Take 1 capsule (200 mg total) by mouth daily as needed for Pain. (Patient not taking: Reported on 4/25/2025), Disp: 90 capsule, Rfl: 1

## 2025-05-11 ENCOUNTER — PATIENT MESSAGE (OUTPATIENT)
Dept: INFECTIOUS DISEASES | Facility: CLINIC | Age: 65
End: 2025-05-11
Payer: COMMERCIAL

## 2025-05-11 ENCOUNTER — PATIENT MESSAGE (OUTPATIENT)
Dept: OPTOMETRY | Facility: CLINIC | Age: 65
End: 2025-05-11
Payer: COMMERCIAL

## 2025-05-11 DIAGNOSIS — B35.1 NAIL FUNGUS: Primary | ICD-10-CM

## 2025-05-14 DIAGNOSIS — F41.8 SITUATIONAL ANXIETY: ICD-10-CM

## 2025-05-14 DIAGNOSIS — G47.00 INSOMNIA, UNSPECIFIED TYPE: ICD-10-CM

## 2025-05-14 RX ORDER — TIRZEPATIDE 10 MG/.5ML
10 INJECTION, SOLUTION SUBCUTANEOUS
Qty: 2 ML | Refills: 5 | Status: SHIPPED | OUTPATIENT
Start: 2025-05-14 | End: 2025-05-15 | Stop reason: SDUPTHER

## 2025-05-14 NOTE — TELEPHONE ENCOUNTER
Refill Routing Note   Medication(s) are not appropriate for processing by [facility] for the following reason(s):        Required vitals outdated  Required labs outdated  Patient not seen by provider within 15 months    [facility] action(s):  Defer               Appointments  past 12m or future 3m with PCP    Date Provider   Last Visit   1/29/2024 Alirio Rodriguez III, MD   Next Visit   Visit date not found Alirio Rodriguez III, MD   ED visits in past 90 days: 0        Note composed:5:52 PM 05/14/2025

## 2025-05-15 ENCOUNTER — PATIENT MESSAGE (OUTPATIENT)
Dept: ENDOCRINOLOGY | Facility: CLINIC | Age: 65
End: 2025-05-15
Payer: COMMERCIAL

## 2025-05-15 RX ORDER — CICLOPIROX 80 MG/ML
SOLUTION TOPICAL NIGHTLY
Qty: 6.6 ML | Refills: 0 | Status: CANCELLED | OUTPATIENT
Start: 2025-05-15 | End: 2025-06-14

## 2025-05-15 RX ORDER — TIRZEPATIDE 10 MG/.5ML
10 INJECTION, SOLUTION SUBCUTANEOUS
Qty: 2 ML | Refills: 11 | Status: SHIPPED | OUTPATIENT
Start: 2025-05-15

## 2025-05-15 NOTE — TELEPHONE ENCOUNTER
Refill Routing Note   Medication(s) are not appropriate for processing by [facility] for the following reason(s):        Required vitals outdated  Required labs outdated  Patient not seen by provider within 15 months    [facility] action(s):  Defer               Appointments  past 12m or future 3m with PCP    Date Provider   Last Visit   1/29/2024 Alirio Rodriguez III, MD   Next Visit   Visit date not found Alirio Rodriguez III, MD   ED visits in past 90 days: 0        Note composed:9:40 AM 05/15/2025

## 2025-05-16 RX ORDER — IRBESARTAN 75 MG/1
75 TABLET ORAL NIGHTLY
Qty: 90 TABLET | Refills: 3 | OUTPATIENT
Start: 2025-05-16 | End: 2026-05-16

## 2025-05-16 RX ORDER — CICLOPIROX 80 MG/ML
SOLUTION TOPICAL NIGHTLY
Qty: 6.6 ML | Refills: 1 | Status: SHIPPED | OUTPATIENT
Start: 2025-05-16 | End: 2025-06-15

## 2025-05-16 RX ORDER — ALPRAZOLAM 0.25 MG/1
0.25 TABLET ORAL NIGHTLY PRN
Qty: 30 TABLET | Refills: 5 | Status: SHIPPED | OUTPATIENT
Start: 2025-05-16

## 2025-05-16 RX ORDER — ZOLPIDEM TARTRATE 5 MG/1
5 TABLET ORAL NIGHTLY PRN
Qty: 30 TABLET | Refills: 1 | Status: SHIPPED | OUTPATIENT
Start: 2025-05-16

## 2025-07-11 ENCOUNTER — OFFICE VISIT (OUTPATIENT)
Dept: ENDOCRINOLOGY | Facility: CLINIC | Age: 65
End: 2025-07-11
Payer: COMMERCIAL

## 2025-07-11 VITALS
DIASTOLIC BLOOD PRESSURE: 77 MMHG | WEIGHT: 180 LBS | HEART RATE: 78 BPM | BODY MASS INDEX: 27.28 KG/M2 | HEIGHT: 68 IN | SYSTOLIC BLOOD PRESSURE: 108 MMHG

## 2025-07-11 DIAGNOSIS — R73.03 PREDIABETES: Primary | ICD-10-CM

## 2025-07-11 DIAGNOSIS — E78.2 MIXED HYPERLIPIDEMIA: ICD-10-CM

## 2025-07-11 DIAGNOSIS — R23.2 HOT FLASHES: ICD-10-CM

## 2025-07-11 DIAGNOSIS — I10 BENIGN ESSENTIAL HYPERTENSION: ICD-10-CM

## 2025-07-11 RX ORDER — PAROXETINE 10 MG/1
10 TABLET, FILM COATED ORAL EVERY MORNING
Qty: 30 TABLET | Refills: 11 | Status: SHIPPED | OUTPATIENT
Start: 2025-07-11 | End: 2026-07-11

## 2025-07-11 NOTE — PROGRESS NOTES
"Josefina Blue is a 65 y.o. female with HTN, HLD presenting for follow-up of prediabetes, metabolic syndrome    The patient location is: home in [place]  The chief complaint leading to consultation is:   Chief Complaint   Patient presents with    Pre-diabetes       Visit type: audiovisual    Face to Face time with patient: 15 minutes  20 minutes of total time spent on the encounter, which includes face to face time and non-face to face time preparing to see the patient (eg, review of tests), Obtaining and/or reviewing separately obtained history, Documenting clinical information in the electronic or other health record, Independently interpreting results (not separately reported) and communicating results to the patient/family/caregiver, or Care coordination (not separately reported).    Each patient to whom he or she provides medical services by telemedicine is:  (1) informed of the relationship between the physician and patient and the respective role of any other health care provider with respect to management of the patient; and (2) notified that he or she may decline to receive medical services by telemedicine and may withdraw from such care at any time.      History of Present Illness  Prediabetes  Metabolic syndrome  Has has elevation in A1c beginning in 2015 in our system with levels consistently in prediabetes range at at times very near to diabetes range with highest 6.4%      History of Present Illness    CHIEF COMPLAINT:  Patient presents today for follow up of weight management and hormone concerns.    WEIGHT MANAGEMENT:  She reports successful weight management with Mounjaro 10mg, maintaining a stable weight of 179-180 lbs for several months. She describes significant positive changes in eating patterns with mental and emotional shifts in dietary habits. She reports feeling "terrific" and active, with gradual weight decline. She notes an improved relationship with eating compared to previous Ozempic use, " experiencing less struggle with food intake and more sustainable lifestyle modifications.    BLOOD PRESSURE:  She reports blood pressure of 107/77 this morning with stable management on reduced dose of Irbesartan. During tennis in high heat (94-degree temperatures), she experiences symptoms of low blood pressure with readings dropping to 90s/60s, accompanied by near-syncope. To mitigate these symptoms, she takes Irbesartan at night before morning tennis.    MENOPAUSAL SYMPTOMS:  She experiences predictable hot flashes nightly between 8:42 PM and 9:05 PM with night sweats, causing midnight awakenings. These symptoms are bothersome but not severely disruptive to daily life. She uses vaginal estrogen cream and Imvexxy twice weekly. Previous trial of testosterone did not significantly improve hormonal symptoms.       Lab Results   Component Value Date    HGBA1C 5.6 10/30/2024       HLD  Taking rosuvastatin 10 mg daily. Great response after changing from pravastatin  Lab Results   Component Value Date    LDLCALC 48.8 (L) 10/30/2024       HTN  On irbesartan 75 mg daily  In digital HTN with excellent control    DXA 2023 normal BMD    Strong family history of HLD, DM, heart disease      Current Outpatient Medications:     ALPRAZolam (XANAX) 0.25 MG tablet, Take 1 tablet (0.25 mg total) by mouth nightly as needed for Anxiety., Disp: 30 tablet, Rfl: 5    aspirin (ECOTRIN) 81 MG EC tablet, Take 1 tablet (81 mg total) by mouth nightly., Disp: , Rfl: 0    estradioL (IMVEXXY MAINTENANCE PACK) 10 mcg Inst, Place 10 mcg vaginally twice a week., Disp: 8 each, Rfl: 11    irbesartan (AVAPRO) 75 MG tablet, Take 1 tablet (75 mg total) by mouth every evening., Disp: 90 tablet, Rfl: 3    rosuvastatin (CRESTOR) 10 MG tablet, Take 1 tablet (10 mg total) by mouth once daily., Disp: 90 tablet, Rfl: 3    tirzepatide (MOUNJARO) 10 mg/0.5 mL PnIj, Inject 10 mg into the skin every 7 days., Disp: 2 mL, Rfl: 11    tretinoin (RETIN-A) 0.1 % cream,  Apply topically every evening., Disp: 45 g, Rfl: 11    zolpidem (AMBIEN) 5 MG Tab, Take 1 tablet (5 mg total) by mouth nightly as needed for insomnia., Disp: 30 tablet, Rfl: 1    paroxetine (PAXIL) 10 MG tablet, Take 1 tablet (10 mg total) by mouth every morning., Disp: 30 tablet, Rfl: 11    ROS as above    Objective:     Vitals:    07/11/25 0836   BP: 108/77   Pulse: 78       Wt Readings from Last 3 Encounters:   07/11/25 0836 81.6 kg (180 lb)   04/25/25 1325 83.5 kg (183 lb 15.9 oz)   10/24/24 1550 85.8 kg (189 lb 3.2 oz)         Body mass index is 27.37 kg/m².      Labs    Chemistry        Component Value Date/Time     10/30/2023 0849    K 4.0 10/30/2023 0849     10/30/2023 0849    CO2 26 10/30/2023 0849    BUN 14 10/30/2023 0849    CREATININE 0.8 10/30/2023 0849    GLU 94 10/30/2023 0849        Component Value Date/Time    CALCIUM 9.5 10/30/2023 0849    ALKPHOS 50 (L) 10/30/2023 0849    AST 20 10/30/2023 0849    ALT 18 10/30/2023 0849    BILITOT 0.6 10/30/2023 0849    ESTGFRAFRICA >60.0 05/09/2022 0948    EGFRNONAA >60.0 05/09/2022 0948        Lab Results   Component Value Date    HGBA1C 5.6 10/30/2024     Lab Results   Component Value Date    TSH 0.765 10/30/2024     1. Prediabetes    2. Mixed hyperlipidemia    3. Benign essential hypertension    4. Hot flashes        Assessment & Plan    Prediabetes  Weight management  - Excellent response to Mounjaro as well as ongoing lifestyle changes  - Cont Mounjaro 10 mg weekly with option to increase in future if needed    MENOPAUSAL SYMPTOMS MANAGEMENT:  - Discussed options for managing menopausal symptoms, including systemic HRT and non-hormonal treatments.  - Provided information on non-hormonal treatments for hot flashes, including paroxetine and Veozah.  - Explained mechanism of action for Veozah in resetting temperature sensors affected by menopause.  - She will consider paroxetine 10 mg at bedtime for hot flash management.  - Cont to follow-up with  gyn as well    HTN  - BP well controlled in digital program    HLD  - excellent LDL  - on statin  - managed by cardiology    RTC 1 year    Liat Francois MD    Visit today included increased complexity associated with the care of the problems addressed and managing the longitudinal care of the patient due to the serious and/or complex managed problems      This note was generated with the assistance of ambient listening technology. Verbal consent was obtained by the patient and accompanying visitor(s) for the recording of patient appointment to facilitate this note. I attest to having reviewed and edited the generated note for accuracy, though some syntax or spelling errors may persist. Please contact the author of this note for any clarification.

## 2025-07-21 DIAGNOSIS — N95.2 VAGINAL ATROPHY: ICD-10-CM

## 2025-07-21 RX ORDER — ESTRADIOL 10 UG/1
INSERT VAGINAL
Qty: 8 EACH | Refills: 11 | Status: SHIPPED | OUTPATIENT
Start: 2025-07-21

## 2025-07-22 ENCOUNTER — PATIENT MESSAGE (OUTPATIENT)
Dept: CARDIOLOGY | Facility: CLINIC | Age: 65
End: 2025-07-22
Payer: COMMERCIAL

## (undated) DEVICE — SET CEMENT (SCULP)

## (undated) DEVICE — BOWL CEMENT

## (undated) DEVICE — HOOD T-5 TEAR AWAY STERILE

## (undated) DEVICE — GLOVE BIOGEL SKINSENSE PI 8.0

## (undated) DEVICE — GLOVE BIOGEL PI MICRO INDIC 7

## (undated) DEVICE — SUT VICRYL 3-0 27 SH

## (undated) DEVICE — SEE MEDLINE ITEM 157150

## (undated) DEVICE — DRESSING LEUKOPLAST FLEX 1X3IN

## (undated) DEVICE — BLADE SAGITTAL 18 X 1.27 X 90M

## (undated) DEVICE — GLOVE BIOGEL PI MICRO SZ 7.5

## (undated) DEVICE — PUMP COLD THERAPY

## (undated) DEVICE — ELECTRODE REM PLYHSV RETURN 9

## (undated) DEVICE — KIT ANTIFOG

## (undated) DEVICE — BANDAGE ESMARK 6X12

## (undated) DEVICE — WRAP PROTECTIVE LEG POS STRL

## (undated) DEVICE — TRAY ENT 4/CS

## (undated) DEVICE — SOL IRR NACL .9% 3000ML

## (undated) DEVICE — TOURNIQUET SB QC DP 34X4IN

## (undated) DEVICE — SEE MEDLINE ITEM 146298

## (undated) DEVICE — SEE MEDLINE ITEM 154981

## (undated) DEVICE — CORD BIPOLAR 12 FOOT

## (undated) DEVICE — BANDAGE ADHESIVE

## (undated) DEVICE — SUT MONOCRYL 3-0 PS-2 UND

## (undated) DEVICE — SUT SILK 3-0 SH 18IN BLACK

## (undated) DEVICE — KIT TOTAL KNEE TKOFG

## (undated) DEVICE — SUT 0 VICRYL / CT-1

## (undated) DEVICE — SUT SILK 3-0 RB-1 30IN BLK

## (undated) DEVICE — MASK FLYTE HOOD PEEL AWAY

## (undated) DEVICE — KIT IRR SUCTION HND PIECE

## (undated) DEVICE — SUT VICRYL BR 1 GEN 27 CT-1

## (undated) DEVICE — SUT VICRYL PLUS 3-0 SH 18IN

## (undated) DEVICE — NDL SAFETY 22G X 1.5 ECLIPSE

## (undated) DEVICE — SEE MEDLINE ITEM 152622

## (undated) DEVICE — DRAPE STERI INSTRUMENT 1018

## (undated) DEVICE — CONTAINER SPECIMEN STRL 4OZ

## (undated) DEVICE — SUT 2-0 SILK 30IN BLK BRAID

## (undated) DEVICE — SUCTION COAGULATOR 10FR 6IN

## (undated) DEVICE — TAPE SURG DURAPORE 2 X10YD

## (undated) DEVICE — PAD COLD THERAPY KNEE WRAP ON

## (undated) DEVICE — DRESSING AQUACEL AG ADV 3.5X12

## (undated) DEVICE — APPLICATOR CHLORAPREP ORN 26ML

## (undated) DEVICE — BLADE SAW STERN .076MM 6.1MM L

## (undated) DEVICE — NDL MICRODISSECTION 3CM 3/32

## (undated) DEVICE — UNDERGLOVES BIOGEL PI SIZE 8

## (undated) DEVICE — SEE MEDLINE ITEM 152487

## (undated) DEVICE — SEE MEDLINE ITEM 152548

## (undated) DEVICE — SHEET EENT SPLIT

## (undated) DEVICE — MIXER BONE CEMENT

## (undated) DEVICE — SPONGE PATTY SURGICAL .5X3IN

## (undated) DEVICE — UNDERGLOVES BIOGEL PI SIZE 8.5

## (undated) DEVICE — STAPLER SKIN 35 WIDE

## (undated) DEVICE — PENCIL ROCKER SWITCH 10FT CORD

## (undated) DEVICE — DRESSING SURGICAL 1/2X1/2

## (undated) DEVICE — PIN FIX TEMP 1/8X2.5IN LF STER
Type: IMPLANTABLE DEVICE | Site: KNEE | Status: NON-FUNCTIONAL
Removed: 2020-11-11